# Patient Record
Sex: FEMALE | Race: WHITE | NOT HISPANIC OR LATINO | Employment: OTHER | URBAN - NONMETROPOLITAN AREA
[De-identification: names, ages, dates, MRNs, and addresses within clinical notes are randomized per-mention and may not be internally consistent; named-entity substitution may affect disease eponyms.]

---

## 2017-05-02 ENCOUNTER — TRANSCRIBE ORDERS (OUTPATIENT)
Dept: ADMINISTRATIVE | Facility: HOSPITAL | Age: 82
End: 2017-05-02

## 2017-05-02 DIAGNOSIS — Z12.39 ENCOUNTER FOR SCREENING FOR MALIGNANT NEOPLASM OF BREAST: Primary | ICD-10-CM

## 2017-05-10 ENCOUNTER — TRANSCRIBE ORDERS (OUTPATIENT)
Dept: ADMINISTRATIVE | Facility: HOSPITAL | Age: 82
End: 2017-05-10

## 2017-05-10 ENCOUNTER — HOSPITAL ENCOUNTER (OUTPATIENT)
Dept: GENERAL RADIOLOGY | Facility: HOSPITAL | Age: 82
Discharge: HOME | End: 2017-05-10
Attending: FAMILY MEDICINE

## 2017-05-10 ENCOUNTER — HOSPITAL ENCOUNTER (OUTPATIENT)
Dept: MAMMOGRAPHY | Facility: HOSPITAL | Age: 82
Discharge: HOME | End: 2017-05-10
Attending: FAMILY MEDICINE | Admitting: FAMILY MEDICINE

## 2017-05-10 DIAGNOSIS — M79.604 BILATERAL LEG PAIN: Primary | ICD-10-CM

## 2017-05-10 DIAGNOSIS — M79.605 BILATERAL LEG PAIN: Primary | ICD-10-CM

## 2017-05-10 DIAGNOSIS — Z12.31 ENCOUNTER FOR SCREENING MAMMOGRAM FOR BREAST CANCER: ICD-10-CM

## 2017-05-10 PROCEDURE — 72110 X-RAY EXAM L-2 SPINE 4/>VWS: CPT

## 2017-05-10 PROCEDURE — 77063 BREAST TOMOSYNTHESIS BI: CPT

## 2017-05-10 PROCEDURE — G0202 SCR MAMMO BI INCL CAD: HCPCS

## 2017-11-01 ENCOUNTER — TRANSCRIBE ORDERS (OUTPATIENT)
Dept: ADMINISTRATIVE | Facility: HOSPITAL | Age: 82
End: 2017-11-01

## 2017-11-01 DIAGNOSIS — R92.1 BREAST CALCIFICATIONS: Primary | ICD-10-CM

## 2017-11-07 ENCOUNTER — HOSPITAL ENCOUNTER (OUTPATIENT)
Dept: MAMMOGRAPHY | Facility: HOSPITAL | Age: 82
Discharge: HOME | End: 2017-11-07
Attending: FAMILY MEDICINE | Admitting: FAMILY MEDICINE

## 2017-11-07 ENCOUNTER — APPOINTMENT (OUTPATIENT)
Dept: MAMMOGRAPHY | Facility: HOSPITAL | Age: 82
End: 2017-11-07
Attending: FAMILY MEDICINE

## 2017-11-07 DIAGNOSIS — R92.1 BREAST CALCIFICATIONS: ICD-10-CM

## 2017-11-07 PROCEDURE — G0206 DX MAMMO INCL CAD UNI: HCPCS

## 2017-11-07 PROCEDURE — G0279 TOMOSYNTHESIS, MAMMO: HCPCS

## 2017-11-09 ENCOUNTER — DOCUMENTATION (OUTPATIENT)
Dept: ULTRASOUND IMAGING | Facility: HOSPITAL | Age: 82
End: 2017-11-09

## 2017-11-09 ENCOUNTER — DOCUMENTATION (OUTPATIENT)
Dept: MAMMOGRAPHY | Facility: HOSPITAL | Age: 82
End: 2017-11-09

## 2017-11-09 NOTE — PROGRESS NOTES
Spoke with patient regarding need for a left breast stereotactic biopsy.  She states she is on blood thinner but I do not see one on her list.  Call placed to Dr. Pereira regarding above.  Will await return call.  Pre, during and post instructions given.  Patient has stereo educational brochure.

## 2017-11-10 ENCOUNTER — TRANSCRIBE ORDERS (OUTPATIENT)
Dept: ADMINISTRATIVE | Facility: HOSPITAL | Age: 82
End: 2017-11-10

## 2017-11-10 DIAGNOSIS — R92.1 BREAST CALCIFICATION, LEFT: Primary | ICD-10-CM

## 2017-11-13 ENCOUNTER — DOCUMENTATION (OUTPATIENT)
Dept: MAMMOGRAPHY | Facility: HOSPITAL | Age: 82
End: 2017-11-13

## 2017-11-13 NOTE — PROGRESS NOTES
Patient was scheduled for left breast stereotactic biopsy per Dr. Pereira's office for Wednesday November 15, 2017 at 9:30am.      Spoke with [place] with [employer].  No pre auth needed.  Reference # [id number].

## 2017-11-13 NOTE — PROGRESS NOTES
Call received from Delfina with Dr. Pereira on voicemail.  Ok to proceed with left breast biopsy for patient.

## 2017-11-15 ENCOUNTER — HOSPITAL ENCOUNTER (OUTPATIENT)
Dept: MAMMOGRAPHY | Facility: HOSPITAL | Age: 82
Discharge: HOME | End: 2017-11-15
Attending: FAMILY MEDICINE | Admitting: FAMILY MEDICINE

## 2017-11-15 ENCOUNTER — HOSPITAL ENCOUNTER (OUTPATIENT)
Dept: MAMMOGRAPHY | Facility: HOSPITAL | Age: 82
Discharge: HOME | End: 2017-11-15
Attending: FAMILY MEDICINE

## 2017-11-15 VITALS — DIASTOLIC BLOOD PRESSURE: 75 MMHG | SYSTOLIC BLOOD PRESSURE: 159 MMHG

## 2017-11-15 DIAGNOSIS — R92.1 BREAST CALCIFICATION, LEFT: ICD-10-CM

## 2017-11-15 PROCEDURE — 88305 TISSUE EXAM BY PATHOLOGIST: CPT | Performed by: FAMILY MEDICINE

## 2017-11-15 PROCEDURE — A4648 IMPLANTABLE TISSUE MARKER: HCPCS

## 2017-11-15 RX ORDER — LIDOCAINE HYDROCHLORIDE 10 MG/ML
10 INJECTION, SOLUTION INFILTRATION; PERINEURAL ONCE
Status: DISCONTINUED | OUTPATIENT
Start: 2017-11-15 | End: 2017-11-25 | Stop reason: HOSPADM

## 2017-11-15 RX ORDER — LIDOCAINE HYDROCHLORIDE AND EPINEPHRINE 10; 10 MG/ML; UG/ML
10 INJECTION, SOLUTION INFILTRATION; PERINEURAL ONCE
Status: DISCONTINUED | OUTPATIENT
Start: 2017-11-15 | End: 2017-11-25 | Stop reason: HOSPADM

## 2017-11-16 ENCOUNTER — DOCUMENTATION (OUTPATIENT)
Dept: MAMMOGRAPHY | Facility: HOSPITAL | Age: 82
End: 2017-11-16

## 2017-11-16 LAB
CYTO UR: NORMAL
LAB AP CASE REPORT: NORMAL
LAB AP CLINICAL INFORMATION: NORMAL
Lab: NORMAL
PATH REPORT.FINAL DX SPEC: NORMAL
PATH REPORT.GROSS SPEC: NORMAL

## 2017-11-16 NOTE — PROGRESS NOTES
Pathology complete of left breast stereotactic biopsy of left breast done yesterday- pathology is benign, however there is an intraductal papilloma present which warrants a recommendation for surgical consult per the radiologists.  The above message was left on nurse voicemail of Dr. Delfina Pereira.

## 2017-11-24 ENCOUNTER — HOSPITAL ENCOUNTER (OUTPATIENT)
Facility: HOSPITAL | Age: 82
Setting detail: OBSERVATION
Discharge: HOME | End: 2017-11-25
Attending: FAMILY MEDICINE | Admitting: INTERNAL MEDICINE

## 2017-11-24 ENCOUNTER — APPOINTMENT (OUTPATIENT)
Dept: GENERAL RADIOLOGY | Facility: HOSPITAL | Age: 82
End: 2017-11-24

## 2017-11-24 DIAGNOSIS — R07.9 CHEST PAIN, UNSPECIFIED TYPE: Primary | ICD-10-CM

## 2017-11-24 LAB
ALBUMIN SERPL-MCNC: 3.9 G/DL (ref 3.5–5)
ALBUMIN/GLOB SERPL: 1.6 G/DL (ref 1.1–2.5)
ALP SERPL-CCNC: 68 U/L (ref 24–120)
ALT SERPL W P-5'-P-CCNC: 32 U/L (ref 0–54)
ANION GAP SERPL CALCULATED.3IONS-SCNC: 9 MMOL/L (ref 4–13)
APTT PPP: 24.9 SECONDS (ref 24.1–34.8)
AST SERPL-CCNC: 29 U/L (ref 7–45)
BACTERIA UR QL AUTO: ABNORMAL /HPF
BASOPHILS # BLD AUTO: 0.04 10*3/MM3 (ref 0–0.2)
BASOPHILS NFR BLD AUTO: 0.5 % (ref 0–2)
BILIRUB SERPL-MCNC: 0.5 MG/DL (ref 0.1–1)
BILIRUB UR QL STRIP: NEGATIVE
BUN BLD-MCNC: 35 MG/DL (ref 5–21)
BUN/CREAT SERPL: 24.5 (ref 7–25)
CALCIUM SPEC-SCNC: 10 MG/DL (ref 8.4–10.4)
CHLORIDE SERPL-SCNC: 104 MMOL/L (ref 98–110)
CLARITY UR: CLEAR
CO2 SERPL-SCNC: 29 MMOL/L (ref 24–31)
COLOR UR: YELLOW
CREAT BLD-MCNC: 1.43 MG/DL (ref 0.5–1.4)
DEPRECATED RDW RBC AUTO: 46.8 FL (ref 40–54)
EOSINOPHIL # BLD AUTO: 0.15 10*3/MM3 (ref 0–0.7)
EOSINOPHIL NFR BLD AUTO: 1.9 % (ref 0–4)
ERYTHROCYTE [DISTWIDTH] IN BLOOD BY AUTOMATED COUNT: 13.7 % (ref 12–15)
GFR SERPL CREATININE-BSD FRML MDRD: 35 ML/MIN/1.73
GLOBULIN UR ELPH-MCNC: 2.5 GM/DL
GLUCOSE BLD-MCNC: 135 MG/DL (ref 70–100)
GLUCOSE UR STRIP-MCNC: NEGATIVE MG/DL
HCT VFR BLD AUTO: 35.7 % (ref 37–47)
HGB BLD-MCNC: 11.4 G/DL (ref 12–16)
HGB UR QL STRIP.AUTO: NEGATIVE
HYALINE CASTS UR QL AUTO: ABNORMAL /LPF
IMM GRANULOCYTES # BLD: 0.02 10*3/MM3 (ref 0–0.03)
IMM GRANULOCYTES NFR BLD: 0.3 % (ref 0–5)
INR PPP: 0.89 (ref 0.91–1.09)
KETONES UR QL STRIP: NEGATIVE
LEUKOCYTE ESTERASE UR QL STRIP.AUTO: ABNORMAL
LIPASE SERPL-CCNC: 100 U/L (ref 23–203)
LYMPHOCYTES # BLD AUTO: 2.42 10*3/MM3 (ref 0.72–4.86)
LYMPHOCYTES NFR BLD AUTO: 30.6 % (ref 15–45)
MCH RBC QN AUTO: 29.8 PG (ref 28–32)
MCHC RBC AUTO-ENTMCNC: 31.9 G/DL (ref 33–36)
MCV RBC AUTO: 93.5 FL (ref 82–98)
MONOCYTES # BLD AUTO: 0.82 10*3/MM3 (ref 0.19–1.3)
MONOCYTES NFR BLD AUTO: 10.4 % (ref 4–12)
NEUTROPHILS # BLD AUTO: 4.46 10*3/MM3 (ref 1.87–8.4)
NEUTROPHILS NFR BLD AUTO: 56.3 % (ref 39–78)
NITRITE UR QL STRIP: NEGATIVE
PH UR STRIP.AUTO: <=5 [PH] (ref 5–8)
PLATELET # BLD AUTO: 217 10*3/MM3 (ref 130–400)
PMV BLD AUTO: 9.6 FL (ref 6–12)
POTASSIUM BLD-SCNC: 4.2 MMOL/L (ref 3.5–5.3)
PROT SERPL-MCNC: 6.4 G/DL (ref 6.3–8.7)
PROT UR QL STRIP: NEGATIVE
PROTHROMBIN TIME: 12.3 SECONDS (ref 11.9–14.6)
RBC # BLD AUTO: 3.82 10*6/MM3 (ref 4.2–5.4)
RBC # UR: ABNORMAL /HPF
REF LAB TEST METHOD: ABNORMAL
SODIUM BLD-SCNC: 142 MMOL/L (ref 135–145)
SP GR UR STRIP: 1.01 (ref 1–1.03)
SQUAMOUS #/AREA URNS HPF: ABNORMAL /HPF
TROPONIN I SERPL-MCNC: <0.012 NG/ML (ref 0–0.03)
UROBILINOGEN UR QL STRIP: ABNORMAL
WBC NRBC COR # BLD: 7.91 10*3/MM3 (ref 4.8–10.8)
WBC UR QL AUTO: ABNORMAL /HPF

## 2017-11-24 PROCEDURE — 99284 EMERGENCY DEPT VISIT MOD MDM: CPT

## 2017-11-24 PROCEDURE — 71010 HC CHEST PA OR AP: CPT

## 2017-11-24 PROCEDURE — 80053 COMPREHEN METABOLIC PANEL: CPT | Performed by: FAMILY MEDICINE

## 2017-11-24 PROCEDURE — 83690 ASSAY OF LIPASE: CPT | Performed by: FAMILY MEDICINE

## 2017-11-24 PROCEDURE — 85610 PROTHROMBIN TIME: CPT | Performed by: FAMILY MEDICINE

## 2017-11-24 PROCEDURE — 84484 ASSAY OF TROPONIN QUANT: CPT | Performed by: FAMILY MEDICINE

## 2017-11-24 PROCEDURE — 85025 COMPLETE CBC W/AUTO DIFF WBC: CPT | Performed by: FAMILY MEDICINE

## 2017-11-24 PROCEDURE — 85730 THROMBOPLASTIN TIME PARTIAL: CPT | Performed by: FAMILY MEDICINE

## 2017-11-24 PROCEDURE — 93010 ELECTROCARDIOGRAM REPORT: CPT | Performed by: INTERNAL MEDICINE

## 2017-11-24 PROCEDURE — 81001 URINALYSIS AUTO W/SCOPE: CPT | Performed by: FAMILY MEDICINE

## 2017-11-24 PROCEDURE — 93005 ELECTROCARDIOGRAM TRACING: CPT

## 2017-11-24 RX ORDER — CYCLOSPORINE 0.5 MG/ML
1 EMULSION OPHTHALMIC EVERY 12 HOURS
COMMUNITY

## 2017-11-24 RX ORDER — LEVOTHYROXINE SODIUM 112 UG/1
112 CAPSULE ORAL DAILY
COMMUNITY
End: 2018-08-20

## 2017-11-24 RX ORDER — ALENDRONATE SODIUM 70 MG/1
70 TABLET ORAL
COMMUNITY

## 2017-11-24 RX ORDER — AMLODIPINE BESYLATE 10 MG/1
10 TABLET ORAL DAILY
COMMUNITY
End: 2022-05-02 | Stop reason: HOSPADM

## 2017-11-25 ENCOUNTER — APPOINTMENT (OUTPATIENT)
Dept: ULTRASOUND IMAGING | Facility: HOSPITAL | Age: 82
End: 2017-11-25

## 2017-11-25 ENCOUNTER — APPOINTMENT (OUTPATIENT)
Dept: CARDIOLOGY | Facility: HOSPITAL | Age: 82
End: 2017-11-25

## 2017-11-25 VITALS
SYSTOLIC BLOOD PRESSURE: 112 MMHG | RESPIRATION RATE: 16 BRPM | DIASTOLIC BLOOD PRESSURE: 66 MMHG | HEART RATE: 90 BPM | WEIGHT: 125.38 LBS | HEIGHT: 61 IN | TEMPERATURE: 98.9 F | BODY MASS INDEX: 23.67 KG/M2 | OXYGEN SATURATION: 95 %

## 2017-11-25 PROBLEM — M79.622 LEFT UPPER ARM PAIN: Status: ACTIVE | Noted: 2017-11-25

## 2017-11-25 PROBLEM — R07.9 CHEST PAIN: Status: ACTIVE | Noted: 2017-11-25

## 2017-11-25 LAB
ALBUMIN SERPL-MCNC: 3.6 G/DL (ref 3.5–5)
ALBUMIN/GLOB SERPL: 1.5 G/DL (ref 1.1–2.5)
ALP SERPL-CCNC: 67 U/L (ref 24–120)
ALT SERPL W P-5'-P-CCNC: 37 U/L (ref 0–54)
ANION GAP SERPL CALCULATED.3IONS-SCNC: 8 MMOL/L (ref 4–13)
ANION GAP SERPL CALCULATED.3IONS-SCNC: 8 MMOL/L (ref 4–13)
ARTICHOKE IGE QN: 68 MG/DL (ref 0–99)
AST SERPL-CCNC: 36 U/L (ref 7–45)
BASOPHILS # BLD AUTO: 0.03 10*3/MM3 (ref 0–0.2)
BASOPHILS NFR BLD AUTO: 0.4 % (ref 0–2)
BH CV STRESS BP STAGE 1: NORMAL
BH CV STRESS BP STAGE 2: NORMAL
BH CV STRESS BP STAGE 3: NORMAL
BH CV STRESS DOSE DOBUTAMINE STAGE 1: 10
BH CV STRESS DOSE DOBUTAMINE STAGE 2: 20
BH CV STRESS DOSE DOBUTAMINE STAGE 3: 30
BH CV STRESS DURATION MIN STAGE 1: 3
BH CV STRESS DURATION MIN STAGE 2: 3
BH CV STRESS DURATION MIN STAGE 3: 2
BH CV STRESS DURATION SEC STAGE 1: 0
BH CV STRESS DURATION SEC STAGE 2: 0
BH CV STRESS DURATION SEC STAGE 3: 41
BH CV STRESS HR STAGE 1: 86
BH CV STRESS HR STAGE 2: 96
BH CV STRESS HR STAGE 3: 115
BH CV STRESS PROTOCOL 1: NORMAL
BH CV STRESS RECOVERY BP: NORMAL MMHG
BH CV STRESS RECOVERY HR: 100 BPM
BH CV STRESS STAGE 1: 1
BH CV STRESS STAGE 2: 2
BH CV STRESS STAGE 3: 3
BILIRUB SERPL-MCNC: 0.4 MG/DL (ref 0.1–1)
BUN BLD-MCNC: 21 MG/DL (ref 5–21)
BUN BLD-MCNC: 30 MG/DL (ref 5–21)
BUN/CREAT SERPL: 18.3 (ref 7–25)
BUN/CREAT SERPL: 23.4 (ref 7–25)
CALCIUM SPEC-SCNC: 9.2 MG/DL (ref 8.4–10.4)
CALCIUM SPEC-SCNC: 9.3 MG/DL (ref 8.4–10.4)
CHLORIDE SERPL-SCNC: 107 MMOL/L (ref 98–110)
CHLORIDE SERPL-SCNC: 109 MMOL/L (ref 98–110)
CHOLEST SERPL-MCNC: 139 MG/DL (ref 130–200)
CO2 SERPL-SCNC: 26 MMOL/L (ref 24–31)
CO2 SERPL-SCNC: 27 MMOL/L (ref 24–31)
CREAT BLD-MCNC: 1.15 MG/DL (ref 0.5–1.4)
CREAT BLD-MCNC: 1.28 MG/DL (ref 0.5–1.4)
DEPRECATED RDW RBC AUTO: 46.5 FL (ref 40–54)
EOSINOPHIL # BLD AUTO: 0.22 10*3/MM3 (ref 0–0.7)
EOSINOPHIL NFR BLD AUTO: 3.2 % (ref 0–4)
ERYTHROCYTE [DISTWIDTH] IN BLOOD BY AUTOMATED COUNT: 13.7 % (ref 12–15)
GFR SERPL CREATININE-BSD FRML MDRD: 39 ML/MIN/1.73
GFR SERPL CREATININE-BSD FRML MDRD: 44 ML/MIN/1.73
GLOBULIN UR ELPH-MCNC: 2.4 GM/DL
GLUCOSE BLD-MCNC: 116 MG/DL (ref 70–100)
GLUCOSE BLD-MCNC: 84 MG/DL (ref 70–100)
HBA1C MFR BLD: 5.6 %
HCT VFR BLD AUTO: 35.3 % (ref 37–47)
HDLC SERPL-MCNC: 57 MG/DL
HGB BLD-MCNC: 11.2 G/DL (ref 12–16)
IMM GRANULOCYTES # BLD: 0.02 10*3/MM3 (ref 0–0.03)
IMM GRANULOCYTES NFR BLD: 0.3 % (ref 0–5)
LDLC/HDLC SERPL: 1.22 {RATIO}
LYMPHOCYTES # BLD AUTO: 2.46 10*3/MM3 (ref 0.72–4.86)
LYMPHOCYTES NFR BLD AUTO: 35.4 % (ref 15–45)
MAGNESIUM SERPL-MCNC: 1.9 MG/DL (ref 1.4–2.2)
MAXIMAL PREDICTED HEART RATE: 131 BPM
MCH RBC QN AUTO: 29.8 PG (ref 28–32)
MCHC RBC AUTO-ENTMCNC: 31.7 G/DL (ref 33–36)
MCV RBC AUTO: 93.9 FL (ref 82–98)
MONOCYTES # BLD AUTO: 0.86 10*3/MM3 (ref 0.19–1.3)
MONOCYTES NFR BLD AUTO: 12.4 % (ref 4–12)
NEUTROPHILS # BLD AUTO: 3.35 10*3/MM3 (ref 1.87–8.4)
NEUTROPHILS NFR BLD AUTO: 48.3 % (ref 39–78)
PERCENT MAX PREDICTED HR: 87.79 %
PHOSPHATE SERPL-MCNC: 4.1 MG/DL (ref 2.5–4.5)
PLATELET # BLD AUTO: 205 10*3/MM3 (ref 130–400)
PMV BLD AUTO: 10.2 FL (ref 6–12)
POTASSIUM BLD-SCNC: 4.2 MMOL/L (ref 3.5–5.3)
POTASSIUM BLD-SCNC: 4.3 MMOL/L (ref 3.5–5.3)
PROT SERPL-MCNC: 6 G/DL (ref 6.3–8.7)
RBC # BLD AUTO: 3.76 10*6/MM3 (ref 4.2–5.4)
SODIUM BLD-SCNC: 142 MMOL/L (ref 135–145)
SODIUM BLD-SCNC: 143 MMOL/L (ref 135–145)
SODIUM UR-SCNC: 86 MMOL/L (ref 30–90)
STRESS BASELINE BP: NORMAL MMHG
STRESS BASELINE HR: 90 BPM
STRESS PERCENT HR: 103 %
STRESS POST EXERCISE DUR MIN: 8 MIN
STRESS POST EXERCISE DUR SEC: 41 SEC
STRESS POST PEAK BP: NORMAL MMHG
STRESS POST PEAK HR: 115 BPM
STRESS TARGET HR: 111 BPM
TRIGL SERPL-MCNC: 62 MG/DL (ref 0–149)
TROPONIN I SERPL-MCNC: <0.012 NG/ML (ref 0–0.03)
TROPONIN I SERPL-MCNC: <0.012 NG/ML (ref 0–0.03)
WBC NRBC COR # BLD: 6.94 10*3/MM3 (ref 4.8–10.8)

## 2017-11-25 PROCEDURE — 84300 ASSAY OF URINE SODIUM: CPT | Performed by: FAMILY MEDICINE

## 2017-11-25 PROCEDURE — 93350 STRESS TTE ONLY: CPT

## 2017-11-25 PROCEDURE — 85025 COMPLETE CBC W/AUTO DIFF WBC: CPT | Performed by: FAMILY MEDICINE

## 2017-11-25 PROCEDURE — 96372 THER/PROPH/DIAG INJ SC/IM: CPT

## 2017-11-25 PROCEDURE — 96360 HYDRATION IV INFUSION INIT: CPT

## 2017-11-25 PROCEDURE — 93017 CV STRESS TEST TRACING ONLY: CPT

## 2017-11-25 PROCEDURE — 76775 US EXAM ABDO BACK WALL LIM: CPT

## 2017-11-25 PROCEDURE — 83036 HEMOGLOBIN GLYCOSYLATED A1C: CPT | Performed by: FAMILY MEDICINE

## 2017-11-25 PROCEDURE — 93018 CV STRESS TEST I&R ONLY: CPT | Performed by: INTERNAL MEDICINE

## 2017-11-25 PROCEDURE — 93352 ADMIN ECG CONTRAST AGENT: CPT | Performed by: INTERNAL MEDICINE

## 2017-11-25 PROCEDURE — G0378 HOSPITAL OBSERVATION PER HR: HCPCS

## 2017-11-25 PROCEDURE — 80061 LIPID PANEL: CPT | Performed by: FAMILY MEDICINE

## 2017-11-25 PROCEDURE — 84484 ASSAY OF TROPONIN QUANT: CPT | Performed by: FAMILY MEDICINE

## 2017-11-25 PROCEDURE — 83735 ASSAY OF MAGNESIUM: CPT | Performed by: FAMILY MEDICINE

## 2017-11-25 PROCEDURE — 25010000002 HEPARIN (PORCINE) PER 1000 UNITS: Performed by: FAMILY MEDICINE

## 2017-11-25 PROCEDURE — 25010000002 DOBUTAMINE 250 MG/20ML SOLUTION: Performed by: INTERNAL MEDICINE

## 2017-11-25 PROCEDURE — 93350 STRESS TTE ONLY: CPT | Performed by: INTERNAL MEDICINE

## 2017-11-25 PROCEDURE — 96361 HYDRATE IV INFUSION ADD-ON: CPT

## 2017-11-25 PROCEDURE — 84100 ASSAY OF PHOSPHORUS: CPT | Performed by: FAMILY MEDICINE

## 2017-11-25 PROCEDURE — 93010 ELECTROCARDIOGRAM REPORT: CPT | Performed by: INTERNAL MEDICINE

## 2017-11-25 PROCEDURE — 80053 COMPREHEN METABOLIC PANEL: CPT | Performed by: FAMILY MEDICINE

## 2017-11-25 PROCEDURE — 25010000002 PERFLUTREN 6.52 MG/ML SUSPENSION: Performed by: INTERNAL MEDICINE

## 2017-11-25 PROCEDURE — 93005 ELECTROCARDIOGRAM TRACING: CPT | Performed by: FAMILY MEDICINE

## 2017-11-25 RX ORDER — AMLODIPINE BESYLATE 10 MG/1
10 TABLET ORAL
Status: DISCONTINUED | OUTPATIENT
Start: 2017-11-25 | End: 2017-11-25 | Stop reason: HOSPADM

## 2017-11-25 RX ORDER — LEVOTHYROXINE SODIUM 112 UG/1
112 TABLET ORAL
Status: DISCONTINUED | OUTPATIENT
Start: 2017-11-25 | End: 2017-11-25 | Stop reason: HOSPADM

## 2017-11-25 RX ORDER — ACETAMINOPHEN 325 MG/1
650 TABLET ORAL EVERY 4 HOURS PRN
Status: DISCONTINUED | OUTPATIENT
Start: 2017-11-25 | End: 2017-11-25 | Stop reason: HOSPADM

## 2017-11-25 RX ORDER — HEPARIN SODIUM 5000 [USP'U]/ML
5000 INJECTION, SOLUTION INTRAVENOUS; SUBCUTANEOUS EVERY 8 HOURS SCHEDULED
Status: DISCONTINUED | OUTPATIENT
Start: 2017-11-25 | End: 2017-11-25 | Stop reason: HOSPADM

## 2017-11-25 RX ORDER — FAMOTIDINE 20 MG/1
40 TABLET, FILM COATED ORAL DAILY
Status: DISCONTINUED | OUTPATIENT
Start: 2017-11-25 | End: 2017-11-25 | Stop reason: DRUGHIGH

## 2017-11-25 RX ORDER — DOBUTAMINE HYDROCHLORIDE 100 MG/100ML
10-50 INJECTION INTRAVENOUS CONTINUOUS
Status: DISCONTINUED | OUTPATIENT
Start: 2017-11-25 | End: 2017-11-25 | Stop reason: HOSPADM

## 2017-11-25 RX ORDER — ASPIRIN 81 MG/1
81 TABLET ORAL DAILY
Status: DISCONTINUED | OUTPATIENT
Start: 2017-11-25 | End: 2017-11-25 | Stop reason: HOSPADM

## 2017-11-25 RX ORDER — NALOXONE HCL 0.4 MG/ML
0.4 VIAL (ML) INJECTION
Status: DISCONTINUED | OUTPATIENT
Start: 2017-11-25 | End: 2017-11-25 | Stop reason: HOSPADM

## 2017-11-25 RX ORDER — SODIUM CHLORIDE 0.9 % (FLUSH) 0.9 %
1-10 SYRINGE (ML) INJECTION AS NEEDED
Status: DISCONTINUED | OUTPATIENT
Start: 2017-11-25 | End: 2017-11-25 | Stop reason: HOSPADM

## 2017-11-25 RX ORDER — MORPHINE SULFATE 2 MG/ML
1 INJECTION, SOLUTION INTRAMUSCULAR; INTRAVENOUS EVERY 4 HOURS PRN
Status: DISCONTINUED | OUTPATIENT
Start: 2017-11-25 | End: 2017-11-25 | Stop reason: HOSPADM

## 2017-11-25 RX ORDER — SODIUM CHLORIDE 9 MG/ML
100 INJECTION, SOLUTION INTRAVENOUS CONTINUOUS
Status: DISCONTINUED | OUTPATIENT
Start: 2017-11-25 | End: 2017-11-25 | Stop reason: HOSPADM

## 2017-11-25 RX ORDER — ONDANSETRON 2 MG/ML
4 INJECTION INTRAMUSCULAR; INTRAVENOUS EVERY 6 HOURS PRN
Status: DISCONTINUED | OUTPATIENT
Start: 2017-11-25 | End: 2017-11-25 | Stop reason: HOSPADM

## 2017-11-25 RX ORDER — CYCLOSPORINE 0.5 MG/ML
1 EMULSION OPHTHALMIC 2 TIMES DAILY
Status: DISCONTINUED | OUTPATIENT
Start: 2017-11-25 | End: 2017-11-25 | Stop reason: HOSPADM

## 2017-11-25 RX ORDER — FAMOTIDINE 20 MG/1
20 TABLET, FILM COATED ORAL DAILY
Status: DISCONTINUED | OUTPATIENT
Start: 2017-11-25 | End: 2017-11-25 | Stop reason: HOSPADM

## 2017-11-25 RX ADMIN — SODIUM CHLORIDE 100 ML/HR: 9 INJECTION, SOLUTION INTRAVENOUS at 06:32

## 2017-11-25 RX ADMIN — DOBUTAMINE 30 MCG/KG/MIN: 12.5 INJECTION, SOLUTION, CONCENTRATE INTRAVENOUS at 11:22

## 2017-11-25 RX ADMIN — FAMOTIDINE 20 MG: 20 TABLET, FILM COATED ORAL at 08:35

## 2017-11-25 RX ADMIN — LEVOTHYROXINE SODIUM 112 MCG: 112 TABLET ORAL at 06:32

## 2017-11-25 RX ADMIN — PERFLUTREN 8.48 MG: 6.52 INJECTION, SUSPENSION INTRAVENOUS at 11:31

## 2017-11-25 RX ADMIN — ASPIRIN 81 MG: 81 TABLET ORAL at 08:35

## 2017-11-25 RX ADMIN — CYCLOSPORINE 1 DROP: 0.5 EMULSION OPHTHALMIC at 08:35

## 2017-11-25 RX ADMIN — AMLODIPINE BESYLATE 10 MG: 10 TABLET ORAL at 08:35

## 2017-11-25 RX ADMIN — HEPARIN SODIUM 5000 UNITS: 5000 INJECTION, SOLUTION INTRAVENOUS; SUBCUTANEOUS at 06:32

## 2017-11-25 NOTE — PROGRESS NOTES
"Pharmacy Dosing Service  Automatic Renal Adjustment  Pepcid    Assessment/Action/Plan:  Based on prescribing information provided by the drug , Pepcid 40 mg PO every 24 hours, has been changed to Pepcid 20 mg PO every 24 hours. Pharmacy will continue to monitor daily and make further adjustment(s) accordingly.     Subjective:  Mya Chung is a 89 y.o. female     Additional Factors Considered:  • Patient disposition per documentation  • Disease state or condition being treated    Objective:  Ht: 62\" (157.5 cm); Wt: 120 lb (54.4 kg)  Estimated Creatinine Clearance: 22.9 mL/min (by C-G formula based on Cr of 1.43).   Lab Results   Component Value Date    CREATININE 1.43 (H) 11/24/2017       Regino Francois [facility]  11/25/17 3:41 AM    "

## 2017-11-25 NOTE — PROGRESS NOTES
Discharge Planning Assessment   [facility]     Patient Name: Mya Chung  MRN: [id number]  Today's Date: 11/25/2017    Admit Date: 11/24/2017          Discharge Needs Assessment       11/25/17 1009    Living Environment    Lives With alone    Home Accessibility no concerns    Stair Railings at Home none    Type of Financial/Environmental Concern none    Transportation Available car;family or friend will provide    Living Environment    Provides Primary Care For no one    Quality Of Family Relationships supportive    Able to Return to Prior Living Arrangements yes    Discharge Needs Assessment    Concerns To Be Addressed no discharge needs identified    Readmission Within The Last 30 Days no previous admission in last 30 days    Anticipated Changes Related to Illness none    Equipment Currently Used at Home none    Equipment Needed After Discharge none    Discharge Planning Comments --   Pt denies any dc needs and states she does not need  services.            Discharge Plan     None        Discharge Placement     No information found                Demographic Summary     None            Functional Status     None            Psychosocial     None            Abuse/Neglect     None            Legal     None            Substance Abuse     None            Patient Forms     None          SANTOS De Luna

## 2017-11-25 NOTE — ED PROVIDER NOTES
[facility]  eMERGENCY dEPARTMENT eNCOUnter      Pt Name: Mya Chung  MRN: [id number]  Birthdate 2/10/1928  Date of evaluation: 11/25/2017      CHIEF COMPLAINT       Chief Complaint   Patient presents with   • Arm Pain       Nurses Notes reviewed and I agree except as noted in the HPI.      HISTORY OF PRESENT ILLNESS    Mya Chung is a 89 y.o. female who presents   Location/Symptom: Patient presents for left sided arm pain.  It starts in the upper arm and goes down towards her elbow.  Timing/Onset: She Towards that this started about 2 days ago.  She states that finally she was convinced to come in and be checked out.  No treatments prior to arrival.  Exertion seems to make this worse at times.  However at other times it does not seem to do anything to it.  She states that it is extremely random.  She states that it does sometimes go up towards her shoulder as well.       REVIEW OF SYSTEMS     Review of Systems  CONSTITUION: No Fever, No chills, No activity change, No diaphoresis, No unexpected wt change.    HENT: No congestion, no dental problems, no ear pain or discharge,   EYES: No drainage, no itching, no photophobia, no visual disturbance  RESPIRATORY: No apnea, no chest tightness, no cough, no shortness of breath, no stridor, no wheezing  CARDIOVASCULAR: No chest pain, no palpitations, no leg swelling  GI: No abdominal distention, no abdominal pain, no rectal bleeding, no melena, no hematachezia, no diarrhea, no nausea, no vomiting  ENDOCRINE: No polydipsia, no polyphagia, no polyuria, no cold or heat intolerance  : No difficulty urinating, no dyspareunia, nodysuria, no flank pain, no frequency, no genital sore, no hematuria, no menstrual problem if female, no decreased urniation, no hesitation of urination, no vaginal discharge if female, no vaginal pain if female no penile pain if male  ALLERG/IMMUNO:  No env or food allergies, not immunocompromised  NEUROLOGICAL: No dizziness,  "no facial asymmetry, no Headaches, no Light-headedness, no numbess nor paresthesias, no seizures, no speech difficulty, No syncope, no temors, no weakness  HEMATOLOGIC: No adenopathy, no unusual bleeding or bruising  MUSC: No arthralgia, no back pain, no joint swelling, no myalgias, no neck pain, no neck stiffness  SKIN: No rash, no color change, no pallor, no wound  PSYCH: No agitation, no behavior problem, no confusion, no decr concentration, no hallucinations, no suicidal ideation, no homicidal ideation, no self injury, no sleep disturbance  As per the HPI otherwise negative    PAST MEDICAL HISTORY     Past Medical History:   Diagnosis Date   • Hyperlipidemia    • Hypertension    • Hypothyroidism        SURGICAL HISTORY      has a past surgical history that includes Breast biopsy (Bilateral) and  section.    CURRENT MEDICATIONS        Medication List      ASK your doctor about these medications          alendronate 70 MG tablet   Commonly known as:  FOSAMAX       Aliskiren-Hydrochlorothiazide 300-12.5 MG tablet       amLODIPine 10 MG tablet   Commonly known as:  NORVASC       aspirin 81 MG tablet       cycloSPORINE 0.05 % ophthalmic emulsion   Commonly known as:  RESTASIS       levothyroxine sodium 112 MCG capsule   Commonly known as:  TIROSINT           ALLERGIES     is allergic to erythromycin.    FAMILY HISTORY     indicated that the status of her neg hx is unknown.  family history is negative for Breast cancer.    SOCIAL HISTORY      reports that she has never smoked. She does not have any smokeless tobacco history on file. She reports that she does not drink alcohol.    PHYSICAL EXAM     INITIAL VITALS:  height is 62\" (157.5 cm) and weight is 120 lb (54.4 kg). Her temporal artery  temperature is 97.9 °F (36.6 °C). Her blood pressure is 106/52 and her pulse is 80. Her respiration is 12 and oxygen saturation is 93%.    Physical Exam    CONSTITUTIONAL: Well developed, well nourished, not diaphoretic nor " distressed  HENT: Normocephalic, atraumatic, oropharynx clear and moist  EYES: PERRL, EOM normal, no discharge, no scleral icterus  NECK: ROM normal, supple, no tracheal deviation nor JVD, no stridor  CARDIOVASCULAR: Normal rate and rhythm, heart sounds normal, no rub no gallop, intact distal pulses, normal cap refill  PULMONARY: Normal effort and breath sounds, no distress, no wheezes, rhonci or rales, no chest tenderness  ABDOMINAL: Soft, nontender, no guarding, no mass, no rebound, no hernia  GENITOURINARY/ANORECTAL: deferred  MUSCKULOSKELETAL: ROM normal, no tenderness nor deformity, no edema  NEUROLOGICAL: Alert, oriented x 3, DTRS normal, CN x 10 normal, normal tone  SKIN: Warm, dry, no erythema, no rash, normal color  PSYCH: Mood and affect normal, behavior normal, thought content and judgement normal.    DIFFERENTIAL DIAGNOSIS:   MI versus musculoskeletal pain considered but not limited to.    DIAGNOSTIC RESULTS     EKG: All EKG's are interpreted by the Emergency Department Physician who either signs or Co-signs this chart in the absence of a cardiologist.  No acute ST wave changes noted    RADIOLOGY: non-plain film images(s) such as CT, Ultrasound and MRI are read by the radiologist.  Plain radiographic images are visualized and preliminarily interpreted by the emergency physician unless otherwise stated below.  US Renal Limited   Final Result   Left renal cyst.       This report was finalized on 11/25/2017 14:09 by Dr. Rosalinda Maurice MD.      XR Chest 1 View   Final Result   Chronic lung changes without evidence of acute   cardiopulmonary process.   This report was finalized on 11/24/2017 21:15 by Dr. Ghulam Ruiz MD.                 LABS:   Lab Results (last 24 hours)     Procedure Component Value Units Date/Time    CBC & Differential [[id number]] Collected:  11/24/17 2121    Specimen:  Blood Updated:  11/24/17 2131    Narrative:       The following orders were created for panel order CBC &  Differential.  Procedure                               Abnormality         Status                     ---------                               -----------         ------                     CBC Auto Differential[[id number]]        Abnormal            Final result                 Please view results for these tests on the individual orders.    Comprehensive Metabolic Panel [[id number]]  (Abnormal) Collected:  11/24/17 2121    Specimen:  Blood Updated:  11/24/17 2138     Glucose 135 (H) mg/dL      BUN 35 (H) mg/dL      Creatinine 1.43 (H) mg/dL      Sodium 142 mmol/L      Potassium 4.2 mmol/L      Chloride 104 mmol/L      CO2 29.0 mmol/L      Calcium 10.0 mg/dL      Total Protein 6.4 g/dL      Albumin 3.90 g/dL      ALT (SGPT) 32 U/L      AST (SGOT) 29 U/L      Alkaline Phosphatase 68 U/L      Total Bilirubin 0.5 mg/dL      eGFR Non African Amer 35 (L) mL/min/1.73      Globulin 2.5 gm/dL      A/G Ratio 1.6 g/dL      BUN/Creatinine Ratio 24.5     Anion Gap 9.0 mmol/L     Narrative:       The MDRD GFR formula is only valid for adults with stable renal function between ages 18 and 70.    Protime-INR [[id number]]  (Abnormal) Collected:  11/24/17 2121    Specimen:  Blood Updated:  11/24/17 2143     Protime 12.3 Seconds      INR 0.89 (L)    aPTT [[id number]]  (Normal) Collected:  11/24/17 2121    Specimen:  Blood Updated:  11/24/17 2143     PTT 24.9 seconds     Lipase [[id number]]  (Normal) Collected:  11/24/17 2121    Specimen:  Blood Updated:  11/24/17 2138     Lipase 100 U/L     Troponin [[id number]]  (Normal) Collected:  11/24/17 2121    Specimen:  Blood Updated:  11/24/17 2150     Troponin I <0.012 ng/mL     CBC Auto Differential [[id number]]  (Abnormal) Collected:  11/24/17 2121    Specimen:  Blood Updated:  11/24/17 2131     WBC 7.91 10*3/mm3      RBC 3.82 (L) 10*6/mm3      Hemoglobin 11.4 (L) g/dL      Hematocrit 35.7 (L) %      MCV 93.5 fL      MCH 29.8 pg      MCHC 31.9 (L) g/dL      RDW 13.7 %      RDW-SD 46.8 fl       "MPV 9.6 fL      Platelets 217 10*3/mm3      Neutrophil % 56.3 %      Lymphocyte % 30.6 %      Monocyte % 10.4 %      Eosinophil % 1.9 %      Basophil % 0.5 %      Immature Grans % 0.3 %      Neutrophils, Absolute 4.46 10*3/mm3      Lymphocytes, Absolute 2.42 10*3/mm3      Monocytes, Absolute 0.82 10*3/mm3      Eosinophils, Absolute 0.15 10*3/mm3      Basophils, Absolute 0.04 10*3/mm3      Immature Grans, Absolute 0.02 10*3/mm3     Urinalysis With / Culture If Indicated - Urine, Clean Catch [[id number]]  (Abnormal) Collected:  11/24/17 2130    Specimen:  Urine from Urine, Clean Catch Updated:  11/24/17 2146     Color, UA Yellow     Appearance, UA Clear     pH, UA <=5.0     Specific Gravity, UA 1.014     Glucose, UA Negative     Ketones, UA Negative     Bilirubin, UA Negative     Blood, UA Negative     Protein, UA Negative     Leuk Esterase, UA Trace (A)     Nitrite, UA Negative     Urobilinogen, UA 0.2 E.U./dL    Urinalysis, Microscopic Only - Urine, Clean Catch [[id number]]  (Abnormal) Collected:  11/24/17 2130    Specimen:  Urine from Urine, Clean Catch Updated:  11/24/17 2146     RBC, UA 3-5 (A) /HPF      WBC, UA 0-2 (A) /HPF      Bacteria, UA None Seen /HPF      Squamous Epithelial Cells, UA None Seen /HPF      Hyaline Casts, UA None Seen /LPF      Methodology Automated Microscopy    Troponin [[id number]]  (Normal) Collected:  11/25/17 0009    Specimen:  Blood Updated:  11/25/17 0037     Troponin I <0.012 ng/mL           EMERGENCY DEPARTMENT COURSE:   Vitals:    Vitals:    11/24/17 1949 11/24/17 2131 11/24/17 2132 11/24/17 2231   BP: 141/57 135/83 128/59 106/52   BP Location: Right arm      Patient Position: Sitting      Pulse: 90 85 84 80   Resp: 12      Temp: 97.9 °F (36.6 °C)      TempSrc: Temporal Artery       SpO2: 97% 96% 97% 93%   Weight: 120 lb (54.4 kg)      Height: 62\" (157.5 cm)          The patient was given the following medications:  Medications - No data to display    ED Course       CRITICAL " CARE:  none    CONSULTS:  none    PROCEDURES:  None    FINAL IMPRESSION      1. Chest pain, unspecified type          DISPOSITION/PLAN   Admit in stable condition      PATIENT REFERRED TO:  No follow-up provider specified.    DISCHARGE MEDICATIONS:     Medication List      ASK your doctor about these medications          alendronate 70 MG tablet   Commonly known as:  FOSAMAX       Aliskiren-Hydrochlorothiazide 300-12.5 MG tablet       amLODIPine 10 MG tablet   Commonly known as:  NORVASC       aspirin 81 MG tablet       cycloSPORINE 0.05 % ophthalmic emulsion   Commonly known as:  RESTASIS       levothyroxine sodium 112 MCG capsule   Commonly known as:  TIROSINT           (Please note that portions of this note were completed with a voice recognition program.)    Bisi Browne, DO Bisi Browne DO  11/26/17 0511

## 2017-11-25 NOTE — DISCHARGE SUMMARY
[facility]  DISCHARGE SUMMARY       Date of Admission: 11/24/2017  Date of Discharge:  11/25/2017  Primary Care Physician: Delfina Pereira,     Discharge Diagnoses:  Hospital Problem List     * (Principal)Left upper arm pain        Discharge diagnoses   1.  Left arm pain, negative troponin, negative stress echocardiogram  2.  Acute kidney injury, mild elevation in creatinine, resolved at discharge.  3.  Essential hypertension  4.  Hyperlipidemia  5.  Hypothyroidism  6.  Recent left breast biopsy  Presenting Problem/History of Present Illness:  Chest pain, unspecified type [R07.9]     Chief Complaint on Day of Discharge: No complaints  History of Present Illness on Day of Discharge:   Sitting up in room.  Son present in room.  She denies nausea, vomiting or abdominal pain.  She denies chest pain, palpitations or shortness of breath.  She reports intermittent left upper arm pain from shoulder to elbow over the last 2-3 days.  She recently had left breast biopsy.  In retrospect she thinks arm pain may likely be related to recent breast biopsy.  She had negative stress test today.  Troponins all negative.     Consults: None    Procedures Performed: None    Pertinent Test Results:     Laboratory Data:      Results from last 7 days  Lab Units 11/25/17  0402 11/24/17  2121   WBC 10*3/mm3 6.94 7.91   HEMOGLOBIN g/dL 11.2* 11.4*   HEMATOCRIT % 35.3* 35.7*   PLATELETS 10*3/mm3 205 217          Results from last 7 days  Lab Units 11/25/17  1313 11/25/17  0402 11/24/17  2121   SODIUM mmol/L 143 142 142   POTASSIUM mmol/L 4.2 4.3 4.2   CHLORIDE mmol/L 109 107 104   CO2 mmol/L 26.0 27.0 29.0   BUN mg/dL 21 30* 35*   CREATININE mg/dL 1.15 1.28 1.43*   CALCIUM mg/dL 9.2 9.3 10.0   BILIRUBIN mg/dL  --  0.4 0.5   ALK PHOS U/L  --  67 68   ALT (SGPT) U/L  --  37 32   AST (SGOT) U/L  --  36 29   GLUCOSE mg/dL 116* 84 135*       Lab Results  Lab Value Date/Time   TROPONINI  <0.012 11/25/2017 0402   TROPONINI <0.012 11/25/2017 0009   TROPONINI <0.012 11/24/2017 2121   TROPONINI <0.012 03/28/2016 2320   TROPONINI <0.012 03/28/2016 1859      Collected: 11/25/17 0706      Lab Status: Final result Specimen: Urine from Urine, Clean Catch Updated: 11/25/17 0832      Sodium, Urine 86        Collected: 11/25/17 0402      Lab Status: Final result Specimen: Blood Updated: 11/25/17 0514      Total Cholesterol 139 mg/dL       Triglycerides 62 mg/dL       HDL Cholesterol 57 mg/dL       LDL Cholesterol  68 mg/dL       LDL/HDL Ratio 1.22     Magnesium [[id number]] (Normal) Collected: 11/25/17 0402     Lab Status: Final result Specimen: Blood Updated: 11/25/17 0504      Magnesium 1.9 mg/dL      Phosphorus [[id number]] (Normal) Collected: 11/25/17 0402     Lab Status: Final result Specimen: Blood Updated: 11/25/17 0504      Phosphorus 4.1 mg/dL      Hemoglobin A1c [[id number]] Collected: 11/25/17 0402     Lab Status: Final result Specimen: Blood Updated: 11/25/17 0545      Hemoglobin A1C 5.6        Specimen: Urine from Urine, Clean Catch Updated: 11/24/17 2146       Color, UA Yellow      Appearance, UA Clear      pH, UA <=5.0      Specific Gravity, UA 1.014      Glucose, UA Negative      Ketones, UA Negative      Bilirubin, UA Negative      Blood, UA Negative      Protein, UA Negative      Leuk Esterase, UA Trace (A)      Nitrite, UA Negative      Urobilinogen, UA 0.2 E.U./dL     Urinalysis, Microscopic Only - Urine, Clean Catch [[id number]] (Abnormal) Collected: 11/24/17 2130     Lab Status: Final result Specimen: Urine from Urine, Clean Catch Updated: 11/24/17 2146      RBC, UA 3-5 (A) /HPF       WBC, UA 0-2 (A) /HPF       Bacteria, UA None Seen /HPF       Squamous Epithelial Cells, UA None Seen /HPF       Hyaline Casts, UA None Seen       Specimen: Blood Updated: 11/24/17 2143         Protime 12.3 Seconds       INR 0.89 (L)     aPTT [[id number]] (Normal) Collected: 11/24/17 2121     Lab Status: Final  result Specimen: Blood Updated: 11/24/17 2143      PTT 24.9 seconds      Lipase [[id number]] (Normal) Collected: 11/24/17 2121     Lab Status: Final result Specimen: Blood Updated: 11/24/17 2138      Lipase 100       Imaging Results (all)     Procedure Component Value Units Date/Time    XR Chest 1 View [[id number]] Collected:  11/24/17 2114     Updated:  11/24/17 2118    Narrative:       EXAMINATION: XR CHEST 1 VW- 11/24/2017 9:14 PM CST     HISTORY: Atypical chest pain     COMPARISON: 05/31/2016     FINDINGS:  Heart appears normal in size. The aorta is tortuous with atherosclerotic  calcifications. Chronic interstitial changes are seen bilaterally. There  is no new focal consolidation or effusion. No pneumothorax. Pulmonary  vasculature is stable. There is biapical pleural thickening. There is  evidence of prior granulomatous exposure. No acute bony abnormality is  identified.       Impression:       Chronic lung changes without evidence of acute  cardiopulmonary process.  This report was finalized on 11/24/2017 21:15 by Dr. Ghulam Ruiz MD.    US Renal Limited [[id number]] Collected:  11/25/17 1408     Updated:  11/25/17 1412    Narrative:       EXAM:  US RENAL LIMITED-     INDICATION:  11/25/2017     COMPARISON:  None available.     TECHNIQUE:  Routine grayscale and color Doppler images were obtained  through the bilateral renal fossae.     FINDINGS:       Right Kidney: The right kidney measures 8.3 cm in longitudinal  dimension. There is good corticomedullary differentiation. There is no  evidence of hydronephrosis. There are no cystic lesions or masses. No  echogenic stone.     Left Kidney: The left kidney measures 10.6 cm in longitudinal dimension.  There is good corticomedullary differentiation. There is no evidence of  hydronephrosis. There is a 2.7 x 1.8 x 3.3 cm cyst in the interpolar  region of the left kidney.. No echogenic stone.     Bladder: The bladder is unremarkable without evidence of mass  or  internal debris.       Impression:       Left renal cyst.     This report was finalized on 11/25/2017 14:09 by Dr. Rosalinda Maurice MD.        Stress echocardiogram 11/25/17  · Low risk for ischemia.  · No ECG or echocardiographic evidence of ischemia.      Hospital Course  Patient is a 89 y.o. female presented to [facility] emergency room 11/24/17 with complaints of left arm pain from left shoulder to left elbow off and on for the last 2-3 days.  She denied any chest pain, palpitations, or shortness of breath.  She denied nausea, vomiting or abdominal pain.  No alleviating or aggravating factors.  She reports recent left breast biopsy.  Troponins negative ×2 in the emergency room.  Patient denied any recent trauma to the left arm.  Creatinine 1.43 with baseline creatinine noted to be 1.07 May 2016.  Chest x-ray chronic lung changes without evidence of acute cardiopulmonary process.    She was admitted to the telemetry floor under the hospitalist service with left arm pain rule out cardiac etiology and acute renal failure.  She was hydrated with IV fluids and serial troponins were monitored.  Creatinine improved to 1.28 and then repeat creatinine 1.15 with IV fluid hydration.  Troponins were monitored and were all negative.  She had stress echocardiogram 11/25 that was low risk for ischemia.  No EKG or or echocardiographic evidence of ischemia.  She had no further episode of left upper arm pain.  She denied anterior chest pain, palpitations or shortness of breath.  Again, she indicates she recently had left breast biopsy and in retrospect she believes that left upper arm pain may be related to recent biopsy.  She remained normal sinus rhythm on telemetry.  She ambulated without shortness of breath or chest pain.  Results of troponins and stress echocardiogram discussed with patient and son.  She has been advised to follow up with her primary care provider Dr. Delfina Pereira next week.  No changes in  "home medications at discharge.    On 11/25/17 she is suitable for discharge.  Cardiac workup negative.  Creatinine 1.15 at discharge.  Troponins negative.  Stress echocardiogram negative.    Physical Exam on Discharge:  /66 (BP Location: Left arm, Patient Position: Sitting)  Pulse 90  Temp 98.9 °F (37.2 °C) (Temporal Artery )   Resp 16  Ht 61\" (154.9 cm)  Wt 125 lb 6 oz (56.9 kg)  SpO2 95%  BMI 23.69 kg/m2  Physical Exam   Constitutional: She is oriented to person, place, and time. She appears well-developed and well-nourished.   HENT:   Head: Normocephalic and atraumatic.   Eyes: EOM are normal. Pupils are equal, round, and reactive to light.   Neck: Normal range of motion. Neck supple. No tracheal deviation present. No thyromegaly present.   Cardiovascular: Normal rate, regular rhythm, normal heart sounds and intact distal pulses.  Exam reveals no gallop and no friction rub.    No murmur heard.  Normal sinus rhythm 77-90 on telemetry   Pulmonary/Chest: Effort normal and breath sounds normal. No respiratory distress. She has no wheezes. She has no rales.   Abdominal: Soft. Bowel sounds are normal. She exhibits no distension. There is no tenderness.   Genitourinary:   Genitourinary Comments: Deferred   Musculoskeletal: Normal range of motion. She exhibits no edema or deformity.   Neurological: She is alert and oriented to person, place, and time. She has normal reflexes.   Skin: Skin is warm and dry. No erythema.   Psychiatric: She has a normal mood and affect. Her behavior is normal. Judgment and thought content normal.     Condition on Discharge:  Stable    Discharge Disposition: Home with family      Discharge Diet:   Diet Instructions     Advance Diet As Tolerated                     Activity at Discharge:   Activity Instructions     Activity as Tolerated                   Discharge Care Plan / Instructions:   1.  Should she have worsening returning symptoms of left arm pain she should seek medical " attention.  2.  Follow with primary care provider Dr. Delfina Pereiar, one week.    Discharge Medications:   Mya Chung   Home Medication Instructions TOÑITO:[id number]    Printed on:11/25/17 7513   Medication Information                      alendronate (FOSAMAX) 70 MG tablet  Take 70 mg by mouth.             Aliskiren-Hydrochlorothiazide 300-12.5 MG tablet  Take  by mouth.             amLODIPine (NORVASC) 10 MG tablet  Take 10 mg by mouth.             aspirin 81 MG tablet  Take 81 mg by mouth.             cycloSPORINE (RESTASIS) 0.05 % ophthalmic emulsion  Apply  to eye.             levothyroxine sodium (TIROSINT) 112 MCG capsule  Take  by mouth.               Follow-up Appointments:   Follow-up Information     Follow up with Delfina Pereira DO .    Specialty:  Family Medicine    Why:  1 week    Contact information:    [address] [address]  [phone]          Test Results Pending at Discharge: None    The above documentation resulted from a face-to-face encounter by me Flor TRAVIS, [facility].    ANGY Montemayor  11/25/17  3:24 PM    Time:  This discharge process required 35 minutes for completion.     Plan discussed with Dr. Mckay, patient, and son.    Time spent in face-to-face evaluation, chart review, planning and education 35 minutes.  Please note that portions of this note may have been completed with a voice recognition program. Efforts were made to edit the dictations, but occasionally words are mistranscribed.        I personally evaluated the patient in conjunction with ANGY Ray and agree with the assessment, treatment plan, and disposition of the patient as recorded by her. My history, exam, and further recommendations are:     I reviewed above information and discussed with Flor.  I also reviewed other record.  I noted she had breast biopsy recently.  She presented with left arm pain.  Serial cardiac markers and stress test were negative.    Patient is hemodynamically stable and appropriate for discharge.    Ernesto Mckay MD  11/26/17  2:06 PM

## 2017-11-25 NOTE — H&P
[facility]  HISTORY AND PHYSICAL    Date of Admission: 2017  Primary Care Physician: Delfina Pereira DO    Subjective     Chief Complaint: Left arm pain    History of Present Illness     89-year-old female with past medical history of hyperlipidemia, hypertension and hypothyroidism comes into the ER with left arm pain.  Patient states she was told by her PCP that left arm pain is a sign of a heart attack.  She wanted to come in to rule out heart attack related symptoms.  Initially patient had troponin ×2 was negative in the ER.  Although her lab showed acute renal failure so decision was made to hold patient for observation and IV hydration.  During examination patient denies any other associated symptoms.  Patient denies any trauma to the left arm or any similar symptoms in the past.        Review of Systems     Otherwise complete ROS reviewed and negative except as mentioned in the HPI.    Past Medical History:   Past Medical History:   Diagnosis Date   • Hyperlipidemia    • Hypertension    • Hypothyroidism      Past Surgical History:  Past Surgical History:   Procedure Laterality Date   • BREAST BIOPSY Bilateral     multiple on both, all benign   •  SECTION     • EYE SURGERY      CATARACT SURGERY   • JOINT REPLACEMENT      RIGHT HIP     Social History:  reports that she has never smoked. She has never used smokeless tobacco. She reports that she does not drink alcohol or use illicit drugs.    Family History: family history is negative for Breast cancer.      Allergies:  Allergies   Allergen Reactions   • Erythromycin Shortness Of Breath     Medications:  Prior to Admission medications    Medication Sig Start Date End Date Taking? Authorizing Provider   alendronate (FOSAMAX) 70 MG tablet Take 70 mg by mouth.    Historical Provider, MD   Aliskiren-Hydrochlorothiazide 300-12.5 MG tablet Take  by mouth.    Historical Provider, MD   amLODIPine  "(NORVASC) 10 MG tablet Take 10 mg by mouth.    Historical Provider, MD   aspirin 81 MG tablet Take 81 mg by mouth.    Historical Provider, MD   cycloSPORINE (RESTASIS) 0.05 % ophthalmic emulsion Apply  to eye.    Historical Provider, MD   levothyroxine sodium (TIROSINT) 112 MCG capsule Take  by mouth.    Historical Provider, MD     Objective     Vital Signs: /59  Pulse 79  Temp 98.3 °F (36.8 °C)  Resp 16  Ht 62\" (157.5 cm)  Wt 120 lb (54.4 kg)  SpO2 93%  BMI 21.95 kg/m2  Physical Exam   Constitutional: She is oriented to person, place, and time. She appears well-developed and well-nourished.   HENT:   Head: Normocephalic and atraumatic.   Eyes: EOM are normal. Pupils are equal, round, and reactive to light.   Neck: Normal range of motion. Neck supple.   Cardiovascular: Normal rate, regular rhythm and normal heart sounds.    Pulmonary/Chest: Effort normal and breath sounds normal.   Abdominal: Soft. Bowel sounds are normal.   Musculoskeletal: Normal range of motion.   Neurological: She is alert and oriented to person, place, and time.   Skin: Skin is warm.   Psychiatric: She has a normal mood and affect. Her behavior is normal. Thought content normal.             Results Reviewed:  Lab Results (last 24 hours)     Procedure Component Value Units Date/Time    CBC & Differential [[id number]] Collected:  11/24/17 2121    Specimen:  Blood Updated:  11/24/17 2131    Narrative:       The following orders were created for panel order CBC & Differential.  Procedure                               Abnormality         Status                     ---------                               -----------         ------                     CBC Auto Differential[[id number]]        Abnormal            Final result                 Please view results for these tests on the individual orders.    CBC Auto Differential [[id number]]  (Abnormal) Collected:  11/24/17 2121    Specimen:  Blood Updated:  11/24/17 2131     WBC 7.91 10*3/mm3  "     RBC 3.82 (L) 10*6/mm3      Hemoglobin 11.4 (L) g/dL      Hematocrit 35.7 (L) %      MCV 93.5 fL      MCH 29.8 pg      MCHC 31.9 (L) g/dL      RDW 13.7 %      RDW-SD 46.8 fl      MPV 9.6 fL      Platelets 217 10*3/mm3      Neutrophil % 56.3 %      Lymphocyte % 30.6 %      Monocyte % 10.4 %      Eosinophil % 1.9 %      Basophil % 0.5 %      Immature Grans % 0.3 %      Neutrophils, Absolute 4.46 10*3/mm3      Lymphocytes, Absolute 2.42 10*3/mm3      Monocytes, Absolute 0.82 10*3/mm3      Eosinophils, Absolute 0.15 10*3/mm3      Basophils, Absolute 0.04 10*3/mm3      Immature Grans, Absolute 0.02 10*3/mm3     Comprehensive Metabolic Panel [[id number]]  (Abnormal) Collected:  11/24/17 2121    Specimen:  Blood Updated:  11/24/17 2138     Glucose 135 (H) mg/dL      BUN 35 (H) mg/dL      Creatinine 1.43 (H) mg/dL      Sodium 142 mmol/L      Potassium 4.2 mmol/L      Chloride 104 mmol/L      CO2 29.0 mmol/L      Calcium 10.0 mg/dL      Total Protein 6.4 g/dL      Albumin 3.90 g/dL      ALT (SGPT) 32 U/L      AST (SGOT) 29 U/L      Alkaline Phosphatase 68 U/L      Total Bilirubin 0.5 mg/dL      eGFR Non African Amer 35 (L) mL/min/1.73      Globulin 2.5 gm/dL      A/G Ratio 1.6 g/dL      BUN/Creatinine Ratio 24.5     Anion Gap 9.0 mmol/L     Narrative:       The MDRD GFR formula is only valid for adults with stable renal function between ages 18 and 70.    Lipase [[id number]]  (Normal) Collected:  11/24/17 2121    Specimen:  Blood Updated:  11/24/17 2138     Lipase 100 U/L     Protime-INR [[id number]]  (Abnormal) Collected:  11/24/17 2121    Specimen:  Blood Updated:  11/24/17 2143     Protime 12.3 Seconds      INR 0.89 (L)    aPTT [[id number]]  (Normal) Collected:  11/24/17 2121    Specimen:  Blood Updated:  11/24/17 2143     PTT 24.9 seconds     Urinalysis With / Culture If Indicated - Urine, Clean Catch [[id number]]  (Abnormal) Collected:  11/24/17 2130    Specimen:  Urine from Urine, Clean Catch Updated:  11/24/17 2146      Color, UA Yellow     Appearance, UA Clear     pH, UA <=5.0     Specific Gravity, UA 1.014     Glucose, UA Negative     Ketones, UA Negative     Bilirubin, UA Negative     Blood, UA Negative     Protein, UA Negative     Leuk Esterase, UA Trace (A)     Nitrite, UA Negative     Urobilinogen, UA 0.2 E.U./dL    Urinalysis, Microscopic Only - Urine, Clean Catch [[id number]]  (Abnormal) Collected:  11/24/17 2130    Specimen:  Urine from Urine, Clean Catch Updated:  11/24/17 2146     RBC, UA 3-5 (A) /HPF      WBC, UA 0-2 (A) /HPF      Bacteria, UA None Seen /HPF      Squamous Epithelial Cells, UA None Seen /HPF      Hyaline Casts, UA None Seen /LPF      Methodology Automated Microscopy    Troponin [[id number]]  (Normal) Collected:  11/24/17 2121    Specimen:  Blood Updated:  11/24/17 2150     Troponin I <0.012 ng/mL     Troponin [[id number]]  (Normal) Collected:  11/25/17 0009    Specimen:  Blood Updated:  11/25/17 0037     Troponin I <0.012 ng/mL         Imaging Results (last 24 hours)     Procedure Component Value Units Date/Time    XR Chest 1 View [[id number]] Collected:  11/24/17 2114     Updated:  11/24/17 2118    Narrative:       EXAMINATION: XR CHEST 1 VW- 11/24/2017 9:14 PM CST     HISTORY: Atypical chest pain     COMPARISON: 05/31/2016     FINDINGS:  Heart appears normal in size. The aorta is tortuous with atherosclerotic  calcifications. Chronic interstitial changes are seen bilaterally. There  is no new focal consolidation or effusion. No pneumothorax. Pulmonary  vasculature is stable. There is biapical pleural thickening. There is  evidence of prior granulomatous exposure. No acute bony abnormality is  identified.       Impression:       Chronic lung changes without evidence of acute  cardiopulmonary process.  This report was finalized on 11/24/2017 21:15 by Dr. Ghulam Ruiz MD.        I have personally reviewed and interpreted the radiology studies and ECG obtained at time of admission.     Assessment / Plan      Assessment:   Hospital Problem List     Chest pain        1.  Acute renal failure  2.  Left arm pain and rule out cardiac etiology  3.  Hyperglycemia  4.  Hypertension  5.  Hyperlipidemia  6.  Hypothyroidism    Plan:     -Admit for observation  -Continue cardiac monitoring  -Continuous pulse ox  -Monitor vitals  -Continue IV fluid  -Follow-up a.m. renal panel  -All up renal ultrasound  -Follow-up urine sodium  -Follow-up urine eosinophil  -Hold nephrotoxic medications for now  -Pain management if needed  -Initial troponin ×2 noted to be negative  -Initial EKG in the ER noted  -Follow-up subsequent troponin level  -Follow-up a.m. EKG  -If indicated consider echocardiogram and/or cardiology consult  -Strict glycemic control  -Maintain optimal blood pressure  -Follow-up lipid panel  -Continue home medications  -GI prophylaxis  -DVT prophylaxis      Code Status: Full code     I discussed the patients findings and my recommendations with the patient    Estimated length of stay 1-2 days    Zeina Juan MD   11/25/17   2:17 AM

## 2017-11-27 ENCOUNTER — TELEPHONE (OUTPATIENT)
Dept: SOCIAL WORK | Facility: HOSPITAL | Age: 82
End: 2017-11-27

## 2017-11-27 NOTE — TELEPHONE ENCOUNTER
Post discharge f/u call for [facility].  Pt states doing well.  No questions concerning medications.  Verified f/u appt and that there are no transportation needs.  Will call if needs arise.

## 2017-11-28 LAB
CYTO UR: NORMAL
LAB AP CASE REPORT: NORMAL
Lab: NORMAL
PATH REPORT.FINAL DX SPEC: NORMAL
PATH REPORT.GROSS SPEC: NORMAL

## 2017-11-30 ENCOUNTER — APPOINTMENT (OUTPATIENT)
Dept: PREADMISSION TESTING | Facility: HOSPITAL | Age: 82
End: 2017-11-30

## 2017-11-30 ENCOUNTER — TRANSCRIBE ORDERS (OUTPATIENT)
Dept: ADMINISTRATIVE | Facility: HOSPITAL | Age: 82
End: 2017-11-30

## 2017-11-30 VITALS
DIASTOLIC BLOOD PRESSURE: 53 MMHG | WEIGHT: 124 LBS | BODY MASS INDEX: 22.82 KG/M2 | HEART RATE: 76 BPM | OXYGEN SATURATION: 99 % | RESPIRATION RATE: 18 BRPM | HEIGHT: 62 IN | SYSTOLIC BLOOD PRESSURE: 135 MMHG

## 2017-11-30 DIAGNOSIS — D36.9 INTRADUCTAL PAPILLOMA: Primary | ICD-10-CM

## 2017-11-30 LAB
ALBUMIN SERPL-MCNC: 4.5 G/DL (ref 3.5–5)
ALBUMIN/GLOB SERPL: 1.5 G/DL (ref 1.1–2.5)
ALP SERPL-CCNC: 83 U/L (ref 24–120)
ALT SERPL W P-5'-P-CCNC: 33 U/L (ref 0–54)
ANION GAP SERPL CALCULATED.3IONS-SCNC: 9 MMOL/L (ref 4–13)
AST SERPL-CCNC: 36 U/L (ref 7–45)
BASOPHILS # BLD AUTO: 0.04 10*3/MM3 (ref 0–0.2)
BASOPHILS NFR BLD AUTO: 0.5 % (ref 0–2)
BILIRUB SERPL-MCNC: 0.8 MG/DL (ref 0.1–1)
BUN BLD-MCNC: 26 MG/DL (ref 5–21)
BUN/CREAT SERPL: 21.8 (ref 7–25)
CALCIUM SPEC-SCNC: 9.8 MG/DL (ref 8.4–10.4)
CHLORIDE SERPL-SCNC: 105 MMOL/L (ref 98–110)
CO2 SERPL-SCNC: 28 MMOL/L (ref 24–31)
CREAT BLD-MCNC: 1.19 MG/DL (ref 0.5–1.4)
DEPRECATED RDW RBC AUTO: 44.7 FL (ref 40–54)
EOSINOPHIL # BLD AUTO: 0.16 10*3/MM3 (ref 0–0.7)
EOSINOPHIL NFR BLD AUTO: 2.1 % (ref 0–4)
ERYTHROCYTE [DISTWIDTH] IN BLOOD BY AUTOMATED COUNT: 13.2 % (ref 12–15)
GFR SERPL CREATININE-BSD FRML MDRD: 43 ML/MIN/1.73
GLOBULIN UR ELPH-MCNC: 3 GM/DL
GLUCOSE BLD-MCNC: 81 MG/DL (ref 70–100)
HCT VFR BLD AUTO: 39.3 % (ref 37–47)
HGB BLD-MCNC: 12.4 G/DL (ref 12–16)
IMM GRANULOCYTES # BLD: 0.02 10*3/MM3 (ref 0–0.03)
IMM GRANULOCYTES NFR BLD: 0.3 % (ref 0–5)
LYMPHOCYTES # BLD AUTO: 2.28 10*3/MM3 (ref 0.72–4.86)
LYMPHOCYTES NFR BLD AUTO: 30 % (ref 15–45)
MCH RBC QN AUTO: 29.2 PG (ref 28–32)
MCHC RBC AUTO-ENTMCNC: 31.6 G/DL (ref 33–36)
MCV RBC AUTO: 92.5 FL (ref 82–98)
MONOCYTES # BLD AUTO: 0.78 10*3/MM3 (ref 0.19–1.3)
MONOCYTES NFR BLD AUTO: 10.2 % (ref 4–12)
NEUTROPHILS # BLD AUTO: 4.33 10*3/MM3 (ref 1.87–8.4)
NEUTROPHILS NFR BLD AUTO: 56.9 % (ref 39–78)
NRBC BLD MANUAL-RTO: 0 /100 WBC (ref 0–0)
PLATELET # BLD AUTO: 233 10*3/MM3 (ref 130–400)
PMV BLD AUTO: 9.4 FL (ref 6–12)
POTASSIUM BLD-SCNC: 4.6 MMOL/L (ref 3.5–5.3)
PROT SERPL-MCNC: 7.5 G/DL (ref 6.3–8.7)
RBC # BLD AUTO: 4.25 10*6/MM3 (ref 4.2–5.4)
SODIUM BLD-SCNC: 142 MMOL/L (ref 135–145)
WBC NRBC COR # BLD: 7.61 10*3/MM3 (ref 4.8–10.8)

## 2017-11-30 PROCEDURE — 80053 COMPREHEN METABOLIC PANEL: CPT | Performed by: SPECIALIST

## 2017-11-30 PROCEDURE — 36415 COLL VENOUS BLD VENIPUNCTURE: CPT

## 2017-11-30 PROCEDURE — 85025 COMPLETE CBC W/AUTO DIFF WBC: CPT | Performed by: SPECIALIST

## 2017-12-01 ENCOUNTER — APPOINTMENT (OUTPATIENT)
Dept: NUCLEAR MEDICINE | Facility: HOSPITAL | Age: 82
End: 2017-12-01
Attending: SPECIALIST

## 2017-12-01 ENCOUNTER — HOSPITAL ENCOUNTER (OUTPATIENT)
Dept: MAMMOGRAPHY | Facility: HOSPITAL | Age: 82
Discharge: HOME | End: 2017-12-01
Attending: SPECIALIST

## 2017-12-01 ENCOUNTER — ANESTHESIA (OUTPATIENT)
Dept: PERIOP | Facility: HOSPITAL | Age: 82
End: 2017-12-01

## 2017-12-01 ENCOUNTER — HOSPITAL ENCOUNTER (OUTPATIENT)
Facility: HOSPITAL | Age: 82
Setting detail: HOSPITAL OUTPATIENT SURGERY
Discharge: HOME | End: 2017-12-01
Attending: SPECIALIST | Admitting: SPECIALIST

## 2017-12-01 ENCOUNTER — ANESTHESIA EVENT (OUTPATIENT)
Dept: PERIOP | Facility: HOSPITAL | Age: 82
End: 2017-12-01

## 2017-12-01 VITALS
DIASTOLIC BLOOD PRESSURE: 52 MMHG | SYSTOLIC BLOOD PRESSURE: 109 MMHG | OXYGEN SATURATION: 96 % | HEART RATE: 91 BPM | RESPIRATION RATE: 18 BRPM | TEMPERATURE: 98.7 F

## 2017-12-01 DIAGNOSIS — D36.9 INTRADUCTAL PAPILLOMA: ICD-10-CM

## 2017-12-01 DIAGNOSIS — N63.0 BREAST MASS: ICD-10-CM

## 2017-12-01 PROCEDURE — 25010000002 ONDANSETRON PER 1 MG: Performed by: NURSE ANESTHETIST, CERTIFIED REGISTERED

## 2017-12-01 PROCEDURE — 25010000002 FENTANYL CITRATE (PF) 100 MCG/2ML SOLUTION: Performed by: NURSE ANESTHETIST, CERTIFIED REGISTERED

## 2017-12-01 PROCEDURE — 25010000002 DEXAMETHASONE PER 1 MG: Performed by: ANESTHESIOLOGY

## 2017-12-01 PROCEDURE — 88307 TISSUE EXAM BY PATHOLOGIST: CPT | Performed by: SPECIALIST

## 2017-12-01 PROCEDURE — 25010000002 DEXAMETHASONE PER 1 MG: Performed by: NURSE ANESTHETIST, CERTIFIED REGISTERED

## 2017-12-01 PROCEDURE — 76098 X-RAY EXAM SURGICAL SPECIMEN: CPT

## 2017-12-01 PROCEDURE — 25010000002 LEVOFLOXACIN PER 250 MG: Performed by: NURSE ANESTHETIST, CERTIFIED REGISTERED

## 2017-12-01 PROCEDURE — 25010000002 HYDROMORPHONE PER 4 MG: Performed by: ANESTHESIOLOGY

## 2017-12-01 PROCEDURE — 25010000002 PROPOFOL 10 MG/ML EMULSION: Performed by: NURSE ANESTHETIST, CERTIFIED REGISTERED

## 2017-12-01 RX ORDER — MORPHINE SULFATE 2 MG/ML
2 INJECTION, SOLUTION INTRAMUSCULAR; INTRAVENOUS AS NEEDED
Status: DISCONTINUED | OUTPATIENT
Start: 2017-12-01 | End: 2017-12-01 | Stop reason: HOSPADM

## 2017-12-01 RX ORDER — NALOXONE HCL 0.4 MG/ML
0.04 VIAL (ML) INJECTION AS NEEDED
Status: DISCONTINUED | OUTPATIENT
Start: 2017-12-01 | End: 2017-12-01 | Stop reason: HOSPADM

## 2017-12-01 RX ORDER — ONDANSETRON 2 MG/ML
4 INJECTION INTRAMUSCULAR; INTRAVENOUS AS NEEDED
Status: DISCONTINUED | OUTPATIENT
Start: 2017-12-01 | End: 2017-12-01 | Stop reason: HOSPADM

## 2017-12-01 RX ORDER — SODIUM CHLORIDE 0.9 % (FLUSH) 0.9 %
3 SYRINGE (ML) INJECTION AS NEEDED
Status: DISCONTINUED | OUTPATIENT
Start: 2017-12-01 | End: 2017-12-01 | Stop reason: HOSPADM

## 2017-12-01 RX ORDER — ONDANSETRON 2 MG/ML
INJECTION INTRAMUSCULAR; INTRAVENOUS AS NEEDED
Status: DISCONTINUED | OUTPATIENT
Start: 2017-12-01 | End: 2017-12-01 | Stop reason: SURG

## 2017-12-01 RX ORDER — ROCURONIUM BROMIDE 10 MG/ML
INJECTION, SOLUTION INTRAVENOUS AS NEEDED
Status: DISCONTINUED | OUTPATIENT
Start: 2017-12-01 | End: 2017-12-01 | Stop reason: SURG

## 2017-12-01 RX ORDER — PROPOFOL 10 MG/ML
VIAL (ML) INTRAVENOUS AS NEEDED
Status: DISCONTINUED | OUTPATIENT
Start: 2017-12-01 | End: 2017-12-01 | Stop reason: SURG

## 2017-12-01 RX ORDER — METOCLOPRAMIDE HYDROCHLORIDE 5 MG/ML
5 INJECTION INTRAMUSCULAR; INTRAVENOUS
Status: DISCONTINUED | OUTPATIENT
Start: 2017-12-01 | End: 2017-12-01 | Stop reason: HOSPADM

## 2017-12-01 RX ORDER — SODIUM CHLORIDE, SODIUM LACTATE, POTASSIUM CHLORIDE, CALCIUM CHLORIDE 600; 310; 30; 20 MG/100ML; MG/100ML; MG/100ML; MG/100ML
100 INJECTION, SOLUTION INTRAVENOUS CONTINUOUS
Status: DISCONTINUED | OUTPATIENT
Start: 2017-12-01 | End: 2017-12-01 | Stop reason: HOSPADM

## 2017-12-01 RX ORDER — FLUMAZENIL 0.1 MG/ML
0.2 INJECTION INTRAVENOUS AS NEEDED
Status: DISCONTINUED | OUTPATIENT
Start: 2017-12-01 | End: 2017-12-01 | Stop reason: HOSPADM

## 2017-12-01 RX ORDER — LIDOCAINE HYDROCHLORIDE 10 MG/ML
10 INJECTION, SOLUTION INFILTRATION; PERINEURAL ONCE
Status: DISCONTINUED | OUTPATIENT
Start: 2017-12-01 | End: 2018-07-30

## 2017-12-01 RX ORDER — SODIUM CHLORIDE, SODIUM LACTATE, POTASSIUM CHLORIDE, CALCIUM CHLORIDE 600; 310; 30; 20 MG/100ML; MG/100ML; MG/100ML; MG/100ML
1000 INJECTION, SOLUTION INTRAVENOUS CONTINUOUS
Status: DISCONTINUED | OUTPATIENT
Start: 2017-12-01 | End: 2017-12-01 | Stop reason: HOSPADM

## 2017-12-01 RX ORDER — MAGNESIUM HYDROXIDE 1200 MG/15ML
LIQUID ORAL AS NEEDED
Status: DISCONTINUED | OUTPATIENT
Start: 2017-12-01 | End: 2017-12-01 | Stop reason: HOSPADM

## 2017-12-01 RX ORDER — SODIUM CHLORIDE 0.9 % (FLUSH) 0.9 %
1-10 SYRINGE (ML) INJECTION AS NEEDED
Status: DISCONTINUED | OUTPATIENT
Start: 2017-12-01 | End: 2017-12-01 | Stop reason: HOSPADM

## 2017-12-01 RX ORDER — LABETALOL HYDROCHLORIDE 5 MG/ML
5 INJECTION, SOLUTION INTRAVENOUS
Status: DISCONTINUED | OUTPATIENT
Start: 2017-12-01 | End: 2017-12-01 | Stop reason: HOSPADM

## 2017-12-01 RX ORDER — LIDOCAINE HYDROCHLORIDE 20 MG/ML
INJECTION, SOLUTION INFILTRATION; PERINEURAL AS NEEDED
Status: DISCONTINUED | OUTPATIENT
Start: 2017-12-01 | End: 2017-12-01 | Stop reason: SURG

## 2017-12-01 RX ORDER — IPRATROPIUM BROMIDE AND ALBUTEROL SULFATE 2.5; .5 MG/3ML; MG/3ML
3 SOLUTION RESPIRATORY (INHALATION) ONCE AS NEEDED
Status: DISCONTINUED | OUTPATIENT
Start: 2017-12-01 | End: 2017-12-01 | Stop reason: HOSPADM

## 2017-12-01 RX ORDER — DEXAMETHASONE SODIUM PHOSPHATE 4 MG/ML
INJECTION, SOLUTION INTRA-ARTICULAR; INTRALESIONAL; INTRAMUSCULAR; INTRAVENOUS; SOFT TISSUE AS NEEDED
Status: DISCONTINUED | OUTPATIENT
Start: 2017-12-01 | End: 2017-12-01 | Stop reason: SURG

## 2017-12-01 RX ORDER — HYDROCODONE BITARTRATE AND ACETAMINOPHEN 5; 325 MG/1; MG/1
1-2 TABLET ORAL EVERY 4 HOURS PRN
Qty: 10 TABLET | Refills: 0 | Status: SHIPPED | OUTPATIENT
Start: 2017-12-01 | End: 2018-07-24

## 2017-12-01 RX ORDER — PHENYLEPHRINE HCL IN 0.9% NACL 0.8MG/10ML
SYRINGE (ML) INTRAVENOUS AS NEEDED
Status: DISCONTINUED | OUTPATIENT
Start: 2017-12-01 | End: 2017-12-01 | Stop reason: SURG

## 2017-12-01 RX ORDER — FENTANYL CITRATE 50 UG/ML
INJECTION, SOLUTION INTRAMUSCULAR; INTRAVENOUS AS NEEDED
Status: DISCONTINUED | OUTPATIENT
Start: 2017-12-01 | End: 2017-12-01 | Stop reason: SURG

## 2017-12-01 RX ORDER — LIDOCAINE HYDROCHLORIDE 10 MG/ML
10 INJECTION, SOLUTION INFILTRATION; PERINEURAL ONCE
Status: CANCELLED | OUTPATIENT
Start: 2017-12-01 | End: 2017-12-01

## 2017-12-01 RX ORDER — HYDROCODONE BITARTRATE AND ACETAMINOPHEN 5; 325 MG/1; MG/1
1 TABLET ORAL ONCE AS NEEDED
Status: DISCONTINUED | OUTPATIENT
Start: 2017-12-01 | End: 2017-12-01 | Stop reason: HOSPADM

## 2017-12-01 RX ORDER — LEVOFLOXACIN 5 MG/ML
INJECTION, SOLUTION INTRAVENOUS AS NEEDED
Status: DISCONTINUED | OUTPATIENT
Start: 2017-12-01 | End: 2017-12-01 | Stop reason: SURG

## 2017-12-01 RX ORDER — HYDRALAZINE HYDROCHLORIDE 20 MG/ML
5 INJECTION INTRAMUSCULAR; INTRAVENOUS
Status: DISCONTINUED | OUTPATIENT
Start: 2017-12-01 | End: 2017-12-01 | Stop reason: HOSPADM

## 2017-12-01 RX ORDER — MEPERIDINE HYDROCHLORIDE 25 MG/ML
12.5 INJECTION INTRAMUSCULAR; INTRAVENOUS; SUBCUTANEOUS
Status: DISCONTINUED | OUTPATIENT
Start: 2017-12-01 | End: 2017-12-01 | Stop reason: HOSPADM

## 2017-12-01 RX ORDER — DEXAMETHASONE SODIUM PHOSPHATE 4 MG/ML
4 INJECTION, SOLUTION INTRA-ARTICULAR; INTRALESIONAL; INTRAMUSCULAR; INTRAVENOUS; SOFT TISSUE ONCE AS NEEDED
Status: COMPLETED | OUTPATIENT
Start: 2017-12-01 | End: 2017-12-01

## 2017-12-01 RX ADMIN — LIDOCAINE HYDROCHLORIDE 100 MG: 20 INJECTION, SOLUTION INFILTRATION; PERINEURAL at 14:38

## 2017-12-01 RX ADMIN — PROPOFOL 100 MG: 10 INJECTION, EMULSION INTRAVENOUS at 14:38

## 2017-12-01 RX ADMIN — HYDROMORPHONE HYDROCHLORIDE 0.5 MG: 1 INJECTION, SOLUTION INTRAMUSCULAR; INTRAVENOUS; SUBCUTANEOUS at 15:43

## 2017-12-01 RX ADMIN — HYDROMORPHONE HYDROCHLORIDE 0.5 MG: 1 INJECTION, SOLUTION INTRAMUSCULAR; INTRAVENOUS; SUBCUTANEOUS at 15:55

## 2017-12-01 RX ADMIN — FENTANYL CITRATE 100 MCG: 50 INJECTION, SOLUTION INTRAMUSCULAR; INTRAVENOUS at 14:38

## 2017-12-01 RX ADMIN — DEXAMETHASONE SODIUM PHOSPHATE 4 MG: 4 INJECTION, SOLUTION INTRAMUSCULAR; INTRAVENOUS at 15:00

## 2017-12-01 RX ADMIN — Medication 80 MCG: at 14:38

## 2017-12-01 RX ADMIN — SODIUM CHLORIDE, POTASSIUM CHLORIDE, SODIUM LACTATE AND CALCIUM CHLORIDE 1000 ML: 600; 310; 30; 20 INJECTION, SOLUTION INTRAVENOUS at 09:14

## 2017-12-01 RX ADMIN — LEVOFLOXACIN 500 MG: 500 INJECTION, SOLUTION INTRAVENOUS at 14:38

## 2017-12-01 RX ADMIN — PROPOFOL 50 MG: 10 INJECTION, EMULSION INTRAVENOUS at 15:03

## 2017-12-01 RX ADMIN — ROCURONIUM BROMIDE 10 MG: 10 INJECTION INTRAVENOUS at 14:38

## 2017-12-01 RX ADMIN — DEXAMETHASONE SODIUM PHOSPHATE 4 MG: 4 INJECTION, SOLUTION INTRAMUSCULAR; INTRAVENOUS at 13:25

## 2017-12-01 RX ADMIN — LIDOCAINE HYDROCHLORIDE 0.5 ML: 10 INJECTION, SOLUTION EPIDURAL; INFILTRATION; INTRACAUDAL; PERINEURAL at 09:14

## 2017-12-01 RX ADMIN — ONDANSETRON HYDROCHLORIDE 4 MG: 2 SOLUTION INTRAMUSCULAR; INTRAVENOUS at 15:00

## 2017-12-01 RX ADMIN — EPHEDRINE SULFATE 10 MG: 50 INJECTION INTRAMUSCULAR; INTRAVENOUS; SUBCUTANEOUS at 14:46

## 2017-12-01 NOTE — ANESTHESIA PREPROCEDURE EVALUATION
Anesthesia Evaluation     Patient summary reviewed   no history of anesthetic complications:  NPO Solid Status: > 8 hours  NPO Liquid Status: > 8 hours     Airway   Mallampati: II  TM distance: >3 FB  Neck ROM: full  no difficulty expected  Dental          Pulmonary - negative pulmonary ROS and normal exam   Cardiovascular - normal exam  Exercise tolerance: good (4-7 METS)    (+) hypertension, hyperlipidemia      Neuro/Psych- negative ROS  GI/Hepatic/Renal/Endo    (+)  hypothyroidism,     Musculoskeletal (-) negative ROS    Abdominal    Substance History      OB/GYN          Other      history of cancer (intraductal papilloma)                                    Anesthesia Plan    ASA 2     general     intravenous induction   Anesthetic plan and risks discussed with patient.

## 2017-12-01 NOTE — PLAN OF CARE
Problem: Patient Care Overview (Adult)  Goal: Plan of Care Review  Outcome: Ongoing (interventions implemented as appropriate)    12/01/17 1126   Coping/Psychosocial Response Interventions   Plan Of Care Reviewed With patient   Patient Care Overview   Progress improving   Outcome Evaluation   Outcome Summary/Follow up Plan met dc criteria pacu         Problem: Perioperative Period (Adult)  Goal: Signs and Symptoms of Listed Potential Problems Will be Absent or Manageable (Perioperative Period)  Outcome: Ongoing (interventions implemented as appropriate)

## 2017-12-01 NOTE — PLAN OF CARE
Problem: Patient Care Overview (Adult)  Goal: Plan of Care Review  Outcome: Ongoing (interventions implemented as appropriate)    12/01/17 1311   Coping/Psychosocial Response Interventions   Plan Of Care Reviewed With patient   Patient Care Overview   Progress no change         Problem: Perioperative Period (Adult)  Goal: Signs and Symptoms of Listed Potential Problems Will be Absent or Manageable (Perioperative Period)  Outcome: Ongoing (interventions implemented as appropriate)    12/01/17 1311   Perioperative Period   Problems Assessed (Perioperative Period) pain;embolism;infection   Problems Present (Perioperative Period) none

## 2017-12-01 NOTE — DISCHARGE INSTRUCTIONS

## 2017-12-01 NOTE — OP NOTE
Preoperative diagnosis:  Left intraductal papilloma  Postoperative diagnosis:  Same  Procedure:  Left needle-directed excisional biopsy  Surgeon:  Malgorzata Scott MD  Anesthesia:  Get loc  Ebl:  Minimal  Ivf:  See anesthesia notes  Indications:  The patient is an 89-year-old female who has been found to have left microcalcifications. Pathology demonstrated fibrocystic changes with intraductal papilloma.  She presents for left needle-directed excisional biopsy for completion of excision.  The risks, benefits, complications, and possible alternatives were discussed with the patient who agreed to proceed.  Description of procedure:  The patient was laid supine.  The left breast was prepped and draped. An incision was created near the localization wire.  bovie electrocautery was used to excise the tissue around the perimeter of the wire.  Specimen mammogram was confirmed by the radiologist to include the localization clip.  Hemostasis was obtained with bovie electrocautery.  The skin was closed with 3-0 and 4-0 vicryl.  The sponge, needle, and instrument counts were correct.  Complications: none  Findings: specimen mammogram ok'd  Disposition:  Good to pacu

## 2017-12-01 NOTE — ANESTHESIA PROCEDURE NOTES
Airway  Urgency: elective    Airway not difficult    General Information and Staff    Patient location during procedure: OR  CRNA: JOHANNA BUSTILLO    Indications and Patient Condition  Indications for airway management: airway protection    Preoxygenated: yes  Mask difficulty assessment: 1 - vent by mask    Final Airway Details  Final airway type: endotracheal airway      Successful airway: ETT  Cuffed: yes   Successful intubation technique: direct laryngoscopy  Facilitating devices/methods: intubating stylet  Endotracheal tube insertion site: oral  Blade: Ruiz  Blade size: #2  ETT size: 7.0 mm  Cormack-Lehane Classification: grade I - full view of glottis  Placement verified by: chest auscultation and capnometry   Cuff volume (mL): 8  Measured from: lips  ETT to lips (cm): 21  Number of attempts at approach: 1    Additional Comments  Atraumatic

## 2017-12-01 NOTE — PLAN OF CARE
Problem: Patient Care Overview (Adult)  Goal: Plan of Care Review  Outcome: Outcome(s) achieved Date Met:  12/01/17 12/01/17 1726   Coping/Psychosocial Response Interventions   Plan Of Care Reviewed With patient;daughter   Patient Care Overview   Progress improving   Outcome Evaluation   Outcome Summary/Follow up Plan Patient meets discharge criteria. Patient and daughter verbalize understanding of discharge instructions.          Problem: Perioperative Period (Adult)  Goal: Signs and Symptoms of Listed Potential Problems Will be Absent or Manageable (Perioperative Period)  Outcome: Outcome(s) achieved Date Met:  12/01/17

## 2018-05-03 ENCOUNTER — TRANSCRIBE ORDERS (OUTPATIENT)
Dept: ADMINISTRATIVE | Facility: HOSPITAL | Age: 83
End: 2018-05-03

## 2018-05-03 DIAGNOSIS — Z12.31 ENCOUNTER FOR SCREENING MAMMOGRAM FOR MALIGNANT NEOPLASM OF BREAST: Primary | ICD-10-CM

## 2018-05-14 ENCOUNTER — HOSPITAL ENCOUNTER (OUTPATIENT)
Dept: MAMMOGRAPHY | Facility: HOSPITAL | Age: 83
Discharge: HOME | End: 2018-05-14
Attending: FAMILY MEDICINE | Admitting: FAMILY MEDICINE

## 2018-05-14 DIAGNOSIS — Z12.31 ENCOUNTER FOR SCREENING MAMMOGRAM FOR MALIGNANT NEOPLASM OF BREAST: ICD-10-CM

## 2018-05-14 PROCEDURE — 77067 SCR MAMMO BI INCL CAD: CPT

## 2018-05-14 PROCEDURE — 77063 BREAST TOMOSYNTHESIS BI: CPT

## 2018-06-04 ENCOUNTER — HOSPITAL ENCOUNTER (EMERGENCY)
Facility: HOSPITAL | Age: 83
Discharge: HOME | End: 2018-06-05
Admitting: EMERGENCY MEDICINE

## 2018-06-04 ENCOUNTER — APPOINTMENT (OUTPATIENT)
Dept: GENERAL RADIOLOGY | Facility: HOSPITAL | Age: 83
End: 2018-06-04

## 2018-06-04 DIAGNOSIS — I10 HYPERTENSION, UNSPECIFIED TYPE: Primary | ICD-10-CM

## 2018-06-04 LAB
ALBUMIN SERPL-MCNC: 3.8 G/DL (ref 3.5–5)
ALBUMIN/GLOB SERPL: 1.5 G/DL (ref 1.1–2.5)
ALP SERPL-CCNC: 74 U/L (ref 24–120)
ALT SERPL W P-5'-P-CCNC: 28 U/L (ref 0–54)
ANION GAP SERPL CALCULATED.3IONS-SCNC: 9 MMOL/L (ref 4–13)
AST SERPL-CCNC: 30 U/L (ref 7–45)
BASOPHILS # BLD AUTO: 0.04 10*3/MM3 (ref 0–0.2)
BASOPHILS NFR BLD AUTO: 0.5 % (ref 0–2)
BILIRUB SERPL-MCNC: 0.4 MG/DL (ref 0.1–1)
BUN BLD-MCNC: 26 MG/DL (ref 5–21)
BUN/CREAT SERPL: 23.9 (ref 7–25)
CALCIUM SPEC-SCNC: 9.7 MG/DL (ref 8.4–10.4)
CHLORIDE SERPL-SCNC: 104 MMOL/L (ref 98–110)
CO2 SERPL-SCNC: 30 MMOL/L (ref 24–31)
CREAT BLD-MCNC: 1.09 MG/DL (ref 0.5–1.4)
DEPRECATED RDW RBC AUTO: 42.8 FL (ref 40–54)
EOSINOPHIL # BLD AUTO: 0.28 10*3/MM3 (ref 0–0.7)
EOSINOPHIL NFR BLD AUTO: 3.7 % (ref 0–4)
ERYTHROCYTE [DISTWIDTH] IN BLOOD BY AUTOMATED COUNT: 12.7 % (ref 12–15)
GFR SERPL CREATININE-BSD FRML MDRD: 47 ML/MIN/1.73
GLOBULIN UR ELPH-MCNC: 2.6 GM/DL
GLUCOSE BLD-MCNC: 106 MG/DL (ref 70–100)
HCT VFR BLD AUTO: 38.2 % (ref 37–47)
HGB BLD-MCNC: 12.4 G/DL (ref 12–16)
IMM GRANULOCYTES # BLD: 0.05 10*3/MM3 (ref 0–0.03)
IMM GRANULOCYTES NFR BLD: 0.7 % (ref 0–5)
LYMPHOCYTES # BLD AUTO: 3.21 10*3/MM3 (ref 0.72–4.86)
LYMPHOCYTES NFR BLD AUTO: 42.7 % (ref 15–45)
MCH RBC QN AUTO: 30 PG (ref 28–32)
MCHC RBC AUTO-ENTMCNC: 32.5 G/DL (ref 33–36)
MCV RBC AUTO: 92.3 FL (ref 82–98)
MONOCYTES # BLD AUTO: 0.74 10*3/MM3 (ref 0.19–1.3)
MONOCYTES NFR BLD AUTO: 9.9 % (ref 4–12)
NEUTROPHILS # BLD AUTO: 3.19 10*3/MM3 (ref 1.87–8.4)
NEUTROPHILS NFR BLD AUTO: 42.5 % (ref 39–78)
NRBC BLD MANUAL-RTO: 0 /100 WBC (ref 0–0)
PLATELET # BLD AUTO: 204 10*3/MM3 (ref 130–400)
PMV BLD AUTO: 9.2 FL (ref 6–12)
POTASSIUM BLD-SCNC: 4.5 MMOL/L (ref 3.5–5.3)
PROT SERPL-MCNC: 6.4 G/DL (ref 6.3–8.7)
RBC # BLD AUTO: 4.14 10*6/MM3 (ref 4.2–5.4)
SODIUM BLD-SCNC: 143 MMOL/L (ref 135–145)
TROPONIN I SERPL-MCNC: <0.012 NG/ML (ref 0–0.03)
WBC NRBC COR # BLD: 7.51 10*3/MM3 (ref 4.8–10.8)

## 2018-06-04 PROCEDURE — 99284 EMERGENCY DEPT VISIT MOD MDM: CPT

## 2018-06-04 PROCEDURE — 85025 COMPLETE CBC W/AUTO DIFF WBC: CPT | Performed by: NURSE PRACTITIONER

## 2018-06-04 PROCEDURE — 71045 X-RAY EXAM CHEST 1 VIEW: CPT

## 2018-06-04 PROCEDURE — 84484 ASSAY OF TROPONIN QUANT: CPT | Performed by: NURSE PRACTITIONER

## 2018-06-04 PROCEDURE — 80053 COMPREHEN METABOLIC PANEL: CPT | Performed by: NURSE PRACTITIONER

## 2018-06-04 PROCEDURE — 93005 ELECTROCARDIOGRAM TRACING: CPT | Performed by: NURSE PRACTITIONER

## 2018-06-04 PROCEDURE — 93010 ELECTROCARDIOGRAM REPORT: CPT | Performed by: INTERNAL MEDICINE

## 2018-06-04 RX ORDER — SODIUM CHLORIDE 0.9 % (FLUSH) 0.9 %
10 SYRINGE (ML) INJECTION AS NEEDED
Status: DISCONTINUED | OUTPATIENT
Start: 2018-06-04 | End: 2018-06-05 | Stop reason: HOSPADM

## 2018-06-05 VITALS
OXYGEN SATURATION: 94 % | WEIGHT: 122 LBS | SYSTOLIC BLOOD PRESSURE: 132 MMHG | RESPIRATION RATE: 16 BRPM | HEIGHT: 62 IN | HEART RATE: 65 BPM | DIASTOLIC BLOOD PRESSURE: 59 MMHG | TEMPERATURE: 98 F | BODY MASS INDEX: 22.45 KG/M2

## 2018-06-05 NOTE — DISCHARGE INSTRUCTIONS
Return to the ER as needed  Please follow up with your PCP in 1-2 days for a recheck; keep a blood pressure diary at home    Hypertension  Hypertension is another name for high blood pressure. High blood pressure forces your heart to work harder to pump blood. This can cause problems over time.  There are two numbers in a blood pressure reading. There is a top number (systolic) over a bottom number (diastolic). It is best to have a blood pressure below 120/80. Healthy choices can help lower your blood pressure. You may need medicine to help lower your blood pressure if:  · Your blood pressure cannot be lowered with healthy choices.  · Your blood pressure is higher than 130/80.  Follow these instructions at home:  Eating and drinking   · If directed, follow the DASH eating plan. This diet includes:  ¨ Filling half of your plate at each meal with fruits and vegetables.  ¨ Filling one quarter of your plate at each meal with whole grains. Whole grains include whole wheat pasta, brown rice, and whole grain bread.  ¨ Eating or drinking low-fat dairy products, such as skim milk or low-fat yogurt.  ¨ Filling one quarter of your plate at each meal with low-fat (lean) proteins. Low-fat proteins include fish, skinless chicken, eggs, beans, and tofu.  ¨ Avoiding fatty meat, cured and processed meat, or chicken with skin.  ¨ Avoiding premade or processed food.  · Eat less than 1,500 mg of salt (sodium) a day.  · Limit alcohol use to no more than 1 drink a day for nonpregnant women and 2 drinks a day for men. One drink equals 12 oz of beer, 5 oz of wine, or 1½ oz of hard liquor.  Lifestyle   · Work with your doctor to stay at a healthy weight or to lose weight. Ask your doctor what the best weight is for you.  · Get at least 30 minutes of exercise that causes your heart to beat faster (aerobic exercise) most days of the week. This may include walking, swimming, or biking.  · Get at least 30 minutes of exercise that strengthens  your muscles (resistance exercise) at least 3 days a week. This may include lifting weights or pilates.  · Do not use any products that contain nicotine or tobacco. This includes cigarettes and e-cigarettes. If you need help quitting, ask your doctor.  · Check your blood pressure at home as told by your doctor.  · Keep all follow-up visits as told by your doctor. This is important.  Medicines   · Take over-the-counter and prescription medicines only as told by your doctor. Follow directions carefully.  · Do not skip doses of blood pressure medicine. The medicine does not work as well if you skip doses. Skipping doses also puts you at risk for problems.  · Ask your doctor about side effects or reactions to medicines that you should watch for.  Contact a doctor if:  · You think you are having a reaction to the medicine you are taking.  · You have headaches that keep coming back (recurring).  · You feel dizzy.  · You have swelling in your ankles.  · You have trouble with your vision.  Get help right away if:  · You get a very bad headache.  · You start to feel confused.  · You feel weak or numb.  · You feel faint.  · You get very bad pain in your:  ¨ Chest.  ¨ Belly (abdomen).  · You throw up (vomit) more than once.  · You have trouble breathing.  Summary  · Hypertension is another name for high blood pressure.  · Making healthy choices can help lower blood pressure. If your blood pressure cannot be controlled with healthy choices, you may need to take medicine.  This information is not intended to replace advice given to you by your health care provider. Make sure you discuss any questions you have with your health care provider.  Document Released: 06/05/2009 Document Revised: 11/15/2017 Document Reviewed: 11/15/2017  [employer] Interactive Patient Education © 2017 [employer]

## 2018-06-05 NOTE — ED PROVIDER NOTES
Subjective   Patient is a 89-year-old  female that presents to the ER today with complaint of elevated blood pressure.  Patient reports that this evening she took her blood pressure and it was 162/72 at home.  She states that normally her blood pressure runs in the 120s systolic.  She states that this concerned her so she called her family to bring her to the ER.  The patient reports that she has no headache or dizziness.  She denies visual changes.  Patient denies chest pain or shortness of breath with this.  She states that she has no weakness.  She states that she feels fine however due to her blood pressure being elevated thought she should come to the ER for further evaluation.  She denies any recent changes in her medications.  She reports that she has taken her medication as prescribed today. She presents to the ER today for further evaluation.         History provided by:  Patient   used: No    Hypertension   Severity:  Mild  Onset quality:  Sudden  Duration:  1 day  Timing:  Constant  Progression:  Unchanged  Chronicity:  New  Time since last dose of antihypertensive:  1 day  Notable PTA blood pressures:  162/72  Context: normal sodium, not caffeine, not drug abuse, not herbal remedies, not medication change, not noncompliance, not OTC medications used and not stress    Relieved by:  Nothing  Worsened by:  Nothing  Ineffective treatments:  ACE inhibitors  Associated symptoms: no abdominal pain, no anxiety, no blurred vision, no chest pain, no confusion, no dizziness, no ear pain, no epistaxis, no fatigue, no fever, no headaches, no hematuria, no hypokalemia, no loss of consciousness, no nausea, no neck pain, no palpitations, no peripheral edema, no shortness of breath, no syncope, no tinnitus and no weakness    Risk factors: no alcohol use, no cardiac disease, no cocaine use, no decongestant use, no diabetes, no family history of hypertension, no kidney disease, no obesity, no  prior aneurysm, no prior stroke, no PVD and no tobacco use        Review of Systems   Constitutional: Negative for fatigue and fever.   HENT: Negative for ear pain, nosebleeds and tinnitus.    Eyes: Negative for blurred vision.   Respiratory: Negative for shortness of breath.    Cardiovascular: Negative for chest pain, palpitations and syncope.   Gastrointestinal: Negative for abdominal pain and nausea.   Genitourinary: Negative for hematuria.   Musculoskeletal: Negative for neck pain.   Neurological: Negative for dizziness, loss of consciousness, weakness and headaches.   Psychiatric/Behavioral: Negative for confusion. The patient is not nervous/anxious.    All other systems reviewed and are negative.      Past Medical History:   Diagnosis Date   • Hyperlipidemia    • Hypertension    • Hypothyroidism    • Skin cancer        Allergies   Allergen Reactions   • Erythromycin Shortness Of Breath   • Penicillins Rash       Past Surgical History:   Procedure Laterality Date   • BREAST BIOPSY Bilateral     multiple on both, all benign   • BREAST BIOPSY Left 2017    Procedure: LEFT MAMMOGRAM GUIDED NEEDLE DIRECTED EXCISIONAL BREAST BIOPSY;  Surgeon: Malgorzata Scott MD;  Location: [facility];  Service:    •  SECTION      X2   • EYE SURGERY      CATARACT SURGERY   • JOINT REPLACEMENT      RIGHT HIP       Family History   Problem Relation Age of Onset   • Breast cancer Neg Hx        Social History     Social History   • Marital status:      Social History Main Topics   • Smoking status: Former Smoker     Years: 2.00     Types: Cigarettes     Quit date:    • Smokeless tobacco: Never Used   • Alcohol use No   • Drug use: No   • Sexual activity: Defer     Other Topics Concern   • Not on file           Objective   Physical Exam   Constitutional: She is oriented to person, place, and time. She appears well-developed and well-nourished.   HENT:   Head: Normocephalic and atraumatic.   Eyes: Conjunctivae are  normal. Pupils are equal, round, and reactive to light.   Cardiovascular: Normal rate, regular rhythm and normal heart sounds.    Pulmonary/Chest: Effort normal and breath sounds normal.   Abdominal: Soft. Bowel sounds are normal.   Neurological: She is alert and oriented to person, place, and time.   Skin: Skin is warm and dry. Capillary refill takes less than 2 seconds.   Psychiatric: She has a normal mood and affect.   Nursing note and vitals reviewed.      Procedures           ED Course  ED Course as of Jun 05 0003 Mon Jun 04, 2018   2359 Is labs reviewed.  Her troponin was negative.  Other labs are unremarkable.  EKG today was compared to previous EKG from May 17.  There are no significant changes noted.  Patient blood pressure at this time is 131/57.  Again the patient has no headache, no dizziness, no visual changes, chest pain, no shortness of breath.  At this time patient be discharged home in stable condition.  Advised her to keep a blood pressure diary.  Asked her to follow-up with Dr. Pereira in one to 2 days for a recheck.  She is asked return to the ER if any new or worsening symptoms.  She will be discharged home at this time family stable condition.  [LF]      ED Course User Index  [LF] Gayathri Tyson, ANGY          XR Chest 1 View    (Results Pending)     Lab Results (last 24 hours)     Procedure Component Value Units Date/Time    CBC & Differential [[id number]] Collected:  06/04/18 2245    Specimen:  Blood Updated:  06/04/18 2251    Narrative:       The following orders were created for panel order CBC & Differential.  Procedure                               Abnormality         Status                     ---------                               -----------         ------                     CBC Auto Differential[[id number]]        Abnormal            Final result                 Please view results for these tests on the individual orders.    Comprehensive Metabolic Panel [[id number]]  (Abnormal)  Collected:  06/04/18 2245    Specimen:  Blood Updated:  06/04/18 2303     Glucose 106 (H) mg/dL      BUN 26 (H) mg/dL      Creatinine 1.09 mg/dL      Sodium 143 mmol/L      Potassium 4.5 mmol/L      Chloride 104 mmol/L      CO2 30.0 mmol/L      Calcium 9.7 mg/dL      Total Protein 6.4 g/dL      Albumin 3.80 g/dL      ALT (SGPT) 28 U/L      AST (SGOT) 30 U/L      Alkaline Phosphatase 74 U/L      Total Bilirubin 0.4 mg/dL      eGFR Non African Amer 47 (L) mL/min/1.73      Globulin 2.6 gm/dL      A/G Ratio 1.5 g/dL      BUN/Creatinine Ratio 23.9     Anion Gap 9.0 mmol/L     Narrative:       The MDRD GFR formula is only valid for adults with stable renal function between ages 18 and 70.    Troponin [[id number]]  (Normal) Collected:  06/04/18 2245    Specimen:  Blood Updated:  06/04/18 2321     Troponin I <0.012 ng/mL     CBC Auto Differential [[id number]]  (Abnormal) Collected:  06/04/18 2245    Specimen:  Blood Updated:  06/04/18 2251     WBC 7.51 10*3/mm3      RBC 4.14 (L) 10*6/mm3      Hemoglobin 12.4 g/dL      Hematocrit 38.2 %      MCV 92.3 fL      MCH 30.0 pg      MCHC 32.5 (L) g/dL      RDW 12.7 %      RDW-SD 42.8 fl      MPV 9.2 fL      Platelets 204 10*3/mm3      Neutrophil % 42.5 %      Lymphocyte % 42.7 %      Monocyte % 9.9 %      Eosinophil % 3.7 %      Basophil % 0.5 %      Immature Grans % 0.7 %      Neutrophils, Absolute 3.19 10*3/mm3      Lymphocytes, Absolute 3.21 10*3/mm3      Monocytes, Absolute 0.74 10*3/mm3      Eosinophils, Absolute 0.28 10*3/mm3      Basophils, Absolute 0.04 10*3/mm3      Immature Grans, Absolute 0.05 (H) 10*3/mm3      nRBC 0.0 /100 WBC                 MDM  Number of Diagnoses or Management Options  Hypertension, unspecified type: new and requires workup     Amount and/or Complexity of Data Reviewed  Clinical lab tests: ordered and reviewed  Tests in the radiology section of CPT®: ordered and reviewed  Tests in the medicine section of CPT®: ordered and reviewed  Decide to obtain  previous medical records or to obtain history from someone other than the patient: yes  Discuss the patient with other providers: yes    Patient Progress  Patient progress: stable        Final diagnoses:   Hypertension, unspecified type            Gayathri Tyson, ANGY  06/05/18 0003

## 2018-07-18 ENCOUNTER — TELEPHONE (OUTPATIENT)
Dept: VASCULAR SURGERY | Facility: CLINIC | Age: 83
End: 2018-07-18

## 2018-07-24 ENCOUNTER — OFFICE VISIT (OUTPATIENT)
Dept: VASCULAR SURGERY | Facility: CLINIC | Age: 83
End: 2018-07-24

## 2018-07-24 VITALS
SYSTOLIC BLOOD PRESSURE: 126 MMHG | BODY MASS INDEX: 21.34 KG/M2 | WEIGHT: 125 LBS | HEART RATE: 84 BPM | HEIGHT: 64 IN | OXYGEN SATURATION: 98 % | DIASTOLIC BLOOD PRESSURE: 76 MMHG

## 2018-07-24 DIAGNOSIS — I10 ESSENTIAL HYPERTENSION: ICD-10-CM

## 2018-07-24 DIAGNOSIS — Z01.818 PREOP TESTING: ICD-10-CM

## 2018-07-24 DIAGNOSIS — E78.5 HYPERLIPIDEMIA, UNSPECIFIED HYPERLIPIDEMIA TYPE: ICD-10-CM

## 2018-07-24 DIAGNOSIS — Z51.81 ENCOUNTER FOR MONITORING ANTIPLATELET THERAPY: ICD-10-CM

## 2018-07-24 DIAGNOSIS — I73.9 PAD (PERIPHERAL ARTERY DISEASE) (HCC): Primary | ICD-10-CM

## 2018-07-24 DIAGNOSIS — Z79.02 ENCOUNTER FOR MONITORING ANTIPLATELET THERAPY: ICD-10-CM

## 2018-07-24 PROCEDURE — 99204 OFFICE O/P NEW MOD 45 MIN: CPT | Performed by: NURSE PRACTITIONER

## 2018-07-24 NOTE — PATIENT INSTRUCTIONS
Angiogram  An angiogram, also called angiography, is a procedure used to look at the blood vessels. In this procedure, dye is injected through a long, thin tube (catheter) into an artery. X-rays are then taken. The X-rays will show if there is a blockage or problem in a blood vessel.  Tell a health care provider about:  · Any allergies you have, including allergies to shellfish or contrast dye.  · All medicines you are taking, including vitamins, herbs, eye drops, creams, and over-the-counter medicines.  · Any problems you or family members have had with anesthetic medicines.  · Any blood disorders you have.  · Any surgeries you have had.  · Any previous kidney problems or failure you have had.  · Any medical conditions you have.  · Possibility of pregnancy, if this applies.  What are the risks?  Generally, an angiogram is a safe procedure. However, as with any procedure, problems can occur. Possible problems include:  · Injury to the blood vessels, including rupture or bleeding.  · Infection or bruising at the catheter site.  · Allergic reaction to the dye or contrast used.  · Kidney damage from the dye or contrast used.  · Blood clots that can lead to a stroke or heart attack.    What happens before the procedure?  · Do not eat or drink after midnight on the night before the procedure, or as directed by your health care provider.  · Ask your health care provider if you may drink enough water to take any needed medicines the morning of the procedure.  What happens during the procedure?  · You may be given a medicine to help you relax (sedative) before and during the procedure. This medicine is given through an IV access tube that is inserted into one of your veins.  · The area where the catheter will be inserted will be washed and shaved. This is usually done in the groin but may be done in the fold of your arm (near your elbow) or in the wrist.  · A medicine will be given to numb the area where the catheter will  be inserted (local anesthetic).  · The catheter will be inserted with a guide wire into an artery. The catheter is guided by using a type of X-ray (fluoroscopy) to the blood vessel being examined.  · Dye is then injected into the catheter, and X-rays are taken. The dye helps to show where any narrowing or blockages are located.  What happens after the procedure?  · If the procedure is done through the leg, you will be kept in bed lying flat for several hours. You will be instructed to not bend or cross your legs.  · The insertion site will be checked frequently.  · The pulse in your feet or wrist will be checked frequently.  · Additional blood tests, X-rays, and electrocardiography may be done.  · You may need to stay in the hospital overnight for observation.  This information is not intended to replace advice given to you by your health care provider. Make sure you discuss any questions you have with your health care provider.  Document Released: 09/27/2006 Document Revised: 05/31/2017 Document Reviewed: 05/21/2014  "[employer] Interactive Patient Education © 2017 "[employer] Inc[employer]        How to Use Compression Stockings  Compression stockings are elastic socks that squeeze the legs. They help to increase blood flow to the legs, decrease swelling in the legs, and reduce the chance of developing blood clots in the lower legs. Compression stockings are often used by people who:  · Are recovering from surgery.  · Have poor circulation in their legs.  · Are prone to getting blood clots in their legs.  · Have varicose veins.  · Sit or stay in bed for long periods of time.    How to use compression stockings  Before you put on your compression stockings:  · Make sure that they are the correct size. If you do not know your size, ask your health care provider.  · Make sure that they are clean, dry, and in good condition.  · Check them for rips and tears. Do not put them on if they are ripped or torn.    Put your stockings on  first thing in the morning, before you get out of bed. Keep them on for as long as your health care provider advises. When you are wearing your stockings:  · Keep them as smooth as possible. Do not allow them to bunch up. It is especially important to prevent the stockings from bunching up around your toes or behind your knees.  · Do not roll the stockings downward and leave them rolled down. This can decrease blood flow to your leg.  · Change them right away if they become wet or dirty.    When you take off your stockings, inspect your legs and feet. Anything that does not seem normal may require medical attention. Look for:  · Open sores.  · Red spots.  · Swelling.    Information and tips  · Do not stop wearing your compression stockings without talking to your health care provider first.  · Wash your stockings every day with mild detergent in cold or warm water. Do not use bleach. Air-dry your stockings or dry them in a clothes dryer on low heat.  · Replace your stockings every 3-6 months.  · If skin moisturizing is part of your treatment plan, apply lotion or cream at night so that your skin will be dry when you put on the stockings in the morning. It is harder to put the stockings on when you have lotion on your legs or feet.  Contact a health care provider if:  Remove your stockings and seek medical care if:  · You have a feeling of pins and needles in your feet or legs.  · You have any new changes in your skin.  · You have skin lesions that are getting worse.  · You have swelling or pain that is getting worse.    Get help right away if:  · You have numbness or tingling in your lower legs that does not get better right after you take the stockings off.  · Your toes or feet become cold and blue.  · You develop open sores or red spots on your legs that do not go away.  · You see or feel a warm spot on your leg.  · You have new swelling or soreness in your leg.  · You are short of breath or you have chest pain  for no reason.  · You have a rapid or irregular heartbeat.  · You feel light-headed or dizzy.  This information is not intended to replace advice given to you by your health care provider. Make sure you discuss any questions you have with your health care provider.  Document Released: 10/15/2010 Document Revised: 05/17/2017 Document Reviewed: 11/25/2015  [employer] Interactive Patient Education © 2018 [employer]

## 2018-07-24 NOTE — H&P
2018        Delfina Pereira, DO  [address]  [address]  [place] [address]     Mya Chung  2/10/1928          Chief Complaint   Patient presents with   • Varicose Veins       Dr Pereira referred over for severe varicose veins with leg pain. Patient denies any stroke like symptoms. Patient states bilateral legs are weak and about a month ago the left leg started hurting giving her a lot of problems an painful.          Dear Delfina Pereira, *:        HPI  I had the pleasure of seeing your patient Mya Chung in the office today.  Thank you kindly for this consultation.  As you recall, Mya Chung is a 89 y.o.  female who you are currently following for routine health maintenance.  She has complaints of weakness to bilateral lower extremities.  She has pain in her legs, left leg worse.  She states the pain is worse when she walks, and difficulty walking to her mailbox. She had arterial testing in 2016, that showed mild insufficiency.  She denies any back pain.  She does receive cortisone shot  In her knee.  She denies any cramping to her legs at night when she sleeps.       Medical History        Past Medical History:   Diagnosis Date   • Arthritis     • Hyperlipidemia     • Hypertension     • Hypothyroidism     • Lung nodule     • Osteopenia     • Skin cancer     • 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              Surgical History         Past Surgical History:   Procedure Laterality Date   • BREAST BIOPSY Bilateral       multiple on both, all benign   • BREAST BIOPSY Left 2017     Procedure: LEFT MAMMOGRAM GUIDED NEEDLE DIRECTED EXCISIONAL BREAST BIOPSY;  Surgeon: Malgorzata Scott MD;  Location: [facility];  Service:    • BUNIONECTOMY Bilateral     •  SECTION         X2   • EYE SURGERY         CATARACT SURGERY   • JOINT REPLACEMENT         RIGHT HIP                  Family History   Problem Relation Age of Onset   • Cancer Sister           Uterus   • Breast cancer  Neg Hx           Social History   Social History            Social History   • Marital status:        Spouse name: N/A   • Number of children: N/A   • Years of education: N/A          Occupational History   • Not on file.            Social History Main Topics   • Smoking status: Former Smoker       Years: 2.00       Types: Cigarettes       Quit date: 1955   • Smokeless tobacco: Never Used   • Alcohol use No   • Drug use: No   • Sexual activity: Defer           Other Topics Concern   • Not on file          Social History Narrative   • No narrative on file                  Allergies   Allergen Reactions   • Erythromycin Shortness Of Breath   • Zithromax [Azithromycin] Unknown (See Comments)       unknown   • Penicillins Rash                 Prior to Admission medications    Medication Sig Start Date End Date Taking? Authorizing Provider   alendronate (FOSAMAX) 70 MG tablet Take 70 mg by mouth Every 7 (Seven) Days.     Yes Historical Provider, MD   amLODIPine (NORVASC) 10 MG tablet Take 10 mg by mouth Daily.     Yes Historical Provider, MD   aspirin 81 MG tablet Take 81 mg by mouth Daily.     Yes Historical Provider, MD   Calcium Carbonate-Vitamin D (CALCIUM 600/VITAMIN D PO) Take 1 tablet by mouth Daily.     Yes Historical Provider, MD   CloNIDine (CATAPRES) 0.1 MG tablet Take 0.1 mg by mouth.     Yes Historical Provider, MD   cycloSPORINE (RESTASIS) 0.05 % ophthalmic emulsion Administer 1 drop to both eyes Every 12 (Twelve) Hours.     Yes Historical Provider, MD   Glucosamine-Chondroitin-Vit D3 5182-0851-802 MG-MG-UNIT pack Take 1 tablet by mouth 2 (Two) Times a Day.     Yes Historical Provider, MD   levothyroxine sodium (TIROSINT) 112 MCG capsule Take 112 mcg by mouth Daily.     Yes Historical Provider, MD   Multiple Vitamins-Minerals (COMPLETE MULTIVITAMIN/MINERAL PO) Take 1 tablet by mouth Daily.     Yes Historical Provider, MD   Aliskiren-Hydrochlorothiazide 300-12.5 MG tablet Take 1 tablet by mouth Daily.  "      Historical Provider, MD   HYDROcodone-acetaminophen (NORCO) 5-325 MG per tablet Take 1-2 tablets by mouth Every 4 (Four) Hours As Needed (Pain). 12/1/17 7/24/18 April JOHNATHAN Scott MD         Review of Systems   Constitutional: Negative.    HENT: Negative.    Eyes: Negative.    Respiratory: Negative.    Cardiovascular: Negative.    Gastrointestinal: Negative.    Endocrine: Negative.    Genitourinary: Negative.    Musculoskeletal: Negative.    Skin: Positive for color change.   Allergic/Immunologic: Negative.    Neurological: Negative for numbness.   Hematological: Negative.    Psychiatric/Behavioral: Negative.          /76 (BP Location: Right arm, Patient Position: Sitting, Cuff Size: Adult)   Pulse 84   Ht 162.6 cm (64\")   Wt 56.7 kg (125 lb)   SpO2 98%   BMI 21.46 kg/m²   Physical Exam   Constitutional: She is oriented to person, place, and time. She appears well-developed and well-nourished. No distress.   HENT:   Head: Normocephalic and atraumatic.   Mouth/Throat: Oropharynx is clear and moist.   Eyes: Pupils are equal, round, and reactive to light. No scleral icterus.   Neck: Normal range of motion. Neck supple. No JVD present. Carotid bruit is not present. No thyromegaly present.   Cardiovascular: Normal rate, regular rhythm, S2 normal, normal heart sounds, intact distal pulses and normal pulses.  Exam reveals no gallop and no friction rub.    No murmur heard.  Pulses:       Femoral pulses are 2+ on the right side, and 2+ on the left side.       Dorsalis pedis pulses are 2+ on the right side.        Posterior tibial pulses are 2+ on the right side.   Left: doppler signals  Significant varicose veins to left lower extremity   Pulmonary/Chest: Effort normal and breath sounds normal.   Abdominal: Soft. Normal aorta and bowel sounds are normal. There is no hepatosplenomegaly.   Musculoskeletal: Normal range of motion.   Neurological: She is alert and oriented to person, place, and time. No cranial " nerve deficit.   Skin: Skin is warm and dry. She is not diaphoretic.   Psychiatric: She has a normal mood and affect. Her behavior is normal. Judgment and thought content normal.   Nursing note and vitals reviewed.        No results found.         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   • Hypertension   • Hyperlipidemia             ICD-10-CM ICD-9-CM   1. PAD (peripheral artery disease) (CMS/[facility]) I73.9 443.9   2. Preop testing Z01.818 V72.84   3. Encounter for monitoring antiplatelet therapy Z51.81 V58.83     Z79.02 V58.63   4. Essential hypertension I10 401.9   5. Hyperlipidemia, unspecified hyperlipidemia type E78.5 272.4         Lab Frequency Next Occurrence   Mammo Diagnostic Digital Tomosynthesis Left With CAD Once 11/12/2017   NM sentinel node injection only Once 12/05/2017         Plan: After thoroughly evaluating [facility], I believe the best course of action is to proceed with an angiogram of the left lower extremity.  Risks of angiogram were discussed.  These include, but are not limited to, bleeding, infection, vessel damage, nerve damage, embolus, and loss of limb.  The patient understands these risks and wishes to proceed with procedure.  I did discuss vascular risk factors as they pertain to the progression of vascular disease including controlling her hypertension and hyperlipidemia.  Body mass index is 21.46 kg/m². The patient can continue taking her current medication regimen as previously planned.  This was all discussed in full with complete understanding.     Thank you for allowing me to participate in the care of your patient.  Please do not hesitate with any questions or concerns.  I will keep you aware of any further encounters with [facility].           Sincerely yours,           ANGY Allen, DO

## 2018-07-24 NOTE — PROGRESS NOTES
2018      Delfina Pereira, DO  [address]  [address]  [place] [address]    Mya Chung  2/10/1928    Chief Complaint   Patient presents with   • Varicose Veins     Dr Pereira referred over for severe varicose veins with leg pain. Patient denies any stroke like symptoms. Patient states bilateral legs are weak and about a month ago the left leg started hurting giving her a lot of problems an painful.        Dear Delfina Pereira, *:      HPI  I had the pleasure of seeing your patient Mya Chung in the office today.  Thank you kindly for this consultation.  As you recall, Mya Chung is a 89 y.o.  female who you are currently following for routine health maintenance.  She has complaints of weakness to bilateral lower extremities.  She has pain in her legs, left leg worse.  She states the pain is worse when she walks, and difficulty walking to her mailbox. She had arterial testing in 2016, that showed mild insufficiency.  She denies any back pain.  She does receive cortisone shot  In her knee.  She denies any cramping to her legs at night when she sleeps.      Past Medical History:   Diagnosis Date   • Arthritis    • Hyperlipidemia    • Hypertension    • Hypothyroidism    • Lung nodule    • Osteopenia    • Skin cancer    • 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        Past Surgical History:   Procedure Laterality Date   • BREAST BIOPSY Bilateral     multiple on both, all benign   • BREAST BIOPSY Left 2017    Procedure: LEFT MAMMOGRAM GUIDED NEEDLE DIRECTED EXCISIONAL BREAST BIOPSY;  Surgeon: Malgorzata Scott MD;  Location: [facility];  Service:    • BUNIONECTOMY Bilateral    •  SECTION      X2   • EYE SURGERY      CATARACT SURGERY   • JOINT REPLACEMENT      RIGHT HIP       Family History   Problem Relation Age of Onset   • Cancer Sister         Uterus   • Breast cancer Neg Hx        Social History     Social History   • Marital status:      Spouse name: N/A   •  Number of children: N/A   • Years of education: N/A     Occupational History   • Not on file.     Social History Main Topics   • Smoking status: Former Smoker     Years: 2.00     Types: Cigarettes     Quit date: 1955   • Smokeless tobacco: Never Used   • Alcohol use No   • Drug use: No   • Sexual activity: Defer     Other Topics Concern   • Not on file     Social History Narrative   • No narrative on file       Allergies   Allergen Reactions   • Erythromycin Shortness Of Breath   • Zithromax [Azithromycin] Unknown (See Comments)     unknown   • Penicillins Rash       Prior to Admission medications    Medication Sig Start Date End Date Taking? Authorizing Provider   alendronate (FOSAMAX) 70 MG tablet Take 70 mg by mouth Every 7 (Seven) Days.   Yes Historical Provider, MD   amLODIPine (NORVASC) 10 MG tablet Take 10 mg by mouth Daily.   Yes Historical Provider, MD   aspirin 81 MG tablet Take 81 mg by mouth Daily.   Yes Historical Provider, MD   Calcium Carbonate-Vitamin D (CALCIUM 600/VITAMIN D PO) Take 1 tablet by mouth Daily.   Yes Historical Provider, MD   CloNIDine (CATAPRES) 0.1 MG tablet Take 0.1 mg by mouth.   Yes Historical Provider, MD   cycloSPORINE (RESTASIS) 0.05 % ophthalmic emulsion Administer 1 drop to both eyes Every 12 (Twelve) Hours.   Yes Historical Provider, MD   Glucosamine-Chondroitin-Vit D3 1029-1767-717 MG-MG-UNIT pack Take 1 tablet by mouth 2 (Two) Times a Day.   Yes Historical Provider, MD   levothyroxine sodium (TIROSINT) 112 MCG capsule Take 112 mcg by mouth Daily.   Yes Historical Provider, MD   Multiple Vitamins-Minerals (COMPLETE MULTIVITAMIN/MINERAL PO) Take 1 tablet by mouth Daily.   Yes Historical Provider, MD   Aliskiren-Hydrochlorothiazide 300-12.5 MG tablet Take 1 tablet by mouth Daily.    Historical Provider, MD   HYDROcodone-acetaminophen (NORCO) 5-325 MG per tablet Take 1-2 tablets by mouth Every 4 (Four) Hours As Needed (Pain). 12/1/17 7/24/18 April JOHNATHAN Scott MD       Review  "of Systems   Constitutional: Negative.    HENT: Negative.    Eyes: Negative.    Respiratory: Negative.    Cardiovascular: Negative.    Gastrointestinal: Negative.    Endocrine: Negative.    Genitourinary: Negative.    Musculoskeletal: Negative.    Skin: Positive for color change.   Allergic/Immunologic: Negative.    Neurological: Negative for numbness.   Hematological: Negative.    Psychiatric/Behavioral: Negative.        /76 (BP Location: Right arm, Patient Position: Sitting, Cuff Size: Adult)   Pulse 84   Ht 162.6 cm (64\")   Wt 56.7 kg (125 lb)   SpO2 98%   BMI 21.46 kg/m²   Physical Exam   Constitutional: She is oriented to person, place, and time. She appears well-developed and well-nourished. No distress.   HENT:   Head: Normocephalic and atraumatic.   Mouth/Throat: Oropharynx is clear and moist.   Eyes: Pupils are equal, round, and reactive to light. No scleral icterus.   Neck: Normal range of motion. Neck supple. No JVD present. Carotid bruit is not present. No thyromegaly present.   Cardiovascular: Normal rate, regular rhythm, S2 normal, normal heart sounds, intact distal pulses and normal pulses.  Exam reveals no gallop and no friction rub.    No murmur heard.  Pulses:       Femoral pulses are 2+ on the right side, and 2+ on the left side.       Dorsalis pedis pulses are 2+ on the right side.        Posterior tibial pulses are 2+ on the right side.   Left: doppler signals  Significant varicose veins to left lower extremity   Pulmonary/Chest: Effort normal and breath sounds normal.   Abdominal: Soft. Normal aorta and bowel sounds are normal. There is no hepatosplenomegaly.   Musculoskeletal: Normal range of motion.   Neurological: She is alert and oriented to person, place, and time. No cranial nerve deficit.   Skin: Skin is warm and dry. She is not diaphoretic.   Psychiatric: She has a normal mood and affect. Her behavior is normal. Judgment and thought content normal.   Nursing note and vitals " reviewed.      No results found.    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   • Hypertension   • Hyperlipidemia         ICD-10-CM ICD-9-CM   1. PAD (peripheral artery disease) (CMS/[facility]) I73.9 443.9   2. Preop testing Z01.818 V72.84   3. Encounter for monitoring antiplatelet therapy Z51.81 V58.83    Z79.02 V58.63   4. Essential hypertension I10 401.9   5. Hyperlipidemia, unspecified hyperlipidemia type E78.5 272.4       Lab Frequency Next Occurrence   Mammo Diagnostic Digital Tomosynthesis Left With CAD Once 11/12/2017   NM sentinel node injection only Once 12/05/2017       Plan: After thoroughly evaluating [facility], I believe the best course of action is to proceed with an angiogram of the left lower extremity.  Risks of angiogram were discussed.  These include, but are not limited to, bleeding, infection, vessel damage, nerve damage, embolus, and loss of limb.  The patient understands these risks and wishes to proceed with procedure.  I did also give her a prescription for compression stockings in the 15-20 mmHg.  We did instruct her on how to wear these on a daily basis.  I did discuss vascular risk factors as they pertain to the progression of vascular disease including controlling her hypertension and hyperlipidemia.  Body mass index is 21.46 kg/m². The patient can continue taking her current medication regimen as previously planned.  This was all discussed in full with complete understanding.    Thank you for allowing me to participate in the care of your patient.  Please do not hesitate with any questions or concerns.  I will keep you aware of any further encounters with [facility].        Sincerely yours,         ANGY Allen, DO

## 2018-07-30 ENCOUNTER — APPOINTMENT (OUTPATIENT)
Dept: PREADMISSION TESTING | Facility: HOSPITAL | Age: 83
End: 2018-07-30

## 2018-07-30 ENCOUNTER — HOSPITAL ENCOUNTER (OUTPATIENT)
Dept: GENERAL RADIOLOGY | Facility: HOSPITAL | Age: 83
Discharge: HOME | End: 2018-07-30
Admitting: NURSE PRACTITIONER

## 2018-07-30 VITALS
OXYGEN SATURATION: 96 % | SYSTOLIC BLOOD PRESSURE: 148 MMHG | RESPIRATION RATE: 20 BRPM | WEIGHT: 125.88 LBS | HEIGHT: 63 IN | BODY MASS INDEX: 22.3 KG/M2 | HEART RATE: 78 BPM | DIASTOLIC BLOOD PRESSURE: 65 MMHG

## 2018-07-30 DIAGNOSIS — Z01.818 PREOP TESTING: ICD-10-CM

## 2018-07-30 DIAGNOSIS — Z51.81 ENCOUNTER FOR MONITORING ANTIPLATELET THERAPY: ICD-10-CM

## 2018-07-30 DIAGNOSIS — I73.9 PAD (PERIPHERAL ARTERY DISEASE) (HCC): ICD-10-CM

## 2018-07-30 DIAGNOSIS — Z79.02 ENCOUNTER FOR MONITORING ANTIPLATELET THERAPY: ICD-10-CM

## 2018-07-30 LAB
ANION GAP SERPL CALCULATED.3IONS-SCNC: 10 MMOL/L (ref 4–13)
APTT PPP: 28.7 SECONDS (ref 24.1–34.8)
BASOPHILS # BLD AUTO: 0.06 10*3/MM3 (ref 0–0.2)
BASOPHILS NFR BLD AUTO: 0.7 % (ref 0–2)
BUN BLD-MCNC: 27 MG/DL (ref 5–21)
BUN/CREAT SERPL: 25 (ref 7–25)
CALCIUM SPEC-SCNC: 10.6 MG/DL (ref 8.4–10.4)
CHLORIDE SERPL-SCNC: 104 MMOL/L (ref 98–110)
CO2 SERPL-SCNC: 28 MMOL/L (ref 24–31)
CREAT BLD-MCNC: 1.08 MG/DL (ref 0.5–1.4)
DEPRECATED RDW RBC AUTO: 43.5 FL (ref 40–54)
EOSINOPHIL # BLD AUTO: 0.21 10*3/MM3 (ref 0–0.7)
EOSINOPHIL NFR BLD AUTO: 2.5 % (ref 0–4)
ERYTHROCYTE [DISTWIDTH] IN BLOOD BY AUTOMATED COUNT: 12.8 % (ref 12–15)
GFR SERPL CREATININE-BSD FRML MDRD: 48 ML/MIN/1.73
GLUCOSE BLD-MCNC: 89 MG/DL (ref 70–100)
HCT VFR BLD AUTO: 40 % (ref 37–47)
HGB BLD-MCNC: 12.7 G/DL (ref 12–16)
IMM GRANULOCYTES # BLD: 0.03 10*3/MM3 (ref 0–0.03)
IMM GRANULOCYTES NFR BLD: 0.4 % (ref 0–5)
INR PPP: 0.88 (ref 0.91–1.09)
LYMPHOCYTES # BLD AUTO: 3.22 10*3/MM3 (ref 0.72–4.86)
LYMPHOCYTES NFR BLD AUTO: 38.5 % (ref 15–45)
MCH RBC QN AUTO: 29.3 PG (ref 28–32)
MCHC RBC AUTO-ENTMCNC: 31.8 G/DL (ref 33–36)
MCV RBC AUTO: 92.4 FL (ref 82–98)
MONOCYTES # BLD AUTO: 0.85 10*3/MM3 (ref 0.19–1.3)
MONOCYTES NFR BLD AUTO: 10.2 % (ref 4–12)
NEUTROPHILS # BLD AUTO: 4 10*3/MM3 (ref 1.87–8.4)
NEUTROPHILS NFR BLD AUTO: 47.7 % (ref 39–78)
NRBC BLD MANUAL-RTO: 0 /100 WBC (ref 0–0)
PLATELET # BLD AUTO: 217 10*3/MM3 (ref 130–400)
PMV BLD AUTO: 9.9 FL (ref 6–12)
POTASSIUM BLD-SCNC: 4.2 MMOL/L (ref 3.5–5.3)
PROTHROMBIN TIME: 12.2 SECONDS (ref 11.9–14.6)
RBC # BLD AUTO: 4.33 10*6/MM3 (ref 4.2–5.4)
SODIUM BLD-SCNC: 142 MMOL/L (ref 135–145)
WBC NRBC COR # BLD: 8.37 10*3/MM3 (ref 4.8–10.8)

## 2018-07-30 PROCEDURE — 93005 ELECTROCARDIOGRAM TRACING: CPT

## 2018-07-30 PROCEDURE — 85730 THROMBOPLASTIN TIME PARTIAL: CPT | Performed by: NURSE PRACTITIONER

## 2018-07-30 PROCEDURE — 80048 BASIC METABOLIC PNL TOTAL CA: CPT | Performed by: NURSE PRACTITIONER

## 2018-07-30 PROCEDURE — 85610 PROTHROMBIN TIME: CPT | Performed by: NURSE PRACTITIONER

## 2018-07-30 PROCEDURE — 71046 X-RAY EXAM CHEST 2 VIEWS: CPT

## 2018-07-30 PROCEDURE — 93010 ELECTROCARDIOGRAM REPORT: CPT | Performed by: INTERNAL MEDICINE

## 2018-07-30 PROCEDURE — 36415 COLL VENOUS BLD VENIPUNCTURE: CPT

## 2018-07-30 PROCEDURE — 85025 COMPLETE CBC W/AUTO DIFF WBC: CPT | Performed by: NURSE PRACTITIONER

## 2018-07-30 RX ORDER — ALISKIREN 300 MG/1
300 TABLET, FILM COATED ORAL DAILY
COMMUNITY
End: 2022-05-04

## 2018-08-03 ENCOUNTER — TELEPHONE (OUTPATIENT)
Dept: VASCULAR SURGERY | Facility: CLINIC | Age: 83
End: 2018-08-03

## 2018-08-03 NOTE — TELEPHONE ENCOUNTER
I called patient to give her a curtesy reminder of her procedure on Monday with a 8 am arrival time. Had to leave a message.

## 2018-08-06 ENCOUNTER — ANESTHESIA (OUTPATIENT)
Dept: PERIOP | Facility: HOSPITAL | Age: 83
End: 2018-08-06

## 2018-08-06 ENCOUNTER — APPOINTMENT (OUTPATIENT)
Dept: INTERVENTIONAL RADIOLOGY/VASCULAR | Facility: HOSPITAL | Age: 83
End: 2018-08-06

## 2018-08-06 ENCOUNTER — ANESTHESIA EVENT (OUTPATIENT)
Dept: PERIOP | Facility: HOSPITAL | Age: 83
End: 2018-08-06

## 2018-08-06 ENCOUNTER — HOSPITAL ENCOUNTER (OUTPATIENT)
Facility: HOSPITAL | Age: 83
Setting detail: HOSPITAL OUTPATIENT SURGERY
Discharge: HOME | End: 2018-08-06
Attending: SURGERY | Admitting: SURGERY

## 2018-08-06 VITALS
HEIGHT: 63 IN | TEMPERATURE: 97.9 F | SYSTOLIC BLOOD PRESSURE: 136 MMHG | HEART RATE: 76 BPM | RESPIRATION RATE: 20 BRPM | DIASTOLIC BLOOD PRESSURE: 59 MMHG | OXYGEN SATURATION: 96 %

## 2018-08-06 DIAGNOSIS — Z01.818 PREOP TESTING: ICD-10-CM

## 2018-08-06 DIAGNOSIS — I73.9 PAD (PERIPHERAL ARTERY DISEASE) (HCC): ICD-10-CM

## 2018-08-06 LAB
ABO GROUP BLD: NORMAL
BLD GP AB SCN SERPL QL: NEGATIVE
RH BLD: POSITIVE
T&S EXPIRATION DATE: NORMAL

## 2018-08-06 PROCEDURE — C1894 INTRO/SHEATH, NON-LASER: HCPCS | Performed by: SURGERY

## 2018-08-06 PROCEDURE — 86850 RBC ANTIBODY SCREEN: CPT | Performed by: NURSE PRACTITIONER

## 2018-08-06 PROCEDURE — 25010000002 FENTANYL CITRATE (PF) 100 MCG/2ML SOLUTION: Performed by: NURSE ANESTHETIST, CERTIFIED REGISTERED

## 2018-08-06 PROCEDURE — 25010000002 PROPOFOL 1000 MG/ML EMULSION: Performed by: NURSE ANESTHETIST, CERTIFIED REGISTERED

## 2018-08-06 PROCEDURE — 75710 ARTERY X-RAYS ARM/LEG: CPT

## 2018-08-06 PROCEDURE — C1887 CATHETER, GUIDING: HCPCS | Performed by: SURGERY

## 2018-08-06 PROCEDURE — 25010000002 HEPARIN (PORCINE) PER 1000 UNITS: Performed by: SURGERY

## 2018-08-06 PROCEDURE — 75710 ARTERY X-RAYS ARM/LEG: CPT | Performed by: SURGERY

## 2018-08-06 PROCEDURE — 36245 INS CATH ABD/L-EXT ART 1ST: CPT | Performed by: SURGERY

## 2018-08-06 PROCEDURE — C1769 GUIDE WIRE: HCPCS | Performed by: SURGERY

## 2018-08-06 PROCEDURE — 76000 FLUOROSCOPY <1 HR PHYS/QHP: CPT

## 2018-08-06 PROCEDURE — 25010000002 HEPARIN (PORCINE) PER 1000 UNITS: Performed by: NURSE ANESTHETIST, CERTIFIED REGISTERED

## 2018-08-06 PROCEDURE — 86901 BLOOD TYPING SEROLOGIC RH(D): CPT | Performed by: NURSE PRACTITIONER

## 2018-08-06 PROCEDURE — 25010000002 VANCOMYCIN 10 G RECONSTITUTED SOLUTION: Performed by: NURSE PRACTITIONER

## 2018-08-06 PROCEDURE — 86900 BLOOD TYPING SEROLOGIC ABO: CPT | Performed by: NURSE PRACTITIONER

## 2018-08-06 PROCEDURE — C1760 CLOSURE DEV, VASC: HCPCS | Performed by: SURGERY

## 2018-08-06 PROCEDURE — 25010000002 IOPAMIDOL 61 % SOLUTION: Performed by: SURGERY

## 2018-08-06 RX ORDER — IBUPROFEN 600 MG/1
600 TABLET ORAL ONCE AS NEEDED
Status: DISCONTINUED | OUTPATIENT
Start: 2018-08-06 | End: 2018-08-06 | Stop reason: HOSPADM

## 2018-08-06 RX ORDER — FENTANYL CITRATE 50 UG/ML
INJECTION, SOLUTION INTRAMUSCULAR; INTRAVENOUS AS NEEDED
Status: DISCONTINUED | OUTPATIENT
Start: 2018-08-06 | End: 2018-08-06 | Stop reason: SURG

## 2018-08-06 RX ORDER — SODIUM CHLORIDE 0.9 % (FLUSH) 0.9 %
3 SYRINGE (ML) INJECTION AS NEEDED
Status: DISCONTINUED | OUTPATIENT
Start: 2018-08-06 | End: 2018-08-06 | Stop reason: HOSPADM

## 2018-08-06 RX ORDER — IPRATROPIUM BROMIDE AND ALBUTEROL SULFATE 2.5; .5 MG/3ML; MG/3ML
3 SOLUTION RESPIRATORY (INHALATION) ONCE AS NEEDED
Status: DISCONTINUED | OUTPATIENT
Start: 2018-08-06 | End: 2018-08-06 | Stop reason: HOSPADM

## 2018-08-06 RX ORDER — SODIUM CHLORIDE, SODIUM LACTATE, POTASSIUM CHLORIDE, CALCIUM CHLORIDE 600; 310; 30; 20 MG/100ML; MG/100ML; MG/100ML; MG/100ML
1000 INJECTION, SOLUTION INTRAVENOUS CONTINUOUS
Status: DISCONTINUED | OUTPATIENT
Start: 2018-08-06 | End: 2018-08-06 | Stop reason: HOSPADM

## 2018-08-06 RX ORDER — OXYCODONE AND ACETAMINOPHEN 10; 325 MG/1; MG/1
1 TABLET ORAL ONCE AS NEEDED
Status: DISCONTINUED | OUTPATIENT
Start: 2018-08-06 | End: 2018-08-06 | Stop reason: HOSPADM

## 2018-08-06 RX ORDER — ONDANSETRON 2 MG/ML
4 INJECTION INTRAMUSCULAR; INTRAVENOUS ONCE AS NEEDED
Status: DISCONTINUED | OUTPATIENT
Start: 2018-08-06 | End: 2018-08-06 | Stop reason: HOSPADM

## 2018-08-06 RX ORDER — HYDROCODONE BITARTRATE AND ACETAMINOPHEN 5; 325 MG/1; MG/1
1 TABLET ORAL ONCE AS NEEDED
Status: DISCONTINUED | OUTPATIENT
Start: 2018-08-06 | End: 2018-08-06 | Stop reason: HOSPADM

## 2018-08-06 RX ORDER — SODIUM CHLORIDE 0.9 % (FLUSH) 0.9 %
1-10 SYRINGE (ML) INJECTION AS NEEDED
Status: DISCONTINUED | OUTPATIENT
Start: 2018-08-06 | End: 2018-08-06 | Stop reason: HOSPADM

## 2018-08-06 RX ORDER — MEPERIDINE HYDROCHLORIDE 50 MG/ML
12.5 INJECTION INTRAMUSCULAR; INTRAVENOUS; SUBCUTANEOUS
Status: DISCONTINUED | OUTPATIENT
Start: 2018-08-06 | End: 2018-08-06 | Stop reason: HOSPADM

## 2018-08-06 RX ORDER — HEPARIN SODIUM 1000 [USP'U]/ML
INJECTION, SOLUTION INTRAVENOUS; SUBCUTANEOUS AS NEEDED
Status: DISCONTINUED | OUTPATIENT
Start: 2018-08-06 | End: 2018-08-06 | Stop reason: SURG

## 2018-08-06 RX ORDER — OXYCODONE AND ACETAMINOPHEN 7.5; 325 MG/1; MG/1
2 TABLET ORAL ONCE AS NEEDED
Status: DISCONTINUED | OUTPATIENT
Start: 2018-08-06 | End: 2018-08-06 | Stop reason: HOSPADM

## 2018-08-06 RX ORDER — DEXTROSE MONOHYDRATE 25 G/50ML
12.5 INJECTION, SOLUTION INTRAVENOUS AS NEEDED
Status: DISCONTINUED | OUTPATIENT
Start: 2018-08-06 | End: 2018-08-06 | Stop reason: HOSPADM

## 2018-08-06 RX ORDER — VASOPRESSIN 20 U/ML
INJECTION PARENTERAL AS NEEDED
Status: DISCONTINUED | OUTPATIENT
Start: 2018-08-06 | End: 2018-08-06 | Stop reason: SURG

## 2018-08-06 RX ORDER — FENTANYL CITRATE 50 UG/ML
25 INJECTION, SOLUTION INTRAMUSCULAR; INTRAVENOUS AS NEEDED
Status: DISCONTINUED | OUTPATIENT
Start: 2018-08-06 | End: 2018-08-06 | Stop reason: HOSPADM

## 2018-08-06 RX ORDER — NALOXONE HCL 0.4 MG/ML
0.4 VIAL (ML) INJECTION AS NEEDED
Status: DISCONTINUED | OUTPATIENT
Start: 2018-08-06 | End: 2018-08-06 | Stop reason: HOSPADM

## 2018-08-06 RX ORDER — LABETALOL HYDROCHLORIDE 5 MG/ML
5 INJECTION, SOLUTION INTRAVENOUS
Status: DISCONTINUED | OUTPATIENT
Start: 2018-08-06 | End: 2018-08-06 | Stop reason: HOSPADM

## 2018-08-06 RX ORDER — METOCLOPRAMIDE HYDROCHLORIDE 5 MG/ML
5 INJECTION INTRAMUSCULAR; INTRAVENOUS
Status: DISCONTINUED | OUTPATIENT
Start: 2018-08-06 | End: 2018-08-06 | Stop reason: HOSPADM

## 2018-08-06 RX ORDER — BUPIVACAINE HYDROCHLORIDE 5 MG/ML
INJECTION, SOLUTION EPIDURAL; INTRACAUDAL AS NEEDED
Status: DISCONTINUED | OUTPATIENT
Start: 2018-08-06 | End: 2018-08-06 | Stop reason: HOSPADM

## 2018-08-06 RX ORDER — MIDAZOLAM HYDROCHLORIDE 1 MG/ML
1 INJECTION INTRAMUSCULAR; INTRAVENOUS
Status: DISCONTINUED | OUTPATIENT
Start: 2018-08-06 | End: 2018-08-06 | Stop reason: HOSPADM

## 2018-08-06 RX ORDER — SODIUM CHLORIDE, SODIUM LACTATE, POTASSIUM CHLORIDE, CALCIUM CHLORIDE 600; 310; 30; 20 MG/100ML; MG/100ML; MG/100ML; MG/100ML
9 INJECTION, SOLUTION INTRAVENOUS CONTINUOUS
Status: DISCONTINUED | OUTPATIENT
Start: 2018-08-06 | End: 2018-08-06 | Stop reason: HOSPADM

## 2018-08-06 RX ORDER — MIDAZOLAM HYDROCHLORIDE 1 MG/ML
2 INJECTION INTRAMUSCULAR; INTRAVENOUS
Status: DISCONTINUED | OUTPATIENT
Start: 2018-08-06 | End: 2018-08-06 | Stop reason: HOSPADM

## 2018-08-06 RX ORDER — FENTANYL CITRATE 50 UG/ML
25 INJECTION, SOLUTION INTRAMUSCULAR; INTRAVENOUS
Status: DISCONTINUED | OUTPATIENT
Start: 2018-08-06 | End: 2018-08-06 | Stop reason: HOSPADM

## 2018-08-06 RX ADMIN — HEPARIN SODIUM 1000 UNITS: 1000 INJECTION, SOLUTION INTRAVENOUS; SUBCUTANEOUS at 10:55

## 2018-08-06 RX ADMIN — FENTANYL CITRATE 50 MCG: 50 INJECTION, SOLUTION INTRAMUSCULAR; INTRAVENOUS at 10:35

## 2018-08-06 RX ADMIN — LIDOCAINE HYDROCHLORIDE 0.5 ML: 10 INJECTION, SOLUTION EPIDURAL; INFILTRATION; INTRACAUDAL; PERINEURAL at 09:00

## 2018-08-06 RX ADMIN — VASOPRESSIN 0.5 UNITS: 20 INJECTION INTRAVENOUS at 11:00

## 2018-08-06 RX ADMIN — VANCOMYCIN HYDROCHLORIDE 750 MG: 10 INJECTION, POWDER, LYOPHILIZED, FOR SOLUTION INTRAVENOUS at 10:11

## 2018-08-06 RX ADMIN — SODIUM CHLORIDE, POTASSIUM CHLORIDE, SODIUM LACTATE AND CALCIUM CHLORIDE 1000 ML: 600; 310; 30; 20 INJECTION, SOLUTION INTRAVENOUS at 09:00

## 2018-08-06 RX ADMIN — VASOPRESSIN 0.5 UNITS: 20 INJECTION INTRAVENOUS at 10:51

## 2018-08-06 RX ADMIN — PROPOFOL 50 MCG/KG/MIN: 10 INJECTION, EMULSION INTRAVENOUS at 10:35

## 2018-08-06 RX ADMIN — VASOPRESSIN 0.5 UNITS: 20 INJECTION INTRAVENOUS at 11:13

## 2018-08-06 NOTE — H&P (VIEW-ONLY)
2018        Delfina Pereira, DO  [address]  [address]  [place] [address]     Mya Chung  2/10/1928          Chief Complaint   Patient presents with   • Varicose Veins       Dr Pereira referred over for severe varicose veins with leg pain. Patient denies any stroke like symptoms. Patient states bilateral legs are weak and about a month ago the left leg started hurting giving her a lot of problems an painful.          Dear Delfina Pereira, *:        HPI  I had the pleasure of seeing your patient Mya Chung in the office today.  Thank you kindly for this consultation.  As you recall, Mya Chung is a 89 y.o.  female who you are currently following for routine health maintenance.  She has complaints of weakness to bilateral lower extremities.  She has pain in her legs, left leg worse.  She states the pain is worse when she walks, and difficulty walking to her mailbox. She had arterial testing in 2016, that showed mild insufficiency.  She denies any back pain.  She does receive cortisone shot  In her knee.  She denies any cramping to her legs at night when she sleeps.       Medical History        Past Medical History:   Diagnosis Date   • Arthritis     • Hyperlipidemia     • Hypertension     • Hypothyroidism     • Lung nodule     • Osteopenia     • Skin cancer     • 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              Surgical History         Past Surgical History:   Procedure Laterality Date   • BREAST BIOPSY Bilateral       multiple on both, all benign   • BREAST BIOPSY Left 2017     Procedure: LEFT MAMMOGRAM GUIDED NEEDLE DIRECTED EXCISIONAL BREAST BIOPSY;  Surgeon: Malgorzata Scott MD;  Location: [facility];  Service:    • BUNIONECTOMY Bilateral     •  SECTION         X2   • EYE SURGERY         CATARACT SURGERY   • JOINT REPLACEMENT         RIGHT HIP                  Family History   Problem Relation Age of Onset   • Cancer Sister           Uterus   • Breast cancer  Neg Hx           Social History   Social History            Social History   • Marital status:        Spouse name: N/A   • Number of children: N/A   • Years of education: N/A          Occupational History   • Not on file.            Social History Main Topics   • Smoking status: Former Smoker       Years: 2.00       Types: Cigarettes       Quit date: 1955   • Smokeless tobacco: Never Used   • Alcohol use No   • Drug use: No   • Sexual activity: Defer           Other Topics Concern   • Not on file          Social History Narrative   • No narrative on file                  Allergies   Allergen Reactions   • Erythromycin Shortness Of Breath   • Zithromax [Azithromycin] Unknown (See Comments)       unknown   • Penicillins Rash                 Prior to Admission medications    Medication Sig Start Date End Date Taking? Authorizing Provider   alendronate (FOSAMAX) 70 MG tablet Take 70 mg by mouth Every 7 (Seven) Days.     Yes Historical Provider, MD   amLODIPine (NORVASC) 10 MG tablet Take 10 mg by mouth Daily.     Yes Historical Provider, MD   aspirin 81 MG tablet Take 81 mg by mouth Daily.     Yes Historical Provider, MD   Calcium Carbonate-Vitamin D (CALCIUM 600/VITAMIN D PO) Take 1 tablet by mouth Daily.     Yes Historical Provider, MD   CloNIDine (CATAPRES) 0.1 MG tablet Take 0.1 mg by mouth.     Yes Historical Provider, MD   cycloSPORINE (RESTASIS) 0.05 % ophthalmic emulsion Administer 1 drop to both eyes Every 12 (Twelve) Hours.     Yes Historical Provider, MD   Glucosamine-Chondroitin-Vit D3 3877-0480-145 MG-MG-UNIT pack Take 1 tablet by mouth 2 (Two) Times a Day.     Yes Historical Provider, MD   levothyroxine sodium (TIROSINT) 112 MCG capsule Take 112 mcg by mouth Daily.     Yes Historical Provider, MD   Multiple Vitamins-Minerals (COMPLETE MULTIVITAMIN/MINERAL PO) Take 1 tablet by mouth Daily.     Yes Historical Provider, MD   Aliskiren-Hydrochlorothiazide 300-12.5 MG tablet Take 1 tablet by mouth Daily.  "      Historical Provider, MD   HYDROcodone-acetaminophen (NORCO) 5-325 MG per tablet Take 1-2 tablets by mouth Every 4 (Four) Hours As Needed (Pain). 12/1/17 7/24/18 April JOHNATHAN Scott MD         Review of Systems   Constitutional: Negative.    HENT: Negative.    Eyes: Negative.    Respiratory: Negative.    Cardiovascular: Negative.    Gastrointestinal: Negative.    Endocrine: Negative.    Genitourinary: Negative.    Musculoskeletal: Negative.    Skin: Positive for color change.   Allergic/Immunologic: Negative.    Neurological: Negative for numbness.   Hematological: Negative.    Psychiatric/Behavioral: Negative.          /76 (BP Location: Right arm, Patient Position: Sitting, Cuff Size: Adult)   Pulse 84   Ht 162.6 cm (64\")   Wt 56.7 kg (125 lb)   SpO2 98%   BMI 21.46 kg/m²   Physical Exam   Constitutional: She is oriented to person, place, and time. She appears well-developed and well-nourished. No distress.   HENT:   Head: Normocephalic and atraumatic.   Mouth/Throat: Oropharynx is clear and moist.   Eyes: Pupils are equal, round, and reactive to light. No scleral icterus.   Neck: Normal range of motion. Neck supple. No JVD present. Carotid bruit is not present. No thyromegaly present.   Cardiovascular: Normal rate, regular rhythm, S2 normal, normal heart sounds, intact distal pulses and normal pulses.  Exam reveals no gallop and no friction rub.    No murmur heard.  Pulses:       Femoral pulses are 2+ on the right side, and 2+ on the left side.       Dorsalis pedis pulses are 2+ on the right side.        Posterior tibial pulses are 2+ on the right side.   Left: doppler signals  Significant varicose veins to left lower extremity   Pulmonary/Chest: Effort normal and breath sounds normal.   Abdominal: Soft. Normal aorta and bowel sounds are normal. There is no hepatosplenomegaly.   Musculoskeletal: Normal range of motion.   Neurological: She is alert and oriented to person, place, and time. No cranial " nerve deficit.   Skin: Skin is warm and dry. She is not diaphoretic.   Psychiatric: She has a normal mood and affect. Her behavior is normal. Judgment and thought content normal.   Nursing note and vitals reviewed.        No results found.         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   • Hypertension   • Hyperlipidemia             ICD-10-CM ICD-9-CM   1. PAD (peripheral artery disease) (CMS/[facility]) I73.9 443.9   2. Preop testing Z01.818 V72.84   3. Encounter for monitoring antiplatelet therapy Z51.81 V58.83     Z79.02 V58.63   4. Essential hypertension I10 401.9   5. Hyperlipidemia, unspecified hyperlipidemia type E78.5 272.4         Lab Frequency Next Occurrence   Mammo Diagnostic Digital Tomosynthesis Left With CAD Once 11/12/2017   NM sentinel node injection only Once 12/05/2017         Plan: After thoroughly evaluating [facility], I believe the best course of action is to proceed with an angiogram of the left lower extremity.  Risks of angiogram were discussed.  These include, but are not limited to, bleeding, infection, vessel damage, nerve damage, embolus, and loss of limb.  The patient understands these risks and wishes to proceed with procedure.  I did discuss vascular risk factors as they pertain to the progression of vascular disease including controlling her hypertension and hyperlipidemia.  Body mass index is 21.46 kg/m². The patient can continue taking her current medication regimen as previously planned.  This was all discussed in full with complete understanding.     Thank you for allowing me to participate in the care of your patient.  Please do not hesitate with any questions or concerns.  I will keep you aware of any further encounters with [facility].           Sincerely yours,           ANGY Allen, DO

## 2018-08-06 NOTE — DISCHARGE INSTRUCTIONS
YOUR NEXT PAIN MEDICATION IS DUE AT______________        Moderate Conscious Sedation, Adult, Care After  Refer to this sheet in the next few weeks. These instructions provide you with information on caring for yourself after your procedure. Your health care provider may also give you more specific instructions. Your treatment has been planned according to current medical practices, but problems sometimes occur. Call your health care provider if you have any problems or questions after your procedure.  WHAT TO EXPECT AFTER THE PROCEDURE    After your procedure:  · You may feel sleepy, clumsy, and have poor balance for several hours.  · Vomiting may occur if you eat too soon after the procedure.  HOME CARE INSTRUCTIONS  · Do not participate in any activities where you could become injured for at least 24 hours. Do not:  ¨ Drive.  ¨ Swim.  ¨ Ride a bicycle.  ¨ Operate heavy machinery.  ¨ Cook.  ¨ Use power tools.  ¨ Climb ladders.  ¨ Work from a high place.  · Do not make important decisions or sign legal documents until you are improved.  · If you vomit, drink water, juice, or soup when you can drink without vomiting. Make sure you have little or no nausea before eating solid foods.  · Only take over-the-counter or prescription medicines for pain, discomfort, or fever as directed by your health care provider.  · Make sure you and your family fully understand everything about the medicines given to you, including what side effects may occur.  · You should not drink alcohol, take sleeping pills, or take medicines that cause drowsiness for at least 24 hours.  · If you smoke, do not smoke without supervision.  · If you are feeling better, you may resume normal activities 24 hours after you were sedated.  · Keep all appointments with your health care provider.  SEEK MEDICAL CARE IF:  · Your skin is pale or bluish in color.  · You continue to feel nauseous or vomit.  · Your pain is getting worse and is not helped by  medicine.  · You have bleeding or swelling.  · You are still sleepy or feeling clumsy after 24 hours.  SEEK IMMEDIATE MEDICAL CARE IF:  · You develop a rash.  · You have difficulty breathing.  · You develop any type of allergic problem.  · You have a fever.  MAKE SURE YOU:  · Understand these instructions.  · Will watch your condition.  · Will get help right away if you are not doing well or get worse.     This information is not intended to replace advice given to you by your health care provider. Make sure you discuss any questions you have with your health care provider.     Document Released: 10/08/2014 Document Revised: 01/08/2016 Document Reviewed: 10/08/2014  [employer] Interactive Patient Education ©2016 [employer]         CALL YOUR PHYSICIAN IF YOU EXPERIENCE  INCREASED PAIN NOT HELPED BY YOUR PAIN MEDICATION.        Fall Prevention in the Home      Falls can cause injuries. They can happen to people of all ages. There are many things you can do to make your home safe and to help prevent falls.    WHAT CAN I DO ON THE OUTSIDE OF MY HOME?  · Regularly fix the edges of walkways and driveways and fix any cracks.  · Remove anything that might make you trip as you walk through a door, such as a raised step or threshold.  · Trim any bushes or trees on the path to your home.  · Use bright outdoor lighting.  · Clear any walking paths of anything that might make someone trip, such as rocks or tools.  · Regularly check to see if handrails are loose or broken. Make sure that both sides of any steps have handrails.  · Any raised decks and porches should have guardrails on the edges.  · Have any leaves, snow, or ice cleared regularly.  · Use sand or salt on walking paths during winter.  · Clean up any spills in your garage right away. This includes oil or grease spills.  WHAT CAN I DO IN THE BATHROOM?    · Use night lights.  · Install grab bars by the toilet and in the tub and shower. Do not use towel bars as grab  bars.  · Use non-skid mats or decals in the tub or shower.  · If you need to sit down in the shower, use a plastic, non-slip stool.  · Keep the floor dry. Clean up any water that spills on the floor as soon as it happens.  · Remove soap buildup in the tub or shower regularly.  · Attach bath mats securely with double-sided non-slip rug tape.  · Do not have throw rugs and other things on the floor that can make you trip.  WHAT CAN I DO IN THE BEDROOM?  · Use night lights.  · Make sure that you have a light by your bed that is easy to reach.  · Do not use any sheets or blankets that are too big for your bed. They should not hang down onto the floor.  · Have a firm chair that has side arms. You can use this for support while you get dressed.  · Do not have throw rugs and other things on the floor that can make you trip.  WHAT CAN I DO IN THE KITCHEN?  · Clean up any spills right away.  · Avoid walking on wet floors.  · Keep items that you use a lot in easy-to-reach places.  · If you need to reach something above you, use a strong step stool that has a grab bar.  · Keep electrical cords out of the way.  · Do not use floor polish or wax that makes floors slippery. If you must use wax, use non-skid floor wax.  · Do not have throw rugs and other things on the floor that can make you trip.  WHAT CAN I DO WITH MY STAIRS?  · Do not leave any items on the stairs.  · Make sure that there are handrails on both sides of the stairs and use them. Fix handrails that are broken or loose. Make sure that handrails are as long as the stairways.  · Check any carpeting to make sure that it is firmly attached to the stairs. Fix any carpet that is loose or worn.  · Avoid having throw rugs at the top or bottom of the stairs. If you do have throw rugs, attach them to the floor with carpet tape.  · Make sure that you have a light switch at the top of the stairs and the bottom of the stairs. If you do not have them, ask someone to add them for  you.  WHAT ELSE CAN I DO TO HELP PREVENT FALLS?  · Wear shoes that:  ¨ Do not have high heels.  ¨ Have rubber bottoms.  ¨ Are comfortable and fit you well.  ¨ Are closed at the toe. Do not wear sandals.  · If you use a stepladder:  ¨ Make sure that it is fully opened. Do not climb a closed stepladder.  ¨ Make sure that both sides of the stepladder are locked into place.  ¨ Ask someone to hold it for you, if possible.  · Clearly ormel and make sure that you can see:  ¨ Any grab bars or handrails.  ¨ First and last steps.  ¨ Where the edge of each step is.  · Use tools that help you move around (mobility aids) if they are needed. These include:  ¨ Canes.  ¨ Walkers.  ¨ Scooters.  ¨ Crutches.  · Turn on the lights when you go into a dark area. Replace any light bulbs as soon as they burn out.  · Set up your furniture so you have a clear path. Avoid moving your furniture around.  · If any of your floors are uneven, fix them.  · If there are any pets around you, be aware of where they are.  · Review your medicines with your doctor. Some medicines can make you feel dizzy. This can increase your chance of falling.  Ask your doctor what other things that you can do to help prevent falls.     This information is not intended to replace advice given to you by your health care provider. Make sure you discuss any questions you have with your health care provider.     Document Released: 10/14/2010 Document Revised: 05/03/2016 Document Reviewed: 01/22/2016  Elsevier Interactive Patient Education ©2016 [employer]     PATIENT/FAMILY/RESPONSIBLE PARTY VERBALIZES UNDERSTANDING OF ABOVE EDUCATION.  COPY OF PAIN SCALE GIVEN AND REVIEWED WITH VERBALIZED UNDERSTANDING.

## 2018-08-06 NOTE — OP NOTE
Mya Chung  8/6/2018     PREOPERATIVE DIAGNOSIS: Preop testing [Z01.818]  PAD (peripheral artery disease) (CMS/[facility]) [I73.9]     POSTOPERATIVE DIAGNOSIS: Post-Op Diagnosis Codes:     * Preop testing [Z01.818]     * PAD (peripheral artery disease) (CMS/[facility]) [I73.9]     PROCEDURE PERFORMED:   1.  Introduction of catheter/sheath into the aorta  2.  Aortoiliac angiogram with left lower extremity runoff with radiographic supervision and interpretation  3.  Contralateral cannulation of the left common iliac artery  4.  Minx closure     SURGEON: Walker Davis DO      ANESTHESIA: Mac    PREPARATION: Routine.    STAFF: Circulator: Adriana Jones RN  Scrub Person: Jessica Kathleen  Assistant: Loni Mariee  Vascular Radiology Technician: Katelynn Lester    ESTIMATED BLOOD LOSS: 10 ML    SPECIMENS: None    COMPLICATIONS: None    INDICATIONS: Mya Chung is a 89 y.o. female who has complaints of weakness to bilateral lower extremities.  She has pain in her legs, left leg worse.  She states the pain is worse when she walks, and difficulty walking to her mailbox. She had arterial testing in 2016, that showed mild insufficiency.  She denies any back pain.  She does receive cortisone shot  In her knee.  She denies any cramping to her legs at night when she sleeps. The indications, risks, and possible complications of the procedure were explained to the patient, who voiced understanding and wished to proceed with surgery.     PROCEDURE IN DETAIL: The patient was taken to the operating room and placed on the operating table in a supine position. After Mac anesthesia was obtained, the bilateral groins was prepped and draped in a sterile manner.  5 mL of 0.5% Marcaine plain was used to infiltrate the right groin for local anesthesia.  Using a micropuncture technique the right common femoral artery was cannulated and a micro-sheath was placed.  The stiff angled Glidewire was advanced up into the aorta and a short 5  Lao introducer sheath was placed.  The patient was given 1000 units of intravenous heparin.  The Omni Flush catheter was advanced into the aorta and aortoiliac angiogram was performed.  Findings are as follows:  1.  Patent renal arteries bilaterally without stenosis  2.  Patent aorta without stenosis  3.  Patent iliac systems bilaterally without stenosis    At this point, the decision was made to proceed with cannulation of the left common iliac artery.  The catheter was then brought down to the level of the femoral head.  An angiogram with runoff was performed.  Findings are as follows:  1.  Occluded common femoral artery with reconstitution in the proximal SFA approximately 5 cm past the takeoff.   2.  Patent profunda femoris artery  3.  Patent proximal/mid and distal SFA without stenosis  4.  Patent above-knee and below-knee popliteal artery without stenosis  5.  Two-vessel runoff to the foot via the anterior tibial and posterior tibial arteries.  The anterior tibial artery becomes atretic as it enters the ankle.     At this point, I felt no further intervention was warranted.  The patient will need an open common femoral endarterectomy with bovine patch angioplasty.  The sheath and wire were removed.  A minx was used to seal off the right common femoral artery.  Direct pressure was held for an additional 10-15 minutes of ensure hemostasis.  Sterile dressings were applied. The patient tolerated the procedure well. Sponge and needle counts were correct. The patient was then awakened  in the operating room and taken to the recovery room in good condition.  Walker Davis DO  Date: 8/6/2018 Time: 11:10 AM    CC:Delfina Pereira DO

## 2018-08-06 NOTE — BRIEF OP NOTE
AORTAGRAM WITH OR WITHOUT RUNOFFS POSSIBLE STENT  Progress Note    Mya STONE [facility]  8/6/2018    Pre-op Diagnosis:   Preop testing [Z01.818]  PAD (peripheral artery disease) (CMS/[facility]) [I73.9]       Post-Op Diagnosis Codes:     * Preop testing [Z01.818]     * PAD (peripheral artery disease) (CMS/[facility]) [I73.9]    Procedure/CPT® Codes:      Procedure(s):  LEFT LOWER EXTREMITY ANGIOGRAM    Surgeon(s):  Walker Davis DO    Anesthesia: Monitor Anesthesia Care    Staff:   Circulator: Adriana Jones RN  Scrub Person: Jessica Kathleen  Assistant: Loni Mariee  Vascular Radiology Technician: Katelynn Lester    Estimated Blood Loss: 10ml    Urine Voided: * No values recorded between 8/6/2018 10:30 AM and 8/6/2018 11:08 AM *    Specimens:                None      Complications: none      Walker Davis DO     Date: 8/6/2018  Time: 11:08 AM

## 2018-08-06 NOTE — ANESTHESIA POSTPROCEDURE EVALUATION
"Patient: Mya STONE [facility]    Procedure Summary     Date:  08/06/18 Room / Location:   PAD OR  /  PAD HYBRID OR 12    Anesthesia Start:  1032 Anesthesia Stop:  1122    Procedure:  LEFT LOWER EXTREMITY ANGIOGRAM (Right Groin) Diagnosis:       Preop testing      PAD (peripheral artery disease) (CMS/[facility])      (Preop testing [Z01.818])      (PAD (peripheral artery disease) (CMS/[facility]) [I73.9])    Surgeon:  Walker Davis DO Provider:  Wilber Lynn CRNA    Anesthesia Type:  MAC ASA Status:  2          Anesthesia Type: MAC  Last vitals  BP   125/51 (08/06/18 1531)   Temp   97.9 °F (36.6 °C) (08/06/18 1220)   Pulse   71 (08/06/18 1531)   Resp   18 (08/06/18 1531)     SpO2   95 % (08/06/18 1531)     Post Anesthesia Care and Evaluation    Patient location during evaluation: PACU  Patient participation: complete - patient participated  Level of consciousness: awake and alert  Pain management: adequate  Airway patency: patent  Anesthetic complications: No anesthetic complications  PONV Status: none  Cardiovascular status: acceptable and hemodynamically stable  Respiratory status: acceptable  Hydration status: acceptable    Comments: Blood pressure 125/51, pulse 71, temperature 97.9 °F (36.6 °C), temperature source Temporal Artery , resp. rate 18, height 161 cm (63.39\"), SpO2 95 %.    Patient discharged from PACU based upon Brad score. Please see RN notes for further details      "

## 2018-08-06 NOTE — ANESTHESIA PREPROCEDURE EVALUATION
Anesthesia Evaluation     Patient summary reviewed   no history of anesthetic complications:  NPO Solid Status: > 8 hours             Airway   Mallampati: II  TM distance: >3 FB  Neck ROM: full  Dental      Pulmonary    (+) COPD,   (-) sleep apnea, not a smoker  Cardiovascular   Exercise tolerance: (Limited by leg pain)    ECG reviewed    (+) hypertension, PVD,   (-) pacemaker, past MI, angina, cardiac stents      Neuro/Psych  (-) seizures, TIA, CVA  GI/Hepatic/Renal/Endo    (+)   hypothyroidism,   (-) GERD, liver disease, no renal disease, diabetes    Musculoskeletal     Abdominal    Substance History      OB/GYN          Other                        Anesthesia Plan    ASA 2     MAC     intravenous induction   Anesthetic plan and risks discussed with patient.

## 2018-08-08 ENCOUNTER — TELEPHONE (OUTPATIENT)
Dept: SOCIAL WORK | Facility: HOSPITAL | Age: 83
End: 2018-08-08

## 2018-08-08 NOTE — TELEPHONE ENCOUNTER
[facility] member post discharge f/u call to home.  Spoke with patient who states she is doing well with no complications from procedure.  Verified f/u appts next week with MD. Denies any needs at this time.

## 2018-08-13 ENCOUNTER — OFFICE VISIT (OUTPATIENT)
Dept: VASCULAR SURGERY | Facility: CLINIC | Age: 83
End: 2018-08-13

## 2018-08-13 VITALS
HEIGHT: 63 IN | WEIGHT: 124 LBS | DIASTOLIC BLOOD PRESSURE: 68 MMHG | SYSTOLIC BLOOD PRESSURE: 124 MMHG | BODY MASS INDEX: 21.97 KG/M2 | HEART RATE: 90 BPM

## 2018-08-13 DIAGNOSIS — Z79.02 ENCOUNTER FOR MONITORING ANTIPLATELET THERAPY: ICD-10-CM

## 2018-08-13 DIAGNOSIS — Z01.818 PREOP TESTING: ICD-10-CM

## 2018-08-13 DIAGNOSIS — E78.5 HYPERLIPIDEMIA, UNSPECIFIED HYPERLIPIDEMIA TYPE: ICD-10-CM

## 2018-08-13 DIAGNOSIS — Z51.81 ENCOUNTER FOR MONITORING ANTIPLATELET THERAPY: ICD-10-CM

## 2018-08-13 DIAGNOSIS — I10 ESSENTIAL HYPERTENSION: ICD-10-CM

## 2018-08-13 DIAGNOSIS — I73.9 PAD (PERIPHERAL ARTERY DISEASE) (HCC): Primary | ICD-10-CM

## 2018-08-13 PROCEDURE — 99213 OFFICE O/P EST LOW 20 MIN: CPT | Performed by: NURSE PRACTITIONER

## 2018-08-13 NOTE — PROGRESS NOTES
"08/13/2018       Delfina Pereira,   [address]  [place] [address]    Mya Chung  2/10/1928    Chief Complaint   Patient presents with   • Follow-up     Post LLE angiogram.Denies problems since procedure.Continues to have left leg pain when walking.Relieved with rest.       Dear Delfina Pereira,        HPI  I had the pleasure of seeing your patient Mya Chung in the office today.   As you recall, Mya Chung is a 89 y.o.  female who you are currently following for routine health maintenance.  She has complaints of weakness to bilateral lower extremities.  She has pain in her legs, left leg worse.  She states the pain is worse when she walks, and difficulty walking to her mailbox.  She denies any back pain.  She denies any cramping to her legs at night when she sleeps. She did undergo a left lower extremity angiogram and is now back for follow up.  She is going to need a left common femoral endarterectomy.  Her groin has healed.       Review of Systems   Constitutional: Negative.    HENT: Negative.    Eyes: Negative.    Respiratory: Negative.    Cardiovascular: Negative.    Gastrointestinal: Negative.    Endocrine: Negative.    Genitourinary: Negative.    Musculoskeletal: Negative.    Skin: Positive for color change.   Allergic/Immunologic: Negative.    Neurological: Negative for numbness.   Hematological: Negative.    Psychiatric/Behavioral: Negative.        /68   Pulse 90   Ht 158.8 cm (62.5\")   Wt 56.2 kg (124 lb)   BMI 22.32 kg/m²   Physical Exam   Constitutional: She is oriented to person, place, and time. She appears well-developed and well-nourished. No distress.   HENT:   Head: Normocephalic and atraumatic.   Mouth/Throat: Oropharynx is clear and moist.   Eyes: Pupils are equal, round, and reactive to light. No scleral icterus.   Neck: Normal range of motion. Neck supple. No JVD present. Carotid bruit is not present. No thyromegaly present. "   Cardiovascular: Normal rate, regular rhythm, S2 normal, normal heart sounds, intact distal pulses and normal pulses.  Exam reveals no gallop and no friction rub.    No murmur heard.  Pulses:       Femoral pulses are 2+ on the right side, and 2+ on the left side.       Dorsalis pedis pulses are 2+ on the right side.        Posterior tibial pulses are 2+ on the right side.   Left: doppler signals  Significant varicose veins to left lower extremity   Pulmonary/Chest: Effort normal and breath sounds normal.   Abdominal: Soft. Normal aorta and bowel sounds are normal. There is no hepatosplenomegaly.   Musculoskeletal: Normal range of motion.   Neurological: She is alert and oriented to person, place, and time. No cranial nerve deficit.   Skin: Skin is warm and dry. She is not diaphoretic.   Psychiatric: She has a normal mood and affect. Her behavior is normal. Judgment and thought content normal.   Nursing note and vitals reviewed.      Xr Chest 2 Vw    Result Date: 7/30/2018  Narrative: XR CHEST 2 VW- 7/30/2018 3:30 PM CDT  HISTORY: PREOP-LEFT LOWER EXTREMTIY ANGIOGRAM; I73.9-Peripheral vascular disease, unspecified; Z01.818-Encounter for other preprocedural examination   COMPARISON: June 4, 2018.  FINDINGS: Upright frontal and lateral radiographs of the chest were obtained.  Hyperinflation flattening diaphragms noted.. Cardiac silhouette is normal. Vascular calcifications present aortic arch and descending thoracic aorta.. The osseous structures and surrounding soft tissues demonstrate no acute abnormality.      Impression: 1. COPD with no acute cardiopulmonary process.   This report was finalized on 07/30/2018 15:46 by Dr. Jose J Dominguez MD.    Ir Angiogram Extremity Left    Result Date: 8/6/2018  Narrative: Performed by Dr. Davis. Please see procedure note.  This report was finalized on 08/06/2018 14:58 by Dr. Walker Davis MD.    Fl C Arm During Surgery    Result Date: 8/6/2018  Narrative: Performed by   Ryan. Please see procedure note.  This report was finalized on 08/06/2018 14:58 by Dr. Walker Davis MD.      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 PAD (peripheral artery disease) (CMS/[facility]) I73.9 443.9   2. Preop testing Z01.818 V72.84   3. Encounter for monitoring antiplatelet therapy Z51.81 V58.83    Z79.02 V58.63       Lab Frequency Next Occurrence   Mammo Diagnostic Digital Tomosynthesis Left With CAD Once 11/12/2017   NM sentinel node injection only Once 12/05/2017       Plan: After thoroughly evaluating [facility], I believe the best course of action is to proceed with a left common femoral endarterectomy.  Risks of femoral endarterectomy were discussed.  These include, but are not limited to, bleeding, infection, vessel damage, nerve damage, embolus, and loss of limb.  The patient understands these risks and wishes to proceed with procedure.  I did also give her a prescription for compression stockings in the 15-20 mmHg.  We did instruct her on how to wear these on a daily basis.  I did discuss vascular risk factors as they pertain to the progression of vascular disease including controlling her hypertension and hyperlipidemia.  Body mass index is 22.32 kg/m². The patient can continue taking her current medication regimen as previously planned.  This was all discussed in full with complete understanding.    Thank you for allowing me to participate in the care of your patient.  Please do not hesitate with any questions or concerns.  I will keep you aware of any further encounters with [facility].        Sincerely yours,         Saumya Morales, ANGY Pereira, Delfina Whitney, DO

## 2018-08-15 ENCOUNTER — TELEPHONE (OUTPATIENT)
Dept: VASCULAR SURGERY | Facility: CLINIC | Age: 83
End: 2018-08-15

## 2018-08-15 NOTE — TELEPHONE ENCOUNTER
I called patient to see if she wanted to move her surgery up, she agrees. Her procedure is now scheduled for 8/22/18 with a 5:15 arrival time. Her pre work will stay the same.

## 2018-08-20 ENCOUNTER — APPOINTMENT (OUTPATIENT)
Dept: PREADMISSION TESTING | Facility: HOSPITAL | Age: 83
End: 2018-08-20

## 2018-08-20 VITALS
DIASTOLIC BLOOD PRESSURE: 56 MMHG | HEIGHT: 63 IN | OXYGEN SATURATION: 95 % | BODY MASS INDEX: 22.27 KG/M2 | WEIGHT: 125.66 LBS | RESPIRATION RATE: 20 BRPM | HEART RATE: 87 BPM | SYSTOLIC BLOOD PRESSURE: 149 MMHG

## 2018-08-20 DIAGNOSIS — Z51.81 ENCOUNTER FOR MONITORING ANTIPLATELET THERAPY: ICD-10-CM

## 2018-08-20 DIAGNOSIS — Z01.818 PREOP TESTING: ICD-10-CM

## 2018-08-20 DIAGNOSIS — Z79.02 ENCOUNTER FOR MONITORING ANTIPLATELET THERAPY: ICD-10-CM

## 2018-08-20 DIAGNOSIS — I73.9 PAD (PERIPHERAL ARTERY DISEASE) (HCC): ICD-10-CM

## 2018-08-20 LAB
ANION GAP SERPL CALCULATED.3IONS-SCNC: 10 MMOL/L (ref 4–13)
APTT PPP: 29.5 SECONDS (ref 24.1–34.8)
BASOPHILS # BLD AUTO: 0.06 10*3/MM3 (ref 0–0.2)
BASOPHILS NFR BLD AUTO: 0.8 % (ref 0–2)
BUN BLD-MCNC: 23 MG/DL (ref 5–21)
BUN/CREAT SERPL: 19.7 (ref 7–25)
CALCIUM SPEC-SCNC: 10.1 MG/DL (ref 8.4–10.4)
CHLORIDE SERPL-SCNC: 103 MMOL/L (ref 98–110)
CO2 SERPL-SCNC: 28 MMOL/L (ref 24–31)
CREAT BLD-MCNC: 1.17 MG/DL (ref 0.5–1.4)
DEPRECATED RDW RBC AUTO: 43.1 FL (ref 40–54)
EOSINOPHIL # BLD AUTO: 0.15 10*3/MM3 (ref 0–0.7)
EOSINOPHIL NFR BLD AUTO: 2 % (ref 0–4)
ERYTHROCYTE [DISTWIDTH] IN BLOOD BY AUTOMATED COUNT: 12.7 % (ref 12–15)
GFR SERPL CREATININE-BSD FRML MDRD: 43 ML/MIN/1.73
GLUCOSE BLD-MCNC: 100 MG/DL (ref 70–100)
HCT VFR BLD AUTO: 37 % (ref 37–47)
HGB BLD-MCNC: 12.2 G/DL (ref 12–16)
IMM GRANULOCYTES # BLD: 0.02 10*3/MM3 (ref 0–0.03)
IMM GRANULOCYTES NFR BLD: 0.3 % (ref 0–5)
INR PPP: 0.88 (ref 0.91–1.09)
LYMPHOCYTES # BLD AUTO: 2.58 10*3/MM3 (ref 0.72–4.86)
LYMPHOCYTES NFR BLD AUTO: 33.9 % (ref 15–45)
MCH RBC QN AUTO: 30.3 PG (ref 28–32)
MCHC RBC AUTO-ENTMCNC: 33 G/DL (ref 33–36)
MCV RBC AUTO: 91.8 FL (ref 82–98)
MONOCYTES # BLD AUTO: 0.69 10*3/MM3 (ref 0.19–1.3)
MONOCYTES NFR BLD AUTO: 9.1 % (ref 4–12)
NEUTROPHILS # BLD AUTO: 4.12 10*3/MM3 (ref 1.87–8.4)
NEUTROPHILS NFR BLD AUTO: 53.9 % (ref 39–78)
NRBC BLD MANUAL-RTO: 0 /100 WBC (ref 0–0)
PLATELET # BLD AUTO: 231 10*3/MM3 (ref 130–400)
PMV BLD AUTO: 9.6 FL (ref 6–12)
POTASSIUM BLD-SCNC: 4.6 MMOL/L (ref 3.5–5.3)
PROTHROMBIN TIME: 12.2 SECONDS (ref 11.9–14.6)
RBC # BLD AUTO: 4.03 10*6/MM3 (ref 4.2–5.4)
SODIUM BLD-SCNC: 141 MMOL/L (ref 135–145)
WBC NRBC COR # BLD: 7.62 10*3/MM3 (ref 4.8–10.8)

## 2018-08-20 PROCEDURE — 85730 THROMBOPLASTIN TIME PARTIAL: CPT | Performed by: NURSE PRACTITIONER

## 2018-08-20 PROCEDURE — 85025 COMPLETE CBC W/AUTO DIFF WBC: CPT | Performed by: NURSE PRACTITIONER

## 2018-08-20 PROCEDURE — 80048 BASIC METABOLIC PNL TOTAL CA: CPT | Performed by: NURSE PRACTITIONER

## 2018-08-20 PROCEDURE — 85610 PROTHROMBIN TIME: CPT | Performed by: NURSE PRACTITIONER

## 2018-08-20 PROCEDURE — 36415 COLL VENOUS BLD VENIPUNCTURE: CPT

## 2018-08-20 RX ORDER — LEVOTHYROXINE SODIUM 0.12 MG/1
125 TABLET ORAL DAILY
Status: ON HOLD | COMMUNITY
End: 2022-05-02 | Stop reason: SDUPTHER

## 2018-08-20 NOTE — DISCHARGE INSTRUCTIONS
DAY OF SURGERY INSTRUCTIONS        YOUR SURGEON: dr grady    PROCEDURE: ***left femoral endarterectomy    DATE OF SURGERY: ***8/22/2018  ARRIVAL TIME: AS DIRECTED BY OFFICE    DAY OF SURGERY TAKE ONLY THESE MEDICATIONS UNLESS OTHERWISE INSTRUCTED BY YOUR PHYSICIAN: ***amlodipine, tekturna        MANAGING PAIN AFTER SURGERY    We know you are probably wondering what your pain will be like after surgery.  Following surgery it is unrealistic to expect you will not have pain.   Pain is how our bodies let us know that something is wrong or cautions us to be careful.  That said, our goal is to make your pain tolerable.    Methods we may use to treat your pain include (oral or IV medications, PCAs, epidurals, nerve blocks, etc.)   While some procedures require IV pain medications for a short time after surgery, transitioning to pain medications by mouth allows for better management of pain.   Your nurse will encourage you to take oral pain medications whenever possible.  IV medications work almost immediately, but only last a short while.  Taking medications by mouth allows for a more constant level of medication in your blood stream for a longer period of time.      Once your pain is out of control it is harder to get back under control.  It is important you are aware when your next dose of pain medication is due.  If you are admitted, your nurse may write the time of your next dose on the white board in your room to help you remember.      We are interested in your pain and encourage you to inform us about aggravating factors during your visit.   Many times a simple repositioning every few hours can make a big difference.    If your physician says it is okay, do not let your pain prevent you from getting out of bed. Be sure to call your nurse for assistance prior to getting up so you do not fall.      Before surgery, please decide your tolerable pain goal.  These faces help describe the pain ratings we use on a 0-10  scale.   Be prepared to tell us your goal and whether or not you take pain or anxiety medications at home.          BEFORE YOU COME TO THE HOSPITAL  (Pre-op instructions)  • Do not eat, drink, smoke or chew gum after midnight the night before surgery.  This also includes no mints.  • Morning of surgery take only the medicines you have been instructed with a sip of water unless otherwise instructed  by your physician.  • Do not shave, wear makeup or dark nail polish.  • Remove all jewelry including rings.  • Leave anything you consider valuable at home.  • Leave your suitcase in the car until after your surgery.  • Bring the following with you if applicable:  o Picture ID and insurance, Medicare or Medicaid cards  o Co-pay/deductible required by insurance (cash, check, credit card)  o Copy of advance directive, living will or power-of- documents if not brought to PAT  o CPAP or BIPAP mask and tubing  o Relaxation aids (MP3 player, book, magazine)  • On the day of surgery check in at registration located at the main entrance of the hospital.       Outpatient Surgery Guidelines, Adult  Outpatient procedures are those for which the person having the procedure is allowed to go home the same day as the procedure. Various procedures are done on an outpatient basis. You should follow some general guidelines if you will be having an outpatient procedure.  LET YOUR HEALTH CARE PROVIDER KNOW ABOUT:  · Any allergies you have.  · All medicines you are taking, including vitamins, herbs, eye drops, creams, and over-the-counter medicines.  · Previous problems you or members of your family have had with the use of anesthetics.  · Any blood disorders you have.  · Previous surgeries you have had.  · Medical conditions you have.  RISKS AND COMPLICATIONS  Your health care provider will discuss possible risks and complications with you before surgery. Common risks and complications include:    · Problems due to the use of  anesthetics.  · Blood loss and replacement (does not apply to minor surgical procedures).  · Temporary increase in pain due to surgery.  · Uncorrected pain or problems that the surgery was meant to correct.  · Infection.  · New damage.  BEFORE THE PROCEDURE  · Ask your health care provider about changing or stopping your regular medicines. You may need to stop taking certain medicines in the days or weeks before the procedure.  · Stop smoking at least 2 weeks before surgery. This lowers your risk for complications during and after surgery. Ask your health care provider for help with this if needed.  · Eat your usual meals and a light supper the day before surgery. Continue fluid intake. Do not drink alcohol.  · Do not eat or drink after midnight the night before your surgery.   · Arrange for someone to take you home and to stay with you for 24 hours after the procedure. Medicine given for your procedure may affect your ability to drive or to care for yourself.  · Call your health care provider's office if you develop an illness or problem that may prevent you from safely having your procedure.  AFTER THE PROCEDURE  After surgery, you will be taken to a recovery area, where your progress will be monitored. If there are no complications, you will be allowed to go home when you are awake, stable, and taking fluids well. You may have numbness around the surgical site. Healing will take some time. You will have tenderness at the surgical site and may have some swelling and bruising. You may also have some nausea.  HOME CARE INSTRUCTIONS  · Do not drive for 24 hours, or as directed by your health care provider. Do not drive while taking prescription pain medicines.  · Do not drink alcohol for 24 hours.  · Do not make important decisions or sign legal documents for 24 hours.  · You may resume a normal diet and activities as directed.  · Do not lift anything heavier than 10 pounds (4.5 kg) or play contact sports until your  health care provider says it is okay.  · Change your bandages (dressings) as directed.  · Only take over-the-counter or prescription medicines as directed by your health care provider.  · Follow up with your health care provider as directed.  SEEK MEDICAL CARE IF:  · You have increased bleeding (more than a small spot) from the surgical site.  · You have redness, swelling, or increasing pain in the wound.  · You see pus coming from the wound.  · You have a fever.  · You notice a bad smell coming from the wound or dressing.  · You feel lightheaded or faint.  · You develop a rash.  · You have trouble breathing.  · You develop allergies.  MAKE SURE YOU:  · Understand these instructions.  · Will watch your condition.  · Will get help right away if you are not doing well or get worse.     This information is not intended to replace advice given to you by your health care provider. Make sure you discuss any questions you have with your health care provider.     Document Released: 09/12/2002 Document Revised: 05/03/2016 Document Reviewed: 05/22/2014  [employer] Interactive Patient Education ©2016 [employer]       Fall Prevention in Hospitals, Adult  As a hospital patient, your condition and the treatments you receive can increase your risk for falls. Some additional risk factors for falls in a hospital include:  · Being in an unfamiliar environment.  · Being on bed rest.  · Your surgery.  · Taking certain medicines.  · Your tubing requirements, such as intravenous (IV) therapy or catheters.  It is important that you learn how to decrease fall risks while at the hospital. Below are important tips that can help prevent falls.  SAFETY TIPS FOR PREVENTING FALLS  Talk about your risk of falling.  · Ask your health care provider why you are at risk for falling. Is it your medicine, illness, tubing placement, or something else?  · Make a plan with your health care provider to keep you safe from falls.  · Ask your health care  provider or pharmacist about side effects of your medicines. Some medicines can make you dizzy or affect your coordination.  Ask for help.  · Ask for help before getting out of bed. You may need to press your call button.  · Ask for assistance in getting safely to the toilet.  · Ask for a walker or cane to be put at your bedside. Ask that most of the side rails on your bed be placed up before your health care provider leaves the room.  · Ask family or friends to sit with you.  · Ask for things that are out of your reach, such as your glasses, hearing aids, telephone, bedside table, or call button.  Follow these tips to avoid falling:  · Stay lying or seated, rather than standing, while waiting for help.  · Wear rubber-soled slippers or shoes whenever you walk in the hospital.  · Avoid quick, sudden movements.  ¨ Change positions slowly.  ¨ Sit on the side of your bed before standing.  ¨ Stand up slowly and wait before you start to walk.  · Let your health care provider know if there is a spill on the floor.  · Pay careful attention to the medical equipment, electrical cords, and tubes around you.  · When you need help, use your call button by your bed or in the bathroom. Wait for one of your health care providers to help you.  · If you feel dizzy or unsure of your footing, return to bed and wait for assistance.  · Avoid being distracted by the TV, telephone, or another person in your room.  · Do not lean or support yourself on rolling objects, such as IV poles or bedside tables.     This information is not intended to replace advice given to you by your health care provider. Make sure you discuss any questions you have with your health care provider.     Document Released: 12/15/2001 Document Revised: 01/08/2016 Document Reviewed: 08/25/2013  [employer] Interactive Patient Education ©2016 [employer]       Surgical Site Infections FAQs  What is a Surgical Site Infection (SSI)?  A surgical site infection is an  infection that occurs after surgery in the part of the body where the surgery took place. Most patients who have surgery do not develop an infection. However, infections develop in about 1 to 3 out of every 100 patients who have surgery.  Some of the common symptoms of a surgical site infection are:  · Redness and pain around the area where you had surgery  · Drainage of cloudy fluid from your surgical wound  · Fever  Can SSIs be treated?  Yes. Most surgical site infections can be treated with antibiotics. The antibiotic given to you depends on the bacteria (germs) causing the infection. Sometimes patients with SSIs also need another surgery to treat the infection.  What are some of the things that hospitals are doing to prevent SSIs?  To prevent SSIs, doctors, nurses, and other healthcare providers:  · Clean their hands and arms up to their elbows with an antiseptic agent just before the surgery.  · Clean their hands with soap and water or an alcohol-based hand rub before and after caring for each patient.  · May remove some of your hair immediately before your surgery using electric clippers if the hair is in the same area where the procedure will occur. They should not shave you with a razor.  · Wear special hair covers, masks, gowns, and gloves during surgery to keep the surgery area clean.  · Give you antibiotics before your surgery starts. In most cases, you should get antibiotics within 60 minutes before the surgery starts and the antibiotics should be stopped within 24 hours after surgery.  · Clean the skin at the site of your surgery with a special soap that kills germs.  What can I do to help prevent SSIs?  Before your surgery:  · Tell your doctor about other medical problems you may have. Health problems such as allergies, diabetes, and obesity could affect your surgery and your treatment.  · Quit smoking. Patients who smoke get more infections. Talk to your doctor about how you can quit before your  surgery.  · Do not shave near where you will have surgery. Shaving with a razor can irritate your skin and make it easier to develop an infection.  At the time of your surgery:  · Speak up if someone tries to shave you with a razor before surgery. Ask why you need to be shaved and talk with your surgeon if you have any concerns.  · Ask if you will get antibiotics before surgery.  After your surgery:  · Make sure that your healthcare providers clean their hands before examining you, either with soap and water or an alcohol-based hand rub.  · If you do not see your providers clean their hands, please ask them to do so.  · Family and friends who visit you should not touch the surgical wound or dressings.  · Family and friends should clean their hands with soap and water or an alcohol-based hand rub before and after visiting you. If you do not see them clean their hands, ask them to clean their hands.  What do I need to do when I go home from the hospital?  · Before you go home, your doctor or nurse should explain everything you need to know about taking care of your wound. Make sure you understand how to care for your wound before you leave the hospital.  · Always clean your hands before and after caring for your wound.  · Before you go home, make sure you know who to contact if you have questions or problems after you get home.  · If you have any symptoms of an infection, such as redness and pain at the surgery site, drainage, or fever, call your doctor immediately.  If you have additional questions, please ask your doctor or nurse.  Developed and co-sponsored by The Society for Healthcare Epidemiology of [place] (SHEA); Infectious Diseases Society of [place] (IDSA); American Hospital Association; Association for Professionals in Infection Control and Epidemiology (APIC); Centers for Disease Control and Prevention (CDC); and The Joint Commission.     This information is not intended to replace advice given to you by  your health care provider. Make sure you discuss any questions you have with your health care provider.     Document Released: 12/23/2014 Document Revised: 01/08/2016 Document Reviewed: 03/02/2016  [employer] Interactive Patient Education ©2016 [employer]       [facility]  CHG 4% Patient Instruction Sheet    Preparing the Skin Before Surgery  Preparing or “prepping” skin before surgery can reduce the risk of infection at the surgical site. To make the process easier,[facility] has chosen 4% Chlorhexidine Gluconate (CHG) antiseptic solution.   The steps below outline the prepping process and should be carefully followed.                                                                                                                                                      Prep the skin at the following time(s):                                                      We recommend you shower the night before surgery, and again the morning of surgery with the 4% CHG antiseptic solution using half of the bottle and a cloth each time.  Dress in clean clothes/sleepwear after showering.  See instructions below for application.          Do not apply any lotions or moisturizers.       Do not shave the area to be prepped for at least 2 days prior to surgery.    Clipping the hair may be done immediately prior to your surgery at the hospital    if needed.    Directions:  Thoroughly rinse your body with water.  Apply 4% CHG to a cloth and wash skin gently, paying special attention to the operative site.  Rinse again thoroughly.  Once you have begun using this product do not apply anything else to your skin. If itching or redness persists, rinse affected areas and discontinue use.    When using this product:  • Keep out of eyes, ears, and mouth.  • If solution should contact these areas, rinse out promptly and thoroughly with water.  • For external use only.  • Do not use in genital area, on your face or  head.      PATIENT/FAMILY/RESPONSIBLE PARTY VERBALIZES UNDERSTANDING OF ABOVE EDUCATION.  COPY OF PAIN SCALE GIVEN AND REVIEWED WITH VERBALIZED UNDERSTANDING.

## 2018-08-21 ENCOUNTER — TELEPHONE (OUTPATIENT)
Dept: VASCULAR SURGERY | Facility: CLINIC | Age: 83
End: 2018-08-21

## 2018-08-21 NOTE — TELEPHONE ENCOUNTER
I called patient to give a curtesy reminder of her procedure tomorrow with a new arrival time of 7 am.

## 2018-08-22 ENCOUNTER — APPOINTMENT (OUTPATIENT)
Dept: INTERVENTIONAL RADIOLOGY/VASCULAR | Facility: HOSPITAL | Age: 83
End: 2018-08-22

## 2018-08-22 ENCOUNTER — ANESTHESIA EVENT (OUTPATIENT)
Dept: PERIOP | Facility: HOSPITAL | Age: 83
End: 2018-08-22

## 2018-08-22 ENCOUNTER — ANESTHESIA (OUTPATIENT)
Dept: PERIOP | Facility: HOSPITAL | Age: 83
End: 2018-08-22

## 2018-08-22 ENCOUNTER — HOSPITAL ENCOUNTER (INPATIENT)
Facility: HOSPITAL | Age: 83
LOS: 1 days | Discharge: HOME | End: 2018-08-23
Attending: SURGERY | Admitting: SURGERY

## 2018-08-22 DIAGNOSIS — I73.9 PAD (PERIPHERAL ARTERY DISEASE) (HCC): ICD-10-CM

## 2018-08-22 DIAGNOSIS — Z01.818 PREOP TESTING: ICD-10-CM

## 2018-08-22 PROCEDURE — 25010000002 PROPOFOL 10 MG/ML EMULSION: Performed by: NURSE ANESTHETIST, CERTIFIED REGISTERED

## 2018-08-22 PROCEDURE — 25010000002 IOPAMIDOL 61 % SOLUTION: Performed by: SURGERY

## 2018-08-22 PROCEDURE — 25010000002 PROTAMINE SULFATE PER 10 MG: Performed by: NURSE ANESTHETIST, CERTIFIED REGISTERED

## 2018-08-22 PROCEDURE — 25010000002 HEPARIN (PORCINE) PER 1000 UNITS: Performed by: SURGERY

## 2018-08-22 PROCEDURE — 04CL0ZZ EXTIRPATION OF MATTER FROM LEFT FEMORAL ARTERY, OPEN APPROACH: ICD-10-PCS | Performed by: SURGERY

## 2018-08-22 PROCEDURE — 86900 BLOOD TYPING SEROLOGIC ABO: CPT | Performed by: NURSE PRACTITIONER

## 2018-08-22 PROCEDURE — 25010000002 HEPARIN (PORCINE) PER 1000 UNITS: Performed by: NURSE ANESTHETIST, CERTIFIED REGISTERED

## 2018-08-22 PROCEDURE — 25010000002 VANCOMYCIN 10 G RECONSTITUTED SOLUTION: Performed by: NURSE PRACTITIONER

## 2018-08-22 PROCEDURE — 94799 UNLISTED PULMONARY SVC/PX: CPT

## 2018-08-22 PROCEDURE — 25010000002 MORPHINE SULFATE (PF) 2 MG/ML SOLUTION: Performed by: ANESTHESIOLOGY

## 2018-08-22 PROCEDURE — 25010000002 SUCCINYLCHOLINE PER 20 MG: Performed by: NURSE ANESTHETIST, CERTIFIED REGISTERED

## 2018-08-22 PROCEDURE — 25010000002 FENTANYL CITRATE (PF) 100 MCG/2ML SOLUTION: Performed by: ANESTHESIOLOGY

## 2018-08-22 PROCEDURE — 86850 RBC ANTIBODY SCREEN: CPT | Performed by: NURSE PRACTITIONER

## 2018-08-22 PROCEDURE — 35372 RECHANNELING OF ARTERY: CPT | Performed by: SURGERY

## 2018-08-22 PROCEDURE — 88311 DECALCIFY TISSUE: CPT | Performed by: SURGERY

## 2018-08-22 PROCEDURE — 04UL0KZ SUPPLEMENT LEFT FEMORAL ARTERY WITH NONAUTOLOGOUS TISSUE SUBSTITUTE, OPEN APPROACH: ICD-10-PCS | Performed by: SURGERY

## 2018-08-22 PROCEDURE — 25010000002 FENTANYL CITRATE (PF) 100 MCG/2ML SOLUTION: Performed by: NURSE ANESTHETIST, CERTIFIED REGISTERED

## 2018-08-22 PROCEDURE — 25010000002 DEXAMETHASONE PER 1 MG: Performed by: ANESTHESIOLOGY

## 2018-08-22 PROCEDURE — C1768 GRAFT, VASCULAR: HCPCS | Performed by: SURGERY

## 2018-08-22 PROCEDURE — 88304 TISSUE EXAM BY PATHOLOGIST: CPT | Performed by: SURGERY

## 2018-08-22 PROCEDURE — 86901 BLOOD TYPING SEROLOGIC RH(D): CPT | Performed by: NURSE PRACTITIONER

## 2018-08-22 DEVICE — PTCH VASC XENOSURE BIO 0.8X8CM: Type: IMPLANTABLE DEVICE | Site: GROIN | Status: FUNCTIONAL

## 2018-08-22 RX ORDER — FLUMAZENIL 0.1 MG/ML
0.2 INJECTION INTRAVENOUS AS NEEDED
Status: DISCONTINUED | OUTPATIENT
Start: 2018-08-22 | End: 2018-08-22 | Stop reason: HOSPADM

## 2018-08-22 RX ORDER — LABETALOL HYDROCHLORIDE 5 MG/ML
5 INJECTION, SOLUTION INTRAVENOUS
Status: DISCONTINUED | OUTPATIENT
Start: 2018-08-22 | End: 2018-08-22 | Stop reason: HOSPADM

## 2018-08-22 RX ORDER — MULTIVIT,CALC,MINS/IRON/FOLIC 9MG-400MCG
1 TABLET ORAL DAILY
Status: DISCONTINUED | OUTPATIENT
Start: 2018-08-22 | End: 2018-08-23 | Stop reason: HOSPADM

## 2018-08-22 RX ORDER — MORPHINE SULFATE 2 MG/ML
2 INJECTION, SOLUTION INTRAMUSCULAR; INTRAVENOUS
Status: DISCONTINUED | OUTPATIENT
Start: 2018-08-22 | End: 2018-08-22 | Stop reason: HOSPADM

## 2018-08-22 RX ORDER — LIDOCAINE HYDROCHLORIDE 20 MG/ML
INJECTION, SOLUTION INFILTRATION; PERINEURAL AS NEEDED
Status: DISCONTINUED | OUTPATIENT
Start: 2018-08-22 | End: 2018-08-22 | Stop reason: SURG

## 2018-08-22 RX ORDER — ROCURONIUM BROMIDE 10 MG/ML
INJECTION, SOLUTION INTRAVENOUS AS NEEDED
Status: DISCONTINUED | OUTPATIENT
Start: 2018-08-22 | End: 2018-08-22 | Stop reason: SURG

## 2018-08-22 RX ORDER — IPRATROPIUM BROMIDE AND ALBUTEROL SULFATE 2.5; .5 MG/3ML; MG/3ML
3 SOLUTION RESPIRATORY (INHALATION) ONCE AS NEEDED
Status: DISCONTINUED | OUTPATIENT
Start: 2018-08-22 | End: 2018-08-22 | Stop reason: HOSPADM

## 2018-08-22 RX ORDER — CYCLOSPORINE 0.5 MG/ML
1 EMULSION OPHTHALMIC EVERY 12 HOURS SCHEDULED
Status: DISCONTINUED | OUTPATIENT
Start: 2018-08-22 | End: 2018-08-23 | Stop reason: HOSPADM

## 2018-08-22 RX ORDER — ACETAMINOPHEN 325 MG/1
650 TABLET ORAL EVERY 4 HOURS PRN
Status: DISCONTINUED | OUTPATIENT
Start: 2018-08-22 | End: 2018-08-23 | Stop reason: HOSPADM

## 2018-08-22 RX ORDER — ONDANSETRON 4 MG/1
4 TABLET, FILM COATED ORAL EVERY 6 HOURS PRN
Status: DISCONTINUED | OUTPATIENT
Start: 2018-08-22 | End: 2018-08-23 | Stop reason: HOSPADM

## 2018-08-22 RX ORDER — HYDRALAZINE HYDROCHLORIDE 20 MG/ML
5 INJECTION INTRAMUSCULAR; INTRAVENOUS
Status: DISCONTINUED | OUTPATIENT
Start: 2018-08-22 | End: 2018-08-22 | Stop reason: HOSPADM

## 2018-08-22 RX ORDER — ONDANSETRON 4 MG/1
4 TABLET, ORALLY DISINTEGRATING ORAL EVERY 6 HOURS PRN
Status: DISCONTINUED | OUTPATIENT
Start: 2018-08-22 | End: 2018-08-23 | Stop reason: HOSPADM

## 2018-08-22 RX ORDER — FENTANYL CITRATE 50 UG/ML
25 INJECTION, SOLUTION INTRAMUSCULAR; INTRAVENOUS AS NEEDED
Status: DISCONTINUED | OUTPATIENT
Start: 2018-08-22 | End: 2018-08-22 | Stop reason: HOSPADM

## 2018-08-22 RX ORDER — ACETAMINOPHEN 500 MG
500 TABLET ORAL ONCE
Status: COMPLETED | OUTPATIENT
Start: 2018-08-22 | End: 2018-08-22

## 2018-08-22 RX ORDER — METOCLOPRAMIDE HYDROCHLORIDE 5 MG/ML
5 INJECTION INTRAMUSCULAR; INTRAVENOUS
Status: DISCONTINUED | OUTPATIENT
Start: 2018-08-22 | End: 2018-08-22 | Stop reason: HOSPADM

## 2018-08-22 RX ORDER — MORPHINE SULFATE 2 MG/ML
2 INJECTION, SOLUTION INTRAMUSCULAR; INTRAVENOUS
Status: DISCONTINUED | OUTPATIENT
Start: 2018-08-22 | End: 2018-08-23 | Stop reason: HOSPADM

## 2018-08-22 RX ORDER — SODIUM CHLORIDE 9 MG/ML
50 INJECTION, SOLUTION INTRAVENOUS CONTINUOUS
Status: DISCONTINUED | OUTPATIENT
Start: 2018-08-22 | End: 2018-08-23 | Stop reason: HOSPADM

## 2018-08-22 RX ORDER — HEPARIN SODIUM 1000 [USP'U]/ML
INJECTION, SOLUTION INTRAVENOUS; SUBCUTANEOUS AS NEEDED
Status: DISCONTINUED | OUTPATIENT
Start: 2018-08-22 | End: 2018-08-22 | Stop reason: SURG

## 2018-08-22 RX ORDER — SODIUM CHLORIDE 0.9 % (FLUSH) 0.9 %
3 SYRINGE (ML) INJECTION AS NEEDED
Status: DISCONTINUED | OUTPATIENT
Start: 2018-08-22 | End: 2018-08-22 | Stop reason: HOSPADM

## 2018-08-22 RX ORDER — PROTAMINE SULFATE 10 MG/ML
INJECTION, SOLUTION INTRAVENOUS AS NEEDED
Status: DISCONTINUED | OUTPATIENT
Start: 2018-08-22 | End: 2018-08-22 | Stop reason: SURG

## 2018-08-22 RX ORDER — SODIUM CHLORIDE, SODIUM LACTATE, POTASSIUM CHLORIDE, CALCIUM CHLORIDE 600; 310; 30; 20 MG/100ML; MG/100ML; MG/100ML; MG/100ML
1000 INJECTION, SOLUTION INTRAVENOUS CONTINUOUS
Status: DISCONTINUED | OUTPATIENT
Start: 2018-08-22 | End: 2018-08-22 | Stop reason: HOSPADM

## 2018-08-22 RX ORDER — NALOXONE HCL 0.4 MG/ML
0.04 VIAL (ML) INJECTION AS NEEDED
Status: DISCONTINUED | OUTPATIENT
Start: 2018-08-22 | End: 2018-08-22 | Stop reason: HOSPADM

## 2018-08-22 RX ORDER — VASOPRESSIN 20 U/ML
INJECTION PARENTERAL AS NEEDED
Status: DISCONTINUED | OUTPATIENT
Start: 2018-08-22 | End: 2018-08-22 | Stop reason: SURG

## 2018-08-22 RX ORDER — FENTANYL CITRATE 50 UG/ML
INJECTION, SOLUTION INTRAMUSCULAR; INTRAVENOUS AS NEEDED
Status: DISCONTINUED | OUTPATIENT
Start: 2018-08-22 | End: 2018-08-22 | Stop reason: SURG

## 2018-08-22 RX ORDER — CLONIDINE HYDROCHLORIDE 0.1 MG/1
0.1 TABLET ORAL EVERY 8 HOURS PRN
Status: DISCONTINUED | OUTPATIENT
Start: 2018-08-22 | End: 2018-08-23 | Stop reason: HOSPADM

## 2018-08-22 RX ORDER — LEVOTHYROXINE SODIUM 112 UG/1
112 TABLET ORAL
Status: DISCONTINUED | OUTPATIENT
Start: 2018-08-23 | End: 2018-08-23 | Stop reason: HOSPADM

## 2018-08-22 RX ORDER — ASPIRIN 81 MG/1
81 TABLET ORAL DAILY
Status: DISCONTINUED | OUTPATIENT
Start: 2018-08-22 | End: 2018-08-23 | Stop reason: HOSPADM

## 2018-08-22 RX ORDER — SODIUM CHLORIDE 0.9 % (FLUSH) 0.9 %
1-10 SYRINGE (ML) INJECTION AS NEEDED
Status: DISCONTINUED | OUTPATIENT
Start: 2018-08-22 | End: 2018-08-22 | Stop reason: HOSPADM

## 2018-08-22 RX ORDER — HYDROCODONE BITARTRATE AND ACETAMINOPHEN 5; 325 MG/1; MG/1
1 TABLET ORAL EVERY 4 HOURS PRN
Status: DISCONTINUED | OUTPATIENT
Start: 2018-08-22 | End: 2018-08-23 | Stop reason: HOSPADM

## 2018-08-22 RX ORDER — ALISKIREN 150 MG/1
300 TABLET, FILM COATED ORAL DAILY
Status: DISCONTINUED | OUTPATIENT
Start: 2018-08-22 | End: 2018-08-23 | Stop reason: HOSPADM

## 2018-08-22 RX ORDER — DEXAMETHASONE SODIUM PHOSPHATE 4 MG/ML
2 INJECTION, SOLUTION INTRA-ARTICULAR; INTRALESIONAL; INTRAMUSCULAR; INTRAVENOUS; SOFT TISSUE ONCE AS NEEDED
Status: COMPLETED | OUTPATIENT
Start: 2018-08-22 | End: 2018-08-22

## 2018-08-22 RX ORDER — AMLODIPINE BESYLATE 10 MG/1
10 TABLET ORAL DAILY
Status: DISCONTINUED | OUTPATIENT
Start: 2018-08-23 | End: 2018-08-23 | Stop reason: HOSPADM

## 2018-08-22 RX ORDER — OXYCODONE AND ACETAMINOPHEN 10; 325 MG/1; MG/1
1 TABLET ORAL ONCE AS NEEDED
Status: COMPLETED | OUTPATIENT
Start: 2018-08-22 | End: 2018-08-22

## 2018-08-22 RX ORDER — ONDANSETRON 2 MG/ML
4 INJECTION INTRAMUSCULAR; INTRAVENOUS EVERY 6 HOURS PRN
Status: DISCONTINUED | OUTPATIENT
Start: 2018-08-22 | End: 2018-08-23 | Stop reason: HOSPADM

## 2018-08-22 RX ORDER — MEPERIDINE HYDROCHLORIDE 25 MG/ML
12.5 INJECTION INTRAMUSCULAR; INTRAVENOUS; SUBCUTANEOUS
Status: DISCONTINUED | OUTPATIENT
Start: 2018-08-22 | End: 2018-08-22 | Stop reason: HOSPADM

## 2018-08-22 RX ORDER — ONDANSETRON 2 MG/ML
4 INJECTION INTRAMUSCULAR; INTRAVENOUS AS NEEDED
Status: DISCONTINUED | OUTPATIENT
Start: 2018-08-22 | End: 2018-08-22 | Stop reason: HOSPADM

## 2018-08-22 RX ORDER — CLOPIDOGREL BISULFATE 75 MG/1
75 TABLET ORAL EVERY 24 HOURS
Status: DISCONTINUED | OUTPATIENT
Start: 2018-08-22 | End: 2018-08-23 | Stop reason: HOSPADM

## 2018-08-22 RX ORDER — SUCCINYLCHOLINE CHLORIDE 20 MG/ML
INJECTION INTRAMUSCULAR; INTRAVENOUS AS NEEDED
Status: DISCONTINUED | OUTPATIENT
Start: 2018-08-22 | End: 2018-08-22 | Stop reason: SURG

## 2018-08-22 RX ORDER — PROPOFOL 10 MG/ML
VIAL (ML) INTRAVENOUS AS NEEDED
Status: DISCONTINUED | OUTPATIENT
Start: 2018-08-22 | End: 2018-08-22 | Stop reason: SURG

## 2018-08-22 RX ORDER — NALOXONE HCL 0.4 MG/ML
0.4 VIAL (ML) INJECTION
Status: DISCONTINUED | OUTPATIENT
Start: 2018-08-22 | End: 2018-08-23 | Stop reason: HOSPADM

## 2018-08-22 RX ORDER — SODIUM CHLORIDE, SODIUM LACTATE, POTASSIUM CHLORIDE, CALCIUM CHLORIDE 600; 310; 30; 20 MG/100ML; MG/100ML; MG/100ML; MG/100ML
100 INJECTION, SOLUTION INTRAVENOUS CONTINUOUS
Status: DISCONTINUED | OUTPATIENT
Start: 2018-08-22 | End: 2018-08-22 | Stop reason: HOSPADM

## 2018-08-22 RX ORDER — PHENYLEPHRINE HCL IN 0.9% NACL 0.8MG/10ML
SYRINGE (ML) INTRAVENOUS AS NEEDED
Status: DISCONTINUED | OUTPATIENT
Start: 2018-08-22 | End: 2018-08-22 | Stop reason: SURG

## 2018-08-22 RX ADMIN — ASPIRIN 81 MG: 81 TABLET ORAL at 17:48

## 2018-08-22 RX ADMIN — SODIUM CHLORIDE, POTASSIUM CHLORIDE, SODIUM LACTATE AND CALCIUM CHLORIDE 100 ML/HR: 600; 310; 30; 20 INJECTION, SOLUTION INTRAVENOUS at 09:29

## 2018-08-22 RX ADMIN — MORPHINE SULFATE 2 MG: 2 INJECTION, SOLUTION INTRAMUSCULAR; INTRAVENOUS at 12:27

## 2018-08-22 RX ADMIN — EPHEDRINE SULFATE 10 MG: 50 INJECTION INTRAMUSCULAR; INTRAVENOUS; SUBCUTANEOUS at 10:44

## 2018-08-22 RX ADMIN — PROPOFOL 70 MG: 10 INJECTION, EMULSION INTRAVENOUS at 10:08

## 2018-08-22 RX ADMIN — SODIUM CHLORIDE, POTASSIUM CHLORIDE, SODIUM LACTATE AND CALCIUM CHLORIDE 1000 ML: 600; 310; 30; 20 INJECTION, SOLUTION INTRAVENOUS at 08:47

## 2018-08-22 RX ADMIN — SUCCINYLCHOLINE CHLORIDE 100 MG: 20 INJECTION, SOLUTION INTRAMUSCULAR; INTRAVENOUS at 10:08

## 2018-08-22 RX ADMIN — VASOPRESSIN 1 UNITS: 20 INJECTION INTRAVENOUS at 10:16

## 2018-08-22 RX ADMIN — ACETAMINOPHEN 500 MG: 500 TABLET, FILM COATED ORAL at 09:29

## 2018-08-22 RX ADMIN — FENTANYL CITRATE 100 MCG: 50 INJECTION, SOLUTION INTRAMUSCULAR; INTRAVENOUS at 10:08

## 2018-08-22 RX ADMIN — VASOPRESSIN 0.5 UNITS: 20 INJECTION INTRAVENOUS at 10:38

## 2018-08-22 RX ADMIN — CLOPIDOGREL 75 MG: 75 TABLET, FILM COATED ORAL at 17:48

## 2018-08-22 RX ADMIN — SODIUM CHLORIDE 50 ML/HR: 9 INJECTION, SOLUTION INTRAVENOUS at 17:48

## 2018-08-22 RX ADMIN — LIDOCAINE HYDROCHLORIDE 80 MG: 20 INJECTION, SOLUTION INFILTRATION; PERINEURAL at 10:08

## 2018-08-22 RX ADMIN — Medication 1 TABLET: at 17:48

## 2018-08-22 RX ADMIN — VANCOMYCIN HYDROCHLORIDE 750 MG: 10 INJECTION, POWDER, LYOPHILIZED, FOR SOLUTION INTRAVENOUS at 09:31

## 2018-08-22 RX ADMIN — FENTANYL CITRATE 25 MCG: 50 INJECTION, SOLUTION INTRAMUSCULAR; INTRAVENOUS at 12:30

## 2018-08-22 RX ADMIN — CYCLOSPORINE 1 DROP: 0.5 EMULSION OPHTHALMIC at 21:08

## 2018-08-22 RX ADMIN — DEXAMETHASONE SODIUM PHOSPHATE 2 MG: 4 INJECTION, SOLUTION INTRA-ARTICULAR; INTRALESIONAL; INTRAMUSCULAR; INTRAVENOUS; SOFT TISSUE at 09:29

## 2018-08-22 RX ADMIN — OXYCODONE HYDROCHLORIDE AND ACETAMINOPHEN 1 TABLET: 10; 325 TABLET ORAL at 12:54

## 2018-08-22 RX ADMIN — LIDOCAINE HYDROCHLORIDE 0.5 ML: 10 INJECTION, SOLUTION EPIDURAL; INFILTRATION; INTRACAUDAL; PERINEURAL at 08:46

## 2018-08-22 RX ADMIN — ROCURONIUM BROMIDE 10 MG: 10 INJECTION INTRAVENOUS at 10:08

## 2018-08-22 RX ADMIN — Medication 80 MCG: at 10:13

## 2018-08-22 RX ADMIN — PROTAMINE SULFATE 20 MG: 10 INJECTION, SOLUTION INTRAVENOUS at 11:13

## 2018-08-22 RX ADMIN — EPHEDRINE SULFATE 10 MG: 50 INJECTION INTRAMUSCULAR; INTRAVENOUS; SUBCUTANEOUS at 10:19

## 2018-08-22 RX ADMIN — HEPARIN SODIUM 6000 UNITS: 1000 INJECTION, SOLUTION INTRAVENOUS; SUBCUTANEOUS at 10:34

## 2018-08-22 NOTE — ANESTHESIA PROCEDURE NOTES
Airway  Urgency: elective    Airway not difficult    General Information and Staff    Patient location during procedure: OR  CRNA: EYE, TEODORO    Indications and Patient Condition  Indications for airway management: airway protection    Preoxygenated: yes  Mask difficulty assessment: 1 - vent by mask    Final Airway Details  Final airway type: endotracheal airway      Successful airway: ETT  Cuffed: yes   Successful intubation technique: direct laryngoscopy  Facilitating devices/methods: intubating stylet  Endotracheal tube insertion site: oral  Blade: Ruiz  Blade size: #2  ETT size: 7.0 mm  Cormack-Lehane Classification: grade IIb - view of arytenoids or posterior of glottis only  Placement verified by: chest auscultation and capnometry   Cuff volume (mL): 5  Measured from: lips  ETT to lips (cm): 20  Number of attempts at approach: 1

## 2018-08-22 NOTE — BRIEF OP NOTE
FEMORAL ENDARTERECTOMY  Progress Note    Mya STONE [facility]  8/22/2018    Pre-op Diagnosis:   Preop testing [Z01.818]  PAD (peripheral artery disease) (CMS/[facility]) [I73.9]       Post-Op Diagnosis Codes:     * Preop testing [Z01.818]     * PAD (peripheral artery disease) (CMS/[facility]) [I73.9]    Procedure/CPT® Codes:      Procedure(s):  LEFT FEMORAL ENDARTERECTOMY    Surgeon(s):  Walker Davis DO    Anesthesia: General    Staff:   Circulator: Adriana Jones RN  Scrub Person: Loni Mariee Helen B  Assistant: Jessica Kathleen    Estimated Blood Loss: 50ml    Urine Voided: * No values recorded between 8/22/2018 10:04 AM and 8/22/2018 11:28 AM *    Specimens:                ID Type Source Tests Collected by Time   A : left femoral plaque Tissue Femoral Plaque TISSUE PATHOLOGY EXAM Walker Davis DO 8/22/2018 1041         Drains:   Urethral Catheter Silicone 16 Fr. (Active)         Complications: none      Walker Davis DO     Date: 8/22/2018  Time: 11:28 AM

## 2018-08-22 NOTE — ANESTHESIA POSTPROCEDURE EVALUATION
Patient: Mya STONE [facility]    Procedure Summary     Date:  08/22/18 Room / Location:   PAD OR  /  PAD HYBRID OR 12    Anesthesia Start:  1004 Anesthesia Stop:  1142    Procedure:  LEFT FEMORAL ENDARTERECTOMY (Left Groin) Diagnosis:       Preop testing      PAD (peripheral artery disease) (CMS/HCC)      (Preop testing [Z01.818])      (PAD (peripheral artery disease) (CMS/HCC) [I73.9])    Surgeon:  Walker Davis DO Provider:  Roxann aCson CRNA    Anesthesia Type:  general ASA Status:  2          Anesthesia Type: general  Last vitals  BP   114/62 (08/22/18 1645)   Temp   97.7 °F (36.5 °C) (08/22/18 1645)   Pulse   97 (08/22/18 1645)   Resp   16 (08/22/18 1645)     SpO2   99 % (08/22/18 1645)     Post Anesthesia Care and Evaluation    Patient location during evaluation: PACU  Level of consciousness: awake  Pain management: adequate  Airway patency: patent  Anesthetic complications: No anesthetic complications  PONV Status: none  Cardiovascular status: acceptable  Respiratory status: acceptable  Hydration status: acceptable

## 2018-08-22 NOTE — ANESTHESIA PREPROCEDURE EVALUATION
Anesthesia Evaluation     Patient summary reviewed   no history of anesthetic complications:  NPO Solid Status: > 8 hours  NPO Liquid Status: > 8 hours           Airway   Mallampati: I  TM distance: >3 FB  Neck ROM: full  Dental - normal exam     Pulmonary - normal exam    breath sounds clear to auscultation  (-) asthma, recent URI, sleep apnea, not a smoker  Cardiovascular - normal exam  Exercise tolerance: good (4-7 METS)    ECG reviewed  Rhythm: regular  Rate: normal    (+) hypertension, PVD, hyperlipidemia,   (-) pacemaker, past MI, angina, cardiac stents, CABG    ROS comment: Stress Echo 11/25/17 - Post stress images with adequate visualization of myocardium to assess for ischemia. Normal stress echo with no significant echocardiographic evidence for myocardial ischemia.    Neuro/Psych  (-) seizures, TIA, CVA  GI/Hepatic/Renal/Endo    (+)   hypothyroidism,   (-) GERD, liver disease, no renal disease, diabetes, hyperthyroidism    Musculoskeletal     Abdominal    Substance History      OB/GYN          Other                      Anesthesia Plan    ASA 2     general     intravenous induction   Anesthetic plan and risks discussed with patient.

## 2018-08-22 NOTE — PLAN OF CARE
Problem: Patient Care Overview  Goal: Plan of Care Review  Outcome: Ongoing (interventions implemented as appropriate)   08/22/18 1702   Coping/Psychosocial   Plan of Care Reviewed With patient   Plan of Care Review   Progress improving   OTHER   Outcome Summary dressing dry and intact. left dp and pt pulses 2 with doppler. prn pain meds available., family at bedside. cabral to bedside drainage.      Goal: Individualization and Mutuality  Outcome: Ongoing (interventions implemented as appropriate)    Goal: Discharge Needs Assessment  Outcome: Ongoing (interventions implemented as appropriate)      Problem: Pain, Acute (Adult)  Goal: Identify Related Risk Factors and Signs and Symptoms  Outcome: Ongoing (interventions implemented as appropriate)    Goal: Acceptable Pain Control/Comfort Level  Outcome: Ongoing (interventions implemented as appropriate)      Problem: Surgery Nonspecified (Adult)  Goal: Signs and Symptoms of Listed Potential Problems Will be Absent, Minimized or Managed (Surgery Nonspecified)  Outcome: Ongoing (interventions implemented as appropriate)

## 2018-08-22 NOTE — OP NOTE
Mya Chung  8/22/2018     PREOPERATIVE DIAGNOSIS: Preop testing [Z01.818]  PAD (peripheral artery disease) (CMS/[facility]) [I73.9]     POSTOPERATIVE DIAGNOSIS: Post-Op Diagnosis Codes:     * Preop testing [Z01.818]     * PAD (peripheral artery disease) (CMS/[facility]) [I73.9]     PROCEDURE PERFORMED:   1.  Left common femoral endarterectomy with bovine patch angioplasty  2.  Eversion profundoplasty     SURGEON: Walker Davis DO      ANESTHESIA: General.    PREPARATION: Routine.    STAFF: Circulator: Adriana Jones RN  Scrub Person: Loni Mariee; Zenia Peter  Assistant: Jessica Kathleen    ESTIMATED BLOOD LOSS: 50 ML    SPECIMENS: Femoral plaque    COMPLICATIONS: None    INDICATIONS: Mya Chung is a 89 y.o. female who has complaints of weakness to bilateral lower extremities.  She has pain in her legs, left leg worse.  She states the pain is worse when she walks, and difficulty walking to her mailbox. She had arterial testing in 2016, that showed mild insufficiency.  She denies any back pain.  She does receive cortisone shot  In her knee.  She had a recent angiogram of the left leg which showed a common femoral artery occlusion.  She is here today for revascularization. The indications, risks, and possible complications of the procedure were explained to the patient, who voiced understanding and wished to proceed with surgery.     PROCEDURE IN DETAIL: The patient was taken to the operating room and placed on the operating table in a supine position. After general anesthesia was obtained, the bilateral groins and left lower extremity was prepped and draped in a sterile manner.  A longitudinal incision was then made in the left groin overlying the common femoral artery.  Careful dissection was made down through subcutaneous tissues using the Bovie cautery to ensure hemostasis.  Any crossing veins were ligated with 3-0 silk suture and hemoclips.  The inguinal ligament was identified and just inferior  to that the femoral sheath was entered with the Metzenbaum scissors.  The common femoral artery was identified and freed from its local attachments.  Proximal and distal control was established with vessel loops.  Patient was given 6000 units of intravenous heparin.  After 3 minutes the artery was clamped proximally and distally.  Using an 11 blade an arteriotomy was made in the common femoral artery.  It was extended up along the proximal common femoral artery and all the way down onto the superficial femoral artery about 5 cm.  There was a significant amount of heavy plaque burden localized in the common femoral extending just down onto the superficial femoral artery.  Using the [place] elevator the standard endarterectomy was performed removing all the plaque burden.  An eversion profundoplasty was also performed removing plaque burden.  The wound bed was irrigated with heparinized saline and all loose debris was picked clean.  The bovine pericardial patch was brought to the field and the patch anastomosis was performed in a running fashion with 5-0 Prolene.  Both sutures were brought down to the midline.  The patch was then appropriately fashioned distally.  The patch anastomosis was continued in a running fashion with 5-0 Prolene.  Both sutures were brought to the midline.  Prior to completion of the patch anastomosis the appropriate flushing maneuvers were performed and the anastomosis was completed.  Flow was reestablished.  There was strong Doppler signals at the ankle level and the foot was nice and warm.  2 separate sutures of 5-0 Prolene were used help ensure hemostasis.  20 mg of protamine was given intravenously.  Gelfoam and thrombin was also used of ensure hemostasis.  The wound bed was irrigated with antibiotic saline and hemostasis was observed.  The deep layers were then closed with a 2-0 Vicryl in a running fashion.  The subcutaneous layers were closed with a 3-0 Vicryl in a running fashion.  The  skin was then reapproximated using a 4-0 Monocryl in a subcuticular fashion.  The wound was then cleaned.  Sterile dressings were applied. The patient tolerated the procedure well. Sponge and needle counts were correct. The patient was then awakened and extubated in the operating room and taken to the recovery room in good condition.  Walker Davis DO  Date: 8/22/2018 Time: 11:32 AM     CC:CC:Delfina Pereira DO

## 2018-08-22 NOTE — H&P (VIEW-ONLY)
2018        Delfina Pereira, DO  [address]  [address]  [place] [address]     Mya Chung  2/10/1928          Chief Complaint   Patient presents with   • Varicose Veins       Dr Pereira referred over for severe varicose veins with leg pain. Patient denies any stroke like symptoms. Patient states bilateral legs are weak and about a month ago the left leg started hurting giving her a lot of problems an painful.          Dear Delfina Pereira, *:        HPI  I had the pleasure of seeing your patient Mya Chung in the office today.  Thank you kindly for this consultation.  As you recall, Mya Chung is a 89 y.o.  female who you are currently following for routine health maintenance.  She has complaints of weakness to bilateral lower extremities.  She has pain in her legs, left leg worse.  She states the pain is worse when she walks, and difficulty walking to her mailbox. She had arterial testing in 2016, that showed mild insufficiency.  She denies any back pain.  She does receive cortisone shot  In her knee.  She denies any cramping to her legs at night when she sleeps.       Medical History        Past Medical History:   Diagnosis Date   • Arthritis     • Hyperlipidemia     • Hypertension     • Hypothyroidism     • Lung nodule     • Osteopenia     • Skin cancer     • 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              Surgical History         Past Surgical History:   Procedure Laterality Date   • BREAST BIOPSY Bilateral       multiple on both, all benign   • BREAST BIOPSY Left 2017     Procedure: LEFT MAMMOGRAM GUIDED NEEDLE DIRECTED EXCISIONAL BREAST BIOPSY;  Surgeon: Malgorzata Scott MD;  Location: [facility];  Service:    • BUNIONECTOMY Bilateral     •  SECTION         X2   • EYE SURGERY         CATARACT SURGERY   • JOINT REPLACEMENT         RIGHT HIP                  Family History   Problem Relation Age of Onset   • Cancer Sister           Uterus   • Breast cancer  Neg Hx           Social History   Social History            Social History   • Marital status:        Spouse name: N/A   • Number of children: N/A   • Years of education: N/A          Occupational History   • Not on file.            Social History Main Topics   • Smoking status: Former Smoker       Years: 2.00       Types: Cigarettes       Quit date: 1955   • Smokeless tobacco: Never Used   • Alcohol use No   • Drug use: No   • Sexual activity: Defer           Other Topics Concern   • Not on file          Social History Narrative   • No narrative on file                  Allergies   Allergen Reactions   • Erythromycin Shortness Of Breath   • Zithromax [Azithromycin] Unknown (See Comments)       unknown   • Penicillins Rash                 Prior to Admission medications    Medication Sig Start Date End Date Taking? Authorizing Provider   alendronate (FOSAMAX) 70 MG tablet Take 70 mg by mouth Every 7 (Seven) Days.     Yes Historical Provider, MD   amLODIPine (NORVASC) 10 MG tablet Take 10 mg by mouth Daily.     Yes Historical Provider, MD   aspirin 81 MG tablet Take 81 mg by mouth Daily.     Yes Historical Provider, MD   Calcium Carbonate-Vitamin D (CALCIUM 600/VITAMIN D PO) Take 1 tablet by mouth Daily.     Yes Historical Provider, MD   CloNIDine (CATAPRES) 0.1 MG tablet Take 0.1 mg by mouth.     Yes Historical Provider, MD   cycloSPORINE (RESTASIS) 0.05 % ophthalmic emulsion Administer 1 drop to both eyes Every 12 (Twelve) Hours.     Yes Historical Provider, MD   Glucosamine-Chondroitin-Vit D3 5255-9486-025 MG-MG-UNIT pack Take 1 tablet by mouth 2 (Two) Times a Day.     Yes Historical Provider, MD   levothyroxine sodium (TIROSINT) 112 MCG capsule Take 112 mcg by mouth Daily.     Yes Historical Provider, MD   Multiple Vitamins-Minerals (COMPLETE MULTIVITAMIN/MINERAL PO) Take 1 tablet by mouth Daily.     Yes Historical Provider, MD   Aliskiren-Hydrochlorothiazide 300-12.5 MG tablet Take 1 tablet by mouth Daily.  "      Historical Provider, MD   HYDROcodone-acetaminophen (NORCO) 5-325 MG per tablet Take 1-2 tablets by mouth Every 4 (Four) Hours As Needed (Pain). 12/1/17 7/24/18 April JOHNATHAN Scott MD         Review of Systems   Constitutional: Negative.    HENT: Negative.    Eyes: Negative.    Respiratory: Negative.    Cardiovascular: Negative.    Gastrointestinal: Negative.    Endocrine: Negative.    Genitourinary: Negative.    Musculoskeletal: Negative.    Skin: Positive for color change.   Allergic/Immunologic: Negative.    Neurological: Negative for numbness.   Hematological: Negative.    Psychiatric/Behavioral: Negative.          /76 (BP Location: Right arm, Patient Position: Sitting, Cuff Size: Adult)   Pulse 84   Ht 162.6 cm (64\")   Wt 56.7 kg (125 lb)   SpO2 98%   BMI 21.46 kg/m²   Physical Exam   Constitutional: She is oriented to person, place, and time. She appears well-developed and well-nourished. No distress.   HENT:   Head: Normocephalic and atraumatic.   Mouth/Throat: Oropharynx is clear and moist.   Eyes: Pupils are equal, round, and reactive to light. No scleral icterus.   Neck: Normal range of motion. Neck supple. No JVD present. Carotid bruit is not present. No thyromegaly present.   Cardiovascular: Normal rate, regular rhythm, S2 normal, normal heart sounds, intact distal pulses and normal pulses.  Exam reveals no gallop and no friction rub.    No murmur heard.  Pulses:       Femoral pulses are 2+ on the right side, and 2+ on the left side.       Dorsalis pedis pulses are 2+ on the right side.        Posterior tibial pulses are 2+ on the right side.   Left: doppler signals  Significant varicose veins to left lower extremity   Pulmonary/Chest: Effort normal and breath sounds normal.   Abdominal: Soft. Normal aorta and bowel sounds are normal. There is no hepatosplenomegaly.   Musculoskeletal: Normal range of motion.   Neurological: She is alert and oriented to person, place, and time. No cranial " nerve deficit.   Skin: Skin is warm and dry. She is not diaphoretic.   Psychiatric: She has a normal mood and affect. Her behavior is normal. Judgment and thought content normal.   Nursing note and vitals reviewed.        No results found.         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   • Hypertension   • Hyperlipidemia             ICD-10-CM ICD-9-CM   1. PAD (peripheral artery disease) (CMS/[facility]) I73.9 443.9   2. Preop testing Z01.818 V72.84   3. Encounter for monitoring antiplatelet therapy Z51.81 V58.83     Z79.02 V58.63   4. Essential hypertension I10 401.9   5. Hyperlipidemia, unspecified hyperlipidemia type E78.5 272.4         Lab Frequency Next Occurrence   Mammo Diagnostic Digital Tomosynthesis Left With CAD Once 11/12/2017   NM sentinel node injection only Once 12/05/2017         Plan: After thoroughly evaluating [facility], I believe the best course of action is to proceed with an angiogram of the left lower extremity.  Risks of angiogram were discussed.  These include, but are not limited to, bleeding, infection, vessel damage, nerve damage, embolus, and loss of limb.  The patient understands these risks and wishes to proceed with procedure.  I did discuss vascular risk factors as they pertain to the progression of vascular disease including controlling her hypertension and hyperlipidemia.  Body mass index is 21.46 kg/m². The patient can continue taking her current medication regimen as previously planned.  This was all discussed in full with complete understanding.     Thank you for allowing me to participate in the care of your patient.  Please do not hesitate with any questions or concerns.  I will keep you aware of any further encounters with [facility].           Sincerely yours,           ANGY Allen, DO

## 2018-08-22 NOTE — INTERVAL H&P NOTE
Plan: After thoroughly evaluating the patient I believe the best course of action is to proceed with a left femoral endarterectomy with patch angioplasty.  Patient may need endovascular angiogram with stenting at the same time.  Risks/benefits were explained at great length to the patient which include but are not limited to bleeding, infection, vessel damage, nerve damage, embolus, and loss of limb.  The patient understands the risks and wishes for me to proceed.    H&P reviewed. The patient was examined and there are no changes to the H&P.

## 2018-08-23 VITALS
SYSTOLIC BLOOD PRESSURE: 106 MMHG | HEART RATE: 90 BPM | BODY MASS INDEX: 22.27 KG/M2 | OXYGEN SATURATION: 96 % | RESPIRATION RATE: 17 BRPM | WEIGHT: 125.66 LBS | TEMPERATURE: 97.9 F | HEIGHT: 63 IN | DIASTOLIC BLOOD PRESSURE: 52 MMHG

## 2018-08-23 LAB
CYTO UR: NORMAL
LAB AP CASE REPORT: NORMAL
PATH REPORT.FINAL DX SPEC: NORMAL
PATH REPORT.GROSS SPEC: NORMAL

## 2018-08-23 PROCEDURE — 94760 N-INVAS EAR/PLS OXIMETRY 1: CPT

## 2018-08-23 PROCEDURE — 94799 UNLISTED PULMONARY SVC/PX: CPT

## 2018-08-23 PROCEDURE — 99024 POSTOP FOLLOW-UP VISIT: CPT | Performed by: NURSE PRACTITIONER

## 2018-08-23 PROCEDURE — 25010000002 VANCOMYCIN 10 G RECONSTITUTED SOLUTION: Performed by: SURGERY

## 2018-08-23 RX ORDER — CLOPIDOGREL BISULFATE 75 MG/1
75 TABLET ORAL EVERY 24 HOURS
Qty: 30 TABLET | Refills: 5 | Status: SHIPPED | OUTPATIENT
Start: 2018-08-23

## 2018-08-23 RX ORDER — HYDROCODONE BITARTRATE AND ACETAMINOPHEN 5; 325 MG/1; MG/1
1 TABLET ORAL EVERY 6 HOURS PRN
Qty: 30 TABLET | Refills: 0 | Status: SHIPPED | OUTPATIENT
Start: 2018-08-23 | End: 2019-03-06

## 2018-08-23 RX ADMIN — Medication 1 TABLET: at 09:25

## 2018-08-23 RX ADMIN — LEVOTHYROXINE SODIUM 112 MCG: 112 TABLET ORAL at 06:29

## 2018-08-23 RX ADMIN — ASPIRIN 81 MG: 81 TABLET ORAL at 09:25

## 2018-08-23 RX ADMIN — CYCLOSPORINE 1 DROP: 0.5 EMULSION OPHTHALMIC at 09:25

## 2018-08-23 RX ADMIN — HYDROCODONE BITARTRATE AND ACETAMINOPHEN 1 TABLET: 5; 325 TABLET ORAL at 12:14

## 2018-08-23 RX ADMIN — VANCOMYCIN HYDROCHLORIDE 750 MG: 10 INJECTION, POWDER, LYOPHILIZED, FOR SOLUTION INTRAVENOUS at 09:29

## 2018-08-23 NOTE — PLAN OF CARE
Problem: Patient Care Overview  Goal: Plan of Care Review  Outcome: Ongoing (interventions implemented as appropriate)   08/23/18 0059   Coping/Psychosocial   Plan of Care Reviewed With patient   Plan of Care Review   Progress improving

## 2018-08-23 NOTE — DISCHARGE SUMMARY
Date of Discharge:  8/23/2018    Discharge Diagnosis: PAD (peripheral artery disease) (CMS/[facility]) [I73.9]    Presenting Problem/History of Present Illness  Preop testing [Z01.818]  PAD (peripheral artery disease) (CMS/[facility]) [I73.9]  PAD (peripheral artery disease) (CMS/[facility]) [I73.9]       Hospital Course  Patient is a 89 y.o. female who has complaints of weakness to bilateral lower extremities.  She has pain in her legs, left leg worse.  She states the pain is worse when she walks, and difficulty walking to her mailbox. She did underwent a left common femoral endarterectomy without incident. She was transferred to  overnight for continued monitoring. Her groin is soft, left foot warm with palpable pulses.  She is stable and ready for discharge.  We will see her back in 2 weeks for post operative follow up.  Medication will remain the same with the addition of Plavix.  I will also send in pain medication. Written and verbal instructions were given.  This was all discussed in full with complete understanding.    Procedures Performed  Procedure(s):  LEFT FEMORAL ENDARTERECTOMY       Consults:   Consults     No orders found for last 30 day(s).            Condition on Discharge:  stable    Discharge Medications     Discharge Medications      New Medications      Instructions Start Date   clopidogrel 75 MG tablet  Commonly known as:  PLAVIX   75 mg, Oral, Every 24 Hours      HYDROcodone-acetaminophen 5-325 MG per tablet  Commonly known as:  NORCO   1 tablet, Oral, Every 6 Hours PRN         Continue These Medications      Instructions Start Date   alendronate 70 MG tablet  Commonly known as:  FOSAMAX   70 mg, Oral, Every 7 Days, Thursday      amLODIPine 10 MG tablet  Commonly known as:  NORVASC   10 mg, Oral, Daily      aspirin 81 MG tablet   81 mg, Oral, Daily      CALCIUM 600/VITAMIN D PO   1 tablet, Oral, Daily      CloNIDine 0.1 MG tablet  Commonly known as:  CATAPRES   0.1 mg, Oral, As Needed      COMPLETE  MULTIVITAMIN/MINERAL PO   1 tablet, Oral, Daily      cycloSPORINE 0.05 % ophthalmic emulsion  Commonly known as:  RESTASIS   1 drop, Both Eyes, Every 12 Hours      Glucosamine-Chondroitin-Vit D3 1783-7400-538 MG-MG-UNIT pack   1 tablet, Oral, 2 Times Daily      levothyroxine 112 MCG tablet  Commonly known as:  SYNTHROID, LEVOTHROID   112 mcg, Oral, Daily      TEKTURNA 300 MG tablet  Generic drug:  aliskiren   300 mg, Oral, Daily             Discharge Diet:   Diet Instructions     Diet: Regular; Thin       Discharge Diet:  Regular    Fluid Consistency:  Thin          Activity at Discharge:   Activity Instructions     Discharge Activity Restrictions       1) No driving for 1 week and no longer taking narcotics.   2) Return to school / work after follow up  3) May shower / sponge bathe for 24 hours.  4) Do not lift / push / pull more then 10 lbs.  5) No squatting          Follow-up Appointments  No future appointments.  Additional Instructions for the Follow-ups that You Need to Schedule     Discharge Follow-up with Specialty: Ryan; 2 Weeks    As directed      Specialty:  Ryan    Follow Up:  2 Weeks                ANGY Allen  08/23/18  8:57 AM

## 2018-08-27 ENCOUNTER — TELEPHONE (OUTPATIENT)
Dept: VASCULAR SURGERY | Facility: CLINIC | Age: 83
End: 2018-08-27

## 2018-08-27 NOTE — TELEPHONE ENCOUNTER
Ms Chung called in and stated she had a procedure done on 08/22/2018 and she was having a little swelling in lower legs but mainly feet more than legs. She wasn't hurting or anything just a little swelling. Advised patient to elevate legs with some warm compresses to see if this helps the swelling at all and did advise if this doesn't help to call the office back this afternoon 08/27/18. Ms Chung verbalized understanding and stated she had just sat down and elevated her legs before calling because she thought her being up on them awhile could be part of it.

## 2018-09-05 ENCOUNTER — OFFICE VISIT (OUTPATIENT)
Dept: VASCULAR SURGERY | Facility: CLINIC | Age: 83
End: 2018-09-05

## 2018-09-05 VITALS
OXYGEN SATURATION: 98 % | SYSTOLIC BLOOD PRESSURE: 120 MMHG | HEART RATE: 97 BPM | DIASTOLIC BLOOD PRESSURE: 70 MMHG | HEIGHT: 63 IN | BODY MASS INDEX: 21.26 KG/M2 | WEIGHT: 120 LBS

## 2018-09-05 DIAGNOSIS — E78.5 HYPERLIPIDEMIA, UNSPECIFIED HYPERLIPIDEMIA TYPE: ICD-10-CM

## 2018-09-05 DIAGNOSIS — I10 ESSENTIAL HYPERTENSION: ICD-10-CM

## 2018-09-05 DIAGNOSIS — I73.9 PAD (PERIPHERAL ARTERY DISEASE) (HCC): Primary | ICD-10-CM

## 2018-09-05 PROBLEM — Z01.818 PREOP TESTING: Status: RESOLVED | Noted: 2018-07-24 | Resolved: 2018-09-05

## 2018-09-05 PROCEDURE — 99024 POSTOP FOLLOW-UP VISIT: CPT | Performed by: NURSE PRACTITIONER

## 2018-09-05 NOTE — PROGRESS NOTES
"09/05/2018      Delfina Pereira,   [address]  [facility] [address]    Mya Chung  2/10/1928    Chief Complaint   Patient presents with   • Follow-up     2 Week Post-Op Follow Up LEFT FEMORAL ENDARTERECTOMY. Patient denies any stroke like symptoms.        Dear Delfina Pereira,        HPI  I had the pleasure of seeing your patient Mya Chung in the office today.   As you recall, Mya Chung is a 89 y.o.  female who you are currently following for routine health maintenance.  She has complaints of weakness to bilateral lower extremities.  She has pain in her legs, left leg worse.  She states the pain is worse when she walks, and difficulty walking to her mailbox.  She denies any back pain.  She denies any cramping to her legs at night when she sleeps. She did undergo a left lower extremity angiogram most recently a left common femoral endarterectomy.  Her groin has healed.       Review of Systems   Constitutional: Negative.    HENT: Negative.    Eyes: Negative.    Respiratory: Negative.    Cardiovascular: Negative.    Gastrointestinal: Negative.    Endocrine: Negative.    Genitourinary: Negative.    Musculoskeletal: Negative.    Skin: Positive for color change.   Allergic/Immunologic: Negative.    Neurological: Negative for numbness.   Hematological: Negative.    Psychiatric/Behavioral: Negative.        /70 (BP Location: Right arm, Patient Position: Sitting, Cuff Size: Adult)   Pulse 97   Ht 160 cm (63\")   Wt 54.4 kg (120 lb)   SpO2 98%   BMI 21.26 kg/m²   Physical Exam   Constitutional: She is oriented to person, place, and time. She appears well-developed and well-nourished. No distress.   HENT:   Head: Normocephalic and atraumatic.   Mouth/Throat: Oropharynx is clear and moist.   Eyes: Pupils are equal, round, and reactive to light. No scleral icterus.   Neck: Normal range of motion. Neck supple. No JVD present. Carotid bruit is not present. No thyromegaly " present.   Cardiovascular: Normal rate, regular rhythm, S2 normal, normal heart sounds, intact distal pulses and normal pulses.  Exam reveals no gallop and no friction rub.    No murmur heard.  Pulses:       Femoral pulses are 2+ on the right side, and 2+ on the left side.       Dorsalis pedis pulses are 2+ on the right side.        Posterior tibial pulses are 2+ on the right side.   Left: doppler signals  Significant varicose veins to left lower extremity   Pulmonary/Chest: Effort normal and breath sounds normal.   Abdominal: Soft. Normal aorta and bowel sounds are normal. There is no hepatosplenomegaly.   Musculoskeletal: Normal range of motion.   Neurological: She is alert and oriented to person, place, and time. No cranial nerve deficit.   Skin: Skin is warm and dry. She is not diaphoretic.   Psychiatric: She has a normal mood and affect. Her behavior is normal. Judgment and thought content normal.   Nursing note and vitals reviewed.      No results found.    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   • Hypertension   • Hyperlipidemia   • PAD (peripheral artery disease) (CMS/[facility])         ICD-10-CM ICD-9-CM   1. PAD (peripheral artery disease) (CMS/[facility]) I73.9 443.9   2. Essential hypertension I10 401.9   3. Hyperlipidemia, unspecified hyperlipidemia type E78.5 272.4       Lab Frequency Next Occurrence   Mammo Diagnostic Digital Tomosynthesis Left With CAD Once 11/12/2017   NM sentinel node injection only Once 12/05/2017       Plan: After thoroughly evaluating Mya [facility], I am pleased to report she is doing great status post  left common femoral endarterectomy.  She states she has no difficulty walking o her mailbox which is about a block and a half away.  We will see her back in 6 months with repeat noninvasive testing for continued surveillance, including ABIs.   I did also give her a prescription for compression stockings in the 15-20  mmHg.  We did instruct her on how to wear these on a daily basis.  I did discuss vascular risk factors as they pertain to the progression of vascular disease including controlling her hypertension and hyperlipidemia.  Body mass index is 21.26 kg/m². The patient can continue taking her current medication regimen as previously planned.  This was all discussed in full with complete understanding.    Thank you for allowing me to participate in the care of your patient.  Please do not hesitate with any questions or concerns.  I will keep you aware of any further encounters with [facility].        Sincerely yours,         Saumya Morales, ANGY Pereira, Delfina Whitney, DO

## 2018-10-16 ENCOUNTER — TRANSCRIBE ORDERS (OUTPATIENT)
Dept: ADMINISTRATIVE | Facility: HOSPITAL | Age: 83
End: 2018-10-16

## 2018-10-16 DIAGNOSIS — N64.59 OTHER SIGNS AND SYMPTOMS IN BREAST: Primary | ICD-10-CM

## 2018-10-16 DIAGNOSIS — N64.4 MASTODYNIA, FEMALE: ICD-10-CM

## 2018-10-18 ENCOUNTER — HOSPITAL ENCOUNTER (OUTPATIENT)
Dept: MAMMOGRAPHY | Facility: HOSPITAL | Age: 83
Discharge: HOME | End: 2018-10-18
Admitting: PHYSICIAN ASSISTANT

## 2018-10-18 ENCOUNTER — HOSPITAL ENCOUNTER (OUTPATIENT)
Dept: ULTRASOUND IMAGING | Facility: HOSPITAL | Age: 83
Discharge: HOME | End: 2018-10-18

## 2018-10-18 DIAGNOSIS — N64.4 MASTODYNIA, FEMALE: ICD-10-CM

## 2018-10-18 DIAGNOSIS — N64.59 OTHER SIGNS AND SYMPTOMS IN BREAST: ICD-10-CM

## 2018-10-18 PROCEDURE — G0279 TOMOSYNTHESIS, MAMMO: HCPCS

## 2018-10-18 PROCEDURE — 76642 ULTRASOUND BREAST LIMITED: CPT

## 2018-10-18 PROCEDURE — 77065 DX MAMMO INCL CAD UNI: CPT

## 2019-02-22 ENCOUNTER — TRANSCRIBE ORDERS (OUTPATIENT)
Dept: ADMINISTRATIVE | Facility: HOSPITAL | Age: 84
End: 2019-02-22

## 2019-02-22 DIAGNOSIS — R09.89 OTH SYMPTOMS AND SIGNS INVOLVING THE CIRC AND RESP SYSTEMS: ICD-10-CM

## 2019-02-22 DIAGNOSIS — E03.9 HYPOTHYROIDISM, UNSPECIFIED TYPE: ICD-10-CM

## 2019-02-22 DIAGNOSIS — R53.83 OTHER FATIGUE: ICD-10-CM

## 2019-02-22 DIAGNOSIS — R06.02 SHORTNESS OF BREATH: Primary | ICD-10-CM

## 2019-02-22 DIAGNOSIS — I10 ESSENTIAL (PRIMARY) HYPERTENSION: ICD-10-CM

## 2019-02-27 ENCOUNTER — TRANSCRIBE ORDERS (OUTPATIENT)
Dept: ADMINISTRATIVE | Facility: HOSPITAL | Age: 84
End: 2019-02-27

## 2019-02-27 ENCOUNTER — HOSPITAL ENCOUNTER (OUTPATIENT)
Dept: CARDIOLOGY | Facility: HOSPITAL | Age: 84
Discharge: HOME | End: 2019-02-27

## 2019-02-27 ENCOUNTER — HOSPITAL ENCOUNTER (OUTPATIENT)
Dept: CARDIOLOGY | Facility: HOSPITAL | Age: 84
Discharge: HOME | End: 2019-02-27
Admitting: FAMILY MEDICINE

## 2019-02-27 VITALS
DIASTOLIC BLOOD PRESSURE: 70 MMHG | WEIGHT: 120 LBS | SYSTOLIC BLOOD PRESSURE: 120 MMHG | BODY MASS INDEX: 21.26 KG/M2 | HEIGHT: 63 IN

## 2019-02-27 DIAGNOSIS — R06.02 SHORTNESS OF BREATH: ICD-10-CM

## 2019-02-27 DIAGNOSIS — I10 HYPERTENSION, ESSENTIAL: Primary | ICD-10-CM

## 2019-02-27 DIAGNOSIS — R53.83 OTHER FATIGUE: ICD-10-CM

## 2019-02-27 PROCEDURE — 93010 ELECTROCARDIOGRAM REPORT: CPT | Performed by: INTERNAL MEDICINE

## 2019-02-27 PROCEDURE — 93306 TTE W/DOPPLER COMPLETE: CPT | Performed by: INTERNAL MEDICINE

## 2019-02-27 PROCEDURE — 93005 ELECTROCARDIOGRAM TRACING: CPT

## 2019-02-27 PROCEDURE — 93306 TTE W/DOPPLER COMPLETE: CPT

## 2019-03-01 LAB
BH CV ECHO MEAS - AO MAX PG (FULL): 2 MMHG
BH CV ECHO MEAS - AO MAX PG: 5.4 MMHG
BH CV ECHO MEAS - AO MEAN PG (FULL): 1 MMHG
BH CV ECHO MEAS - AO MEAN PG: 3 MMHG
BH CV ECHO MEAS - AO ROOT AREA (BSA CORRECTED): 1.9
BH CV ECHO MEAS - AO ROOT AREA: 6.6 CM^2
BH CV ECHO MEAS - AO ROOT DIAM: 2.9 CM
BH CV ECHO MEAS - AO V2 MAX: 116 CM/SEC
BH CV ECHO MEAS - AO V2 MEAN: 85.5 CM/SEC
BH CV ECHO MEAS - AO V2 VTI: 27 CM
BH CV ECHO MEAS - AVA(I,A): 2.6 CM^2
BH CV ECHO MEAS - AVA(I,D): 2.6 CM^2
BH CV ECHO MEAS - AVA(V,A): 2.2 CM^2
BH CV ECHO MEAS - AVA(V,D): 2.2 CM^2
BH CV ECHO MEAS - BSA(HAYCOCK): 1.6 M^2
BH CV ECHO MEAS - BSA: 1.6 M^2
BH CV ECHO MEAS - BZI_BMI: 21.3 KILOGRAMS/M^2
BH CV ECHO MEAS - BZI_METRIC_HEIGHT: 160 CM
BH CV ECHO MEAS - BZI_METRIC_WEIGHT: 54.4 KG
BH CV ECHO MEAS - EDV(CUBED): 67.9 ML
BH CV ECHO MEAS - EDV(MOD-SP4): 68.2 ML
BH CV ECHO MEAS - EDV(TEICH): 73.4 ML
BH CV ECHO MEAS - EF(CUBED): 59 %
BH CV ECHO MEAS - EF(MOD-SP4): 53.7 %
BH CV ECHO MEAS - EF(TEICH): 51.1 %
BH CV ECHO MEAS - ESV(CUBED): 27.8 ML
BH CV ECHO MEAS - ESV(MOD-SP4): 31.6 ML
BH CV ECHO MEAS - ESV(TEICH): 35.9 ML
BH CV ECHO MEAS - FS: 25.7 %
BH CV ECHO MEAS - IVS/LVPW: 0.87
BH CV ECHO MEAS - IVSD: 0.93 CM
BH CV ECHO MEAS - LA DIMENSION: 2.9 CM
BH CV ECHO MEAS - LA/AO: 1
BH CV ECHO MEAS - LAT PEAK E' VEL: 5 CM/SEC
BH CV ECHO MEAS - LV DIASTOLIC VOL/BSA (35-75): 43.8 ML/M^2
BH CV ECHO MEAS - LV MASS(C)D: 130.7 GRAMS
BH CV ECHO MEAS - LV MASS(C)DI: 84 GRAMS/M^2
BH CV ECHO MEAS - LV MAX PG: 3.4 MMHG
BH CV ECHO MEAS - LV MEAN PG: 2 MMHG
BH CV ECHO MEAS - LV SYSTOLIC VOL/BSA (12-30): 20.3 ML/M^2
BH CV ECHO MEAS - LV V1 MAX: 91.6 CM/SEC
BH CV ECHO MEAS - LV V1 MEAN: 67.1 CM/SEC
BH CV ECHO MEAS - LV V1 VTI: 24.3 CM
BH CV ECHO MEAS - LVIDD: 4.1 CM
BH CV ECHO MEAS - LVIDS: 3 CM
BH CV ECHO MEAS - LVLD AP4: 8.4 CM
BH CV ECHO MEAS - LVLS AP4: 7.1 CM
BH CV ECHO MEAS - LVOT AREA (M): 2.8 CM^2
BH CV ECHO MEAS - LVOT AREA: 2.8 CM^2
BH CV ECHO MEAS - LVOT DIAM: 1.9 CM
BH CV ECHO MEAS - LVPWD: 1.1 CM
BH CV ECHO MEAS - MED PEAK E' VEL: 4 CM/SEC
BH CV ECHO MEAS - MR MAX PG: 72.6 MMHG
BH CV ECHO MEAS - MR MAX VEL: 426 CM/SEC
BH CV ECHO MEAS - MV A MAX VEL: 116 CM/SEC
BH CV ECHO MEAS - MV DEC SLOPE: 551 CM/SEC^2
BH CV ECHO MEAS - MV DEC TIME: 0.16 SEC
BH CV ECHO MEAS - MV E MAX VEL: 71.3 CM/SEC
BH CV ECHO MEAS - MV E/A: 0.61
BH CV ECHO MEAS - MV P1/2T MAX VEL: 97.7 CM/SEC
BH CV ECHO MEAS - MV P1/2T: 51.9 MSEC
BH CV ECHO MEAS - MVA P1/2T LCG: 2.3 CM^2
BH CV ECHO MEAS - MVA(P1/2T): 4.2 CM^2
BH CV ECHO MEAS - RAP SYSTOLE: 5 MMHG
BH CV ECHO MEAS - RVSP: 27.8 MMHG
BH CV ECHO MEAS - SI(AO): 114.6 ML/M^2
BH CV ECHO MEAS - SI(CUBED): 25.8 ML/M^2
BH CV ECHO MEAS - SI(LVOT): 44.3 ML/M^2
BH CV ECHO MEAS - SI(MOD-SP4): 23.5 ML/M^2
BH CV ECHO MEAS - SI(TEICH): 24.1 ML/M^2
BH CV ECHO MEAS - SV(AO): 178.3 ML
BH CV ECHO MEAS - SV(CUBED): 40.1 ML
BH CV ECHO MEAS - SV(LVOT): 68.9 ML
BH CV ECHO MEAS - SV(MOD-SP4): 36.6 ML
BH CV ECHO MEAS - SV(TEICH): 37.5 ML
BH CV ECHO MEAS - TR MAX VEL: 239 CM/SEC
BH CV ECHO MEASUREMENTS AVERAGE E/E' RATIO: 15.84
LEFT ATRIUM VOLUME INDEX: 17.1 ML/M2
LV EF 2D ECHO EST: 55 %
MAXIMAL PREDICTED HEART RATE: 129 BPM
STRESS TARGET HR: 110 BPM

## 2019-03-05 ENCOUNTER — TELEPHONE (OUTPATIENT)
Dept: VASCULAR SURGERY | Facility: CLINIC | Age: 84
End: 2019-03-05

## 2019-03-05 NOTE — TELEPHONE ENCOUNTER
Spoke with Ms Chung reminding her of her appointments for Wednesday, March 6th, 2019. Reminded Ms Chung of to arrive at the [facility] at 830 am for testing and follow up afterwards at 1015 am with Saumya TRAVIS. Ms Chung confirmed she would be here.

## 2019-03-06 ENCOUNTER — HOSPITAL ENCOUNTER (OUTPATIENT)
Dept: ULTRASOUND IMAGING | Facility: HOSPITAL | Age: 84
Discharge: HOME | End: 2019-03-06
Admitting: NURSE PRACTITIONER

## 2019-03-06 ENCOUNTER — OFFICE VISIT (OUTPATIENT)
Dept: VASCULAR SURGERY | Facility: CLINIC | Age: 84
End: 2019-03-06

## 2019-03-06 VITALS
WEIGHT: 121 LBS | OXYGEN SATURATION: 97 % | DIASTOLIC BLOOD PRESSURE: 70 MMHG | BODY MASS INDEX: 20.66 KG/M2 | HEIGHT: 64 IN | HEART RATE: 88 BPM | SYSTOLIC BLOOD PRESSURE: 126 MMHG

## 2019-03-06 DIAGNOSIS — E78.5 HYPERLIPIDEMIA, UNSPECIFIED HYPERLIPIDEMIA TYPE: ICD-10-CM

## 2019-03-06 DIAGNOSIS — I73.9 PAD (PERIPHERAL ARTERY DISEASE) (HCC): ICD-10-CM

## 2019-03-06 DIAGNOSIS — I73.9 PAD (PERIPHERAL ARTERY DISEASE) (HCC): Primary | ICD-10-CM

## 2019-03-06 DIAGNOSIS — I10 ESSENTIAL HYPERTENSION: ICD-10-CM

## 2019-03-06 PROCEDURE — 99214 OFFICE O/P EST MOD 30 MIN: CPT | Performed by: NURSE PRACTITIONER

## 2019-03-06 PROCEDURE — 93924 LWR XTR VASC STDY BILAT: CPT | Performed by: SURGERY

## 2019-03-06 PROCEDURE — 93923 UPR/LXTR ART STDY 3+ LVLS: CPT

## 2019-05-07 ENCOUNTER — TRANSCRIBE ORDERS (OUTPATIENT)
Dept: ADMINISTRATIVE | Facility: HOSPITAL | Age: 84
End: 2019-05-07

## 2019-05-07 DIAGNOSIS — Z12.31 ENCOUNTER FOR SCREENING MAMMOGRAM FOR MALIGNANT NEOPLASM OF BREAST: Primary | ICD-10-CM

## 2019-05-07 DIAGNOSIS — M85.9 DISORDER OF BONE DENSITY AND STRUCTURE, UNSPECIFIED: ICD-10-CM

## 2019-05-30 ENCOUNTER — HOSPITAL ENCOUNTER (OUTPATIENT)
Dept: BONE DENSITY | Facility: HOSPITAL | Age: 84
Discharge: HOME | End: 2019-05-30

## 2019-05-30 ENCOUNTER — HOSPITAL ENCOUNTER (OUTPATIENT)
Dept: MAMMOGRAPHY | Facility: HOSPITAL | Age: 84
Discharge: HOME | End: 2019-05-30
Admitting: FAMILY MEDICINE

## 2019-05-30 DIAGNOSIS — Z12.31 ENCOUNTER FOR SCREENING MAMMOGRAM FOR MALIGNANT NEOPLASM OF BREAST: ICD-10-CM

## 2019-05-30 DIAGNOSIS — M85.9 DISORDER OF BONE DENSITY AND STRUCTURE, UNSPECIFIED: ICD-10-CM

## 2019-05-30 PROCEDURE — 77067 SCR MAMMO BI INCL CAD: CPT

## 2019-05-30 PROCEDURE — 77080 DXA BONE DENSITY AXIAL: CPT

## 2019-05-30 PROCEDURE — 77063 BREAST TOMOSYNTHESIS BI: CPT

## 2019-07-04 ENCOUNTER — APPOINTMENT (OUTPATIENT)
Dept: GENERAL RADIOLOGY | Facility: HOSPITAL | Age: 84
End: 2019-07-04

## 2019-07-04 ENCOUNTER — HOSPITAL ENCOUNTER (INPATIENT)
Facility: HOSPITAL | Age: 84
LOS: 3 days | Discharge: NURSING, REHAB OR LONG-TERM CARE | End: 2019-07-08
Attending: INTERNAL MEDICINE | Admitting: FAMILY MEDICINE
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
## 2019-07-04 DIAGNOSIS — S52.501A CLOSED FRACTURE OF DISTAL END OF RIGHT RADIUS, UNSPECIFIED FRACTURE MORPHOLOGY, INITIAL ENCOUNTER: ICD-10-CM

## 2019-07-04 DIAGNOSIS — S72.001A CLOSED FRACTURE OF RIGHT HIP, INITIAL ENCOUNTER (HCC): Primary | ICD-10-CM

## 2019-07-04 DIAGNOSIS — Z74.09 IMPAIRED MOBILITY: ICD-10-CM

## 2019-07-04 DIAGNOSIS — Z78.9 DECREASED ACTIVITIES OF DAILY LIVING (ADL): ICD-10-CM

## 2019-07-04 DIAGNOSIS — S52.601A CLOSED FRACTURE OF DISTAL END OF RIGHT ULNA, UNSPECIFIED FRACTURE MORPHOLOGY, INITIAL ENCOUNTER: ICD-10-CM

## 2019-07-04 DIAGNOSIS — Z79.01 CHRONIC ANTICOAGULATION: ICD-10-CM

## 2019-07-04 PROCEDURE — 99284 EMERGENCY DEPT VISIT MOD MDM: CPT

## 2019-07-04 PROCEDURE — 73130 X-RAY EXAM OF HAND: CPT

## 2019-07-04 PROCEDURE — 36415 COLL VENOUS BLD VENIPUNCTURE: CPT

## 2019-07-04 PROCEDURE — 73502 X-RAY EXAM HIP UNI 2-3 VIEWS: CPT

## 2019-07-04 PROCEDURE — 73110 X-RAY EXAM OF WRIST: CPT

## 2019-07-04 RX ORDER — SODIUM CHLORIDE 0.9 % (FLUSH) 0.9 %
10 SYRINGE (ML) INJECTION AS NEEDED
Status: DISCONTINUED | OUTPATIENT
Start: 2019-07-04 | End: 2019-07-08 | Stop reason: HOSPADM

## 2019-07-05 ENCOUNTER — APPOINTMENT (OUTPATIENT)
Dept: GENERAL RADIOLOGY | Facility: HOSPITAL | Age: 84
End: 2019-07-05

## 2019-07-05 PROBLEM — S72.001A CLOSED FRACTURE OF RIGHT HIP (HCC): Status: ACTIVE | Noted: 2019-07-05

## 2019-07-05 PROBLEM — M25.551 ACUTE RIGHT HIP PAIN: Status: ACTIVE | Noted: 2019-07-05

## 2019-07-05 PROBLEM — S32.591A CLOSED FRACTURE OF MULTIPLE PUBIC RAMI, RIGHT, INITIAL ENCOUNTER (HCC): Status: ACTIVE | Noted: 2019-07-05

## 2019-07-05 PROBLEM — R26.89 IMPAIRED GAIT AND MOBILITY: Status: ACTIVE | Noted: 2019-07-05

## 2019-07-05 PROBLEM — S62.101A CLOSED FRACTURE OF RIGHT WRIST: Status: ACTIVE | Noted: 2019-07-05

## 2019-07-05 PROBLEM — E03.9 HYPOTHYROIDISM: Status: ACTIVE | Noted: 2019-07-05
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
## 2019-07-05 LAB
ALBUMIN SERPL-MCNC: 4.3 G/DL (ref 3.5–5)
ALBUMIN/GLOB SERPL: 1.5 G/DL (ref 1.1–2.5)
ALP SERPL-CCNC: 95 U/L (ref 24–120)
ALT SERPL W P-5'-P-CCNC: 22 U/L (ref 0–54)
ANION GAP SERPL CALCULATED.3IONS-SCNC: 7 MMOL/L (ref 4–13)
APTT PPP: 28.1 SECONDS (ref 24.1–35)
AST SERPL-CCNC: 46 U/L (ref 7–45)
BASOPHILS # BLD AUTO: 0.05 10*3/MM3 (ref 0–0.2)
BASOPHILS NFR BLD AUTO: 0.3 % (ref 0–2)
BILIRUB SERPL-MCNC: 0.6 MG/DL (ref 0.1–1)
BUN BLD-MCNC: 36 MG/DL (ref 5–21)
BUN/CREAT SERPL: 29 (ref 7–25)
CALCIUM SPEC-SCNC: 9.5 MG/DL (ref 8.4–10.4)
CHLORIDE SERPL-SCNC: 108 MMOL/L (ref 98–110)
CO2 SERPL-SCNC: 25 MMOL/L (ref 24–31)
CREAT BLD-MCNC: 1.24 MG/DL (ref 0.5–1.4)
DEPRECATED RDW RBC AUTO: 42.8 FL (ref 40–54)
EOSINOPHIL # BLD AUTO: 0.13 10*3/MM3 (ref 0–0.7)
EOSINOPHIL NFR BLD AUTO: 0.9 % (ref 0–4)
ERYTHROCYTE [DISTWIDTH] IN BLOOD BY AUTOMATED COUNT: 12.7 % (ref 12–15)
GFR SERPL CREATININE-BSD FRML MDRD: 41 ML/MIN/1.73
GLOBULIN UR ELPH-MCNC: 2.9 GM/DL
GLUCOSE BLD-MCNC: 110 MG/DL (ref 70–100)
HCT VFR BLD AUTO: 37.2 % (ref 37–47)
HGB BLD-MCNC: 12.1 G/DL (ref 12–16)
IMM GRANULOCYTES # BLD AUTO: 0.1 10*3/MM3 (ref 0–0.05)
IMM GRANULOCYTES NFR BLD AUTO: 0.7 % (ref 0–5)
INR PPP: 0.94 (ref 0.91–1.09)
LYMPHOCYTES # BLD AUTO: 2.17 10*3/MM3 (ref 0.72–4.86)
LYMPHOCYTES NFR BLD AUTO: 14.7 % (ref 15–45)
MCH RBC QN AUTO: 29.9 PG (ref 28–32)
MCHC RBC AUTO-ENTMCNC: 32.5 G/DL (ref 33–36)
MCV RBC AUTO: 91.9 FL (ref 82–98)
MONOCYTES # BLD AUTO: 1.18 10*3/MM3 (ref 0.19–1.3)
MONOCYTES NFR BLD AUTO: 8 % (ref 4–12)
NEUTROPHILS # BLD AUTO: 11.17 10*3/MM3 (ref 1.87–8.4)
NEUTROPHILS NFR BLD AUTO: 75.4 % (ref 39–78)
NRBC BLD AUTO-RTO: 0 /100 WBC (ref 0–0.2)
PLATELET # BLD AUTO: 201 10*3/MM3 (ref 130–400)
PMV BLD AUTO: 9.3 FL (ref 6–12)
POTASSIUM BLD-SCNC: 5 MMOL/L (ref 3.5–5.3)
PROT SERPL-MCNC: 7.2 G/DL (ref 6.3–8.7)
PROTHROMBIN TIME: 12.8 SECONDS (ref 11.9–14.6)
RBC # BLD AUTO: 4.05 10*6/MM3 (ref 4.2–5.4)
SODIUM BLD-SCNC: 140 MMOL/L (ref 135–145)
WBC NRBC COR # BLD: 14.8 10*3/MM3 (ref 4.8–10.8)

## 2019-07-05 PROCEDURE — 25010000002 MORPHINE PER 10 MG: Performed by: NURSE PRACTITIONER

## 2019-07-05 PROCEDURE — 73562 X-RAY EXAM OF KNEE 3: CPT

## 2019-07-05 PROCEDURE — 93010 ELECTROCARDIOGRAM REPORT: CPT | Performed by: INTERNAL MEDICINE

## 2019-07-05 PROCEDURE — 73590 X-RAY EXAM OF LOWER LEG: CPT

## 2019-07-05 PROCEDURE — 94760 N-INVAS EAR/PLS OXIMETRY 1: CPT

## 2019-07-05 PROCEDURE — 80053 COMPREHEN METABOLIC PANEL: CPT | Performed by: NURSE PRACTITIONER

## 2019-07-05 PROCEDURE — 97162 PT EVAL MOD COMPLEX 30 MIN: CPT

## 2019-07-05 PROCEDURE — 94799 UNLISTED PULMONARY SVC/PX: CPT

## 2019-07-05 PROCEDURE — 25010000002 ONDANSETRON PER 1 MG: Performed by: NURSE PRACTITIONER

## 2019-07-05 PROCEDURE — 97166 OT EVAL MOD COMPLEX 45 MIN: CPT

## 2019-07-05 PROCEDURE — 85025 COMPLETE CBC W/AUTO DIFF WBC: CPT | Performed by: NURSE PRACTITIONER

## 2019-07-05 PROCEDURE — 85610 PROTHROMBIN TIME: CPT | Performed by: NURSE PRACTITIONER

## 2019-07-05 PROCEDURE — 93005 ELECTROCARDIOGRAM TRACING: CPT | Performed by: INTERNAL MEDICINE

## 2019-07-05 PROCEDURE — 85730 THROMBOPLASTIN TIME PARTIAL: CPT | Performed by: NURSE PRACTITIONER

## 2019-07-05 RX ORDER — ONDANSETRON 2 MG/ML
4 INJECTION INTRAMUSCULAR; INTRAVENOUS EVERY 6 HOURS PRN
Status: DISCONTINUED | OUTPATIENT
Start: 2019-07-05 | End: 2019-07-08 | Stop reason: HOSPADM

## 2019-07-05 RX ORDER — ONDANSETRON 4 MG/1
4 TABLET, FILM COATED ORAL EVERY 6 HOURS PRN
Status: DISCONTINUED | OUTPATIENT
Start: 2019-07-05 | End: 2019-07-08 | Stop reason: HOSPADM

## 2019-07-05 RX ORDER — AMLODIPINE BESYLATE 10 MG/1
10 TABLET ORAL DAILY
Status: DISCONTINUED | OUTPATIENT
Start: 2019-07-05 | End: 2019-07-08 | Stop reason: HOSPADM

## 2019-07-05 RX ORDER — CYCLOSPORINE 0.5 MG/ML
1 EMULSION OPHTHALMIC EVERY 12 HOURS
Status: DISCONTINUED | OUTPATIENT
Start: 2019-07-05 | End: 2019-07-08 | Stop reason: HOSPADM

## 2019-07-05 RX ORDER — ONDANSETRON 2 MG/ML
4 INJECTION INTRAMUSCULAR; INTRAVENOUS ONCE
Status: COMPLETED | OUTPATIENT
Start: 2019-07-05 | End: 2019-07-05

## 2019-07-05 RX ORDER — SODIUM CHLORIDE 0.9 % (FLUSH) 0.9 %
3 SYRINGE (ML) INJECTION EVERY 12 HOURS SCHEDULED
Status: DISCONTINUED | OUTPATIENT
Start: 2019-07-05 | End: 2019-07-08 | Stop reason: HOSPADM

## 2019-07-05 RX ORDER — OXYCODONE HYDROCHLORIDE AND ACETAMINOPHEN 5; 325 MG/1; MG/1
1 TABLET ORAL EVERY 4 HOURS PRN
Status: DISCONTINUED | OUTPATIENT
Start: 2019-07-05 | End: 2019-07-08 | Stop reason: HOSPADM

## 2019-07-05 RX ORDER — MORPHINE SULFATE 2 MG/ML
2 INJECTION, SOLUTION INTRAMUSCULAR; INTRAVENOUS EVERY 4 HOURS PRN
Status: DISCONTINUED | OUTPATIENT
Start: 2019-07-05 | End: 2019-07-06 | Stop reason: ALTCHOICE

## 2019-07-05 RX ORDER — ALISKIREN 150 MG/1
300 TABLET, FILM COATED ORAL DAILY
Status: DISCONTINUED | OUTPATIENT
Start: 2019-07-05 | End: 2019-07-08 | Stop reason: HOSPADM

## 2019-07-05 RX ORDER — LEVOTHYROXINE SODIUM 112 UG/1
112 TABLET ORAL
Status: DISCONTINUED | OUTPATIENT
Start: 2019-07-05 | End: 2019-07-08

## 2019-07-05 RX ORDER — CLONIDINE HYDROCHLORIDE 0.1 MG/1
0.1 TABLET ORAL EVERY 6 HOURS PRN
Status: DISCONTINUED | OUTPATIENT
Start: 2019-07-05 | End: 2019-07-08 | Stop reason: HOSPADM

## 2019-07-05 RX ORDER — VITS A,C,E/LUTEIN/MINERALS 300MCG-200
1 TABLET ORAL DAILY
Status: DISCONTINUED | OUTPATIENT
Start: 2019-07-05 | End: 2019-07-08 | Stop reason: HOSPADM

## 2019-07-05 RX ORDER — NALOXONE HCL 0.4 MG/ML
0.4 VIAL (ML) INJECTION
Status: DISCONTINUED | OUTPATIENT
Start: 2019-07-05 | End: 2019-07-08 | Stop reason: HOSPADM

## 2019-07-05 RX ORDER — SODIUM CHLORIDE 9 MG/ML
50 INJECTION, SOLUTION INTRAVENOUS CONTINUOUS
Status: DISCONTINUED | OUTPATIENT
Start: 2019-07-05 | End: 2019-07-06

## 2019-07-05 RX ORDER — MORPHINE SULFATE 2 MG/ML
2 INJECTION, SOLUTION INTRAMUSCULAR; INTRAVENOUS ONCE
Status: COMPLETED | OUTPATIENT
Start: 2019-07-05 | End: 2019-07-05

## 2019-07-05 RX ORDER — SODIUM CHLORIDE 0.9 % (FLUSH) 0.9 %
3-10 SYRINGE (ML) INJECTION AS NEEDED
Status: DISCONTINUED | OUTPATIENT
Start: 2019-07-05 | End: 2019-07-08 | Stop reason: HOSPADM

## 2019-07-05 RX ORDER — ACETAMINOPHEN 325 MG/1
650 TABLET ORAL EVERY 4 HOURS PRN
Status: DISCONTINUED | OUTPATIENT
Start: 2019-07-05 | End: 2019-07-08 | Stop reason: HOSPADM

## 2019-07-05 RX ADMIN — CYCLOSPORINE 1 DROP: 0.5 EMULSION OPHTHALMIC at 09:03

## 2019-07-05 RX ADMIN — LEVOTHYROXINE SODIUM 112 MCG: 112 TABLET ORAL at 09:19

## 2019-07-05 RX ADMIN — ONDANSETRON 4 MG: 2 INJECTION INTRAMUSCULAR; INTRAVENOUS at 01:56

## 2019-07-05 RX ADMIN — SODIUM CHLORIDE 75 ML/HR: 9 INJECTION, SOLUTION INTRAVENOUS at 03:13

## 2019-07-05 RX ADMIN — AMLODIPINE BESYLATE 10 MG: 10 TABLET ORAL at 09:03

## 2019-07-05 RX ADMIN — MORPHINE SULFATE 2 MG: 2 INJECTION, SOLUTION INTRAMUSCULAR; INTRAVENOUS at 01:56

## 2019-07-05 RX ADMIN — CYCLOSPORINE 1 DROP: 0.5 EMULSION OPHTHALMIC at 20:07

## 2019-07-05 RX ADMIN — CALCIUM CARBONATE-CHOLECALCIFEROL TAB 250 MG-125 UNIT 1 TABLET: 250-125 TAB at 09:03

## 2019-07-05 RX ADMIN — SODIUM CHLORIDE, PRESERVATIVE FREE 3 ML: 5 INJECTION INTRAVENOUS at 20:07

## 2019-07-05 RX ADMIN — OXYCODONE HYDROCHLORIDE AND ACETAMINOPHEN 1 TABLET: 5; 325 TABLET ORAL at 03:12

## 2019-07-05 RX ADMIN — Medication 1 TABLET: at 09:03

## 2019-07-05 RX ADMIN — ALISKIREN HEMIFUMARATE 300 MG: 150 TABLET, FILM COATED ORAL at 09:03

## 2019-07-05 RX ADMIN — OXYCODONE HYDROCHLORIDE AND ACETAMINOPHEN 1 TABLET: 5; 325 TABLET ORAL at 16:16

## 2019-07-05 RX ADMIN — SODIUM CHLORIDE, PRESERVATIVE FREE 3 ML: 5 INJECTION INTRAVENOUS at 09:03

## 2019-07-05 NOTE — PROGRESS NOTES
[facility] Medicine Services  INPATIENT PROGRESS NOTE    Length of Stay: 0  Date of Admission: 7/4/2019  Primary Care Physician: Delfina Pereira DO    Subjective   Chief Complaint: Fall with right hip and right arm pain  HPI   She was walking out of her house carrying a folding chair to see the fireworks.  She got to the bottom step and missed the step slipped and fell landing onto the grass.  She fell landing on her right hip with her right arm outstretched.  She denied any loss of consciousness.  She denied blurred vision double vision or syncopal episode prior to fall.  She reports that she just missed the bottom step and fell.  She experienced immediate pain and was unable to stand.  Presented to the emergency room where x-ray reported distal right radius and ulna fracture.  She has soft splint cast in place. Concern for fracture greater trochanteric on right hip.  Orthopedics consulted.    Lying in bed.  Son in room.  She reports right hip pain level 7 out of 10.  She denies nausea, vomiting or abdominal pain.  Oxygen at 1.5 L.  She does not normally wear oxygen at home.  She denies chest pain, palpitations or shortness of breath.  Awaiting orthopedic consultation and recommendations.    Review of Systems   Constitutional: Positive for appetite change (After fall). Negative for fatigue and fever.   HENT: Negative for congestion and trouble swallowing.    Eyes: Negative for photophobia and visual disturbance.   Respiratory: Negative for cough, shortness of breath and wheezing.    Cardiovascular: Negative for chest pain, palpitations and leg swelling.   Gastrointestinal: Negative for constipation, diarrhea, nausea and vomiting.   Endocrine: Negative for cold intolerance, heat intolerance and polyuria.   Genitourinary: Negative for dysuria and urgency.   Musculoskeletal: Positive for gait problem (After fall).   Skin: Negative for wound.   Allergic/Immunologic: Negative  for immunocompromised state.   Neurological: Positive for weakness.   Hematological: Negative for adenopathy. Does not bruise/bleed easily.   Psychiatric/Behavioral: Negative for agitation, behavioral problems and confusion.      All pertinent negatives and positives are as above. All other systems have been reviewed and are negative unless otherwise stated.     Objective    Temp:  [97.4 °F (36.3 °C)-98 °F (36.7 °C)] 98 °F (36.7 °C)  Heart Rate:  [72-93] 77  Resp:  [15-18] 18  BP: (120-145)/(56-71) 130/56  Physical Exam   Constitutional: She is oriented to person, place, and time. She appears well-developed and well-nourished.   HENT:   Head: Atraumatic.   Eyes: EOM are normal. Pupils are equal, round, and reactive to light.   Neck: Normal range of motion. Neck supple.   Cardiovascular: Normal rate, regular rhythm, normal heart sounds and intact distal pulses. Exam reveals no gallop and no friction rub.   No murmur heard.  Pulmonary/Chest: Effort normal and breath sounds normal. She has no wheezes. She has no rales.   Oxygen at 1.5 L   Abdominal: Soft. Bowel sounds are normal. She exhibits no distension.   Genitourinary:   Genitourinary Comments: Voiding.   Musculoskeletal: She exhibits tenderness (Right hip, right lower extremity, right upper extremity).   Neurological: She is alert and oriented to person, place, and time.   Skin: Skin is warm and dry.   Psychiatric: She has a normal mood and affect. Her behavior is normal. Judgment and thought content normal.     Results Review:  I have reviewed the labs, radiology results, and diagnostic studies.    Laboratory Data:   Results from last 7 days   Lab Units 07/05/19  0018   WBC 10*3/mm3 14.80*   HEMOGLOBIN g/dL 12.1   HEMATOCRIT % 37.2   PLATELETS 10*3/mm3 201        Results from last 7 days   Lab Units 07/05/19  0017   SODIUM mmol/L 140   POTASSIUM mmol/L 5.0   CHLORIDE mmol/L 108   CO2 mmol/L 25.0   BUN mg/dL 36*   CREATININE mg/dL 1.24   CALCIUM mg/dL 9.5    BILIRUBIN mg/dL 0.6   ALK PHOS U/L 95   ALT (SGPT) U/L 22   AST (SGOT) U/L 46*   GLUCOSE mg/dL 110*        Imaging Results (all)     Procedure Component Value Units Date/Time    XR Hand 3+ View Right [[id number]] Collected:  07/05/19 0740     Updated:  07/05/19 0744    Narrative:       EXAMINATION:  XR HAND 3+ VW RIGHT-  7/5/2019 12:04 AM CDT     HISTORY: rt hand pain, fall      COMPARISON: No comparison study.     TECHNIQUE: 3 views: AP lateral and oblique projection imaging     FINDINGS:      There is a transversely oriented fracture of the distal radius, mildly  displaced.     There is a mildly displaced ulnar styloid fracture.     There is osteoporosis.     There are degenerative changes in the distal interphalangeal joints  particularly.     There is no additional fractures of the right hand.       Impression:       1. Distal radial and ulnar styloid fractures.  2. No additional fracture/acute osseous abnormality.  This report was finalized on 07/05/2019 07:41 by Dr. Christian Kathleen MD.    XR Wrist 3+ View Right [[id number]] Collected:  07/05/19 0739     Updated:  07/05/19 0743    Narrative:       EXAMINATION:  XR WRIST 3+ VW RIGHT-  7/5/2019 12:04 AM CDT     HISTORY: rt wrist pain, fall      COMPARISON: No comparison study.     TECHNIQUE: 3 views: AP, lateral and oblique projection imaging     FINDINGS:      There is distal radial fracture, transversely oriented, mildly displaced  with intra-articular extension centrally.     There is a mildly displaced ulnar styloid fracture.     There is osteoporosis.       Impression:       1. Distal radial and ulnar styloid fractures.  This report was finalized on 07/05/2019 07:40 by Dr. Christian Kathleen MD.    XR Tibia Fibula 2 View Right [[id number]] Collected:  07/05/19 0738     Updated:  07/05/19 0742    Narrative:       EXAMINATION:  XR TIBIA FIBULA 2 VW RIGHT-  7/5/2019 12:05 AM CDT     HISTORY: pain, swellling to lateral leg, brushing; S72.001A-Fracture of  unspecified  part of neck of right femur, initial encounter for closed  fracture; W19.XXXA-Unspecified fall, initial encounter;  S52.501A-Unspecified fracture of the lower end of right radius, initial  encounter for closed fracture; S52.601A-Unspecified fracture of lower  end of right ulna, initial encounter for closed fracture; Z79.01-Long      COMPARISON: No comparison study.     TECHNIQUE: 2 views: AP and lateral projection imaging. 4 images     FINDINGS: The knee and ankle joints are maintained without fracture.       Impression:       1. No acute osseous abnormality.  This report was finalized on 07/05/2019 07:39 by Dr. Christian Kathleen MD.    XR Knee 3 View Right [[id number]] Collected:  07/05/19 0735     Updated:  07/05/19 0741    Narrative:       EXAMINATION:  XR KNEE 3 VW RIGHT-  7/5/2019 12:05 AM CDT     HISTORY: rt knee pain; S72.001A-Fracture of unspecified part of neck of  right femur, initial encounter for closed fracture; W19.XXXA-Unspecified  fall, initial encounter; S52.501A-Unspecified fracture of the lower end  of right radius, initial encounter for closed fracture;  S52.601A-Unspecified fracture of lower end of right ulna, initial  encounter for closed fracture; Z79.01-Long term (current) use of anti      COMPARISON: No comparison study.     TECHNIQUE: 3 views: AP lateral and oblique projection imaging     FINDINGS:      Patella femoral and tibial relationships are appropriate, without  fracture.     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 Chondrocalcinosis  involving the menisci observed.     Vascular calcifications observed.     There is no significant joint effusion.       Impression:       1. No acute osseous abnormality.  This report was finalized on 07/05/2019 07:38 by Dr. Christian Kathleen MD.    XR Hip With or Without Pelvis 2 - 3 View Right [[id number]] Collected:  07/05/19 0731     Updated:  07/05/19 0741    Narrative:        EXAMINATION:  XR HIP W OR WO PELVIS 2-3 VIEW RIGHT-  7/5/2019 12:04 AM  CDT     HISTORY: rt  hip pain, fall      COMPARISON: 02/26/2016     TECHNIQUE: 3 views: AP pelvis, AP and lateral right hip     FINDINGS:      The right femoral prosthesis well-positioned without hardware  complications.     There is no fracture or dislocation.     There is deformity of the right superior and inferior pubic rami  compatible with fractures, age-indeterminate, question subacute/chronic.  Correlate with clinical findings.     The left hip joint is maintained. Advanced degenerative changes in the  lower lumbar spine.       Impression:       1. 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  2. Deformity of the right superior and inferior pubic rami compatible  with fractures, age-indeterminate, question chronic/subacute. Correlate  with patient presentation.  This report was finalized on 07/05/2019 07:35 by Dr. Christian Kathleen MD.          Intake/Output  No intake or output data in the 24 hours ending 07/05/19 0800    Scheduled Meds    aliskiren 300 mg Oral Daily   amLODIPine 10 mg Oral Daily   cycloSPORINE 1 drop Both Eyes Q12H   levothyroxine 112 mcg Oral Q AM   OCUVITE-LUTEIN 1 tablet Oral Daily   oyster shell calcium/vitamin d 1 tablet Oral Daily   sodium chloride 3 mL Intravenous Q12H       I have reviewed the patient current medications.     Assessment/Plan     Active Hospital Problems    Diagnosis   • **Closed fracture of right hip (CMS/[facility])   • Closed fracture of right wrist   • Fall   • Impaired gait and mobility   • Closed fracture of multiple pubic rami, right, initial encounter (CMS/[facility])   • Acute right hip pain   • Hypothyroidism   • Hypertension     Plan:  1.  X-ray right hip changes from right hip arthroplasty without hardware complications.  Deformity right superior and inferior pubic ramus compatible with fractures.  2.  X-ray right wrist with distal radial and ulnar styloid fractures.  3.   Orthopedic consult  4.  Nothing by mouth until orthopedic evaluation.  5.  IV fluids at 75 mL/h.  6.  Physical therapy and Occupational Therapy consults.  7.  Social service consult.  8.  Home medications reviewed and resumed if appropriate.  9.  Hold aspirin and Plavix until evaluated by orthopedics.  10.  SCDs for deep vein thrombosis prophylaxis.  11.  CBC, basic metabolic panel tomorrow    Her sonJuliet and daughter Yeni Mixon will act as her surrogate decision makers.  The above documentation resulted from a face-to-face encounter by me Flor TRAVIS, [facility].    Discharge Planning: I expect patient to be discharged to be determined.    ANGY Montemayor   07/05/19   8:00 AM

## 2019-07-05 NOTE — PROGRESS NOTES
Discharge Planning Assessment  [facility]     Patient Name: Mya Chung  MRN: [id number]  Today's Date: 7/5/2019    Admit Date: 7/4/2019    Discharge Needs Assessment     Row Name 07/05/19 1111       Living Environment    Lives With  alone    Current Living Arrangements  home/apartment/condo    Primary Care Provided by  self    Provides Primary Care For  no one    Family Caregiver if Needed  child(nancy), adult    Quality of Family Relationships  helpful;involved;supportive    Able to Return to Prior Arrangements  other (see comments)    Living Arrangement Comments  MAKING REFERRAL TO [place] PER PT/FAMILY REQUEST       Resource/Environmental Concerns    Resource/Environmental Concerns  none    Transportation Concerns  car, none       Transition Planning    Patient/Family Anticipates Transition to  inpatient rehabilitation facility;long term care facility    Patient/Family Anticipated Services at Transition  skilled nursing    Transportation Anticipated  health plan transportation       Discharge Needs Assessment    Readmission Within the Last 30 Days  no previous admission in last 30 days    Concerns to be Addressed  denies needs/concerns at this time    Equipment Currently Used at Home  none    Anticipated Changes Related to Illness  inability to care for self    Equipment Needed After Discharge  other (see comments) NH WILL PROVIDE DME     Outpatient/Agency/Support Group Needs  skilled nursing facility    Discharge Facility/Level of Care Needs  nursing facility, skilled    Offered/Gave Vendor List  yes    Patient's Choice of Community Agency(s)  [place]    Discharge Coordination/Progress  PT HAD OR 7-4-19. SPOKE TO FAMILY REGARDING DC NEEDS. THEY HAVE REQUESTED REFERRAL BE SENT TO [place] FOR SHORT TERM. MADE REFERRAL TO [place] [phone], AWAIT ANSWER.         Discharge Plan     Row Name 07/05/19 1116       Plan    Plan  REFERRAL PENDING TO [place]    Row Name 07/05/19 1038       Plan    Plan  Patient is  a [facility] member of [employer]. May contact navigators at [phone] or [phone] for any discharge needs.    Patient/Family in Agreement with Plan  yes    Plan Comments  Visited with patient and son.   notified of admission.  Patient denies any needs at this time, will continue to follow.        Destination      No service coordination in this encounter.      Durable Medical Equipment      No service coordination in this encounter.      Dialysis/Infusion      No service coordination in this encounter.      Home Medical Care      No service coordination in this encounter.      Therapy      No service coordination in this encounter.      Community Resources      No service coordination in this encounter.          Demographic Summary    No documentation.       Functional Status    No documentation.       Psychosocial    No documentation.       Abuse/Neglect    No documentation.       Legal    No documentation.       Substance Abuse    No documentation.       Patient Forms    No documentation.           LONNIE Engle

## 2019-07-05 NOTE — PROGRESS NOTES
Continued Stay Note   [facility]     Patient Name: Mya Chung  MRN: [id number]  Today's Date: 7/5/2019    Admit Date: 7/4/2019    Discharge Plan     Row Name 07/05/19 1033       Plan    Plan  Patient is a [facility] member of [employer]. May contact navigators at [phone] or [phone] for any discharge needs.    Patient/Family in Agreement with Plan  yes    Plan Comments  Visited with patient and son.   notified of admission.  Patient denies any needs at this time, will continue to follow.        Discharge Codes    No documentation.             Kathyrn Downing RN

## 2019-07-05 NOTE — PLAN OF CARE
Problem: Patient Care Overview  Goal: Plan of Care Review  Outcome: Ongoing (interventions implemented as appropriate)   07/05/19 0401   Coping/Psychosocial   Plan of Care Reviewed With patient   Plan of Care Review   Progress no change   OTHER   Outcome Summary Pt admitted from ED after a fall at home. R arm is splinted. R hip fracture, and hematoma RLE. Can turn w assist, using bedpan. OC pain 8/10, tolerates PO pain meds as ordered. NPO for possible surgery today. Bedrest.        Problem: Fall Risk (Adult)  Goal: Identify Related Risk Factors and Signs and Symptoms  Outcome: Outcome(s) achieved Date Met: 07/05/19    Goal: Absence of Fall  Outcome: Ongoing (interventions implemented as appropriate)      Problem: Fractured Hip (Adult)  Goal: Signs and Symptoms of Listed Potential Problems Will be Absent, Minimized or Managed (Fractured Hip)  Outcome: Ongoing (interventions implemented as appropriate)

## 2019-07-05 NOTE — ED PROVIDER NOTES
Subjective   Patient is a 89-year-old  female who presents ER today with fall.  Patient reports that she tripped on a stair earlier this evening and fell landing on the grass.  She states that she landed on her outstretched right arm and right hip.  The patient states that she did not hit her head.  She states that she did not have a loss of consciousness.  Patient states that her foot slipped which caused her to fall.  The patient is complaining of right hip pain, right lower extremity pain, and right wrist pain and swelling.  The patient presents with her family members who brought her into the ER today for further evaluation.  She denies chest pain shortness of breath or other injuries.  She denies any back or neck pain.  Patient has had a previous hip replacement to the right hip in the past.  She presents to the ER today for further evaluation.        History provided by:  Patient   used: No    Fall   Mechanism of injury: fall    Injury location:  Leg and hand  Hand injury location:  R wrist and R hand  Leg injury location:  R hip and R lower leg  Incident location:  Home  Time since incident:  3 hours  Arrived directly from scene: no    Fall:     Fall occurred:  Standing    Impact surface:  Grass    Point of impact:  Outstretched arms    Entrapped after fall: no    Suspicion of alcohol use: no    Suspicion of drug use: no    Associated symptoms: no abdominal pain, no back pain, no blindness, no chest pain, no difficulty breathing, no headaches, no hearing loss, no loss of consciousness, no nausea, no neck pain, no seizures and no vomiting    Risk factors: anticoagulation therapy    Risk factors: no AICD, no asthma, no beta blocker therapy, no CABG, no CAD, no CHF, no COPD, no diabetes, no dialysis, no hemophilia, no kidney disease, no pacemaker, no past MI, not pregnant and no steroid use        Review of Systems   HENT: Negative for hearing loss.    Eyes: Negative for blindness.    Cardiovascular: Negative for chest pain.   Gastrointestinal: Negative for abdominal pain, nausea and vomiting.   Musculoskeletal: Negative for back pain and neck pain.   Neurological: Negative for seizures, loss of consciousness and headaches.   All other systems reviewed and are negative.      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    • Wears glasses        Allergies   Allergen Reactions   • Erythromycin Shortness Of Breath   • Zithromax [Azithromycin] Other (See Comments)     Yeast infection   • Penicillins Rash       Past Surgical History:   Procedure Laterality Date   • AORTAGRAM Right 2018    Procedure: LEFT LOWER EXTREMITY ANGIOGRAM;  Surgeon: Walker Davis DO;  Location:  [facility];  Service: Vascular   • BREAST BIOPSY Bilateral     multiple on both, all benign   • BREAST BIOPSY Left 2017    Procedure: LEFT MAMMOGRAM GUIDED NEEDLE DIRECTED EXCISIONAL BREAST BIOPSY;  Surgeon: Malgorzata Scott MD;  Location: [facility] OR;  Service:    • BUNIONECTOMY Bilateral    •  SECTION      X2   • EYE SURGERY      CATARACT SURGERY   • FEMORAL ENDARTERECTOMY Left 2018    Procedure: LEFT FEMORAL ENDARTERECTOMY;  Surgeon: Walker Davis DO;  Location:  [facility];  Service: Vascular   • JOINT REPLACEMENT      RIGHT HIP       Family History   Problem Relation Age of Onset   • Cancer Sister         Uterus   • No Known Problems Mother    • No Known Problems Father    • No Known Problems Brother    • No Known Problems Daughter    • No Known Problems Son    • No Known Problems Maternal Grandmother    • No Known Problems Paternal Grandmother    • No Known Problems Maternal Aunt    • No Known Problems Paternal Aunt    • Breast cancer Neg Hx    • BRCA 1/2 Neg Hx    • Colon  cancer Neg Hx    • Endometrial cancer Neg Hx    • Ovarian cancer Neg Hx        Social History     Socioeconomic History   • Marital status:      Spouse name: Not on file   • Number of children: Not on file   • Years of education: Not on file   • Highest education level: Not on file   Tobacco Use   • Smoking status: Former Smoker     Years: 2.00     Types: Cigarettes     Last attempt to quit:      Years since quittin.5   • Smokeless tobacco: Never Used   Substance and Sexual Activity   • Alcohol use: No   • Drug use: No   • Sexual activity: Defer           Objective   Physical Exam   Constitutional: She is oriented to person, place, and time. She appears well-developed and well-nourished.   HENT:   Head: Normocephalic and atraumatic.   Eyes: Conjunctivae are normal. Pupils are equal, round, and reactive to light.   Neck: Normal range of motion. Neck supple.       Cardiovascular: Normal rate, regular rhythm and normal heart sounds.   Pulmonary/Chest: Effort normal and breath sounds normal.   Musculoskeletal:        Arms:       Legs:  Neurological: She is alert and oriented to person, place, and time.   Skin: Skin is warm and dry. Capillary refill takes less than 2 seconds.   Psychiatric: She has a normal mood and affect.   Nursing note and vitals reviewed.      Splint - Cast - Strapping  Date/Time: 2019 1:43 AM  Performed by: Gayathri Tyson APRN  Authorized by: Remigio Bryant MD     Consent:     Consent obtained:  Verbal    Consent given by:  Patient    Risks discussed:  Discoloration, numbness, pain and swelling    Alternatives discussed:  No treatment, delayed treatment, alternative treatment, observation and referral  Pre-procedure details:     Sensation:  Normal    Skin color:  Pink, warm and dry, +CMS, neurovascularly intact  Procedure details:     Laterality:  Right    Location:  Wrist    Wrist:  R wrist    Splint type:  Sugar tong    Supplies:  Cotton padding, Ortho-Glass and  elastic bandage  Post-procedure details:     Pain:  Improved    Sensation:  Normal    Skin color:  Pink, warm and dry, +CMS, neurovasculary intact    Patient tolerance of procedure:  Tolerated well, no immediate complications  Comments:      Splint applied per LAYLA Randolph               ED Course  ED Course as of Jul 05 0145 Fri Jul 05, 2019 0114 The patient was evaluated-year-old female that fell today.  Patient has a right hip fracture.  She also has a right distal radius and distal ulnar fracture.  She will be placed in a sugar tong splint.  I discussed the patient's case with Dr. Perez.  He is asked the patient be admitted to the hospitalist consult on the patient in the morning.  I discussed the patient's case with Dr. Bryant who agrees with admission.  All findings have been discussed with the patient and family members and they are agreeable to admission at this time.  Pt will be admitted at this time in stable condition.  [LF]      ED Course User Index  [LF] Gayathri Tyson, APRN          XR Hip With or Without Pelvis 2 - 3 View Right   ED Interpretation   Abnormal   Fx tip of greater trochanter rt hip      XR Wrist 3+ View Right   ED Interpretation   Abnormal   Distal radius and ulna fx      XR Hand 3+ View Right   ED Interpretation   No acute findings      XR Tibia Fibula 2 View Right   ED Interpretation   No acute findings      XR Knee 3 View Right   ED Interpretation   No acute findings        Labs Reviewed   COMPREHENSIVE METABOLIC PANEL - Abnormal; Notable for the following components:       Result Value    Glucose 110 (*)     BUN 36 (*)     AST (SGOT) 46 (*)     eGFR Non African Amer 41 (*)     BUN/Creatinine Ratio 29.0 (*)     All other components within normal limits    Narrative:     GFR Normal >60  Chronic Kidney Disease <60  Kidney Failure <15   CBC WITH AUTO DIFFERENTIAL - Abnormal; Notable for the following components:    WBC 14.80 (*)     RBC 4.05 (*)     MCHC 32.5 (*)     Lymphocyte %  14.7 (*)     Neutrophils, Absolute 11.17 (*)     Immature Grans, Absolute 0.10 (*)     All other components within normal limits   PROTIME-INR - Normal   APTT - Normal   CBC AND DIFFERENTIAL    Narrative:     The following orders were created for panel order CBC & Differential.  Procedure                               Abnormality         Status                     ---------                               -----------         ------                     CBC Auto Differential[[id number]]        Abnormal            Final result                 Please view results for these tests on the individual orders.             MDM  Number of Diagnoses or Management Options  Chronic anticoagulation: new and requires workup  Closed fracture of distal end of right radius, unspecified fracture morphology, initial encounter: new and requires workup  Closed fracture of distal end of right ulna, unspecified fracture morphology, initial encounter: new and requires workup  Closed fracture of right hip, initial encounter (CMS/[facility]): new and requires workup  Fall, initial encounter: new and requires workup     Amount and/or Complexity of Data Reviewed  Clinical lab tests: ordered and reviewed  Tests in the radiology section of CPT®: ordered and reviewed  Discuss the patient with other providers: yes  Independent visualization of images, tracings, or specimens: yes    Patient Progress  Patient progress: stable        Final diagnoses:   Closed fracture of right hip, initial encounter (CMS/[facility])   Fall, initial encounter   Closed fracture of distal end of right radius, unspecified fracture morphology, initial encounter   Closed fracture of distal end of right ulna, unspecified fracture morphology, initial encounter   Chronic anticoagulation            Gayathri Tyson, APRN  07/05/19 0145

## 2019-07-05 NOTE — H&P
[facility] Medicine Services  HISTORY AND PHYSICAL    Date of Admission: 7/4/2019  Primary Care Physician: Delfina Pereira DO    Subjective     Chief Complaint: Fall right hip pain    History of Present Illness  Patient is a very pleasant 89-year-old white female past medical history of hypertension hypothyroidism who was walking out of her house this evening and going down the stairs tripped and fell landing on the grass.  She had a right arm outstretched and landed on her right hip.  Her hip has a total arthrosis.  X-rays show a fracture of the distal radius and ulna as well as well looks like to be a partial fracture of the greater trochanter on the right hip.  The hardware looks to be intact.  Orthopedics has been contacted and wishes for the patient to be admitted she cannot bear weight on her leg.  We have been asked to facilitate this and admit the patient.  She denies any loss of consciousness she denies striking her head.  She denies any other pains except for the place as mentioned above.  Her review of systems otherwise unremarkable for any recent problems.        Review of Systems   14 point review of systems negative except for as per HPI    Otherwise complete ROS reviewed and negative except as mentioned in the HPI.    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    • Wears glasses      Past Surgical History:  Past Surgical History:   Procedure Laterality Date   • AORTAGRAM Right 8/6/2018    Procedure: LEFT LOWER EXTREMITY ANGIOGRAM;  Surgeon: Walker Davis DO;  Location: [place] [id number];  Service: Vascular   • BREAST BIOPSY Bilateral     multiple on both, all benign   • BREAST BIOPSY Left 12/1/2017    Procedure: LEFT  MAMMOGRAM GUIDED NEEDLE DIRECTED EXCISIONAL BREAST BIOPSY;  Surgeon: Malgorzata Scott MD;  Location: [facility] OR;  Service:    • BUNIONECTOMY Bilateral    •  SECTION      X2   • EYE SURGERY      CATARACT SURGERY   • FEMORAL ENDARTERECTOMY Left 2018    Procedure: LEFT FEMORAL ENDARTERECTOMY;  Surgeon: Walker Davis DO;  Location:  [facility];  Service: Vascular   • JOINT REPLACEMENT      RIGHT HIP     Social History:  reports that she quit smoking about 64 years ago. Her smoking use included cigarettes. She quit after 2.00 years of use. She has never used smokeless tobacco. She reports that she does not drink alcohol or use drugs.    Family History: family history includes Cancer in her sister; No Known Problems in her brother, daughter, father, maternal aunt, maternal grandmother, mother, paternal aunt, paternal grandmother, and son.       Allergies:  Allergies   Allergen Reactions   • Erythromycin Shortness Of Breath   • Zithromax [Azithromycin] Other (See Comments)     Yeast infection   • Penicillins Rash     Medications:  Prior to Admission medications    Medication Sig Start Date End Date Taking? Authorizing Provider   alendronate (FOSAMAX) 70 MG tablet Take 70 mg by mouth Every 7 (Seven) Days. Thursday    Hossein Colvin MD   aliskiren (TEKTURNA) 300 MG tablet Take 300 mg by mouth Daily.    ProviderHossein MD   amLODIPine (NORVASC) 10 MG tablet Take 10 mg by mouth Daily.    Hossein Colvin MD   aspirin 81 MG tablet Take 81 mg by mouth Daily.    Hossein Colvin MD   Calcium Carbonate-Vitamin D (CALCIUM 600/VITAMIN D PO) Take 1 tablet by mouth Daily.    Hossein Colvin MD   CloNIDine (CATAPRES) 0.1 MG tablet Take 0.1 mg by mouth As Needed.    Hossein Colvin MD   clopidogrel (PLAVIX) 75 MG tablet Take 1 tablet by mouth Daily. 18   Saumya Morales APRN   cycloSPORINE (RESTASIS) 0.05 % ophthalmic emulsion Administer 1 drop to both eyes Every 12  "(Twelve) Hours.    Hossein Colvin MD   Glucosamine-Chondroitin-Vit D3 8890-4102-975 MG-MG-UNIT pack Take 1 tablet by mouth 2 (Two) Times a Day.    Hossein Colvin MD   levothyroxine (SYNTHROID, LEVOTHROID) 112 MCG tablet Take 112 mcg by mouth Daily.    Hossein Colvin MD   Multiple Vitamins-Minerals (COMPLETE MULTIVITAMIN/MINERAL PO) Take 1 tablet by mouth Daily.    Hossein Colvin MD     Objective     Vital Signs: /59   Pulse 85   Temp 97.7 °F (36.5 °C)   Resp 17   Ht 157.5 cm (62\")   Wt 54.4 kg (120 lb)   SpO2 96%   BMI 21.95 kg/m²   Physical Exam  Gen.:  Well-nourished well-developed white female in no acute distress  HEENT: Atraumatic, normocephalic.  Pupils equal, round, and reactive to light. Extraocular movements intact.  Sclerae anicteric.  TMs negative.  Mucous membranes moist.  Neck is supple without lymphadenopathy.  No JVD is noted.  No carotid bruits are auscultated.  Oropharynx is without erythema or exudate.   Chest: Clear to auscultation and percussion.  CV: Regular rate and rhythm.  Normal S1-S2.  No gallops, murmurs. or rubs.  Abdomen: Soft, nontender, nondistended.  Active bowel sounds,  No hepatosplenomegaly.  No masses.  No hernias.  Extremities: No clubbing, edema, or cyanosis.  Capillary refill is normal.  Pulses are 2+ and symmetric at radial and dorsalis pedis.  Posterior tibial pulses are intact and 2+ palpable.  Pain with palpation range of motion of right hip also a large hematoma on her anterior lateral shin  Neuro: Patient is awake, alert, and oriented ×3.  Cranial nerves II through XII are grossly intact.  Motor is 5 out of 5 bilaterally.  DTRs are 2+ and symmetric bilaterally. Sensory exam is nonfocal  Skin: Warm, dry, and intact.  No evidence of breakdown.  Sensorium: Normal thought and affect    Nursing notes and vital signs reviewed        Results Reviewed:  Lab Results (last 24 hours)     Procedure Component Value Units Date/Time    " Protime-INR [[id number]]  (Normal) Collected:  07/05/19 0017    Specimen:  Blood Updated:  07/05/19 0039     Protime 12.8 Seconds      INR 0.94    aPTT [[id number]]  (Normal) Collected:  07/05/19 0017    Specimen:  Blood Updated:  07/05/19 0039     PTT 28.1 seconds     Comprehensive Metabolic Panel [[id number]]  (Abnormal) Collected:  07/05/19 0017    Specimen:  Blood Updated:  07/05/19 0034     Glucose 110 mg/dL      BUN 36 mg/dL      Creatinine 1.24 mg/dL      Sodium 140 mmol/L      Potassium 5.0 mmol/L      Chloride 108 mmol/L      CO2 25.0 mmol/L      Calcium 9.5 mg/dL      Total Protein 7.2 g/dL      Albumin 4.30 g/dL      ALT (SGPT) 22 U/L      AST (SGOT) 46 U/L      Alkaline Phosphatase 95 U/L      Total Bilirubin 0.6 mg/dL      eGFR Non African Amer 41 mL/min/1.73      Globulin 2.9 gm/dL      A/G Ratio 1.5 g/dL      BUN/Creatinine Ratio 29.0     Anion Gap 7.0 mmol/L     Narrative:       GFR Normal >60  Chronic Kidney Disease <60  Kidney Failure <15    CBC & Differential [[id number]] Collected:  07/05/19 0018    Specimen:  Blood Updated:  07/05/19 0024    Narrative:       The following orders were created for panel order CBC & Differential.  Procedure                               Abnormality         Status                     ---------                               -----------         ------                     CBC Auto Differential[[id number]]        Abnormal            Final result                 Please view results for these tests on the individual orders.    CBC Auto Differential [[id number]]  (Abnormal) Collected:  07/05/19 0018    Specimen:  Blood Updated:  07/05/19 0024     WBC 14.80 10*3/mm3      RBC 4.05 10*6/mm3      Hemoglobin 12.1 g/dL      Hematocrit 37.2 %      MCV 91.9 fL      MCH 29.9 pg      MCHC 32.5 g/dL      RDW 12.7 %      RDW-SD 42.8 fl      MPV 9.3 fL      Platelets 201 10*3/mm3      Neutrophil % 75.4 %      Lymphocyte % 14.7 %      Monocyte % 8.0 %      Eosinophil % 0.9 %      Basophil %  0.3 %      Immature Grans % 0.7 %      Neutrophils, Absolute 11.17 10*3/mm3      Lymphocytes, Absolute 2.17 10*3/mm3      Monocytes, Absolute 1.18 10*3/mm3      Eosinophils, Absolute 0.13 10*3/mm3      Basophils, Absolute 0.05 10*3/mm3      Immature Grans, Absolute 0.10 10*3/mm3      nRBC 0.0 /100 WBC         Imaging Results (last 24 hours)     Procedure Component Value Units Date/Time    XR Hip With or Without Pelvis 2 - 3 View Right [[id number]]  (Abnormal) Resulted:  07/05/19 0108     Updated:  07/05/19 0109    XR Wrist 3+ View Right [[id number]]  (Abnormal) Resulted:  07/05/19 0108     Updated:  07/05/19 0108    XR Hand 3+ View Right [[id number]] Resulted:  07/05/19 0108     Updated:  07/05/19 0108    XR Tibia Fibula 2 View Right [[id number]] Resulted:  07/05/19 0108     Updated:  07/05/19 0108    XR Knee 3 View Right [[id number]] Resulted:  07/05/19 0108     Updated:  07/05/19 0108        I have personally reviewed and interpreted the radiology studies and ECG obtained at time of admission.     Assessment / Plan     Assessment:   Active Hospital Problems    Diagnosis   • **Closed fracture of right hip (CMS/HCC)   • Closed fracture of right wrist   • Fall   • Hypertension   1.  Fall fracture right hip and right wrist plan is to admit patient consult PT and OT consult orthopedic surgery.  Further plans per orthopedics we will hold her Plavix and aspirin for now in case surgery is planned.  2.  Hypertension stable continue current medications and monitor blood pressure.  3.  Peripheral vascular disease stable asymptomatic currently will have to hold Plavix and aspirin at least for today.  Due to the possibility of surgery and also due to the large hematoma she has leg.        Patient seen after midnight         Code Status: DNR     I discussed the patient's findings and my recommendations with the patient her daughter and her son-in-law.  Her daughter is her healthcare power surrogate should she not be able to make  decisions for herself.    Estimated length of stay to be determined    Remigio Bryant MD   07/05/19   2:15 AM

## 2019-07-05 NOTE — THERAPY EVALUATION
Acute Care - Physical Therapy Initial Evaluation  [facility]     Patient Name: Mya Chung  : 2/10/1928  MRN: [id number]  Today's Date: 2019   Onset of Illness/Injury or Date of Surgery: 19  Date of Referral to PT: 19  Referring Physician: Dr Bryant      Admit Date: 2019    Visit Dx:     ICD-10-CM ICD-9-CM   1. Closed fracture of right hip, initial encounter (CMS/[facility]) S72.001A 820.8   2. Fall, initial encounter W19.XXXA E888.9   3. Closed fracture of distal end of right radius, unspecified fracture morphology, initial encounter S52.501A 813.42   4. Closed fracture of distal end of right ulna, unspecified fracture morphology, initial encounter S52.601A 813.43   5. Chronic anticoagulation Z79.01 V58.61   6. Impaired mobility Z74.09 799.89   7. Decreased activities of daily living (ADL) R68.89 780.99     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   • Hypertension   • Hyperlipidemia   • PAD (peripheral artery disease) (CMS/[facility])   • Closed fracture of right hip (CMS/[facility])   • Closed fracture of right wrist   • Fall   • Impaired gait and mobility   • Acute right hip pain   • Hypothyroidism   • Closed fracture of multiple pubic rami, right, initial encounter (CMS/[facility])     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    • Wears glasses      Past Surgical History:   Procedure Laterality Date   • AORTAGRAM Right 2018    Procedure: LEFT LOWER EXTREMITY ANGIOGRAM;  Surgeon: Walker Davis DO;  Location: [place] [address];  Service: Vascular   • BREAST BIOPSY Bilateral     multiple on both, all benign   • BREAST BIOPSY Left 2017    Procedure: LEFT MAMMOGRAM GUIDED NEEDLE DIRECTED EXCISIONAL BREAST BIOPSY;  Surgeon:  Malgorzata Scott MD;  Location: [facility] OR;  Service:    • BUNIONECTOMY Bilateral    •  SECTION      X2   • EYE SURGERY      CATARACT SURGERY   • FEMORAL ENDARTERECTOMY Left 2018    Procedure: LEFT FEMORAL ENDARTERECTOMY;  Surgeon: Walker Daivs DO;  Location: [facility] HYBRID OR 12;  Service: Vascular   • JOINT REPLACEMENT      RIGHT HIP        PT ASSESSMENT (last 12 hours)      Physical Therapy Evaluation     Row Name 19 1115 19 0800       PT Evaluation Time/Intention    Subjective Information  complains of;pain  -PRINCESS  --    Document Type  evaluation  -PRINCESS  --    Mode of Treatment  physical therapy  -PRINCESS  --    Comment: Evaluation Not Performed  --  Awaiting ortho  (Significant)   -PRINCESS    Row Name 19 1115          General Information    Patient Profile Reviewed?  yes  -PRINCESS     Onset of Illness/Injury or Date of Surgery  19  -PRINCESS     Referring Physician  Dr Bryant  -PRINCESS     Patient Observations  alert;cooperative;agree to therapy  -PRINCESS     Patient/Family Observations  multiple family in room  -PRINCESS     General Observations of Patient  lying in fowlers in bed, splint to R UE, pt is having large amount of oral secretions unable to swallow them  -PRINCESS     Prior Level of Function  independent:;all household mobility;community mobility;ADL's  -PRINCESS     Equipment Currently Used at Home  cane, quad;cane, straight;raised toilet;commode, bedside;grab bar;dressing device;walker, rolling  -PRINCESS     Pertinent History of Current Functional Problem  fall onto R side with R hip and wrist pain while carrying chair down steps; Dx: R greater trochanter avulsion fx, R displaced superior pubic ramus fx, R nondisplaced inferior pubic ramus fx, R distal radius and ulnar styloid fxs  -PRINCESS     Existing Precautions/Restrictions  fall;non-weight bearing  (Significant)  NWB RUE, WBAT RLE  -PRINCESS     Equipment Issued to Patient  -- platform walker  -PRINCESS     Risks Reviewed  patient and  family:;LOB;nausea/vomiting;dizziness;increased discomfort;change in vital signs;lines disloged  -PRINCESS     Benefits Reviewed  patient and family:;improve function;increase independence;increase strength;increase balance;decrease pain;increase knowledge;improve skin integrity  -PRINCESS     Barriers to Rehab  physical barrier  -[facility] Name 07/05/19 1115          Relationship/Environment    Lives With  alone  -PRINCESS     Family Caregiver if Needed  child(nancy), adult  -PRINCESS     Row Name 07/05/19 1115          Resource/Environmental Concerns    Current Living Arrangements  home/apartment/condo  -[facility] 07/05/19 1115          Home Main Entrance    Number of Stairs, Main Entrance  two  -PRINCESS     Stair Railings, Main Entrance  railing on left side (ascending)  -[facility] Name 07/05/19 1115          Cognitive Assessment/Interventions    Additional Documentation  Cognitive Assessment/Intervention (Group)  -PRINCESS     Row Name 07/05/19 1115          Cognitive Assessment/Intervention- PT/OT    Orientation Status (Cognition)  oriented x 4  -PRINCESS     Follows Commands (Cognition)  WFL  -PRINCESS     Personal Safety Interventions  fall prevention program maintained;gait belt;muscle strengthening facilitated;nonskid shoes/slippers when out of bed  -[facility] Name 07/05/19 1115          Safety Issues, Functional Mobility    Impairments Affecting Function (Mobility)  balance;pain;strength  -[facility] Name 07/05/19 1115          Mobility Assessment/Treatment    Extremity Weight-bearing Status  right upper extremity;right lower extremity  (Significant)  R wrist  -PRINCESS     Right Upper Extremity (Weight-bearing Status)  non weight-bearing (NWB)  (Significant)   -PRINCESS     Right Lower Extremity (Weight-bearing Status)  weight-bearing as tolerated (WBAT)  (Significant)   -[facility] Name 07/05/19 1115          Bed Mobility Assessment/Treatment    Bed Mobility Assessment/Treatment  scooting/bridging;supine-sit;sit-supine  -PRINCESS     Scooting/Bridging  Evans (Bed Mobility)  moderate assist (50% patient effort);2 person assist;verbal cues;nonverbal cues (demo/gesture)  -PRINCESS     Supine-Sit Evans (Bed Mobility)  moderate assist (50% patient effort);2 person assist;verbal cues  -PRINCESS     Sit-Supine Evans (Bed Mobility)  moderate assist (50% patient effort);2 person assist;verbal cues  -PRINCESS     Bed Mobility, Safety Issues  decreased use of arms for pushing/pulling;decreased use of legs for bridging/pushing  -PRINCESS     Assistive Device (Bed Mobility)  draw sheet;head of bed elevated  -PRINCESS     [facility] Name 07/05/19 1115          Transfer Assessment/Treatment    Transfer Assessment/Treatment  bed-chair transfer;chair-bed transfer  -PRINCESS     Maintains Weight-bearing Status (Transfers)  able to maintain  -PRINCESS     Bed-Chair Evans (Transfers)  moderate assist (50% patient effort);2 person assist;verbal cues  -PRINCESS     Chair-Bed Evans (Transfers)  moderate assist (50% patient effort);2 person assist;verbal cues  -[facility] Name 07/05/19 1115          General ROM    GENERAL ROM COMMENTS  L LE AROM WFL, deferred R LE  -[facility] Name 07/05/19 1115          MMT (Manual Muscle Testing)    General MMT Comments  L LE functionally 4/5, deferred R LE  -PRINCESS     [facility] Name 07/05/19 1115          Motor Assessment/Intervention    Additional Documentation  Balance (Group)  -PRINCESS Cates Name 07/05/19 1115          Balance    Balance  static sitting balance;static standing balance  -PRINCESS     Row Name 07/05/19 1115          Static Sitting Balance    Level of Evans (Unsupported Sitting, Static Balance)  standby assist  -PRINCESS     Sitting Position (Unsupported Sitting, Static Balance)  -- on BSC  -PRINCESS Cates Name 07/05/19 1115          Static Standing Balance    Level of Evans (Supported Standing, Static Balance)  moderate assist, 50 to 74% patient effort;2 person assist  -PRINCESS     [facility] Name 07/05/19 1115          Pain Assessment    Additional Documentation  Pain Scale:  FACES Pre/Post-Treatment (Group)  -PRINCESS     Row Name 07/05/19 1115          Pain Scale: Numbers Pre/Post-Treatment    Pain Location - Side  Right  -PRINCESS     Pain Location - Orientation  lower  -PRINCESS     Pain Location  extremity  -PRINCESS     Row Name 07/05/19 1115          Pain Scale: FACES Pre/Post-Treatment    Pain: FACES Scale, Pretreatment  4-->hurts little more  -PRINCESS     Pain: FACES Scale, Post-Treatment  4-->hurts little more  -PRINCESS     Row Name 07/05/19 1115          Health Promotion    Additional Documentation  Coping (Group);Plan of Care Review (Group)  -PRINCESS     Row Name             Wound 07/05/19 0330 Right lower;anterior leg other (see comments)    Wound - Properties Group Date first assessed: 07/05/19  -ST Time first assessed: 0330  -ST Present On Admission : yes  -ST Side: Right  -ST Orientation: lower;anterior  -ST Location: leg  -ST Type: other (see comments)  -ST, Hematoma     Row Name 07/05/19 1115          Coping    Observed Emotional State  accepting;cooperative  -PRINCESS     Row Name 07/05/19 1115          Plan of Care Review    Plan of Care Reviewed With  patient;family  -PRINCESS     Row Name 07/05/19 1115          Physical Therapy Clinical Impression    Date of Referral to PT  07/05/19  -PRINCESS     Patient/Family Goals Statement (PT Clinical Impression)  Go to rehab  -PRINCESS     Criteria for Skilled Interventions Met (PT Clinical Impression)  yes;treatment indicated  -PRINCESS     Impairments Found (describe specific impairments)  gait, locomotion, and balance  -PRINCESS     Functional Limitations in Following Categories (Describe Specific Limitations)  self-care;home management  -PRINCESS     Disability: Inability to Perform Actions/Activities of Required Roles (describe specific disability)  community/leisure  -PRINCESS     Rehab Potential (PT Clinical Summary)  good, to achieve stated therapy goals  -PRINCESS     Predicted Duration of Therapy (PT)  until d/c  -PRINCESS     Care Plan Review (PT)  evaluation/treatment results reviewed;risks/benefits  reviewed;current/potential barriers reviewed;patient/other agree to care plan  -PRINCESS     Care Plan Review, Other Participant (PT Clinical Impression)  family  -PRINCESS     Row Name 07/05/19 1115          Physical Therapy Goals    Bed Mobility Goal Selection (PT)  bed mobility, PT goal 1  -PRINCESS     Transfer Goal Selection (PT)  transfer, PT goal 1  -PRINCESS     Gait Training Goal Selection (PT)  gait training, PT goal 1  -PRINCESS     Row Name 07/05/19 1115          Bed Mobility Goal 1 (PT)    Activity/Assistive Device (Bed Mobility Goal 1, PT)  sit to supine/supine to sit  -PRINCESS     [place] Level/Cues Needed (Bed Mobility Goal 1, PT)  minimum assist (75% or more patient effort)  -PRINCESS     Time Frame (Bed Mobility Goal 1, PT)  long term goal (LTG);10 days  -PRINCESS     Progress/Outcomes (Bed Mobility Goal 1, PT)  goal ongoing  -PRINCESS     Row Name 07/05/19 1115          Transfer Goal 1 (PT)    Activity/Assistive Device (Transfer Goal 1, PT)  sit-to-stand/stand-to-sit;bed-to-chair/chair-to-bed;walker, rolling platform  -PRINCESS     [place] Level/Cues Needed (Transfer Goal 1, PT)  minimum assist (75% or more patient effort)  -PRINCESS     Time Frame (Transfer Goal 1, PT)  long term goal (LTG);10 days  -PRINCESS     Progress/Outcome (Transfer Goal 1, PT)  goal ongoing  -PRINCESS     Row Name 07/05/19 1115          Gait Training Goal 1 (PT)    Activity/Assistive Device (Gait Training Goal 1, PT)  gait (walking locomotion);assistive device use;decrease fall risk;improve balance and speed;increase endurance/gait distance;walker, rolling platform  -PRINCESS     [place] Level (Gait Training Goal 1, PT)  minimum assist (75% or more patient effort)  -PRINCESS     Distance (Gait Goal 1, PT)  50  -PRINCESS     Time Frame (Gait Training Goal 1, PT)  long term goal (LTG);10 days  -PRINCESS     Progress/Outcome (Gait Training Goal 1, PT)  goal ongoing  -PRINCESS     Row Name 07/05/19 1115          Positioning and Restraints    Pre-Treatment Position  in bed  -PRINCESS     Post Treatment Position  bed  -PRINCESS      In Bed  fowlers;call light within reach;encouraged to call for assist;notified nsg;with nsg;with family/caregiver  -PRINCESS     Row Name 07/05/19 1115          Living Environment    Home Accessibility  stairs to enter home  -PRINCESS       User Key  (r) = Recorded By, (t) = Taken By, (c) = Cosigned By    Initials Name Provider Type    Ming Hernandez, PT DPT Physical Therapist    Lakesha Burris RN Registered Nurse        Physical Therapy Education     Title: PT OT SLP Therapies (In Progress)     Topic: Physical Therapy (In Progress)     Point: Mobility training (Done)     Learning Progress Summary           Patient Acceptance, E, VU,NR by PRINCESS at 7/5/2019 11:15 AM    Comment:  Educated pt/family on progression of PT POC, benefits of activity, WBAT R LE, NWB R wrist.   Family Acceptance, E, VU,NR by PRINCESS at 7/5/2019 11:15 AM    Comment:  Educated pt/family on progression of PT POC, benefits of activity, WBAT R LE, NWB R wrist.                   Point: Precautions (Done)     Learning Progress Summary           Patient Acceptance, E, VU,NR by PRINCESS at 7/5/2019 11:15 AM    Comment:  Educated pt/family on progression of PT POC, benefits of activity, WBAT R LE, NWB R wrist.   Family Acceptance, E, VU,NR by PRINCESS at 7/5/2019 11:15 AM    Comment:  Educated pt/family on progression of PT POC, benefits of activity, WBAT R LE, NWB R wrist.                               User Key     Initials Effective Dates Name Provider Type Sukhdeep SÁNCHEZ 08/02/16 -  Ming Villaseñor, PT DPT Physical Therapist PT              PT Recommendation and Plan  Anticipated Discharge Disposition (PT): inpatient rehabilitation facility, skilled nursing facility  Planned Therapy Interventions (PT Eval): bed mobility training, transfer training, gait training, balance training, home exercise program, patient/family education, postural re-education, strengthening  Therapy Frequency (PT Clinical Impression): daily  Outcome Summary/Treatment Plan  (PT)  Anticipated Discharge Disposition (PT): inpatient rehabilitation facility, skilled nursing facility  Plan of Care Reviewed With: patient, family  Outcome Summary: PT rocco completed. She presents alert and oriented. She was educated on WBAT R LE and NWB R wrist. She needed mod assist x 2 to complete all bed mobility and bed<>BSC transfer. She demos pain in R LE with activity and weakness. She will benefit from continued therapy in order to increased stregnth, weight bearing, and functional mobility. PT ramiro kincaid d.c to acute rehab vs SNF for continued PT and OT.   Outcome Measures     Row Name 07/05/19 1115 07/05/19 1100          How much help from another person do you currently need...    Turning from your back to your side while in flat bed without using bedrails?  2  -PRINCESS  --     Moving from lying on back to sitting on the side of a flat bed without bedrails?  2  -PRINCESS  --     Moving to and from a bed to a chair (including a wheelchair)?  2  -PRINCESS  --     Standing up from a chair using your arms (e.g., wheelchair, bedside chair)?  2  -PRINCESS  --     Climbing 3-5 steps with a railing?  1  -PRINCESS  --     To walk in hospital room?  2  -PRINCESS  --     AM-PAC 6 Clicks Score (PT)  11  -PRINCESS  --        How much help from another is currently needed...    Putting on and taking off regular lower body clothing?  --  2  -AC     Bathing (including washing, rinsing, and drying)  --  2  -AC     Toileting (which includes using toilet bed pan or urinal)  --  2  -AC     Putting on and taking off regular upper body clothing  --  3  -AC     Taking care of personal grooming (such as brushing teeth)  --  3  -AC     Eating meals  --  3  -AC     AM-PAC 6 Clicks Score (OT)  --  15  -AC        Functional Assessment    Outcome Measure Options  AM-PAC 6 Clicks Basic Mobility (PT)  -PRINCESS  AM-PAC 6 Clicks Daily Activity (OT)  -AC       User Key  (r) = Recorded By, (t) = Taken By, (c) = Cosigned By    Initials Name Provider Type    Jose Frazier  N, OTR/L, CNT Occupational Therapist    Ming Hernandez, PT DPT Physical Therapist         Time Calculation:   PT Charges     Row Name 07/05/19 1309             Time Calculation    Start Time  1115  -PRINCESS      Stop Time  1145 + 10 minutes with chart review at 0750. PT with 40 total minutes.   -PRINCESS      Time Calculation (min)  30 min  -PRINCESS      PT Received On  07/05/19  -PRINCESS      PT Goal Re-Cert Due Date  07/15/19  -PRINCESS        User Key  (r) = Recorded By, (t) = Taken By, (c) = Cosigned By    Initials Name Provider Type    Ming Hernandez, PT DPT Physical Therapist        Therapy Charges for Today     Code Description Service Date Service Provider Modifiers Qty    89408009053 HC PT EVAL MOD COMPLEXITY 3 7/5/2019 Ming Villaseñor, PT DPT GP 1          PT G-Codes  Outcome Measure Options: AM-PAC 6 Clicks Basic Mobility (PT)  AM-PAC 6 Clicks Score (PT): 11  AM-PAC 6 Clicks Score (OT): 15      Ming Villaseñor, PT DPT  7/5/2019

## 2019-07-05 NOTE — PLAN OF CARE
Problem: Patient Care Overview  Goal: Plan of Care Review  Outcome: Ongoing (interventions implemented as appropriate)   07/05/19 1310   Coping/Psychosocial   Plan of Care Reviewed With patient   Plan of Care Review   Progress no change   OTHER   Outcome Summary OT eval completed. Pt alert and oriented, follows commands WFL for eval. ModAx2 for bed mobility and transfers to BSC and back to bed. Pt unable to bear full weight on RLE. Needs minimal cues to maintain NWB status on R wrist. Pt maxA for hygiene after voiding and LB dressing. Pt having large amount of oral secretions she is unable to swallow, RN aware. OT will continue to work with pt to increase her safety, balance, and overall independence with ADL. Anticipate SNF placement at discharge.

## 2019-07-05 NOTE — PLAN OF CARE
Problem: Patient Care Overview  Goal: Plan of Care Review  Outcome: Ongoing (interventions implemented as appropriate)   07/05/19 9968   Coping/Psychosocial   Plan of Care Reviewed With patient;family;son   OTHER   Outcome Summary Pt alert and oriented x4. Pt has not requested any medicine for pain relief. Pt ate only few bites of breakfast and lunch after being able to consume a regular diet. Pt up with assist x2 due to lower extremity weakness. Pt's family plans on transferring pt to [facility] after DC for further rehabilitation. Pt's family came and got RN due to pt having difficulty swallowing secretions. Pt c/o thick secretions, mucus, and congestion in chest area. Contacted ANGY Haile for mucinex.      Goal: Individualization and Mutuality  Outcome: Ongoing (interventions implemented as appropriate)    Goal: Interprofessional Rounds/Family Conf  Outcome: Ongoing (interventions implemented as appropriate)      Problem: Fall Risk (Adult)  Goal: Absence of Fall  Outcome: Ongoing (interventions implemented as appropriate)      Problem: Fractured Hip (Adult)  Goal: Signs and Symptoms of Listed Potential Problems Will be Absent, Minimized or Managed (Fractured Hip)  Outcome: Ongoing (interventions implemented as appropriate)      Problem: Skin Injury Risk (Adult)  Goal: Identify Related Risk Factors and Signs and Symptoms  Outcome: Ongoing (interventions implemented as appropriate)    Goal: Skin Health and Integrity  Outcome: Ongoing (interventions implemented as appropriate)

## 2019-07-05 NOTE — CONSULTS
MD Rip Lozoya PA-C, [facility]         Orthopaedic Surgery Consult Note      7/5/2019   9:17 AM    Name:  Mya Chung  MRN:    [id number]     Acct:     [id number]   Room:  [address] [place] [address] Day: 0     Admit Date: 7/4/2019  PCP: Delfina Pereira DO        Subjective:     HPI: 89 y.o. female presented to the ED via EMS with chief complaint of right hip and wrist pain. She was carrying a folding dg out of her house to watch fireworks when she tripped and fell from a standing height impacting her right hip and outstretched right arm. She had immediate right hip pain, both inguinally and laterally, as well as right wrist pain. She was unable to bear weight in either her right arm or leg and was unable to stand or ambulate. She was fully evaluated by the ED staff and seen to have a right distal radius fracture and a right trochanteric avulsion fracture and acute to subacute pubic rami fractures. She denies any previous falls. She states that she has continued severe right hip pain with bearing weight.        Medications:     Allergies:   Allergies   Allergen Reactions   • Erythromycin Shortness Of Breath   • Zithromax [Azithromycin] Other (See Comments)     Yeast infection   • Penicillins Rash       Current Meds:   Current Facility-Administered Medications   Medication Dose Route Frequency Provider Last Rate Last Dose   • acetaminophen (TYLENOL) tablet 650 mg  650 mg Oral Q4H PRN Remigio Bryant MD       • aliskiren (TEKTURNA) tablet 300 mg  300 mg Oral Daily Remigio Bryant MD   300 mg at 07/05/19 0903   • amLODIPine (NORVASC) tablet 10 mg  10 mg Oral Daily Remigio Bryant MD   10 mg at 07/05/19 0903   • CloNIDine (CATAPRES) tablet 0.1 mg  0.1 mg Oral Q6H PRN Remigio Bryant MD       • cycloSPORINE (RESTASIS) 0.05 % ophthalmic emulsion 1 drop  1 drop Both Eyes Q12H Remigio Bryant MD   1 drop at 07/05/19 0903   • levothyroxine (SYNTHROID, LEVOTHROID) tablet 112 mcg  112 mcg Oral Q  AM Remigio Bryant MD       • morphine injection 2 mg  2 mg Intravenous Q4H PRN Remigio Bryant MD        And   • naloxone (NARCAN) injection 0.4 mg  0.4 mg Intravenous Q5 Min PRN Remigio Bryant MD       • OCUVITE-LUTEIN (OCUVITE) tablet 1 tablet  1 tablet Oral Daily Remigio Bryant MD   1 tablet at 07/05/19 0903   • ondansetron (ZOFRAN) tablet 4 mg  4 mg Oral Q6H PRN Remigio Bryant MD        Or   • ondansetron (ZOFRAN) injection 4 mg  4 mg Intravenous Q6H PRN Remigio Bryant MD       • oxyCODONE-acetaminophen (PERCOCET) 5-325 MG per tablet 1 tablet  1 tablet Oral Q4H PRN Remigio Bryant MD   1 tablet at 07/05/19 0312   • oyster shell calcium/vitamin d tablet 1 tablet  1 tablet Oral Daily Remigio Bryant MD   1 tablet at 07/05/19 0903   • sodium chloride 0.9 % flush 10 mL  10 mL Intravenous PRN Gayathri Tyson APRN       • sodium chloride 0.9 % flush 3 mL  3 mL Intravenous Q12H Remigio Bryant MD   3 mL at 07/05/19 0903   • sodium chloride 0.9 % flush 3-10 mL  3-10 mL Intravenous PRN Remigio Bryant MD       • sodium chloride 0.9 % infusion  75 mL/hr Intravenous Continuous Remigio Bryant MD 75 mL/hr at 07/05/19 0313 75 mL/hr at 07/05/19 0313           Data:     Code Status:    Code Status and Medical Interventions:   Ordered at: 07/05/19 0214     Limited Support to NOT Include:    Intubation    Cardioversion/Defibrillation    Vasopressors     Level Of Support Discussed With:    Patient     Code Status:    No CPR     Medical Interventions (Level of Support Prior to Arrest):    Limited       Family History   Problem Relation Age of Onset   • Cancer Sister         Uterus   • No Known Problems Mother    • No Known Problems Father    • No Known Problems Brother    • No Known Problems Daughter    • No Known Problems Son    • No Known Problems Maternal Grandmother    • No Known Problems Paternal Grandmother    • No Known Problems Maternal Aunt    • No Known Problems Paternal Aunt     • Breast cancer Neg Hx    • BRCA 1/2 Neg Hx    • Colon cancer Neg Hx    • Endometrial cancer Neg Hx    • Ovarian cancer Neg Hx        Social History     Socioeconomic History   • Marital status:      Spouse name: Not on file   • Number of children: Not on file   • Years of education: Not on file   • Highest education level: Not on file   Tobacco Use   • Smoking status: Former Smoker     Years: 2.00     Types: Cigarettes     Last attempt to quit: 5     Years since quittin.5   • Smokeless tobacco: Never Used   Substance and Sexual Activity   • Alcohol use: No   • Drug use: No   • Sexual activity: Defer       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    • Wears glasses          Review of Symptoms:  CONSTITUTIONAL:  negative for  fevers, chills, sweats, fatigue, malaise, anorexia and weight loss  EYES:  negative for  double vision, blurred vision and visual disturbance  HEENT:  negative for  hearing loss, tinnitus, ear drainage, earaches, nasal congestion, epistaxis, snoring, sore mouth, sore throat, hoarseness and voice change  RESPIRATORY:  negative for  dry cough, cough with sputum, dyspnea, wheezing, hemoptysis, chest pain, pleuritic pain and cyanosis  CARDIOVASCULAR:  negative for  chest pain, palpitations, orthopnea, exertional chest pressure/discomfort, edema  GASTROINTESTINAL:  negative for nausea, vomiting, change in bowel habits, diarrhea, constipation, abdominal pain, jaundice, dysphagia, regurgitation, hematemesis and hemtochezia  GENITOURINARY:  negative for frequency, dysuria, nocturia, hesitancy and hematuria  INTEGUMENT/BREAST:  negative for rash, skin lesion(s), dryness, skin color change, pruritus, changes in hair and changes in nails  HEMATOLOGIC/LYMPHATIC:  negative for easy  "bruising, bleeding, lymphadenopathy, petechiae and swelling/edema  ALLERGIC/IMMUNOLOGIC:  negative for recurrent infections and urticaria  ENDOCRINE:  negative for heat intolerance, cold intolerance, tremor, weight changes, hair loss and diabetic symptoms including neither polyuria nor polydipsia  MUSCULOSKELETAL:  See HPI  NEUROLOGICAL:  negative for headaches, dizziness, seizures, memory problems, speech problems, visual disturbance, coordination problems, gait problems, tremor, syncope and near syncope  BEHAVIOR/PSYCH:  negative for depressed mood, elated mood, increased agitation and anxiety      Vitals:  /44 (BP Location: Left arm, Patient Position: Lying)   Pulse 77   Temp 97.7 °F (36.5 °C) (Oral)   Resp 18   Ht 157.5 cm (62\")   Wt 57.2 kg (126 lb 3 oz)   SpO2 96%   BMI 23.08 kg/m²   Temp (24hrs), Av.7 °F (36.5 °C), Min:97.4 °F (36.3 °C), Max:98 °F (36.7 °C)      I/O (24Hr):  No intake or output data in the 24 hours ending 19    Labs:  Lab Results (last 72 hours)     Procedure Component Value Units Date/Time    Protime-INR [[id number]]  (Normal) Collected:  19    Specimen:  Blood Updated:  19     Protime 12.8 Seconds      INR 0.94    aPTT [[id number]]  (Normal) Collected:  19    Specimen:  Blood Updated:  19     PTT 28.1 seconds     Comprehensive Metabolic Panel [[id number]]  (Abnormal) Collected:  19    Specimen:  Blood Updated:  19     Glucose 110 mg/dL      BUN 36 mg/dL      Creatinine 1.24 mg/dL      Sodium 140 mmol/L      Potassium 5.0 mmol/L      Chloride 108 mmol/L      CO2 25.0 mmol/L      Calcium 9.5 mg/dL      Total Protein 7.2 g/dL      Albumin 4.30 g/dL      ALT (SGPT) 22 U/L      AST (SGOT) 46 U/L      Alkaline Phosphatase 95 U/L      Total Bilirubin 0.6 mg/dL      eGFR Non African Amer 41 mL/min/1.73      Globulin 2.9 gm/dL      A/G Ratio 1.5 g/dL      BUN/Creatinine Ratio 29.0     Anion Gap 7.0 mmol/L     " Narrative:       GFR Normal >60  Chronic Kidney Disease <60  Kidney Failure <15    CBC & Differential [[id number]] Collected:  07/05/19 0018    Specimen:  Blood Updated:  07/05/19 0024    Narrative:       The following orders were created for panel order CBC & Differential.  Procedure                               Abnormality         Status                     ---------                               -----------         ------                     CBC Auto Differential[[id number]]        Abnormal            Final result                 Please view results for these tests on the individual orders.    CBC Auto Differential [[id number]]  (Abnormal) Collected:  07/05/19 0018    Specimen:  Blood Updated:  07/05/19 0024     WBC 14.80 10*3/mm3      RBC 4.05 10*6/mm3      Hemoglobin 12.1 g/dL      Hematocrit 37.2 %      MCV 91.9 fL      MCH 29.9 pg      MCHC 32.5 g/dL      RDW 12.7 %      RDW-SD 42.8 fl      MPV 9.3 fL      Platelets 201 10*3/mm3      Neutrophil % 75.4 %      Lymphocyte % 14.7 %      Monocyte % 8.0 %      Eosinophil % 0.9 %      Basophil % 0.3 %      Immature Grans % 0.7 %      Neutrophils, Absolute 11.17 10*3/mm3      Lymphocytes, Absolute 2.17 10*3/mm3      Monocytes, Absolute 1.18 10*3/mm3      Eosinophils, Absolute 0.13 10*3/mm3      Basophils, Absolute 0.05 10*3/mm3      Immature Grans, Absolute 0.10 10*3/mm3      nRBC 0.0 /100 WBC           Imaging:  EXAMINATION:  XR HIP W OR WO PELVIS 2-3 VIEW RIGHT-  7/5/2019 12:04 AM  CDT     HISTORY: rt  hip pain, fall      COMPARISON: 02/26/2016     TECHNIQUE: 3 views: AP pelvis, AP and lateral right hip     FINDINGS:      The right femoral prosthesis well-positioned without hardware  complications.     There is no fracture or dislocation.     There is deformity of the right superior and inferior pubic rami  compatible with fractures, age-indeterminate, question subacute/chronic.  Correlate with clinical findings.     The left hip joint is maintained. Advanced  degenerative changes in the  lower lumbar spine.     IMPRESSION:  1. 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  2. Deformity of the right superior and inferior pubic rami compatible  with fractures, age-indeterminate, question chronic/subacute. Correlate  with patient presentation.  This report was finalized on 07/05/2019 07:35 by Dr. Christian Kathleen MD.    EXAMINATION:  XR WRIST 3+ VW RIGHT-  7/5/2019 12:04 AM CDT     HISTORY: rt wrist pain, fall      COMPARISON: No comparison study.     TECHNIQUE: 3 views: AP, lateral and oblique projection imaging     FINDINGS:      There is distal radial fracture, transversely oriented, mildly displaced  with intra-articular extension centrally.     There is a mildly displaced ulnar styloid fracture.     There is osteoporosis.     IMPRESSION:  1. Distal radial and ulnar styloid fractures.  This report was finalized on 07/05/2019 07:40 by Dr. Christian Kathleen MD.        Physical Exam:     General: Pleasant mood and appropriate affect. Well nourished.  HEENT: NC/AT, PRATIMA, EOMI, Nares patent bilaterally with no epistaxis noted.  Neck: Soft throughout with no obvious masses.  Chest: +2 distal pulses throughout, no heaves or lifts seen.  Respiratory: Equal rise and fall bilaterally, no retractions or rhonchi or wheezes noted.  Abdomen: Obese and non tender.  Skin: Pink and dry with appropriate turgor.  Neuro: CN II-XII grossly intact, no focal deficits noted.  Right Hip: Skin is CDI, no deformities noted. TTP laterally and inguinally. Limited ROM secondary to lateral sided pain. NVI distally.  Right Wrist: Currently in a sugar tong splint, which is CDI. NVI distally to exposed fingers.      Assessment:     Primary Orthopaedic Problem  1. Right greater trochanter avulsion fracture, periprosthetic, closed, minimally displaced, initial encounter.  2. Right displaced and shortened superior pubic rami fracture, closed, initial encounter.  3. Right nondisplaced  inferior pubic rami fracture, closed, initial encounter.         Plan:     · Discussed with patient and son the non operative nature of both fracture areas in the right hip and they understand and agree. WBAT on right LE.  · Discussed as well with patient and son that her distal radius fracture is in good position for non operative treatment with close following with xrays about a week apart. Discussed that statistically the outcome of operative vs non operative treatment of this fracture in her age category has the same outcome. Will keep NWB right wrist.  · Approximately 45 min was spent in direct patient contact and decision making.          Electronically signed by Rip Mustafa Jr., PA on 7/5/2019 at 9:17 AM

## 2019-07-05 NOTE — THERAPY EVALUATION
Acute Care - Occupational Therapy Initial Evaluation  [facility]     Patient Name: Mya Chung  : 2/10/1928  MRN: [id number]  Today's Date: 2019  Onset of Illness/Injury or Date of Surgery: 19  Date of Referral to OT: 19  Referring Physician: Dr Bryant    Admit Date: 2019       ICD-10-CM ICD-9-CM   1. Closed fracture of right hip, initial encounter (CMS/[facility]) S72.001A 820.8   2. Fall, initial encounter W19.XXXA E888.9   3. Closed fracture of distal end of right radius, unspecified fracture morphology, initial encounter S52.501A 813.42   4. Closed fracture of distal end of right ulna, unspecified fracture morphology, initial encounter S52.601A 813.43   5. Chronic anticoagulation Z79.01 V58.61   6. Impaired mobility Z74.09 799.89   7. Decreased activities of daily living (ADL) R68.89 780.99     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   • Hypertension   • Hyperlipidemia   • PAD (peripheral artery disease) (CMS/[facility])   • Closed fracture of right hip (CMS/[facility])   • Closed fracture of right wrist   • Fall   • Impaired gait and mobility   • Acute right hip pain   • Hypothyroidism   • Closed fracture of multiple pubic rami, right, initial encounter (CMS/[facility])     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    • Wears glasses      Past Surgical History:   Procedure Laterality Date   • AORTAGRAM Right 2018    Procedure: LEFT LOWER EXTREMITY ANGIOGRAM;  Surgeon: Walker Davis DO;  Location: [facility] OR ;  Service: Vascular   • BREAST BIOPSY Bilateral     multiple on both, all benign   • BREAST BIOPSY Left 2017    Procedure: LEFT MAMMOGRAM GUIDED NEEDLE DIRECTED EXCISIONAL BREAST BIOPSY;  Surgeon: Malgorzata MANN  MD [place];  Location: [facility] OR;  Service:    • BUNIONECTOMY Bilateral    •  SECTION      X2   • EYE SURGERY      CATARACT SURGERY   • FEMORAL ENDARTERECTOMY Left 2018    Procedure: LEFT FEMORAL ENDARTERECTOMY;  Surgeon: Walker Davis DO;  Location: [facility] HYBRID OR 12;  Service: Vascular   • JOINT REPLACEMENT      RIGHT HIP          OT ASSESSMENT FLOWSHEET (last 12 hours)      Occupational Therapy Evaluation     Row Name 19 1100                   OT Evaluation Time/Intention    Subjective Information  complains of;pain  -AC        Document Type  evaluation  -AC        Mode of Treatment  occupational therapy  -AC        Patient Effort  good  -AC           General Information    Patient Profile Reviewed?  yes  -AC        Onset of Illness/Injury or Date of Surgery  19  -AC        Referring Physician  Dr. Bryant  -        Patient Observations  alert;cooperative;agree to therapy  -AC        Patient/Family Observations  multiple family present  -        General Observations of Patient  lying in fowlers in bed, splint to R UE, pt is having large amount of oral secretions unable to swallow them  -AC        Prior Level of Function  independent:;all household mobility;community mobility;gait;transfer;ADL's  -AC        Equipment Currently Used at Home  cane, quad;cane, straight;raised toilet;commode, bedside;grab bar;dressing device;walker, rolling  -AC        Pertinent History of Current Functional Problem  fall onto R side with R hip and wrist pain while carrying chair down steps; Dx: R greater trochanter avulsion fx, R displaced superior pubic ramus fx, R nondisplaced inferior pubic ramus fx, R distal radius and ulnar styloid fxs  -AC        Existing Precautions/Restrictions  fall;non-weight bearing  (Significant)  NWB R wrist, WBAT RLE  -AC        Risks Reviewed  patient:;LOB;nausea/vomiting;dizziness;increased discomfort;change in vital signs;lines disloged  -AC        Benefits  Reviewed  patient:;improve function;increase independence;increase strength;increase balance;decrease pain;decrease risk of DVT;improve skin integrity;increase knowledge  -        Barriers to Rehab  physical barrier  -AC           Relationship/Environment    Lives With  alone  -        Family Caregiver if Needed  child(nancy), adult  -           Resource/Environmental Concerns    Current Living Arrangements  home/apartment/condo  -           Home Main Entrance    Number of Stairs, Main Entrance  two  -AC        Stair Railings, Main Entrance  railing on left side (ascending)  -           Cognitive Assessment/Interventions    Additional Documentation  Cognitive Assessment/Intervention (Group)  -           Cognitive Assessment/Intervention- PT/OT    Orientation Status (Cognition)  oriented x 4  -AC        Follows Commands (Cognition)  WFL  -AC        Cognitive Function (Cognitive)  WFL  -AC        Personal Safety Interventions  elopement precautions initiated;fall prevention program maintained;gait belt;muscle strengthening facilitated;nonskid shoes/slippers when out of bed;supervised activity  -           Safety Issues, Functional Mobility    Impairments Affecting Function (Mobility)  balance;pain;strength  -           Mobility Assessment/Treatment    Extremity Weight-bearing Status  right upper extremity;right lower extremity R wrist  -        Right Upper Extremity (Weight-bearing Status)  non weight-bearing (NWB)  -        Right Lower Extremity (Weight-bearing Status)  weight-bearing as tolerated (WBAT)  -           Bed Mobility Assessment/Treatment    Bed Mobility Assessment/Treatment  scooting/bridging;supine-sit;sit-supine  -        Scooting/Bridging [place] (Bed Mobility)  moderate assist (50% patient effort);2 person assist;verbal cues;nonverbal cues (demo/gesture)  -        Supine-Sit [place] (Bed Mobility)  moderate assist (50% patient effort);2 person assist;verbal cues  -         Sit-Supine [place] (Bed Mobility)  moderate assist (50% patient effort);2 person assist;verbal cues  -        Bed Mobility, Safety Issues  decreased use of arms for pushing/pulling;decreased use of legs for bridging/pushing  -        Assistive Device (Bed Mobility)  draw sheet;head of bed elevated  -           Transfer Assessment/Treatment    Transfer Assessment/Treatment  bed-chair transfer;chair-bed transfer  -        Maintains Weight-bearing Status (Transfers)  able to maintain  -           Bed-Chair Transfer    Bed-Chair [place] (Transfers)  moderate assist (50% patient effort);2 person assist;verbal cues  -AC           Chair-Bed Transfer    Chair-Bed [place] (Transfers)  moderate assist (50% patient effort);2 person assist;verbal cues  -           ADL Assessment/Intervention    BADL Assessment/Intervention  lower body dressing;toileting  -           Lower Body Dressing Assessment/Training    Lower Body Dressing [place] Level  don;socks;maximum assist (25% patient effort)  -        Lower Body Dressing Position  edge of bed sitting  -           Toileting Assessment/Training    [place] Level (Toileting)  toileting skills;supervision;adjust/manage clothing;perform perineal hygiene;change pad/brief;maximum assist (25% patient effort)  -        Assistive Devices (Toileting)  commode, bedside without drop arms  -        Toileting Position  supported sitting;supported standing  -           BADL Safety/Performance    Impairments, BADL Safety/Performance  balance;pain;strength  -           General ROM    GENERAL ROM COMMENTS  WFL AROM BUE, deferred R forearm, wrist and hand  -           MMT (Manual Muscle Testing)    General MMT Comments  LUE 4+/5 functionally, RUE deferred  -           Motor Assessment/Interventions    Additional Documentation  Balance (Group)  -           Balance    Balance  static sitting balance;static standing balance;dynamic sitting  balance;dynamic standing balance  -AC           Static Sitting Balance    Level of [place] (Supported Sitting, Static Balance)  standby assist  -AC        Sitting Position (Supported Sitting, Static Balance)  sitting on edge of bed  -AC        Comment (Supported Sitting, Static Balance)  supports self with LUE  -AC           Static Standing Balance    Level of [place] (Supported Standing, Static Balance)  moderate assist, 50 to 74% patient effort;2 person assist  -AC           Sensory Assessment/Intervention    Sensory General Assessment  no sensation deficits identified  -AC           Positioning and Restraints    Pre-Treatment Position  in bed  -AC        Post Treatment Position  bed  -AC        In Bed  fowlers;call light within reach;encouraged to call for assist;exit alarm on;with family/caregiver;side rails up x2  -AC           Pain Assessment    Additional Documentation  Pain Scale: FACES Pre/Post-Treatment (Group)  -AC           Pain Scale: Numbers Pre/Post-Treatment    Pain Location - Side  Right  -AC        Pain Location - Orientation  lower  -AC        Pain Location  extremity  -AC           Pain Scale: FACES Pre/Post-Treatment    Pain: FACES Scale, Pretreatment  4-->hurts little more  -AC        Pain: FACES Scale, Post-Treatment  4-->hurts little more  -AC           Wound 07/05/19 0330 Right lower;anterior leg other (see comments)    Wound - Properties Group Date first assessed: 07/05/19  -ST Time first assessed: 0330  -ST Present On Admission : yes  -ST Side: Right  -ST Orientation: lower;anterior  -ST Location: leg  -ST Type: other (see comments)  -ST, Hematoma        Plan of Care Review    Plan of Care Reviewed With  patient;family  -AC           Clinical Impression (OT)    Date of Referral to OT  07/05/19  -AC        OT Diagnosis  decreased adl  -AC        Prognosis (OT Eval)  good  -AC        Criteria for Skilled Therapeutic Interventions Met (OT Eval)  yes;treatment indicated  -AC         Rehab Potential (OT Eval)  good, to achieve stated therapy goals  -AC        Therapy Frequency (OT Eval)  5 times/wk  -AC        Predicted Duration of Therapy Intervention (Therapy Eval)  10 days  -AC        Care Plan Review (OT)  evaluation/treatment results reviewed;care plan/treatment goals reviewed;risks/benefits reviewed;current/potential barriers reviewed;patient/other agree to care plan  -AC        Anticipated Discharge Disposition (OT)  skilled nursing facility  -AC           Planned OT Interventions    Planned Therapy Interventions (OT Eval)  BADL retraining;functional balance retraining;occupation/activity based interventions;patient/caregiver education/training;strengthening exercise;transfer/mobility retraining  -AC           OT Goals    Transfer Goal Selection (OT)  transfer, OT goal 1  -AC        Dressing Goal Selection (OT)  dressing, OT goal 1  -AC        Toileting Goal Selection (OT)  toileting, OT goal 1  -AC           Transfer Goal 1 (OT)    Activity/Assistive Device (Transfer Goal 1, OT)  bed-to-chair/chair-to-bed;toilet;commode, bedside without drop arms  -AC        [place] Level/Cues Needed (Transfer Goal 1, OT)  minimum assist (75% or more patient effort)  -AC        Time Frame (Transfer Goal 1, OT)  long term goal (LTG);10 days  -AC        Progress/Outcome (Transfer Goal 1, OT)  goal ongoing  -AC           Dressing Goal 1 (OT)    Activity/Assistive Device (Dressing Goal 1, OT)  lower body dressing  -AC        [place]/Cues Needed (Dressing Goal 1, OT)  minimum assist (75% or more patient effort)  -AC        Time Frame (Dressing Goal 1, OT)  long term goal (LTG);10 days  -AC        Progress/Outcome (Dressing Goal 1, OT)  goal ongoing  -AC           Toileting Goal 1 (OT)    Activity/Device (Toileting Goal 1, OT)  toileting skills, all;commode, bedside without drop arms  -AC        [place] Level/Cues Needed (Toileting Goal 1, OT)  minimum assist (75% or more patient effort)  -AC         Time Frame (Toileting Goal 1, OT)  long term goal (LTG);10 days  -        Progress/Outcome (Toileting Goal 1, OT)  goal ongoing  -           Living Environment    Home Accessibility  stairs to enter home;tub/shower is not walk in walk in shower  -          User Key  (r) = Recorded By, (t) = Taken By, (c) = Cosigned By    Initials Name Effective Dates     Jose Field, OTR/L, KARAN 04/09/19 -     Lakesha Burris RN 08/02/16 -          Occupational Therapy Education     Title: PT OT SLP Therapies (In Progress)     Topic: Occupational Therapy (Done)     Point: ADL training (Done)     Description: Instruct learner(s) on proper safety adaptation and remediation techniques during self care or transfers.   Instruct in proper use of assistive devices.    Learning Progress Summary           Patient Acceptance, E, VU by  at 7/5/2019  1:09 PM    Comment:  NWB status, beenfits of activity, OT POC                   Point: Precautions (Done)     Description: Instruct learner(s) on prescribed precautions during self-care and functional transfers.    Learning Progress Summary           Patient Acceptance, E, VU by  at 7/5/2019  1:09 PM    Comment:  NWB status, beenfits of activity, OT POC                               User Key     Initials Effective Dates Name Provider Type Discipline     04/09/19 -  Jose Field, LYUDMILA/L, KARAN Occupational Therapist OT                  OT Recommendation and Plan  Outcome Summary/Treatment Plan (OT)  Anticipated Discharge Disposition (OT): skilled nursing facility  Planned Therapy Interventions (OT Eval): BADL retraining, functional balance retraining, occupation/activity based interventions, patient/caregiver education/training, strengthening exercise, transfer/mobility retraining  Therapy Frequency (OT Eval): 5 times/wk  Plan of Care Review  Plan of Care Reviewed With: patient  Plan of Care Reviewed With: patient  Outcome Summary: OT eval completed.  Pt alert and  oriented, follows commands WFL for eval.  ModAx2 for bed mobility and transfers to BSC and back to bed.  Pt unable to bear full weight on RLE.  Needs minimal cues to maintain NWB status on R wrist.  Pt maxA for hygiene after voiding and LB dressing.  Pt having large amount of oral secretions she is unable to swallow, RN aware. OT will continue to work with pt to increase her safety, balance, and overall independence with ADL.  Anticipate SNF placement at discharge.     Outcome Measures     Row Name 07/05/19 1115 07/05/19 1100          How much help from another person do you currently need...    Turning from your back to your side while in flat bed without using bedrails?  2  -PRINCESS  --     Moving from lying on back to sitting on the side of a flat bed without bedrails?  2  -PRINCESS  --     Moving to and from a bed to a chair (including a wheelchair)?  2  -PRINCESS  --     Standing up from a chair using your arms (e.g., wheelchair, bedside chair)?  2  -PRINCESS  --     Climbing 3-5 steps with a railing?  1  -PRINCESS  --     To walk in hospital room?  2  -PRINCESS  --     AM-PAC 6 Clicks Score (PT)  11  -PRINCESS  --        How much help from another is currently needed...    Putting on and taking off regular lower body clothing?  --  2  -AC     Bathing (including washing, rinsing, and drying)  --  2  -AC     Toileting (which includes using toilet bed pan or urinal)  --  2  -AC     Putting on and taking off regular upper body clothing  --  3  -AC     Taking care of personal grooming (such as brushing teeth)  --  3  -AC     Eating meals  --  3  -AC     AM-PAC 6 Clicks Score (OT)  --  15  -AC        Functional Assessment    Outcome Measure Options  AM-PAC 6 Clicks Basic Mobility (PT)  -PRINCESS  AM-PAC 6 Clicks Daily Activity (OT)  -AC       User Key  (r) = Recorded By, (t) = Taken By, (c) = Cosigned By    Initials Name Provider Type    Jose Frazier, OTR/L, CNT Occupational Therapist    Ming Hernandez, PT DPT Physical Therapist          Time  Calculation:   Time Calculation- OT     Row Name 07/05/19 1309             Time Calculation- OT    OT Start Time  1100  -AC      OT Stop Time  1200  -AC      OT Time Calculation (min)  60 min  -AC      OT Received On  07/05/19  -      OT Goal Re-Cert Due Date  07/15/19  -        User Key  (r) = Recorded By, (t) = Taken By, (c) = Cosigned By    Initials Name Provider Type     Jose Field, OTR/L, KARAN Occupational Therapist        Therapy Charges for Today     Code Description Service Date Service Provider Modifiers Qty    [id number]  OT EVAL MOD COMPLEXITY 4 7/5/2019 Jose Field OTR/L, KARAN GO 1               LYUDMILA Downey/L, KARAN  7/5/2019

## 2019-07-05 NOTE — PLAN OF CARE
Problem: Patient Care Overview  Goal: Plan of Care Review  Outcome: Ongoing (interventions implemented as appropriate)   07/05/19 1115   Coping/Psychosocial   Plan of Care Reviewed With patient;family   OTHER   Outcome Summary PT rocco completed. She presents alert and oriented. She was educated on WBAT R LE and NWB R wrist. She needed mod assist x 2 to complete all bed mobility and bed<>BSC transfer. She demos pain in R LE with activity and weakness. She will benefit from continued therapy in order to increased stregnth, weight bearing, and functional mobility. PT ramiro recommend d.c to acute rehab vs SNF for continued PT and OT.

## 2019-07-06 LAB
ANION GAP SERPL CALCULATED.3IONS-SCNC: 4 MMOL/L (ref 4–13)
BASOPHILS # BLD AUTO: 0.03 10*3/MM3 (ref 0–0.2)
BASOPHILS NFR BLD AUTO: 0.3 % (ref 0–2)
BUN BLD-MCNC: 23 MG/DL (ref 5–21)
BUN/CREAT SERPL: 22.8 (ref 7–25)
CALCIUM SPEC-SCNC: 8.6 MG/DL (ref 8.4–10.4)
CHLORIDE SERPL-SCNC: 108 MMOL/L (ref 98–110)
CO2 SERPL-SCNC: 27 MMOL/L (ref 24–31)
CREAT BLD-MCNC: 1.01 MG/DL (ref 0.5–1.4)
DEPRECATED RDW RBC AUTO: 44.1 FL (ref 40–54)
EOSINOPHIL # BLD AUTO: 0.21 10*3/MM3 (ref 0–0.7)
EOSINOPHIL NFR BLD AUTO: 2.4 % (ref 0–4)
ERYTHROCYTE [DISTWIDTH] IN BLOOD BY AUTOMATED COUNT: 12.8 % (ref 12–15)
GFR SERPL CREATININE-BSD FRML MDRD: 51 ML/MIN/1.73
GLUCOSE BLD-MCNC: 99 MG/DL (ref 70–100)
GLUCOSE BLDC GLUCOMTR-MCNC: 130 MG/DL (ref 70–130)
HCT VFR BLD AUTO: 31.4 % (ref 37–47)
HGB BLD-MCNC: 10 G/DL (ref 12–16)
IMM GRANULOCYTES # BLD AUTO: 0.03 10*3/MM3 (ref 0–0.05)
IMM GRANULOCYTES NFR BLD AUTO: 0.3 % (ref 0–5)
LYMPHOCYTES # BLD AUTO: 1.82 10*3/MM3 (ref 0.72–4.86)
LYMPHOCYTES NFR BLD AUTO: 20.7 % (ref 15–45)
MCH RBC QN AUTO: 29.8 PG (ref 28–32)
MCHC RBC AUTO-ENTMCNC: 31.8 G/DL (ref 33–36)
MCV RBC AUTO: 93.5 FL (ref 82–98)
MONOCYTES # BLD AUTO: 0.99 10*3/MM3 (ref 0.19–1.3)
MONOCYTES NFR BLD AUTO: 11.3 % (ref 4–12)
NEUTROPHILS # BLD AUTO: 5.72 10*3/MM3 (ref 1.87–8.4)
NEUTROPHILS NFR BLD AUTO: 65 % (ref 39–78)
NRBC BLD AUTO-RTO: 0 /100 WBC (ref 0–0.2)
PLATELET # BLD AUTO: 169 10*3/MM3 (ref 130–400)
PMV BLD AUTO: 9.6 FL (ref 6–12)
POTASSIUM BLD-SCNC: 4.3 MMOL/L (ref 3.5–5.3)
RBC # BLD AUTO: 3.36 10*6/MM3 (ref 4.2–5.4)
SODIUM BLD-SCNC: 139 MMOL/L (ref 135–145)
WBC NRBC COR # BLD: 8.8 10*3/MM3 (ref 4.8–10.8)

## 2019-07-06 PROCEDURE — 85025 COMPLETE CBC W/AUTO DIFF WBC: CPT | Performed by: NURSE PRACTITIONER

## 2019-07-06 PROCEDURE — 80048 BASIC METABOLIC PNL TOTAL CA: CPT | Performed by: NURSE PRACTITIONER

## 2019-07-06 PROCEDURE — 94799 UNLISTED PULMONARY SVC/PX: CPT

## 2019-07-06 PROCEDURE — 82962 GLUCOSE BLOOD TEST: CPT

## 2019-07-06 PROCEDURE — 97530 THERAPEUTIC ACTIVITIES: CPT

## 2019-07-06 PROCEDURE — 94760 N-INVAS EAR/PLS OXIMETRY 1: CPT

## 2019-07-06 PROCEDURE — 97116 GAIT TRAINING THERAPY: CPT

## 2019-07-06 RX ORDER — CLOPIDOGREL BISULFATE 75 MG/1
75 TABLET ORAL EVERY 24 HOURS
Status: DISCONTINUED | OUTPATIENT
Start: 2019-07-06 | End: 2019-07-08 | Stop reason: HOSPADM

## 2019-07-06 RX ORDER — MAGNESIUM CARB/ALUMINUM HYDROX 105-160MG
296 TABLET,CHEWABLE ORAL ONCE
Status: COMPLETED | OUTPATIENT
Start: 2019-07-06 | End: 2019-07-06

## 2019-07-06 RX ORDER — ASPIRIN 81 MG/1
81 TABLET, CHEWABLE ORAL DAILY
Status: DISCONTINUED | OUTPATIENT
Start: 2019-07-06 | End: 2019-07-08 | Stop reason: HOSPADM

## 2019-07-06 RX ADMIN — SODIUM CHLORIDE, PRESERVATIVE FREE 3 ML: 5 INJECTION INTRAVENOUS at 08:26

## 2019-07-06 RX ADMIN — Medication 1 TABLET: at 08:24

## 2019-07-06 RX ADMIN — LEVOTHYROXINE SODIUM 112 MCG: 112 TABLET ORAL at 06:07

## 2019-07-06 RX ADMIN — CLOPIDOGREL 75 MG: 75 TABLET, FILM COATED ORAL at 08:24

## 2019-07-06 RX ADMIN — ASPIRIN 81 MG 81 MG: 81 TABLET ORAL at 08:24

## 2019-07-06 RX ADMIN — AMLODIPINE BESYLATE 10 MG: 10 TABLET ORAL at 08:24

## 2019-07-06 RX ADMIN — Medication 296 ML: at 11:23

## 2019-07-06 RX ADMIN — CYCLOSPORINE 1 DROP: 0.5 EMULSION OPHTHALMIC at 08:24

## 2019-07-06 RX ADMIN — ALISKIREN HEMIFUMARATE 300 MG: 150 TABLET, FILM COATED ORAL at 08:24

## 2019-07-06 RX ADMIN — OXYCODONE HYDROCHLORIDE AND ACETAMINOPHEN 1 TABLET: 5; 325 TABLET ORAL at 18:46

## 2019-07-06 RX ADMIN — CYCLOSPORINE 1 DROP: 0.5 EMULSION OPHTHALMIC at 21:25

## 2019-07-06 RX ADMIN — CALCIUM CARBONATE-CHOLECALCIFEROL TAB 250 MG-125 UNIT 1 TABLET: 250-125 TAB at 08:24

## 2019-07-06 RX ADMIN — SODIUM CHLORIDE, PRESERVATIVE FREE 3 ML: 5 INJECTION INTRAVENOUS at 21:26

## 2019-07-06 RX ADMIN — OXYCODONE HYDROCHLORIDE AND ACETAMINOPHEN 1 TABLET: 5; 325 TABLET ORAL at 03:38

## 2019-07-06 NOTE — THERAPY TREATMENT NOTE
Acute Care - Physical Therapy Treatment Note  [facility]     Patient Name: Mya Chung  : 2/10/1928  MRN: [id number]  Today's Date: 2019  Onset of Illness/Injury or Date of Surgery: 19  Date of Referral to PT: 19  Referring Physician: Dr Bryant    Admit Date: 2019    Visit Dx:    ICD-10-CM ICD-9-CM   1. Closed fracture of right hip, initial encounter (CMS/Formerly [facility]) S72.001A 820.8   2. Fall, initial encounter W19.XXXA E888.9   3. Closed fracture of distal end of right radius, unspecified fracture morphology, initial encounter S52.501A 813.42   4. Closed fracture of distal end of right ulna, unspecified fracture morphology, initial encounter S52.601A 813.43   5. Chronic anticoagulation Z79.01 V58.61   6. Impaired mobility Z74.09 799.89   7. Decreased activities of daily living (ADL) R68.89 780.99     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   • Hypertension   • Hyperlipidemia   • PAD (peripheral artery disease) (CMS/Formerly [facility])   • Right greater trochanteric avulsion periprosthetic, closed, minimally displaced   • Closed fracture of right wrist   • Fall   • Impaired gait and mobility   • Acute right hip pain   • Hypothyroidism   • Closed fracture of multiple pubic rami, right, initial encounter (CMS/Formerly [facility])       Therapy Treatment    Rehabilitation Treatment Summary     Row Name 19 1131             Treatment Time/Intention    Discipline  physical therapy assistant  -LG      Document Type  therapy note (daily note)  -LG2      Subjective Information  complains of;weakness;pain  -LG2      Mode of Treatment  individual therapy;physical therapy  -LG2      Patient/Family Observations  son present bedside  -LG2      Patient Effort  good  -LG2      Existing Precautions/Restrictions  fall;non-weight bearing  (Significant)  NWB R WRIST , WBAT R LE  -LG2      Recorded by [LG] Norberto Degroot, PTA 19 1131  [LG2] Norberto Degroot, PTA  07/06/19 1155      Row Name 07/06/19 1131             Bed Mobility Assessment/Treatment    Supine-Sit [place] (Bed Mobility)  verbal cues;minimum assist (75% patient effort)  -LG      Assistive Device (Bed Mobility)  bed rails;draw sheet  -LG      Recorded by [LG] Norberto Degroot, PTA 07/06/19 1155      Row Name 07/06/19 1131             Sit-Stand Transfer    Sit-Stand [place] (Transfers)  verbal cues;contact guard;minimum assist (75% patient effort);2 person assist  -LG      Assistive Device (Sit-Stand Transfers)  walker, rolling platform  -LG      Recorded by [LG] Norberto Degroot, PTA 07/06/19 1155      Row Name 07/06/19 1131             Stand-Sit Transfer    Stand-Sit [place] (Transfers)  verbal cues;contact guard  -LG      Assistive Device (Stand-Sit Transfers)  walker, rolling platform  -LG      Recorded by [LG] Norberto Degroot, PTA 07/06/19 1155      Row Name 07/06/19 1131             Toilet Transfer    Type (Toilet Transfer)  sit-stand;stand-sit;stand pivot/stand step  -LG      [place] Level (Toilet Transfer)  verbal cues;minimum assist (75% patient effort)  -LG      Assistive Device (Toilet Transfer)  commode, bedside without drop arms;walker, rolling platform  -LG      Recorded by [LG] Norberto Degroot, PTA 07/06/19 1155      Row Name 07/06/19 1131             Gait/Stairs Assessment/Training    Gait/Stairs Assessment/Training  gait/ambulation assistive device  -LG      [place] Level (Gait)  verbal cues;contact guard;minimum assist (75% patient effort)  -LG      Assistive Device (Gait)  walker, rolling platform  -LG      Distance in Feet (Gait)  10' standing rest, 10' standing rest then 5' sitting rest.   -LG      Pattern (Gait)  step-to  -LG      Deviations/Abnormal Patterns (Gait)  antalgic;base of support, narrow;gait speed decreased;stride length decreased  -LG      Bilateral Gait Deviations  forward flexed posture  -LG      Recorded by [LG] Norberto Degroot, PTA 07/06/19 1155      Row Name  07/06/19 1131             Positioning and Restraints    Pre-Treatment Position  in bed  -LG      Post Treatment Position  chair  -LG      In Chair  reclined;legs elevated;call light within reach;encouraged to call for assist;with family/caregiver  -LG      Recorded by [LG] Norberto Degroot, PTA 07/06/19 1155      Row Name 07/06/19 1131             Pain Scale: FACES Pre/Post-Treatment    Pain: FACES Scale, Pretreatment  4-->hurts little more  -LG      Pain: FACES Scale, Post-Treatment  4-->hurts little more  -LG      Recorded by [LG] Norberto Degroot, PTA 07/06/19 1155      Row Name                Wound 07/05/19 0330 Right lower;anterior leg other (see comments)    Wound - Properties Group Date first assessed: 07/05/19 [ST] Time first assessed: 0330 [ST] Present On Admission : yes [ST] Side: Right [ST] Orientation: lower;anterior [ST] Location: leg [ST] Type: other (see comments) [ST], Hematoma  Recorded by:  [ST] Lakesha Mejias RN 07/05/19 0342      User Key  (r) = Recorded By, (t) = Taken By, (c) = Cosigned By    Initials Name Effective Dates Discipline    LG Norberto Degroot, PTA 08/02/16 -  PT    ST Lakesha Mejias RN 08/02/16 -  Nurse          Wound 07/05/19 0330 Right lower;anterior leg other (see comments) (Active)   Dressing Appearance open to air 7/6/2019  8:25 AM   Base purple 7/5/2019  7:30 PM   Periwound dry;intact 7/5/2019  7:30 PM   Drainage Amount none 7/6/2019  8:25 AM   Dressing Care, Wound open to air 7/6/2019  8:25 AM           Physical Therapy Education     Title: PT OT SLP Therapies (In Progress)     Topic: Physical Therapy (In Progress)     Point: Mobility training (Done)     Learning Progress Summary           Patient Acceptance, E, VU,NR by PRINCESS at 7/5/2019 11:15 AM    Comment:  Educated pt/family on progression of PT POC, benefits of activity, WBAT R LE, NWB R wrist.   Family Acceptance, E, VU,NR by PRINCESS at 7/5/2019 11:15 AM    Comment:  Educated pt/family on progression of PT POC, benefits of  activity, WBAT R LE, NWB R wrist.                   Point: Precautions (Done)     Learning Progress Summary           Patient Acceptance, E, ARIAN,NR by PRINCESS at 7/5/2019 11:15 AM    Comment:  Educated pt/family on progression of PT POC, benefits of activity, WBAT R LE, NWB R wrist.   Family Acceptance, E, ARIAN,NR by PRINCESS at 7/5/2019 11:15 AM    Comment:  Educated pt/family on progression of PT POC, benefits of activity, WBAT R LE, NWB R wrist.                               User Key     Initials Effective Dates Name Provider Type Discipline    PRINCESS 08/02/16 -  Ming Villaseñor, PT DPT Physical Therapist PT                PT Recommendation and Plan        Outcome Measures     Row Name 07/05/19 1115 07/05/19 1100          How much help from another person do you currently need...    Turning from your back to your side while in flat bed without using bedrails?  2  -PRINCESS  --     Moving from lying on back to sitting on the side of a flat bed without bedrails?  2  -PRINCESS  --     Moving to and from a bed to a chair (including a wheelchair)?  2  -PRINCESS  --     Standing up from a chair using your arms (e.g., wheelchair, bedside chair)?  2  -PRINCESS  --     Climbing 3-5 steps with a railing?  1  -PRINCESS  --     To walk in hospital room?  2  -PRINCESS  --     AM-PAC 6 Clicks Score (PT)  11  -JO  --        How much help from another is currently needed...    Putting on and taking off regular lower body clothing?  --  2  -AC     Bathing (including washing, rinsing, and drying)  --  2  -AC     Toileting (which includes using toilet bed pan or urinal)  --  2  -AC     Putting on and taking off regular upper body clothing  --  3  -AC     Taking care of personal grooming (such as brushing teeth)  --  3  -AC     Eating meals  --  3  -AC     AM-PAC 6 Clicks Score (OT)  --  15  -AC        Functional Assessment    Outcome Measure Options  AM-PAC 6 Clicks Basic Mobility (PT)  -PRINCESS  AM-PAC 6 Clicks Daily Activity (OT)  -AC       User Key  (r) = Recorded By, (t) = Taken By,  (c) = Cosigned By    Initials Name Provider Type    AC Jose Field, OTR/L, CNT Occupational Therapist    Ming Hernandez, PT DPT Physical Therapist         Time Calculation:   PT Charges     Row Name 07/06/19 1131             Time Calculation    Start Time  1131  -LG      Stop Time  1155  -LG      Time Calculation (min)  24 min  -LG      PT Received On  07/06/19  -LG      PT Goal Re-Cert Due Date  07/15/19  -LG         Time Calculation- PT    Total Timed Code Minutes- PT  24 minute(s)  -LG        User Key  (r) = Recorded By, (t) = Taken By, (c) = Cosigned By    Initials Name Provider Type    LG Norberto Degroot PTA Physical Therapy Assistant        Therapy Charges for Today     Code Description Service Date Service Provider Modifiers Qty    45198309836 HC GAIT TRAINING EA 15 MIN 7/6/2019 Norberto Degroot, RICARDO GP 1    82912674807 HC PT THERAPEUTIC ACT EA 15 MIN 7/6/2019 Norberto Degroot PTA GP 1          PT G-Codes  Outcome Measure Options: AM-PAC 6 Clicks Basic Mobility (PT)  AM-PAC 6 Clicks Score (PT): 11  AM-PAC 6 Clicks Score (OT): 15    Norberto Degroot PTA  7/6/2019

## 2019-07-06 NOTE — PROGRESS NOTES
[facility] Medicine Services  INPATIENT PROGRESS NOTE    Length of Stay: 1  Date of Admission: 7/4/2019  Primary Care Physician: Delfina Pereira DO    Subjective   Chief Complaint: Right arm pain, right hip pain  HPI   Lying in bed.  No family in room.  Right upper extremity elevated on pillow.  Complains of right hip pain when attempts to stand.  Nonweightbearing right upper extremity.  Weightbearing as tolerated right lower extremity.  She denies nausea, vomiting or abdominal pain.  Reports no bowel movement since admission.  She does not wear oxygen at home.  Will wean oxygen.  Orthopedics recommends nonsurgical intervention.    Review of Systems   Constitutional: Positive for appetite change (After fall). Negative for fatigue and fever.   HENT: Negative for congestion and trouble swallowing.    Eyes: Negative for photophobia and visual disturbance.   Respiratory: Negative for cough, shortness of breath and wheezing.    Cardiovascular: Negative for chest pain, palpitations and leg swelling.   Gastrointestinal: Negative for constipation, diarrhea, nausea and vomiting.   Endocrine: Negative for cold intolerance, heat intolerance and polyuria.   Genitourinary: Negative for dysuria and urgency.   Musculoskeletal: Positive for gait problem (After fall).   Skin: Negative for wound.   Allergic/Immunologic: Negative for immunocompromised state.   Neurological: Positive for weakness.   Hematological: Negative for adenopathy. Does not bruise/bleed easily.   Psychiatric/Behavioral: Negative for agitation, behavioral problems and confusion.     All pertinent negatives and positives are as above. All other systems have been reviewed and are negative unless otherwise stated.     Objective    Temp:  [97.9 °F (36.6 °C)-99.5 °F (37.5 °C)] 98 °F (36.7 °C)  Heart Rate:  [76-95] 81  Resp:  [14-18] 14  BP: ()/(40-47) 110/47  Physical Exam  Constitutional: She is oriented to person,  place, and time. She appears well-developed and well-nourished.   HENT:   Head: Atraumatic.   Eyes: EOM are normal. Pupils are equal, round, and reactive to light.   Neck: Normal range of motion. Neck supple.   Cardiovascular: Normal rate, regular rhythm, normal heart sounds and intact distal pulses. Exam reveals no gallop and no friction rub.   No murmur heard.  Pulmonary/Chest: Effort normal and breath sounds normal. She has no wheezes. She has no rales.   Oxygen at 1 L  and discontinued during assessment.  Abdominal: Soft. Bowel sounds are normal. She exhibits no distension.   Genitourinary:   Genitourinary Comments: Voiding.   Musculoskeletal: She exhibits tenderness (Right hip, right lower extremity, right upper extremity).   Right upper extremity with Ace cast  Neurological: She is alert and oriented to person, place, and time.   Skin: Skin is warm and dry.   Psychiatric: She has a normal mood and affect. Her behavior is normal. Judgment and thought content normal.     Results Review:  I have reviewed the labs, radiology results, and diagnostic studies.    Laboratory Data:   Results from last 7 days   Lab Units 07/06/19  0501 07/05/19  0018   WBC 10*3/mm3 8.80 14.80*   HEMOGLOBIN g/dL 10.0* 12.1   HEMATOCRIT % 31.4* 37.2   PLATELETS 10*3/mm3 169 201        Results from last 7 days   Lab Units 07/06/19  0501 07/05/19  0017   SODIUM mmol/L 139 140   POTASSIUM mmol/L 4.3 5.0   CHLORIDE mmol/L 108 108   CO2 mmol/L 27.0 25.0   BUN mg/dL 23* 36*   CREATININE mg/dL 1.01 1.24   CALCIUM mg/dL 8.6 9.5   BILIRUBIN mg/dL  --  0.6   ALK PHOS U/L  --  95   ALT (SGPT) U/L  --  22   AST (SGOT) U/L  --  46*   GLUCOSE mg/dL 99 110*        Imaging Results (all)     Procedure Component Value Units Date/Time    XR Hand 3+ View Right [[id number]] Collected:  07/05/19 0740     Updated:  07/05/19 0744    Narrative:       EXAMINATION:  XR HAND 3+ VW RIGHT-  7/5/2019 12:04 AM CDT     HISTORY: rt hand pain, fall      COMPARISON: No  comparison study.     TECHNIQUE: 3 views: AP lateral and oblique projection imaging     FINDINGS:      There is a transversely oriented fracture of the distal radius, mildly  displaced.     There is a mildly displaced ulnar styloid fracture.     There is osteoporosis.     There are degenerative changes in the distal interphalangeal joints  particularly.     There is no additional fractures of the right hand.       Impression:       1. Distal radial and ulnar styloid fractures.  2. No additional fracture/acute osseous abnormality.  This report was finalized on 07/05/2019 07:41 by Dr. Christian Kathleen MD.    XR Wrist 3+ View Right [[id number]] Collected:  07/05/19 0739     Updated:  07/05/19 0743    Narrative:       EXAMINATION:  XR WRIST 3+ VW RIGHT-  7/5/2019 12:04 AM CDT     HISTORY: rt wrist pain, fall      COMPARISON: No comparison study.     TECHNIQUE: 3 views: AP, lateral and oblique projection imaging     FINDINGS:      There is distal radial fracture, transversely oriented, mildly displaced  with intra-articular extension centrally.     There is a mildly displaced ulnar styloid fracture.     There is osteoporosis.       Impression:       1. Distal radial and ulnar styloid fractures.  This report was finalized on 07/05/2019 07:40 by Dr. Christian Kathleen MD.    XR Tibia Fibula 2 View Right [[id number]] Collected:  07/05/19 0738     Updated:  07/05/19 0742    Narrative:       EXAMINATION:  XR TIBIA FIBULA 2 VW RIGHT-  7/5/2019 12:05 AM CDT     HISTORY: pain, swellling to lateral leg, brushing; S72.001A-Fracture of  unspecified part of neck of right femur, initial encounter for closed  fracture; W19.XXXA-Unspecified fall, initial encounter;  S52.501A-Unspecified fracture of the lower end of right radius, initial  encounter for closed fracture; S52.601A-Unspecified fracture of lower  end of right ulna, initial encounter for closed fracture; Z79.01-Long      COMPARISON: No comparison study.     TECHNIQUE: 2 views: AP and  lateral projection imaging. 4 images     FINDINGS: The knee and ankle joints are maintained without fracture.       Impression:       1. No acute osseous abnormality.  This report was finalized on 07/05/2019 07:39 by Dr. Christian Kathleen MD.    XR Knee 3 View Right [[id number]] Collected:  07/05/19 0735     Updated:  07/05/19 0741    Narrative:       EXAMINATION:  XR KNEE 3 VW RIGHT-  7/5/2019 12:05 AM CDT     HISTORY: rt knee pain; S72.001A-Fracture of unspecified part of neck of  right femur, initial encounter for closed fracture; W19.XXXA-Unspecified  fall, initial encounter; S52.501A-Unspecified fracture of the lower end  of right radius, initial encounter for closed fracture;  S52.601A-Unspecified fracture of lower end of right ulna, initial  encounter for closed fracture; Z79.01-Long term (current) use of anti      COMPARISON: No comparison study.     TECHNIQUE: 3 views: AP lateral and oblique projection imaging     FINDINGS:      Patella femoral and tibial relationships are appropriate, without  fracture.     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 Chondrocalcinosis  involving the menisci observed.     Vascular calcifications observed.     There is no significant joint effusion.       Impression:       1. No acute osseous abnormality.  This report was finalized on 07/05/2019 07:38 by Dr. Christian Kathleen MD.    XR Hip With or Without Pelvis 2 - 3 View Right [[id number]] Collected:  07/05/19 0731     Updated:  07/05/19 0741    Narrative:       EXAMINATION:  XR HIP W OR WO PELVIS 2-3 VIEW RIGHT-  7/5/2019 12:04 AM  CDT     HISTORY: rt  hip pain, fall      COMPARISON: 02/26/2016     TECHNIQUE: 3 views: AP pelvis, AP and lateral right hip     FINDINGS:      The right femoral prosthesis well-positioned without hardware  complications.     There is no fracture or dislocation.     There is deformity of the right superior and inferior pubic  rami  compatible with fractures, age-indeterminate, question subacute/chronic.  Correlate with clinical findings.     The left hip joint is maintained. Advanced degenerative changes in the  lower lumbar spine.       Impression:       1. 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  2. Deformity of the right superior and inferior pubic rami compatible  with fractures, age-indeterminate, question chronic/subacute. Correlate  with patient presentation.  This report was finalized on 07/05/2019 07:35 by Dr. Christian Kathleen MD.          Intake/Output    Intake/Output Summary (Last 24 hours) at 7/6/2019 0936  Last data filed at 7/6/2019 0922  Gross per 24 hour   Intake 560 ml   Output 360 ml   Net 200 ml       Scheduled Meds    aliskiren 300 mg Oral Daily   amLODIPine 10 mg Oral Daily   aspirin 81 mg Oral Daily   clopidogrel 75 mg Oral Q24H   cycloSPORINE 1 drop Both Eyes Q12H   levothyroxine 112 mcg Oral Q AM   OCUVITE-LUTEIN 1 tablet Oral Daily   oyster shell calcium/vitamin d 1 tablet Oral Daily   sodium chloride 3 mL Intravenous Q12H     I have reviewed the patient current medications.     Assessment/Plan     Active Hospital Problems    Diagnosis   • **Right greater trochanteric avulsion periprosthetic, closed, minimally displaced   • Closed fracture of right wrist   • Closed fracture of multiple pubic rami, right, initial encounter (CMS/Formerly [facility])   • Fall   • Impaired gait and mobility   • Acute right hip pain   • Hypothyroidism   • Hypertension     Plan:  1.  X-ray right hip changes from right hip arthroplasty without hardware complications.  Deformity right superior and inferior pubic ramus compatible with fractures.  2.  X-ray right wrist with distal radial and ulnar styloid fractures.  3.  Orthopedic consulted.  Nonoperative fractures right hip.  Distal fracture right radius nonoperative treatment.  4.  Nonweightbearing right wrist.  Weightbearing as tolerated lower extremities.  5.  Physical  therapy consulted.  Requires moderate assist x2 to complete bed mobility.  Recommends acute rehab or skilled nursing facility for physical therapy and Occupational Therapy.  6.  Family requested [facility].  Social service consulted and referral made.  7.  Home medications reviewed.  Will resume aspirin and Plavix as no surgical intervention planned per orthopedics.  8.  SCDs.  9.  Discontinue oxygen.  Wean as tolerated  10.  Magnesium citrate for constipation.    The above documentation resulted from a face-to-face encounter by me Flor TRAVIS, [facility].    Discharge Planning: I expect patient to be discharged to [facility] in 2 days if insurance approves and bed offered..    ANGY Montemayor   07/06/19   9:36 AM

## 2019-07-06 NOTE — PLAN OF CARE
"Problem: Patient Care Overview  Goal: Plan of Care Review   07/06/19 1431   Coping/Psychosocial   Plan of Care Reviewed With patient   OTHER   Outcome Summary Pt up in chair was able to sit-stand mod x2 started amb to BR pt was able to amb 10' into the BR as soon as we got in front of toilet pt stated \"im going black\" but cont to talk w/ PT and was able to take few steps bk to commode. Pt was able to reach back and sit down min x2 once sitting pt became unresponsive did mumble a few times then didn't respond to anything called for Staff Assist/Code Blue had to dependently pick pt up and transfer to straight back chair pulled chair to the bed and dependently transferred pt btb. after doing so nsg staff began to come in to assist and pt started coming around and answering questions. Vitals stable except HR elevated /59 o2 92% RA but nsg placed o2 on pt. HR 160s but after several minutes came down to 70-80s. Left pt w/ nsg staff and pt was awake and oriented          "

## 2019-07-06 NOTE — PROGRESS NOTES
MD Rip Lozoya PA-C, [facility]       Orthopaedic Surgery Progress Note      7/6/2019   9:01 AM    Name:  Mya Chung  MRN:    [id number]     Acct:     [id number]   Room:  [address] [place] [address] Day: 1     Admit Date: 7/4/2019  PCP: Delfina Pereira DO        Subjective:     Interval: Resting comfortably in bed. Doing very well with regard to wrist. Still complaining of severe pain in the lateral and inguinal aspects of her right hip when bearing weight or ambulating.      Medications:     Allergies:   Allergies   Allergen Reactions   • Erythromycin Shortness Of Breath   • Zithromax [Azithromycin] Other (See Comments)     Yeast infection   • Penicillins Rash       Current Meds:   Current Facility-Administered Medications   Medication Dose Route Frequency Provider Last Rate Last Dose   • acetaminophen (TYLENOL) tablet 650 mg  650 mg Oral Q4H PRN Remigio Bryant MD       • aliskiren (TEKTURNA) tablet 300 mg  300 mg Oral Daily Remigio Bryant MD   300 mg at 07/06/19 0824   • amLODIPine (NORVASC) tablet 10 mg  10 mg Oral Daily Remigio Bryant MD   10 mg at 07/06/19 0824   • aspirin chewable tablet 81 mg  81 mg Oral Daily Flor Montalvo APRN   81 mg at 07/06/19 0824   • CloNIDine (CATAPRES) tablet 0.1 mg  0.1 mg Oral Q6H PRN Remigio Bryant MD       • clopidogrel (PLAVIX) tablet 75 mg  75 mg Oral Q24H Flor Montalvo APRN   75 mg at 07/06/19 0824   • cycloSPORINE (RESTASIS) 0.05 % ophthalmic emulsion 1 drop  1 drop Both Eyes Q12H Remigio Bryant MD   1 drop at 07/06/19 0824   • levothyroxine (SYNTHROID, LEVOTHROID) tablet 112 mcg  112 mcg Oral Q AM Remigio Bryant MD   112 mcg at 07/06/19 0607   • naloxone (NARCAN) injection 0.4 mg  0.4 mg Intravenous Q5 Min PRN Remigio Bryant MD       • OCUVITE-LUTEIN (OCUVITE) tablet 1 tablet  1 tablet Oral Daily Remigio Bryant MD   1 tablet at 07/06/19 0861   • ondansetron (ZOFRAN) tablet 4 mg  4 mg Oral Q6H PRN Remigio Bryant MD     "    Or   • ondansetron (ZOFRAN) injection 4 mg  4 mg Intravenous Q6H PRN Remigio Bryant MD       • oxyCODONE-acetaminophen (PERCOCET) 5-325 MG per tablet 1 tablet  1 tablet Oral Q4H PRN Remigio Bryant MD   1 tablet at 19 0338   • oyster shell calcium/vitamin d tablet 1 tablet  1 tablet Oral Daily Remigio Bryant MD   1 tablet at 19 0824   • sodium chloride 0.9 % flush 10 mL  10 mL Intravenous PRN Gayathri Tyson APRN       • sodium chloride 0.9 % flush 3 mL  3 mL Intravenous Q12H Remigio Bryant MD   3 mL at 19 0826   • sodium chloride 0.9 % flush 3-10 mL  3-10 mL Intravenous PRN Remigio Bryant MD       • sodium chloride 0.9 % infusion  50 mL/hr Intravenous Continuous Flor Montalvo APRN 50 mL/hr at 19 50 mL/hr at 19         Data:     Vitals:  /47 (BP Location: Left arm, Patient Position: Lying)   Pulse 81   Temp 98 °F (36.7 °C) (Oral)   Resp 14   Ht 157.5 cm (62\")   Wt 57.2 kg (126 lb 3 oz)   SpO2 91%   BMI 23.08 kg/m²   Temp (24hrs), Av.5 °F (36.9 °C), Min:97.9 °F (36.6 °C), Max:99.5 °F (37.5 °C)      I/O (24Hr):    Intake/Output Summary (Last 24 hours) at 2019  Last data filed at 2019 0338  Gross per 24 hour   Intake 320 ml   Output 360 ml   Net -40 ml       Labs:  Lab Results (last 72 hours)     Procedure Component Value Units Date/Time    CBC & Differential [[id number]] Collected:  19 050    Specimen:  Blood Updated:  19    Narrative:       The following orders were created for panel order CBC & Differential.  Procedure                               Abnormality         Status                     ---------                               -----------         ------                     CBC Auto Differential[[id number]]        Abnormal            Final result                 Please view results for these tests on the individual orders.    CBC Auto Differential [[id number]]  (Abnormal) Collected:  19 " 0501    Specimen:  Blood Updated:  07/06/19 0629     WBC 8.80 10*3/mm3      RBC 3.36 10*6/mm3      Hemoglobin 10.0 g/dL      Hematocrit 31.4 %      MCV 93.5 fL      MCH 29.8 pg      MCHC 31.8 g/dL      RDW 12.8 %      RDW-SD 44.1 fl      MPV 9.6 fL      Platelets 169 10*3/mm3      Neutrophil % 65.0 %      Lymphocyte % 20.7 %      Monocyte % 11.3 %      Eosinophil % 2.4 %      Basophil % 0.3 %      Immature Grans % 0.3 %      Neutrophils, Absolute 5.72 10*3/mm3      Lymphocytes, Absolute 1.82 10*3/mm3      Monocytes, Absolute 0.99 10*3/mm3      Eosinophils, Absolute 0.21 10*3/mm3      Basophils, Absolute 0.03 10*3/mm3      Immature Grans, Absolute 0.03 10*3/mm3      nRBC 0.0 /100 WBC     Basic Metabolic Panel [[id number]]  (Abnormal) Collected:  07/06/19 0501    Specimen:  Blood Updated:  07/06/19 0542     Glucose 99 mg/dL      BUN 23 mg/dL      Creatinine 1.01 mg/dL      Sodium 139 mmol/L      Potassium 4.3 mmol/L      Chloride 108 mmol/L      CO2 27.0 mmol/L      Calcium 8.6 mg/dL      eGFR Non African Amer 51 mL/min/1.73      BUN/Creatinine Ratio 22.8     Anion Gap 4.0 mmol/L     Narrative:       GFR Normal >60  Chronic Kidney Disease <60  Kidney Failure <15    Protime-INR [[id number]]  (Normal) Collected:  07/05/19 0017    Specimen:  Blood Updated:  07/05/19 0039     Protime 12.8 Seconds      INR 0.94    aPTT [[id number]]  (Normal) Collected:  07/05/19 0017    Specimen:  Blood Updated:  07/05/19 0039     PTT 28.1 seconds     Comprehensive Metabolic Panel [[id number]]  (Abnormal) Collected:  07/05/19 0017    Specimen:  Blood Updated:  07/05/19 0034     Glucose 110 mg/dL      BUN 36 mg/dL      Creatinine 1.24 mg/dL      Sodium 140 mmol/L      Potassium 5.0 mmol/L      Chloride 108 mmol/L      CO2 25.0 mmol/L      Calcium 9.5 mg/dL      Total Protein 7.2 g/dL      Albumin 4.30 g/dL      ALT (SGPT) 22 U/L      AST (SGOT) 46 U/L      Alkaline Phosphatase 95 U/L      Total Bilirubin 0.6 mg/dL      eGFR Non  Amer 41  mL/min/1.73      Globulin 2.9 gm/dL      A/G Ratio 1.5 g/dL      BUN/Creatinine Ratio 29.0     Anion Gap 7.0 mmol/L     Narrative:       GFR Normal >60  Chronic Kidney Disease <60  Kidney Failure <15    CBC & Differential [[id number]] Collected:  07/05/19 0018    Specimen:  Blood Updated:  07/05/19 0024    Narrative:       The following orders were created for panel order CBC & Differential.  Procedure                               Abnormality         Status                     ---------                               -----------         ------                     CBC Auto Differential[[id number]]        Abnormal            Final result                 Please view results for these tests on the individual orders.    CBC Auto Differential [[id number]]  (Abnormal) Collected:  07/05/19 0018    Specimen:  Blood Updated:  07/05/19 0024     WBC 14.80 10*3/mm3      RBC 4.05 10*6/mm3      Hemoglobin 12.1 g/dL      Hematocrit 37.2 %      MCV 91.9 fL      MCH 29.9 pg      MCHC 32.5 g/dL      RDW 12.7 %      RDW-SD 42.8 fl      MPV 9.3 fL      Platelets 201 10*3/mm3      Neutrophil % 75.4 %      Lymphocyte % 14.7 %      Monocyte % 8.0 %      Eosinophil % 0.9 %      Basophil % 0.3 %      Immature Grans % 0.7 %      Neutrophils, Absolute 11.17 10*3/mm3      Lymphocytes, Absolute 2.17 10*3/mm3      Monocytes, Absolute 1.18 10*3/mm3      Eosinophils, Absolute 0.13 10*3/mm3      Basophils, Absolute 0.05 10*3/mm3      Immature Grans, Absolute 0.10 10*3/mm3      nRBC 0.0 /100 WBC             Physical Exam:     Right Hip: Skin is CDI, no deformities noted. TTP laterally and inguinally. Limited ROM secondary to lateral sided pain. NVI distally.  Right Wrist: Currently in a sugar tong splint, which is CDI. NVI distally to exposed fingers.        Assessment:     Primary Problem  1. Right greater trochanter avulsion fracture, periprosthetic, closed, minimally displaced, subsequent encounter.  2. Right displaced and shortened superior pubic  rami fracture, closed, subsequent encounter.  3. Right nondisplaced inferior pubic rami fracture, closed, subsequent encounter.             Plan:     1. Continue PT/OT.  2. Continue DVT prophylaxis.  3. Continue NWB right upper extremity, WBAT right LE  4. Anticipate discharge when placement found.      Electronically signed by Rip Mustafa Jr., PA on 7/6/2019 at 9:01 AM

## 2019-07-06 NOTE — THERAPY TREATMENT NOTE
Acute Care - Physical Therapy Treatment Note  [facility]     Patient Name: Mya Chung  : 2/10/1928  MRN: [id number]  Today's Date: 2019  Onset of Illness/Injury or Date of Surgery: 19  Date of Referral to PT: 19  Referring Physician: Dr Bryant    Admit Date: 2019    Visit Dx:    ICD-10-CM ICD-9-CM   1. Closed fracture of right hip, initial encounter (CMS/[facility]) S72.001A 820.8   2. Fall, initial encounter W19.XXXA E888.9   3. Closed fracture of distal end of right radius, unspecified fracture morphology, initial encounter S52.501A 813.42   4. Closed fracture of distal end of right ulna, unspecified fracture morphology, initial encounter S52.601A 813.43   5. Chronic anticoagulation Z79.01 V58.61   6. Impaired mobility Z74.09 799.89   7. Decreased activities of daily living (ADL) R68.89 780.99     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   • Hypertension   • Hyperlipidemia   • PAD (peripheral artery disease) (CMS/[facility])   • Right greater trochanteric avulsion periprosthetic, closed, minimally displaced   • Closed fracture of right wrist   • Fall   • Impaired gait and mobility   • Acute right hip pain   • Hypothyroidism   • Closed fracture of multiple pubic rami, right, initial encounter (CMS/[facility])       Therapy Treatment    Rehabilitation Treatment Summary     Row Name 19 1326 19 1131          Treatment Time/Intention    Discipline  physical therapy assistant  -NW  physical therapy assistant  -LG     Document Type  therapy note (daily note)  -NW  therapy note (daily note)  -LG2     Subjective Information  complains of;weakness;fatigue;pain  -NW2  complains of;weakness;pain  -LG2     Mode of Treatment  --  individual therapy;physical therapy  -LG2     Patient/Family Observations  no family present  -NW2  son present bedside  -LG2     Patient Effort  good  -NW2  good  -LG2     Comment  NWB RUE ok WB thru wrist WBAT  RLE  -NW2  --     Existing Precautions/Restrictions  fall;non-weight bearing  -NW2  fall;non-weight bearing  (Significant)  NWB R WRIST , WBAT R LE  -LG2     Recorded by [NW] Lina Laura, PTA 07/06/19 1352  [NW2] Lina Laura, PTA 07/06/19 1428 [LG] Norberto Degroot, PTA 07/06/19 1131  [LG2] Norberto Degroot, PTA 07/06/19 1155     Row Name 07/06/19 1326             Safety Issues, Functional Mobility    Impairments Affecting Function (Mobility)  pain;strength  -NW      Recorded by [NW] Lina Laura, PTA 07/06/19 1428      Row Name 07/06/19 1326             Mobility Assessment/Intervention    Extremity Weight-bearing Status  right upper extremity  -NW      Right Upper Extremity (Weight-bearing Status)  non weight-bearing (NWB)  -NW      Right Lower Extremity (Weight-bearing Status)  weight-bearing as tolerated (WBAT)  -NW      Recorded by [NW] Lina Laura, PTA 07/06/19 1428      Row Name 07/06/19 1326 07/06/19 1131          Bed Mobility Assessment/Treatment    Supine-Sit [place] (Bed Mobility)  -- up in chair  -NW  verbal cues;minimum assist (75% patient effort)  -LG     Sit-Supine [place] (Bed Mobility)  dependent (less than 25% patient effort)  -NW  --     Bed Mobility, Safety Issues  decreased use of arms for pushing/pulling;decreased use of legs for bridging/pushing  -NW  --     Assistive Device (Bed Mobility)  --  bed rails;draw sheet  -LG     Recorded by [NW] Lina Laura, PTA 07/06/19 1428 [LG] Norberto Degroot, PTA 07/06/19 1155     Row Name 07/06/19 1326 07/06/19 1131          Sit-Stand Transfer    Sit-Stand Drake (Transfers)  verbal cues;moderate assist (50% patient effort);2 person assist from chair  -NW  verbal cues;contact guard;minimum assist (75% patient effort);2 person assist  -LG     Assistive Device (Sit-Stand Transfers)  walker, front-wheeled  -NW  walker, rolling platform  -LG     Recorded by [NW] Lina Laura, PTA 07/06/19 1428 [LG] Norberto Degroot, PTA 07/06/19  "1155     Row Name 07/06/19 1131             Stand-Sit Transfer    Stand-Sit [place] (Transfers)  verbal cues;contact guard  -LG      Assistive Device (Stand-Sit Transfers)  walker, rolling platform  -LG      Recorded by [LG] Norberto Degroot, PTA 07/06/19 1155      Row Name 07/06/19 1131             Toilet Transfer    Type (Toilet Transfer)  sit-stand;stand-sit;stand pivot/stand step  -LG      [place] Level (Toilet Transfer)  verbal cues;minimum assist (75% patient effort)  -LG      Assistive Device (Toilet Transfer)  commode, bedside without drop arms;walker, rolling platform  -LG      Recorded by [LG] Norberto Degroot, PTA 07/06/19 1155      Row Name 07/06/19 1326 07/06/19 1131          Gait/Stairs Assessment/Training    Gait/Stairs Assessment/Training  --  gait/ambulation assistive device  -LG     [place] Level (Gait)  --  verbal cues;contact guard;minimum assist (75% patient effort)  -LG     Assistive Device (Gait)  --  walker, rolling platform  -LG     Distance in Feet (Gait)  --  10' standing rest, 10' standing rest then 5' sitting rest.   -LG     Pattern (Gait)  --  step-to  -LG     Deviations/Abnormal Patterns (Gait)  --  antalgic;base of support, narrow;gait speed decreased;stride length decreased  -LG     Bilateral Gait Deviations  --  forward flexed posture  -LG     Comment (Gait/Stairs)  Pt up in chair was able to sit-stand mod x2 started amb to BR pt was able to amb 10' into the BR as soon as we got in front of toilet pt stated \"im going black\" but cont to talk w/ PT and was able to take few steps bk to commode. Pt was able to reach back and sit down min x2 once sitting pt became unresponsive did mumble a few times then didn't respond to anything called for Staff Assist/Code Blue had to dependently pick pt up and transfer to straight back chair pulled chair to the bed and dependently transferred pt btb. after doing so nsg staff began to come in to assist and pt started coming around and " answering questions. Vitals stable except HR elevated /59 o2 92% RA but nsg placed o2 on pt. HR 160s but after several minutes came down to 70-80s. Left pt w/ nsg staff and pt was awake and oriented  (Significant)   -NW  --     Recorded by [NW] Lina Laura, PTA 07/06/19 1428 [LG] Norberto Degroot, PTA 07/06/19 1155     Row Name 07/06/19 1326 07/06/19 1131          Positioning and Restraints    Pre-Treatment Position  sitting in chair/recliner  -NW  in bed  -LG     Post Treatment Position  bed  -NW  chair  -LG     In Bed  fowlers;call light within reach;encouraged to call for assist;exit alarm on;with nsg  -NW  --     In Chair  --  reclined;legs elevated;call light within reach;encouraged to call for assist;with family/caregiver  -LG     Recorded by [NW] Lina Laura, PTA 07/06/19 1428 [LG] Norberto Degroot, PTA 07/06/19 1155     Row Name 07/06/19 1131             Pain Scale: FACES Pre/Post-Treatment    Pain: FACES Scale, Pretreatment  4-->hurts little more  -LG      Pain: FACES Scale, Post-Treatment  4-->hurts little more  -LG      Recorded by [LG] Norberto Degroot, PTA 07/06/19 1155      Row Name                Wound 07/05/19 0330 Right lower;anterior leg other (see comments)    Wound - Properties Group Date first assessed: 07/05/19 [ST] Time first assessed: 0330 [ST] Present On Admission : yes [ST] Side: Right [ST] Orientation: lower;anterior [ST] Location: leg [ST] Type: other (see comments) [ST], Hematoma  Recorded by:  [ST] Lakesha Mejias RN 07/05/19 0342      User Key  (r) = Recorded By, (t) = Taken By, (c) = Cosigned By    Initials Name Effective Dates Discipline    LG Norberto Degroot, PTA 08/02/16 -  PT    NW Lina Laura, PTA 08/02/16 -  PT    ST Lakesha Mejias RN 08/02/16 -  Nurse          Wound 07/05/19 0330 Right lower;anterior leg other (see comments) (Active)   Dressing Appearance open to air 7/6/2019  8:25 AM   Base purple 7/5/2019  7:30 PM   Periwound dry;intact 7/5/2019  7:30 PM    Drainage Amount none 7/6/2019  8:25 AM   Dressing Care, Wound open to air 7/6/2019  8:25 AM           Physical Therapy Education     Title: PT OT SLP Therapies (In Progress)     Topic: Physical Therapy (In Progress)     Point: Mobility training (Done)     Learning Progress Summary           Patient Acceptance, E, VU,NR by PRINCESS at 7/5/2019 11:15 AM    Comment:  Educated pt/family on progression of PT POC, benefits of activity, WBAT R LE, NWB R wrist.   Family Acceptance, E, VU,NR by PRINCESS at 7/5/2019 11:15 AM    Comment:  Educated pt/family on progression of PT POC, benefits of activity, WBAT R LE, NWB R wrist.                   Point: Precautions (Done)     Learning Progress Summary           Patient Acceptance, E, VU,NR by PRINCESS at 7/5/2019 11:15 AM    Comment:  Educated pt/family on progression of PT POC, benefits of activity, WBAT R LE, NWB R wrist.   Family Acceptance, E, VU,NR by PRINCESS at 7/5/2019 11:15 AM    Comment:  Educated pt/family on progression of PT POC, benefits of activity, WBAT R LE, NWB R wrist.                               User Key     Initials Effective Dates Name Provider Type Discipline    PRINCESS 08/02/16 -  Ming Villaseñor, PT DPT Physical Therapist PT                PT Recommendation and Plan        Outcome Measures     Row Name 07/05/19 1115 07/05/19 1100          How much help from another person do you currently need...    Turning from your back to your side while in flat bed without using bedrails?  2  -PRINCESS  --     Moving from lying on back to sitting on the side of a flat bed without bedrails?  2  -PRINCESS  --     Moving to and from a bed to a chair (including a wheelchair)?  2  -PRINCESS  --     Standing up from a chair using your arms (e.g., wheelchair, bedside chair)?  2  -PRINCESS  --     Climbing 3-5 steps with a railing?  1  -PRINCESS  --     To walk in hospital room?  2  -PRINCESS  --     AM-PAC 6 Clicks Score (PT)  11  -PRINCESS  --        How much help from another is currently needed...    Putting on and taking off regular  lower body clothing?  --  2  -AC     Bathing (including washing, rinsing, and drying)  --  2  -AC     Toileting (which includes using toilet bed pan or urinal)  --  2  -AC     Putting on and taking off regular upper body clothing  --  3  -AC     Taking care of personal grooming (such as brushing teeth)  --  3  -AC     Eating meals  --  3  -AC     AM-PAC 6 Clicks Score (OT)  --  15  -AC        Functional Assessment    Outcome Measure Options  AM-PAC 6 Clicks Basic Mobility (PT)  -PRINCESS  AM-PAC 6 Clicks Daily Activity (OT)  -AC       User Key  (r) = Recorded By, (t) = Taken By, (c) = Cosigned By    Initials Name Provider Type    AC Jose Field, OTR/L, CNT Occupational Therapist    Ming Hernandez, PT DPT Physical Therapist         Time Calculation:   PT Charges     Row Name 07/06/19 1428 07/06/19 1131          Time Calculation    Start Time  1326  -NW  1131  -LG     Stop Time  1354  -NW  1155  -LG     Time Calculation (min)  28 min  -NW  24 min  -LG     PT Received On  --  07/06/19  -     PT Goal Re-Cert Due Date  --  07/15/19  -        Time Calculation- PT    Total Timed Code Minutes- PT  28 minute(s)  -NW  24 minute(s)  -        Timed Charges    81907 - Gait Training Minutes   15  -NW  --     97137 - PT Therapeutic Activity Minutes  13  -NW  --       User Key  (r) = Recorded By, (t) = Taken By, (c) = Cosigned By    Initials Name Provider Type     Norberto Degroot PTA Physical Therapy Assistant     Lina Laura PTA Physical Therapy Assistant        Therapy Charges for Today     Code Description Service Date Service Provider Modifiers Qty    46859271701 HC GAIT TRAINING EA 15 MIN 7/6/2019 Lina Laura PTA GP 1    74799681187 HC PT THERAPEUTIC ACT EA 15 MIN 7/6/2019 Lina Laura PTA GP 1          PT G-Codes  Outcome Measure Options: AM-PAC 6 Clicks Basic Mobility (PT)  AM-PAC 6 Clicks Score (PT): 11  AM-PAC 6 Clicks Score (OT): 15    Lina Laura PTA  7/6/2019

## 2019-07-06 NOTE — NURSING NOTE
Responded to Staff assist/code blue called by PT while they were working with patient. After arriving in room, witnessed PT getting patient back into bed from straight back chair. PT stated patient had been on the toilet in bathroom when she had blacked out and became unresponsive. Nursing staff began checking vitals and blood glucose, pt became alert and oriented again once lying in bed. HR was elevated at first but returned to normal in 70s. All other vitals were stable. /59, O2 90-93% on room air, . ANGY Haile was notified at the desk of incident. No new orders.

## 2019-07-06 NOTE — PLAN OF CARE
Problem: Patient Care Overview  Goal: Plan of Care Review  Outcome: Ongoing (interventions implemented as appropriate)   07/06/19 0309   Coping/Psychosocial   Plan of Care Reviewed With patient   Plan of Care Review   Progress no change   OTHER   Outcome Summary AAOX4. Family wants patient to go to [facility]. Hard for patient to move in bed even. Resting easy at present time. Will cont to monitor.       Problem: Fall Risk (Adult)  Goal: Absence of Fall  Outcome: Ongoing (interventions implemented as appropriate)      Problem: Fractured Hip (Adult)  Goal: Signs and Symptoms of Listed Potential Problems Will be Absent, Minimized or Managed (Fractured Hip)  Outcome: Ongoing (interventions implemented as appropriate)      Problem: Skin Injury Risk (Adult)  Goal: Identify Related Risk Factors and Signs and Symptoms  Outcome: Outcome(s) achieved Date Met: 07/06/19    Goal: Skin Health and Integrity  Outcome: Ongoing (interventions implemented as appropriate)

## 2019-07-07 LAB
DEPRECATED RDW RBC AUTO: 43.2 FL (ref 40–54)
ERYTHROCYTE [DISTWIDTH] IN BLOOD BY AUTOMATED COUNT: 12.6 % (ref 12–15)
HCT VFR BLD AUTO: 32 % (ref 37–47)
HGB BLD-MCNC: 10.2 G/DL (ref 12–16)
MCH RBC QN AUTO: 30 PG (ref 28–32)
MCHC RBC AUTO-ENTMCNC: 31.9 G/DL (ref 33–36)
MCV RBC AUTO: 94.1 FL (ref 82–98)
PLATELET # BLD AUTO: 167 10*3/MM3 (ref 130–400)
PMV BLD AUTO: 9.8 FL (ref 6–12)
RBC # BLD AUTO: 3.4 10*6/MM3 (ref 4.2–5.4)
WBC NRBC COR # BLD: 9.23 10*3/MM3 (ref 4.8–10.8)

## 2019-07-07 PROCEDURE — 94760 N-INVAS EAR/PLS OXIMETRY 1: CPT

## 2019-07-07 PROCEDURE — 85027 COMPLETE CBC AUTOMATED: CPT | Performed by: NURSE PRACTITIONER

## 2019-07-07 PROCEDURE — 97116 GAIT TRAINING THERAPY: CPT

## 2019-07-07 PROCEDURE — 94799 UNLISTED PULMONARY SVC/PX: CPT

## 2019-07-07 RX ADMIN — AMLODIPINE BESYLATE 10 MG: 10 TABLET ORAL at 08:05

## 2019-07-07 RX ADMIN — ASPIRIN 81 MG 81 MG: 81 TABLET ORAL at 08:05

## 2019-07-07 RX ADMIN — CYCLOSPORINE 1 DROP: 0.5 EMULSION OPHTHALMIC at 20:59

## 2019-07-07 RX ADMIN — Medication 1 TABLET: at 08:05

## 2019-07-07 RX ADMIN — OXYCODONE HYDROCHLORIDE AND ACETAMINOPHEN 1 TABLET: 5; 325 TABLET ORAL at 14:28

## 2019-07-07 RX ADMIN — CALCIUM CARBONATE-CHOLECALCIFEROL TAB 250 MG-125 UNIT 1 TABLET: 250-125 TAB at 08:05

## 2019-07-07 RX ADMIN — CYCLOSPORINE 1 DROP: 0.5 EMULSION OPHTHALMIC at 10:28

## 2019-07-07 RX ADMIN — SODIUM CHLORIDE, PRESERVATIVE FREE 3 ML: 5 INJECTION INTRAVENOUS at 08:06

## 2019-07-07 RX ADMIN — CLOPIDOGREL 75 MG: 75 TABLET, FILM COATED ORAL at 08:05

## 2019-07-07 RX ADMIN — SODIUM CHLORIDE, PRESERVATIVE FREE 3 ML: 5 INJECTION INTRAVENOUS at 20:59

## 2019-07-07 RX ADMIN — ALISKIREN HEMIFUMARATE 300 MG: 150 TABLET, FILM COATED ORAL at 08:05

## 2019-07-07 RX ADMIN — LEVOTHYROXINE SODIUM 112 MCG: 112 TABLET ORAL at 05:47

## 2019-07-07 NOTE — PROGRESS NOTES
[facility] Medicine Services  INPATIENT PROGRESS NOTE    Length of Stay: 2  Date of Admission: 7/4/2019  Primary Care Physician: Delfina Pereira DO    Subjective   Chief Complaint: Right hip, right arm pain  HPI   Lying in bed.  No family in room.  Right upper extremity casted and elevated on pillow.  Nonweightbearing right upper extremity.  She was up with physical therapy yesterday.  When she went to the bathroom for a bowel movement she had a vagal response.  She was not able to have a bowel movement.  No vomiting, nausea or abdominal pain.  Oxygen 2 L will wean and discontinue.  Nonsurgical intervention recommended by orthopedics.  Hopefully to skilled nursing facility tomorrow.    Review of Systems   Constitutional: Positive for appetite change (After fall). Negative for fatigue and fever.   HENT: Negative for congestion and trouble swallowing.    Eyes: Negative for photophobia and visual disturbance.   Respiratory: Negative for cough, shortness of breath and wheezing.    Cardiovascular: Negative for chest pain, palpitations and leg swelling.   Gastrointestinal: Negative for constipation, diarrhea, nausea and vomiting.   Endocrine: Negative for cold intolerance, heat intolerance and polyuria.   Genitourinary: Negative for dysuria and urgency.   Musculoskeletal: Positive for gait problem (After fall).   Skin: Negative for wound.   Allergic/Immunologic: Negative for immunocompromised state.   Neurological: Positive for weakness.   Hematological: Negative for adenopathy. Does not bruise/bleed easily.   Psychiatric/Behavioral: Negative for agitation, behavioral problems and confusion    All pertinent negatives and positives are as above. All other systems have been reviewed and are negative unless otherwise stated.     Objective    Temp:  [98.2 °F (36.8 °C)-98.6 °F (37 °C)] 98.3 °F (36.8 °C)  Heart Rate:  [] 90  Resp:  [14-16] 16  BP: ()/(48-66)  122/48  Physical Exam  Constitutional: She is oriented to person, place, and time. She appears well-developed and well-nourished.   HENT:   Head: Atraumatic.   Eyes: EOM are normal. Pupils are equal, round, and reactive to light.   Neck: Normal range of motion. Neck supple.   Cardiovascular: Normal rate, regular rhythm, normal heart sounds and intact distal pulses. Exam reveals no gallop and no friction rub.   No murmur heard.  Pulmonary/Chest: Effort normal and breath sounds normal. She has no wheezes. She has no rales.   Oxygen at 1 L.  Will wean and discontinue.  Abdominal: Soft. Bowel sounds are normal. She exhibits no distension.   Genitourinary:   Genitourinary Comments: Voiding.   Musculoskeletal: She exhibits tenderness (Right hip, right lower extremity, right upper extremity).   Right upper extremity with Ace cast  Neurological: She is alert and oriented to person, place, and time.   Skin: Skin is warm and dry.   Large bruising region right lower extremity  Psychiatric: She has a normal mood and affect. Her behavior is normal. Judgment and thought content normal.     Results Review:  I have reviewed the labs, radiology results, and diagnostic studies.    Laboratory Data:   Results from last 7 days   Lab Units 07/07/19  0738 07/06/19  0501 07/05/19  0018   WBC 10*3/mm3 9.23 8.80 14.80*   HEMOGLOBIN g/dL 10.2* 10.0* 12.1   HEMATOCRIT % 32.0* 31.4* 37.2   PLATELETS 10*3/mm3 167 169 201        Results from last 7 days   Lab Units 07/06/19  0501 07/05/19  0017   SODIUM mmol/L 139 140   POTASSIUM mmol/L 4.3 5.0   CHLORIDE mmol/L 108 108   CO2 mmol/L 27.0 25.0   BUN mg/dL 23* 36*   CREATININE mg/dL 1.01 1.24   CALCIUM mg/dL 8.6 9.5   BILIRUBIN mg/dL  --  0.6   ALK PHOS U/L  --  95   ALT (SGPT) U/L  --  22   AST (SGOT) U/L  --  46*   GLUCOSE mg/dL 99 110*     Imaging Results (all)     Procedure Component Value Units Date/Time    XR Hand 3+ View Right [[id number]] Collected:  07/05/19 0740     Updated:  07/05/19 0744     Narrative:       EXAMINATION:  XR HAND 3+ VW RIGHT-  7/5/2019 12:04 AM CDT     HISTORY: rt hand pain, fall      COMPARISON: No comparison study.     TECHNIQUE: 3 views: AP lateral and oblique projection imaging     FINDINGS:      There is a transversely oriented fracture of the distal radius, mildly  displaced.     There is a mildly displaced ulnar styloid fracture.     There is osteoporosis.     There are degenerative changes in the distal interphalangeal joints  particularly.     There is no additional fractures of the right hand.       Impression:       1. Distal radial and ulnar styloid fractures.  2. No additional fracture/acute osseous abnormality.  This report was finalized on 07/05/2019 07:41 by Dr. Christian Kathleen MD.    XR Wrist 3+ View Right [[id number]] Collected:  07/05/19 0739     Updated:  07/05/19 0743    Narrative:       EXAMINATION:  XR WRIST 3+ VW RIGHT-  7/5/2019 12:04 AM CDT     HISTORY: rt wrist pain, fall      COMPARISON: No comparison study.     TECHNIQUE: 3 views: AP, lateral and oblique projection imaging     FINDINGS:      There is distal radial fracture, transversely oriented, mildly displaced  with intra-articular extension centrally.     There is a mildly displaced ulnar styloid fracture.     There is osteoporosis.       Impression:       1. Distal radial and ulnar styloid fractures.  This report was finalized on 07/05/2019 07:40 by Dr. Christian Kathleen MD.    XR Tibia Fibula 2 View Right [[id number]] Collected:  07/05/19 0738     Updated:  07/05/19 0742    Narrative:       EXAMINATION:  XR TIBIA FIBULA 2 VW RIGHT-  7/5/2019 12:05 AM CDT     HISTORY: pain, swellling to lateral leg, brushing; S72.001A-Fracture of  unspecified part of neck of right femur, initial encounter for closed  fracture; W19.XXXA-Unspecified fall, initial encounter;  S52.501A-Unspecified fracture of the lower end of right radius, initial  encounter for closed fracture; S52.601A-Unspecified fracture of lower  end of  right ulna, initial encounter for closed fracture; Z79.01-Long      COMPARISON: No comparison study.     TECHNIQUE: 2 views: AP and lateral projection imaging. 4 images     FINDINGS: The knee and ankle joints are maintained without fracture.       Impression:       1. No acute osseous abnormality.  This report was finalized on 07/05/2019 07:39 by Dr. Christian Kathleen MD.    XR Knee 3 View Right [[id number]] Collected:  07/05/19 0735     Updated:  07/05/19 0741    Narrative:       EXAMINATION:  XR KNEE 3 VW RIGHT-  7/5/2019 12:05 AM CDT     HISTORY: rt knee pain; S72.001A-Fracture of unspecified part of neck of  right femur, initial encounter for closed fracture; W19.XXXA-Unspecified  fall, initial encounter; S52.501A-Unspecified fracture of the lower end  of right radius, initial encounter for closed fracture;  S52.601A-Unspecified fracture of lower end of right ulna, initial  encounter for closed fracture; Z79.01-Long term (current) use of anti      COMPARISON: No comparison study.     TECHNIQUE: 3 views: AP lateral and oblique projection imaging     FINDINGS:      Patella femoral and tibial relationships are appropriate, without  fracture.     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 Chondrocalcinosis  involving the menisci observed.     Vascular calcifications observed.     There is no significant joint effusion.       Impression:       1. No acute osseous abnormality.  This report was finalized on 07/05/2019 07:38 by Dr. Christian Kathleen MD.    XR Hip With or Without Pelvis 2 - 3 View Right [[id number]] Collected:  07/05/19 0731     Updated:  07/05/19 0741    Narrative:       EXAMINATION:  XR HIP W OR WO PELVIS 2-3 VIEW RIGHT-  7/5/2019 12:04 AM  CDT     HISTORY: rt  hip pain, fall      COMPARISON: 02/26/2016     TECHNIQUE: 3 views: AP pelvis, AP and lateral right hip     FINDINGS:      The right femoral prosthesis well-positioned without  hardware  complications.     There is no fracture or dislocation.     There is deformity of the right superior and inferior pubic rami  compatible with fractures, age-indeterminate, question subacute/chronic.  Correlate with clinical findings.     The left hip joint is maintained. Advanced degenerative changes in the  lower lumbar spine.       Impression:       1. 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  2. Deformity of the right superior and inferior pubic rami compatible  with fractures, age-indeterminate, question chronic/subacute. Correlate  with patient presentation.  This report was finalized on 07/05/2019 07:35 by Dr. Christian Kathleen MD.          Intake/Output    Intake/Output Summary (Last 24 hours) at 7/7/2019 0853  Last data filed at 7/7/2019 0600  Gross per 24 hour   Intake 240 ml   Output 570 ml   Net -330 ml       Scheduled Meds    aliskiren 300 mg Oral Daily   amLODIPine 10 mg Oral Daily   aspirin 81 mg Oral Daily   clopidogrel 75 mg Oral Q24H   cycloSPORINE 1 drop Both Eyes Q12H   levothyroxine 112 mcg Oral Q AM   OCUVITE-LUTEIN 1 tablet Oral Daily   oyster shell calcium/vitamin d 1 tablet Oral Daily   sodium chloride 3 mL Intravenous Q12H       I have reviewed the patient current medications.     Assessment/Plan     Active Hospital Problems    Diagnosis   • **Right greater trochanteric avulsion periprosthetic, closed, minimally displaced   • Closed fracture of right wrist   • Closed fracture of multiple pubic rami, right, initial encounter (CMS/[facility])   • Fall   • Impaired gait and mobility   • Acute right hip pain   • Hypothyroidism   • Hypertension     Plan:  1.  X-ray right hip changes from right hip arthroplasty without hardware complications.  Deformity right superior and inferior pubic ramus compatible with fractures.  2.  X-ray right wrist with distal radial and ulnar styloid fractures.  3.  Orthopedics recommends nonoperative fractures right hip.  Distal fracture right  radius nonoperative treatment.  4.  Nonweightbearing right wrist.  Weightbearing as tolerated lower extremities.  5.  Physical therapy consulted. Moderate assist x2 to complete bed mobility.  Recommends acute rehab or skilled nursing facility for physical therapy and Occupational Therapy.  6.  Family requested [facility].  Social service consulted and referral made.  7.  Home medications reviewed. Aspirin and Plavix as no surgical intervention planned per orthopedics.  8.  SCDs.  9.  Wean oxygen as tolerated  10.    No results from magnesium citrate yesterday.  Will give hog enema today.  No bowel movement since admission.  11.  Labs stable     The above documentation resulted from a face-to-face encounter by me Flor TRAVIS, [facility].    Discharge Planning: I expect patient to be discharged to [facility] in 1 day.    ANGY Montemayor   07/07/19   8:53 AM

## 2019-07-07 NOTE — PROGRESS NOTES
Continued Stay Note  JAMEY Scales     Patient Name: Mya Chung  MRN: [id number]  Today's Date: 7/7/2019    Admit Date: 7/4/2019    Discharge Plan     Row Name 07/07/19 1049       Plan    Plan Comments  Referral was sent Friday, 07/05, to [facility]. 3 night inpatient stay will be completed Monday, 07/08.  will follow for bed offer.         Discharge Codes    No documentation.             Nahomi Torre

## 2019-07-07 NOTE — PLAN OF CARE
Problem: Patient Care Overview  Goal: Plan of Care Review  Outcome: Ongoing (interventions implemented as appropriate)   07/06/19 0309 07/06/19 1431 07/06/19 1901   Coping/Psychosocial   Plan of Care Reviewed With --  patient --    Plan of Care Review   Progress no change --  --    OTHER   Outcome Summary --  --  Pt AAO x4. Medicated prn for pain. Continent, voiding. Assist x2 to chair. Splint/ACE wrap c/d/i to R arm. Bruising to hip. Safety maintained. Will continue to monitor.     Goal: Individualization and Mutuality  Outcome: Ongoing (interventions implemented as appropriate)      Problem: Fall Risk (Adult)  Goal: Absence of Fall  Outcome: Ongoing (interventions implemented as appropriate)      Problem: Fractured Hip (Adult)  Goal: Signs and Symptoms of Listed Potential Problems Will be Absent, Minimized or Managed (Fractured Hip)  Outcome: Ongoing (interventions implemented as appropriate)      Problem: Skin Injury Risk (Adult)  Goal: Skin Health and Integrity  Outcome: Ongoing (interventions implemented as appropriate)

## 2019-07-07 NOTE — PLAN OF CARE
Problem: Patient Care Overview  Goal: Plan of Care Review  Outcome: Ongoing (interventions implemented as appropriate)   07/07/19 1447 07/07/19 1611   Coping/Psychosocial   Plan of Care Reviewed With patient --    Plan of Care Review   Progress improving --    OTHER   Outcome Summary --  Pt AAO x4. Medicated prn for pain. Assist x2 to BSC. Voiding, BM today. Refused Hog enema. Splint/ACE to R arm c/d/i. Safety maintained. Will continue to monitor.     Goal: Individualization and Mutuality  Outcome: Ongoing (interventions implemented as appropriate)      Problem: Fall Risk (Adult)  Goal: Absence of Fall  Outcome: Ongoing (interventions implemented as appropriate)      Problem: Fractured Hip (Adult)  Goal: Signs and Symptoms of Listed Potential Problems Will be Absent, Minimized or Managed (Fractured Hip)  Outcome: Ongoing (interventions implemented as appropriate)      Problem: Skin Injury Risk (Adult)  Goal: Skin Health and Integrity  Outcome: Ongoing (interventions implemented as appropriate)

## 2019-07-07 NOTE — PLAN OF CARE
Problem: Patient Care Overview  Goal: Plan of Care Review  Outcome: Ongoing (interventions implemented as appropriate)   07/07/19 3796   Coping/Psychosocial   Plan of Care Reviewed With patient   Plan of Care Review   Progress improving   OTHER   Outcome Summary Bed mobility min x1-2 using draw sheet pt has difficulty due to not being able to use RUE and increase pain in RLE. sit-stand min 2 w/ bed elevated Pt amb 20'x2 rolling platform wx min1 and follow w/ chair. Pt also needs assist w/ AD during amb . Pt will benefit from cont PT upon d/c

## 2019-07-07 NOTE — PLAN OF CARE
Problem: Patient Care Overview  Goal: Plan of Care Review  Outcome: Ongoing (interventions implemented as appropriate)      Problem: Fall Risk (Adult)  Goal: Absence of Fall  Outcome: Ongoing (interventions implemented as appropriate)      Problem: Fractured Hip (Adult)  Goal: Signs and Symptoms of Listed Potential Problems Will be Absent, Minimized or Managed (Fractured Hip)  Outcome: Ongoing (interventions implemented as appropriate)      Problem: Skin Injury Risk (Adult)  Goal: Skin Health and Integrity  Outcome: Ongoing (interventions implemented as appropriate)      Pt. Is A&O x4. No complaints of pain during shift. Splint/ACE wrap to right arm C/D/I and elevated on a pillow. Bruises to right leg. VSS. Safety maintained.

## 2019-07-08 VITALS
BODY MASS INDEX: 23.22 KG/M2 | SYSTOLIC BLOOD PRESSURE: 100 MMHG | DIASTOLIC BLOOD PRESSURE: 44 MMHG | HEART RATE: 85 BPM | OXYGEN SATURATION: 93 % | HEIGHT: 62 IN | RESPIRATION RATE: 17 BRPM | WEIGHT: 126.19 LBS | TEMPERATURE: 98.5 F

## 2019-07-08 PROCEDURE — 97116 GAIT TRAINING THERAPY: CPT

## 2019-07-08 RX ORDER — OXYCODONE HYDROCHLORIDE AND ACETAMINOPHEN 5; 325 MG/1; MG/1
1 TABLET ORAL EVERY 6 HOURS PRN
Qty: 12 TABLET | Refills: 0 | Status: SHIPPED | OUTPATIENT
Start: 2019-07-08 | End: 2019-07-15

## 2019-07-08 RX ORDER — LEVOTHYROXINE SODIUM 0.12 MG/1
125 TABLET ORAL
Status: DISCONTINUED | OUTPATIENT
Start: 2019-07-09 | End: 2019-07-08 | Stop reason: HOSPADM

## 2019-07-08 RX ORDER — ACETAMINOPHEN 325 MG/1
650 TABLET ORAL EVERY 4 HOURS PRN
Status: ON HOLD
Start: 2019-07-08 | End: 2022-10-21

## 2019-07-08 RX ADMIN — ALISKIREN HEMIFUMARATE 300 MG: 150 TABLET, FILM COATED ORAL at 08:38

## 2019-07-08 RX ADMIN — OXYCODONE HYDROCHLORIDE AND ACETAMINOPHEN 1 TABLET: 5; 325 TABLET ORAL at 08:40

## 2019-07-08 RX ADMIN — CYCLOSPORINE 1 DROP: 0.5 EMULSION OPHTHALMIC at 08:38

## 2019-07-08 RX ADMIN — SODIUM CHLORIDE, PRESERVATIVE FREE 3 ML: 5 INJECTION INTRAVENOUS at 08:37

## 2019-07-08 RX ADMIN — Medication 1 TABLET: at 08:37

## 2019-07-08 RX ADMIN — CLOPIDOGREL 75 MG: 75 TABLET, FILM COATED ORAL at 08:37

## 2019-07-08 RX ADMIN — CALCIUM CARBONATE-CHOLECALCIFEROL TAB 250 MG-125 UNIT 1 TABLET: 250-125 TAB at 08:38

## 2019-07-08 RX ADMIN — AMLODIPINE BESYLATE 10 MG: 10 TABLET ORAL at 08:37

## 2019-07-08 RX ADMIN — ASPIRIN 81 MG 81 MG: 81 TABLET ORAL at 08:37

## 2019-07-08 RX ADMIN — LEVOTHYROXINE SODIUM 112 MCG: 112 TABLET ORAL at 05:47

## 2019-07-08 NOTE — PLAN OF CARE
Problem: Patient Care Overview  Goal: Plan of Care Review  Outcome: Ongoing (interventions implemented as appropriate)   07/08/19 6974   Coping/Psychosocial   Plan of Care Reviewed With patient   Plan of Care Review   Progress improving   OTHER   Outcome Summary Pt A&Ox4. Turn Q2 hrs. C/o pain in right hip. PRN medication given upon request with good relief. Hematoma on R leg. RUE splint in place. D/C to [facility] via EMS. Report called to Ronna CLEMENS.      Goal: Discharge Needs Assessment  Outcome: Ongoing (interventions implemented as appropriate)    Goal: Interprofessional Rounds/Family Conf  Outcome: Ongoing (interventions implemented as appropriate)      Problem: Fall Risk (Adult)  Goal: Absence of Fall  Outcome: Ongoing (interventions implemented as appropriate)      Problem: Fractured Hip (Adult)  Goal: Signs and Symptoms of Listed Potential Problems Will be Absent, Minimized or Managed (Fractured Hip)  Outcome: Ongoing (interventions implemented as appropriate)      Problem: Skin Injury Risk (Adult)  Goal: Skin Health and Integrity  Outcome: Ongoing (interventions implemented as appropriate)

## 2019-07-08 NOTE — PLAN OF CARE
Problem: Patient Care Overview  Goal: Plan of Care Review  Outcome: Ongoing (interventions implemented as appropriate)   07/08/19 0845   Coping/Psychosocial   Plan of Care Reviewed With patient   Plan of Care Review   Progress improving   OTHER   Outcome Summary pt trans to EOB min of 2,sit-stand min assist, pt amb 25 feet x 2 with platform rwx, follow with chair. pt would benefit from SNF

## 2019-07-08 NOTE — THERAPY TREATMENT NOTE
Acute Care - Physical Therapy Treatment Note  [facility]     Patient Name: Mya Chung  : 2/10/1928  MRN: [id number]  Today's Date: 2019  Onset of Illness/Injury or Date of Surgery: 19  Date of Referral to PT: 19  Referring Physician: Dr Bryant    Admit Date: 2019    Visit Dx:    ICD-10-CM ICD-9-CM   1. Closed fracture of right hip, initial encounter (CMS/[facility]) S72.001A 820.8   2. Fall, initial encounter W19.XXXA E888.9   3. Closed fracture of distal end of right radius, unspecified fracture morphology, initial encounter S52.501A 813.42   4. Closed fracture of distal end of right ulna, unspecified fracture morphology, initial encounter S52.601A 813.43   5. Chronic anticoagulation Z79.01 V58.61   6. Impaired mobility Z74.09 799.89   7. Decreased activities of daily living (ADL) R68.89 780.99     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   • Hypertension   • Hyperlipidemia   • PAD (peripheral artery disease) (CMS/[facility])   • Right greater trochanteric avulsion periprosthetic, closed, minimally displaced   • Closed fracture of right wrist   • Fall   • Impaired gait and mobility   • Acute right hip pain   • Hypothyroidism   • Closed fracture of multiple pubic rami, right, initial encounter (CMS/[facility])       Therapy Treatment    Rehabilitation Treatment Summary     Row Name 19 0811             Treatment Time/Intention    Discipline  physical therapy assistant  -      Document Type  therapy note (daily note)  -2      Subjective Information  complains of;pain  -2      Patient Effort  good  -2      Existing Precautions/Restrictions  fall;non-weight bearing NWB R wrist, WBAT RLE  -2      Recorded by [AH] Ameena Pagan, PTA 19 0812  [AH2] Ameena Pagan, PTA 19 0844      Row Name 19 0811             Safety Issues, Functional Mobility    Impairments Affecting Function (Mobility)  pain;strength  -       Recorded by [] Ameena Pagan, PTA 07/08/19 0844      Row Name 07/08/19 0811             Mobility Assessment/Intervention    Extremity Weight-bearing Status  right upper extremity;right lower extremity  -      Right Upper Extremity (Weight-bearing Status)  non weight-bearing (NWB) ok to WB thru elbow  -      Right Lower Extremity (Weight-bearing Status)  weight-bearing as tolerated (WBAT)  -      Recorded by [] Ameena Pagan, PTA 07/08/19 0844      Row Name 07/08/19 0811             Bed Mobility Assessment/Treatment    Supine-Sit [place] (Bed Mobility)  verbal cues;minimum assist (75% patient effort);2 person assist  -      Sit-Supine [place] (Bed Mobility)  -- up in chair  -      Bed Mobility, Safety Issues  decreased use of arms for pushing/pulling  -      Assistive Device (Bed Mobility)  draw sheet  -      Recorded by [] Ameena Pagan, PTA 07/08/19 0844      Row Name 07/08/19 0811             Sit-Stand Transfer    Sit-Stand [place] (Transfers)  verbal cues;minimum assist (75% patient effort);2 person assist  -      Assistive Device (Sit-Stand Transfers)  walker, rolling platform  -      Recorded by [] Ameena Pagan, PTA 07/08/19 0844      Row Name 07/08/19 0811             Stand-Sit Transfer    Stand-Sit [place] (Transfers)  verbal cues;minimum assist (75% patient effort)  -      Assistive Device (Stand-Sit Transfers)  walker, rolling platform  -      Recorded by [] Ameena Pagan, PTA 07/08/19 0844      Row Name 07/08/19 0811             Gait/Stairs Assessment/Training    [place] Level (Gait)  verbal cues;contact guard;minimum assist (75% patient effort);1 person assist;1 person to manage equipment  -      Assistive Device (Gait)  walker, front-wheeled  -      Distance in Feet (Gait)  25 25 X 2, follow with chair  -      Pattern (Gait)  step-to  -      Deviations/Abnormal Patterns (Gait)  gait speed decreased;stride length decreased  -       Bilateral Gait Deviations  heel strike decreased  -      Recorded by [] Ameena Pagan, PTA 07/08/19 0844      Row Name 07/08/19 0811             Positioning and Restraints    Pre-Treatment Position  in bed  -AH      Post Treatment Position  chair  -AH      In Chair  reclined;call light within reach;encouraged to call for assist;notified nsg  -AH      Recorded by [] Ameena Pagan, PTA 07/08/19 0844      Row Name 07/08/19 0811             Pain Assessment    Additional Documentation  Pain Scale: Numbers Pre/Post-Treatment (Group)  -AH      Recorded by [] Ameena Pagan, PTA 07/08/19 0844      Row Name 07/08/19 0811             Pain Scale: Numbers Pre/Post-Treatment    Pain Scale: Numbers, Pretreatment  7/10  -      Pain Location - Side  Right  -AH      Pain Location - Orientation  --  -AH      Pain Location  hip  -AH      Pain Intervention(s)  Repositioned;Ambulation/increased activity  -      Recorded by [] Ameena Pagan, PTA 07/08/19 0844      Row Name 07/08/19 0811             Pain Scale: FACES Pre/Post-Treatment    Pain: FACES Scale, Pretreatment  --  -AH      Recorded by [] Ameena Pagan, PTA 07/08/19 0844      Row Name                Wound 07/05/19 0330 Right lower;anterior leg other (see comments)    Wound - Properties Group Date first assessed: 07/05/19 [ST] Time first assessed: 0330 [ST] Present On Admission : yes [ST] Side: Right [ST] Orientation: lower;anterior [ST] Location: leg [ST] Type: other (see comments) [ST], Hematoma  Recorded by:  [ST] Lakesha Mejias RN 07/05/19 0342      User Key  (r) = Recorded By, (t) = Taken By, (c) = Cosigned By    Initials Name Effective Dates Discipline     Ameena Pagan, [facility] 08/02/16 -  PT    Lakesha Burris RN 08/02/16 -  Nurse          Wound 07/05/19 0330 Right lower;anterior leg other (see comments) (Active)   Dressing Appearance open to air 7/7/2019  9:02 PM   Base purple 7/7/2019  9:02 PM   Periwound dry;intact 7/7/2019  9:02 PM    Dressing Care, Wound open to air 7/7/2019  9:02 PM           Physical Therapy Education     Title: PT OT SLP Therapies (In Progress)     Topic: Physical Therapy (In Progress)     Point: Mobility training (Done)     Learning Progress Summary           Patient Acceptance, E,TB, VU by  at 7/8/2019  8:45 AM    Comment:  bed mobility    Acceptance, E, VU,NR by PRINCESS at 7/5/2019 11:15 AM    Comment:  Educated pt/family on progression of PT POC, benefits of activity, WBAT R LE, NWB R wrist.   Family Acceptance, E, VU,NR by PRINCESS at 7/5/2019 11:15 AM    Comment:  Educated pt/family on progression of PT POC, benefits of activity, WBAT R LE, NWB R wrist.                   Point: Precautions (Done)     Learning Progress Summary           Patient Acceptance, E, VU,NR by PRINCESS at 7/5/2019 11:15 AM    Comment:  Educated pt/family on progression of PT POC, benefits of activity, WBAT R LE, NWB R wrist.   Family Acceptance, E, VU,NR by PRINCESS at 7/5/2019 11:15 AM    Comment:  Educated pt/family on progression of PT POC, benefits of activity, WBAT R LE, NWB R wrist.                               User Key     Initials Effective Dates Name Provider Type Discipline     08/02/16 -  Ameena Pagan, PTA Physical Therapy Assistant PT    PRINCESS 08/02/16 -  Ming Villaseñor, PT DPT Physical Therapist PT                PT Recommendation and Plan     Plan of Care Reviewed With: patient  Progress: improving  Outcome Summary: pt trans to EOB min of 2,sit-stand min assist, pt amb 25 feet x 2 with platform rwx, follow with chair. pt would benefit from SNF  Outcome Measures     Row Name 07/07/19 1400 07/05/19 1115 07/05/19 1100       How much help from another person do you currently need...    Turning from your back to your side while in flat bed without using bedrails?  3  -NW  2  -PRINCESS  --    Moving from lying on back to sitting on the side of a flat bed without bedrails?  3  -NW  2  -PRINCESS  --    Moving to and from a bed to a chair (including a wheelchair)?   3  -NW  2  -PRINCESS  --    Standing up from a chair using your arms (e.g., wheelchair, bedside chair)?  2  -NW  2  -PRINCESS  --    Climbing 3-5 steps with a railing?  2  -NW  1  -PRINCESS  --    To walk in hospital room?  3  -NW  2  -PRINCESS  --    AM-PAC 6 Clicks Score (PT)  16  -NW  11  -PRINCESS  --       How much help from another is currently needed...    Putting on and taking off regular lower body clothing?  --  --  2  -AC    Bathing (including washing, rinsing, and drying)  --  --  2  -AC    Toileting (which includes using toilet bed pan or urinal)  --  --  2  -AC    Putting on and taking off regular upper body clothing  --  --  3  -AC    Taking care of personal grooming (such as brushing teeth)  --  --  3  -AC    Eating meals  --  --  3  -AC    AM-PAC 6 Clicks Score (OT)  --  --  15  -AC       Functional Assessment    Outcome Measure Options  AM-PAC 6 Clicks Basic Mobility (PT)  -NW  AM-PAC 6 Clicks Basic Mobility (PT)  -PRINCESS  AM-PAC 6 Clicks Daily Activity (OT)  -AC      User Key  (r) = Recorded By, (t) = Taken By, (c) = Cosigned By    Initials Name Provider Type     Jose Field, OTR/L, CNT Occupational Therapist    Ming Hernandez, PT DPT Physical Therapist    iLna Estrada, RICARDO Physical Therapy Assistant         Time Calculation:   PT Charges     Row Name 07/08/19 0847             Time Calculation    Start Time  0811  -      Stop Time  0826  -      Time Calculation (min)  15 min  -      PT Received On  07/08/19  -         Time Calculation- PT    Total Timed Code Minutes- PT  15 minute(s)  -         Timed Charges    57982 - Gait Training Minutes   15  -        User Key  (r) = Recorded By, (t) = Taken By, (c) = Cosigned By    Initials Name Provider Type     Ameena Pagan, RICARDO Physical Therapy Assistant        Therapy Charges for Today     Code Description Service Date Service Provider Modifiers Qty    95120898387 HC GAIT TRAINING EA 15 MIN 7/8/2019 Ameena Pagan, PTA GP 1          PT  G-Codes  Outcome Measure Options: AM-PAC 6 Clicks Basic Mobility (PT)  AM-PAC 6 Clicks Score (PT): 16  AM-PAC 6 Clicks Score (OT): 15    Ameena Pagan, PTA  7/8/2019

## 2019-07-08 NOTE — PLAN OF CARE
Problem: Patient Care Overview  Goal: Plan of Care Review  Outcome: Ongoing (interventions implemented as appropriate)   07/08/19 0354   Coping/Psychosocial   Plan of Care Reviewed With patient   Plan of Care Review   Progress improving   OTHER   Outcome Summary Pt is A&O x4. No complaints of pain. Splint/ACE to right arm C/D/I and elevated on pillow. Repositioned q2hrs Safety maintained.        Problem: Fall Risk (Adult)  Goal: Absence of Fall  Outcome: Ongoing (interventions implemented as appropriate)      Problem: Fractured Hip (Adult)  Goal: Signs and Symptoms of Listed Potential Problems Will be Absent, Minimized or Managed (Fractured Hip)  Outcome: Ongoing (interventions implemented as appropriate)      Problem: Skin Injury Risk (Adult)  Goal: Skin Health and Integrity  Outcome: Ongoing (interventions implemented as appropriate)

## 2019-07-08 NOTE — PROGRESS NOTES
Continued Stay Note   [facility]     Patient Name: Mya Chung  MRN: [id number]  Today's Date: 7/8/2019    Admit Date: 7/4/2019    Discharge Plan     Row Name 07/08/19 0858       Plan    Final Discharge Disposition Code  03 - skilled nursing facility (SNF)    Final Note  [place] HAS OFFERED A BED AND CAN ACCEPT PT TODAY. PT IS BEING DCD TO [facility] TODAY, SKILLED LEVEL OF CARE. CALL REPORT NUMBER [phone]        Discharge Codes    No documentation.             LONNIE Engle

## 2019-07-08 NOTE — THERAPY DISCHARGE NOTE
Acute Care - Physical Therapy Discharge Summary  [facility]       Patient Name: Mya Chung  : 2/10/1928  MRN: [id number]    Today's Date: 2019  Onset of Illness/Injury or Date of Surgery: 19    Date of Referral to PT: 19  Referring Physician: Dr Bryant      Admit Date: 2019      PT Recommendation and Plan    Visit Dx:    ICD-10-CM ICD-9-CM   1. Closed fracture of right hip, initial encounter (CMS/[facility]) S72.001A 820.8   2. Fall, initial encounter W19.XXXA E888.9   3. Closed fracture of distal end of right radius, unspecified fracture morphology, initial encounter S52.501A 813.42   4. Closed fracture of distal end of right ulna, unspecified fracture morphology, initial encounter S52.601A 813.43   5. Chronic anticoagulation Z79.01 V58.61   6. Impaired mobility Z74.09 799.89   7. Decreased activities of daily living (ADL) R68.89 780.99       Outcome Measures     Row Name 19 1400             How much help from another person do you currently need...    Turning from your back to your side while in flat bed without using bedrails?  3  -NW      Moving from lying on back to sitting on the side of a flat bed without bedrails?  3  -NW      Moving to and from a bed to a chair (including a wheelchair)?  3  -NW      Standing up from a chair using your arms (e.g., wheelchair, bedside chair)?  2  -NW      Climbing 3-5 steps with a railing?  2  -NW      To walk in hospital room?  3  -NW      AM-PAC 6 Clicks Score (PT)  16  -NW         Functional Assessment    Outcome Measure Options  AM-PAC 6 Clicks Basic Mobility (PT)  -NW        User Key  (r) = Recorded By, (t) = Taken By, (c) = Cosigned By    Initials Name Provider Type    Lina Estrada, PTA Physical Therapy Assistant          PT Charges     Row Name 19 0847             Time Calculation    Start Time  08  -      Stop Time  826  -      Time Calculation (min)  15 min  -      PT Received On  19  -         Time  Calculation- PT    Total Timed Code Minutes- PT  15 minute(s)  -         Timed Charges    81076 - Gait Training Minutes   15  -AH        User Key  (r) = Recorded By, (t) = Taken By, (c) = Cosigned By    Initials Name Provider Type    Ameena Suarez PTA Physical Therapy Assistant          Rehab Goal Summary     Row Name 07/08/19 1556             Physical Therapy Goals    Bed Mobility Goal Selection (PT)  bed mobility, PT goal 1  -NW      Transfer Goal Selection (PT)  transfer, PT goal 1  -NW      Gait Training Goal Selection (PT)  gait training, PT goal 1  -NW         Bed Mobility Goal 1 (PT)    Activity/Assistive Device (Bed Mobility Goal 1, PT)  sit to supine/supine to sit  -NW      [place] Level/Cues Needed (Bed Mobility Goal 1, PT)  minimum assist (75% or more patient effort)  -NW      Time Frame (Bed Mobility Goal 1, PT)  long term goal (LTG);10 days  -NW      Progress/Outcomes (Bed Mobility Goal 1, PT)  goal not met  -NW         Transfer Goal 1 (PT)    Activity/Assistive Device (Transfer Goal 1, PT)  sit-to-stand/stand-to-sit;bed-to-chair/chair-to-bed;walker, rolling platform  -NW      [place] Level/Cues Needed (Transfer Goal 1, PT)  minimum assist (75% or more patient effort)  -NW      Time Frame (Transfer Goal 1, PT)  long term goal (LTG);10 days  -NW      Progress/Outcome (Transfer Goal 1, PT)  goal not met  -NW         Gait Training Goal 1 (PT)    Activity/Assistive Device (Gait Training Goal 1, PT)  gait (walking locomotion);assistive device use;decrease fall risk;improve balance and speed;increase endurance/gait distance;walker, rolling platform  -NW      [place] Level (Gait Training Goal 1, PT)  minimum assist (75% or more patient effort)  -NW      Distance (Gait Goal 1, PT)  50  -NW      Time Frame (Gait Training Goal 1, PT)  long term goal (LTG);10 days  -NW      Progress/Outcome (Gait Training Goal 1, PT)  goal not met  -NW        User Key  (r) = Recorded By, (t) = Taken By, (c)  = Cosigned By    Initials Name Provider Type Discipline    NW Lina Laura, RICARDO Physical Therapy Assistant PT          Therapy Charges for Today     Code Description Service Date Service Provider Modifiers Qty    63273414149 HC GAIT TRAINING EA 15 MIN 7/7/2019 Lina Laura PTA GP 2          PT Discharge Summary  Anticipated Discharge Disposition (PT): skilled nursing facility  Reason for Discharge: Discharge from facility  Outcomes Achieved: Refer to plan of care for updates on goals achieved  Discharge Destination: SNF      Lina Laura PTA   7/8/2019

## 2019-07-09 NOTE — THERAPY DISCHARGE NOTE
Acute Care - Occupational Therapy Discharge Summary  [facility]     Patient Name: Mya Chung  : 2/10/1928  MRN: [id number]    Today's Date: 2019  Onset of Illness/Injury or Date of Surgery: 19    Date of Referral to OT: 19  Referring Physician: Dr Bryant      Admit Date: 2019        OT Recommendation and Plan    Visit Dx:    ICD-10-CM ICD-9-CM   1. Closed fracture of right hip, initial encounter (CMS/[facility]) S72.001A 820.8   2. Fall, initial encounter W19.XXXA E888.9   3. Closed fracture of distal end of right radius, unspecified fracture morphology, initial encounter S52.501A 813.42   4. Closed fracture of distal end of right ulna, unspecified fracture morphology, initial encounter S52.601A 813.43   5. Chronic anticoagulation Z79.01 V58.61   6. Impaired mobility Z74.09 799.89   7. Decreased activities of daily living (ADL) R68.89 780.99               Rehab Goal Summary     Row Name 19 0700             Transfer Goal 1 (OT)    Activity/Assistive Device (Transfer Goal 1, OT)  bed-to-chair/chair-to-bed;toilet;commode, bedside without drop arms  -TS      [place] Level/Cues Needed (Transfer Goal 1, OT)  minimum assist (75% or more patient effort)  -TS      Time Frame (Transfer Goal 1, OT)  long term goal (LTG);10 days  -TS      Progress/Outcome (Transfer Goal 1, OT)  goal not met  -TS         Dressing Goal 1 (OT)    Activity/Assistive Device (Dressing Goal 1, OT)  lower body dressing  -TS      [place]/Cues Needed (Dressing Goal 1, OT)  minimum assist (75% or more patient effort)  -TS      Time Frame (Dressing Goal 1, OT)  long term goal (LTG);10 days  -TS      Progress/Outcome (Dressing Goal 1, OT)  goal not met  -TS         Toileting Goal 1 (OT)    Activity/Device (Toileting Goal 1, OT)  toileting skills, all;commode, bedside without drop arms  -TS      [place] Level/Cues Needed (Toileting Goal 1, OT)  minimum assist (75% or more patient effort)  -TS      Time Frame  (Toileting Goal 1, OT)  long term goal (LTG);10 days  -TS      Progress/Outcome (Toileting Goal 1, OT)  goal not met  -TS        User Key  (r) = Recorded By, (t) = Taken By, (c) = Cosigned By    Initials Name Provider Type Discipline    TS Roxann Garcia COTA/L Occupational Therapy Assistant OT          Outcome Measures     Row Name 07/07/19 1400             How much help from another person do you currently need...    Turning from your back to your side while in flat bed without using bedrails?  3  -NW      Moving from lying on back to sitting on the side of a flat bed without bedrails?  3  -NW      Moving to and from a bed to a chair (including a wheelchair)?  3  -NW      Standing up from a chair using your arms (e.g., wheelchair, bedside chair)?  2  -NW      Climbing 3-5 steps with a railing?  2  -NW      To walk in hospital room?  3  -NW      AM-PAC 6 Clicks Score (PT)  16  -NW         Functional Assessment    Outcome Measure Options  AM-PAC 6 Clicks Basic Mobility (PT)  -NW        User Key  (r) = Recorded By, (t) = Taken By, (c) = Cosigned By    Initials Name Provider Type    Lina Estrada, PTA Physical Therapy Assistant          Therapy Suggested Charges     Code   Minutes Charges    None                 OT Discharge Summary  Reason for Discharge: Discharge from facility  Outcomes Achieved: Refer to plan of care for updates on goals achieved  Discharge Destination: [facility]      PASCUAL Garcia  7/9/2019

## 2019-08-27 ENCOUNTER — TELEPHONE (OUTPATIENT)
Dept: VASCULAR SURGERY | Facility: CLINIC | Age: 84
End: 2019-08-27

## 2019-08-28 ENCOUNTER — HOSPITAL ENCOUNTER (OUTPATIENT)
Dept: ULTRASOUND IMAGING | Facility: HOSPITAL | Age: 84
Discharge: HOME | End: 2019-08-28
Admitting: NURSE PRACTITIONER

## 2019-08-28 ENCOUNTER — OFFICE VISIT (OUTPATIENT)
Dept: VASCULAR SURGERY | Facility: CLINIC | Age: 84
End: 2019-08-28

## 2019-08-28 VITALS
SYSTOLIC BLOOD PRESSURE: 134 MMHG | WEIGHT: 117.2 LBS | HEART RATE: 96 BPM | OXYGEN SATURATION: 98 % | HEIGHT: 62 IN | DIASTOLIC BLOOD PRESSURE: 78 MMHG | BODY MASS INDEX: 21.57 KG/M2

## 2019-08-28 DIAGNOSIS — I73.9 PAD (PERIPHERAL ARTERY DISEASE) (HCC): Primary | ICD-10-CM

## 2019-08-28 DIAGNOSIS — E78.5 HYPERLIPIDEMIA, UNSPECIFIED HYPERLIPIDEMIA TYPE: ICD-10-CM

## 2019-08-28 DIAGNOSIS — I73.9 PAD (PERIPHERAL ARTERY DISEASE) (HCC): ICD-10-CM

## 2019-08-28 DIAGNOSIS — I10 ESSENTIAL HYPERTENSION: ICD-10-CM

## 2019-08-28 PROCEDURE — 99214 OFFICE O/P EST MOD 30 MIN: CPT | Performed by: NURSE PRACTITIONER

## 2019-08-28 PROCEDURE — 93923 UPR/LXTR ART STDY 3+ LVLS: CPT

## 2019-08-28 PROCEDURE — 93924 LWR XTR VASC STDY BILAT: CPT | Performed by: SURGERY

## 2019-08-28 NOTE — PROGRESS NOTES
"8/28/2019       Delfina Pereira,   [address]  [place] [address]    Mya Chung  2/10/1928    Chief Complaint   Patient presents with   • Follow-up     6 Month Follow Up For Peripheral Artery Disease. Test [id number] US pad ankle / brach ind ext comp. Patient denies any stroke like symptoms.        Dear Delfina Pereira,        HPI  I had the pleasure of seeing your patient Mya Chung in the office today.   As you recall, Mya Chung is a 89 y.o.  female who you are currently following for routine health maintenance.  She has complaints of weakness to bilateral lower extremities.   She denies any cramping to her legs at night when she sleeps.  She was describing claudication and underwent  a left lower extremity angiogram most recently a left common femoral endarterectomy in August 2018.  She denies any claudication symptoms since that time.  She is maintained on aspirin and Plavix.  She did have noninvasive testing performed today, which I did review in office.      Review of Systems   Constitutional: Negative.    Eyes: Negative.    Respiratory: Negative.    Cardiovascular: Positive for leg swelling.   Gastrointestinal: Negative.    Endocrine: Negative.    Genitourinary: Negative.    Musculoskeletal: Negative.    Skin: Positive for color change.   Allergic/Immunologic: Negative.    Neurological: Negative for numbness.   Hematological: Negative.    Psychiatric/Behavioral: Negative.        /78 (BP Location: Right arm, Patient Position: Sitting, Cuff Size: Adult)   Pulse 96   Ht 157.5 cm (62\")   Wt 53.2 kg (117 lb 3.2 oz)   SpO2 98%   BMI 21.44 kg/m²   Physical Exam   Constitutional: She is oriented to person, place, and time. Vital signs are normal. She appears well-developed and well-nourished. No distress.   HENT:   Head: Normocephalic and atraumatic.   Mouth/Throat: Oropharynx is clear and moist.   Eyes: Pupils are equal, round, and reactive to light. No scleral " icterus.   Neck: Normal range of motion. Neck supple. No JVD present. Carotid bruit is not present. No thyromegaly present.   Cardiovascular: Normal rate, regular rhythm, S2 normal, normal heart sounds, intact distal pulses and normal pulses. Exam reveals no gallop and no friction rub.   No murmur heard.  Pulses:       Femoral pulses are 2+ on the right side, and 2+ on the left side.       Dorsalis pedis pulses are 2+ on the right side, and 2+ on the left side.        Posterior tibial pulses are 2+ on the right side, and 2+ on the left side.   Significant varicose veins to left lower extremity with venous congestion   Pulmonary/Chest: Effort normal and breath sounds normal.   Abdominal: Soft. Normal aorta and bowel sounds are normal. There is no hepatosplenomegaly.   Musculoskeletal: Normal range of motion. She exhibits edema (Bilateral lower extremities).   Neurological: She is alert and oriented to person, place, and time. No cranial nerve deficit.   Skin: Skin is warm and dry. She is not diaphoretic.   Psychiatric: She has a normal mood and affect. Her behavior is normal. Judgment and thought content normal.   Nursing note and vitals reviewed.      No results found.    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)   • Right greater trochanteric avulsion periprosthetic, closed, minimally displaced   • Closed fracture of right wrist   • Fall   • Impaired gait and mobility   • Acute right hip pain   • Hypothyroidism   • Closed fracture of multiple pubic rami, right, initial encounter (CMS/[facility])         ICD-10-CM ICD-9-CM   1. PAD (peripheral artery disease) (CMS/[facility]) I73.9 443.9   2. Essential hypertension I10 401.9   3. Hyperlipidemia, unspecified hyperlipidemia type E78.5 272.4         Plan: After thoroughly evaluating [facility], I best course of action is to  remain conservative from a vascular standpoint.  Her legs continue to feel good, but does have some swelling noted.  I did review her testing and is within normal limits.  Feet and nice and warm with palpable pulses.  We will see her back in 6 months with repeat noninvasive testing for continued surveillance, including ABIs.  I did also give her a prescription for compression stockings in the 15-20 mmHg.  We did instruct her on how to wear these on a daily basis.  I did discuss vascular risk factors as they pertain to the progression of vascular disease including controlling her hypertension and hyperlipidemia.  Her hypertension and hyperlipidemia remain stable. Body mass index is 21.44 kg/m². The patient can continue taking her current medication regimen as previously planned.  This was all discussed in full with complete understanding.    Thank you for allowing me to participate in the care of your patient.  Please do not hesitate with any questions or concerns.  I will keep you aware of any further encounters with [facility].        Sincerely yours,         Saumya Morales, ANGY Pereira, Delfina Whitney, DO

## 2019-11-22 ENCOUNTER — HOSPITAL ENCOUNTER (OUTPATIENT)
Dept: CARDIOLOGY | Facility: HOSPITAL | Age: 84
Discharge: HOME | End: 2019-11-22
Admitting: FAMILY MEDICINE

## 2019-11-22 ENCOUNTER — TRANSCRIBE ORDERS (OUTPATIENT)
Dept: ADMINISTRATIVE | Facility: HOSPITAL | Age: 84
End: 2019-11-22

## 2019-11-22 ENCOUNTER — HOSPITAL ENCOUNTER (OUTPATIENT)
Dept: CT IMAGING | Facility: HOSPITAL | Age: 84
Discharge: HOME | End: 2019-11-22

## 2019-11-22 DIAGNOSIS — R55 SYNCOPE AND COLLAPSE: ICD-10-CM

## 2019-11-22 DIAGNOSIS — R55 SYNCOPE AND COLLAPSE: Primary | ICD-10-CM

## 2019-11-22 PROCEDURE — 93005 ELECTROCARDIOGRAM TRACING: CPT | Performed by: FAMILY MEDICINE

## 2019-11-22 PROCEDURE — 70450 CT HEAD/BRAIN W/O DYE: CPT

## 2019-11-22 PROCEDURE — 93010 ELECTROCARDIOGRAM REPORT: CPT | Performed by: INTERNAL MEDICINE

## 2020-02-25 ENCOUNTER — APPOINTMENT (OUTPATIENT)
Dept: ULTRASOUND IMAGING | Facility: HOSPITAL | Age: 85
End: 2020-02-25

## 2020-03-18 ENCOUNTER — APPOINTMENT (OUTPATIENT)
Dept: ULTRASOUND IMAGING | Facility: HOSPITAL | Age: 85
End: 2020-03-18

## 2020-09-18 ENCOUNTER — TELEPHONE (OUTPATIENT)
Dept: PODIATRY | Facility: CLINIC | Age: 85
End: 2020-09-18

## 2020-09-18 NOTE — TELEPHONE ENCOUNTER
Pt called requesting to move appt to Nov. I have moved appt and testing , pt was notified and letter of appt have been mailed DN

## 2020-10-02 ENCOUNTER — APPOINTMENT (OUTPATIENT)
Dept: ULTRASOUND IMAGING | Facility: HOSPITAL | Age: 85
End: 2020-10-02

## 2020-10-29 ENCOUNTER — TRANSCRIBE ORDERS (OUTPATIENT)
Dept: ADMINISTRATIVE | Facility: HOSPITAL | Age: 85
End: 2020-10-29

## 2020-10-29 DIAGNOSIS — Z12.31 ENCOUNTER FOR SCREENING MAMMOGRAM FOR MALIGNANT NEOPLASM OF BREAST: Primary | ICD-10-CM

## 2020-11-09 ENCOUNTER — HOSPITAL ENCOUNTER (OUTPATIENT)
Dept: ULTRASOUND IMAGING | Facility: HOSPITAL | Age: 85
Discharge: HOME | End: 2020-11-09
Admitting: NURSE PRACTITIONER

## 2020-11-09 ENCOUNTER — OFFICE VISIT (OUTPATIENT)
Dept: VASCULAR SURGERY | Facility: CLINIC | Age: 85
End: 2020-11-09

## 2020-11-09 VITALS
WEIGHT: 121 LBS | BODY MASS INDEX: 20.66 KG/M2 | HEART RATE: 82 BPM | HEIGHT: 64 IN | OXYGEN SATURATION: 96 % | DIASTOLIC BLOOD PRESSURE: 60 MMHG | SYSTOLIC BLOOD PRESSURE: 142 MMHG

## 2020-11-09 DIAGNOSIS — I73.9 PAD (PERIPHERAL ARTERY DISEASE) (HCC): ICD-10-CM

## 2020-11-09 DIAGNOSIS — I73.9 PAD (PERIPHERAL ARTERY DISEASE) (HCC): Primary | ICD-10-CM

## 2020-11-09 DIAGNOSIS — I10 ESSENTIAL HYPERTENSION: ICD-10-CM

## 2020-11-09 DIAGNOSIS — E78.5 HYPERLIPIDEMIA, UNSPECIFIED HYPERLIPIDEMIA TYPE: ICD-10-CM

## 2020-11-09 PROCEDURE — 93923 UPR/LXTR ART STDY 3+ LVLS: CPT | Performed by: SURGERY

## 2020-11-09 PROCEDURE — 93923 UPR/LXTR ART STDY 3+ LVLS: CPT

## 2020-11-09 PROCEDURE — 99214 OFFICE O/P EST MOD 30 MIN: CPT | Performed by: NURSE PRACTITIONER

## 2020-11-09 NOTE — PROGRESS NOTES
"11/9/2020       Delfina Pereira,   [address]  [facility] [address]    Mya Chung  2/10/1928    Chief Complaint   Patient presents with   • Follow-up     Patient here for 6 month fu with SHEREEN completed.  She states that she has not had any stroke like symptoms.  Patient states that her right leg feels like it will give out on her.  Patient is taking Plavix.        Dear Delfina Pereira,        HPI  I had the pleasure of seeing your patient Mya Chung in the office today.   As you recall, Mya Chung is a 89 y.o.  female who you are currently following for routine health maintenance.  She has complaints of weakness to bilateral lower extremities.   She denies any cramping to her legs at night when she sleeps.  She was describing claudication and underwent  a left lower extremity angiogram most recently a left common femoral endarterectomy in August 2018.  She denies any claudication symptoms since that time.  She continues to be maintained on aspirin and Plavix.  Her only complaint is that she feels like occasionally her right leg will just give out on her.  She denies any back pain.  She did have noninvasive testing performed today, which I did review in office.      Review of Systems   Constitutional: Negative.    Eyes: Negative.    Respiratory: Negative.    Cardiovascular: Positive for leg swelling.   Gastrointestinal: Negative.    Endocrine: Negative.    Genitourinary: Negative.    Musculoskeletal: Negative.    Skin: Positive for color change.   Allergic/Immunologic: Negative.    Neurological: Negative for numbness.   Hematological: Negative.    Psychiatric/Behavioral: Negative.        /60 (BP Location: Left arm, Patient Position: Sitting)   Pulse 82   Ht 162.6 cm (64\")   Wt 54.9 kg (121 lb)   SpO2 96%   BMI 20.77 kg/m²     Physical Exam  Vitals signs and nursing note reviewed.   Constitutional:       General: She is not in acute distress.     Appearance: She is " well-developed. She is not diaphoretic.   HENT:      Head: Normocephalic and atraumatic.   Eyes:      General: No scleral icterus.     Pupils: Pupils are equal, round, and reactive to light.   Neck:      Musculoskeletal: Normal range of motion and neck supple.      Thyroid: No thyromegaly.      Vascular: No carotid bruit or JVD.   Cardiovascular:      Rate and Rhythm: Normal rate and regular rhythm.      Pulses: Normal pulses.           Femoral pulses are 2+ on the right side and 2+ on the left side.       Dorsalis pedis pulses are 2+ on the right side and 2+ on the left side.        Posterior tibial pulses are 2+ on the right side and 2+ on the left side.      Heart sounds: Normal heart sounds and S2 normal. No murmur. No friction rub. No gallop.       Comments: Significant varicose veins to left lower extremity with venous congestion  Pulmonary:      Effort: Pulmonary effort is normal.      Breath sounds: Normal breath sounds.   Abdominal:      General: Bowel sounds are normal.      Palpations: Abdomen is soft.   Musculoskeletal: Normal range of motion.   Skin:     General: Skin is warm and dry.   Neurological:      Mental Status: She is alert and oriented to person, place, and time.      Cranial Nerves: No cranial nerve deficit.   Psychiatric:         Behavior: Behavior normal.         Thought Content: Thought content normal.         Judgment: Judgment normal.       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 PAD (peripheral artery  disease) (CMS/[facility])  I73.9 443.9   2. Essential hypertension  I10 401.9   3. Hyperlipidemia, unspecified hyperlipidemia type  E78.5 272.4         Plan: After thoroughly evaluating Mya Chung, I believe the best course of action is to remain conservative from vascular surgery standpoint.  Overall, she appears to be doing well and denies any claudication to her lower extremities.  She does have the feeling that her right leg wants to give out on her intermittently.  She denies any back pain however I assume this is due to lumbar degenerative disc disease.  I did review her testing which shows normal arterial insufficiency to her lower extremities.  I would typically have her come back every 6 months with testing however the patient inquires about yearly testing.  We can see her back in 1 year however I urged her to call sooner with any claudication symptoms especially to her left lower extremity.   I did discuss vascular risk factors as they pertain to the progression of vascular disease including controlling her hypertension and hyperlipidemia.  Her blood pressure is stable on her current medication regimen.   Body mass index is 20.77 kg/m². The patient can continue taking her current medication regimen as previously planned.  This was all discussed in full with complete understanding.    Thank you for allowing me to participate in the care of your patient.  Please do not hesitate with any questions or concerns.  I will keep you aware of any further encounters with Mya Chung.        Sincerely yours,         Saumya Morales, ANGY Pereira, Delfina Whitney, DO

## 2020-11-18 ENCOUNTER — HOSPITAL ENCOUNTER (OUTPATIENT)
Dept: MAMMOGRAPHY | Facility: HOSPITAL | Age: 85
Discharge: HOME | End: 2020-11-18
Admitting: FAMILY MEDICINE

## 2020-11-18 DIAGNOSIS — Z12.31 ENCOUNTER FOR SCREENING MAMMOGRAM FOR MALIGNANT NEOPLASM OF BREAST: ICD-10-CM

## 2020-11-18 PROCEDURE — 77063 BREAST TOMOSYNTHESIS BI: CPT

## 2020-11-18 PROCEDURE — 77067 SCR MAMMO BI INCL CAD: CPT

## 2021-02-25 ENCOUNTER — TRANSCRIBE ORDERS (OUTPATIENT)
Dept: LAB | Facility: HOSPITAL | Age: 86
End: 2021-02-25

## 2021-02-25 ENCOUNTER — LAB (OUTPATIENT)
Dept: LAB | Facility: HOSPITAL | Age: 86
End: 2021-02-25

## 2021-02-25 DIAGNOSIS — R11.11 VOMITING WITHOUT NAUSEA, INTRACTABILITY OF VOMITING NOT SPECIFIED, UNSPECIFIED VOMITING TYPE: ICD-10-CM

## 2021-02-25 DIAGNOSIS — R11.0 NAUSEA: ICD-10-CM

## 2021-02-25 DIAGNOSIS — R11.11 VOMITING WITHOUT NAUSEA, INTRACTABILITY OF VOMITING NOT SPECIFIED, UNSPECIFIED VOMITING TYPE: Primary | ICD-10-CM

## 2021-02-25 LAB — SARS-COV-2 ORF1AB RESP QL NAA+PROBE: NOT DETECTED

## 2021-02-25 PROCEDURE — U0004 COV-19 TEST NON-CDC HGH THRU: HCPCS

## 2021-02-25 PROCEDURE — U0005 INFEC AGEN DETEC AMPLI PROBE: HCPCS

## 2021-02-25 PROCEDURE — C9803 HOPD COVID-19 SPEC COLLECT: HCPCS

## 2021-02-26 ENCOUNTER — TRANSCRIBE ORDERS (OUTPATIENT)
Dept: ADMINISTRATIVE | Facility: HOSPITAL | Age: 86
End: 2021-02-26

## 2021-02-26 DIAGNOSIS — R10.13 EPIGASTRIC PAIN: Primary | ICD-10-CM

## 2021-03-01 ENCOUNTER — IMMUNIZATION (OUTPATIENT)
Dept: VACCINE CLINIC | Facility: HOSPITAL | Age: 86
End: 2021-03-01

## 2021-03-01 PROCEDURE — 0011A: CPT | Performed by: OBSTETRICS & GYNECOLOGY

## 2021-03-01 PROCEDURE — 91301 HC SARSCO02 VAC 100MCG/0.5ML IM: CPT | Performed by: OBSTETRICS & GYNECOLOGY

## 2021-03-03 ENCOUNTER — HOSPITAL ENCOUNTER (OUTPATIENT)
Dept: ULTRASOUND IMAGING | Facility: HOSPITAL | Age: 86
Discharge: HOME | End: 2021-03-03
Admitting: FAMILY MEDICINE

## 2021-03-03 DIAGNOSIS — R10.13 EPIGASTRIC PAIN: ICD-10-CM

## 2021-03-03 PROCEDURE — 76705 ECHO EXAM OF ABDOMEN: CPT

## 2021-03-04 ENCOUNTER — HOSPITAL ENCOUNTER (OUTPATIENT)
Dept: NUCLEAR MEDICINE | Facility: HOSPITAL | Age: 86
Discharge: HOME | End: 2021-03-04

## 2021-03-04 DIAGNOSIS — R10.13 EPIGASTRIC PAIN: ICD-10-CM

## 2021-03-04 PROCEDURE — A9537 TC99M MEBROFENIN: HCPCS | Performed by: FAMILY MEDICINE

## 2021-03-04 PROCEDURE — 0 TECHNETIUM TC 99M MEBROFENIN KIT: Performed by: FAMILY MEDICINE

## 2021-03-04 PROCEDURE — 78226 HEPATOBILIARY SYSTEM IMAGING: CPT

## 2021-03-04 RX ORDER — KIT FOR THE PREPARATION OF TECHNETIUM TC 99M MEBROFENIN 45 MG/10ML
1 INJECTION, POWDER, LYOPHILIZED, FOR SOLUTION INTRAVENOUS
Status: COMPLETED | OUTPATIENT
Start: 2021-03-04 | End: 2021-03-04

## 2021-03-04 RX ADMIN — MEBROFENIN 1 DOSE: 45 INJECTION, POWDER, LYOPHILIZED, FOR SOLUTION INTRAVENOUS at 08:20

## 2021-03-10 ENCOUNTER — TRANSCRIBE ORDERS (OUTPATIENT)
Dept: ADMINISTRATIVE | Facility: HOSPITAL | Age: 86
End: 2021-03-10

## 2021-03-10 DIAGNOSIS — Z11.59 SCREENING FOR VIRAL DISEASE: Primary | ICD-10-CM

## 2021-03-11 ENCOUNTER — APPOINTMENT (OUTPATIENT)
Dept: PREADMISSION TESTING | Facility: HOSPITAL | Age: 86
End: 2021-03-11

## 2021-03-11 VITALS
HEART RATE: 92 BPM | OXYGEN SATURATION: 100 % | HEIGHT: 61 IN | SYSTOLIC BLOOD PRESSURE: 122 MMHG | DIASTOLIC BLOOD PRESSURE: 59 MMHG | WEIGHT: 117.73 LBS | BODY MASS INDEX: 22.23 KG/M2

## 2021-03-11 LAB
ALBUMIN SERPL-MCNC: 4 G/DL (ref 3.5–5.2)
ALBUMIN/GLOB SERPL: 1.4 G/DL
ALP SERPL-CCNC: 60 U/L (ref 39–117)
ALT SERPL W P-5'-P-CCNC: 10 U/L (ref 1–33)
ANION GAP SERPL CALCULATED.3IONS-SCNC: 8 MMOL/L (ref 5–15)
AST SERPL-CCNC: 20 U/L (ref 1–32)
BASOPHILS # BLD AUTO: 0.04 10*3/MM3 (ref 0–0.2)
BASOPHILS NFR BLD AUTO: 0.6 % (ref 0–1.5)
BILIRUB SERPL-MCNC: 0.7 MG/DL (ref 0–1.2)
BUN SERPL-MCNC: 42 MG/DL (ref 8–23)
BUN/CREAT SERPL: 30.9 (ref 7–25)
CALCIUM SPEC-SCNC: 9.9 MG/DL (ref 8.2–9.6)
CHLORIDE SERPL-SCNC: 102 MMOL/L (ref 98–107)
CO2 SERPL-SCNC: 29 MMOL/L (ref 22–29)
CREAT SERPL-MCNC: 1.36 MG/DL (ref 0.57–1)
DEPRECATED RDW RBC AUTO: 45.5 FL (ref 37–54)
EOSINOPHIL # BLD AUTO: 0.24 10*3/MM3 (ref 0–0.4)
EOSINOPHIL NFR BLD AUTO: 3.4 % (ref 0.3–6.2)
ERYTHROCYTE [DISTWIDTH] IN BLOOD BY AUTOMATED COUNT: 13.3 % (ref 12.3–15.4)
GFR SERPL CREATININE-BSD FRML MDRD: 36 ML/MIN/1.73
GLOBULIN UR ELPH-MCNC: 2.8 GM/DL
GLUCOSE SERPL-MCNC: 94 MG/DL (ref 65–99)
HCT VFR BLD AUTO: 39.9 % (ref 34–46.6)
HGB BLD-MCNC: 13 G/DL (ref 12–15.9)
IMM GRANULOCYTES # BLD AUTO: 0.02 10*3/MM3 (ref 0–0.05)
IMM GRANULOCYTES NFR BLD AUTO: 0.3 % (ref 0–0.5)
LYMPHOCYTES # BLD AUTO: 2.11 10*3/MM3 (ref 0.7–3.1)
LYMPHOCYTES NFR BLD AUTO: 30.2 % (ref 19.6–45.3)
MCH RBC QN AUTO: 30.4 PG (ref 26.6–33)
MCHC RBC AUTO-ENTMCNC: 32.6 G/DL (ref 31.5–35.7)
MCV RBC AUTO: 93.4 FL (ref 79–97)
MONOCYTES # BLD AUTO: 0.62 10*3/MM3 (ref 0.1–0.9)
MONOCYTES NFR BLD AUTO: 8.9 % (ref 5–12)
NEUTROPHILS NFR BLD AUTO: 3.96 10*3/MM3 (ref 1.7–7)
NEUTROPHILS NFR BLD AUTO: 56.6 % (ref 42.7–76)
NRBC BLD AUTO-RTO: 0 /100 WBC (ref 0–0.2)
PLATELET # BLD AUTO: 182 10*3/MM3 (ref 140–450)
PMV BLD AUTO: 10.2 FL (ref 6–12)
POTASSIUM SERPL-SCNC: 4.7 MMOL/L (ref 3.5–5.2)
PROT SERPL-MCNC: 6.8 G/DL (ref 6–8.5)
RBC # BLD AUTO: 4.27 10*6/MM3 (ref 3.77–5.28)
SODIUM SERPL-SCNC: 139 MMOL/L (ref 136–145)
WBC # BLD AUTO: 6.99 10*3/MM3 (ref 3.4–10.8)

## 2021-03-11 PROCEDURE — 80053 COMPREHEN METABOLIC PANEL: CPT

## 2021-03-11 PROCEDURE — 36415 COLL VENOUS BLD VENIPUNCTURE: CPT

## 2021-03-11 PROCEDURE — 93010 ELECTROCARDIOGRAM REPORT: CPT | Performed by: EMERGENCY MEDICINE

## 2021-03-11 PROCEDURE — 85025 COMPLETE CBC W/AUTO DIFF WBC: CPT

## 2021-03-11 PROCEDURE — 93005 ELECTROCARDIOGRAM TRACING: CPT

## 2021-03-11 NOTE — DISCHARGE INSTRUCTIONS
DAY OF SURGERY INSTRUCTIONS        YOUR SURGEON: ***April moise    PROCEDURE: ***cholecystectomy    DATE OF SURGERY: ***03/16/2021    ARRIVAL TIME: AS DIRECTED BY OFFICE    YOU MAY TAKE THE FOLLOWING MEDICATION(S) THE MORNING OF SURGERY WITH A SIP OF WATER: ***      ALL OTHER HOME MEDICATION CHECK WITH YOUR PHYSICIAN      DO NOT TAKE ANY ERECTILE DYSFUNCTION MEDICATIONS (EX: CIALIS, VIAGRA) 24 HOURS PRIOR TO SURGERY                      MANAGING PAIN AFTER SURGERY    We know you are probably wondering what your pain will be like after surgery.  Following surgery it is unrealistic to expect you will not have pain.   Pain is how our bodies let us know that something is wrong or cautions us to be careful.  That said, our goal is to make your pain tolerable.    Methods we may use to treat your pain include (oral or IV medications, PCAs, epidurals, nerve blocks, etc.)   While some procedures require IV pain medications for a short time after surgery, transitioning to pain medications by mouth allows for better management of pain.   Your nurse will encourage you to take oral pain medications whenever possible.  IV medications work almost immediately, but only last a short while.  Taking medications by mouth allows for a more constant level of medication in your blood stream for a longer period of time.      Once your pain is out of control it is harder to get back under control.  It is important you are aware when your next dose of pain medication is due.  If you are admitted, your nurse may write the time of your next dose on the white board in your room to help you remember.      We are interested in your pain and encourage you to inform us about aggravating factors during your visit.   Many times a simple repositioning every few hours can make a big difference.    If your physician says it is okay, do not let your pain prevent you from getting out of bed. Be sure to call your nurse for assistance prior to getting  up so you do not fall.      Before surgery, please decide your tolerable pain goal.  These faces help describe the pain ratings we use on a 0-10 scale.   Be prepared to tell us your goal and whether or not you take pain or anxiety medications at home.          BEFORE YOU COME TO THE HOSPITAL  (Pre-op instructions)  • Do not eat, drink, smoke or chew gum after midnight the night before surgery.  This also includes no mints.  • Morning of surgery take only the medicines you have been instructed with a sip of water unless otherwise instructed  by your physician.  • Do not shave, wear makeup or dark nail polish.  • Remove all jewelry including rings.  • Leave anything you consider valuable at home.  • Leave your suitcase in the car until after your surgery.  • Bring the following with you if applicable:  o Picture ID and insurance, Medicare or Medicaid cards  o Co-pay/deductible required by insurance (cash, check, credit card)  o Copy of advance directive, living will or power-of- documents if not brought to PAT  o CPAP or BIPAP mask and tubing  o Relaxation aids ( book, magazine), etc.  o Hearing aids                        ON THE DAY OF SURGERY  · On the day of surgery check in at registration located at the main entrance of the hospital.   ? You will be registered and given a beeper with instructions where to wait in the main lobby.  ? When your beeper lights up and vibrates a member of the Outpatient Surgery staff will meet you at the double doors under the stair steps and escort you to your preoperative room.   · You may have cloth compression devices placed on your legs. These help to prevent blood clots and reduce swelling in your legs.  · An IV may be inserted into one of your veins.  · In the operating room, you may be given one or more of the following:  ? A medicine to help you relax (sedative).  ? A medicine to numb the area (local anesthetic).  ? A medicine to make you fall asleep (general  "anesthetic).  ? A medicine that is injected into an area of your body to numb everything below the injection site (regional anesthetic).  · Your surgical site will be marked or identified.  · You may be given an antibiotic through your IV to help prevent infection.  Contact a health care provider if you:  · Develop a fever of more than 100.4°F (38°C) or other feelings of illness during the 48 hours before your surgery.  · Have symptoms that get worse.  Have questions or concerns about your surgery    General Anesthesia/Surgery, Adult  General anesthesia is the use of medicines to make a person \"go to sleep\" (unconscious) for a medical procedure. General anesthesia must be used for certain procedures, and is often recommended for procedures that:  · Last a long time.  · Require you to be still or in an unusual position.  · Are major and can cause blood loss.  The medicines used for general anesthesia are called general anesthetics. As well as making you unconscious for a certain amount of time, these medicines:  · Prevent pain.  · Control your blood pressure.  · Relax your muscles.  Tell a health care provider about:  · Any allergies you have.  · All medicines you are taking, including vitamins, herbs, eye drops, creams, and over-the-counter medicines.  · Any problems you or family members have had with anesthetic medicines.  · Types of anesthetics you have had in the past.  · Any blood disorders you have.  · Any surgeries you have had.  · Any medical conditions you have.  · Any recent upper respiratory, chest, or ear infections.  · Any history of:  ? Heart or lung conditions, such as heart failure, sleep apnea, asthma, or chronic obstructive pulmonary disease (COPD).  ?  service.  ? Depression or anxiety.  · Any tobacco or drug use, including marijuana or alcohol use.  · Whether you are pregnant or may be pregnant.  What are the risks?  Generally, this is a safe procedure. However, problems may occur, " including:  · Allergic reaction.  · Lung and heart problems.  · Inhaling food or liquid from the stomach into the lungs (aspiration).  · Nerve injury.  · Air in the bloodstream, which can lead to stroke.  · Extreme agitation or confusion (delirium) when you wake up from the anesthetic.  · Waking up during your procedure and being unable to move. This is rare.  These problems are more likely to develop if you are having a major surgery or if you have an advanced or serious medical condition. You can prevent some of these complications by answering all of your health care provider's questions thoroughly and by following all instructions before your procedure.  General anesthesia can cause side effects, including:  · Nausea or vomiting.  · A sore throat from the breathing tube.  · Hoarseness.  · Wheezing or coughing.  · Shaking chills.  · Tiredness.  · Body aches.  · Anxiety.  · Sleepiness or drowsiness.  · Confusion or agitation.  RISKS AND COMPLICATIONS OF SURGERY  Your health care provider will discuss possible risks and complications with you before surgery. Common risks and complications include:    · Problems due to the use of anesthetics.  · Blood loss and replacement (does not apply to minor surgical procedures).  · Temporary increase in pain due to surgery.  · Uncorrected pain or problems that the surgery was meant to correct.  · Infection.  · New damage.    What happens before the procedure?    Medicines  Ask your health care provider about:  · Changing or stopping your regular medicines. This is especially important if you are taking diabetes medicines or blood thinners.  · Taking medicines such as aspirin and ibuprofen. These medicines can thin your blood. Do not take these medicines unless your health care provider tells you to take them.  · Taking over-the-counter medicines, vitamins, herbs, and supplements. Do not take these during the week before your procedure unless your health care provider approves  them.  General instructions  · Starting 3-6 weeks before the procedure, do not use any products that contain nicotine or tobacco, such as cigarettes and e-cigarettes. If you need help quitting, ask your health care provider.  · If you brush your teeth on the morning of the procedure, make sure to spit out all of the toothpaste.  · Tell your health care provider if you become ill or develop a cold, cough, or fever.  · If instructed by your health care provider, bring your sleep apnea device with you on the day of your surgery (if applicable).  · Ask your health care provider if you will be going home the same day, the following day, or after a longer hospital stay.  ? Plan to have someone take you home from the hospital or clinic.  ? Plan to have a responsible adult care for you for at least 24 hours after you leave the hospital or clinic. This is important.  What happens during the procedure?  · You will be given anesthetics through both of the following:  ? A mask placed over your nose and mouth.  ? An IV in one of your veins.  · You may receive a medicine to help you relax (sedative).  · After you are unconscious, a breathing tube may be inserted down your throat to help you breathe. This will be removed before you wake up.  · An anesthesia specialist will stay with you throughout your procedure. He or she will:  ? Keep you comfortable and safe by continuing to give you medicines and adjusting the amount of medicine that you get.  ? Monitor your blood pressure, pulse, and oxygen levels to make sure that the anesthetics do not cause any problems.  The procedure may vary among health care providers and hospitals.  What happens after the procedure?  · Your blood pressure, temperature, heart rate, breathing rate, and blood oxygen level will be monitored until the medicines you were given have worn off.  · You will wake up in a recovery area. You may wake up slowly.  · If you feel anxious or agitated, you may be given  medicine to help you calm down.  · If you will be going home the same day, your health care provider may check to make sure you can walk, drink, and urinate.  · Your health care provider will treat any pain or side effects you have before you go home.  · Do not drive for 24 hours if you were given a sedative.  Summary  · General anesthesia is used to keep you still and prevent pain during a procedure.  · It is important to tell your healthcare provider about your medical history and any surgeries you have had, and previous experience with anesthesia.  · Follow your healthcare provider’s instructions about when to stop eating, drinking, or taking certain medicines before your procedure.  · Plan to have someone take you home from the hospital or clinic.  This information is not intended to replace advice given to you by your health care provider. Make sure you discuss any questions you have with your health care provider.  Document Released: 03/26/2009 Document Revised: 08/03/2018 Document Reviewed: 08/03/2018  [employer] Interactive Patient Education © 2019 [employer]       Fall Prevention in Hospitals, Adult  As a hospital patient, your condition and the treatments you receive can increase your risk for falls. Some additional risk factors for falls in a hospital include:  · Being in an unfamiliar environment.  · Being on bed rest.  · Your surgery.  · Taking certain medicines.  · Your tubing requirements, such as intravenous (IV) therapy or catheters.  It is important that you learn how to decrease fall risks while at the hospital. Below are important tips that can help prevent falls.  SAFETY TIPS FOR PREVENTING FALLS  Talk about your risk of falling.  · Ask your health care provider why you are at risk for falling. Is it your medicine, illness, tubing placement, or something else?  · Make a plan with your health care provider to keep you safe from falls.  · Ask your health care provider or pharmacist about side  effects of your medicines. Some medicines can make you dizzy or affect your coordination.  Ask for help.  · Ask for help before getting out of bed. You may need to press your call button.  · Ask for assistance in getting safely to the toilet.  · Ask for a walker or cane to be put at your bedside. Ask that most of the side rails on your bed be placed up before your health care provider leaves the room.  · Ask family or friends to sit with you.  · Ask for things that are out of your reach, such as your glasses, hearing aids, telephone, bedside table, or call button.  Follow these tips to avoid falling:  · Stay lying or seated, rather than standing, while waiting for help.  · Wear rubber-soled slippers or shoes whenever you walk in the hospital.  · Avoid quick, sudden movements.  ¨ Change positions slowly.  ¨ Sit on the side of your bed before standing.  ¨ Stand up slowly and wait before you start to walk.  · Let your health care provider know if there is a spill on the floor.  · Pay careful attention to the medical equipment, electrical cords, and tubes around you.  · When you need help, use your call button by your bed or in the bathroom. Wait for one of your health care providers to help you.  · If you feel dizzy or unsure of your footing, return to bed and wait for assistance.  · Avoid being distracted by the TV, telephone, or another person in your room.  · Do not lean or support yourself on rolling objects, such as IV poles or bedside tables.     This information is not intended to replace advice given to you by your health care provider. Make sure you discuss any questions you have with your health care provider.     Document Released: 12/15/2001 Document Revised: 01/08/2016 Document Reviewed: 08/25/2013  [employer] Interactive Patient Education ©2016 [employer]       [facility]  CHG 4% Patient Instruction Sheet    Chlorhexidine Before Surgery  Chlorhexidine gluconate (CHG) is a germ-killing  (antiseptic) solution that is used to clean the skin. It gets rid of the bacteria that normally live on the skin. Cleaning your skin with CHG before surgery helps lower the risk for infection after surgery.    How to use CHG solution  · You will take 2 showers, one shower the night before surgery, the second shower the morning of surgery before coming to the hospital.  · Use CHG only as told by your health care provider, and follow the instructions on the label.  · Use CHG solution while taking a shower. Follow these steps when using CHG solution (unless your health care provider gives you different instructions):  1. Start the shower.  2. Use your normal soap and shampoo to wash your face and hair.  3. Turn off the shower or move out of the shower stream.  4. Pour the CHG onto a clean washcloth. Do not use any type of brush or rough-edged sponge.  5. Starting at your neck, lather your body down to your toes. Make sure you:  6. Pay special attention to the part of your body where you will be having surgery. Scrub this area for at least 1 minute.  7. Use the full amount of CHG as directed. Usually, this is one half bottle for each shower.  8. Do not use CHG on your head or face. If the solution gets into your ears or eyes, rinse them well with water.  9. Avoid your genital area.  10. Avoid any areas of skin that have broken skin, cuts, or scrapes.  11. Scrub your back and under your arms. Make sure to wash skin folds.  12. Let the lather sit on your skin for 1-2 minutes or as long as told by your health care  provider.  13. Thoroughly rinse your entire body in the shower. Make sure that all body creases and crevices are rinsed well.  14. Dry off with a clean towel. Do not put any substances on your body afterward, such as powder, lotion, or perfume.  15. Put on clean clothes or pajamas.  16. If it is the night before your surgery, sleep in clean sheets.    What are the risks?  Risks of using CHG include:  · A skin  reaction.  · Hearing loss, if CHG gets in your ears.  · Eye injury, if CHG gets in your eyes and is not rinsed out.  · The CHG product catching fire.  Make sure that you avoid smoking and flames after applying CHG to your skin.  Do not use CHG:  · If you have a chlorhexidine allergy or have previously reacted to chlorhexidine.  · On babies younger than 2 months of age.      On the day of surgery, when you are taken to your room in Outpatient Surgery you will be given a CHG prepackaged cloth to wipe the site for your surgery.  How to use CHG prepackaged cloths  · Follow the instructions on the label.  · Use the CHG cloth on clean, dry skin. Follow these steps when using a CHG cloth (unless your health care provider gives you different instructions):  1. Using the CHG cloth, vigorously scrub the part of your body where you will be having surgery. Scrub using a back-and-forth motion for 3 minutes. The area on your body should be completely wet with CHG when you are finished scrubbing.  2. Do not rinse. Discard the cloth and let the area air-dry for 1 minute. Do not put any substances on your body afterward, such as powder, lotion, or perfume.  Contact a health care provider if:  · Your skin gets irritated after scrubbing.  · You have questions about using your solution or cloth.  Get help right away if:  · Your eyes become very red or swollen.  · Your eyes itch badly.  · Your skin itches badly and is red or swollen.  · Your hearing changes.  · You have trouble seeing.  · You have swelling or tingling in your mouth or throat.  · You have trouble breathing.  · You swallow any chlorhexidine.  Summary  · Chlorhexidine gluconate (CHG) is a germ-killing (antiseptic) solution that is used to clean the skin. Cleaning your skin with CHG before surgery helps lower the risk for infection after surgery.  · You may be given CHG to use at home. It may be in a bottle or in a prepackaged cloth to use on your skin. Carefully follow  your health care provider's instructions and the instructions on the product label.  · Do not use CHG if you have a chlorhexidine allergy.  · Contact your health care provider if your skin gets irritated after scrubbing.  This information is not intended to replace advice given to you by your health care provider. Make sure you discuss any questions you have with your health care provider.  Document Released: 09/11/2013 Document Revised: 11/15/2018 Document Reviewed: 11/15/2018  [employer] Interactive Patient Education © 2019 [employer]          PATIENT/FAMILY/RESPONSIBLE PARTY VERBALIZES UNDERSTANDING OF ABOVE EDUCATION.  COPY OF PAIN SCALE GIVEN AND REVIEWED WITH VERBALIZED UNDERSTANDING.

## 2021-03-12 LAB
QT INTERVAL: 416 MS
QTC INTERVAL: 486 MS

## 2021-03-13 ENCOUNTER — LAB (OUTPATIENT)
Dept: LAB | Facility: HOSPITAL | Age: 86
End: 2021-03-13

## 2021-03-13 PROCEDURE — U0004 COV-19 TEST NON-CDC HGH THRU: HCPCS | Performed by: SPECIALIST

## 2021-03-13 PROCEDURE — C9803 HOPD COVID-19 SPEC COLLECT: HCPCS | Performed by: SPECIALIST

## 2021-03-13 PROCEDURE — U0005 INFEC AGEN DETEC AMPLI PROBE: HCPCS | Performed by: SPECIALIST

## 2021-03-14 LAB — SARS-COV-2 ORF1AB RESP QL NAA+PROBE: NOT DETECTED

## 2021-03-16 ENCOUNTER — ANESTHESIA (OUTPATIENT)
Dept: PERIOP | Facility: HOSPITAL | Age: 86
End: 2021-03-16

## 2021-03-16 ENCOUNTER — ANESTHESIA EVENT (OUTPATIENT)
Dept: PERIOP | Facility: HOSPITAL | Age: 86
End: 2021-03-16

## 2021-03-16 ENCOUNTER — HOSPITAL ENCOUNTER (OUTPATIENT)
Facility: HOSPITAL | Age: 86
Discharge: HOME | End: 2021-03-17
Attending: SPECIALIST | Admitting: SPECIALIST

## 2021-03-16 DIAGNOSIS — K81.9 CHOLECYSTITIS: ICD-10-CM

## 2021-03-16 PROCEDURE — A9270 NON-COVERED ITEM OR SERVICE: HCPCS | Performed by: SPECIALIST

## 2021-03-16 PROCEDURE — 25010000002 FENTANYL CITRATE (PF) 100 MCG/2ML SOLUTION: Performed by: NURSE ANESTHETIST, CERTIFIED REGISTERED

## 2021-03-16 PROCEDURE — 25010000002 DEXAMETHASONE PER 1 MG: Performed by: NURSE ANESTHETIST, CERTIFIED REGISTERED

## 2021-03-16 PROCEDURE — 25010000002 PROPOFOL 10 MG/ML EMULSION: Performed by: NURSE ANESTHETIST, CERTIFIED REGISTERED

## 2021-03-16 PROCEDURE — G0378 HOSPITAL OBSERVATION PER HR: HCPCS

## 2021-03-16 PROCEDURE — 25010000002 ONDANSETRON PER 1 MG: Performed by: NURSE ANESTHETIST, CERTIFIED REGISTERED

## 2021-03-16 PROCEDURE — 63710000001 OXYCODONE-ACETAMINOPHEN 7.5-325 MG TABLET: Performed by: ANESTHESIOLOGY

## 2021-03-16 PROCEDURE — 88304 TISSUE EXAM BY PATHOLOGIST: CPT | Performed by: SPECIALIST

## 2021-03-16 PROCEDURE — 63710000001 CYCLOSPORINE 0.05 % EMULSION: Performed by: SPECIALIST

## 2021-03-16 PROCEDURE — 63710000001 LEVOTHYROXINE 125 MCG TABLET: Performed by: SPECIALIST

## 2021-03-16 PROCEDURE — 63710000001 ALISKIREN 150 MG TABLET: Performed by: SPECIALIST

## 2021-03-16 PROCEDURE — 63710000001 AMLODIPINE 10 MG TABLET: Performed by: SPECIALIST

## 2021-03-16 PROCEDURE — 25010000002 FENTANYL CITRATE (PF) 100 MCG/2ML SOLUTION: Performed by: ANESTHESIOLOGY

## 2021-03-16 PROCEDURE — 94799 UNLISTED PULMONARY SVC/PX: CPT

## 2021-03-16 PROCEDURE — A9270 NON-COVERED ITEM OR SERVICE: HCPCS | Performed by: ANESTHESIOLOGY

## 2021-03-16 PROCEDURE — 25010000002 DEXAMETHASONE PER 1 MG: Performed by: ANESTHESIOLOGY

## 2021-03-16 PROCEDURE — 25010000002 ONDANSETRON PER 1 MG: Performed by: ANESTHESIOLOGY

## 2021-03-16 PROCEDURE — 25010000002 PHENYLEPHRINE HCL 0.8 MG/10ML SOLUTION PREFILLED SYRINGE: Performed by: NURSE ANESTHETIST, CERTIFIED REGISTERED

## 2021-03-16 DEVICE — LIGACLIP 10-M/L, 10MM ENDOSCOPIC ROTATING MULTIPLE CLIP APPLIERS
Type: IMPLANTABLE DEVICE | Status: FUNCTIONAL
Brand: LIGACLIP

## 2021-03-16 RX ORDER — LABETALOL HYDROCHLORIDE 5 MG/ML
5 INJECTION, SOLUTION INTRAVENOUS
Status: DISCONTINUED | OUTPATIENT
Start: 2021-03-16 | End: 2021-03-16 | Stop reason: HOSPADM

## 2021-03-16 RX ORDER — IBUPROFEN 600 MG/1
600 TABLET ORAL ONCE AS NEEDED
Status: DISCONTINUED | OUTPATIENT
Start: 2021-03-16 | End: 2021-03-16 | Stop reason: HOSPADM

## 2021-03-16 RX ORDER — EPHEDRINE SULFATE 50 MG/ML
INJECTION, SOLUTION INTRAVENOUS AS NEEDED
Status: DISCONTINUED | OUTPATIENT
Start: 2021-03-16 | End: 2021-03-16

## 2021-03-16 RX ORDER — LIDOCAINE HYDROCHLORIDE 10 MG/ML
0.5 INJECTION, SOLUTION EPIDURAL; INFILTRATION; INTRACAUDAL; PERINEURAL ONCE AS NEEDED
Status: DISCONTINUED | OUTPATIENT
Start: 2021-03-16 | End: 2021-03-16 | Stop reason: HOSPADM

## 2021-03-16 RX ORDER — AMLODIPINE BESYLATE 10 MG/1
10 TABLET ORAL DAILY
Status: DISCONTINUED | OUTPATIENT
Start: 2021-03-16 | End: 2021-03-17 | Stop reason: HOSPADM

## 2021-03-16 RX ORDER — ONDANSETRON 2 MG/ML
INJECTION INTRAMUSCULAR; INTRAVENOUS AS NEEDED
Status: DISCONTINUED | OUTPATIENT
Start: 2021-03-16 | End: 2021-03-16 | Stop reason: SURG

## 2021-03-16 RX ORDER — SODIUM CHLORIDE 0.9 % (FLUSH) 0.9 %
10 SYRINGE (ML) INJECTION EVERY 12 HOURS SCHEDULED
Status: DISCONTINUED | OUTPATIENT
Start: 2021-03-16 | End: 2021-03-16 | Stop reason: HOSPADM

## 2021-03-16 RX ORDER — FENTANYL CITRATE 50 UG/ML
25 INJECTION, SOLUTION INTRAMUSCULAR; INTRAVENOUS
Status: DISCONTINUED | OUTPATIENT
Start: 2021-03-16 | End: 2021-03-16 | Stop reason: HOSPADM

## 2021-03-16 RX ORDER — ROCURONIUM BROMIDE 10 MG/ML
INJECTION, SOLUTION INTRAVENOUS AS NEEDED
Status: DISCONTINUED | OUTPATIENT
Start: 2021-03-16 | End: 2021-03-16 | Stop reason: SURG

## 2021-03-16 RX ORDER — OXYCODONE AND ACETAMINOPHEN 10; 325 MG/1; MG/1
1 TABLET ORAL ONCE AS NEEDED
Status: DISCONTINUED | OUTPATIENT
Start: 2021-03-16 | End: 2021-03-16 | Stop reason: HOSPADM

## 2021-03-16 RX ORDER — FENTANYL CITRATE 50 UG/ML
25 INJECTION, SOLUTION INTRAMUSCULAR; INTRAVENOUS
Status: COMPLETED | OUTPATIENT
Start: 2021-03-16 | End: 2021-03-16

## 2021-03-16 RX ORDER — OXYCODONE AND ACETAMINOPHEN 7.5; 325 MG/1; MG/1
2 TABLET ORAL EVERY 4 HOURS PRN
Status: DISCONTINUED | OUTPATIENT
Start: 2021-03-16 | End: 2021-03-16 | Stop reason: HOSPADM

## 2021-03-16 RX ORDER — FAMOTIDINE 10 MG/ML
20 INJECTION, SOLUTION INTRAVENOUS
Status: DISCONTINUED | OUTPATIENT
Start: 2021-03-16 | End: 2021-03-16 | Stop reason: HOSPADM

## 2021-03-16 RX ORDER — EPHEDRINE SULFATE 50 MG/ML
INJECTION, SOLUTION INTRAVENOUS AS NEEDED
Status: DISCONTINUED | OUTPATIENT
Start: 2021-03-16 | End: 2021-03-16 | Stop reason: SURG

## 2021-03-16 RX ORDER — FLUMAZENIL 0.1 MG/ML
0.2 INJECTION INTRAVENOUS AS NEEDED
Status: DISCONTINUED | OUTPATIENT
Start: 2021-03-16 | End: 2021-03-16 | Stop reason: HOSPADM

## 2021-03-16 RX ORDER — LEVOTHYROXINE SODIUM 0.12 MG/1
125 TABLET ORAL
Status: DISCONTINUED | OUTPATIENT
Start: 2021-03-16 | End: 2021-03-17 | Stop reason: HOSPADM

## 2021-03-16 RX ORDER — SODIUM CHLORIDE 0.9 % (FLUSH) 0.9 %
3 SYRINGE (ML) INJECTION AS NEEDED
Status: DISCONTINUED | OUTPATIENT
Start: 2021-03-16 | End: 2021-03-16 | Stop reason: HOSPADM

## 2021-03-16 RX ORDER — PROPOFOL 10 MG/ML
VIAL (ML) INTRAVENOUS AS NEEDED
Status: DISCONTINUED | OUTPATIENT
Start: 2021-03-16 | End: 2021-03-16 | Stop reason: SURG

## 2021-03-16 RX ORDER — ALISKIREN 150 MG/1
300 TABLET, FILM COATED ORAL DAILY
Status: DISCONTINUED | OUTPATIENT
Start: 2021-03-16 | End: 2021-03-17 | Stop reason: HOSPADM

## 2021-03-16 RX ORDER — LIDOCAINE HYDROCHLORIDE 20 MG/ML
INJECTION, SOLUTION EPIDURAL; INFILTRATION; INTRACAUDAL; PERINEURAL AS NEEDED
Status: DISCONTINUED | OUTPATIENT
Start: 2021-03-16 | End: 2021-03-16 | Stop reason: SURG

## 2021-03-16 RX ORDER — ONDANSETRON 2 MG/ML
4 INJECTION INTRAMUSCULAR; INTRAVENOUS EVERY 4 HOURS PRN
Status: DISCONTINUED | OUTPATIENT
Start: 2021-03-16 | End: 2021-03-17 | Stop reason: HOSPADM

## 2021-03-16 RX ORDER — CYCLOSPORINE 0.5 MG/ML
1 EMULSION OPHTHALMIC EVERY 12 HOURS SCHEDULED
Status: DISCONTINUED | OUTPATIENT
Start: 2021-03-16 | End: 2021-03-17 | Stop reason: HOSPADM

## 2021-03-16 RX ORDER — SODIUM CHLORIDE, SODIUM LACTATE, POTASSIUM CHLORIDE, CALCIUM CHLORIDE 600; 310; 30; 20 MG/100ML; MG/100ML; MG/100ML; MG/100ML
9 INJECTION, SOLUTION INTRAVENOUS CONTINUOUS
Status: DISCONTINUED | OUTPATIENT
Start: 2021-03-16 | End: 2021-03-16

## 2021-03-16 RX ORDER — SODIUM CHLORIDE, SODIUM LACTATE, POTASSIUM CHLORIDE, CALCIUM CHLORIDE 600; 310; 30; 20 MG/100ML; MG/100ML; MG/100ML; MG/100ML
1000 INJECTION, SOLUTION INTRAVENOUS CONTINUOUS
Status: DISCONTINUED | OUTPATIENT
Start: 2021-03-16 | End: 2021-03-16

## 2021-03-16 RX ORDER — DEXAMETHASONE SODIUM PHOSPHATE 4 MG/ML
4 INJECTION, SOLUTION INTRA-ARTICULAR; INTRALESIONAL; INTRAMUSCULAR; INTRAVENOUS; SOFT TISSUE ONCE AS NEEDED
Status: COMPLETED | OUTPATIENT
Start: 2021-03-16 | End: 2021-03-16

## 2021-03-16 RX ORDER — MAGNESIUM HYDROXIDE 1200 MG/15ML
LIQUID ORAL AS NEEDED
Status: DISCONTINUED | OUTPATIENT
Start: 2021-03-16 | End: 2021-03-16 | Stop reason: HOSPADM

## 2021-03-16 RX ORDER — SODIUM CHLORIDE 0.9 % (FLUSH) 0.9 %
10 SYRINGE (ML) INJECTION AS NEEDED
Status: DISCONTINUED | OUTPATIENT
Start: 2021-03-16 | End: 2021-03-16 | Stop reason: HOSPADM

## 2021-03-16 RX ORDER — SODIUM CHLORIDE, SODIUM LACTATE, POTASSIUM CHLORIDE, CALCIUM CHLORIDE 600; 310; 30; 20 MG/100ML; MG/100ML; MG/100ML; MG/100ML
50 INJECTION, SOLUTION INTRAVENOUS CONTINUOUS
Status: DISCONTINUED | OUTPATIENT
Start: 2021-03-16 | End: 2021-03-17 | Stop reason: HOSPADM

## 2021-03-16 RX ORDER — BUPIVACAINE HYDROCHLORIDE AND EPINEPHRINE 2.5; 5 MG/ML; UG/ML
INJECTION, SOLUTION INFILTRATION; PERINEURAL AS NEEDED
Status: DISCONTINUED | OUTPATIENT
Start: 2021-03-16 | End: 2021-03-16 | Stop reason: HOSPADM

## 2021-03-16 RX ORDER — NEOSTIGMINE METHYLSULFATE 5 MG/5 ML
SYRINGE (ML) INTRAVENOUS AS NEEDED
Status: DISCONTINUED | OUTPATIENT
Start: 2021-03-16 | End: 2021-03-16 | Stop reason: SURG

## 2021-03-16 RX ORDER — SODIUM CHLORIDE 9 MG/ML
INJECTION, SOLUTION INTRAVENOUS AS NEEDED
Status: DISCONTINUED | OUTPATIENT
Start: 2021-03-16 | End: 2021-03-16 | Stop reason: HOSPADM

## 2021-03-16 RX ORDER — ONDANSETRON 2 MG/ML
4 INJECTION INTRAMUSCULAR; INTRAVENOUS ONCE AS NEEDED
Status: COMPLETED | OUTPATIENT
Start: 2021-03-16 | End: 2021-03-16

## 2021-03-16 RX ORDER — KETOROLAC TROMETHAMINE 30 MG/ML
15 INJECTION, SOLUTION INTRAMUSCULAR; INTRAVENOUS EVERY 6 HOURS PRN
Status: DISCONTINUED | OUTPATIENT
Start: 2021-03-16 | End: 2021-03-17 | Stop reason: HOSPADM

## 2021-03-16 RX ORDER — OXYCODONE HYDROCHLORIDE AND ACETAMINOPHEN 5; 325 MG/1; MG/1
1 TABLET ORAL EVERY 4 HOURS PRN
Status: DISCONTINUED | OUTPATIENT
Start: 2021-03-16 | End: 2021-03-17 | Stop reason: HOSPADM

## 2021-03-16 RX ORDER — DEXAMETHASONE SODIUM PHOSPHATE 4 MG/ML
INJECTION, SOLUTION INTRA-ARTICULAR; INTRALESIONAL; INTRAMUSCULAR; INTRAVENOUS; SOFT TISSUE AS NEEDED
Status: DISCONTINUED | OUTPATIENT
Start: 2021-03-16 | End: 2021-03-16 | Stop reason: SURG

## 2021-03-16 RX ORDER — NALOXONE HCL 0.4 MG/ML
0.4 VIAL (ML) INJECTION AS NEEDED
Status: DISCONTINUED | OUTPATIENT
Start: 2021-03-16 | End: 2021-03-16 | Stop reason: HOSPADM

## 2021-03-16 RX ORDER — FENTANYL CITRATE 50 UG/ML
INJECTION, SOLUTION INTRAMUSCULAR; INTRAVENOUS AS NEEDED
Status: DISCONTINUED | OUTPATIENT
Start: 2021-03-16 | End: 2021-03-16 | Stop reason: SURG

## 2021-03-16 RX ORDER — PHENYLEPHRINE HCL IN 0.9% NACL 0.8MG/10ML
SYRINGE (ML) INTRAVENOUS AS NEEDED
Status: DISCONTINUED | OUTPATIENT
Start: 2021-03-16 | End: 2021-03-16 | Stop reason: SURG

## 2021-03-16 RX ORDER — DEXTROSE MONOHYDRATE 25 G/50ML
12.5 INJECTION, SOLUTION INTRAVENOUS AS NEEDED
Status: DISCONTINUED | OUTPATIENT
Start: 2021-03-16 | End: 2021-03-16 | Stop reason: HOSPADM

## 2021-03-16 RX ADMIN — CEFAZOLIN 2 G: 1 INJECTION, POWDER, FOR SOLUTION INTRAMUSCULAR; INTRAVENOUS; PARENTERAL at 07:13

## 2021-03-16 RX ADMIN — CYCLOSPORINE 1 DROP: 0.5 EMULSION OPHTHALMIC at 21:50

## 2021-03-16 RX ADMIN — ONDANSETRON HYDROCHLORIDE 4 MG: 2 SOLUTION INTRAMUSCULAR; INTRAVENOUS at 07:40

## 2021-03-16 RX ADMIN — OXYCODONE HYDROCHLORIDE AND ACETAMINOPHEN 2 TABLET: 7.5; 325 TABLET ORAL at 08:30

## 2021-03-16 RX ADMIN — DEXAMETHASONE SODIUM PHOSPHATE 4 MG: 4 INJECTION, SOLUTION INTRAMUSCULAR; INTRAVENOUS at 07:39

## 2021-03-16 RX ADMIN — EPHEDRINE SULFATE 10 MG: 50 INJECTION INTRAVENOUS at 07:25

## 2021-03-16 RX ADMIN — GLYCOPYRROLATE 0.2 MG: 0.2 INJECTION, SOLUTION INTRAMUSCULAR; INTRAVENOUS at 07:40

## 2021-03-16 RX ADMIN — DEXAMETHASONE SODIUM PHOSPHATE 4 MG: 4 INJECTION, SOLUTION INTRAMUSCULAR; INTRAVENOUS at 06:56

## 2021-03-16 RX ADMIN — EPHEDRINE SULFATE 10 MG: 50 INJECTION INTRAVENOUS at 07:18

## 2021-03-16 RX ADMIN — FENTANYL CITRATE 25 MCG: 50 INJECTION, SOLUTION INTRAMUSCULAR; INTRAVENOUS at 08:15

## 2021-03-16 RX ADMIN — ROCURONIUM BROMIDE 20 MG: 10 INJECTION INTRAVENOUS at 07:07

## 2021-03-16 RX ADMIN — LEVOTHYROXINE SODIUM 125 MCG: 125 TABLET ORAL at 10:41

## 2021-03-16 RX ADMIN — ONDANSETRON HYDROCHLORIDE 4 MG: 2 SOLUTION INTRAMUSCULAR; INTRAVENOUS at 08:04

## 2021-03-16 RX ADMIN — FENTANYL CITRATE 50 MCG: 50 INJECTION, SOLUTION INTRAMUSCULAR; INTRAVENOUS at 07:29

## 2021-03-16 RX ADMIN — Medication 80 MCG: at 07:20

## 2021-03-16 RX ADMIN — Medication 3 MG: at 07:40

## 2021-03-16 RX ADMIN — FENTANYL CITRATE 25 MCG: 50 INJECTION, SOLUTION INTRAMUSCULAR; INTRAVENOUS at 08:05

## 2021-03-16 RX ADMIN — GLYCOPYRROLATE 0.4 MG: 0.2 INJECTION, SOLUTION INTRAMUSCULAR; INTRAVENOUS at 07:25

## 2021-03-16 RX ADMIN — FENTANYL CITRATE 25 MCG: 50 INJECTION, SOLUTION INTRAMUSCULAR; INTRAVENOUS at 08:10

## 2021-03-16 RX ADMIN — FENTANYL CITRATE 25 MCG: 50 INJECTION, SOLUTION INTRAMUSCULAR; INTRAVENOUS at 08:20

## 2021-03-16 RX ADMIN — EPHEDRINE SULFATE 10 MG: 50 INJECTION INTRAVENOUS at 07:20

## 2021-03-16 RX ADMIN — ALISKIREN 300 MG: 150 TABLET, FILM COATED ORAL at 10:40

## 2021-03-16 RX ADMIN — LIDOCAINE HYDROCHLORIDE 30 MG: 20 INJECTION, SOLUTION EPIDURAL; INFILTRATION; INTRACAUDAL; PERINEURAL at 07:07

## 2021-03-16 RX ADMIN — EPHEDRINE SULFATE 10 MG: 50 INJECTION INTRAVENOUS at 07:23

## 2021-03-16 RX ADMIN — CYCLOSPORINE 1 DROP: 0.5 EMULSION OPHTHALMIC at 10:40

## 2021-03-16 RX ADMIN — AMLODIPINE BESYLATE 10 MG: 10 TABLET ORAL at 10:41

## 2021-03-16 RX ADMIN — PROPOFOL 80 MG: 10 INJECTION, EMULSION INTRAVENOUS at 07:07

## 2021-03-16 RX ADMIN — Medication 80 MCG: at 07:14

## 2021-03-16 RX ADMIN — SODIUM CHLORIDE, POTASSIUM CHLORIDE, SODIUM LACTATE AND CALCIUM CHLORIDE 1000 ML: 600; 310; 30; 20 INJECTION, SOLUTION INTRAVENOUS at 05:54

## 2021-03-16 RX ADMIN — FENTANYL CITRATE 50 MCG: 50 INJECTION, SOLUTION INTRAMUSCULAR; INTRAVENOUS at 07:05

## 2021-03-16 RX ADMIN — FAMOTIDINE 20 MG: 10 INJECTION INTRAVENOUS at 06:56

## 2021-03-16 RX ADMIN — SODIUM CHLORIDE, POTASSIUM CHLORIDE, SODIUM LACTATE AND CALCIUM CHLORIDE 50 ML/HR: 600; 310; 30; 20 INJECTION, SOLUTION INTRAVENOUS at 10:07

## 2021-03-16 NOTE — ANESTHESIA POSTPROCEDURE EVALUATION
Patient: Mya STONE [facility]    Procedure Summary     Date: 03/16/21 Room / Location:  [facility] OR 06 /  [facility] OR    Anesthesia Start: 0703 Anesthesia Stop: 0755    Procedure: LAPAROSCOPIC CHOLECYSTECTOMY WITH CHOLANGIOGRAM (N/A Abdomen) Diagnosis: (BILIARY DYSKINESIA)    Surgeons: Malgorzata Scott MD Provider: Esperanza Peck CRNA    Anesthesia Type: general ASA Status: 3          Anesthesia Type: general    Vitals  Vitals Value Taken Time   /64 03/16/21 0850   Temp 97.5 °F (36.4 °C) 03/16/21 0850   Pulse 95 03/16/21 0850   Resp 12 03/16/21 0850   SpO2 97 % 03/16/21 0850           Post Anesthesia Care and Evaluation    Patient location during evaluation: bedside  Patient participation: complete - patient participated  Level of consciousness: awake and awake and alert  Pain score: 0  Pain management: adequate  Airway patency: patent  Anesthetic complications: No anesthetic complications  PONV Status: none  Cardiovascular status: acceptable  Respiratory status: acceptable  Hydration status: acceptable    Comments: Patient discharged according to acceptable Brad score per RN assessment. See nursing records for further information.     Blood pressure 135/57, pulse 60, temperature 97.4 °F (36.3 °C), temperature source Oral, resp. rate 12, SpO2 100 %, not currently breastfeeding.

## 2021-03-16 NOTE — ANESTHESIA PROCEDURE NOTES
Airway  Urgency: elective    Date/Time: 3/16/2021 7:08 AM  Airway not difficult    General Information and Staff    Patient location during procedure: OR    Indications and Patient Condition  Indications for airway management: airway protection    Preoxygenated: yes  MILS maintained throughout  Mask difficulty assessment: 2 - vent by mask + OA or adjuvant +/- NMBA    Final Airway Details  Final airway type: endotracheal airway      Successful airway: ETT  Cuffed: yes   Successful intubation technique: direct laryngoscopy  Facilitating devices/methods: intubating stylet  Endotracheal tube insertion site: oral  Blade: Paulette  Blade size: 3.5.  ETT size (mm): 7.0  Cormack-Lehane Classification: grade IIb - view of arytenoids or posterior of glottis only  Placement verified by: chest auscultation and capnometry   Measured from: lips  ETT/EBT  to lips (cm): 21  Number of attempts at approach: 1  Assessment: atraumatic intubation    Additional Comments  Small lip laceration noted after intubation

## 2021-03-16 NOTE — PLAN OF CARE
VSS, patient up with stand by assist, continuous pulse ox, room air, voiding, dsg CDI to abdomen X4 laps, family member at bedside, tolerating regular diet, safety maintained    Problem: Adult Inpatient Plan of Care  Goal: Plan of Care Review  3/16/2021 1802 by Camila Goyal, RN  Outcome: Ongoing, Progressing  Flowsheets  Taken 3/16/2021 1802  Progress: improving  Taken 3/16/2021 1029  Plan of Care Reviewed With:   patient   daughter   Goal Outcome Evaluation:  Plan of Care Reviewed With: patient, daughter  Progress: improving

## 2021-03-16 NOTE — OP NOTE
Preoperative diagnosis:  Biliary dyskinesia  Postoperative diagnosis:  Same  Procedure: laparoscopic cholecystectomy  Surgeon: Malgorzata Scott MD  Anesthesia:  Get loc  Ebl:  Minimal  Ivf: see anesthesia  Indications: the patient is a 89-year-old female who presents for cholecystectomy. The risks, benefits, complications, and possible alternatives were discussed with the patient who agreed to proceed.  Description of procedure: the patient was laid supine. The abdomen was prepped and draped.  The fundus and infundibulum were grasped and elevated. The cystic duct and artery were skeletonized and identified. They were clipped proximally and distally and then transected. The gallbladder was freed from the liver bed with bovie. It was placed in an endocatch bag and removed.  The fascia of the epigastric site was closed with 0 vicryl using an endostitch passer. The skin was closed with 3-0 and 4-0 vicyrl. The sponge, needle, and instrument counts were correct.  Complications: none  Disposition: good to pacu  Findings:  Dilated and friable gallbladder

## 2021-03-16 NOTE — ANESTHESIA PREPROCEDURE EVALUATION
Anesthesia Evaluation     Patient summary reviewed   no history of anesthetic complications:  NPO Solid Status: > 8 hours             Airway   Mallampati: II  TM distance: >3 FB  Neck ROM: full  Dental      Pulmonary    (-) COPD, asthma, sleep apnea, not a smoker  Cardiovascular   Exercise tolerance: good (4-7 METS)    ECG reviewed    (+) hypertension, PVD,   (-) pacemaker, past MI, angina, cardiac stents      Neuro/Psych  (-) seizures, TIA, CVA  GI/Hepatic/Renal/Endo    (+)   renal disease CRI, thyroid problem hypothyroidism  (-) GERD, liver disease, diabetes    Musculoskeletal     Abdominal    Substance History      OB/GYN          Other                        Anesthesia Plan    ASA 3     general     intravenous induction     Anesthetic plan, all risks, benefits, and alternatives have been provided, discussed and informed consent has been obtained with: patient.

## 2021-03-16 NOTE — PROGRESS NOTES
Patient is a [facility] member of [id number].  [employer] is without a  at this time.  Navigators visited with patient and daughter who deny any needs at this time.  Will continue to follow.

## 2021-03-17 VITALS
RESPIRATION RATE: 16 BRPM | DIASTOLIC BLOOD PRESSURE: 49 MMHG | SYSTOLIC BLOOD PRESSURE: 107 MMHG | OXYGEN SATURATION: 96 % | HEIGHT: 61 IN | WEIGHT: 117.73 LBS | HEART RATE: 84 BPM | TEMPERATURE: 97.8 F | BODY MASS INDEX: 22.23 KG/M2

## 2021-03-17 PROCEDURE — 63710000001 AMLODIPINE 10 MG TABLET: Performed by: SPECIALIST

## 2021-03-17 PROCEDURE — A9270 NON-COVERED ITEM OR SERVICE: HCPCS | Performed by: SPECIALIST

## 2021-03-17 PROCEDURE — 63710000001 CYCLOSPORINE 0.05 % EMULSION: Performed by: SPECIALIST

## 2021-03-17 PROCEDURE — 63710000001 ALISKIREN 150 MG TABLET: Performed by: SPECIALIST

## 2021-03-17 PROCEDURE — 63710000001 LEVOTHYROXINE 125 MCG TABLET: Performed by: SPECIALIST

## 2021-03-17 RX ADMIN — SODIUM CHLORIDE, POTASSIUM CHLORIDE, SODIUM LACTATE AND CALCIUM CHLORIDE 50 ML/HR: 600; 310; 30; 20 INJECTION, SOLUTION INTRAVENOUS at 06:06

## 2021-03-17 RX ADMIN — ALISKIREN 300 MG: 150 TABLET, FILM COATED ORAL at 08:48

## 2021-03-17 RX ADMIN — LEVOTHYROXINE SODIUM 125 MCG: 125 TABLET ORAL at 06:45

## 2021-03-17 RX ADMIN — AMLODIPINE BESYLATE 10 MG: 10 TABLET ORAL at 08:48

## 2021-03-17 RX ADMIN — CYCLOSPORINE 1 DROP: 0.5 EMULSION OPHTHALMIC at 08:48

## 2021-03-17 NOTE — PROGRESS NOTES
"Malgorzata cSott MD [facility]  Progress Note     LOS: 0 days   Patient Care Team:  Delfina Pereira DO as PCP - General  Delfina Pereira DO as PCP - Family Medicine      Subjective     Interval History:      \"doing great\"     Objective     Vital Signs  Temp:  [97.5 °F (36.4 °C)-98 °F (36.7 °C)] 97.8 °F (36.6 °C)  Heart Rate:  [72-84] 84  Resp:  [12-18] 16  BP: (107-118)/(48-63) 107/49    Physical Exam:  General appearance - alert, well appearing, and in no distress  Abdomen - soft, clean, appropriately tender      Results Review:    Lab Results (last 24 hours)     ** No results found for the last 24 hours. **        Imaging Results (Last 24 Hours)     ** No results found for the last 24 hours. **            Assessment/Plan       POD#1 lap pebbles    home      Malgorzata Scott MD  03/17/21  11:08 CDT      "

## 2021-03-17 NOTE — PLAN OF CARE
Goal Outcome Evaluation:  Plan of Care Reviewed With: patient  Progress: no change  Outcome Summary: Patient A&O x 4, tolerating po intake without pain or nausea, IVF cont, O2 at 3 liters, cont pulse ox, Plavix on hold, SCDs in place, up with SB assist to bathroom et voids without difficulty,  denies any pain this shift.

## 2021-03-17 NOTE — DISCHARGE SUMMARY
Consults     No orders found for last 30 day(s).      Malgorzata Scott MD FACS Discharge Summary    Date of Discharge:  3/17/2021    Discharge Diagnosis: biliary dyskinesia    Presenting Problem/History of Present Illness  Cholecystitis [K81.9]     Hospital Course  Patient is a 89 y.o. female presented with findings of dyskinesia.  She underwent lap pebbles.  She did extremely well.  Diet as tolerated.  She was discharged on all home meds and diet and activity as tolerated.      Procedures Performed  Procedure(s):  LAPAROSCOPIC CHOLECYSTECTOMY WITH CHOLANGIOGRAM       Consults:   Consults     No orders found for last 30 day(s).          Condition on Discharge:  good    Vital Signs  Temp:  [97.5 °F (36.4 °C)-98 °F (36.7 °C)] 97.8 °F (36.6 °C)  Heart Rate:  [72-84] 84  Resp:  [12-18] 16  BP: (107-118)/(48-63) 107/49    Physical Exam:   See History and Physical found in chart.    Discharge Disposition  Home or Self Care    Discharge Medications     Discharge Medications      Continue These Medications      Instructions Start Date   acetaminophen 325 MG tablet  Commonly known as: TYLENOL   650 mg, Oral, Every 4 Hours PRN      alendronate 70 MG tablet  Commonly known as: FOSAMAX   70 mg, Oral, Every 7 Days, Thursday      amLODIPine 10 MG tablet  Commonly known as: NORVASC   10 mg, Oral, Daily      aspirin 81 MG tablet   81 mg, Oral, Daily      CALCIUM 600/VITAMIN D PO   1 tablet, Oral, Daily      clopidogrel 75 MG tablet  Commonly known as: PLAVIX   75 mg, Oral, Every 24 Hours      COMPLETE MULTIVITAMIN/MINERAL PO   1 tablet, Oral, Daily      cycloSPORINE 0.05 % ophthalmic emulsion  Commonly known as: RESTASIS   1 drop, Both Eyes, Every 12 Hours      Glucosamine-Chondroitin-Vit D3 7384-5917-411 MG-MG-UNIT pack   1 tablet, Oral, 2 Times Daily      levothyroxine 125 MCG tablet  Commonly known as: SYNTHROID, LEVOTHROID   125 mcg, Oral, Daily      Tekturna 300 MG tablet  Generic drug: aliskiren   300 mg, Oral, Daily              Discharge Diet:     Activity at Discharge:     Follow-up Appointments  Future Appointments   Date Time Provider Department Center   3/29/2021  4:05 PM C19 VACCINE CLIN PAD 2ND DOSE  PAD C19VC PAD   11/8/2021  9:00 AM PAD US NIVAS CART 1  PAD US PAD   11/8/2021 10:00 AM Saumya Morales APRN [facility] VS PAD PAD         Test Results Pending at Discharge  Pending Labs     Order Current Status    Tissue Pathology Exam In process           April SUSANNE Scott MD  03/17/21  11:10 CDT

## 2021-03-22 ENCOUNTER — TELEPHONE (OUTPATIENT)
Dept: PASTORAL CARE | Facility: HOSPITAL | Age: 86
End: 2021-03-22

## 2021-03-22 NOTE — TELEPHONE ENCOUNTER
Attempted post discharge follow up call to [facility] member.  No answer.

## 2021-03-23 ENCOUNTER — TELEPHONE (OUTPATIENT)
Dept: PASTORAL CARE | Facility: HOSPITAL | Age: 86
End: 2021-03-23

## 2021-03-23 NOTE — TELEPHONE ENCOUNTER
Post discharge follow up call to [facility] member.  Spoke with patient who states she is doing well.  No questions concerning medications.  Verified follow up appointments with physicians.  Denies any needs from [employer], has good family support who live nearby.

## 2021-03-29 ENCOUNTER — IMMUNIZATION (OUTPATIENT)
Dept: VACCINE CLINIC | Facility: HOSPITAL | Age: 86
End: 2021-03-29

## 2021-03-29 PROCEDURE — 0012A: CPT | Performed by: OBSTETRICS & GYNECOLOGY

## 2021-03-29 PROCEDURE — 91301 HC SARSCO02 VAC 100MCG/0.5ML IM: CPT | Performed by: OBSTETRICS & GYNECOLOGY

## 2021-05-10 NOTE — PROGRESS NOTES
"Chief Complaint   Patient presents with   • GI Problem     nausea weight loss had gallbladder surgury 3- still having problems running low grade temp last 2 nights       PCP: Delfina Pereira,   REFER: Delfina Pereira,*    Subjective     HPI      Mya Chung referred for weight loss.  Mya Chung complain of daily persistent nausea.  No emesis.  Similar symptoms present prior to undergoing cholecystectomy.  Six months ago bowels described as moving dialy, formed stool.  Now stool described as \"mushy.\"  No bright red blood per rectum, no melena.  She feels bowels are completely evacuated.  She does not feel hungry.  No heartburn or indigestion.  Denies frequent use of NSAIDs.     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    • Wears glasses        Past Surgical History:   Procedure Laterality Date   • AORTAGRAM Right 2018    Procedure: LEFT LOWER EXTREMITY ANGIOGRAM;  Surgeon: Walker Davis DO;  Location: [facility] OR 12;  Service: Vascular   • BREAST BIOPSY Bilateral     multiple on both, all benign   • BREAST BIOPSY Left 2017    Procedure: LEFT MAMMOGRAM GUIDED NEEDLE DIRECTED EXCISIONAL BREAST BIOPSY;  Surgeon: Malgorzata Scott MD;  Location: [facility] OR;  Service:    • BUNIONECTOMY Bilateral    •  SECTION      X2   • CHOLECYSTECTOMY     • CHOLECYSTECTOMY WITH INTRAOPERATIVE CHOLANGIOGRAM N/A 3/16/2021    Procedure: LAPAROSCOPIC CHOLECYSTECTOMY WITH CHOLANGIOGRAM;  Surgeon: Malgorzata Scott MD;  Location: [facility] OR;  Service: " General;  Laterality: N/A;   • COLONOSCOPY  06/18/2015    diverticulosis   • EYE SURGERY      CATARACT SURGERY   • FEMORAL ENDARTERECTOMY Left 8/22/2018    Procedure: LEFT FEMORAL ENDARTERECTOMY;  Surgeon: Walker Davsi DO;  Location: [place] [address];  Service: Vascular   • JOINT REPLACEMENT      RIGHT HIP       Outpatient Medications Marked as Taking for the 5/11/21 encounter (Office Visit) with Ghulam Hyde APRN   Medication Sig Dispense Refill   • acetaminophen (TYLENOL) 325 MG tablet Take 2 tablets by mouth Every 4 (Four) Hours As Needed for Mild Pain .     • alendronate (FOSAMAX) 70 MG tablet Take 70 mg by mouth Every 7 (Seven) Days. Thursday     • aliskiren (TEKTURNA) 300 MG tablet Take 300 mg by mouth Daily.     • amLODIPine (NORVASC) 10 MG tablet Take 10 mg by mouth Daily.     • aspirin 81 MG tablet Take 81 mg by mouth Daily.     • Calcium Carbonate-Vitamin D (CALCIUM 600/VITAMIN D PO) Take 1 tablet by mouth Daily.     • clopidogrel (PLAVIX) 75 MG tablet Take 1 tablet by mouth Daily. 30 tablet 5   • cycloSPORINE (RESTASIS) 0.05 % ophthalmic emulsion Administer 1 drop to both eyes Every 12 (Twelve) Hours.     • Glucosamine-Chondroitin-Vit D3 8650-3435-082 MG-MG-UNIT pack Take 1 tablet by mouth 2 (Two) Times a Day.     • levothyroxine (SYNTHROID, LEVOTHROID) 125 MCG tablet Take 125 mcg by mouth Daily.     • Multiple Vitamins-Minerals (COMPLETE MULTIVITAMIN/MINERAL PO) Take 1 tablet by mouth Daily.         Allergies   Allergen Reactions   • Erythromycin Shortness Of Breath   • Zithromax [Azithromycin] Itching and Other (See Comments)     Yeast infection   • Penicillins Rash       Social History     Socioeconomic History   • Marital status:      Spouse name: Not on file   • Number of children: Not on file   • Years of education: Not on file   • Highest education level: Not on file   Tobacco Use   • Smoking status: Former Smoker     Years: 2.00     Types: Cigarettes     Quit date: 1955  "    Years since quittin.4   • Smokeless tobacco: Never Used   Substance and Sexual Activity   • Alcohol use: No   • Drug use: No   • Sexual activity: Defer       Review of Systems   Constitutional: Positive for unexpected weight change.   Respiratory: Negative for shortness of breath.    Cardiovascular: Negative for chest pain.   Gastrointestinal: Positive for nausea. Negative for abdominal pain and anal bleeding.       Objective     Vitals:    21 1021   BP: 110/60   Pulse: 81   Temp: 97 °F (36.1 °C)   SpO2: 96%   Weight: 52.2 kg (115 lb)   Height: 162.6 cm (64\")     Body mass index is 19.74 kg/m².    Physical Exam  Constitutional:       Appearance: Normal appearance. She is well-developed.   Eyes:      General: No scleral icterus.  Cardiovascular:      Rate and Rhythm: Regular rhythm.      Heart sounds: Normal heart sounds. No murmur heard.     Pulmonary:      Effort: Pulmonary effort is normal. No accessory muscle usage.      Breath sounds: Normal breath sounds.   Abdominal:      General: Bowel sounds are normal. There is no distension.      Palpations: Abdomen is soft. There is no mass.      Tenderness: There is no abdominal tenderness. There is no guarding or rebound.   Skin:     General: Skin is warm and dry.      Coloration: Skin is not jaundiced.   Neurological:      Mental Status: She is alert.   Psychiatric:         Behavior: Behavior is cooperative.         Imaging Results (Most Recent)     None          Body mass index is 19.74 kg/m².    Assessment/Plan     Diagnoses and all orders for this visit:    1. Nausea (Primary)  -     Case Request; Standing  -     Implement Anesthesia Orders Day of Procedure; Standing  -     Obtain Informed Consent; Standing  -     Case Request    2. Weight loss        ESOPHAGOGASTRODUODENOSCOPY WITH ANESTHESIA (N/A)    If EGD is negative colonoscopy vs bowel prep  Will defer to Dr Cheek post EGD    Hold plavix x 5 days    Patient is to continue all blood pressure and " cardiac medications prior to procedure and has been advised to take medications morning of procedure   Pt verbalized understanding      Advised pt to stop use of NSAIDs, Fish Oil, and MV 5 days prior to procedure, per Dr Cheek protocol.  Tylenol based products are ok to take.  Pt verbalized understanding.    The risk of the endoscopy were discussed in detail.  We discussed the risk of perforation (one out of [id number], riskier with dilation), bleeding (one out of 500), and the rare risks of infection, adverse reaction to anesthesia, respiratory failure, cardiac failure including MI and adverse reaction to medications, etc.  We discussed consequences that could occur if a risk were to develop such as the need for hospitalization, blood transfusion, surgical intervention, medications, pain and disability and death.  Alternatives include not doing anything, or pursuing an UGI series which only offers a diagnosis with potential less accuracy compared to egd.  The patient verbalizes understanding and agrees to proceed.          Precautions are currently being put in place due to COVID-19.  I have explained to [facility] they will be required to undergo COVID testing prior to their procedure.  [facility] verbalized understanding and was willing to proceed.        Ghulam Hyde, APRN  05/11/21          There are no Patient Instructions on file for this visit.

## 2021-05-11 ENCOUNTER — OFFICE VISIT (OUTPATIENT)
Dept: GASTROENTEROLOGY | Facility: CLINIC | Age: 86
End: 2021-05-11

## 2021-05-11 VITALS
HEART RATE: 81 BPM | WEIGHT: 115 LBS | TEMPERATURE: 97 F | HEIGHT: 64 IN | DIASTOLIC BLOOD PRESSURE: 60 MMHG | OXYGEN SATURATION: 96 % | SYSTOLIC BLOOD PRESSURE: 110 MMHG | BODY MASS INDEX: 19.63 KG/M2

## 2021-05-11 DIAGNOSIS — R63.4 WEIGHT LOSS: ICD-10-CM

## 2021-05-11 DIAGNOSIS — R11.0 NAUSEA: Primary | ICD-10-CM

## 2021-05-11 PROCEDURE — 99214 OFFICE O/P EST MOD 30 MIN: CPT | Performed by: NURSE PRACTITIONER

## 2021-05-12 ENCOUNTER — TRANSCRIBE ORDERS (OUTPATIENT)
Dept: LAB | Facility: HOSPITAL | Age: 86
End: 2021-05-12

## 2021-05-12 DIAGNOSIS — Z01.818 PREOP TESTING: Primary | ICD-10-CM

## 2021-05-12 PROBLEM — R11.0 NAUSEA: Status: ACTIVE | Noted: 2021-05-12

## 2021-05-15 ENCOUNTER — LAB (OUTPATIENT)
Dept: LAB | Facility: HOSPITAL | Age: 86
End: 2021-05-15

## 2021-05-15 LAB — SARS-COV-2 ORF1AB RESP QL NAA+PROBE: NOT DETECTED

## 2021-05-15 PROCEDURE — U0004 COV-19 TEST NON-CDC HGH THRU: HCPCS | Performed by: INTERNAL MEDICINE

## 2021-05-15 PROCEDURE — U0005 INFEC AGEN DETEC AMPLI PROBE: HCPCS | Performed by: INTERNAL MEDICINE

## 2021-05-15 PROCEDURE — C9803 HOPD COVID-19 SPEC COLLECT: HCPCS | Performed by: INTERNAL MEDICINE

## 2021-05-18 ENCOUNTER — ANESTHESIA EVENT (OUTPATIENT)
Dept: GASTROENTEROLOGY | Facility: HOSPITAL | Age: 86
End: 2021-05-18

## 2021-05-18 ENCOUNTER — HOSPITAL ENCOUNTER (OUTPATIENT)
Facility: HOSPITAL | Age: 86
Setting detail: HOSPITAL OUTPATIENT SURGERY
Discharge: HOME | End: 2021-05-18
Attending: INTERNAL MEDICINE | Admitting: INTERNAL MEDICINE

## 2021-05-18 ENCOUNTER — ANESTHESIA (OUTPATIENT)
Dept: GASTROENTEROLOGY | Facility: HOSPITAL | Age: 86
End: 2021-05-18

## 2021-05-18 VITALS
DIASTOLIC BLOOD PRESSURE: 65 MMHG | HEIGHT: 63 IN | SYSTOLIC BLOOD PRESSURE: 128 MMHG | RESPIRATION RATE: 20 BRPM | BODY MASS INDEX: 20.2 KG/M2 | HEART RATE: 75 BPM | OXYGEN SATURATION: 96 % | WEIGHT: 114 LBS | TEMPERATURE: 96.8 F

## 2021-05-18 DIAGNOSIS — R11.0 NAUSEA: ICD-10-CM

## 2021-05-18 PROCEDURE — 87081 CULTURE SCREEN ONLY: CPT | Performed by: INTERNAL MEDICINE

## 2021-05-18 PROCEDURE — 43239 EGD BIOPSY SINGLE/MULTIPLE: CPT | Performed by: INTERNAL MEDICINE

## 2021-05-18 PROCEDURE — 25010000002 PROPOFOL 10 MG/ML EMULSION: Performed by: NURSE ANESTHETIST, CERTIFIED REGISTERED

## 2021-05-18 PROCEDURE — 88305 TISSUE EXAM BY PATHOLOGIST: CPT | Performed by: INTERNAL MEDICINE

## 2021-05-18 RX ORDER — LIDOCAINE HYDROCHLORIDE 20 MG/ML
INJECTION, SOLUTION EPIDURAL; INFILTRATION; INTRACAUDAL; PERINEURAL AS NEEDED
Status: DISCONTINUED | OUTPATIENT
Start: 2021-05-18 | End: 2021-05-18 | Stop reason: SURG

## 2021-05-18 RX ORDER — SODIUM CHLORIDE 0.9 % (FLUSH) 0.9 %
10 SYRINGE (ML) INJECTION AS NEEDED
Status: DISCONTINUED | OUTPATIENT
Start: 2021-05-18 | End: 2021-05-18 | Stop reason: HOSPADM

## 2021-05-18 RX ORDER — SODIUM CHLORIDE 9 MG/ML
500 INJECTION, SOLUTION INTRAVENOUS CONTINUOUS PRN
Status: DISCONTINUED | OUTPATIENT
Start: 2021-05-18 | End: 2021-05-18 | Stop reason: HOSPADM

## 2021-05-18 RX ORDER — PROPOFOL 10 MG/ML
VIAL (ML) INTRAVENOUS AS NEEDED
Status: DISCONTINUED | OUTPATIENT
Start: 2021-05-18 | End: 2021-05-18 | Stop reason: SURG

## 2021-05-18 RX ORDER — LIDOCAINE HYDROCHLORIDE 10 MG/ML
0.5 INJECTION, SOLUTION EPIDURAL; INFILTRATION; INTRACAUDAL; PERINEURAL ONCE AS NEEDED
Status: CANCELLED | OUTPATIENT
Start: 2021-05-18

## 2021-05-18 RX ADMIN — LIDOCAINE HYDROCHLORIDE 40 MG: 20 INJECTION, SOLUTION EPIDURAL; INFILTRATION; INTRACAUDAL; PERINEURAL at 12:49

## 2021-05-18 RX ADMIN — SODIUM CHLORIDE 500 ML: 9 INJECTION, SOLUTION INTRAVENOUS at 12:00

## 2021-05-18 RX ADMIN — PROPOFOL 40 MG: 10 INJECTION, EMULSION INTRAVENOUS at 12:49

## 2021-05-18 NOTE — ANESTHESIA PREPROCEDURE EVALUATION
Anesthesia Evaluation     Patient summary reviewed   no history of anesthetic complications:  NPO Solid Status: > 8 hours             Airway   Mallampati: II  Dental      Pulmonary - negative pulmonary ROS   Cardiovascular     (+) hypertension, PVD,       Neuro/Psych- negative ROS  GI/Hepatic/Renal/Endo    (+)   renal disease CRI, thyroid problem hypothyroidism    Musculoskeletal     Abdominal    Substance History      OB/GYN          Other                        Anesthesia Plan    ASA 3     MAC       Anesthetic plan, all risks, benefits, and alternatives have been provided, discussed and informed consent has been obtained with: patient.

## 2021-05-19 LAB
LAB AP CASE REPORT: NORMAL
PATH REPORT.FINAL DX SPEC: NORMAL
PATH REPORT.GROSS SPEC: NORMAL
UREASE TISS QL: NEGATIVE

## 2021-05-20 ENCOUNTER — TELEPHONE (OUTPATIENT)
Dept: GASTROENTEROLOGY | Facility: CLINIC | Age: 86
End: 2021-05-20

## 2021-05-20 NOTE — TELEPHONE ENCOUNTER
----- Message from Sonu Cheek DO sent at 5/19/2021  1:51 PM CDT -----  Please let patient know small bowel biopsy was negative     Thank you      ----- Message -----  From: Lab, Background User  Sent: 5/19/2021   1:02 PM CDT  To: Sonu Cheek DO

## 2021-10-20 ENCOUNTER — TRANSCRIBE ORDERS (OUTPATIENT)
Dept: ADMINISTRATIVE | Facility: HOSPITAL | Age: 86
End: 2021-10-20

## 2021-10-20 DIAGNOSIS — Z12.31 ENCOUNTER FOR SCREENING MAMMOGRAM FOR MALIGNANT NEOPLASM OF BREAST: ICD-10-CM

## 2021-10-20 DIAGNOSIS — Z78.0 POSTMENOPAUSAL: Primary | ICD-10-CM

## 2021-11-04 ENCOUNTER — HOSPITAL ENCOUNTER (OUTPATIENT)
Dept: BONE DENSITY | Facility: HOSPITAL | Age: 86
Discharge: HOME | End: 2021-11-04

## 2021-11-04 ENCOUNTER — HOSPITAL ENCOUNTER (OUTPATIENT)
Dept: MAMMOGRAPHY | Facility: HOSPITAL | Age: 86
Discharge: HOME | End: 2021-11-04

## 2021-11-04 DIAGNOSIS — Z78.0 POSTMENOPAUSAL: ICD-10-CM

## 2021-11-04 DIAGNOSIS — Z12.31 ENCOUNTER FOR SCREENING MAMMOGRAM FOR MALIGNANT NEOPLASM OF BREAST: ICD-10-CM

## 2021-11-04 PROCEDURE — 77067 SCR MAMMO BI INCL CAD: CPT

## 2021-11-04 PROCEDURE — 77080 DXA BONE DENSITY AXIAL: CPT

## 2021-11-04 PROCEDURE — 77063 BREAST TOMOSYNTHESIS BI: CPT

## 2021-11-05 ENCOUNTER — TELEPHONE (OUTPATIENT)
Dept: VASCULAR SURGERY | Facility: CLINIC | Age: 86
End: 2021-11-05

## 2021-11-05 NOTE — TELEPHONE ENCOUNTER
Spoke with Mrs Mixon reminding her of Mrs Chung's appointments for Monday, November 8th, 2021. Reminded Mrs Mixon to have Mrs Chung to arrive [facility] the [facility] at 830 am for 9 o'clock testing and follow up afterwards at 10 am with Saumya TRAVIS. Mrs Mixon confirmed Mrs Chung would be here.

## 2021-11-08 ENCOUNTER — HOSPITAL ENCOUNTER (OUTPATIENT)
Dept: ULTRASOUND IMAGING | Facility: HOSPITAL | Age: 86
Discharge: HOME | End: 2021-11-08
Admitting: NURSE PRACTITIONER

## 2021-11-08 ENCOUNTER — OFFICE VISIT (OUTPATIENT)
Dept: VASCULAR SURGERY | Facility: CLINIC | Age: 86
End: 2021-11-08

## 2021-11-08 VITALS
HEIGHT: 64 IN | OXYGEN SATURATION: 98 % | DIASTOLIC BLOOD PRESSURE: 84 MMHG | WEIGHT: 114 LBS | SYSTOLIC BLOOD PRESSURE: 138 MMHG | HEART RATE: 75 BPM | BODY MASS INDEX: 19.46 KG/M2

## 2021-11-08 DIAGNOSIS — E78.5 HYPERLIPIDEMIA, UNSPECIFIED HYPERLIPIDEMIA TYPE: ICD-10-CM

## 2021-11-08 DIAGNOSIS — I73.9 PAD (PERIPHERAL ARTERY DISEASE) (HCC): ICD-10-CM

## 2021-11-08 DIAGNOSIS — I73.9 PAD (PERIPHERAL ARTERY DISEASE) (HCC): Primary | ICD-10-CM

## 2021-11-08 DIAGNOSIS — I10 ESSENTIAL HYPERTENSION: ICD-10-CM

## 2021-11-08 PROCEDURE — 99214 OFFICE O/P EST MOD 30 MIN: CPT | Performed by: NURSE PRACTITIONER

## 2021-11-08 PROCEDURE — 93923 UPR/LXTR ART STDY 3+ LVLS: CPT | Performed by: SURGERY

## 2021-11-08 PROCEDURE — 93923 UPR/LXTR ART STDY 3+ LVLS: CPT

## 2021-11-08 NOTE — PROGRESS NOTES
"11/8/2021       Delfina Pereira,   [address]  [place] [address]    Mya Chung  2/10/1928    Chief Complaint   Patient presents with   • Follow-up     1 yr f/u with ABIs.  Pt last seen in the office on 11/9/20.  Pt denies any stroke like symptoms.        Dear Delfina Pereira,        HPI  I had the pleasure of seeing your patient Mya Chung in the office today.   As you recall, Mya Chung is a 89 y.o.  female who we are following for lower extremity PAD.  She does have weakness to bilateral lower extremities.  She was describing claudication symptoms and underwent a left lower extremity angiogram with left common femoral endarterectomy in August 2018.  Currently she is doing well denies any claudication.  She is maintained on aspirin and Plavix.  She did have noninvasive testing performed today, which I did review in office.      Review of Systems   Constitutional: Negative.    HENT: Negative.    Eyes: Negative.    Respiratory: Negative.    Cardiovascular: Positive for leg swelling.   Gastrointestinal: Negative.    Endocrine: Negative.    Genitourinary: Negative.    Musculoskeletal: Negative.    Skin: Negative.    Allergic/Immunologic: Negative.    Neurological: Negative.    Hematological: Negative.    Psychiatric/Behavioral: Negative.         /84   Pulse 75   Ht 162.6 cm (64\")   Wt 51.7 kg (114 lb)   SpO2 98%   BMI 19.57 kg/m²   Physical Exam  Vitals and nursing note reviewed.   Constitutional:       General: She is not in acute distress.     Appearance: Normal appearance. She is well-developed and normal weight. She is not diaphoretic.   HENT:      Head: Normocephalic and atraumatic.   Eyes:      General: No scleral icterus.     Pupils: Pupils are equal, round, and reactive to light.   Neck:      Thyroid: No thyromegaly.      Vascular: No carotid bruit or JVD.   Cardiovascular:      Rate and Rhythm: Normal rate and regular rhythm.      Pulses: Normal pulses. "      Heart sounds: Normal heart sounds and S2 normal. No murmur heard.  No friction rub. No gallop.       Comments: Varicose veins significant to the left lower extremity.  Pulmonary:      Effort: Pulmonary effort is normal.      Breath sounds: Normal breath sounds.   Abdominal:      General: Bowel sounds are normal.      Palpations: Abdomen is soft.   Musculoskeletal:         General: Normal range of motion.      Cervical back: Normal range of motion and neck supple.   Skin:     General: Skin is warm and dry.   Neurological:      General: No focal deficit present.      Mental Status: She is alert and oriented to person, place, and time.      Cranial Nerves: No cranial nerve deficit.      Motor: Weakness (To legs) present.   Psychiatric:         Mood and Affect: Mood normal.         Behavior: Behavior normal.         Thought Content: Thought content normal.         Judgment: Judgment normal.        Diagnostic data:  Noninvasive test including ABIs show right SHEREEN 1.09 and a left SHEREEN of 1.09.      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 PAD (peripheral artery disease) ([facility])  I73.9 443.9   2. Essential hypertension  I10 401.9   3. Hyperlipidemia, unspecified hyperlipidemia type  E78.5 272.4         Plan: After thoroughly evaluating Mya [facility], I believe the best course of action is to remain conservative from vascular surgery standpoint.  Overall she appears to be doing well denies any claudication to her lower extremities.  I did  review her testing which shows no arterial insufficiency to her lower extremities.  She has significant varicosities to her left leg and reports no symptoms associated with this.  We will see her back in 1 year with repeat noninvasive testing for continued surveillance, including ABIs.  If she develops any problems sooner I asked her to call our office.  I did discuss vascular risk factors as they pertain to the progression of vascular disease including controlling her hypertension and hyperlipidemia.  Her blood pressure stable on her current medication regimen. Patient's Body mass index is 19.57 kg/m². indicating that she is within normal range (BMI 18.5-24.9). No BMI management plan needed. The patient can continue taking her current medication regimen as previously planned.  This was all discussed in full with complete understanding.    Thank you for allowing me to participate in the care of your patient.  Please do not hesitate with any questions or concerns.  I will keep you aware of any further encounters with [facility].        Sincerely yours,         Saumya Morales, ANGY Pereira, Delfina Whitney, DO

## 2021-11-09 ENCOUNTER — HOSPITAL ENCOUNTER (OUTPATIENT)
Dept: MAMMOGRAPHY | Facility: HOSPITAL | Age: 86
Discharge: HOME | End: 2021-11-09

## 2021-11-09 ENCOUNTER — HOSPITAL ENCOUNTER (OUTPATIENT)
Dept: ULTRASOUND IMAGING | Facility: HOSPITAL | Age: 86
Discharge: HOME | End: 2021-11-09

## 2021-11-09 DIAGNOSIS — R92.8 ABNORMAL MAMMOGRAM: ICD-10-CM

## 2021-11-09 PROCEDURE — 77065 DX MAMMO INCL CAD UNI: CPT

## 2021-11-09 PROCEDURE — G0279 TOMOSYNTHESIS, MAMMO: HCPCS

## 2021-12-13 ENCOUNTER — TRANSCRIBE ORDERS (OUTPATIENT)
Dept: ADMINISTRATIVE | Facility: HOSPITAL | Age: 86
End: 2021-12-13

## 2021-12-13 DIAGNOSIS — R42 DIZZINESS AND GIDDINESS: ICD-10-CM

## 2021-12-13 DIAGNOSIS — R41.82 ALTERED MENTAL STATUS, UNSPECIFIED ALTERED MENTAL STATUS TYPE: Primary | ICD-10-CM

## 2021-12-15 ENCOUNTER — HOSPITAL ENCOUNTER (OUTPATIENT)
Dept: MRI IMAGING | Facility: HOSPITAL | Age: 86
Discharge: HOME | End: 2021-12-15
Admitting: FAMILY MEDICINE

## 2021-12-15 DIAGNOSIS — R42 DIZZINESS AND GIDDINESS: ICD-10-CM

## 2021-12-15 DIAGNOSIS — R41.82 ALTERED MENTAL STATUS, UNSPECIFIED ALTERED MENTAL STATUS TYPE: ICD-10-CM

## 2021-12-15 PROCEDURE — 70551 MRI BRAIN STEM W/O DYE: CPT

## 2021-12-22 ENCOUNTER — APPOINTMENT (OUTPATIENT)
Dept: MRI IMAGING | Facility: HOSPITAL | Age: 86
End: 2021-12-22

## 2022-04-28 ENCOUNTER — HOSPITAL ENCOUNTER (EMERGENCY)
Facility: HOSPITAL | Age: 87
Discharge: HOME | End: 2022-04-28
Attending: EMERGENCY MEDICINE | Admitting: EMERGENCY MEDICINE

## 2022-04-28 ENCOUNTER — APPOINTMENT (OUTPATIENT)
Dept: GENERAL RADIOLOGY | Facility: HOSPITAL | Age: 87
End: 2022-04-28

## 2022-04-28 VITALS
HEART RATE: 86 BPM | TEMPERATURE: 99 F | HEIGHT: 64 IN | BODY MASS INDEX: 19.81 KG/M2 | WEIGHT: 116 LBS | SYSTOLIC BLOOD PRESSURE: 125 MMHG | OXYGEN SATURATION: 91 % | DIASTOLIC BLOOD PRESSURE: 66 MMHG | RESPIRATION RATE: 18 BRPM

## 2022-04-28 DIAGNOSIS — I50.1 PULMONARY EDEMA CARDIAC CAUSE: Primary | ICD-10-CM

## 2022-04-28 DIAGNOSIS — N28.9 RENAL INSUFFICIENCY: ICD-10-CM

## 2022-04-28 LAB
ALBUMIN SERPL-MCNC: 4.1 G/DL (ref 3.5–5.2)
ALBUMIN/GLOB SERPL: 1.5 G/DL
ALP SERPL-CCNC: 69 U/L (ref 39–117)
ALT SERPL W P-5'-P-CCNC: 11 U/L (ref 1–33)
ANION GAP SERPL CALCULATED.3IONS-SCNC: 11 MMOL/L (ref 5–15)
APTT PPP: 31.4 SECONDS (ref 24.1–35)
AST SERPL-CCNC: 20 U/L (ref 1–32)
BASOPHILS # BLD AUTO: 0.05 10*3/MM3 (ref 0–0.2)
BASOPHILS NFR BLD AUTO: 0.5 % (ref 0–1.5)
BILIRUB SERPL-MCNC: 1.1 MG/DL (ref 0–1.2)
BUN SERPL-MCNC: 24 MG/DL (ref 8–23)
BUN/CREAT SERPL: 19.5 (ref 7–25)
CALCIUM SPEC-SCNC: 10.1 MG/DL (ref 8.2–9.6)
CHLORIDE SERPL-SCNC: 107 MMOL/L (ref 98–107)
CO2 SERPL-SCNC: 24 MMOL/L (ref 22–29)
CREAT SERPL-MCNC: 1.23 MG/DL (ref 0.57–1)
D DIMER PPP FEU-MCNC: 0.92 MG/L (FEU) (ref 0–0.5)
DEPRECATED RDW RBC AUTO: 49.7 FL (ref 37–54)
EGFRCR SERPLBLD CKD-EPI 2021: 40.8 ML/MIN/1.73
EOSINOPHIL # BLD AUTO: 0.09 10*3/MM3 (ref 0–0.4)
EOSINOPHIL NFR BLD AUTO: 1 % (ref 0.3–6.2)
ERYTHROCYTE [DISTWIDTH] IN BLOOD BY AUTOMATED COUNT: 14.1 % (ref 12.3–15.4)
GLOBULIN UR ELPH-MCNC: 2.8 GM/DL
GLUCOSE SERPL-MCNC: 108 MG/DL (ref 65–99)
HCT VFR BLD AUTO: 37.4 % (ref 34–46.6)
HGB BLD-MCNC: 11.9 G/DL (ref 12–15.9)
IMM GRANULOCYTES # BLD AUTO: 0.03 10*3/MM3 (ref 0–0.05)
IMM GRANULOCYTES NFR BLD AUTO: 0.3 % (ref 0–0.5)
INR PPP: 0.97 (ref 0.91–1.09)
LYMPHOCYTES # BLD AUTO: 1.76 10*3/MM3 (ref 0.7–3.1)
LYMPHOCYTES NFR BLD AUTO: 19.3 % (ref 19.6–45.3)
MCH RBC QN AUTO: 30.7 PG (ref 26.6–33)
MCHC RBC AUTO-ENTMCNC: 31.8 G/DL (ref 31.5–35.7)
MCV RBC AUTO: 96.4 FL (ref 79–97)
MONOCYTES # BLD AUTO: 0.87 10*3/MM3 (ref 0.1–0.9)
MONOCYTES NFR BLD AUTO: 9.5 % (ref 5–12)
NEUTROPHILS NFR BLD AUTO: 6.32 10*3/MM3 (ref 1.7–7)
NEUTROPHILS NFR BLD AUTO: 69.4 % (ref 42.7–76)
NRBC BLD AUTO-RTO: 0 /100 WBC (ref 0–0.2)
NT-PROBNP SERPL-MCNC: ABNORMAL PG/ML (ref 0–1800)
PLATELET # BLD AUTO: 231 10*3/MM3 (ref 140–450)
PMV BLD AUTO: 9.8 FL (ref 6–12)
POTASSIUM SERPL-SCNC: 4.4 MMOL/L (ref 3.5–5.2)
PROCALCITONIN SERPL-MCNC: 0.09 NG/ML (ref 0–0.25)
PROT SERPL-MCNC: 6.9 G/DL (ref 6–8.5)
PROTHROMBIN TIME: 12.5 SECONDS (ref 11.9–14.6)
RBC # BLD AUTO: 3.88 10*6/MM3 (ref 3.77–5.28)
SODIUM SERPL-SCNC: 142 MMOL/L (ref 136–145)
TROPONIN T SERPL-MCNC: <0.01 NG/ML (ref 0–0.03)
WBC NRBC COR # BLD: 9.12 10*3/MM3 (ref 3.4–10.8)

## 2022-04-28 PROCEDURE — 93005 ELECTROCARDIOGRAM TRACING: CPT | Performed by: EMERGENCY MEDICINE

## 2022-04-28 PROCEDURE — 80053 COMPREHEN METABOLIC PANEL: CPT | Performed by: EMERGENCY MEDICINE

## 2022-04-28 PROCEDURE — 96374 THER/PROPH/DIAG INJ IV PUSH: CPT

## 2022-04-28 PROCEDURE — 99283 EMERGENCY DEPT VISIT LOW MDM: CPT

## 2022-04-28 PROCEDURE — 25010000002 FUROSEMIDE PER 20 MG: Performed by: EMERGENCY MEDICINE

## 2022-04-28 PROCEDURE — 85610 PROTHROMBIN TIME: CPT | Performed by: EMERGENCY MEDICINE

## 2022-04-28 PROCEDURE — 83880 ASSAY OF NATRIURETIC PEPTIDE: CPT | Performed by: EMERGENCY MEDICINE

## 2022-04-28 PROCEDURE — 84484 ASSAY OF TROPONIN QUANT: CPT | Performed by: EMERGENCY MEDICINE

## 2022-04-28 PROCEDURE — 85379 FIBRIN DEGRADATION QUANT: CPT | Performed by: EMERGENCY MEDICINE

## 2022-04-28 PROCEDURE — 84145 PROCALCITONIN (PCT): CPT | Performed by: EMERGENCY MEDICINE

## 2022-04-28 PROCEDURE — 85730 THROMBOPLASTIN TIME PARTIAL: CPT | Performed by: EMERGENCY MEDICINE

## 2022-04-28 PROCEDURE — 93010 ELECTROCARDIOGRAM REPORT: CPT | Performed by: EMERGENCY MEDICINE

## 2022-04-28 PROCEDURE — 85025 COMPLETE CBC W/AUTO DIFF WBC: CPT | Performed by: EMERGENCY MEDICINE

## 2022-04-28 PROCEDURE — 71045 X-RAY EXAM CHEST 1 VIEW: CPT

## 2022-04-28 RX ORDER — POTASSIUM CHLORIDE 750 MG/1
10 TABLET, FILM COATED, EXTENDED RELEASE ORAL DAILY
Qty: 3 TABLET | Refills: 0 | Status: SHIPPED | OUTPATIENT
Start: 2022-04-28 | End: 2022-05-02 | Stop reason: HOSPADM

## 2022-04-28 RX ORDER — MONTELUKAST SODIUM 4 MG/1
1 TABLET, CHEWABLE ORAL DAILY
COMMUNITY

## 2022-04-28 RX ORDER — FUROSEMIDE 20 MG/1
20 TABLET ORAL DAILY
Qty: 7 TABLET | Refills: 0 | Status: ON HOLD | OUTPATIENT
Start: 2022-04-28 | End: 2022-05-02 | Stop reason: SDUPTHER

## 2022-04-28 RX ORDER — FUROSEMIDE 10 MG/ML
40 INJECTION INTRAMUSCULAR; INTRAVENOUS ONCE
Status: COMPLETED | OUTPATIENT
Start: 2022-04-28 | End: 2022-04-28

## 2022-04-28 RX ADMIN — FUROSEMIDE 40 MG: 10 INJECTION, SOLUTION INTRAVENOUS at 13:02

## 2022-04-29 LAB
QT INTERVAL: 406 MS
QTC INTERVAL: 507 MS

## 2022-05-01 ENCOUNTER — HOSPITAL ENCOUNTER (INPATIENT)
Facility: HOSPITAL | Age: 87
LOS: 1 days | Discharge: HOME | End: 2022-05-02
Attending: STUDENT IN AN ORGANIZED HEALTH CARE EDUCATION/TRAINING PROGRAM | Admitting: INTERNAL MEDICINE

## 2022-05-01 ENCOUNTER — APPOINTMENT (OUTPATIENT)
Dept: CT IMAGING | Facility: HOSPITAL | Age: 87
End: 2022-05-01

## 2022-05-01 ENCOUNTER — APPOINTMENT (OUTPATIENT)
Dept: GENERAL RADIOLOGY | Facility: HOSPITAL | Age: 87
End: 2022-05-01

## 2022-05-01 DIAGNOSIS — I50.9 ACUTE ON CHRONIC CONGESTIVE HEART FAILURE, UNSPECIFIED HEART FAILURE TYPE: Primary | ICD-10-CM

## 2022-05-01 DIAGNOSIS — N18.32 STAGE 3B CHRONIC KIDNEY DISEASE: ICD-10-CM

## 2022-05-01 LAB
ALBUMIN SERPL-MCNC: 4.2 G/DL (ref 3.5–5.2)
ALBUMIN/GLOB SERPL: 1.4 G/DL
ALP SERPL-CCNC: 72 U/L (ref 39–117)
ALT SERPL W P-5'-P-CCNC: 10 U/L (ref 1–33)
ANION GAP SERPL CALCULATED.3IONS-SCNC: 12 MMOL/L (ref 5–15)
ARTERIAL PATENCY WRIST A: ABNORMAL
AST SERPL-CCNC: 23 U/L (ref 1–32)
ATMOSPHERIC PRESS: 750 MMHG
BASE EXCESS BLDA CALC-SCNC: 2.3 MMOL/L (ref 0–2)
BASOPHILS # BLD AUTO: 0.06 10*3/MM3 (ref 0–0.2)
BASOPHILS NFR BLD AUTO: 0.7 % (ref 0–1.5)
BDY SITE: ABNORMAL
BILIRUB SERPL-MCNC: 0.8 MG/DL (ref 0–1.2)
BODY TEMPERATURE: 37 C
BUN SERPL-MCNC: 28 MG/DL (ref 8–23)
BUN/CREAT SERPL: 23.3 (ref 7–25)
CALCIUM SPEC-SCNC: 10.3 MG/DL (ref 8.2–9.6)
CHLORIDE SERPL-SCNC: 104 MMOL/L (ref 98–107)
CHOLEST SERPL-MCNC: 207 MG/DL (ref 0–200)
CO2 SERPL-SCNC: 25 MMOL/L (ref 22–29)
CREAT SERPL-MCNC: 1.2 MG/DL (ref 0.57–1)
D DIMER PPP FEU-MCNC: 1.03 MG/L (FEU) (ref 0–0.5)
DEPRECATED RDW RBC AUTO: 49.1 FL (ref 37–54)
EGFRCR SERPLBLD CKD-EPI 2021: 42 ML/MIN/1.73
EOSINOPHIL # BLD AUTO: 0.18 10*3/MM3 (ref 0–0.4)
EOSINOPHIL NFR BLD AUTO: 2.2 % (ref 0.3–6.2)
ERYTHROCYTE [DISTWIDTH] IN BLOOD BY AUTOMATED COUNT: 14 % (ref 12.3–15.4)
GLOBULIN UR ELPH-MCNC: 3 GM/DL
GLUCOSE SERPL-MCNC: 101 MG/DL (ref 65–99)
HBA1C MFR BLD: 5.3 % (ref 4.8–5.6)
HCO3 BLDA-SCNC: 26.5 MMOL/L (ref 20–26)
HCT VFR BLD AUTO: 40.2 % (ref 34–46.6)
HDLC SERPL-MCNC: 66 MG/DL (ref 40–60)
HGB BLD-MCNC: 12.9 G/DL (ref 12–15.9)
HOLD SPECIMEN: NORMAL
IMM GRANULOCYTES # BLD AUTO: 0.03 10*3/MM3 (ref 0–0.05)
IMM GRANULOCYTES NFR BLD AUTO: 0.4 % (ref 0–0.5)
LDLC SERPL CALC-MCNC: 125 MG/DL (ref 0–100)
LDLC/HDLC SERPL: 1.86 {RATIO}
LYMPHOCYTES # BLD AUTO: 2.12 10*3/MM3 (ref 0.7–3.1)
LYMPHOCYTES NFR BLD AUTO: 25.7 % (ref 19.6–45.3)
Lab: ABNORMAL
Lab: ABNORMAL
MCH RBC QN AUTO: 30.8 PG (ref 26.6–33)
MCHC RBC AUTO-ENTMCNC: 32.1 G/DL (ref 31.5–35.7)
MCV RBC AUTO: 95.9 FL (ref 79–97)
MODALITY: ABNORMAL
MONOCYTES # BLD AUTO: 0.52 10*3/MM3 (ref 0.1–0.9)
MONOCYTES NFR BLD AUTO: 6.3 % (ref 5–12)
NEUTROPHILS NFR BLD AUTO: 5.33 10*3/MM3 (ref 1.7–7)
NEUTROPHILS NFR BLD AUTO: 64.7 % (ref 42.7–76)
NOTIFIED BY: ABNORMAL
NOTIFIED WHO: ABNORMAL
NRBC BLD AUTO-RTO: 0 /100 WBC (ref 0–0.2)
NT-PROBNP SERPL-MCNC: 6712 PG/ML (ref 0–1800)
PCO2 BLDA: 38.8 MM HG (ref 35–45)
PCO2 TEMP ADJ BLD: 38.8 MM HG (ref 35–45)
PH BLDA: 7.44 PH UNITS (ref 7.35–7.45)
PH, TEMP CORRECTED: 7.44 PH UNITS (ref 7.35–7.45)
PLATELET # BLD AUTO: 276 10*3/MM3 (ref 140–450)
PMV BLD AUTO: 9.9 FL (ref 6–12)
PO2 BLDA: 53.7 MM HG (ref 83–108)
PO2 TEMP ADJ BLD: 53.7 MM HG (ref 83–108)
POTASSIUM SERPL-SCNC: 4.5 MMOL/L (ref 3.5–5.2)
PROT SERPL-MCNC: 7.2 G/DL (ref 6–8.5)
RBC # BLD AUTO: 4.19 10*6/MM3 (ref 3.77–5.28)
SAO2 % BLDCOA: 89.4 % (ref 94–99)
SARS-COV-2 RNA PNL SPEC NAA+PROBE: NOT DETECTED
SODIUM SERPL-SCNC: 141 MMOL/L (ref 136–145)
TRIGL SERPL-MCNC: 92 MG/DL (ref 0–150)
TROPONIN T SERPL-MCNC: 0.01 NG/ML (ref 0–0.03)
TROPONIN T SERPL-MCNC: <0.01 NG/ML (ref 0–0.03)
TSH SERPL DL<=0.05 MIU/L-ACNC: 22.38 UIU/ML (ref 0.27–4.2)
VENTILATOR MODE: ABNORMAL
VLDLC SERPL-MCNC: 16 MG/DL (ref 5–40)
WBC NRBC COR # BLD: 8.24 10*3/MM3 (ref 3.4–10.8)
WHOLE BLOOD HOLD SPECIMEN: NORMAL

## 2022-05-01 PROCEDURE — 25010000002 FUROSEMIDE PER 20 MG: Performed by: STUDENT IN AN ORGANIZED HEALTH CARE EDUCATION/TRAINING PROGRAM

## 2022-05-01 PROCEDURE — 71045 X-RAY EXAM CHEST 1 VIEW: CPT

## 2022-05-01 PROCEDURE — 0 IOPAMIDOL PER 1 ML: Performed by: STUDENT IN AN ORGANIZED HEALTH CARE EDUCATION/TRAINING PROGRAM

## 2022-05-01 PROCEDURE — 83880 ASSAY OF NATRIURETIC PEPTIDE: CPT | Performed by: STUDENT IN AN ORGANIZED HEALTH CARE EDUCATION/TRAINING PROGRAM

## 2022-05-01 PROCEDURE — 71275 CT ANGIOGRAPHY CHEST: CPT

## 2022-05-01 PROCEDURE — 85379 FIBRIN DEGRADATION QUANT: CPT | Performed by: STUDENT IN AN ORGANIZED HEALTH CARE EDUCATION/TRAINING PROGRAM

## 2022-05-01 PROCEDURE — 85025 COMPLETE CBC W/AUTO DIFF WBC: CPT | Performed by: STUDENT IN AN ORGANIZED HEALTH CARE EDUCATION/TRAINING PROGRAM

## 2022-05-01 PROCEDURE — 87635 SARS-COV-2 COVID-19 AMP PRB: CPT | Performed by: STUDENT IN AN ORGANIZED HEALTH CARE EDUCATION/TRAINING PROGRAM

## 2022-05-01 PROCEDURE — 80061 LIPID PANEL: CPT | Performed by: INTERNAL MEDICINE

## 2022-05-01 PROCEDURE — 84443 ASSAY THYROID STIM HORMONE: CPT | Performed by: INTERNAL MEDICINE

## 2022-05-01 PROCEDURE — 36600 WITHDRAWAL OF ARTERIAL BLOOD: CPT

## 2022-05-01 PROCEDURE — 99284 EMERGENCY DEPT VISIT MOD MDM: CPT

## 2022-05-01 PROCEDURE — 93010 ELECTROCARDIOGRAM REPORT: CPT | Performed by: INTERNAL MEDICINE

## 2022-05-01 PROCEDURE — 84484 ASSAY OF TROPONIN QUANT: CPT | Performed by: STUDENT IN AN ORGANIZED HEALTH CARE EDUCATION/TRAINING PROGRAM

## 2022-05-01 PROCEDURE — 82803 BLOOD GASES ANY COMBINATION: CPT

## 2022-05-01 PROCEDURE — 93005 ELECTROCARDIOGRAM TRACING: CPT | Performed by: STUDENT IN AN ORGANIZED HEALTH CARE EDUCATION/TRAINING PROGRAM

## 2022-05-01 PROCEDURE — 80053 COMPREHEN METABOLIC PANEL: CPT | Performed by: STUDENT IN AN ORGANIZED HEALTH CARE EDUCATION/TRAINING PROGRAM

## 2022-05-01 PROCEDURE — 83036 HEMOGLOBIN GLYCOSYLATED A1C: CPT | Performed by: INTERNAL MEDICINE

## 2022-05-01 PROCEDURE — 25010000002 FUROSEMIDE PER 20 MG: Performed by: INTERNAL MEDICINE

## 2022-05-01 RX ORDER — ALISKIREN 150 MG/1
300 TABLET, FILM COATED ORAL DAILY
Status: DISCONTINUED | OUTPATIENT
Start: 2022-05-02 | End: 2022-05-02 | Stop reason: HOSPADM

## 2022-05-01 RX ORDER — ONDANSETRON 2 MG/ML
4 INJECTION INTRAMUSCULAR; INTRAVENOUS EVERY 6 HOURS PRN
Status: DISCONTINUED | OUTPATIENT
Start: 2022-05-01 | End: 2022-05-02 | Stop reason: HOSPADM

## 2022-05-01 RX ORDER — CYCLOSPORINE 0.5 MG/ML
1 EMULSION OPHTHALMIC EVERY 12 HOURS
Status: DISCONTINUED | OUTPATIENT
Start: 2022-05-01 | End: 2022-05-02 | Stop reason: HOSPADM

## 2022-05-01 RX ORDER — MULTIPLE VITAMINS W/ MINERALS TAB 9MG-400MCG
1 TAB ORAL DAILY
Status: DISCONTINUED | OUTPATIENT
Start: 2022-05-01 | End: 2022-05-02 | Stop reason: HOSPADM

## 2022-05-01 RX ORDER — ACETAMINOPHEN 325 MG/1
650 TABLET ORAL EVERY 4 HOURS PRN
Status: DISCONTINUED | OUTPATIENT
Start: 2022-05-01 | End: 2022-05-01 | Stop reason: SDUPTHER

## 2022-05-01 RX ORDER — FUROSEMIDE 10 MG/ML
20 INJECTION INTRAMUSCULAR; INTRAVENOUS EVERY 12 HOURS
Status: DISCONTINUED | OUTPATIENT
Start: 2022-05-01 | End: 2022-05-02 | Stop reason: HOSPADM

## 2022-05-01 RX ORDER — SODIUM CHLORIDE 0.9 % (FLUSH) 0.9 %
10 SYRINGE (ML) INJECTION AS NEEDED
Status: DISCONTINUED | OUTPATIENT
Start: 2022-05-01 | End: 2022-05-02 | Stop reason: HOSPADM

## 2022-05-01 RX ORDER — SODIUM CHLORIDE 0.9 % (FLUSH) 0.9 %
10 SYRINGE (ML) INJECTION EVERY 12 HOURS SCHEDULED
Status: DISCONTINUED | OUTPATIENT
Start: 2022-05-01 | End: 2022-05-02 | Stop reason: HOSPADM

## 2022-05-01 RX ORDER — LEVOTHYROXINE SODIUM 0.12 MG/1
125 TABLET ORAL
Status: DISCONTINUED | OUTPATIENT
Start: 2022-05-02 | End: 2022-05-02 | Stop reason: HOSPADM

## 2022-05-01 RX ORDER — CLOPIDOGREL BISULFATE 75 MG/1
75 TABLET ORAL EVERY 24 HOURS
Status: DISCONTINUED | OUTPATIENT
Start: 2022-05-01 | End: 2022-05-02 | Stop reason: HOSPADM

## 2022-05-01 RX ORDER — ACETAMINOPHEN 160 MG/5ML
650 SOLUTION ORAL EVERY 4 HOURS PRN
Status: DISCONTINUED | OUTPATIENT
Start: 2022-05-01 | End: 2022-05-02 | Stop reason: HOSPADM

## 2022-05-01 RX ORDER — ACETAMINOPHEN 325 MG/1
650 TABLET ORAL EVERY 4 HOURS PRN
Status: DISCONTINUED | OUTPATIENT
Start: 2022-05-01 | End: 2022-05-02 | Stop reason: HOSPADM

## 2022-05-01 RX ORDER — ASPIRIN 81 MG/1
81 TABLET ORAL DAILY
Status: DISCONTINUED | OUTPATIENT
Start: 2022-05-02 | End: 2022-05-02 | Stop reason: HOSPADM

## 2022-05-01 RX ORDER — FUROSEMIDE 10 MG/ML
20 INJECTION INTRAMUSCULAR; INTRAVENOUS ONCE
Status: COMPLETED | OUTPATIENT
Start: 2022-05-01 | End: 2022-05-01

## 2022-05-01 RX ADMIN — Medication 10 ML: at 21:01

## 2022-05-01 RX ADMIN — CYCLOSPORINE 1 DROP: 0.5 EMULSION OPHTHALMIC at 21:02

## 2022-05-01 RX ADMIN — ACETAMINOPHEN 650 MG: 325 TABLET ORAL at 20:59

## 2022-05-01 RX ADMIN — IOPAMIDOL 81 ML: 755 INJECTION, SOLUTION INTRAVENOUS at 11:55

## 2022-05-01 RX ADMIN — FUROSEMIDE 20 MG: 10 INJECTION, SOLUTION INTRAVENOUS at 17:02

## 2022-05-01 RX ADMIN — FUROSEMIDE 20 MG: 10 INJECTION, SOLUTION INTRAVENOUS at 12:25

## 2022-05-01 NOTE — ED PROVIDER NOTES
Subjective   Patient states that she has been having worsening shortness of breath over the past few days.  States that she was seen here few days ago.  States that she felt better for 2 nights but then last night she was sitting up all night and was not able to breathe so came in today for further evaluation.  She states that she is not having any chest pain.  States she is shortness of breath.  Denies any new leg swelling or calf tenderness.  Denies any numbness, tingling, headache, dizziness, neck pain that is new.          Review of Systems   All other systems reviewed and are negative.      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    • Wears glasses        Allergies   Allergen Reactions   • Erythromycin Shortness Of Breath   • Zithromax [Azithromycin] Itching and Other (See Comments)     Yeast infection   • Penicillins Rash       Past Surgical History:   Procedure Laterality Date   • AORTAGRAM Right 2018    Procedure: LEFT LOWER EXTREMITY ANGIOGRAM;  Surgeon: Walker Davis DO;  Location: [facility] OR 12;  Service: Vascular   • BREAST BIOPSY Bilateral     multiple on both, all benign   • BREAST BIOPSY Left 2017    Procedure: LEFT MAMMOGRAM GUIDED NEEDLE DIRECTED EXCISIONAL BREAST BIOPSY;  Surgeon: Malgorzata Scott MD;  Location: [facility] OR;  Service:    • BUNIONECTOMY Bilateral    •  SECTION      X2   • CHOLECYSTECTOMY     • CHOLECYSTECTOMY WITH INTRAOPERATIVE CHOLANGIOGRAM N/A 2021    Procedure: LAPAROSCOPIC CHOLECYSTECTOMY WITH CHOLANGIOGRAM;  Surgeon: Malgorzata Scott MD;  Location: [facility] OR;  Service: General;  Laterality: N/A;   • COLONOSCOPY  2015    diverticulosis   • ENDOSCOPY N/A 2021    Procedure: ESOPHAGOGASTRODUODENOSCOPY WITH ANESTHESIA;   Surgeon: Sonu Cheek DO;  Location: [facility] ENDOSCOPY;  Service: Gastroenterology;  Laterality: N/A;  pre nausea  post esophagitis  Delfina Pereira DO   • EYE SURGERY      CATARACT SURGERY   • FEMORAL ENDARTERECTOMY Left 2018    Procedure: LEFT FEMORAL ENDARTERECTOMY;  Surgeon: Walker Davis DO;  Location: [facility] HYBRID OR 12;  Service: Vascular   • JOINT REPLACEMENT      RIGHT HIP       Family History   Problem Relation Age of Onset   • No Known Problems Mother    • Heart disease Father    • Cancer Sister         Uterus   • No Known Problems Brother    • No Known Problems Maternal Aunt    • No Known Problems Paternal Aunt    • No Known Problems Maternal Grandmother    • No Known Problems Paternal Grandmother    • No Known Problems Daughter    • No Known Problems Son    • Breast cancer Neg Hx    • BRCA 1/2 Neg Hx    • Colon cancer Neg Hx    • Endometrial cancer Neg Hx    • Ovarian cancer Neg Hx    • Colon polyps Neg Hx    • Esophageal cancer Neg Hx        Social History     Socioeconomic History   • Marital status:    Tobacco Use   • Smoking status: Former Smoker     Years: 2.00     Types: Cigarettes     Quit date:      Years since quittin.3   • Smokeless tobacco: Never Used   Vaping Use   • Vaping Use: Never used   Substance and Sexual Activity   • Alcohol use: No   • Drug use: No   • Sexual activity: Defer           Objective   Physical Exam  Vitals and nursing note reviewed.   Constitutional:       General: She is not in acute distress.     Appearance: She is not toxic-appearing or diaphoretic.   HENT:      Head: Normocephalic and atraumatic.      Nose: Nose normal.   Eyes:      General:         Right eye: No discharge.         Left eye: No discharge.      Extraocular Movements: Extraocular movements intact.      Conjunctiva/sclera: Conjunctivae normal.   Cardiovascular:      Rate and Rhythm: Normal rate.      Pulses: Normal pulses.   Pulmonary:      Effort: Pulmonary  effort is normal. No respiratory distress.      Breath sounds: No stridor. Rhonchi present.   Abdominal:      General: Abdomen is flat.   Musculoskeletal:         General: Normal range of motion.      Cervical back: No rigidity.   Skin:     General: Skin is warm.   Neurological:      General: No focal deficit present.      Mental Status: She is alert and oriented to person, place, and time.      Cranial Nerves: No cranial nerve deficit.      Sensory: No sensory deficit.      Motor: No weakness.   Psychiatric:         Mood and Affect: Mood normal.         Behavior: Behavior normal.         Thought Content: Thought content normal.         Judgment: Judgment normal.         Procedures           ED Course                                                 MDM  Number of Diagnoses or Management Options  Acute on chronic congestive heart failure, unspecified heart failure type (HCC)  Stage 3b chronic kidney disease (HCC)  Diagnosis management comments: This is a 89yoF presenting with shortness of breath. Patient arrived hemodynamically stable and was afebrile. PO2 on ABG is 53. Placed on 2L O2 as she was satting 90-91% prior to ABG. Patient was placed on the monitor and IV access was established. EKG was obtained and did not reveal any malignant/unstable dysrhythmia, any acute ST elevations, evidence of Brugada, or significantly prolonged QTc.     Differentials include, but are not limited to ACS, PE, anxiety, CHF exacerbation.     Plan to obtain cbc, cmp, ekg, troponin, BNP, CXR, ABG, d-dimer, and reassess.    Presentation not consistent with other acute, emergent causes of shortness of breath at this time. Low suspicion for DVT as there is no new calf swelling, tenderness, palpable tortuous lower extremity vein, and there is no chinedu's sign bilaterally. Low suspicion for pneumothorax as the patient has no radiographic evidence of a pneumothorax. Low suspicion for dissection there is no widened mediastinum, hypotension,  pulse deficits, and no tearing back/abdominal pain. Low suspicion for tamponade as there is no JVD, muffled heart sounds, electrical alternans on EKG, and no hypotension. Low suspicion for pericarditis as there is no diffuse ST elevation or MT depression and the patient is afebrile. No evidence of a GI bleed per patient's history that could contribute to the shortness of breath due to blood loss anemia. Low suspicion for myocarditis as there is no persistent tachycardia, recent viral illness, hypotension, or evidence of LVH.     The workup was reviewed and found to have evidence of CHF and chronic kidney disease. I reassessed the patient and discussed the findings of the work up so far. I told her that I would like to admit her. I answered all her questions regarding the emergency department evaluation, diagnosis, and treatment plan in plain and simple language that she was able to understand.     I told her that the next step in treatment would be admission to the hospital for further workup and care. She voiced agreement with the plan of care so far and had no further questions. I told her that there is always some diagnostic uncertainty in the ER and that her work up, current physical exam, and even her current presentation may not always reveal other underlying conditions. I also went over the fact that her condition may change or worsen while being in the hospital. I told her that they may even find more things that require treatment or follow-up. She expressed understanding and agreed that there are reasonable limitations with the practice of emergency medicine.    The hospitalist service was consulted for evaluation and admission. The hospitalist service assumed primary care of the patient and admitted the patient in stable condition.           Amount and/or Complexity of Data Reviewed  Clinical lab tests: reviewed and ordered  Tests in the radiology section of CPT®: ordered and reviewed  Tests in the  medicine section of CPT®: reviewed  Decide to obtain previous medical records or to obtain history from someone other than the patient: yes        Final diagnoses:   Acute on chronic congestive heart failure, unspecified heart failure type (HCC)   Stage 3b chronic kidney disease (HCC)       ED Disposition  ED Disposition     ED Disposition   Decision to Admit    Condition   --    Comment   Level of Care: Med/Surg [1]   Diagnosis: Acute on chronic congestive heart failure, unspecified heart failure type (HCC) [[id number]]   Admitting Physician: WOODY BAUTISTA [[id number]]   Attending Physician: WOODY BAUTISTA [[id number]]   Certification: I Certify That Inpatient Hospital Services Are Medically Necessary For Greater Than 2 Midnights               Delfina Pereira, DO  [address]  [address]  [place] [id number] [phone]    Follow up on 5/6/2022  11:10am    [facility] CARDIOLOGY  [address], [address]  [facility] [address] [phone]    needs ref.from PCP         Medication List      Changed    furosemide 20 MG tablet  Commonly known as: LASIX  Take 2 tablets by mouth Daily As Needed (Weight gain, shortness of breath, swelling).  What changed:   · how much to take  · when to take this  · reasons to take this     levothyroxine 137 MCG tablet  Commonly known as: SYNTHROID, LEVOTHROID  Take 1 tablet by mouth Daily.  What changed:   · medication strength  · how much to take        Stop    amLODIPine 10 MG tablet  Commonly known as: NORVASC     potassium chloride 10 MEQ CR tablet           Where to Get Your Medications      These medications were sent to [facility] [address] [place] [address] [address] [facility] [facility] [phone]  - [phone]   [address] [facility] [address]    Phone: [phone]   · furosemide 20 MG tablet  · levothyroxine 137 MCG tablet          Casey Rodriguez MD  05/09/22 [id number]

## 2022-05-01 NOTE — PLAN OF CARE
Goal Outcome Evaluation:  Plan of Care Reviewed With: patient, daughter        Progress: improving  Outcome Evaluation: PATIENT ADMITTED FROM ER WITH SHORTNESS OF BREATH/CHF. PATIENT IS ALERT AND ORIENTED. SATS WNL ON 2 LITERS. C/O SOA- WORSE WHEN LYING FLAT. RECEIVING IV LASIX. UP AD ALISON. DAUGHTER AT BEDSIDE. CONT TO MONITOR.

## 2022-05-01 NOTE — H&P
"4           [facility] Medicine Services  HISTORY AND PHYSICAL    Date of Admission: 5/1/2022  Primary Care Physician: Delfina Pereira DO    Subjective     Chief Complaint: sob-\"could not breathe\" especially when laying down    History of Present Illness  I am asked to admit the patient for CHF exacerbation with hypoxia.  Patient is 89-year-old woman who carries history of hypertension, hyperlipidemia, hypothyroidism.  Repeat requested hospital admission.  I have not seen physical exam in ER documentation as of yet at the time when I started my review of record.    Initial vital signs were 125/68, heart rate 84, respiratory rate 20, temperature 97.9.  Her O2 saturation is 93%.  O2 saturation did not come down below 90 however on arterial blood gas, PaO2 was 54 taken in room air.  She has a normal pH at 7.44 and PCO2 of 39.  Patient has D-dimer of 1.03.  Her creatinine is 1.2.  GFR 42 based on current creatinine of 1.2.  ER requested for CT angiogram of the chest.  No evidence of PE.  There is cardiomegaly and small pleural effusion as well as interstitial edema favoring CHF.  No aneurysm or dissection.  Chest x-ray done earlier  compared with April 20, 2022 by radiologist read as stable chest.  Cardiomegaly with findings favorable for CHF with interstitial edema and small pleural effusion.  Patient's proBNP is elevated greater than 6000    She does not have any recent echocardiogram.  In the record dates back February 27, 2019.  Results for orders placed during the hospital encounter of 02/27/19    Adult Transthoracic Echo Complete W/ Cont if Necessary Per Protocol    Interpretation Summary  · Estimated EF = 55%.  · Left ventricular systolic function is normal.  · Left ventricular diastolic dysfunction.  · Mild aortic valve regurgitation is present.  · No evidence of pulmonary hypertension is present.    There is not much record from care everywhere of any laboratory or " echocardiogram for comparison.      Patient over the past 3 to 5 days has increasing shortness of breath, decreased appetite, needing to sleep on a recliner because of shortness of breath.  No  cough wheezing  No  chest pain, palpitation, near syncope or syncopal episode  No  nausea vomiting, abdominal pain, bowel disturbance  States been urinating well since started on Lasix.  No mention fall    Patient accompanied by daughter.  Patient not well aware about medications she take neither does she know what it is for.  Her daughter was helping in verifying some information.    Patient still lives by herself.  Quit smoking many years ago    Was recently in the emergency room and received prescription for Lasix.  Review of Systems     Otherwise complet daughter helped e ROS reviewed and negative except as mentioned in the HPI.    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 OR 12;  Service: Vascular   • BREAST BIOPSY Bilateral     multiple on both, all benign   • BREAST BIOPSY Left 2017    Procedure: LEFT MAMMOGRAM GUIDED NEEDLE DIRECTED EXCISIONAL BREAST BIOPSY;  Surgeon: Malgorzata Scott MD;  Location: [facility] OR;  Service:    • BUNIONECTOMY Bilateral    •  SECTION      X2   • CHOLECYSTECTOMY     • CHOLECYSTECTOMY WITH INTRAOPERATIVE CHOLANGIOGRAM N/A 3/16/2021    Procedure: LAPAROSCOPIC CHOLECYSTECTOMY WITH CHOLANGIOGRAM;  Surgeon: Malgorzata Scott MD;  Location: [facility] OR;  Service: General;  Laterality: N/A;   • COLONOSCOPY  2015     diverticulosis   • ENDOSCOPY N/A 5/18/2021    Procedure: ESOPHAGOGASTRODUODENOSCOPY WITH ANESTHESIA;  Surgeon: Sonu Cheek DO;  Location: [facility] ENDOSCOPY;  Service: Gastroenterology;  Laterality: N/A;  pre nausea  post esophagitis  Delfina Pereira DO   • EYE SURGERY      CATARACT SURGERY   • FEMORAL ENDARTERECTOMY Left 8/22/2018    Procedure: LEFT FEMORAL ENDARTERECTOMY;  Surgeon: Walker Davis DO;  Location: [facility] HYBRID OR 12;  Service: Vascular   • JOINT REPLACEMENT      RIGHT HIP     Social History:  reports that she quit smoking about 67 years ago. Her smoking use included cigarettes. She quit after 2.00 years of use. She has never used smokeless tobacco. She reports that she does not drink alcohol and does not use drugs.    Family History: family history includes Cancer in her sister; Heart disease in her father; No Known Problems in her brother, daughter, maternal aunt, maternal grandmother, mother, paternal aunt, paternal grandmother, and son.    Allergies:  Allergies   Allergen Reactions   • Erythromycin Shortness Of Breath   • Zithromax [Azithromycin] Itching and Other (See Comments)     Yeast infection   • Penicillins Rash       Medications:  Prior to Admission medications    Medication Sig Start Date End Date Taking? Authorizing Provider   acetaminophen (TYLENOL) 325 MG tablet Take 2 tablets by mouth Every 4 (Four) Hours As Needed for Mild Pain . 7/8/19   Flor Montalvo APRN   alendronate (FOSAMAX) 70 MG tablet Take 70 mg by mouth Every 7 (Seven) Days. Thursday    Hossein Colvin MD   aliskiren (TEKTURNA) 300 MG tablet Take 300 mg by mouth Daily.    Hossein Colvin MD   amLODIPine (NORVASC) 10 MG tablet Take 10 mg by mouth Daily.    Hossein Colvin MD   aspirin 81 MG tablet Take 81 mg by mouth Daily.    Hossein Colvin MD   Calcium Carbonate-Vitamin D (CALCIUM 600/VITAMIN D PO) Take 1 tablet by mouth Daily.    Hossein Colvin MD   clopidogrel  "(PLAVIX) 75 MG tablet Take 1 tablet by mouth Daily. 8/23/18   Saumya Morales APRN   colestipol (COLESTID) 1 g tablet Take 1 g by mouth.    Hossein Colvin MD   cycloSPORINE (RESTASIS) 0.05 % ophthalmic emulsion Administer 1 drop to both eyes Every 12 (Twelve) Hours.    Hossein Colvin MD   furosemide (LASIX) 20 MG tablet Take 1 tablet by mouth Daily for 7 days. 4/28/22 5/5/22  Joseph Machado MD   Glucosamine-Chondroitin-Vit D3 6422-7486-051 MG-MG-UNIT pack Take 1 tablet by mouth 2 (Two) Times a Day.    Hossein Colvin MD   levothyroxine (SYNTHROID, LEVOTHROID) 125 MCG tablet Take 125 mcg by mouth Daily.    Hossein Colvin MD   Multiple Vitamins-Minerals (COMPLETE MULTIVITAMIN/MINERAL PO) Take 1 tablet by mouth Daily.    Hossein Colvin MD   potassium chloride 10 MEQ CR tablet Take 1 tablet by mouth Daily for 3 days. 4/28/22 5/1/22  Joseph Machado MD     I have utilized all available immediate resources to obtain, update, and review the patient's current medications.    Objective     Vital Signs: /61   Pulse 83   Temp 97.9 °F (36.6 °C)   Resp 17   Ht 162.6 cm (64\")   Wt 51.7 kg (114 lb)   SpO2 92%   BMI 19.57 kg/m²   Physical Exam    not in any apparent distress  Speaks in full sentences  GEN: Awake, alert, interactive, in NAD  HEENT: Atraumatic, PERRLA, EOMI, Anicteric, Trachea midline, no hepatojugular reflux or JVD  Lungs: CTAB, no wheezing/rales/rhonchi  Heart: Intermittent premature beats, +S1/s2, positive gallop.  Positive murmur  ABD: soft, nt/nd, +BS, no guarding/rebound  Extremities: atraumatic, no cyanosis, no edema  Skin: no rashes or lesions  Neuro: AAOx3, no focal deficits  Psych: normal mood & affect      Results Reviewed:  Lab Results (last 24 hours)     Procedure Component Value Units Date/Time    Troponin [[id number]] Collected: 05/01/22 1222    Specimen: Blood Updated: 05/01/22 1224    [place] Draw [[id number]] Collected: 05/01/22 1036    Specimen: Blood " Updated: 05/01/22 1147    Narrative:      The following orders were created for panel order [place] Draw.  Procedure                               Abnormality         Status                     ---------                               -----------         ------                     Red Top[[id number]]                                          Final result               [place] Blood Culture Oscar...[[id number]]                      Final result               [place] Top[[id number]]                                         In process                 Light Blue Top[[id number]]                                   Final result                 Please view results for these tests on the individual orders.    [place] Blood Culture Bottle Set [[id number]] Collected: 05/01/22 1036    Specimen: Blood from Arm, Right Updated: 05/01/22 1147     Extra Tube Hold for add-ons.     Comment: Auto resulted.       Red Top [[id number]] Collected: 05/01/22 1042    Specimen: Blood Updated: 05/01/22 1147     Extra Tube Hold for add-ons.     Comment: Auto resulted.       Light Blue Top [[id number]] Collected: 05/01/22 1042    Specimen: Blood Updated: 05/01/22 1147     Extra Tube hold for add-on     Comment: Auto resulted       COVID PRE-OP / PRE-PROCEDURE SCREENING ORDER (NO ISOLATION) - Swab, Nasal Cavity [[id number]]  (Normal) Collected: 05/01/22 1033    Specimen: Swab from Nasal Cavity Updated: 05/01/22 1127    Narrative:      The following orders were created for panel order COVID PRE-OP / PRE-PROCEDURE SCREENING ORDER (NO ISOLATION) - Swab, Nasal Cavity.  Procedure                               Abnormality         Status                     ---------                               -----------         ------                     COVID-19,[facility] IN-JAZMIN...[[id number]]  Normal              Final result                 Please view results for these tests on the individual orders.    COVID-19,[facility] IN-HOUSE,Nasal Swab No Transport Media 3-4 HR TAT - Swab,  Nasal Cavity [[id number]]  (Normal) Collected: 05/01/22 1033    Specimen: Swab from Nasal Cavity Updated: 05/01/22 1127     COVID19 Not Detected    Narrative:      Fact sheet for providers: https://www.fda.gov/media/966406/download     Fact sheet for patients: https://www.fda.gov/media/092498/download    Test performed by PCR.    Consider negative results in combination with clinical observations, patient history, and epidemiological information.    Comprehensive Metabolic Panel [[id number]]  (Abnormal) Collected: 05/01/22 1042    Specimen: Blood Updated: 05/01/22 1119     Glucose 101 mg/dL      BUN 28 mg/dL      Creatinine 1.20 mg/dL      Sodium 141 mmol/L      Potassium 4.5 mmol/L      Chloride 104 mmol/L      CO2 25.0 mmol/L      Calcium 10.3 mg/dL      Total Protein 7.2 g/dL      Albumin 4.20 g/dL      ALT (SGPT) 10 U/L      AST (SGOT) 23 U/L      Alkaline Phosphatase 72 U/L      Total Bilirubin 0.8 mg/dL      Globulin 3.0 gm/dL      A/G Ratio 1.4 g/dL      BUN/Creatinine Ratio 23.3     Anion Gap 12.0 mmol/L      eGFR 42.0 mL/min/1.73      Comment: National Kidney Foundation and American Society of Nephrology (ASN) Task Force recommended calculation based on the Chronic Kidney Disease Epidemiology Collaboration (CKD-EPI) equation refit without adjustment for race.       Narrative:      GFR Normal >60  Chronic Kidney Disease <60  Kidney Failure <15      Troponin [[id number]]  (Normal) Collected: 05/01/22 1042    Specimen: Blood Updated: 05/01/22 1118     Troponin T 0.011 ng/mL     Narrative:      Troponin T Reference Range:  <= 0.03 ng/mL-   Negative for AMI  >0.03 ng/mL-     Abnormal for myocardial necrosis.  Clinicians would have to utilize clinical acumen, EKG, Troponin and serial changes to determine if it is an Acute Myocardial Infarction or myocardial injury due to an underlying chronic condition.       Results may be falsely decreased if patient taking Biotin.      BNP [[id number]]  (Abnormal) Collected:  05/01/22 1042    Specimen: Blood Updated: 05/01/22 1116     proBNP 6,712.0 pg/mL     Narrative:      Among patients with dyspnea, NT-proBNP is highly sensitive for the detection of acute congestive heart failure. In addition NT-proBNP of <300 pg/ml effectively rules out acute congestive heart failure with 99% negative predictive value.    Results may be falsely decreased if patient taking Biotin.      D-dimer, Quantitative [[id number]]  (Abnormal) Collected: 05/01/22 1042    Specimen: Blood Updated: 05/01/22 1112     D-Dimer, Quantitative 1.03 mg/L (FEU)     Narrative:      Reference Range is 0-0.50 mg/L FEU. However, results <0.50 mg/L FEU tends to rule out DVT or PE. Results >0.50 mg/L FEU are not useful in predicting absence or presence of DVT or PE.      CBC & Differential [[id number]]  (Normal) Collected: 05/01/22 1042    Specimen: Blood Updated: 05/01/22 1057    Narrative:      The following orders were created for panel order CBC & Differential.  Procedure                               Abnormality         Status                     ---------                               -----------         ------                     CBC Auto Differential[[id number]]        Normal              Final result                 Please view results for these tests on the individual orders.    CBC Auto Differential [[id number]]  (Normal) Collected: 05/01/22 1042    Specimen: Blood Updated: 05/01/22 1057     WBC 8.24 10*3/mm3      RBC 4.19 10*6/mm3      Hemoglobin 12.9 g/dL      Hematocrit 40.2 %      MCV 95.9 fL      MCH 30.8 pg      MCHC 32.1 g/dL      RDW 14.0 %      RDW-SD 49.1 fl      MPV 9.9 fL      Platelets 276 10*3/mm3      Neutrophil % 64.7 %      Lymphocyte % 25.7 %      Monocyte % 6.3 %      Eosinophil % 2.2 %      Basophil % 0.7 %      Immature Grans % 0.4 %      Neutrophils, Absolute 5.33 10*3/mm3      Lymphocytes, Absolute 2.12 10*3/mm3      Monocytes, Absolute 0.52 10*3/mm3      Eosinophils, Absolute 0.18 10*3/mm3       Basophils, Absolute 0.06 10*3/mm3      Immature Grans, Absolute 0.03 10*3/mm3      nRBC 0.0 /100 WBC     [place] Top [[id number]] Collected: 05/01/22 1042    Specimen: Blood Updated: 05/01/22 1050    Blood Gas, Arterial - [[id number]]  (Abnormal) Collected: 05/01/22 1101    Specimen: Arterial Blood Updated: 05/01/22 1049     Site Left Brachial     Mandeep's Test N/A     pH, Arterial 7.442 pH units      pCO2, Arterial 38.8 mm Hg      pO2, Arterial 53.7 mm Hg      Comment: 85 Value below critical limit        HCO3, Arterial 26.5 mmol/L      Comment: 83 Value above reference range        Base Excess, Arterial 2.3 mmol/L      Comment: 83 Value above reference range        O2 Saturation, Arterial 89.4 %      Comment: 84 Value below reference range        Temperature 37.0 C      Barometric Pressure for Blood Gas 750 mmHg      Modality Room Air     Ventilator Mode NA     Notified Who DR ABEL     Notified By [id number]     Notified Time 05/01/2022 11:01     Collected by [id number]     Comment: Meter: [id number]     :  [id number]        pCO2, Temperature Corrected 38.8 mm Hg      pH, Temp Corrected 7.442 pH Units      pO2, Temperature Corrected 53.7 mm Hg         Imaging Results (Last 24 Hours)     Procedure Component Value Units Date/Time    CT Angiogram Chest [[id number]] Collected: 05/01/22 1201     Updated: 05/01/22 1216    Narrative:      CT ANGIOGRAM CHEST- 5/1/2022 11:46 AM CDT     HISTORY: sob, elevated dimer, new O2 requirement      COMPARISON: 3/28/2016     DOSE LENGTH PRODUCT: 255 mGy cm. Automated exposure control was also  utilized to decrease patient radiation dose.     TECHNIQUE: Axial images the chest are obtained following IV contrast.  2-D and maximal intensity projection images are reconstructed and  reviewed     FINDINGS:  There are no pulmonary emboli identified. There is  cardiomegaly. Ascending thoracic aorta measuring up to 3.8 cm. Moderate  scattered vascular calcification involving thoracic aorta as  well as  coronary arteries. Only trace pericardial fluid. Very small bilateral  pleural effusions. No pathologic intrathoracic or axillary  lymphadenopathy.     Images the upper abdomen demonstrate a large 9 cm left renal cyst.  Cholecystectomy clips.     There is smooth interlobular septal thickening of the lungs with  scattered groundglass densities greatest within the dependent upper  lobes. Findings most favorable for edema, CHF related. Underlying right  upper lobe pneumonia difficult to entirely exclude. No pneumothorax.     Exaggerated kyphosis of the degenerative thoracic spine. Dextroscoliotic  curvature. No focal destructive osseous lesions.       Impression:      1. No pulmonary emboli.  2. Cardiomegaly small pleural effusions as well as interstitial  pulmonary edema favoring CHF. Request densities within the posterior  right upper lobe likely represent asymmetrical edema, however  superimposed pneumonia difficult to exclude.  3. Scattered vascular calcification with no aneurysm or dissection.  This report was finalized on 05/01/2022 12:13 by Dr. Delfina Escobar MD.    XR Chest 1 View [[id number]] Collected: 05/01/22 1051     Updated: 05/01/22 1056    Narrative:      HISTORY: Shortness of breath     CXR: Frontal view the chest obtained.     COMPARISON: 4/20/2022     FINDINGS: Lungs are hyperinflated. Cardiac silhouette is enlarged.  Diffuse bilateral interstitial pulmonary opacities with small pleural  effusions. Thoracic aortic calcification. Degenerative change regional  skeleton.       Impression:      1. Stable chest compared to 4/20/2022. Cardiomegaly with findings  favorable for CHF with interstitial edema and small pleural effusions.  This report was finalized on 05/01/2022 10:53 by Dr. Delfina Escobar MD.      EKG reviewed.  Findings suggestive of LVH, wide-complex QRS, PVC.  I did not appreciate any acute ST-T wave changes  I have personally reviewed and interpreted the radiology studies and ECG  obtained at time of admission.     Assessment / Plan     Assessment:   Active Hospital Problems    Diagnosis    • Acute on chronic congestive heart failure (HCC)        Suspected acute heart failure  Small pleural effusion by ct imaging   valvular heart disease  HTN prob underlying HCVD (LVH by imaging study and ekg) with HF  Elevated BNP  Hypoxia  Prob underlying ckd stage III  Advance age    Plan:    diurese  Check echo  Follow up electrolytes and volume status  o2  Cont antiplatelet   Held off norvasc to give room for diuretic.   At her age, not certain how much things can be modified from prevention of cardiovascular disease. troponins are negative.     Other plans per orders    Code Status/Advanced Care Plan: dnr/dni  The patient's surrogate decision maker is Yeni (daughter)  I discussed my findings and recommendations with the  Patient and daughter  Estimated length of stay is tbd  The patient was seen and examined by me on .    Electronically signed by Ernesto Mckay MD, 05/01/22, 12:33 CDT.

## 2022-05-02 ENCOUNTER — READMISSION MANAGEMENT (OUTPATIENT)
Dept: CALL CENTER | Facility: HOSPITAL | Age: 87
End: 2022-05-02

## 2022-05-02 ENCOUNTER — APPOINTMENT (OUTPATIENT)
Dept: CARDIOLOGY | Facility: HOSPITAL | Age: 87
End: 2022-05-02

## 2022-05-02 VITALS
BODY MASS INDEX: 18.85 KG/M2 | TEMPERATURE: 98.4 F | HEIGHT: 64 IN | WEIGHT: 110.4 LBS | OXYGEN SATURATION: 95 % | DIASTOLIC BLOOD PRESSURE: 50 MMHG | SYSTOLIC BLOOD PRESSURE: 102 MMHG | RESPIRATION RATE: 16 BRPM | HEART RATE: 70 BPM

## 2022-05-02 PROBLEM — I50.9 ACUTE ON CHRONIC CONGESTIVE HEART FAILURE, UNSPECIFIED HEART FAILURE TYPE: Status: RESOLVED | Noted: 2022-05-01 | Resolved: 2022-05-02

## 2022-05-02 PROBLEM — I50.33 ACUTE ON CHRONIC DIASTOLIC CONGESTIVE HEART FAILURE: Status: ACTIVE | Noted: 2022-05-01

## 2022-05-02 LAB
ANION GAP SERPL CALCULATED.3IONS-SCNC: 10 MMOL/L (ref 5–15)
BH CV ECHO MEAS - AO MAX PG: 6.1 MMHG
BH CV ECHO MEAS - AO MEAN PG: 3 MMHG
BH CV ECHO MEAS - AO ROOT DIAM: 2.8 CM
BH CV ECHO MEAS - AO V2 MAX: 123 CM/SEC
BH CV ECHO MEAS - AO V2 VTI: 24.4 CM
BH CV ECHO MEAS - AVA(I,D): 1.91 CM2
BH CV ECHO MEAS - EDV(CUBED): 96.7 ML
BH CV ECHO MEAS - EDV(MOD-SP2): 111 ML
BH CV ECHO MEAS - EDV(MOD-SP4): 114 ML
BH CV ECHO MEAS - EF(MOD-BP): 47.8 %
BH CV ECHO MEAS - EF(MOD-SP2): 57.7 %
BH CV ECHO MEAS - EF(MOD-SP4): 37.9 %
BH CV ECHO MEAS - ESV(CUBED): 55.3 ML
BH CV ECHO MEAS - ESV(MOD-SP2): 46.9 ML
BH CV ECHO MEAS - ESV(MOD-SP4): 70.8 ML
BH CV ECHO MEAS - FS: 17 %
BH CV ECHO MEAS - IVS/LVPW: 1.03 CM
BH CV ECHO MEAS - IVSD: 1.29 CM
BH CV ECHO MEAS - LA DIMENSION: 3.2 CM
BH CV ECHO MEAS - LAT PEAK E' VEL: 4.7 CM/SEC
BH CV ECHO MEAS - LV DIASTOLIC VOL/BSA (35-75): 75.1 CM2
BH CV ECHO MEAS - LV MASS(C)D: 221.7 GRAMS
BH CV ECHO MEAS - LV MAX PG: 3 MMHG
BH CV ECHO MEAS - LV MEAN PG: 2 MMHG
BH CV ECHO MEAS - LV SYSTOLIC VOL/BSA (12-30): 46.7 CM2
BH CV ECHO MEAS - LV V1 MAX: 85.9 CM/SEC
BH CV ECHO MEAS - LV V1 VTI: 18.3 CM
BH CV ECHO MEAS - LVIDD: 4.6 CM
BH CV ECHO MEAS - LVIDS: 3.8 CM
BH CV ECHO MEAS - LVOT AREA: 2.5 CM2
BH CV ECHO MEAS - LVOT DIAM: 1.8 CM
BH CV ECHO MEAS - LVPWD: 1.25 CM
BH CV ECHO MEAS - MED PEAK E' VEL: 5.2 CM/SEC
BH CV ECHO MEAS - MR MAX PG: 80.9 MMHG
BH CV ECHO MEAS - MR MAX VEL: 449.3 CM/SEC
BH CV ECHO MEAS - MR MEAN PG: 45.3 MMHG
BH CV ECHO MEAS - MR MEAN VEL: 310.3 CM/SEC
BH CV ECHO MEAS - MR VTI: 166.7 CM
BH CV ECHO MEAS - MV A MAX VEL: 103 CM/SEC
BH CV ECHO MEAS - MV DEC TIME: 0.25 MSEC
BH CV ECHO MEAS - MV E MAX VEL: 81.7 CM/SEC
BH CV ECHO MEAS - MV E/A: 0.79
BH CV ECHO MEAS - PI END-D VEL: 105 CM/SEC
BH CV ECHO MEAS - SI(MOD-SP2): 42.3 ML/M2
BH CV ECHO MEAS - SI(MOD-SP4): 28.5 ML/M2
BH CV ECHO MEAS - SV(LVOT): 46.6 ML
BH CV ECHO MEAS - SV(MOD-SP2): 64.1 ML
BH CV ECHO MEAS - SV(MOD-SP4): 43.2 ML
BH CV ECHO MEAS - TR MAX PG: 20.4 MMHG
BH CV ECHO MEAS - TR MAX VEL: 226 CM/SEC
BH CV ECHO MEASUREMENTS AVERAGE E/E' RATIO: 16.51
BUN SERPL-MCNC: 28 MG/DL (ref 8–23)
BUN/CREAT SERPL: 20.4 (ref 7–25)
CALCIUM SPEC-SCNC: 9.2 MG/DL (ref 8.2–9.6)
CHLORIDE SERPL-SCNC: 103 MMOL/L (ref 98–107)
CO2 SERPL-SCNC: 27 MMOL/L (ref 22–29)
CREAT SERPL-MCNC: 1.37 MG/DL (ref 0.57–1)
EGFRCR SERPLBLD CKD-EPI 2021: 35.9 ML/MIN/1.73
GLUCOSE SERPL-MCNC: 94 MG/DL (ref 65–99)
LEFT ATRIUM VOLUME INDEX: 23.3 ML/M2
LEFT ATRIUM VOLUME: 35.4 CM3
MAXIMAL PREDICTED HEART RATE: 126 BPM
POTASSIUM SERPL-SCNC: 3.9 MMOL/L (ref 3.5–5.2)
SODIUM SERPL-SCNC: 140 MMOL/L (ref 136–145)
STRESS TARGET HR: 107 BPM
T4 FREE SERPL-MCNC: 0.8 NG/DL (ref 0.93–1.7)

## 2022-05-02 PROCEDURE — 93306 TTE W/DOPPLER COMPLETE: CPT

## 2022-05-02 PROCEDURE — 93306 TTE W/DOPPLER COMPLETE: CPT | Performed by: EMERGENCY MEDICINE

## 2022-05-02 PROCEDURE — 84439 ASSAY OF FREE THYROXINE: CPT | Performed by: NURSE PRACTITIONER

## 2022-05-02 PROCEDURE — 80048 BASIC METABOLIC PNL TOTAL CA: CPT | Performed by: INTERNAL MEDICINE

## 2022-05-02 PROCEDURE — 36415 COLL VENOUS BLD VENIPUNCTURE: CPT | Performed by: INTERNAL MEDICINE

## 2022-05-02 RX ORDER — FUROSEMIDE 20 MG/1
40 TABLET ORAL DAILY PRN
Qty: 30 TABLET | Refills: 1 | Status: SHIPPED | OUTPATIENT
Start: 2022-05-02

## 2022-05-02 RX ORDER — LEVOTHYROXINE SODIUM 137 UG/1
137 TABLET ORAL DAILY
Qty: 30 TABLET | Refills: 2 | Status: SHIPPED | OUTPATIENT
Start: 2022-05-02

## 2022-05-02 RX ADMIN — ALISKIREN 300 MG: 150 TABLET, FILM COATED ORAL at 08:10

## 2022-05-02 RX ADMIN — CYCLOSPORINE 1 DROP: 0.5 EMULSION OPHTHALMIC at 08:10

## 2022-05-02 RX ADMIN — Medication 1 TABLET: at 12:03

## 2022-05-02 RX ADMIN — LEVOTHYROXINE SODIUM 125 MCG: 125 TABLET ORAL at 06:28

## 2022-05-02 RX ADMIN — Medication 10 ML: at 08:10

## 2022-05-02 RX ADMIN — ASPIRIN 81 MG: 81 TABLET ORAL at 08:11

## 2022-05-02 NOTE — PLAN OF CARE
Goal Outcome Evaluation:              Outcome Evaluation: NTN assessment due to BMI < 19; however, BMI not placing pt at NTN risk. Advanced age; physical findings (muscle and fat) appropriate and no NTN barriers identified at this time. Continue current NTN POC. NTN following per protocol.

## 2022-05-02 NOTE — PLAN OF CARE
Goal Outcome Evaluation:   Patient A/OX4. Slept well last night. Denies pain. Tylenol given last night for slight anxiety, unfamiliar place. Medication effective. States she still feels SOB lying down. HOB elevated for comfort. Voiding well. See I&O. VSS. Telemetry RSR. HR 67-90. Safely maintained. No falls. Will continue to monitor.

## 2022-05-02 NOTE — DISCHARGE INSTRUCTIONS
Measure weight daily at the same time each day.  Take Lasix as needed for weight gain greater than 2 pounds overnight or 3 pounds in 2 days.  May also take Lasix if you are having worsening swelling or shortness of breath.    Measure blood pressure daily at the same time each day.  Record these measurements to take to follow-up with primary care provider to discuss possible medication adjustment.

## 2022-05-02 NOTE — DISCHARGE SUMMARY
[facility]  DISCHARGE SUMMARY       Date of Admission: 5/1/2022  Date of Discharge:  5/2/2022  Primary Care Physician: Delfina Pereira DO    Presenting Problem/History of Present Illness:  Shortness of breath, orthopnea    Final Discharge Diagnoses:  Active Hospital Problems    Diagnosis    • Acute on chronic diastolic congestive heart failure (HCC)    • Hypothyroidism    • Hypertension    • Hyperlipidemia      Consults: None    Procedures Performed: None    Pertinent Test Results:   Lab Results (all)     Procedure Component Value Units    T4, Free [[id number]]  (Abnormal)    Specimen: Blood     Free T4 0.80 ng/dL          Basic Metabolic Panel [[id number]]  (Abnormal)    Specimen: Blood     Glucose 94 mg/dL     BUN 28 mg/dL     Creatinine 1.37 mg/dL     Sodium 140 mmol/L     Potassium 3.9 mmol/L         Chloride 103 mmol/L     CO2 27.0 mmol/L     Calcium 9.2 mg/dL     BUN/Creatinine Ratio     Anion Gap 10.0 mmol/L     eGFR 35.9 mL/min/1.73              Lipid Panel [[id number]]  (Abnormal)    Specimen: Blood     Total Cholesterol 207 mg/dL     Triglycerides 92 mg/dL     HDL Cholesterol 66 mg/dL     LDL Cholesterol  125 mg/dL     VLDL Cholesterol 16 mg/dL     LDL/HDL Ratio          TSH [[id number]]  (Abnormal)    Specimen: Blood     TSH 22.380 uIU/mL    Hemoglobin A1c [[id number]]  (Normal)    Specimen: Blood     Hemoglobin A1C 5.30 %    Troponin [[id number]]  (Normal)    Specimen: Blood     Troponin T <0.010 ng/mL    COVID-19,[facility] IN-HOUSE,Nasal Swab No Transport Media 3-4 HR TAT - Swab, Nasal Cavity [[id number]]  (Normal)    Specimen: Swab from Nasal Cavity     COVID19    Comprehensive Metabolic Panel [[id number]]  (Abnormal)    Specimen: Blood     Glucose 101 mg/dL     BUN 28 mg/dL     Creatinine 1.20 mg/dL     Sodium 141 mmol/L     Potassium 4.5 mmol/L     Chloride 104 mmol/L     CO2 25.0 mmol/L     Calcium 10.3 mg/dL     Total Protein 7.2 g/dL      Albumin 4.20 g/dL     ALT (SGPT) 10 U/L     AST (SGOT) 23 U/L     Alkaline Phosphatase 72 U/L     Total Bilirubin 0.8 mg/dL     Globulin 3.0 gm/dL     A/G Ratio 1.4 g/dL     BUN/Creatinine Ratio     Anion Gap 12.0 mmol/L     eGFR 42.0 mL/min/1.73        Troponin [[id number]]  (Normal)    Specimen: Blood     Troponin T 0.011 ng/mL    BNP [[id number]]  (Abnormal)    Specimen: Blood     proBNP 6,712.0 pg/mL    D-dimer, Quantitative [[id number]]  (Abnormal)    Specimen: Blood     D-Dimer, Quantitative 1.03 mg/L (FEU)    CBC Auto Differential [[id number]]  (Normal)    Specimen: Blood     WBC 8.24 10*3/mm3     RBC 4.19 10*6/mm3     Hemoglobin 12.9 g/dL     Hematocrit 40.2 %     MCV 95.9 fL     MCH 30.8 pg     MCHC 32.1 g/dL     RDW 14.0 %     RDW-SD 49.1 fl     MPV 9.9 fL     Platelets 276 10*3/mm3     Neutrophil % 64.7 %     Lymphocyte % 25.7 %     Monocyte % 6.3 %     Eosinophil % 2.2 %     Basophil % 0.7 %     Immature Grans % 0.4 %     Neutrophils, Absolute 5.33 10*3/mm3     Lymphocytes, Absolute 2.12 10*3/mm3     Monocytes, Absolute 0.52 10*3/mm3     Eosinophils, Absolute 0.18 10*3/mm3     Basophils, Absolute 0.06 10*3/mm3     Immature Grans, Absolute 0.03 10*3/mm3     nRBC 0.0 /100 WBC    Blood Gas, Arterial - [[id number]]  (Abnormal)    Specimen: Arterial Blood     Site     Mandeep's Test     pH, Arterial 7.442 pH units     pCO2, Arterial 38.8 mm Hg     pO2, Arterial 53.7 mm Hg         HCO3, Arterial 26.5 mmol/L         Base Excess, Arterial 2.3 mmol/L         O2 Saturation, Arterial 89.4 %         Temperature 37.0 C     Barometric Pressure for Blood Gas 750 mmHg     Modality     Ventilator Mode     Notified Who     Notified By     Notified Time     Collected by         pCO2, Temperature Corrected 38.8 mm Hg     pH, Temp Corrected 7.442 pH Units     pO2, Temperature Corrected 53.7 mm Hg        Imaging Results (All)     Procedure Component Value Units Date/Time    CT Angiogram Chest [[id number]] Collected: 05/01/22 1206      Updated: 05/01/22 1216    Narrative:      CT ANGIOGRAM CHEST- 5/1/2022 11:46 AM CDT     HISTORY: sob, elevated dimer, new O2 requirement      COMPARISON: 3/28/2016     DOSE LENGTH PRODUCT: 255 mGy cm. Automated exposure control was also  utilized to decrease patient radiation dose.     TECHNIQUE: Axial images the chest are obtained following IV contrast.  2-D and maximal intensity projection images are reconstructed and  reviewed     FINDINGS:  There are no pulmonary emboli identified. There is  cardiomegaly. Ascending thoracic aorta measuring up to 3.8 cm. Moderate  scattered vascular calcification involving thoracic aorta as well as  coronary arteries. Only trace pericardial fluid. Very small bilateral  pleural effusions. No pathologic intrathoracic or axillary  lymphadenopathy.     Images the upper abdomen demonstrate a large 9 cm left renal cyst.  Cholecystectomy clips.     There is smooth interlobular septal thickening of the lungs with  scattered groundglass densities greatest within the dependent upper  lobes. Findings most favorable for edema, CHF related. Underlying right  upper lobe pneumonia difficult to entirely exclude. No pneumothorax.     Exaggerated kyphosis of the degenerative thoracic spine. Dextroscoliotic  curvature. No focal destructive osseous lesions.       Impression:      1. No pulmonary emboli.  2. Cardiomegaly small pleural effusions as well as interstitial  pulmonary edema favoring CHF. Request densities within the posterior  right upper lobe likely represent asymmetrical edema, however  superimposed pneumonia difficult to exclude.  3. Scattered vascular calcification with no aneurysm or dissection.  This report was finalized on 05/01/2022 12:13 by Dr. Delfina Escobar MD.    XR Chest 1 View [[id number]] Collected: 05/01/22 1051     Updated: 05/01/22 1056    Narrative:      HISTORY: Shortness of breath     CXR: Frontal view the chest obtained.     COMPARISON: 4/20/2022     FINDINGS: Lungs  are hyperinflated. Cardiac silhouette is enlarged.  Diffuse bilateral interstitial pulmonary opacities with small pleural  effusions. Thoracic aortic calcification. Degenerative change regional  skeleton.       Impression:      1. Stable chest compared to 4/20/2022. Cardiomegaly with findings  favorable for CHF with interstitial edema and small pleural effusions.  This report was finalized on 05/01/2022 10:53 by Dr. Delfina Escobar MD.        History of Present Illness on Day of Discharge: Patient states she feels about the same today.  She does still have some shortness of breath with lying down.  She denies any chest pain or lower extremity edema.  She is agreeable for discharge home today.    Hospital Course:  Ms. Chung is a very pleasant 89-year-old  female who follows Dr. Delfina Pereira for primary care.  She has a medical history significant for hypertension, hypothyroidism, hyperlipidemia.  The patient presented to the [facility] emergency department on 5/1/2022 with complaints of shortness of breath and orthopnea.  She had been seen in our emergency department on 4/28/2022 for the same complaints.  Her chest x-ray at that time showed findings suggestive of volume overload with interstitial developing ramesh pulmonary edema and bilateral trace pleural effusions.  She was given a dose of IV Lasix and sent home with a 7-day prescription for oral Lasix 20 mg daily.  On this admission, a CTA of the chest was performed, which showed no evidence of pulmonary emboli.  Cardiomegaly and small pleural effusions again noted as well as interstitial pulmonary edema favoring CHF.  The patient was admitted to the hospitalist service for further evaluation and management.    The patient was given 2 doses of IV Lasix on admission.  Her IV Lasix this morning was held due to lower blood pressure readings with systolic blood pressures in the 90s.  She had a transthoracic echocardiogram performed, which  "showed grade 1 diastolic dysfunction with ejection fraction 46 to 50%.  No evidence of pulmonary hypertension noted.  Her last echocardiogram 2019 showed an ejection fraction of 55%.  Due to the patient's lower blood pressure readings today, would not be inclined to add heart failure appropriate beta-blocker at this time.  She does take Tekturna as a home medication, which is also not heart failure appropriate however would defer to her primary care provider regarding changing to ACE or ARB on an outpatient basis if blood pressure and renal function will tolerate.  The patient's creatinine on admission was 1.2 now up to 1.37, however this is still felt to be at or near her baseline.  We will send the patient home with a dosage of Lasix to take daily as needed.  She has been instructed regarding daily weights and symptom management.    Of note, the patient's TSH is under suppressed at 22.3 with low free T4.  She is compliant with her medications per her report.  Her Synthroid dosage will be increased from 125 mcg to 137 mcg and will need to be reassessed patient basis in 6 weeks.    Overall, the patient is hemodynamically stable and appropriate for discharge home today.  She will need close outpatient monitoring by her primary care provider and we will make a referral to cardiology as well.    Condition on Discharge: Medically stable    Physical Exam on Discharge:  /50 (BP Location: Left arm, Patient Position: Lying)   Pulse 70   Temp 98.4 °F (36.9 °C) (Oral)   Resp 16   Ht 162.6 cm (64\")   Wt 50.1 kg (110 lb 6.4 oz)   SpO2 95%   BMI 18.95 kg/m²   Physical Exam  Vitals and nursing note reviewed.   Constitutional:       General: She is not in acute distress.     Appearance: She is not ill-appearing or toxic-appearing.   HENT:      Head: Normocephalic and atraumatic.      Mouth/Throat:      Mouth: Mucous membranes are moist.      Pharynx: Oropharynx is clear.   Cardiovascular:      Rate and Rhythm: Normal " rate and regular rhythm.      Pulses: Normal pulses.      Heart sounds: No murmur heard.     Comments: Sinus 75-76 with first-degree, bundle branch block and frequent PVCs  Pulmonary:      Effort: Pulmonary effort is normal. No respiratory distress.      Breath sounds: Normal breath sounds. No wheezing or rhonchi.   Abdominal:      General: Bowel sounds are normal. There is no distension.      Palpations: Abdomen is soft.      Tenderness: There is no abdominal tenderness.   Musculoskeletal:         General: No swelling or tenderness. Normal range of motion.      Cervical back: Normal range of motion and neck supple. No muscular tenderness.   Skin:     General: Skin is warm and dry.      Findings: No erythema or rash.   Neurological:      General: No focal deficit present.      Mental Status: She is alert and oriented to person, place, and time.      Cranial Nerves: No cranial nerve deficit.      Motor: No weakness.   Psychiatric:         Mood and Affect: Mood normal.         Behavior: Behavior normal.       Discharge Disposition:  Home or Self Care    Discharge Medications:     Discharge Medications      Changes to Medications      Instructions Start Date   furosemide 20 MG tablet  Commonly known as: LASIX  What changed:   · how much to take  · when to take this  · reasons to take this   40 mg, Oral, Daily PRN      levothyroxine 137 MCG tablet  Commonly known as: SYNTHROID, LEVOTHROID  What changed:   · medication strength  · how much to take   137 mcg, Oral, Daily         Continue These Medications      Instructions Start Date   acetaminophen 325 MG tablet  Commonly known as: TYLENOL   650 mg, Oral, Every 4 Hours PRN      alendronate 70 MG tablet  Commonly known as: FOSAMAX   70 mg, Oral, Every 7 Days, Thursday      aliskiren 300 MG tablet  Commonly known as: TEKTURNA   300 mg, Oral, Daily      aspirin 81 MG tablet   81 mg, Oral, Daily      CALCIUM 600/VITAMIN D PO   1 tablet, Oral, Daily      clopidogrel 75 MG  tablet  Commonly known as: PLAVIX   75 mg, Oral, Every 24 Hours      colestipol 1 g tablet  Commonly known as: COLESTID   1 g, Oral, Daily      COMPLETE MULTIVITAMIN/MINERAL PO   1 tablet, Oral, Daily      cycloSPORINE 0.05 % ophthalmic emulsion  Commonly known as: RESTASIS   1 drop, Both Eyes, Every 12 Hours      Glucosamine-Chondroitin-Vit D3 2343-9733-277 MG-MG-UNIT pack   1 tablet, Oral, 2 Times Daily         Stop These Medications    amLODIPine 10 MG tablet  Commonly known as: NORVASC     potassium chloride 10 MEQ CR tablet          Discharge Diet:   Diet Instructions     Diet: Cardiac, Specialty Diet; Thin Liquids, No Restrictions; Low Sodium; Thin      Discharge Diet:  Cardiac  Specialty Diet       Fluid Consistency: Thin Liquids, No Restrictions    Specialty Diets: Low Sodium    Fluid Consistency: Thin        Activity at Discharge:   Activity Instructions     Activity as Tolerated      Measure Blood Pressure      Measure Weight          Discharge Care Plan/Instructions:   1.  Return for any acute or worsening symptoms.  2.  Patient instructed regarding daily weights.  Instructed to take Lasix as needed for weight gain greater than 2 pounds overnight/3 pounds in 2 days, or if experiencing shortness of breath or swelling.  3.  Patient instructed to monitor blood pressure readings at home.  Record these measurements to discuss and follow-up with primary care provider.  Medication Norvasc to be held at time of discharge due to lower blood pressure readings.    Follow-up Appointments:   1.  Primary care provider in 1 week.  2.  Ambulatory referral to cardiology.  Future Appointments   Date Time Provider Department Center   10/24/2022  8:00 AM PAD US [facility] CART 1  PAD US PAD   10/24/2022  9:30 AM Saumya Morales APRN [facility] PAD     Test Results Pending at Discharge: None    Electronically signed by ANGY Carlisle, 5/2/2022, 12:00 CDT.    Time: 35 minutes

## 2022-05-03 LAB
QT INTERVAL: 406 MS
QTC INTERVAL: 499 MS

## 2022-05-03 NOTE — OUTREACH NOTE
Prep Survey    Flowsheet Row Responses   [facility] facility patient discharged from? [place]   Is LACE score < 7 ? No   Emergency Room discharge w/ pulse ox? No   Eligibility Readm Mgmt   Discharge diagnosis Acute on chronic diastolic congestive heart failure, Hypothyroidism   Does the patient have one of the following disease processes/diagnoses(primary or secondary)? CHF   Does the patient have Home health ordered? No   Is there a DME ordered? No   Prep survey completed? Yes          KALLIE HANLEY - Registered Nurse

## 2022-05-04 ENCOUNTER — OFFICE VISIT (OUTPATIENT)
Dept: CARDIOLOGY | Facility: CLINIC | Age: 87
End: 2022-05-04

## 2022-05-04 VITALS
WEIGHT: 112 LBS | BODY MASS INDEX: 19.12 KG/M2 | DIASTOLIC BLOOD PRESSURE: 60 MMHG | SYSTOLIC BLOOD PRESSURE: 106 MMHG | HEIGHT: 64 IN | HEART RATE: 81 BPM | OXYGEN SATURATION: 96 %

## 2022-05-04 DIAGNOSIS — I73.9 PAD (PERIPHERAL ARTERY DISEASE): ICD-10-CM

## 2022-05-04 DIAGNOSIS — I10 PRIMARY HYPERTENSION: ICD-10-CM

## 2022-05-04 DIAGNOSIS — E03.9 HYPOTHYROIDISM, UNSPECIFIED TYPE: ICD-10-CM

## 2022-05-04 DIAGNOSIS — E78.5 HYPERLIPIDEMIA, UNSPECIFIED HYPERLIPIDEMIA TYPE: ICD-10-CM

## 2022-05-04 DIAGNOSIS — I50.33 ACUTE ON CHRONIC DIASTOLIC CONGESTIVE HEART FAILURE: Primary | ICD-10-CM

## 2022-05-04 PROCEDURE — 99204 OFFICE O/P NEW MOD 45 MIN: CPT | Performed by: EMERGENCY MEDICINE

## 2022-05-04 RX ORDER — ESCITALOPRAM OXALATE 10 MG/1
10 TABLET ORAL DAILY
COMMUNITY

## 2022-05-04 RX ORDER — SACUBITRIL AND VALSARTAN 24; 26 MG/1; MG/1
1 TABLET, FILM COATED ORAL 2 TIMES DAILY
Qty: 60 TABLET | Refills: 5 | Status: SHIPPED | OUTPATIENT
Start: 2022-05-04 | End: 2022-11-29 | Stop reason: SDUPTHER

## 2022-05-04 RX ORDER — CARVEDILOL 3.12 MG/1
3.12 TABLET ORAL 2 TIMES DAILY
Qty: 60 TABLET | Refills: 11 | Status: SHIPPED | OUTPATIENT
Start: 2022-05-04 | End: 2022-11-29 | Stop reason: SDUPTHER

## 2022-05-04 NOTE — PROGRESS NOTES
[facility] - CARDIOLOGY  New Patient Initial Outpatient Evaulation    Primary Care Physician: Delfina Pereira,     Subjective     Chief Complaint   Patient presents with   • Congestive Heart Failure     NEW PT         History of Present Illness    Patient is a very pleasant 89-year-old female with a past medical history of hypertension, hyperlipidemia, and hypothyroidism who presents to the cardiology clinic today for initial evaluation.  Patient recently underwent a echocardiogram a couple of days ago while she was being hospitalized for shortness of breath.  This showed that her systolic function was a little low in the 46 to 50% range.  She also had mild concentric hypertrophy and grade 1 diastolic dysfunction.  Mild aortic regurgitation, moderate mitral regurgitation and trace tricuspid regurgitation.    Patient states her symptoms are no better after leaving the hospital than when she first went.    Review of Systems   Constitutional: Negative for diaphoresis, fever and malaise/fatigue.   HENT: Negative for congestion.    Eyes: Negative for vision loss in left eye and vision loss in right eye.   Cardiovascular: Negative for chest pain, claudication, dyspnea on exertion, irregular heartbeat, leg swelling, orthopnea, palpitations and syncope.   Respiratory: Positive for shortness of breath. Negative for cough and wheezing.    Hematologic/Lymphatic: Negative for adenopathy.   Skin: Negative for rash.   Musculoskeletal: Negative for joint pain and joint swelling.   Gastrointestinal: Negative for abdominal pain, diarrhea, nausea and vomiting.   Neurological: Negative for excessive daytime sleepiness, dizziness, focal weakness, light-headedness, numbness and weakness.   Psychiatric/Behavioral: Negative for depression. The patient does not have insomnia.         Otherwise complete ROS reviewed and negative except as mentioned in the HPI.      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 HYBRID OR 12;  Service: Vascular   • BREAST BIOPSY Bilateral     multiple on both, all benign   • BREAST BIOPSY Left 2017    Procedure: LEFT MAMMOGRAM GUIDED NEEDLE DIRECTED EXCISIONAL BREAST BIOPSY;  Surgeon: Malgorzata Scott MD;  Location: [facility] OR;  Service:    • BUNIONECTOMY Bilateral    •  SECTION      X2   • CHOLECYSTECTOMY     • CHOLECYSTECTOMY WITH INTRAOPERATIVE CHOLANGIOGRAM N/A 2021    Procedure: LAPAROSCOPIC CHOLECYSTECTOMY WITH CHOLANGIOGRAM;  Surgeon: Malgorzata Scott MD;  Location: [facility] OR;  Service: General;  Laterality: N/A;   • COLONOSCOPY  2015    diverticulosis   • ENDOSCOPY N/A 2021    Procedure: ESOPHAGOGASTRODUODENOSCOPY WITH ANESTHESIA;  Surgeon: Sonu Cheek DO;  Location: [facility] ENDOSCOPY;  Service: Gastroenterology;  Laterality: N/A;  pre nausea  post esophagitis  Delfina Pereira DO   • EYE SURGERY      CATARACT SURGERY   • FEMORAL ENDARTERECTOMY Left 2018    Procedure: LEFT FEMORAL ENDARTERECTOMY;  Surgeon: Walker Davis DO;  Location: [facility] HYBRID OR 12;  Service: Vascular   • JOINT REPLACEMENT      RIGHT HIP       Family History: family history includes Cancer in her sister; Heart disease in her father; No Known Problems in her brother, daughter, maternal aunt, maternal grandmother, mother, paternal aunt, paternal grandmother, and son.    Social History:  reports that she quit smoking about 67 years ago. Her smoking use included cigarettes. She quit after 2.00 years of use. She has never used  smokeless tobacco. She reports that she does not drink alcohol and does not use drugs.    Medications:  Prior to Admission medications    Medication Sig Start Date End Date Taking? Authorizing Provider   acetaminophen (TYLENOL) 325 MG tablet Take 2 tablets by mouth Every 4 (Four) Hours As Needed for Mild Pain . 7/8/19  Yes Flor Montalvo APRN   alendronate (FOSAMAX) 70 MG tablet Take 70 mg by mouth Every 7 (Seven) Days. Thursday   Yes Hossein Colvin MD   aspirin 81 MG tablet Take 81 mg by mouth Daily.   Yes Hossein Colvin MD   Calcium Carbonate-Vitamin D (CALCIUM 600/VITAMIN D PO) Take 1 tablet by mouth Daily.   Yes Hossein Colvin MD   clopidogrel (PLAVIX) 75 MG tablet Take 1 tablet by mouth Daily. 8/23/18  Yes Saumya Morales APRN   colestipol (COLESTID) 1 g tablet Take 1 g by mouth Daily.   Yes ProviderHossein MD   cycloSPORINE (RESTASIS) 0.05 % ophthalmic emulsion Administer 1 drop to both eyes Every 12 (Twelve) Hours.   Yes Hossein Colvin MD   escitalopram (LEXAPRO) 10 MG tablet Take 10 mg by mouth Daily.   Yes ProviderHossein MD   furosemide (LASIX) 20 MG tablet Take 2 tablets by mouth Daily As Needed (Weight gain, shortness of breath, swelling). 5/2/22  Yes Erika Heart APRN   Glucosamine-Chondroitin-Vit D3 1418-5228-303 MG-MG-UNIT pack Take 1 tablet by mouth 2 (Two) Times a Day.   Yes Hossein Colvin MD   levothyroxine (SYNTHROID, LEVOTHROID) 137 MCG tablet Take 1 tablet by mouth Daily. 5/2/22  Yes Erika Heart APRN   Multiple Vitamins-Minerals (COMPLETE MULTIVITAMIN/MINERAL PO) Take 1 tablet by mouth Daily.   Yes ProviderHossein MD   aliskiren (TEKTURNA) 300 MG tablet Take 300 mg by mouth Daily.  5/4/22 Yes Hossein Colvin MD   carvedilol (COREG) 3.125 MG tablet Take 1 tablet by mouth 2 (Two) Times a Day. 5/4/22   Cesar Urena,    empagliflozin (Jardiance) 10 MG tablet tablet Take 1 tablet by mouth Daily. 5/4/22    "Cesar Urena DO   sacubitril-valsartan (Entresto) 24-26 MG tablet Take 1 tablet by mouth 2 (Two) Times a Day. 5/4/22   Cesar Urena DO     Allergies:  Allergies   Allergen Reactions   • Erythromycin Shortness Of Breath   • Zithromax [Azithromycin] Itching and Other (See Comments)     Yeast infection   • Penicillins Rash       Objective     Vital Signs: /60   Pulse 81   Ht 162.6 cm (64.02\")   Wt 50.8 kg (112 lb)   SpO2 96%   BMI 19.22 kg/m²     Vitals and nursing note reviewed.   Constitutional:       Appearance: Normal and healthy appearance. Well-developed and not in distress.   Eyes:      Extraocular Movements: Extraocular movements intact.      Pupils: Pupils are equal, round, and reactive to light.   HENT:      Head: Normocephalic and atraumatic.    Mouth/Throat:      Pharynx: Oropharynx is clear.   Neck:      Vascular: JVD normal.      Trachea: Trachea normal.   Pulmonary:      Effort: Pulmonary effort is normal.      Breath sounds: Normal breath sounds. No wheezing. No rhonchi. No rales.   Cardiovascular:      PMI at left midclavicular line. Normal rate. Regular rhythm. Normal S1. Normal S2.      Murmurs: There is a grade 2/6 systolic murmur.      No gallop. No click. No rub.   Pulses:     Dorsalis pedis: 2+ bilaterally.     Posterior tibial: 2+ bilaterally.  Abdominal:      General: Bowel sounds are normal.      Palpations: Abdomen is soft.      Tenderness: There is no abdominal tenderness.   Musculoskeletal: Normal range of motion.      Cervical back: Normal range of motion and neck supple. Skin:     General: Skin is warm and dry.      Capillary Refill: Capillary refill takes less than 2 seconds.   Feet:      Right foot:      Skin integrity: Skin integrity normal.      Left foot:      Skin integrity: Skin integrity normal.   Neurological:      Mental Status: Alert and oriented to person, place and time.      Cranial Nerves: Cranial nerves are intact.      Sensory: " Sensation is intact.      Motor: Motor function is intact.      Coordination: Coordination is intact.   Psychiatric:         Speech: Speech normal.         Behavior: Behavior is cooperative.         Results Reviewed:    Procedures      Lab Results   Component Value Date    CHOL 207 (H) 05/01/2022    TRIG 92 05/01/2022    HDL 66 (H) 05/01/2022    VLDL 16 05/01/2022    LDLHDL 1.86 05/01/2022     Lab Results   Component Value Date    HGBA1C 5.30 05/01/2022       Assessment / Plan        Problem List Items Addressed This Visit        Cardiac and Vasculature    Hypertension    Relevant Medications    carvedilol (COREG) 3.125 MG tablet    Hyperlipidemia    PAD (peripheral artery disease) (HCC)    Overview     Added automatically from request for surgery [id number]           Acute on chronic diastolic congestive heart failure (HCC) - Primary    Relevant Medications    sacubitril-valsartan (Entresto) 24-26 MG tablet    carvedilol (COREG) 3.125 MG tablet       Endocrine and Metabolic    Hypothyroidism    Relevant Medications    carvedilol (COREG) 3.125 MG tablet          Plan:    Patient with no diagnosis of systolic and diastolic CHF.  We will attempt to optimize her medical regimen.  She is already on aspirin 81 and Plavix 75 due to her peripheral vascular disease.  She is also on Lasix 40 as needed.  I am going to stop her Tekturna and start her on Entresto 24/26 today.  Samples given.  I am also going to start him on Jardiance, samples given today.  I am also going to start her on beta-blocker with Coreg 3.125 twice daily.  Continue on the Lasix as needed for lower extremity swelling and worsening shortness of breath.    Will try to avoid any invasive cardiac work-up due to patient's age.    Follow-up with cardiology in 3 months or sooner if necessary.    Thank you Erika for this referral.  Please call or text me with any questions at any time.  My cell phone number [phone].    Cesar Urena, DO    Interventional cardiology  [facility]  05/04/22   16:38 CDT

## 2022-05-05 ENCOUNTER — READMISSION MANAGEMENT (OUTPATIENT)
Dept: CALL CENTER | Facility: HOSPITAL | Age: 87
End: 2022-05-05

## 2022-05-05 NOTE — OUTREACH NOTE
CHF Week 1 Survey    Flowsheet Row Responses   [facility] patient discharged from? [place]   Does the patient have one of the following disease processes/diagnoses(primary or secondary)? CHF   CHF Week 1 attempt successful? Yes   Call start time 0958   Call end time 1005   Discharge diagnosis Acute on chronic diastolic congestive heart failure, Hypothyroidism   Person spoke with today (if not patient) and relationship Patient   Meds reviewed with patient/caregiver? Yes   Is the patient having any side effects they believe may be caused by any medication additions or changes? No   Does the patient have all medications ordered at discharge? Yes   Prescription comments Lasix/Levothyroxine dose change   Is the patient taking all medications as directed (includes completed medication regime)? Yes   Does the patient have a primary care provider?  Yes   Does the patient have an appointment with their PCP within 7 days of discharge? Yes   Comments regarding PCP 5/6/22   Has the patient kept scheduled appointments due by today? N/A   Comments Cardiologist fu appt on 5/4/22   Pulse Ox monitoring Intermittent   Pulse Ox device source Patient   O2 Sat comments 92-93% RA   O2 Sat: education provided Sat levels, Monitoring frequency, When to seek care   O2 Sat education comments sats remaining below 90% call 911/go to ED   Psychosocial issues? No   Did the patient receive a copy of their discharge instructions? Yes   Nursing interventions Reviewed instructions with patient   What is the patient's perception of their health status since discharge? Improving   Nursing interventions Nurse provided patient education   Is the patient weighing daily? Yes   Does the patient have scales? Yes   Daily weight interventions Education provided on importance of daily weight   Is the patient able to teach back Heart Failure diet management? Yes   Is the patient able to teach back Heart Failure Zones? Yes   Is the patient able to teach  back signs and symptoms of worsening condition? (i.e. weight gain, shortness of air, etc.) Yes   If the patient is a current smoker, are they able to teach back resources for cessation? Not a smoker   Is the patient/caregiver able to teach back the hierarchy of who to call/visit for symptoms/problems? PCP, Specialist, Home health nurse, Urgent Care, ED, 911 Yes    CHF Week 1 call completed? Yes   Wrap up additional comments Pt states she is doing good. Pt was educated on heart failure warning s/s, and when to call cardiologist. Pt verified PCP fu appt on 5/6/22, and had cardiologist appt on 5/4/22. No questions/concerns.          CLAIRE VIVAR - Registered Nurse

## 2022-05-12 ENCOUNTER — READMISSION MANAGEMENT (OUTPATIENT)
Dept: CALL CENTER | Facility: HOSPITAL | Age: 87
End: 2022-05-12

## 2022-05-12 NOTE — OUTREACH NOTE
CHF Week 2 Survey    Flowsheet Row Responses   [facility] [place] patient discharged from? [place]   Does the patient have one of the following disease processes/diagnoses(primary or secondary)? CHF   Week 2 attempt successful? Yes   Call start time 1514   Call end time 1517   Discharge diagnosis Acute on chronic diastolic congestive heart failure, Hypothyroidism   Person spoke with today (if not patient) and relationship Yeni, natalie   Meds reviewed with patient/caregiver? Yes   Is the patient having any side effects they believe may be caused by any medication additions or changes? No   Does the patient have all medications ordered at discharge? Yes   Is the patient taking all medications as directed (includes completed medication regime)? Yes   Does the patient have a primary care provider?  Yes   Does the patient have an appointment with their PCP within 7 days of discharge? Yes   Has the patient kept scheduled appointments due by today? Yes   Pulse Ox monitoring Intermittent   Pulse Ox device source Patient   O2 Sat comments has not checked   Psychosocial issues? No   What is the patient's perception of their health status since discharge? Improving   Nursing interventions Nurse provided patient education   Is the patient weighing daily? Yes   Does the patient have scales? Yes   Daily weight interventions Education provided on importance of daily weight   Is the patient able to teach back Heart Failure diet management? Yes   Is the patient able to teach back Heart Failure Zones? Yes   Is the patient able to teach back signs and symptoms of worsening condition? (i.e. weight gain, shortness of air, etc.) Yes   CHF Week 2 call completed? Yes   Wrap up additional comments Yeni, daughter, states pt is doing good. No questions/concerns.          CLAIRE VIVAR - Registered Nurse

## 2022-05-31 RX ORDER — SACUBITRIL AND VALSARTAN 24; 26 MG/1; MG/1
1 TABLET, FILM COATED ORAL 2 TIMES DAILY
Qty: 60 TABLET | Refills: 5 | OUTPATIENT
Start: 2022-05-31

## 2022-05-31 RX ORDER — CARVEDILOL 3.12 MG/1
3.12 TABLET ORAL 2 TIMES DAILY
Qty: 60 TABLET | Refills: 11 | OUTPATIENT
Start: 2022-05-31

## 2022-06-26 ENCOUNTER — APPOINTMENT (OUTPATIENT)
Dept: GENERAL RADIOLOGY | Facility: HOSPITAL | Age: 87
End: 2022-06-26

## 2022-06-26 ENCOUNTER — HOSPITAL ENCOUNTER (OUTPATIENT)
Facility: HOSPITAL | Age: 87
Setting detail: OBSERVATION
Discharge: HOME | End: 2022-06-28
Attending: FAMILY MEDICINE | Admitting: INTERNAL MEDICINE

## 2022-06-26 DIAGNOSIS — I50.9 ACUTE CONGESTIVE HEART FAILURE, UNSPECIFIED HEART FAILURE TYPE: Primary | ICD-10-CM

## 2022-06-26 DIAGNOSIS — R09.02 HYPOXIA: ICD-10-CM

## 2022-06-26 LAB
ALBUMIN SERPL-MCNC: 3.9 G/DL (ref 3.5–5.2)
ALBUMIN/GLOB SERPL: 1.4 G/DL
ALP SERPL-CCNC: 63 U/L (ref 39–117)
ALT SERPL W P-5'-P-CCNC: 10 U/L (ref 1–33)
ANION GAP SERPL CALCULATED.3IONS-SCNC: 7 MMOL/L (ref 5–15)
APTT PPP: 27.8 SECONDS (ref 24.1–35)
AST SERPL-CCNC: 19 U/L (ref 1–32)
BASOPHILS # BLD AUTO: 0.04 10*3/MM3 (ref 0–0.2)
BASOPHILS NFR BLD AUTO: 0.6 % (ref 0–1.5)
BILIRUB SERPL-MCNC: 0.5 MG/DL (ref 0–1.2)
BUN SERPL-MCNC: 30 MG/DL (ref 8–23)
BUN/CREAT SERPL: 28 (ref 7–25)
CALCIUM SPEC-SCNC: 9.4 MG/DL (ref 8.2–9.6)
CHLORIDE SERPL-SCNC: 108 MMOL/L (ref 98–107)
CO2 SERPL-SCNC: 26 MMOL/L (ref 22–29)
CREAT SERPL-MCNC: 1.07 MG/DL (ref 0.57–1)
D DIMER PPP FEU-MCNC: 0.91 MCGFEU/ML (ref 0–0.5)
DEPRECATED RDW RBC AUTO: 48.7 FL (ref 37–54)
EGFRCR SERPLBLD CKD-EPI 2021: 48.2 ML/MIN/1.73
EOSINOPHIL # BLD AUTO: 0.28 10*3/MM3 (ref 0–0.4)
EOSINOPHIL NFR BLD AUTO: 4.4 % (ref 0.3–6.2)
ERYTHROCYTE [DISTWIDTH] IN BLOOD BY AUTOMATED COUNT: 13.8 % (ref 12.3–15.4)
GLOBULIN UR ELPH-MCNC: 2.7 GM/DL
GLUCOSE SERPL-MCNC: 94 MG/DL (ref 65–99)
HCT VFR BLD AUTO: 37.5 % (ref 34–46.6)
HGB BLD-MCNC: 12 G/DL (ref 12–15.9)
IMM GRANULOCYTES # BLD AUTO: 0.01 10*3/MM3 (ref 0–0.05)
IMM GRANULOCYTES NFR BLD AUTO: 0.2 % (ref 0–0.5)
INR PPP: 1.04 (ref 0.91–1.09)
LIPASE SERPL-CCNC: 34 U/L (ref 13–60)
LYMPHOCYTES # BLD AUTO: 2.15 10*3/MM3 (ref 0.7–3.1)
LYMPHOCYTES NFR BLD AUTO: 33.7 % (ref 19.6–45.3)
MCH RBC QN AUTO: 30.6 PG (ref 26.6–33)
MCHC RBC AUTO-ENTMCNC: 32 G/DL (ref 31.5–35.7)
MCV RBC AUTO: 95.7 FL (ref 79–97)
MONOCYTES # BLD AUTO: 0.6 10*3/MM3 (ref 0.1–0.9)
MONOCYTES NFR BLD AUTO: 9.4 % (ref 5–12)
NEUTROPHILS NFR BLD AUTO: 3.3 10*3/MM3 (ref 1.7–7)
NEUTROPHILS NFR BLD AUTO: 51.7 % (ref 42.7–76)
NRBC BLD AUTO-RTO: 0 /100 WBC (ref 0–0.2)
NT-PROBNP SERPL-MCNC: ABNORMAL PG/ML (ref 0–1800)
PLATELET # BLD AUTO: 165 10*3/MM3 (ref 140–450)
PMV BLD AUTO: 9.8 FL (ref 6–12)
POTASSIUM SERPL-SCNC: 4.6 MMOL/L (ref 3.5–5.2)
PROT SERPL-MCNC: 6.6 G/DL (ref 6–8.5)
PROTHROMBIN TIME: 13.2 SECONDS (ref 11.9–14.6)
RBC # BLD AUTO: 3.92 10*6/MM3 (ref 3.77–5.28)
SARS-COV-2 RDRP RESP QL NAA+PROBE: NORMAL
SODIUM SERPL-SCNC: 141 MMOL/L (ref 136–145)
TROPONIN T SERPL-MCNC: <0.01 NG/ML (ref 0–0.03)
TROPONIN T SERPL-MCNC: <0.01 NG/ML (ref 0–0.03)
WBC NRBC COR # BLD: 6.38 10*3/MM3 (ref 3.4–10.8)

## 2022-06-26 PROCEDURE — 87635 SARS-COV-2 COVID-19 AMP PRB: CPT | Performed by: NURSE PRACTITIONER

## 2022-06-26 PROCEDURE — 96372 THER/PROPH/DIAG INJ SC/IM: CPT

## 2022-06-26 PROCEDURE — 96374 THER/PROPH/DIAG INJ IV PUSH: CPT

## 2022-06-26 PROCEDURE — G0378 HOSPITAL OBSERVATION PER HR: HCPCS

## 2022-06-26 PROCEDURE — 85025 COMPLETE CBC W/AUTO DIFF WBC: CPT | Performed by: NURSE PRACTITIONER

## 2022-06-26 PROCEDURE — 71045 X-RAY EXAM CHEST 1 VIEW: CPT

## 2022-06-26 PROCEDURE — 80053 COMPREHEN METABOLIC PANEL: CPT | Performed by: NURSE PRACTITIONER

## 2022-06-26 PROCEDURE — 99284 EMERGENCY DEPT VISIT MOD MDM: CPT

## 2022-06-26 PROCEDURE — C9803 HOPD COVID-19 SPEC COLLECT: HCPCS

## 2022-06-26 PROCEDURE — 85730 THROMBOPLASTIN TIME PARTIAL: CPT | Performed by: NURSE PRACTITIONER

## 2022-06-26 PROCEDURE — 84484 ASSAY OF TROPONIN QUANT: CPT | Performed by: FAMILY MEDICINE

## 2022-06-26 PROCEDURE — 25010000002 ENOXAPARIN PER 10 MG: Performed by: FAMILY MEDICINE

## 2022-06-26 PROCEDURE — 36415 COLL VENOUS BLD VENIPUNCTURE: CPT | Performed by: FAMILY MEDICINE

## 2022-06-26 PROCEDURE — 93005 ELECTROCARDIOGRAM TRACING: CPT | Performed by: FAMILY MEDICINE

## 2022-06-26 PROCEDURE — 93010 ELECTROCARDIOGRAM REPORT: CPT | Performed by: INTERNAL MEDICINE

## 2022-06-26 PROCEDURE — 83690 ASSAY OF LIPASE: CPT | Performed by: NURSE PRACTITIONER

## 2022-06-26 PROCEDURE — 25010000002 FUROSEMIDE PER 20 MG: Performed by: NURSE PRACTITIONER

## 2022-06-26 PROCEDURE — 85610 PROTHROMBIN TIME: CPT | Performed by: NURSE PRACTITIONER

## 2022-06-26 PROCEDURE — 83880 ASSAY OF NATRIURETIC PEPTIDE: CPT | Performed by: NURSE PRACTITIONER

## 2022-06-26 PROCEDURE — 85379 FIBRIN DEGRADATION QUANT: CPT | Performed by: NURSE PRACTITIONER

## 2022-06-26 PROCEDURE — 84484 ASSAY OF TROPONIN QUANT: CPT | Performed by: NURSE PRACTITIONER

## 2022-06-26 RX ORDER — SODIUM CHLORIDE 0.9 % (FLUSH) 0.9 %
10 SYRINGE (ML) INJECTION EVERY 12 HOURS SCHEDULED
Status: DISCONTINUED | OUTPATIENT
Start: 2022-06-26 | End: 2022-06-28 | Stop reason: HOSPADM

## 2022-06-26 RX ORDER — FUROSEMIDE 10 MG/ML
40 INJECTION INTRAMUSCULAR; INTRAVENOUS EVERY 12 HOURS
Status: DISCONTINUED | OUTPATIENT
Start: 2022-06-26 | End: 2022-06-26

## 2022-06-26 RX ORDER — SODIUM CHLORIDE 0.9 % (FLUSH) 0.9 %
10 SYRINGE (ML) INJECTION AS NEEDED
Status: DISCONTINUED | OUTPATIENT
Start: 2022-06-26 | End: 2022-06-28 | Stop reason: HOSPADM

## 2022-06-26 RX ORDER — ENOXAPARIN SODIUM 100 MG/ML
30 INJECTION SUBCUTANEOUS EVERY 24 HOURS
Status: DISCONTINUED | OUTPATIENT
Start: 2022-06-26 | End: 2022-06-28 | Stop reason: HOSPADM

## 2022-06-26 RX ORDER — FUROSEMIDE 10 MG/ML
40 INJECTION INTRAMUSCULAR; INTRAVENOUS EVERY 12 HOURS
Status: DISCONTINUED | OUTPATIENT
Start: 2022-06-27 | End: 2022-06-28 | Stop reason: HOSPADM

## 2022-06-26 RX ORDER — NITROGLYCERIN 0.4 MG/1
0.4 TABLET SUBLINGUAL
Status: DISCONTINUED | OUTPATIENT
Start: 2022-06-26 | End: 2022-06-28 | Stop reason: HOSPADM

## 2022-06-26 RX ORDER — FUROSEMIDE 10 MG/ML
40 INJECTION INTRAMUSCULAR; INTRAVENOUS ONCE
Status: COMPLETED | OUTPATIENT
Start: 2022-06-26 | End: 2022-06-26

## 2022-06-26 RX ADMIN — Medication 10 ML: at 21:36

## 2022-06-26 RX ADMIN — ENOXAPARIN SODIUM 30 MG: 100 INJECTION SUBCUTANEOUS at 21:36

## 2022-06-26 RX ADMIN — FUROSEMIDE 40 MG: 10 INJECTION, SOLUTION INTRAVENOUS at 20:34

## 2022-06-27 ENCOUNTER — APPOINTMENT (OUTPATIENT)
Dept: CARDIOLOGY | Facility: HOSPITAL | Age: 87
End: 2022-06-27

## 2022-06-27 ENCOUNTER — APPOINTMENT (OUTPATIENT)
Dept: GENERAL RADIOLOGY | Facility: HOSPITAL | Age: 87
End: 2022-06-27

## 2022-06-27 PROBLEM — R07.89 OTHER CHEST PAIN: Status: ACTIVE | Noted: 2022-06-27

## 2022-06-27 LAB
ALBUMIN SERPL-MCNC: 4 G/DL (ref 3.5–5.2)
ALBUMIN/GLOB SERPL: 1.5 G/DL
ALP SERPL-CCNC: 63 U/L (ref 39–117)
ALT SERPL W P-5'-P-CCNC: 11 U/L (ref 1–33)
ANION GAP SERPL CALCULATED.3IONS-SCNC: 9 MMOL/L (ref 5–15)
AST SERPL-CCNC: 21 U/L (ref 1–32)
BH CV ECHO MEAS - AO MAX PG: 3 MMHG
BH CV ECHO MEAS - AO MEAN PG: 2 MMHG
BH CV ECHO MEAS - AO V2 MAX: 86.9 CM/SEC
BH CV ECHO MEAS - AO V2 VTI: 14.4 CM
BH CV ECHO MEAS - EDV(CUBED): 103.8 ML
BH CV ECHO MEAS - EDV(MOD-SP2): 97 ML
BH CV ECHO MEAS - EDV(MOD-SP4): 138 ML
BH CV ECHO MEAS - EF(MOD-BP): 32 %
BH CV ECHO MEAS - EF(MOD-SP2): 32 %
BH CV ECHO MEAS - EF(MOD-SP4): 31.2 %
BH CV ECHO MEAS - ESV(CUBED): 59.3 ML
BH CV ECHO MEAS - ESV(MOD-SP2): 66 ML
BH CV ECHO MEAS - ESV(MOD-SP4): 95 ML
BH CV ECHO MEAS - FS: 17 %
BH CV ECHO MEAS - IVS/LVPW: 1.44 CM
BH CV ECHO MEAS - IVSD: 1.3 CM
BH CV ECHO MEAS - LA DIMENSION: 2.9 CM
BH CV ECHO MEAS - LV DIASTOLIC VOL/BSA (35-75): 89.9 CM2
BH CV ECHO MEAS - LV MASS(C)D: 187.5 GRAMS
BH CV ECHO MEAS - LV MAX PG: 3 MMHG
BH CV ECHO MEAS - LV MEAN PG: 1 MMHG
BH CV ECHO MEAS - LV SYSTOLIC VOL/BSA (12-30): 61.9 CM2
BH CV ECHO MEAS - LV V1 MAX: 86.9 CM/SEC
BH CV ECHO MEAS - LV V1 VTI: 14.5 CM
BH CV ECHO MEAS - LVIDD: 4.7 CM
BH CV ECHO MEAS - LVIDS: 3.9 CM
BH CV ECHO MEAS - LVPWD: 0.9 CM
BH CV ECHO MEAS - MR MAX PG: 30 MMHG
BH CV ECHO MEAS - MR MAX VEL: 274 CM/SEC
BH CV ECHO MEAS - MV A MAX VEL: 82.9 CM/SEC
BH CV ECHO MEAS - MV DEC TIME: 0.16 MSEC
BH CV ECHO MEAS - MV E MAX VEL: 62.5 CM/SEC
BH CV ECHO MEAS - MV E/A: 0.75
BH CV ECHO MEAS - SI(MOD-SP2): 20.2 ML/M2
BH CV ECHO MEAS - SI(MOD-SP4): 28 ML/M2
BH CV ECHO MEAS - SV(MOD-SP2): 31 ML
BH CV ECHO MEAS - SV(MOD-SP4): 43 ML
BH CV ECHO MEAS - TR MAX PG: 19 MMHG
BH CV ECHO MEAS - TR MAX VEL: 218 CM/SEC
BILIRUB SERPL-MCNC: 0.9 MG/DL (ref 0–1.2)
BUN SERPL-MCNC: 28 MG/DL (ref 8–23)
BUN/CREAT SERPL: 24.8 (ref 7–25)
CALCIUM SPEC-SCNC: 9.5 MG/DL (ref 8.2–9.6)
CHLORIDE SERPL-SCNC: 106 MMOL/L (ref 98–107)
CO2 SERPL-SCNC: 28 MMOL/L (ref 22–29)
CREAT SERPL-MCNC: 1.13 MG/DL (ref 0.57–1)
DEPRECATED RDW RBC AUTO: 48 FL (ref 37–54)
EGFRCR SERPLBLD CKD-EPI 2021: 45.2 ML/MIN/1.73
ERYTHROCYTE [DISTWIDTH] IN BLOOD BY AUTOMATED COUNT: 13.8 % (ref 12.3–15.4)
GLOBULIN UR ELPH-MCNC: 2.7 GM/DL
GLUCOSE SERPL-MCNC: 105 MG/DL (ref 65–99)
HCT VFR BLD AUTO: 38.4 % (ref 34–46.6)
HGB BLD-MCNC: 12.2 G/DL (ref 12–15.9)
LEFT ATRIUM VOLUME INDEX: 22.2 ML/M2
LEFT ATRIUM VOLUME: 34 ML
MAXIMAL PREDICTED HEART RATE: 126 BPM
MCH RBC QN AUTO: 30 PG (ref 26.6–33)
MCHC RBC AUTO-ENTMCNC: 31.8 G/DL (ref 31.5–35.7)
MCV RBC AUTO: 94.3 FL (ref 79–97)
PLATELET # BLD AUTO: 183 10*3/MM3 (ref 140–450)
PMV BLD AUTO: 10 FL (ref 6–12)
POTASSIUM SERPL-SCNC: 4.1 MMOL/L (ref 3.5–5.2)
PROT SERPL-MCNC: 6.7 G/DL (ref 6–8.5)
RBC # BLD AUTO: 4.07 10*6/MM3 (ref 3.77–5.28)
SODIUM SERPL-SCNC: 143 MMOL/L (ref 136–145)
STRESS TARGET HR: 107 BPM
TROPONIN T SERPL-MCNC: <0.01 NG/ML (ref 0–0.03)
WBC NRBC COR # BLD: 7.6 10*3/MM3 (ref 3.4–10.8)

## 2022-06-27 PROCEDURE — 93306 TTE W/DOPPLER COMPLETE: CPT

## 2022-06-27 PROCEDURE — 96372 THER/PROPH/DIAG INJ SC/IM: CPT

## 2022-06-27 PROCEDURE — 25010000002 FUROSEMIDE PER 20 MG: Performed by: FAMILY MEDICINE

## 2022-06-27 PROCEDURE — 93306 TTE W/DOPPLER COMPLETE: CPT | Performed by: INTERNAL MEDICINE

## 2022-06-27 PROCEDURE — 80053 COMPREHEN METABOLIC PANEL: CPT | Performed by: FAMILY MEDICINE

## 2022-06-27 PROCEDURE — 99214 OFFICE O/P EST MOD 30 MIN: CPT | Performed by: INTERNAL MEDICINE

## 2022-06-27 PROCEDURE — 93005 ELECTROCARDIOGRAM TRACING: CPT | Performed by: FAMILY MEDICINE

## 2022-06-27 PROCEDURE — G0378 HOSPITAL OBSERVATION PER HR: HCPCS

## 2022-06-27 PROCEDURE — 93010 ELECTROCARDIOGRAM REPORT: CPT | Performed by: INTERNAL MEDICINE

## 2022-06-27 PROCEDURE — 71045 X-RAY EXAM CHEST 1 VIEW: CPT

## 2022-06-27 PROCEDURE — 85027 COMPLETE CBC AUTOMATED: CPT | Performed by: FAMILY MEDICINE

## 2022-06-27 PROCEDURE — 25010000002 PERFLUTREN 6.52 MG/ML SUSPENSION: Performed by: INTERNAL MEDICINE

## 2022-06-27 PROCEDURE — 84484 ASSAY OF TROPONIN QUANT: CPT | Performed by: FAMILY MEDICINE

## 2022-06-27 PROCEDURE — 96376 TX/PRO/DX INJ SAME DRUG ADON: CPT

## 2022-06-27 PROCEDURE — 25010000002 ENOXAPARIN PER 10 MG: Performed by: FAMILY MEDICINE

## 2022-06-27 PROCEDURE — 0 DOBUTAMINE PER 250 MG: Performed by: INTERNAL MEDICINE

## 2022-06-27 RX ORDER — ESCITALOPRAM OXALATE 10 MG/1
10 TABLET ORAL DAILY
Status: DISCONTINUED | OUTPATIENT
Start: 2022-06-27 | End: 2022-06-28 | Stop reason: HOSPADM

## 2022-06-27 RX ORDER — ACETAMINOPHEN 325 MG/1
650 TABLET ORAL EVERY 4 HOURS PRN
Status: DISCONTINUED | OUTPATIENT
Start: 2022-06-27 | End: 2022-06-28 | Stop reason: HOSPADM

## 2022-06-27 RX ORDER — MULTIPLE VITAMINS W/ MINERALS TAB 9MG-400MCG
1 TAB ORAL DAILY
Status: DISCONTINUED | OUTPATIENT
Start: 2022-06-27 | End: 2022-06-28 | Stop reason: HOSPADM

## 2022-06-27 RX ORDER — DOBUTAMINE HYDROCHLORIDE 100 MG/100ML
10-50 INJECTION INTRAVENOUS CONTINUOUS
Status: DISCONTINUED | OUTPATIENT
Start: 2022-06-27 | End: 2022-06-27

## 2022-06-27 RX ORDER — ASPIRIN 81 MG/1
81 TABLET ORAL DAILY
Status: DISCONTINUED | OUTPATIENT
Start: 2022-06-27 | End: 2022-06-28 | Stop reason: HOSPADM

## 2022-06-27 RX ORDER — CLOPIDOGREL BISULFATE 75 MG/1
75 TABLET ORAL EVERY 24 HOURS
Status: DISCONTINUED | OUTPATIENT
Start: 2022-06-27 | End: 2022-06-28 | Stop reason: HOSPADM

## 2022-06-27 RX ORDER — CYCLOSPORINE 0.5 MG/ML
1 EMULSION OPHTHALMIC EVERY 12 HOURS
Status: DISCONTINUED | OUTPATIENT
Start: 2022-06-27 | End: 2022-06-28 | Stop reason: HOSPADM

## 2022-06-27 RX ORDER — CARVEDILOL 3.12 MG/1
3.12 TABLET ORAL 2 TIMES DAILY
Status: DISCONTINUED | OUTPATIENT
Start: 2022-06-27 | End: 2022-06-28 | Stop reason: HOSPADM

## 2022-06-27 RX ADMIN — CARVEDILOL 3.12 MG: 3.12 TABLET, FILM COATED ORAL at 20:59

## 2022-06-27 RX ADMIN — CYCLOSPORINE 1 DROP: 0.5 EMULSION OPHTHALMIC at 09:32

## 2022-06-27 RX ADMIN — FUROSEMIDE 40 MG: 10 INJECTION, SOLUTION INTRAMUSCULAR; INTRAVENOUS at 08:35

## 2022-06-27 RX ADMIN — ESCITALOPRAM 10 MG: 10 TABLET, FILM COATED ORAL at 09:31

## 2022-06-27 RX ADMIN — EMPAGLIFLOZIN 10 MG: 10 TABLET, FILM COATED ORAL at 09:31

## 2022-06-27 RX ADMIN — SACUBITRIL AND VALSARTAN 1 TABLET: 24; 26 TABLET, FILM COATED ORAL at 20:59

## 2022-06-27 RX ADMIN — Medication 1 TABLET: at 09:31

## 2022-06-27 RX ADMIN — PERFLUTREN 8.48 MG: 6.52 INJECTION, SUSPENSION INTRAVENOUS at 11:01

## 2022-06-27 RX ADMIN — CLOPIDOGREL 75 MG: 75 TABLET, FILM COATED ORAL at 17:51

## 2022-06-27 RX ADMIN — CARVEDILOL 3.12 MG: 3.12 TABLET, FILM COATED ORAL at 09:31

## 2022-06-27 RX ADMIN — ENOXAPARIN SODIUM 30 MG: 100 INJECTION SUBCUTANEOUS at 21:03

## 2022-06-27 RX ADMIN — LEVOTHYROXINE SODIUM 137 MCG: 112 TABLET ORAL at 09:31

## 2022-06-27 RX ADMIN — ASPIRIN 81 MG: 81 TABLET, COATED ORAL at 09:56

## 2022-06-27 RX ADMIN — FUROSEMIDE 40 MG: 10 INJECTION, SOLUTION INTRAMUSCULAR; INTRAVENOUS at 20:59

## 2022-06-27 RX ADMIN — Medication 10 ML: at 20:59

## 2022-06-27 RX ADMIN — Medication 10 ML: at 08:40

## 2022-06-27 RX ADMIN — SACUBITRIL AND VALSARTAN 1 TABLET: 24; 26 TABLET, FILM COATED ORAL at 09:56

## 2022-06-28 ENCOUNTER — READMISSION MANAGEMENT (OUTPATIENT)
Dept: CALL CENTER | Facility: HOSPITAL | Age: 87
End: 2022-06-28

## 2022-06-28 VITALS
SYSTOLIC BLOOD PRESSURE: 90 MMHG | HEART RATE: 72 BPM | DIASTOLIC BLOOD PRESSURE: 44 MMHG | RESPIRATION RATE: 16 BRPM | BODY MASS INDEX: 19.38 KG/M2 | TEMPERATURE: 98.1 F | HEIGHT: 64 IN | OXYGEN SATURATION: 92 % | WEIGHT: 113.5 LBS

## 2022-06-28 DIAGNOSIS — I50.21 ACUTE SYSTOLIC CHF (CONGESTIVE HEART FAILURE): Primary | ICD-10-CM

## 2022-06-28 LAB
QT INTERVAL: 408 MS
QT INTERVAL: 440 MS
QTC INTERVAL: 493 MS
QTC INTERVAL: 497 MS

## 2022-06-28 PROCEDURE — 99213 OFFICE O/P EST LOW 20 MIN: CPT | Performed by: NURSE PRACTITIONER

## 2022-06-28 PROCEDURE — G0378 HOSPITAL OBSERVATION PER HR: HCPCS

## 2022-06-28 RX ADMIN — Medication 1 TABLET: at 08:32

## 2022-06-28 RX ADMIN — CYCLOSPORINE 1 DROP: 0.5 EMULSION OPHTHALMIC at 08:32

## 2022-06-28 RX ADMIN — LEVOTHYROXINE SODIUM 137 MCG: 112 TABLET ORAL at 05:42

## 2022-06-28 RX ADMIN — SACUBITRIL AND VALSARTAN 1 TABLET: 24; 26 TABLET, FILM COATED ORAL at 08:32

## 2022-06-28 RX ADMIN — CARVEDILOL 3.12 MG: 3.12 TABLET, FILM COATED ORAL at 08:32

## 2022-06-28 RX ADMIN — EMPAGLIFLOZIN 10 MG: 10 TABLET, FILM COATED ORAL at 08:32

## 2022-06-28 RX ADMIN — ESCITALOPRAM 10 MG: 10 TABLET, FILM COATED ORAL at 08:32

## 2022-06-28 RX ADMIN — ASPIRIN 81 MG: 81 TABLET, COATED ORAL at 08:32

## 2022-06-29 ENCOUNTER — READMISSION MANAGEMENT (OUTPATIENT)
Dept: CALL CENTER | Facility: HOSPITAL | Age: 87
End: 2022-06-29

## 2022-06-29 NOTE — OUTREACH NOTE
Prep Survey    Flowsheet Row Responses   [facility] facility patient discharged from? [place]   Is LACE score < 7 ? No   Emergency Room discharge w/ pulse ox? No   Eligibility Readm Mgmt   Discharge diagnosis Acute systolic heart failure   Does the patient have one of the following disease processes/diagnoses(primary or secondary)? CHF   Does the patient have Home health ordered? No   Is there a DME ordered? No   Prep survey completed? Yes          GREGOR LOVE - Registered Nurse

## 2022-06-29 NOTE — OUTREACH NOTE
CHF Week 1 Survey    Flowsheet Row Responses   [facility] facility patient discharged from? [place]   Does the patient have one of the following disease processes/diagnoses(primary or secondary)? CHF   CHF Week 1 attempt successful? No   Unsuccessful attempts Attempt 1          SILVIO DUNHAM - Registered Nurse

## 2022-07-06 ENCOUNTER — READMISSION MANAGEMENT (OUTPATIENT)
Dept: CALL CENTER | Facility: HOSPITAL | Age: 87
End: 2022-07-06

## 2022-07-06 ENCOUNTER — TELEPHONE (OUTPATIENT)
Dept: PASTORAL CARE | Facility: HOSPITAL | Age: 87
End: 2022-07-06

## 2022-07-06 NOTE — OUTREACH NOTE
CHF Week 2 Survey    Flowsheet Row Responses   [facility] facility patient discharged from? [place]   Does the patient have one of the following disease processes/diagnoses(primary or secondary)? CHF   Week 2 attempt successful? No   Unsuccessful attempts Attempt 1          DAYLIN ARTEAGA - Registered Nurse

## 2022-07-06 NOTE — TELEPHONE ENCOUNTER
Post discharge follow up call to [facility] member.  Spoke with patient's daughter who states she is doing well and trying hard to follow discharge instructions.  Denies any needs , has good family support.  Will call if any needs arise.

## 2022-07-08 ENCOUNTER — READMISSION MANAGEMENT (OUTPATIENT)
Dept: CALL CENTER | Facility: HOSPITAL | Age: 87
End: 2022-07-08

## 2022-07-08 NOTE — OUTREACH NOTE
CHF Week 2 Survey    Flowsheet Row Responses   [facility] facility patient discharged from? [place]   Does the patient have one of the following disease processes/diagnoses(primary or secondary)? CHF   Week 2 attempt successful? No  [All numbers attempted]   Unsuccessful attempts Attempt 2          EMANUEL ALMEIDA - Registered Nurse

## 2022-07-13 ENCOUNTER — OFFICE VISIT (OUTPATIENT)
Dept: CARDIOLOGY | Facility: CLINIC | Age: 87
End: 2022-07-13

## 2022-07-13 VITALS
DIASTOLIC BLOOD PRESSURE: 54 MMHG | WEIGHT: 113 LBS | HEIGHT: 64 IN | HEART RATE: 67 BPM | SYSTOLIC BLOOD PRESSURE: 110 MMHG | BODY MASS INDEX: 19.29 KG/M2 | OXYGEN SATURATION: 97 %

## 2022-07-13 DIAGNOSIS — I50.42 CHRONIC COMBINED SYSTOLIC AND DIASTOLIC CONGESTIVE HEART FAILURE: ICD-10-CM

## 2022-07-13 DIAGNOSIS — E03.9 HYPOTHYROIDISM, UNSPECIFIED TYPE: ICD-10-CM

## 2022-07-13 DIAGNOSIS — I73.9 PAD (PERIPHERAL ARTERY DISEASE): ICD-10-CM

## 2022-07-13 DIAGNOSIS — I10 PRIMARY HYPERTENSION: ICD-10-CM

## 2022-07-13 DIAGNOSIS — E78.5 HYPERLIPIDEMIA, UNSPECIFIED HYPERLIPIDEMIA TYPE: Primary | ICD-10-CM

## 2022-07-13 PROCEDURE — 99214 OFFICE O/P EST MOD 30 MIN: CPT | Performed by: EMERGENCY MEDICINE

## 2022-07-13 RX ORDER — ONDANSETRON 4 MG/1
4 TABLET, FILM COATED ORAL EVERY 8 HOURS PRN
Qty: 30 TABLET | Refills: 1 | Status: ON HOLD | OUTPATIENT
Start: 2022-07-13 | End: 2022-10-21

## 2022-07-21 ENCOUNTER — READMISSION MANAGEMENT (OUTPATIENT)
Dept: CALL CENTER | Facility: HOSPITAL | Age: 87
End: 2022-07-21

## 2022-07-21 NOTE — OUTREACH NOTE
"CHF Week 4 Survey    Flowsheet Row Responses   [facility] patient discharged from? [place]   Does the patient have one of the following disease processes/diagnoses(primary or secondary)? CHF   Week 4 attempt successful? Yes   Call start time 1628   Call end time 1630   Person spoke with today (if not patient) and relationship son-Donal.   Week 4 Call Completed? Yes   Would the patient like one additional call? No   Graduated Yes   Is the patient interested in additional calls from an ambulatory ?  NOTE:  applies to high risk patients requiring additional follow-up. No   Wrap up additional comments Brief call with sonDonal. States patient is \"doing okay, just getting more forgetful\". Denies any known needs today. States will have patient call if she has any questions/concerns.          DUARTE CASTANEDA - Registered Nurse  "

## 2022-07-24 PROBLEM — I50.42 CHRONIC COMBINED SYSTOLIC AND DIASTOLIC CONGESTIVE HEART FAILURE: Status: ACTIVE | Noted: 2022-07-24

## 2022-07-25 NOTE — PROGRESS NOTES
Subjective:     Encounter Date:07/13/2022      Patient ID: Mya Chung is a 89 y.o. female.    Chief Complaint:  History of Present Illness    Patient is a 89-year-old female with a history of systolic and diastolic CHF, hypertension, hyperlipidemia, peripheral vascular disease who presents today for hospital follow-up.  She says she is feeling better since leaving the hospital.    Review of Systems   Constitutional: Negative for diaphoresis, fever and malaise/fatigue.   HENT: Negative for congestion.    Eyes: Negative for vision loss in left eye and vision loss in right eye.   Cardiovascular: Negative for chest pain, claudication, dyspnea on exertion, irregular heartbeat, leg swelling, orthopnea, palpitations and syncope.   Respiratory: Positive for shortness of breath. Negative for cough and wheezing.    Hematologic/Lymphatic: Negative for adenopathy.   Skin: Negative for rash.   Musculoskeletal: Negative for joint pain and joint swelling.   Gastrointestinal: Positive for nausea and vomiting. Negative for abdominal pain and diarrhea.   Neurological: Negative for excessive daytime sleepiness, dizziness, focal weakness, light-headedness, numbness and weakness.   Psychiatric/Behavioral: Negative for depression. The patient does not have insomnia.            Current Outpatient Medications:   •  acetaminophen (TYLENOL) 325 MG tablet, Take 2 tablets by mouth Every 4 (Four) Hours As Needed for Mild Pain ., Disp: , Rfl:   •  alendronate (FOSAMAX) 70 MG tablet, Take 70 mg by mouth Every 7 (Seven) Days. Thursday, Disp: , Rfl:   •  aspirin 81 MG tablet, Take 81 mg by mouth Daily., Disp: , Rfl:   •  Calcium Carbonate-Vitamin D (CALCIUM 600/VITAMIN D PO), Take 1 tablet by mouth Daily., Disp: , Rfl:   •  carvedilol (COREG) 3.125 MG tablet, Take 1 tablet by mouth 2 (Two) Times a Day., Disp: 60 tablet, Rfl: 11  •  clopidogrel (PLAVIX) 75 MG tablet, Take 1 tablet by mouth Daily., Disp: 30 tablet, Rfl: 5  •  colestipol  (COLESTID) 1 g tablet, Take 1 g by mouth Daily., Disp: , Rfl:   •  cycloSPORINE (RESTASIS) 0.05 % ophthalmic emulsion, Administer 1 drop to both eyes Every 12 (Twelve) Hours., Disp: , Rfl:   •  empagliflozin (Jardiance) 10 MG tablet tablet, Take 1 tablet by mouth Daily., Disp: 30 tablet, Rfl: 5  •  escitalopram (LEXAPRO) 10 MG tablet, Take 10 mg by mouth Daily., Disp: , Rfl:   •  furosemide (LASIX) 20 MG tablet, Take 2 tablets by mouth Daily As Needed (Weight gain, shortness of breath, swelling)., Disp: 30 tablet, Rfl: 1  •  Glucosamine-Chondroitin-Vit D3 8794-9321-086 MG-MG-UNIT pack, Take 1 tablet by mouth 2 (Two) Times a Day., Disp: , Rfl:   •  levothyroxine (SYNTHROID, LEVOTHROID) 137 MCG tablet, Take 1 tablet by mouth Daily., Disp: 30 tablet, Rfl: 2  •  Multiple Vitamins-Minerals (COMPLETE MULTIVITAMIN/MINERAL PO), Take 1 tablet by mouth Daily., Disp: , Rfl:   •  sacubitril-valsartan (Entresto) 24-26 MG tablet, Take 1 tablet by mouth 2 (Two) Times a Day., Disp: 60 tablet, Rfl: 5  •  ondansetron (Zofran) 4 MG tablet, Take 1 tablet by mouth Every 8 (Eight) Hours As Needed for Nausea or Vomiting., Disp: 30 tablet, Rfl: 1       Objective:      Vitals:    07/13/22 1121   BP: 110/54   Pulse: 67   SpO2: 97%     Vitals and nursing note reviewed.   Constitutional:       Appearance: Normal and healthy appearance. Well-developed and not in distress.   Eyes:      Extraocular Movements: Extraocular movements intact.      Pupils: Pupils are equal, round, and reactive to light.   HENT:      Head: Normocephalic and atraumatic.    Mouth/Throat:      Pharynx: Oropharynx is clear.   Neck:      Vascular: JVD normal.      Trachea: Trachea normal.   Pulmonary:      Effort: Pulmonary effort is normal.      Breath sounds: Normal breath sounds. No wheezing. No rhonchi. No rales.   Cardiovascular:      PMI at left midclavicular line. Normal rate. Regular rhythm. Normal S1. Normal S2.      Murmurs: There is a grade 2/6 systolic murmur.       No gallop. No click. No rub.   Pulses:     Dorsalis pedis: 2+ bilaterally.     Posterior tibial: 2+ bilaterally.  Abdominal:      General: Bowel sounds are normal.      Palpations: Abdomen is soft.      Tenderness: There is no abdominal tenderness.   Musculoskeletal: Normal range of motion.      Cervical back: Normal range of motion and neck supple. Skin:     General: Skin is warm and dry.      Capillary Refill: Capillary refill takes less than 2 seconds.   Feet:      Right foot:      Skin integrity: Skin integrity normal.      Left foot:      Skin integrity: Skin integrity normal.   Neurological:      Mental Status: Alert and oriented to person, place and time.      Cranial Nerves: Cranial nerves are intact.      Sensory: Sensation is intact.      Motor: Motor function is intact.      Coordination: Coordination is intact.   Psychiatric:         Speech: Speech normal.         Behavior: Behavior is cooperative.         Lab Review:       Procedures      Assessment/Plan:     Problem List Items Addressed This Visit        Cardiac and Vasculature    Hypertension    Hyperlipidemia - Primary    PAD (peripheral artery disease) (HCC)    Overview     Added automatically from request for surgery [id number]           Chronic combined systolic and diastolic congestive heart failure (HCC)       Endocrine and Metabolic    Hypothyroidism            Recommendations/plans:    No need for any further cardiac testing at this time.  I Sandy give her a prescription of Zofran for the significant nausea and vomiting episodes she has been having.    Recommend she continue on her current cardiac regimen at current doses including the aspirin 81, Coreg 3.125 twice daily, Plavix 75, Jardiance 10, Lasix 40 as needed, and Entresto 24/26.    Follow-up with cardiology in 6 months or sooner if necessary    Cesar Urena, DO  Interventional cardiology  [facility]  07/13/2022  19:36 CDT

## 2022-10-20 ENCOUNTER — APPOINTMENT (OUTPATIENT)
Dept: GENERAL RADIOLOGY | Facility: HOSPITAL | Age: 87
End: 2022-10-20

## 2022-10-20 ENCOUNTER — APPOINTMENT (OUTPATIENT)
Dept: CT IMAGING | Facility: HOSPITAL | Age: 87
End: 2022-10-20

## 2022-10-20 ENCOUNTER — HOSPITAL ENCOUNTER (INPATIENT)
Facility: HOSPITAL | Age: 87
LOS: 3 days | Discharge: NURSING, REHAB OR LONG-TERM CARE | End: 2022-10-23
Attending: STUDENT IN AN ORGANIZED HEALTH CARE EDUCATION/TRAINING PROGRAM | Admitting: FAMILY MEDICINE

## 2022-10-20 DIAGNOSIS — Z78.9 IMPAIRED MOBILITY AND ADLS: ICD-10-CM

## 2022-10-20 DIAGNOSIS — Z74.09 IMPAIRED MOBILITY: ICD-10-CM

## 2022-10-20 DIAGNOSIS — M25.552 ACUTE HIP PAIN, LEFT: ICD-10-CM

## 2022-10-20 DIAGNOSIS — M25.522 LEFT ELBOW PAIN: ICD-10-CM

## 2022-10-20 DIAGNOSIS — Z74.09 IMPAIRED MOBILITY AND ADLS: ICD-10-CM

## 2022-10-20 DIAGNOSIS — S32.592A CLOSED FRACTURE OF RAMUS OF LEFT PUBIS, INITIAL ENCOUNTER: Primary | ICD-10-CM
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
## 2022-10-20 PROBLEM — N18.31 STAGE 3A CHRONIC KIDNEY DISEASE (CKD) (HCC): Status: ACTIVE | Noted: 2022-10-20

## 2022-10-20 LAB
ANION GAP SERPL CALCULATED.3IONS-SCNC: 10 MMOL/L (ref 5–15)
APTT PPP: 28.7 SECONDS (ref 24.1–35)
BASOPHILS # BLD AUTO: 0.04 10*3/MM3 (ref 0–0.2)
BASOPHILS NFR BLD AUTO: 0.4 % (ref 0–1.5)
BUN SERPL-MCNC: 31 MG/DL (ref 8–23)
BUN/CREAT SERPL: 28.4 (ref 7–25)
CALCIUM SPEC-SCNC: 10.2 MG/DL (ref 8.2–9.6)
CHLORIDE SERPL-SCNC: 103 MMOL/L (ref 98–107)
CO2 SERPL-SCNC: 29 MMOL/L (ref 22–29)
CREAT SERPL-MCNC: 1.09 MG/DL (ref 0.57–1)
DEPRECATED RDW RBC AUTO: 50.2 FL (ref 37–54)
EGFRCR SERPLBLD CKD-EPI 2021: 47.2 ML/MIN/1.73
EOSINOPHIL # BLD AUTO: 0.03 10*3/MM3 (ref 0–0.4)
EOSINOPHIL NFR BLD AUTO: 0.3 % (ref 0.3–6.2)
ERYTHROCYTE [DISTWIDTH] IN BLOOD BY AUTOMATED COUNT: 13.7 % (ref 12.3–15.4)
GLUCOSE SERPL-MCNC: 114 MG/DL (ref 65–99)
HCT VFR BLD AUTO: 42.2 % (ref 34–46.6)
HGB BLD-MCNC: 12.9 G/DL (ref 12–15.9)
IMM GRANULOCYTES # BLD AUTO: 0.05 10*3/MM3 (ref 0–0.05)
IMM GRANULOCYTES NFR BLD AUTO: 0.4 % (ref 0–0.5)
INR PPP: 1.07 (ref 0.91–1.09)
LYMPHOCYTES # BLD AUTO: 1.55 10*3/MM3 (ref 0.7–3.1)
LYMPHOCYTES NFR BLD AUTO: 13.6 % (ref 19.6–45.3)
MCH RBC QN AUTO: 30.2 PG (ref 26.6–33)
MCHC RBC AUTO-ENTMCNC: 30.6 G/DL (ref 31.5–35.7)
MCV RBC AUTO: 98.8 FL (ref 79–97)
MONOCYTES # BLD AUTO: 0.96 10*3/MM3 (ref 0.1–0.9)
MONOCYTES NFR BLD AUTO: 8.4 % (ref 5–12)
NEUTROPHILS NFR BLD AUTO: 76.9 % (ref 42.7–76)
NEUTROPHILS NFR BLD AUTO: 8.79 10*3/MM3 (ref 1.7–7)
NRBC BLD AUTO-RTO: 0 /100 WBC (ref 0–0.2)
PLATELET # BLD AUTO: 201 10*3/MM3 (ref 140–450)
PMV BLD AUTO: 9.5 FL (ref 6–12)
POTASSIUM SERPL-SCNC: 4.7 MMOL/L (ref 3.5–5.2)
PROTHROMBIN TIME: 13.5 SECONDS (ref 11.9–14.6)
RBC # BLD AUTO: 4.27 10*6/MM3 (ref 3.77–5.28)
SODIUM SERPL-SCNC: 142 MMOL/L (ref 136–145)
WBC NRBC COR # BLD: 11.42 10*3/MM3 (ref 3.4–10.8)

## 2022-10-20 PROCEDURE — 72192 CT PELVIS W/O DYE: CPT

## 2022-10-20 PROCEDURE — 85730 THROMBOPLASTIN TIME PARTIAL: CPT | Performed by: STUDENT IN AN ORGANIZED HEALTH CARE EDUCATION/TRAINING PROGRAM

## 2022-10-20 PROCEDURE — 80048 BASIC METABOLIC PNL TOTAL CA: CPT | Performed by: STUDENT IN AN ORGANIZED HEALTH CARE EDUCATION/TRAINING PROGRAM

## 2022-10-20 PROCEDURE — 0 HYDROMORPHONE 1 MG/ML SOLUTION: Performed by: STUDENT IN AN ORGANIZED HEALTH CARE EDUCATION/TRAINING PROGRAM

## 2022-10-20 PROCEDURE — 85610 PROTHROMBIN TIME: CPT | Performed by: STUDENT IN AN ORGANIZED HEALTH CARE EDUCATION/TRAINING PROGRAM

## 2022-10-20 PROCEDURE — 99284 EMERGENCY DEPT VISIT MOD MDM: CPT

## 2022-10-20 PROCEDURE — 73502 X-RAY EXAM HIP UNI 2-3 VIEWS: CPT

## 2022-10-20 PROCEDURE — 73080 X-RAY EXAM OF ELBOW: CPT

## 2022-10-20 PROCEDURE — 73552 X-RAY EXAM OF FEMUR 2/>: CPT

## 2022-10-20 PROCEDURE — 63710000001 ONDANSETRON ODT 4 MG TABLET DISPERSIBLE: Performed by: STUDENT IN AN ORGANIZED HEALTH CARE EDUCATION/TRAINING PROGRAM

## 2022-10-20 PROCEDURE — 85025 COMPLETE CBC W/AUTO DIFF WBC: CPT | Performed by: STUDENT IN AN ORGANIZED HEALTH CARE EDUCATION/TRAINING PROGRAM

## 2022-10-20 RX ORDER — CYCLOSPORINE 0.5 MG/ML
1 EMULSION OPHTHALMIC EVERY 12 HOURS
Status: DISCONTINUED | OUTPATIENT
Start: 2022-10-20 | End: 2022-10-23 | Stop reason: HOSPADM

## 2022-10-20 RX ORDER — ACETAMINOPHEN 325 MG/1
650 TABLET ORAL EVERY 4 HOURS PRN
Status: DISCONTINUED | OUTPATIENT
Start: 2022-10-20 | End: 2022-10-23 | Stop reason: HOSPADM

## 2022-10-20 RX ORDER — ESCITALOPRAM OXALATE 10 MG/1
10 TABLET ORAL DAILY
Status: DISCONTINUED | OUTPATIENT
Start: 2022-10-20 | End: 2022-10-23 | Stop reason: HOSPADM

## 2022-10-20 RX ORDER — ENOXAPARIN SODIUM 100 MG/ML
30 INJECTION SUBCUTANEOUS DAILY
Status: DISCONTINUED | OUTPATIENT
Start: 2022-10-20 | End: 2022-10-20

## 2022-10-20 RX ORDER — AMOXICILLIN 250 MG
2 CAPSULE ORAL 2 TIMES DAILY
Status: DISCONTINUED | OUTPATIENT
Start: 2022-10-20 | End: 2022-10-23 | Stop reason: HOSPADM

## 2022-10-20 RX ORDER — POLYETHYLENE GLYCOL 3350 17 G/17G
17 POWDER, FOR SOLUTION ORAL DAILY PRN
Status: DISCONTINUED | OUTPATIENT
Start: 2022-10-20 | End: 2022-10-23 | Stop reason: HOSPADM

## 2022-10-20 RX ORDER — ONDANSETRON 4 MG/1
4 TABLET, FILM COATED ORAL EVERY 8 HOURS PRN
Status: DISCONTINUED | OUTPATIENT
Start: 2022-10-20 | End: 2022-10-23 | Stop reason: HOSPADM

## 2022-10-20 RX ORDER — SODIUM CHLORIDE 0.9 % (FLUSH) 0.9 %
10 SYRINGE (ML) INJECTION AS NEEDED
Status: DISCONTINUED | OUTPATIENT
Start: 2022-10-20 | End: 2022-10-23 | Stop reason: HOSPADM

## 2022-10-20 RX ORDER — OXYCODONE HYDROCHLORIDE 5 MG/1
5 TABLET ORAL ONCE
Status: DISCONTINUED | OUTPATIENT
Start: 2022-10-20 | End: 2022-10-20

## 2022-10-20 RX ORDER — BISACODYL 10 MG
10 SUPPOSITORY, RECTAL RECTAL DAILY PRN
Status: DISCONTINUED | OUTPATIENT
Start: 2022-10-20 | End: 2022-10-23 | Stop reason: HOSPADM

## 2022-10-20 RX ORDER — HYDROMORPHONE HYDROCHLORIDE 1 MG/ML
0.5 INJECTION, SOLUTION INTRAMUSCULAR; INTRAVENOUS; SUBCUTANEOUS EVERY 8 HOURS PRN
Status: DISCONTINUED | OUTPATIENT
Start: 2022-10-20 | End: 2022-10-22 | Stop reason: ALTCHOICE

## 2022-10-20 RX ORDER — CARVEDILOL 3.12 MG/1
3.12 TABLET ORAL 2 TIMES DAILY
Status: DISCONTINUED | OUTPATIENT
Start: 2022-10-20 | End: 2022-10-23 | Stop reason: HOSPADM

## 2022-10-20 RX ORDER — CLOPIDOGREL BISULFATE 75 MG/1
75 TABLET ORAL EVERY 24 HOURS
Status: DISCONTINUED | OUTPATIENT
Start: 2022-10-20 | End: 2022-10-23 | Stop reason: HOSPADM

## 2022-10-20 RX ORDER — OXYCODONE HYDROCHLORIDE 5 MG/1
2.5 TABLET ORAL ONCE
Status: COMPLETED | OUTPATIENT
Start: 2022-10-20 | End: 2022-10-20

## 2022-10-20 RX ORDER — SODIUM CHLORIDE 0.9 % (FLUSH) 0.9 %
10 SYRINGE (ML) INJECTION EVERY 12 HOURS SCHEDULED
Status: DISCONTINUED | OUTPATIENT
Start: 2022-10-20 | End: 2022-10-23 | Stop reason: HOSPADM

## 2022-10-20 RX ORDER — OXYCODONE HYDROCHLORIDE AND ACETAMINOPHEN 5; 325 MG/1; MG/1
1 TABLET ORAL EVERY 6 HOURS PRN
Status: DISCONTINUED | OUTPATIENT
Start: 2022-10-20 | End: 2022-10-23 | Stop reason: HOSPADM

## 2022-10-20 RX ORDER — ONDANSETRON 4 MG/1
4 TABLET, ORALLY DISINTEGRATING ORAL ONCE
Status: COMPLETED | OUTPATIENT
Start: 2022-10-20 | End: 2022-10-20

## 2022-10-20 RX ORDER — ASPIRIN 81 MG/1
81 TABLET ORAL DAILY
Status: DISCONTINUED | OUTPATIENT
Start: 2022-10-20 | End: 2022-10-23 | Stop reason: HOSPADM

## 2022-10-20 RX ORDER — BISACODYL 5 MG/1
5 TABLET, DELAYED RELEASE ORAL DAILY PRN
Status: DISCONTINUED | OUTPATIENT
Start: 2022-10-20 | End: 2022-10-23 | Stop reason: HOSPADM

## 2022-10-20 RX ORDER — NALOXONE HCL 0.4 MG/ML
0.4 VIAL (ML) INJECTION
Status: DISCONTINUED | OUTPATIENT
Start: 2022-10-20 | End: 2022-10-22 | Stop reason: ALTCHOICE

## 2022-10-20 RX ADMIN — OXYCODONE HYDROCHLORIDE 2.5 MG: 5 TABLET ORAL at 14:14

## 2022-10-20 RX ADMIN — ONDANSETRON 4 MG: 4 TABLET, ORALLY DISINTEGRATING ORAL at 14:14

## 2022-10-20 RX ADMIN — EMPAGLIFLOZIN 10 MG: 10 TABLET, FILM COATED ORAL at 20:58

## 2022-10-20 RX ADMIN — CYCLOSPORINE 1 DROP: 0.5 EMULSION OPHTHALMIC at 20:58

## 2022-10-20 RX ADMIN — DOCUSATE SODIUM 50 MG AND SENNOSIDES 8.6 MG 2 TABLET: 8.6; 5 TABLET, FILM COATED ORAL at 20:58

## 2022-10-20 RX ADMIN — HYDROMORPHONE HYDROCHLORIDE 0.5 MG: 1 INJECTION, SOLUTION INTRAMUSCULAR; INTRAVENOUS; SUBCUTANEOUS at 15:59

## 2022-10-20 RX ADMIN — ESCITALOPRAM 10 MG: 10 TABLET, FILM COATED ORAL at 20:58

## 2022-10-20 RX ADMIN — Medication 10 ML: at 20:59

## 2022-10-20 RX ADMIN — OXYCODONE HYDROCHLORIDE AND ACETAMINOPHEN 1 TABLET: 5; 325 TABLET ORAL at 18:59

## 2022-10-21 ENCOUNTER — APPOINTMENT (OUTPATIENT)
Dept: GENERAL RADIOLOGY | Facility: HOSPITAL | Age: 87
End: 2022-10-21

## 2022-10-21 LAB
ALBUMIN SERPL-MCNC: 3.4 G/DL (ref 3.5–5.2)
ALBUMIN/GLOB SERPL: 1.4 G/DL
ALP SERPL-CCNC: 85 U/L (ref 39–117)
ALT SERPL W P-5'-P-CCNC: 83 U/L (ref 1–33)
ANION GAP SERPL CALCULATED.3IONS-SCNC: 8 MMOL/L (ref 5–15)
AST SERPL-CCNC: 175 U/L (ref 1–32)
BASOPHILS # BLD AUTO: 0.03 10*3/MM3 (ref 0–0.2)
BASOPHILS NFR BLD AUTO: 0.4 % (ref 0–1.5)
BILIRUB SERPL-MCNC: 1 MG/DL (ref 0–1.2)
BUN SERPL-MCNC: 34 MG/DL (ref 8–23)
BUN/CREAT SERPL: 27.2 (ref 7–25)
CALCIUM SPEC-SCNC: 9 MG/DL (ref 8.2–9.6)
CHLORIDE SERPL-SCNC: 104 MMOL/L (ref 98–107)
CO2 SERPL-SCNC: 29 MMOL/L (ref 22–29)
CREAT SERPL-MCNC: 1.25 MG/DL (ref 0.57–1)
DEPRECATED RDW RBC AUTO: 49.2 FL (ref 37–54)
EGFRCR SERPLBLD CKD-EPI 2021: 40 ML/MIN/1.73
EOSINOPHIL # BLD AUTO: 0.1 10*3/MM3 (ref 0–0.4)
EOSINOPHIL NFR BLD AUTO: 1.4 % (ref 0.3–6.2)
ERYTHROCYTE [DISTWIDTH] IN BLOOD BY AUTOMATED COUNT: 13.8 % (ref 12.3–15.4)
GLOBULIN UR ELPH-MCNC: 2.5 GM/DL
GLUCOSE SERPL-MCNC: 114 MG/DL (ref 65–99)
HCT VFR BLD AUTO: 34.2 % (ref 34–46.6)
HCT VFR BLD AUTO: 35.1 % (ref 34–46.6)
HGB BLD-MCNC: 10.4 G/DL (ref 12–15.9)
HGB BLD-MCNC: 11.1 G/DL (ref 12–15.9)
IMM GRANULOCYTES # BLD AUTO: 0.02 10*3/MM3 (ref 0–0.05)
IMM GRANULOCYTES NFR BLD AUTO: 0.3 % (ref 0–0.5)
LYMPHOCYTES # BLD AUTO: 1.91 10*3/MM3 (ref 0.7–3.1)
LYMPHOCYTES NFR BLD AUTO: 27 % (ref 19.6–45.3)
MCH RBC QN AUTO: 30.7 PG (ref 26.6–33)
MCHC RBC AUTO-ENTMCNC: 31.6 G/DL (ref 31.5–35.7)
MCV RBC AUTO: 97.2 FL (ref 79–97)
MONOCYTES # BLD AUTO: 0.67 10*3/MM3 (ref 0.1–0.9)
MONOCYTES NFR BLD AUTO: 9.5 % (ref 5–12)
NEUTROPHILS NFR BLD AUTO: 4.34 10*3/MM3 (ref 1.7–7)
NEUTROPHILS NFR BLD AUTO: 61.4 % (ref 42.7–76)
NRBC BLD AUTO-RTO: 0 /100 WBC (ref 0–0.2)
PLATELET # BLD AUTO: 175 10*3/MM3 (ref 140–450)
PMV BLD AUTO: 9.8 FL (ref 6–12)
POTASSIUM SERPL-SCNC: 4.4 MMOL/L (ref 3.5–5.2)
PROT SERPL-MCNC: 5.9 G/DL (ref 6–8.5)
RBC # BLD AUTO: 3.61 10*6/MM3 (ref 3.77–5.28)
SODIUM SERPL-SCNC: 141 MMOL/L (ref 136–145)
WBC NRBC COR # BLD: 7.07 10*3/MM3 (ref 3.4–10.8)

## 2022-10-21 PROCEDURE — 80053 COMPREHEN METABOLIC PANEL: CPT | Performed by: NURSE PRACTITIONER

## 2022-10-21 PROCEDURE — 85018 HEMOGLOBIN: CPT | Performed by: INTERNAL MEDICINE

## 2022-10-21 PROCEDURE — 97161 PT EVAL LOW COMPLEX 20 MIN: CPT | Performed by: PHYSICAL THERAPIST

## 2022-10-21 PROCEDURE — 85025 COMPLETE CBC W/AUTO DIFF WBC: CPT | Performed by: NURSE PRACTITIONER

## 2022-10-21 PROCEDURE — 85014 HEMATOCRIT: CPT | Performed by: INTERNAL MEDICINE

## 2022-10-21 PROCEDURE — 97166 OT EVAL MOD COMPLEX 45 MIN: CPT

## 2022-10-21 PROCEDURE — 97530 THERAPEUTIC ACTIVITIES: CPT

## 2022-10-21 PROCEDURE — 73030 X-RAY EXAM OF SHOULDER: CPT

## 2022-10-21 PROCEDURE — 97535 SELF CARE MNGMENT TRAINING: CPT

## 2022-10-21 RX ORDER — SODIUM CHLORIDE, SODIUM LACTATE, POTASSIUM CHLORIDE, CALCIUM CHLORIDE 600; 310; 30; 20 MG/100ML; MG/100ML; MG/100ML; MG/100ML
75 INJECTION, SOLUTION INTRAVENOUS CONTINUOUS
Status: DISCONTINUED | OUTPATIENT
Start: 2022-10-21 | End: 2022-10-22

## 2022-10-21 RX ADMIN — DOCUSATE SODIUM 50 MG AND SENNOSIDES 8.6 MG 2 TABLET: 8.6; 5 TABLET, FILM COATED ORAL at 08:50

## 2022-10-21 RX ADMIN — OXYCODONE HYDROCHLORIDE AND ACETAMINOPHEN 1 TABLET: 5; 325 TABLET ORAL at 08:48

## 2022-10-21 RX ADMIN — SODIUM CHLORIDE, POTASSIUM CHLORIDE, SODIUM LACTATE AND CALCIUM CHLORIDE 75 ML/HR: 600; 310; 30; 20 INJECTION, SOLUTION INTRAVENOUS at 22:39

## 2022-10-21 RX ADMIN — CYCLOSPORINE 1 DROP: 0.5 EMULSION OPHTHALMIC at 08:49

## 2022-10-21 RX ADMIN — SACUBITRIL AND VALSARTAN 1 TABLET: 24; 26 TABLET, FILM COATED ORAL at 19:35

## 2022-10-21 RX ADMIN — SODIUM CHLORIDE 250 ML: 9 INJECTION, SOLUTION INTRAVENOUS at 00:08

## 2022-10-21 RX ADMIN — CARVEDILOL 3.12 MG: 3.12 TABLET, FILM COATED ORAL at 08:50

## 2022-10-21 RX ADMIN — DOCUSATE SODIUM 50 MG AND SENNOSIDES 8.6 MG 2 TABLET: 8.6; 5 TABLET, FILM COATED ORAL at 19:34

## 2022-10-21 RX ADMIN — CLOPIDOGREL 75 MG: 75 TABLET, FILM COATED ORAL at 19:34

## 2022-10-21 RX ADMIN — ASPIRIN 81 MG: 81 TABLET, COATED ORAL at 08:49

## 2022-10-21 RX ADMIN — SODIUM CHLORIDE, POTASSIUM CHLORIDE, SODIUM LACTATE AND CALCIUM CHLORIDE 75 ML/HR: 600; 310; 30; 20 INJECTION, SOLUTION INTRAVENOUS at 01:07

## 2022-10-21 RX ADMIN — EMPAGLIFLOZIN 10 MG: 10 TABLET, FILM COATED ORAL at 08:50

## 2022-10-21 RX ADMIN — Medication 10 ML: at 19:35

## 2022-10-21 RX ADMIN — Medication 10 ML: at 08:53

## 2022-10-21 RX ADMIN — CYCLOSPORINE 1 DROP: 0.5 EMULSION OPHTHALMIC at 19:34

## 2022-10-21 RX ADMIN — SACUBITRIL AND VALSARTAN 1 TABLET: 24; 26 TABLET, FILM COATED ORAL at 08:53

## 2022-10-21 RX ADMIN — LEVOTHYROXINE SODIUM 137 MCG: 112 TABLET ORAL at 08:52

## 2022-10-21 RX ADMIN — ESCITALOPRAM 10 MG: 10 TABLET, FILM COATED ORAL at 08:51

## 2022-10-22 LAB
ALBUMIN SERPL-MCNC: 3.5 G/DL (ref 3.5–5.2)
ALBUMIN/GLOB SERPL: 1.4 G/DL
ALP SERPL-CCNC: 76 U/L (ref 39–117)
ALT SERPL W P-5'-P-CCNC: 52 U/L (ref 1–33)
ANION GAP SERPL CALCULATED.3IONS-SCNC: 8 MMOL/L (ref 5–15)
AST SERPL-CCNC: 56 U/L (ref 1–32)
BILIRUB SERPL-MCNC: 0.8 MG/DL (ref 0–1.2)
BUN SERPL-MCNC: 33 MG/DL (ref 8–23)
BUN/CREAT SERPL: 28.9 (ref 7–25)
CALCIUM SPEC-SCNC: 8.8 MG/DL (ref 8.2–9.6)
CHLORIDE SERPL-SCNC: 102 MMOL/L (ref 98–107)
CO2 SERPL-SCNC: 28 MMOL/L (ref 22–29)
CREAT SERPL-MCNC: 1.14 MG/DL (ref 0.57–1)
DEPRECATED RDW RBC AUTO: 49.4 FL (ref 37–54)
EGFRCR SERPLBLD CKD-EPI 2021: 44.7 ML/MIN/1.73
ERYTHROCYTE [DISTWIDTH] IN BLOOD BY AUTOMATED COUNT: 13.8 % (ref 12.3–15.4)
GLOBULIN UR ELPH-MCNC: 2.5 GM/DL
GLUCOSE SERPL-MCNC: 105 MG/DL (ref 65–99)
HCT VFR BLD AUTO: 30.6 % (ref 34–46.6)
HGB BLD-MCNC: 9.5 G/DL (ref 12–15.9)
MCH RBC QN AUTO: 30.5 PG (ref 26.6–33)
MCHC RBC AUTO-ENTMCNC: 31 G/DL (ref 31.5–35.7)
MCV RBC AUTO: 98.4 FL (ref 79–97)
PLATELET # BLD AUTO: 153 10*3/MM3 (ref 140–450)
PMV BLD AUTO: 9.9 FL (ref 6–12)
POTASSIUM SERPL-SCNC: 4.2 MMOL/L (ref 3.5–5.2)
PROT SERPL-MCNC: 6 G/DL (ref 6–8.5)
RBC # BLD AUTO: 3.11 10*6/MM3 (ref 3.77–5.28)
SODIUM SERPL-SCNC: 138 MMOL/L (ref 136–145)
WBC NRBC COR # BLD: 9.81 10*3/MM3 (ref 3.4–10.8)

## 2022-10-22 PROCEDURE — 97535 SELF CARE MNGMENT TRAINING: CPT

## 2022-10-22 PROCEDURE — 97116 GAIT TRAINING THERAPY: CPT

## 2022-10-22 PROCEDURE — 85027 COMPLETE CBC AUTOMATED: CPT

## 2022-10-22 PROCEDURE — 80053 COMPREHEN METABOLIC PANEL: CPT

## 2022-10-22 RX ORDER — POLYETHYLENE GLYCOL 3350 17 G/17G
17 POWDER, FOR SOLUTION ORAL DAILY
Status: DISCONTINUED | OUTPATIENT
Start: 2022-10-22 | End: 2022-10-23 | Stop reason: HOSPADM

## 2022-10-22 RX ADMIN — DOCUSATE SODIUM 50 MG AND SENNOSIDES 8.6 MG 2 TABLET: 8.6; 5 TABLET, FILM COATED ORAL at 08:56

## 2022-10-22 RX ADMIN — ASPIRIN 81 MG: 81 TABLET, COATED ORAL at 08:56

## 2022-10-22 RX ADMIN — DOCUSATE SODIUM 50 MG AND SENNOSIDES 8.6 MG 2 TABLET: 8.6; 5 TABLET, FILM COATED ORAL at 21:26

## 2022-10-22 RX ADMIN — LEVOTHYROXINE SODIUM 137 MCG: 112 TABLET ORAL at 08:56

## 2022-10-22 RX ADMIN — EMPAGLIFLOZIN 10 MG: 10 TABLET, FILM COATED ORAL at 08:56

## 2022-10-22 RX ADMIN — SACUBITRIL AND VALSARTAN 1 TABLET: 24; 26 TABLET, FILM COATED ORAL at 08:56

## 2022-10-22 RX ADMIN — SACUBITRIL AND VALSARTAN 1 TABLET: 24; 26 TABLET, FILM COATED ORAL at 21:26

## 2022-10-22 RX ADMIN — POLYETHYLENE GLYCOL 3350 17 G: 17 POWDER, FOR SOLUTION ORAL at 14:09

## 2022-10-22 RX ADMIN — CYCLOSPORINE 1 DROP: 0.5 EMULSION OPHTHALMIC at 21:26

## 2022-10-22 RX ADMIN — ESCITALOPRAM 10 MG: 10 TABLET, FILM COATED ORAL at 08:56

## 2022-10-22 RX ADMIN — CARVEDILOL 3.12 MG: 3.12 TABLET, FILM COATED ORAL at 08:56

## 2022-10-22 RX ADMIN — CYCLOSPORINE 1 DROP: 0.5 EMULSION OPHTHALMIC at 08:56

## 2022-10-22 RX ADMIN — CARVEDILOL 3.12 MG: 3.12 TABLET, FILM COATED ORAL at 21:26

## 2022-10-22 RX ADMIN — CLOPIDOGREL 75 MG: 75 TABLET, FILM COATED ORAL at 21:26

## 2022-10-22 RX ADMIN — OXYCODONE HYDROCHLORIDE AND ACETAMINOPHEN 1 TABLET: 5; 325 TABLET ORAL at 08:56

## 2022-10-23 VITALS
OXYGEN SATURATION: 91 % | WEIGHT: 114.6 LBS | TEMPERATURE: 98.1 F | HEART RATE: 79 BPM | RESPIRATION RATE: 14 BRPM | DIASTOLIC BLOOD PRESSURE: 53 MMHG | HEIGHT: 64 IN | BODY MASS INDEX: 19.56 KG/M2 | SYSTOLIC BLOOD PRESSURE: 125 MMHG

## 2022-10-23 LAB — SARS-COV-2 AG RESP QL IA.RAPID: NORMAL

## 2022-10-23 PROCEDURE — 97530 THERAPEUTIC ACTIVITIES: CPT

## 2022-10-23 PROCEDURE — 87426 SARSCOV CORONAVIRUS AG IA: CPT | Performed by: FAMILY MEDICINE

## 2022-10-23 PROCEDURE — 97116 GAIT TRAINING THERAPY: CPT

## 2022-10-23 RX ORDER — OXYCODONE HYDROCHLORIDE AND ACETAMINOPHEN 5; 325 MG/1; MG/1
1 TABLET ORAL 2 TIMES DAILY PRN
Qty: 6 TABLET | Refills: 0 | Status: SHIPPED | OUTPATIENT
Start: 2022-10-23 | End: 2022-10-27

## 2022-10-23 RX ADMIN — EMPAGLIFLOZIN 10 MG: 10 TABLET, FILM COATED ORAL at 09:08

## 2022-10-23 RX ADMIN — ESCITALOPRAM 10 MG: 10 TABLET, FILM COATED ORAL at 09:08

## 2022-10-23 RX ADMIN — CYCLOSPORINE 1 DROP: 0.5 EMULSION OPHTHALMIC at 09:08

## 2022-10-23 RX ADMIN — ASPIRIN 81 MG: 81 TABLET, COATED ORAL at 09:08

## 2022-10-23 RX ADMIN — LEVOTHYROXINE SODIUM 137 MCG: 112 TABLET ORAL at 09:08

## 2022-10-23 RX ADMIN — SACUBITRIL AND VALSARTAN 1 TABLET: 24; 26 TABLET, FILM COATED ORAL at 09:08

## 2022-10-23 RX ADMIN — CARVEDILOL 3.12 MG: 3.12 TABLET, FILM COATED ORAL at 09:08

## 2022-10-23 RX ADMIN — DOCUSATE SODIUM 50 MG AND SENNOSIDES 8.6 MG 2 TABLET: 8.6; 5 TABLET, FILM COATED ORAL at 09:08

## 2022-10-23 RX ADMIN — POLYETHYLENE GLYCOL 3350 17 G: 17 POWDER, FOR SOLUTION ORAL at 09:08

## 2022-10-23 RX ADMIN — OXYCODONE HYDROCHLORIDE AND ACETAMINOPHEN 1 TABLET: 5; 325 TABLET ORAL at 11:22

## 2022-10-24 ENCOUNTER — APPOINTMENT (OUTPATIENT)
Dept: ULTRASOUND IMAGING | Facility: HOSPITAL | Age: 87
End: 2022-10-24

## 2022-11-29 NOTE — TELEPHONE ENCOUNTER
Caller: Yeni Mixon    Relationship: Emergency Contact    Best call back number: [phone]    Requested Prescriptions:   Requested Prescriptions     Pending Prescriptions Disp Refills   • carvedilol (COREG) 3.125 MG tablet 60 tablet 11     Sig: Take 1 tablet by mouth 2 (Two) Times a Day.   • sacubitril-valsartan (Entresto) 24-26 MG tablet 60 tablet 5     Sig: Take 1 tablet by mouth 2 (Two) Times a Day.   • empagliflozin (Jardiance) 10 MG tablet tablet 30 tablet 5     Sig: Take 1 tablet by mouth Daily.        Pharmacy where request should be sent: [facility] [address] [phone] [facility] [phone]      Additional details provided by patient: PT HAS 8 DAYS LEFT OF JARDIANCE.     Does the patient have less than a 3 day supply:  [] Yes  [x] No    Would you like a call back once the refill request has been completed: [x] Yes [] No    If the office needs to give you a call back, can they leave a voicemail: [x] Yes [] No    Marilynn Mullins Rep   11/29/22 15:32 CST

## 2022-11-30 RX ORDER — CARVEDILOL 3.12 MG/1
3.12 TABLET ORAL 2 TIMES DAILY
Qty: 180 TABLET | Refills: 3 | Status: SHIPPED | OUTPATIENT
Start: 2022-11-30

## 2022-11-30 RX ORDER — SACUBITRIL AND VALSARTAN 24; 26 MG/1; MG/1
1 TABLET, FILM COATED ORAL 2 TIMES DAILY
Qty: 180 TABLET | Refills: 3 | Status: SHIPPED | OUTPATIENT
Start: 2022-11-30

## 2022-12-08 ENCOUNTER — TRANSCRIBE ORDERS (OUTPATIENT)
Dept: ADMINISTRATIVE | Facility: HOSPITAL | Age: 87
End: 2022-12-08

## 2022-12-08 DIAGNOSIS — R31.9 HEMATURIA, UNSPECIFIED TYPE: Primary | ICD-10-CM

## 2022-12-12 ENCOUNTER — HOSPITAL ENCOUNTER (OUTPATIENT)
Dept: ULTRASOUND IMAGING | Facility: HOSPITAL | Age: 87
Discharge: HOME | End: 2022-12-12
Admitting: PHYSICIAN ASSISTANT

## 2022-12-12 DIAGNOSIS — R31.9 HEMATURIA, UNSPECIFIED TYPE: ICD-10-CM

## 2022-12-12 PROCEDURE — 76775 US EXAM ABDO BACK WALL LIM: CPT

## 2022-12-19 NOTE — PROGRESS NOTES
Chief Complaint  Left hydronephrosis    Subjective          Mya [place] Juliet presents to [facility] [place]   Patient found to have left hydronephrosis on renal ultrasound.  There is also a left renal cyst.  Apparently the ultrasound was done because the patient had a UTI at a skilled nursing facility following admission for rehabilitation from pelvic fracture.      Current Outpatient Medications:   •  carvedilol (COREG) 3.125 MG tablet, Take 1 tablet by mouth 2 (Two) Times a Day., Disp: 180 tablet, Rfl: 3  •  empagliflozin (Jardiance) 10 MG tablet tablet, Take 1 tablet by mouth Daily., Disp: 90 tablet, Rfl: 3  •  sacubitril-valsartan (Entresto) 24-26 MG tablet, Take 1 tablet by mouth 2 (Two) Times a Day., Disp: 180 tablet, Rfl: 3  •  alendronate (FOSAMAX) 70 MG tablet, Take 70 mg by mouth Every 7 (Seven) Days. Thursday, Disp: , Rfl:   •  aspirin 81 MG tablet, Take 81 mg by mouth Daily., Disp: , Rfl:   •  Calcium Carbonate-Vitamin D (CALCIUM 600/VITAMIN D PO), Take 1 tablet by mouth Daily., Disp: , Rfl:   •  clopidogrel (PLAVIX) 75 MG tablet, Take 1 tablet by mouth Daily., Disp: 30 tablet, Rfl: 5  •  colestipol (COLESTID) 1 g tablet, Take 1 g by mouth Daily., Disp: , Rfl:   •  cycloSPORINE (RESTASIS) 0.05 % ophthalmic emulsion, Administer 1 drop to both eyes Every 12 (Twelve) Hours., Disp: , Rfl:   •  escitalopram (LEXAPRO) 10 MG tablet, Take 10 mg by mouth Daily., Disp: , Rfl:   •  furosemide (LASIX) 20 MG tablet, Take 2 tablets by mouth Daily As Needed (Weight gain, shortness of breath, swelling)., Disp: 30 tablet, Rfl: 1  •  Glucosamine-Chondroitin-Vit D3 0227-3411-935 MG-MG-UNIT pack, Take 1 tablet by mouth 2 (Two) Times a Day., Disp: , Rfl:   •  levothyroxine (SYNTHROID, LEVOTHROID) 137 MCG tablet, Take 1 tablet by mouth Daily., Disp: 30 tablet, Rfl: 2  •  Multiple Vitamins-Minerals (COMPLETE MULTIVITAMIN/MINERAL PO), Take 1 tablet by mouth Daily., Disp: , Rfl:   Past Medical  History:   Diagnosis Date   • Acid reflux    • Arthritis    • Hyperlipidemia    • Hypertension    • Hypothyroidism    • Leg pain     left leg   • Lung nodule    • Osteopenia    • Skin cancer    • 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    • Wears glasses      Past Surgical History:   Procedure Laterality Date   • AORTAGRAM Right 2018    Procedure: LEFT LOWER EXTREMITY ANGIOGRAM;  Surgeon: Walker Davis DO;  Location: [facility] HYBRID OR 12;  Service: Vascular   • BREAST BIOPSY Bilateral     multiple on both, all benign   • BREAST BIOPSY Left 2017    Procedure: LEFT MAMMOGRAM GUIDED NEEDLE DIRECTED EXCISIONAL BREAST BIOPSY;  Surgeon: Malgorzata Scott MD;  Location: [facility] OR;  Service:    • BUNIONECTOMY Bilateral    •  SECTION      X2   • CHOLECYSTECTOMY     • CHOLECYSTECTOMY WITH INTRAOPERATIVE CHOLANGIOGRAM N/A 2021    Procedure: LAPAROSCOPIC CHOLECYSTECTOMY WITH CHOLANGIOGRAM;  Surgeon: Malgorzata Scott MD;  Location: [facility] OR;  Service: General;  Laterality: N/A;   • COLONOSCOPY  2015    diverticulosis   • ENDOSCOPY N/A 2021    Procedure: ESOPHAGOGASTRODUODENOSCOPY WITH ANESTHESIA;  Surgeon: Sonu Cheek DO;  Location: [facility] ENDOSCOPY;  Service: Gastroenterology;  Laterality: N/A;  pre nausea  post esophagitis  Delfina Pereira DO   • EYE SURGERY      CATARACT SURGERY   • FEMORAL ENDARTERECTOMY Left 2018    Procedure: LEFT FEMORAL ENDARTERECTOMY;  Surgeon: Walker Davis DO;  Location: [facility] OR 12;  Service: Vascular   • JOINT REPLACEMENT      RIGHT HIP           Review of Systems  Review  of systems  Constitutional: Negative for chills and fever.   Gastrointestinal: Negative for abdominal pain, anal bleeding and blood in stool.   Genitourinary: Negative for flank pain and hematuria.      Objective   PHYSICAL EXAM  Vital Signs:   There were no vitals taken for this visit.    Physical Exam  Constitutional: Patient is without distress  or deformity.  Vital signs are reviewed as above.    Neuro: No confusion; No disorientation; Alert and oriented  Pulmonary: No respiratory distress.   Skin: No pallor or diaphoresis        DATA  Result Review :              Results for orders placed or performed during the hospital encounter of 10/20/22   COVID-19 RAPID AG,VERITOR,COR/ONESIMO/PAD/SHAWN/MAD/CATHY/LAG/ROSSY/ IN-HOUSE,DRY SWAB, 1-2 HR TAT - Swab, Nasal Cavity    Specimen: Nasal Cavity; Swab   Result Value Ref Range    COVID19 Presumptive Negative Presumptive Negative - Ref. Range   Basic Metabolic Panel    Specimen: Blood   Result Value Ref Range    Glucose 114 (H) 65 - 99 mg/dL    BUN 31 (H) 8 - 23 mg/dL    Creatinine 1.09 (H) 0.57 - 1.00 mg/dL    Sodium 142 136 - 145 mmol/L    Potassium 4.7 3.5 - 5.2 mmol/L    Chloride 103 98 - 107 mmol/L    CO2 29.0 22.0 - 29.0 mmol/L    Calcium 10.2 (H) 8.2 - 9.6 mg/dL    BUN/Creatinine Ratio 28.4 (H) 7.0 - 25.0    Anion Gap 10.0 5.0 - 15.0 mmol/L    eGFR 47.2 (L) >60.0 mL/min/1.73   Protime-INR    Specimen: Blood   Result Value Ref Range    Protime 13.5 11.9 - 14.6 Seconds    INR 1.07 0.91 - 1.09   aPTT    Specimen: Blood   Result Value Ref Range    PTT 28.7 24.1 - 35.0 seconds   CBC Auto Differential    Specimen: Blood   Result Value Ref Range    WBC 11.42 (H) 3.40 - 10.80 10*3/mm3    RBC 4.27 3.77 - 5.28 10*6/mm3    Hemoglobin 12.9 12.0 - 15.9 g/dL    Hematocrit 42.2 34.0 - 46.6 %    MCV 98.8 (H) 79.0 - 97.0 fL    MCH 30.2 26.6 - 33.0 pg    MCHC 30.6 (L) 31.5 - 35.7 g/dL    RDW 13.7 12.3 - 15.4 %    RDW-SD 50.2 37.0 - 54.0 fl    MPV 9.5 6.0 - 12.0 fL    Platelets 201 140 - 450 10*3/mm3    Neutrophil % 76.9 (H) 42.7 - 76.0 %    Lymphocyte % 13.6 (L) 19.6 - 45.3 %    Monocyte % 8.4 5.0 - 12.0 %    Eosinophil % 0.3 0.3 - 6.2 %    Basophil % 0.4 0.0 - 1.5 %    Immature Grans % 0.4 0.0 - 0.5 %    Neutrophils, Absolute 8.79 (H) 1.70 - 7.00 10*3/mm3    Lymphocytes, Absolute 1.55 0.70 - 3.10 10*3/mm3    Monocytes, Absolute 0.96  (H) 0.10 - 0.90 10*3/mm3    Eosinophils, Absolute 0.03 0.00 - 0.40 10*3/mm3    Basophils, Absolute 0.04 0.00 - 0.20 10*3/mm3    Immature Grans, Absolute 0.05 0.00 - 0.05 10*3/mm3    nRBC 0.0 0.0 - 0.2 /100 WBC   CBC Auto Differential    Specimen: Blood   Result Value Ref Range    WBC 7.07 3.40 - 10.80 10*3/mm3    RBC 3.61 (L) 3.77 - 5.28 10*6/mm3    Hemoglobin 11.1 (L) 12.0 - 15.9 g/dL    Hematocrit 35.1 34.0 - 46.6 %    MCV 97.2 (H) 79.0 - 97.0 fL    MCH 30.7 26.6 - 33.0 pg    MCHC 31.6 31.5 - 35.7 g/dL    RDW 13.8 12.3 - 15.4 %    RDW-SD 49.2 37.0 - 54.0 fl    MPV 9.8 6.0 - 12.0 fL    Platelets 175 140 - 450 10*3/mm3    Neutrophil % 61.4 42.7 - 76.0 %    Lymphocyte % 27.0 19.6 - 45.3 %    Monocyte % 9.5 5.0 - 12.0 %    Eosinophil % 1.4 0.3 - 6.2 %    Basophil % 0.4 0.0 - 1.5 %    Immature Grans % 0.3 0.0 - 0.5 %    Neutrophils, Absolute 4.34 1.70 - 7.00 10*3/mm3    Lymphocytes, Absolute 1.91 0.70 - 3.10 10*3/mm3    Monocytes, Absolute 0.67 0.10 - 0.90 10*3/mm3    Eosinophils, Absolute 0.10 0.00 - 0.40 10*3/mm3    Basophils, Absolute 0.03 0.00 - 0.20 10*3/mm3    Immature Grans, Absolute 0.02 0.00 - 0.05 10*3/mm3    nRBC 0.0 0.0 - 0.2 /100 WBC   Comprehensive Metabolic Panel    Specimen: Blood   Result Value Ref Range    Glucose 114 (H) 65 - 99 mg/dL    BUN 34 (H) 8 - 23 mg/dL    Creatinine 1.25 (H) 0.57 - 1.00 mg/dL    Sodium 141 136 - 145 mmol/L    Potassium 4.4 3.5 - 5.2 mmol/L    Chloride 104 98 - 107 mmol/L    CO2 29.0 22.0 - 29.0 mmol/L    Calcium 9.0 8.2 - 9.6 mg/dL    Total Protein 5.9 (L) 6.0 - 8.5 g/dL    Albumin 3.40 (L) 3.50 - 5.20 g/dL    ALT (SGPT) 83 (H) 1 - 33 U/L    AST (SGOT) 175 (H) 1 - 32 U/L    Alkaline Phosphatase 85 39 - 117 U/L    Total Bilirubin 1.0 0.0 - 1.2 mg/dL    Globulin 2.5 gm/dL    A/G Ratio 1.4 g/dL    BUN/Creatinine Ratio 27.2 (H) 7.0 - 25.0    Anion Gap 8.0 5.0 - 15.0 mmol/L    eGFR 40.0 (L) >60.0 mL/min/1.73   Hemoglobin & Hematocrit, Blood    Specimen: Blood   Result Value Ref  "Range    Hemoglobin 10.4 (L) 12.0 - 15.9 g/dL    Hematocrit 34.2 34.0 - 46.6 %   Comprehensive Metabolic Panel    Specimen: Blood   Result Value Ref Range    Glucose 105 (H) 65 - 99 mg/dL    BUN 33 (H) 8 - 23 mg/dL    Creatinine 1.14 (H) 0.57 - 1.00 mg/dL    Sodium 138 136 - 145 mmol/L    Potassium 4.2 3.5 - 5.2 mmol/L    Chloride 102 98 - 107 mmol/L    CO2 28.0 22.0 - 29.0 mmol/L    Calcium 8.8 8.2 - 9.6 mg/dL    Total Protein 6.0 6.0 - 8.5 g/dL    Albumin 3.50 3.50 - 5.20 g/dL    ALT (SGPT) 52 (H) 1 - 33 U/L    AST (SGOT) 56 (H) 1 - 32 U/L    Alkaline Phosphatase 76 39 - 117 U/L    Total Bilirubin 0.8 0.0 - 1.2 mg/dL    Globulin 2.5 gm/dL    A/G Ratio 1.4 g/dL    BUN/Creatinine Ratio 28.9 (H) 7.0 - 25.0    Anion Gap 8.0 5.0 - 15.0 mmol/L    eGFR 44.7 (L) >60.0 mL/min/1.73   CBC (No Diff)    Specimen: Blood   Result Value Ref Range    WBC 9.81 3.40 - 10.80 10*3/mm3    RBC 3.11 (L) 3.77 - 5.28 10*6/mm3    Hemoglobin 9.5 (L) 12.0 - 15.9 g/dL    Hematocrit 30.6 (L) 34.0 - 46.6 %    MCV 98.4 (H) 79.0 - 97.0 fL    MCH 30.5 26.6 - 33.0 pg    MCHC 31.0 (L) 31.5 - 35.7 g/dL    RDW 13.8 12.3 - 15.4 %    RDW-SD 49.4 37.0 - 54.0 fl    MPV 9.9 6.0 - 12.0 fL    Platelets 153 140 - 450 10*3/mm3               US Renal Bilateral (12/12/2022 08:50)      The images for the above \"link(s)\" were made available to me to review independently.  I also reviewed the radiologist's report described above with regard to the urologic findings. My interpretation is as follows:  Patient is well preserved parenchyma with normal cortical medullary differentiation.  There is moderate hydronephrosis without evidence of stone or mass.  Cystic structure that I think is very difficult to even tell its part of the kidney but is likely a cyst.  /Prieto Emery MD           ASSESSMENT AND PLAN          Problem List Items Addressed This Visit    None  Visit Diagnoses     Hydronephrosis of left kidney    -  Primary    Relevant Orders    POC Urinalysis " Dipstick, Multipro    Acquired renal cyst of left kidney          -Suspect the hydronephrosis is a chronic process she is asymptomatic.  We discussed the possibility of evaluating this with noncontrasted CT but since she is asymptomatic she really does not want anything else done.  Given her age I think that is reasonable.  -This renal cyst appears to be septated but there is no evidence of any solid mass.  While further evaluation would require a CT with renal mass protocol, there is concerns about using IV contrast in her since she does have chronic kidney disease stage IIIa.    She will return if she becomes symptomatic.      FOLLOW UP     No follow-ups on file.        (Please note that portions of this note were completed with a voice recognition program.)  Prieto Emery MD  12/27/22  06:54 CST

## 2022-12-27 ENCOUNTER — OFFICE VISIT (OUTPATIENT)
Dept: UROLOGY | Facility: CLINIC | Age: 87
End: 2022-12-27

## 2022-12-27 VITALS — TEMPERATURE: 97 F | HEIGHT: 64 IN | WEIGHT: 111.8 LBS | BODY MASS INDEX: 19.09 KG/M2

## 2022-12-27 DIAGNOSIS — N28.1 ACQUIRED RENAL CYST OF LEFT KIDNEY: ICD-10-CM

## 2022-12-27 DIAGNOSIS — N13.30 HYDRONEPHROSIS OF LEFT KIDNEY: Primary | ICD-10-CM

## 2022-12-27 LAB
BILIRUB BLD-MCNC: NEGATIVE MG/DL
CLARITY, POC: CLEAR
COLOR UR: YELLOW
GLUCOSE UR STRIP-MCNC: ABNORMAL MG/DL
KETONES UR QL: NEGATIVE
LEUKOCYTE EST, POC: NEGATIVE
NITRITE UR-MCNC: NEGATIVE MG/ML
PH UR: 6 [PH] (ref 5–8)
PROT UR STRIP-MCNC: NEGATIVE MG/DL
RBC # UR STRIP: ABNORMAL /UL
SP GR UR: 1.02 (ref 1–1.03)
UROBILINOGEN UR QL: NORMAL

## 2022-12-27 PROCEDURE — 99203 OFFICE O/P NEW LOW 30 MIN: CPT | Performed by: UROLOGY

## 2022-12-27 PROCEDURE — 81001 URINALYSIS AUTO W/SCOPE: CPT | Performed by: UROLOGY

## 2022-12-30 ENCOUNTER — TELEPHONE (OUTPATIENT)
Dept: VASCULAR SURGERY | Facility: CLINIC | Age: 87
End: 2022-12-30

## 2022-12-30 DIAGNOSIS — I73.9 PAD (PERIPHERAL ARTERY DISEASE): Primary | ICD-10-CM

## 2022-12-30 NOTE — TELEPHONE ENCOUNTER
Spoke with daughter and verified new appt date of 01/23/23 with Saumya testing to arrive at 1030 for 11 am appt at [facility]. appt with Sauyma right after. Yeni spoke understanding.

## 2023-01-13 ENCOUNTER — OFFICE VISIT (OUTPATIENT)
Dept: CARDIOLOGY | Facility: CLINIC | Age: 88
End: 2023-01-13
Payer: MEDICARE

## 2023-01-13 VITALS
BODY MASS INDEX: 18.95 KG/M2 | OXYGEN SATURATION: 92 % | WEIGHT: 111 LBS | HEART RATE: 71 BPM | DIASTOLIC BLOOD PRESSURE: 64 MMHG | HEIGHT: 64 IN | SYSTOLIC BLOOD PRESSURE: 130 MMHG

## 2023-01-13 DIAGNOSIS — R07.89 OTHER CHEST PAIN: ICD-10-CM

## 2023-01-13 DIAGNOSIS — E78.5 HYPERLIPIDEMIA, UNSPECIFIED HYPERLIPIDEMIA TYPE: ICD-10-CM

## 2023-01-13 DIAGNOSIS — N18.31 STAGE 3A CHRONIC KIDNEY DISEASE (CKD): ICD-10-CM

## 2023-01-13 DIAGNOSIS — I10 PRIMARY HYPERTENSION: ICD-10-CM

## 2023-01-13 DIAGNOSIS — I73.9 PAD (PERIPHERAL ARTERY DISEASE): ICD-10-CM

## 2023-01-13 DIAGNOSIS — I50.42 CHRONIC COMBINED SYSTOLIC AND DIASTOLIC CONGESTIVE HEART FAILURE: Primary | ICD-10-CM

## 2023-01-13 DIAGNOSIS — I83.819 VARICOSE VEINS OF LOWER EXTREMITY WITH PAIN, UNSPECIFIED LATERALITY: ICD-10-CM

## 2023-01-13 PROCEDURE — 99214 OFFICE O/P EST MOD 30 MIN: CPT | Performed by: EMERGENCY MEDICINE

## 2023-01-13 PROCEDURE — 93000 ELECTROCARDIOGRAM COMPLETE: CPT | Performed by: EMERGENCY MEDICINE

## 2023-01-13 NOTE — PROGRESS NOTES
Subjective:     Encounter Date:01/13/2023      Patient ID: Mya Chung is a 89 y.o. female.    Chief Complaint:  History of Present Illness  Mya Chung presents today in follow-up for congestive heart failure. She is accompanied by an adult female.    The patient reports that she is doing well. She denies having any chest pains, shortness of breath, dizziness, nausea or other symptoms. The adult female states the patient was nauseous when she was in the hospital but not at her last visit. She reports the patient has not had to take the Zofran that was prescribed to her. She states the patient had a fall in October 2022 and she fractured her pelvis in 2 places and was put in rehab for 6 weeks. She notes the patient did well with that and progressed very well. The patient reports she did not have to have surgery when she fractured her pelvis. She states she takes aspirin 81 mg, Plavix, carvedilol, Entresto, and Jardiance.    Review of Systems   Constitutional: Negative for diaphoresis, fever and malaise/fatigue.   HENT: Negative for congestion.    Eyes: Negative for vision loss in left eye and vision loss in right eye.   Cardiovascular: Negative for chest pain, claudication, dyspnea on exertion, irregular heartbeat, leg swelling, orthopnea, palpitations and syncope.   Respiratory: Negative for cough, shortness of breath and wheezing.    Hematologic/Lymphatic: Negative for adenopathy.   Skin: Negative for rash.   Musculoskeletal: Negative for joint pain and joint swelling.   Gastrointestinal: Positive for nausea. Negative for abdominal pain, diarrhea and vomiting.   Neurological: Negative for excessive daytime sleepiness, dizziness, focal weakness, light-headedness, numbness and weakness.   Psychiatric/Behavioral: Negative for depression. The patient does not have insomnia.            Current Outpatient Medications:   •  alendronate (FOSAMAX) 70 MG tablet, Take 70 mg by mouth Every 7 (Seven) Days.  Thursday, Disp: , Rfl:   •  aspirin 81 MG tablet, Take 81 mg by mouth Daily., Disp: , Rfl:   •  Calcium Carbonate-Vitamin D (CALCIUM 600/VITAMIN D PO), Take 1 tablet by mouth Daily., Disp: , Rfl:   •  carvedilol (COREG) 3.125 MG tablet, Take 1 tablet by mouth 2 (Two) Times a Day., Disp: 180 tablet, Rfl: 3  •  clopidogrel (PLAVIX) 75 MG tablet, Take 1 tablet by mouth Daily., Disp: 30 tablet, Rfl: 5  •  colestipol (COLESTID) 1 g tablet, Take 1 g by mouth Daily., Disp: , Rfl:   •  cycloSPORINE (RESTASIS) 0.05 % ophthalmic emulsion, Administer 1 drop to both eyes Every 12 (Twelve) Hours., Disp: , Rfl:   •  empagliflozin (Jardiance) 10 MG tablet tablet, Take 1 tablet by mouth Daily., Disp: 90 tablet, Rfl: 3  •  escitalopram (LEXAPRO) 10 MG tablet, Take 10 mg by mouth Daily., Disp: , Rfl:   •  furosemide (LASIX) 20 MG tablet, Take 2 tablets by mouth Daily As Needed (Weight gain, shortness of breath, swelling)., Disp: 30 tablet, Rfl: 1  •  Glucosamine-Chondroitin-Vit D3 0700-1700-093 MG-MG-UNIT pack, Take 1 tablet by mouth 2 (Two) Times a Day., Disp: , Rfl:   •  levothyroxine (SYNTHROID, LEVOTHROID) 137 MCG tablet, Take 1 tablet by mouth Daily., Disp: 30 tablet, Rfl: 2  •  Multiple Vitamins-Minerals (COMPLETE MULTIVITAMIN/MINERAL PO), Take 1 tablet by mouth Daily., Disp: , Rfl:   •  sacubitril-valsartan (Entresto) 24-26 MG tablet, Take 1 tablet by mouth 2 (Two) Times a Day., Disp: 180 tablet, Rfl: 3       Objective:      Vitals:    01/13/23 1112   BP: 130/64   Pulse: 71   SpO2: 92%     Vitals and nursing note reviewed.   Constitutional:       Appearance: Normal and healthy appearance. Well-developed and not in distress.   Eyes:      Extraocular Movements: Extraocular movements intact.      Pupils: Pupils are equal, round, and reactive to light.   HENT:      Head: Normocephalic and atraumatic.    Mouth/Throat:      Pharynx: Oropharynx is clear.   Neck:      Vascular: JVD normal.      Trachea: Trachea normal.   Pulmonary:       Effort: Pulmonary effort is normal.      Breath sounds: Normal breath sounds. No wheezing. No rhonchi. No rales.   Cardiovascular:      PMI at left midclavicular line. Normal rate. Regular rhythm. Normal S1. Normal S2.      Murmurs: There is a grade 2/6 systolic murmur.      No gallop. No rub.   Pulses:     Dorsalis pedis: 2+ bilaterally.     Posterior tibial: 2+ bilaterally.  Abdominal:      General: Bowel sounds are normal.      Palpations: Abdomen is soft.      Tenderness: There is no abdominal tenderness.   Musculoskeletal: Normal range of motion.      Cervical back: Normal range of motion and neck supple. Skin:     General: Skin is warm and dry.      Capillary Refill: Capillary refill takes less than 2 seconds.   Feet:      Right foot:      Skin integrity: Skin integrity normal.      Left foot:      Skin integrity: Skin integrity normal.   Neurological:      Mental Status: Alert and oriented to person, place and time.      Cranial Nerves: Cranial nerves are intact.      Sensory: Sensation is intact.      Motor: Motor function is intact.      Coordination: Coordination is intact.   Psychiatric:         Speech: Speech normal.         Behavior: Behavior is cooperative.         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 Congestive heart failure  - The patient will continue taking her current medications.  - EKG looks excellent. No changes needed.    Recommendations/plans:    Transcribed from ambient dictation for Cesar Urena DO by Jeri Bernal.  01/13/23   14:22 CST    Patient or patient representative verbalized consent to the visit recording.  I have personally performed the services described in this document as transcribed by the above individual, and it is both accurate and complete.

## 2023-01-20 ENCOUNTER — TELEPHONE (OUTPATIENT)
Dept: VASCULAR SURGERY | Facility: CLINIC | Age: 88
End: 2023-01-20
Payer: MEDICARE

## 2023-01-23 ENCOUNTER — OFFICE VISIT (OUTPATIENT)
Dept: VASCULAR SURGERY | Facility: CLINIC | Age: 88
End: 2023-01-23
Payer: MEDICARE

## 2023-01-23 ENCOUNTER — HOSPITAL ENCOUNTER (OUTPATIENT)
Dept: ULTRASOUND IMAGING | Facility: HOSPITAL | Age: 88
Discharge: HOME | End: 2023-01-23
Admitting: NURSE PRACTITIONER
Payer: MEDICARE

## 2023-01-23 VITALS
BODY MASS INDEX: 19.04 KG/M2 | SYSTOLIC BLOOD PRESSURE: 112 MMHG | OXYGEN SATURATION: 97 % | WEIGHT: 111 LBS | HEART RATE: 75 BPM | DIASTOLIC BLOOD PRESSURE: 72 MMHG

## 2023-01-23 DIAGNOSIS — E78.5 HYPERLIPIDEMIA, UNSPECIFIED HYPERLIPIDEMIA TYPE: ICD-10-CM

## 2023-01-23 DIAGNOSIS — I65.23 BILATERAL CAROTID ARTERY STENOSIS: ICD-10-CM

## 2023-01-23 DIAGNOSIS — I73.9 PAD (PERIPHERAL ARTERY DISEASE): ICD-10-CM

## 2023-01-23 DIAGNOSIS — I73.9 PAD (PERIPHERAL ARTERY DISEASE): Primary | ICD-10-CM

## 2023-01-23 DIAGNOSIS — I10 PRIMARY HYPERTENSION: ICD-10-CM

## 2023-01-23 PROCEDURE — 93923 UPR/LXTR ART STDY 3+ LVLS: CPT

## 2023-01-23 PROCEDURE — 99213 OFFICE O/P EST LOW 20 MIN: CPT | Performed by: NURSE PRACTITIONER

## 2023-01-23 PROCEDURE — 93923 UPR/LXTR ART STDY 3+ LVLS: CPT | Performed by: SURGERY

## 2023-01-23 NOTE — PROGRESS NOTES
1/23/2023       Delfina Pereira,   [address]  [place] [address]    Mya Chung  2/10/1928    Chief Complaint   Patient presents with   • Follow-up     1 yr f/u with ABIs.  Last seen in the office on 11/8/21.  Pt denies any changes in her legs.       Dear Delfina Pereira,        HPI  I had the pleasure of seeing your patient Mya Chung in the office today.   As you recall, Mya Chung is a 89 y.o.  female who we are following for lower extremity PAD.  She was previously describing disabling claudication and underwent a left lower extremity angiogram with left common femoral endarterectomy in August 2018.  Currently she is doing well and denies any claudication.  She is maintained on aspirin and Plavix.  She did have noninvasive testing performed today, which I did review in office.       Review of Systems   Constitutional: Negative.    HENT: Negative.    Eyes: Negative.    Respiratory: Negative.    Cardiovascular: Negative.  Negative for leg swelling.   Gastrointestinal: Negative.    Endocrine: Negative.    Genitourinary: Negative.    Musculoskeletal: Negative.    Skin: Negative.    Allergic/Immunologic: Negative.    Neurological: Negative.    Hematological: Negative.    Psychiatric/Behavioral: Negative.    All other systems reviewed and are negative.        /72   Pulse 75   Wt 50.3 kg (111 lb)   SpO2 97%   BMI 19.04 kg/m²   Physical Exam  Vitals and nursing note reviewed.   Constitutional:       General: She is not in acute distress.     Appearance: Normal appearance. She is well-developed and normal weight. She is not diaphoretic.   HENT:      Head: Normocephalic and atraumatic.   Eyes:      General: No scleral icterus.     Pupils: Pupils are equal, round, and reactive to light.   Neck:      Thyroid: No thyromegaly.      Vascular: No carotid bruit or JVD.   Cardiovascular:      Rate and Rhythm: Normal rate and regular rhythm.      Pulses: Normal pulses.       Heart sounds: Normal heart sounds and S2 normal. No murmur heard.    No friction rub. No gallop.      Comments: Varicose veins significant to the left lower extremity.  Pulmonary:      Effort: Pulmonary effort is normal.      Breath sounds: Normal breath sounds.   Abdominal:      General: Bowel sounds are normal.      Palpations: Abdomen is soft.   Musculoskeletal:         General: Normal range of motion.      Cervical back: Normal range of motion and neck supple.   Skin:     General: Skin is warm and dry.   Neurological:      General: No focal deficit present.      Mental Status: She is alert and oriented to person, place, and time.      Cranial Nerves: No cranial nerve deficit.      Motor: Weakness (To legs) present.   Psychiatric:         Mood and Affect: Mood normal.         Behavior: Behavior normal.         Thought Content: Thought content normal.         Judgment: Judgment normal.        Diagnostic data:  Noninvasive testing including ABIs show right SHEREEN of 1.08 and a left SHEREEN of 1.04.      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   • Hypertension   • Hyperlipidemia   • PAD (peripheral artery disease) ([facility])   • Right greater trochanteric avulsion periprosthetic, closed, minimally displaced   • Closed fracture of right wrist   • Fall   • Impaired gait and mobility   • Acute right hip pain   • Hypothyroidism   • Closed fracture of multiple pubic rami, right, initial encounter ([facility])   • Cholecystitis   • Nausea   • Acute on chronic diastolic congestive heart failure ([facility])   • Other chest pain   • Acute systolic heart failure (CMS/[facility])   • Chronic combined systolic and diastolic congestive heart failure ([facility])   • Closed fracture of ramus of left pubis, initial encounter ([facility])   • Stage 3a chronic kidney disease (CKD) ([facility])         ICD-10-CM ICD-9-CM   1. PAD (peripheral artery disease) ([facility])  I73.9 443.9   2. Bilateral carotid artery stenosis   I65.23 433.10     433.30   3. Primary hypertension  I10 401.9   4. Hyperlipidemia, unspecified hyperlipidemia type  E78.5 272.4         Plan: After thoroughly evaluating Mya Chung, I believe the best course of action is to remain conservative from vascular surgery standpoint.  Currently she is doing well denies any claudication to her lower extremities.  She also denies any strokelike symptoms.  I did review her testing which shows no arterial insufficiency to her lower extremities.  She does have significant varicosities worse to her left leg but is essentially asymptomatic.  We will see her back in 1 year with repeat noninvasive testing for continued surveillance, including ABIs and a carotid duplex.  I did discuss vascular risk factors as it pertains to the progression of vascular disease including controlling her hypertension and hyperlipidemia.  Her blood pressure stable on her current medications. The patient can continue taking her current medication regimen as previously planned.  This was all discussed in full with complete understanding.    Thank you for allowing me to participate in the care of your patient.  Please do not hesitate with any questions or concerns.  I will keep you aware of any further encounters with Mya Chung.        Sincerely yours,         Saumya Morales, ANGY Pereira, Delfina Whitney, DO

## 2023-01-23 NOTE — LETTER
January 23, 2023     Delfina Pereira DO  [address]  [address]  [place] [address]    Patient: Mya Chung   YOB: 1928   Date of Visit: 1/23/2023       Dear Dr. Randall DO:    Thank you for referring Mya Chung to me for evaluation. Below are the relevant portions of my assessment and plan of care.    If you have questions, please do not hesitate to call me. I look forward to following Mya along with you.         Sincerely,        ANGY Allen        CC: No Recipients  Saumya Morales APRN  01/23/23 1313  Signed  1/23/2023       Delfina Pereira DO  [address]  [place] [address]    Mya Chung  2/10/1928    Chief Complaint   Patient presents with   • Follow-up     1 yr f/u with ABIs.  Last seen in the office on 11/8/21.  Pt denies any changes in her legs.       Dear Delfina Pereira,        HPI  I had the pleasure of seeing your patient Mya Chung in the office today.   As you recall, Mya Chung is a 89 y.o.  female who we are following for lower extremity PAD.  She was previously describing disabling claudication and underwent a left lower extremity angiogram with left common femoral endarterectomy in August 2018.  Currently she is doing well and denies any claudication.  She is maintained on aspirin and Plavix.  She did have noninvasive testing performed today, which I did review in office.       Review of Systems   Constitutional: Negative.    HENT: Negative.    Eyes: Negative.    Respiratory: Negative.    Cardiovascular: Negative.  Negative for leg swelling.   Gastrointestinal: Negative.    Endocrine: Negative.    Genitourinary: Negative.    Musculoskeletal: Negative.    Skin: Negative.    Allergic/Immunologic: Negative.    Neurological: Negative.    Hematological: Negative.    Psychiatric/Behavioral: Negative.    All other systems reviewed and are negative.        /72   Pulse 75   Wt 50.3 kg (111 lb)   SpO2 97%    BMI 19.04 kg/m²   Physical Exam  Vitals and nursing note reviewed.   Constitutional:       General: She is not in acute distress.     Appearance: Normal appearance. She is well-developed and normal weight. She is not diaphoretic.   HENT:      Head: Normocephalic and atraumatic.   Eyes:      General: No scleral icterus.     Pupils: Pupils are equal, round, and reactive to light.   Neck:      Thyroid: No thyromegaly.      Vascular: No carotid bruit or JVD.   Cardiovascular:      Rate and Rhythm: Normal rate and regular rhythm.      Pulses: Normal pulses.      Heart sounds: Normal heart sounds and S2 normal. No murmur heard.    No friction rub. No gallop.      Comments: Varicose veins significant to the left lower extremity.  Pulmonary:      Effort: Pulmonary effort is normal.      Breath sounds: Normal breath sounds.   Abdominal:      General: Bowel sounds are normal.      Palpations: Abdomen is soft.   Musculoskeletal:         General: Normal range of motion.      Cervical back: Normal range of motion and neck supple.   Skin:     General: Skin is warm and dry.   Neurological:      General: No focal deficit present.      Mental Status: She is alert and oriented to person, place, and time.      Cranial Nerves: No cranial nerve deficit.      Motor: Weakness (To legs) present.   Psychiatric:         Mood and Affect: Mood normal.         Behavior: Behavior normal.         Thought Content: Thought content normal.         Judgment: Judgment normal.        Diagnostic data:  Noninvasive testing including ABIs show right SHEREEN of 1.08 and a left SHEREEN of 1.04.      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   • Hypertension   • Hyperlipidemia   • PAD (peripheral artery disease) ([facility])   • Right greater trochanteric avulsion periprosthetic, closed, minimally displaced   • Closed fracture of right wrist   • Fall   • Impaired gait and mobility   • Acute right hip  pain   • Hypothyroidism   • Closed fracture of multiple pubic rami, right, initial encounter ([facility])   • Cholecystitis   • Nausea   • Acute on chronic diastolic congestive heart failure ([facility])   • Other chest pain   • Acute systolic heart failure (CMS/[facility])   • Chronic combined systolic and diastolic congestive heart failure ([facility])   • Closed fracture of ramus of left pubis, initial encounter ([facility])   • Stage 3a chronic kidney disease (CKD) ([facility])         ICD-10-CM ICD-9-CM   1. PAD (peripheral artery disease) ([facility])  I73.9 443.9   2. Bilateral carotid artery stenosis  I65.23 433.10     433.30   3. Primary hypertension  I10 401.9   4. Hyperlipidemia, unspecified hyperlipidemia type  E78.5 272.4         Plan: After thoroughly evaluating Mya Chung, I believe the best course of action is to remain conservative from vascular surgery standpoint.  Currently she is doing well denies any claudication to her lower extremities.  She also denies any strokelike symptoms.  I did review her testing which shows no arterial insufficiency to her lower extremities.  She does have significant varicosities worse to her left leg but is essentially asymptomatic.  We will see her back in 1 year with repeat noninvasive testing for continued surveillance, including ABIs and a carotid duplex.  I did discuss vascular risk factors as it pertains to the progression of vascular disease including controlling her hypertension and hyperlipidemia.  Her blood pressure stable on her current medications. The patient can continue taking her current medication regimen as previously planned.  This was all discussed in full with complete understanding.    Thank you for allowing me to participate in the care of your patient.  Please do not hesitate with any questions or concerns.  I will keep you aware of any further encounters with Mya [facility].        Sincerely yours,         Saumya Morales, ANGY Pereira, Delfina Whitney, DO

## 2023-03-22 ENCOUNTER — HOSPITAL ENCOUNTER (EMERGENCY)
Facility: HOSPITAL | Age: 88
Discharge: HOME | End: 2023-03-22
Attending: STUDENT IN AN ORGANIZED HEALTH CARE EDUCATION/TRAINING PROGRAM | Admitting: STUDENT IN AN ORGANIZED HEALTH CARE EDUCATION/TRAINING PROGRAM
Payer: MEDICARE

## 2023-03-22 ENCOUNTER — APPOINTMENT (OUTPATIENT)
Dept: CT IMAGING | Facility: HOSPITAL | Age: 88
End: 2023-03-22
Payer: MEDICARE

## 2023-03-22 VITALS
HEIGHT: 64 IN | TEMPERATURE: 97.8 F | OXYGEN SATURATION: 93 % | BODY MASS INDEX: 19.46 KG/M2 | WEIGHT: 114 LBS | DIASTOLIC BLOOD PRESSURE: 70 MMHG | RESPIRATION RATE: 20 BRPM | HEART RATE: 86 BPM | SYSTOLIC BLOOD PRESSURE: 112 MMHG

## 2023-03-22 DIAGNOSIS — R51.9 NONINTRACTABLE HEADACHE, UNSPECIFIED CHRONICITY PATTERN, UNSPECIFIED HEADACHE TYPE: Primary | ICD-10-CM

## 2023-03-22 LAB
ANION GAP SERPL CALCULATED.3IONS-SCNC: 9 MMOL/L (ref 5–15)
BASOPHILS # BLD AUTO: 0.03 10*3/MM3 (ref 0–0.2)
BASOPHILS NFR BLD AUTO: 0.3 % (ref 0–1.5)
BUN SERPL-MCNC: 26 MG/DL (ref 8–23)
BUN/CREAT SERPL: 23.2 (ref 7–25)
CALCIUM SPEC-SCNC: 9.6 MG/DL (ref 8.2–9.6)
CHLORIDE SERPL-SCNC: 103 MMOL/L (ref 98–107)
CO2 SERPL-SCNC: 27 MMOL/L (ref 22–29)
CREAT SERPL-MCNC: 1.12 MG/DL (ref 0.57–1)
DEPRECATED RDW RBC AUTO: 47.7 FL (ref 37–54)
EGFRCR SERPLBLD CKD-EPI 2021: 45.4 ML/MIN/1.73
EOSINOPHIL # BLD AUTO: 0.04 10*3/MM3 (ref 0–0.4)
EOSINOPHIL NFR BLD AUTO: 0.4 % (ref 0.3–6.2)
ERYTHROCYTE [DISTWIDTH] IN BLOOD BY AUTOMATED COUNT: 13.4 % (ref 12.3–15.4)
GLUCOSE SERPL-MCNC: 117 MG/DL (ref 65–99)
HCT VFR BLD AUTO: 42.2 % (ref 34–46.6)
HGB BLD-MCNC: 13.5 G/DL (ref 12–15.9)
IMM GRANULOCYTES # BLD AUTO: 0.04 10*3/MM3 (ref 0–0.05)
IMM GRANULOCYTES NFR BLD AUTO: 0.4 % (ref 0–0.5)
LYMPHOCYTES # BLD AUTO: 2.05 10*3/MM3 (ref 0.7–3.1)
LYMPHOCYTES NFR BLD AUTO: 21.8 % (ref 19.6–45.3)
MAGNESIUM SERPL-MCNC: 2 MG/DL (ref 1.7–2.3)
MCH RBC QN AUTO: 30.5 PG (ref 26.6–33)
MCHC RBC AUTO-ENTMCNC: 32 G/DL (ref 31.5–35.7)
MCV RBC AUTO: 95.3 FL (ref 79–97)
MONOCYTES # BLD AUTO: 0.81 10*3/MM3 (ref 0.1–0.9)
MONOCYTES NFR BLD AUTO: 8.6 % (ref 5–12)
NEUTROPHILS NFR BLD AUTO: 6.44 10*3/MM3 (ref 1.7–7)
NEUTROPHILS NFR BLD AUTO: 68.5 % (ref 42.7–76)
NRBC BLD AUTO-RTO: 0 /100 WBC (ref 0–0.2)
PLATELET # BLD AUTO: 196 10*3/MM3 (ref 140–450)
PMV BLD AUTO: 9.3 FL (ref 6–12)
POTASSIUM SERPL-SCNC: 4.3 MMOL/L (ref 3.5–5.2)
RBC # BLD AUTO: 4.43 10*6/MM3 (ref 3.77–5.28)
SODIUM SERPL-SCNC: 139 MMOL/L (ref 136–145)
TROPONIN T SERPL HS-MCNC: 16 NG/L
TROPONIN T SERPL HS-MCNC: 19 NG/L
WBC NRBC COR # BLD: 9.41 10*3/MM3 (ref 3.4–10.8)

## 2023-03-22 PROCEDURE — 84484 ASSAY OF TROPONIN QUANT: CPT | Performed by: STUDENT IN AN ORGANIZED HEALTH CARE EDUCATION/TRAINING PROGRAM

## 2023-03-22 PROCEDURE — 70450 CT HEAD/BRAIN W/O DYE: CPT

## 2023-03-22 PROCEDURE — 0 LIDOCAINE 1 % SOLUTION: Performed by: STUDENT IN AN ORGANIZED HEALTH CARE EDUCATION/TRAINING PROGRAM

## 2023-03-22 PROCEDURE — 36415 COLL VENOUS BLD VENIPUNCTURE: CPT

## 2023-03-22 PROCEDURE — 96374 THER/PROPH/DIAG INJ IV PUSH: CPT

## 2023-03-22 PROCEDURE — 25010000002 TRIAMCINOLONE PER 10 MG: Performed by: STUDENT IN AN ORGANIZED HEALTH CARE EDUCATION/TRAINING PROGRAM

## 2023-03-22 PROCEDURE — 85025 COMPLETE CBC W/AUTO DIFF WBC: CPT | Performed by: STUDENT IN AN ORGANIZED HEALTH CARE EDUCATION/TRAINING PROGRAM

## 2023-03-22 PROCEDURE — 96375 TX/PRO/DX INJ NEW DRUG ADDON: CPT

## 2023-03-22 PROCEDURE — 99284 EMERGENCY DEPT VISIT MOD MDM: CPT

## 2023-03-22 PROCEDURE — 25010000002 DIPHENHYDRAMINE PER 50 MG: Performed by: STUDENT IN AN ORGANIZED HEALTH CARE EDUCATION/TRAINING PROGRAM

## 2023-03-22 PROCEDURE — 83735 ASSAY OF MAGNESIUM: CPT | Performed by: STUDENT IN AN ORGANIZED HEALTH CARE EDUCATION/TRAINING PROGRAM

## 2023-03-22 PROCEDURE — 25010000002 PROCHLORPERAZINE 10 MG/2ML SOLUTION: Performed by: STUDENT IN AN ORGANIZED HEALTH CARE EDUCATION/TRAINING PROGRAM

## 2023-03-22 PROCEDURE — 80048 BASIC METABOLIC PNL TOTAL CA: CPT | Performed by: STUDENT IN AN ORGANIZED HEALTH CARE EDUCATION/TRAINING PROGRAM

## 2023-03-22 PROCEDURE — 96372 THER/PROPH/DIAG INJ SC/IM: CPT

## 2023-03-22 RX ORDER — DIPHENHYDRAMINE HYDROCHLORIDE 50 MG/ML
12.5 INJECTION INTRAMUSCULAR; INTRAVENOUS ONCE
Status: COMPLETED | OUTPATIENT
Start: 2023-03-22 | End: 2023-03-22

## 2023-03-22 RX ORDER — METOCLOPRAMIDE HYDROCHLORIDE 5 MG/ML
10 INJECTION INTRAMUSCULAR; INTRAVENOUS ONCE
Status: DISCONTINUED | OUTPATIENT
Start: 2023-03-22 | End: 2023-03-22

## 2023-03-22 RX ORDER — LIDOCAINE HYDROCHLORIDE 10 MG/ML
5 INJECTION, SOLUTION INFILTRATION; PERINEURAL ONCE
Status: COMPLETED | OUTPATIENT
Start: 2023-03-22 | End: 2023-03-22

## 2023-03-22 RX ORDER — TRIAMCINOLONE ACETONIDE 40 MG/ML
40 INJECTION, SUSPENSION INTRA-ARTICULAR; INTRAMUSCULAR ONCE
Status: COMPLETED | OUTPATIENT
Start: 2023-03-22 | End: 2023-03-22

## 2023-03-22 RX ORDER — ACETAMINOPHEN 500 MG
1000 TABLET ORAL ONCE
Status: COMPLETED | OUTPATIENT
Start: 2023-03-22 | End: 2023-03-22

## 2023-03-22 RX ORDER — PROCHLORPERAZINE EDISYLATE 5 MG/ML
5 INJECTION INTRAMUSCULAR; INTRAVENOUS ONCE
Status: COMPLETED | OUTPATIENT
Start: 2023-03-22 | End: 2023-03-22

## 2023-03-22 RX ADMIN — LIDOCAINE HYDROCHLORIDE 5 ML: 10 INJECTION, SOLUTION INFILTRATION; PERINEURAL at 14:27

## 2023-03-22 RX ADMIN — SODIUM CHLORIDE, POTASSIUM CHLORIDE, SODIUM LACTATE AND CALCIUM CHLORIDE 500 ML: 600; 310; 30; 20 INJECTION, SOLUTION INTRAVENOUS at 10:13

## 2023-03-22 RX ADMIN — TRIAMCINOLONE ACETONIDE 40 MG: 40 INJECTION, SUSPENSION INTRA-ARTICULAR; INTRAMUSCULAR at 14:28

## 2023-03-22 RX ADMIN — DIPHENHYDRAMINE HYDROCHLORIDE 12.5 MG: 50 INJECTION, SOLUTION INTRAMUSCULAR; INTRAVENOUS at 11:57

## 2023-03-22 RX ADMIN — ACETAMINOPHEN 1000 MG: 500 TABLET, FILM COATED ORAL at 10:16

## 2023-03-22 RX ADMIN — PROCHLORPERAZINE EDISYLATE 5 MG: 5 INJECTION INTRAMUSCULAR; INTRAVENOUS at 11:57

## 2023-03-22 NOTE — ED PROVIDER NOTES
Subjective   History of Present Illness   Patient presents due to headache.  Severe.  Started 2 days ago.  Has been constant since then; sometimes wanes down to a 7 or 8 but then waxes again to a 10.  No visual disturbance.  No nausea or vomiting.  Feels like a severe ache in the top of her head.  No numbness or weakness or ting anywhere.  No head injuries.  No fevers or chills.  Her neck does hurt when I flex it forward passively.  Does not have a history of headaches or migraines.  Does not take blood thinners.    Review of Systems   Constitutional: Negative for chills and fever.   Respiratory: Negative for cough and shortness of breath.    Cardiovascular: Negative for chest pain and palpitations.   Gastrointestinal: Negative for abdominal pain, nausea and vomiting.   Genitourinary: Negative for difficulty urinating and dysuria.   Neurological: Positive for headaches. Negative for syncope and light-headedness.       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    • Wears glasses        Allergies   Allergen Reactions   • Erythromycin Shortness Of Breath   • Zithromax [Azithromycin] Itching and Other (See Comments)     Yeast infection   • Penicillins Rash       Past Surgical History:   Procedure Laterality Date   • AORTAGRAM Right 2018    Procedure: LEFT LOWER EXTREMITY ANGIOGRAM;  Surgeon: Walker Davis DO;  Location: [facility] OR 12;  Service: Vascular   • BREAST BIOPSY Bilateral     multiple on both, all benign   • BREAST BIOPSY Left 2017    Procedure: LEFT MAMMOGRAM GUIDED NEEDLE DIRECTED EXCISIONAL BREAST BIOPSY;  Surgeon: Malgorzata Scott MD;  Location: [facility] OR;  Service:    • BUNIONECTOMY Bilateral    •  SECTION      X2   • CHOLECYSTECTOMY     • CHOLECYSTECTOMY WITH  INTRAOPERATIVE CHOLANGIOGRAM N/A 2021    Procedure: LAPAROSCOPIC CHOLECYSTECTOMY WITH CHOLANGIOGRAM;  Surgeon: Malgorzata Scott MD;  Location: [facility] OR;  Service: General;  Laterality: N/A;   • COLONOSCOPY  2015    diverticulosis   • ENDOSCOPY N/A 2021    Procedure: ESOPHAGOGASTRODUODENOSCOPY WITH ANESTHESIA;  Surgeon: Sonu Cheek DO;  Location: [facility] ENDOSCOPY;  Service: Gastroenterology;  Laterality: N/A;  pre nausea  post esophagitis  Delfina Pereira DO   • EYE SURGERY      CATARACT SURGERY   • FEMORAL ENDARTERECTOMY Left 2018    Procedure: LEFT FEMORAL ENDARTERECTOMY;  Surgeon: Walker Davis DO;  Location: [facility] HYBRID OR 12;  Service: Vascular   • JOINT REPLACEMENT      RIGHT HIP       Family History   Problem Relation Age of Onset   • No Known Problems Mother    • Heart disease Father    • Cancer Sister         Uterus   • No Known Problems Brother    • No Known Problems Maternal Aunt    • No Known Problems Paternal Aunt    • No Known Problems Maternal Grandmother    • No Known Problems Paternal Grandmother    • No Known Problems Daughter    • No Known Problems Son    • Breast cancer Neg Hx    • BRCA 1/2 Neg Hx    • Colon cancer Neg Hx    • Endometrial cancer Neg Hx    • Ovarian cancer Neg Hx    • Colon polyps Neg Hx    • Esophageal cancer Neg Hx        Social History     Socioeconomic History   • Marital status:    Tobacco Use   • Smoking status: Former     Years: 2.00     Types: Cigarettes     Quit date:      Years since quittin.2   • Smokeless tobacco: Never   Vaping Use   • Vaping Use: Never used   Substance and Sexual Activity   • Alcohol use: No   • Drug use: No   • Sexual activity: Defer           Objective   Physical Exam  Vitals reviewed.   Constitutional:       General: She is not in acute distress.  HENT:      Head: Normocephalic and atraumatic.   Eyes:      Extraocular Movements: Extraocular movements intact.      Conjunctiva/sclera:  Conjunctivae normal.   Cardiovascular:      Pulses: Normal pulses.      Heart sounds: Normal heart sounds.   Pulmonary:      Effort: Pulmonary effort is normal. No respiratory distress.   Abdominal:      General: Abdomen is flat. There is no distension.   Musculoskeletal:      Cervical back: Normal range of motion and neck supple.   Skin:     General: Skin is warm and dry.   Neurological:      General: No focal deficit present.      Mental Status: She is alert. Mental status is at baseline.      Comments: Right upper extremity: 5/5 strength with handgrip and flexion/extension of shoulders, elbows.   Light touch sensation intact and equal when compared to the left upper extremity.    Left upper extremity: 5/5 strength with handgrip and flexion/extension of shoulders, elbows.   Light touch sensation intact and equal when compared to the right upper extremity.    Right lower extremity: 5/5 strength with flexion/extension of hips, knees, and dorsi/plantarflexion of ankles. Able to wiggle toes.   Light touch sensation intact and equal when compared to the left lower extremity.    Left lower extremity: 5/5 strength with flexion/extension of hips, knees, and dorsi/plantarflexion of ankles. Able to wiggle toes.   Light touch sensation intact and equal when compared to the right lower extremity.    Light sensation intact in bilateral face. CN 2-12 normal. FNF normal.    Psychiatric:         Behavior: Behavior normal.         Thought Content: Thought content normal.         Nerve Block    Date/Time: 3/23/2023 11:49 PM  Performed by: Curt Garcia MD  Authorized by: Curt Garcia MD     Consent:     Consent obtained:  Verbal    Consent given by:  Patient    Risks discussed:  Bleeding, infection, intravenous injection, nerve damage, pain and unsuccessful block    Alternatives discussed:  Alternative treatment  Universal protocol:     Patient identity confirmed:  Verbally with patient  Indications:      Indications:  Pain relief  Location:     Body area:  Head    Head nerve:  Greater occipital    Laterality:  Bilateral  Pre-procedure details:     Skin preparation:  Alcohol  Skin anesthesia:     Skin anesthesia method:  Local infiltration    Local anesthetic:  Lidocaine 1% w/o epi  Procedure details:     Block needle gauge:  30 G    Anesthetic injected:  Lidocaine 1% w/o epi    Steroid injected:  Triamcinolone    Injection procedure:  Anatomic landmarks palpated, anatomic landmarks identified, incremental injection, introduced needle and negative aspiration for blood  Post-procedure details:     Dressing:  None    Outcome:  Pain relieved    Procedure completion:  Tolerated well, no immediate complications               ED Course  ED Course as of 03/23/23 2351   Wed Mar 22, 2023   1004 EKG shows left bundle branch block, sinus rhythm, anterior JORGE but not Sgarbossa positive.  Will obtain screening troponin.  No ACS symptoms. H/o LBB previously. [AS]   1232 Tn delta <4; given ECG changes aer old and pt has no CP, no other emergent workup indicated. [AS]      ED Course User Index  [AS] Curt Garcia MD                                           [facility]   Mya Chung is a 89 y.o. female with PMH above who presents to the Emergency Department with headache.  Present for 2 days; differential diagnoses include subarachnoid hemorrhage.  Less likely meningitis given the lack of fever, and the course without significant worsening over time.  Occipital headache is suspected because she has occipital tenderness to palpation.  No injury to suggest subdural hemorrhage.  CT head was indicated and was obtained and did not show any acute bleed.  We discussed risk and benefits of a lumbar puncture and I also offered occipital nerve block and the patient opted to get an occipital nerve block.  Because her pain significantly improved with the nerve block, she did not want to pursue further work-up with lumbar puncture.  We  discussed the risk and benefits of lumbar puncture and then noted to her that I cannot rule out subarachnoid hemorrhage or meningitis and we discussed the repercussions of caring these diagnoses.  She understood this but because her headache resolved a mild suspicion for these things is lower at this point she would like to defer lumbar puncture which does not seem unreasonable.  She understands the risk of deferring it.     Final diagnosis: headache    All questions answered. Patient/family was understanding and in agreement with today's assessment and plan. The patient was monitored during their stay in the ED and dispositioned without acute event.    Electronically signed by:  Curt Garcia MD 3/23/2023 23:51 CDT      Note: Dragon medical dictation software was used in the creation of this note.        Final diagnoses:   Nonintractable headache, unspecified chronicity pattern, unspecified headache type       ED Disposition  ED Disposition     ED Disposition   Discharge    Condition   Stable    Comment   --             Delfina Pereira,   [address] [address]  [phone]          PATIENT CONNECTION - [facility] [address]  [phone]  Schedule an appointment as soon as possible for a visit            Medication List      No changes were made to your prescriptions during this visit.          Curt Garcia MD  03/23/23 7175

## 2023-03-22 NOTE — DISCHARGE INSTRUCTIONS
A referral has been made to neurology for follow-up.    Please come back if your headache worsens, you develop vision changes, nausea vomiting or any other emergencies.  Otherwise please follow-up with a neurologist outpatient to have your headache reassessed.  A referral has been made.

## 2023-04-14 ENCOUNTER — TELEPHONE (OUTPATIENT)
Dept: NEUROLOGY | Facility: CLINIC | Age: 88
End: 2023-04-14
Payer: MEDICARE

## 2023-04-14 NOTE — TELEPHONE ENCOUNTER
Provider: JULIAN  Caller: MIKE  Relationship to Patient: DAUGHTER  Phone Number: [phone]  Reason for Call:PT DAUGHTER CALLED AND IS WANTING TO KNOW IF THERE IS ANY WAY PT CAN BE SEEN  SOONER THAN 07/27/23. DAUGHTER STATES THAT PT HAS A CONSTANT HEAD ACHE AND NO OVER THE COUNTER MEDICATION IS WORKING. PT HAD NERVE BLOCK AT HOSPITAL AND THAT AT LASTED 3 WEEKS BUT HAVE SINCE RETURNED.  DAUGHTER IS UNSURE OF WHAT TO DO.    PLEASE REVIEW AND ADVISE.  THANK YOU.

## 2023-04-20 ENCOUNTER — TRANSCRIBE ORDERS (OUTPATIENT)
Dept: ADMINISTRATIVE | Facility: HOSPITAL | Age: 88
End: 2023-04-20
Payer: MEDICARE

## 2023-04-20 DIAGNOSIS — Z78.0 MENOPAUSE: Primary | ICD-10-CM

## 2023-04-20 DIAGNOSIS — Z12.31 VISIT FOR SCREENING MAMMOGRAM: ICD-10-CM

## 2023-04-23 ENCOUNTER — HOSPITAL ENCOUNTER (EMERGENCY)
Facility: HOSPITAL | Age: 88
Discharge: HOME | End: 2023-04-23
Attending: EMERGENCY MEDICINE | Admitting: EMERGENCY MEDICINE
Payer: MEDICARE

## 2023-04-23 ENCOUNTER — APPOINTMENT (OUTPATIENT)
Dept: CT IMAGING | Facility: HOSPITAL | Age: 88
End: 2023-04-23
Payer: MEDICARE

## 2023-04-23 VITALS
OXYGEN SATURATION: 98 % | DIASTOLIC BLOOD PRESSURE: 61 MMHG | HEIGHT: 64 IN | HEART RATE: 61 BPM | RESPIRATION RATE: 18 BRPM | TEMPERATURE: 98.3 F | BODY MASS INDEX: 18.78 KG/M2 | WEIGHT: 110 LBS | SYSTOLIC BLOOD PRESSURE: 98 MMHG

## 2023-04-23 DIAGNOSIS — R51.9 NONINTRACTABLE HEADACHE, UNSPECIFIED CHRONICITY PATTERN, UNSPECIFIED HEADACHE TYPE: Primary | ICD-10-CM

## 2023-04-23 DIAGNOSIS — M50.30 DEGENERATIVE DISC DISEASE, CERVICAL: ICD-10-CM

## 2023-04-23 LAB
ANION GAP SERPL CALCULATED.3IONS-SCNC: 9 MMOL/L (ref 5–15)
BASOPHILS # BLD AUTO: 0.06 10*3/MM3 (ref 0–0.2)
BASOPHILS NFR BLD AUTO: 0.9 % (ref 0–1.5)
BUN SERPL-MCNC: 25 MG/DL (ref 8–23)
BUN/CREAT SERPL: 22.7 (ref 7–25)
CALCIUM SPEC-SCNC: 9.4 MG/DL (ref 8.2–9.6)
CHLORIDE SERPL-SCNC: 106 MMOL/L (ref 98–107)
CO2 SERPL-SCNC: 26 MMOL/L (ref 22–29)
CREAT SERPL-MCNC: 1.1 MG/DL (ref 0.57–1)
CRP SERPL-MCNC: <0.3 MG/DL (ref 0–0.5)
DEPRECATED RDW RBC AUTO: 49.6 FL (ref 37–54)
EGFRCR SERPLBLD CKD-EPI 2021: 46.4 ML/MIN/1.73
EOSINOPHIL # BLD AUTO: 0.17 10*3/MM3 (ref 0–0.4)
EOSINOPHIL NFR BLD AUTO: 2.6 % (ref 0.3–6.2)
ERYTHROCYTE [DISTWIDTH] IN BLOOD BY AUTOMATED COUNT: 13.8 % (ref 12.3–15.4)
GLUCOSE SERPL-MCNC: 98 MG/DL (ref 65–99)
HCT VFR BLD AUTO: 43.5 % (ref 34–46.6)
HGB BLD-MCNC: 13.5 G/DL (ref 12–15.9)
IMM GRANULOCYTES # BLD AUTO: 0.01 10*3/MM3 (ref 0–0.05)
IMM GRANULOCYTES NFR BLD AUTO: 0.2 % (ref 0–0.5)
LYMPHOCYTES # BLD AUTO: 1.64 10*3/MM3 (ref 0.7–3.1)
LYMPHOCYTES NFR BLD AUTO: 25 % (ref 19.6–45.3)
MCH RBC QN AUTO: 30.4 PG (ref 26.6–33)
MCHC RBC AUTO-ENTMCNC: 31 G/DL (ref 31.5–35.7)
MCV RBC AUTO: 98 FL (ref 79–97)
MONOCYTES # BLD AUTO: 0.56 10*3/MM3 (ref 0.1–0.9)
MONOCYTES NFR BLD AUTO: 8.5 % (ref 5–12)
NEUTROPHILS NFR BLD AUTO: 4.11 10*3/MM3 (ref 1.7–7)
NEUTROPHILS NFR BLD AUTO: 62.8 % (ref 42.7–76)
NRBC BLD AUTO-RTO: 0 /100 WBC (ref 0–0.2)
PLATELET # BLD AUTO: 205 10*3/MM3 (ref 140–450)
PMV BLD AUTO: 9.4 FL (ref 6–12)
POTASSIUM SERPL-SCNC: 4.3 MMOL/L (ref 3.5–5.2)
RBC # BLD AUTO: 4.44 10*6/MM3 (ref 3.77–5.28)
SODIUM SERPL-SCNC: 141 MMOL/L (ref 136–145)
WBC NRBC COR # BLD: 6.55 10*3/MM3 (ref 3.4–10.8)

## 2023-04-23 PROCEDURE — 80048 BASIC METABOLIC PNL TOTAL CA: CPT | Performed by: EMERGENCY MEDICINE

## 2023-04-23 PROCEDURE — 96375 TX/PRO/DX INJ NEW DRUG ADDON: CPT

## 2023-04-23 PROCEDURE — 96374 THER/PROPH/DIAG INJ IV PUSH: CPT

## 2023-04-23 PROCEDURE — 85025 COMPLETE CBC W/AUTO DIFF WBC: CPT | Performed by: EMERGENCY MEDICINE

## 2023-04-23 PROCEDURE — 25010000002 PROCHLORPERAZINE 10 MG/2ML SOLUTION: Performed by: EMERGENCY MEDICINE

## 2023-04-23 PROCEDURE — 86140 C-REACTIVE PROTEIN: CPT | Performed by: EMERGENCY MEDICINE

## 2023-04-23 PROCEDURE — 70450 CT HEAD/BRAIN W/O DYE: CPT

## 2023-04-23 PROCEDURE — 25010000002 DEXAMETHASONE PER 1 MG: Performed by: EMERGENCY MEDICINE

## 2023-04-23 PROCEDURE — 25010000002 KETOROLAC TROMETHAMINE PER 15 MG: Performed by: EMERGENCY MEDICINE

## 2023-04-23 PROCEDURE — 99283 EMERGENCY DEPT VISIT LOW MDM: CPT

## 2023-04-23 PROCEDURE — 72125 CT NECK SPINE W/O DYE: CPT

## 2023-04-23 PROCEDURE — 25010000002 DIPHENHYDRAMINE PER 50 MG: Performed by: EMERGENCY MEDICINE

## 2023-04-23 RX ORDER — KETOROLAC TROMETHAMINE 15 MG/ML
15 INJECTION, SOLUTION INTRAMUSCULAR; INTRAVENOUS ONCE
Status: COMPLETED | OUTPATIENT
Start: 2023-04-23 | End: 2023-04-23

## 2023-04-23 RX ORDER — PROCHLORPERAZINE EDISYLATE 5 MG/ML
5 INJECTION INTRAMUSCULAR; INTRAVENOUS ONCE
Status: COMPLETED | OUTPATIENT
Start: 2023-04-23 | End: 2023-04-23

## 2023-04-23 RX ORDER — METHYLPREDNISOLONE 4 MG/1
TABLET ORAL
Qty: 21 TABLET | Refills: 0 | Status: SHIPPED | OUTPATIENT
Start: 2023-04-23 | End: 2023-05-01

## 2023-04-23 RX ORDER — DIPHENHYDRAMINE HYDROCHLORIDE 50 MG/ML
25 INJECTION INTRAMUSCULAR; INTRAVENOUS ONCE
Status: COMPLETED | OUTPATIENT
Start: 2023-04-23 | End: 2023-04-23

## 2023-04-23 RX ORDER — DEXAMETHASONE SODIUM PHOSPHATE 10 MG/ML
10 INJECTION INTRAMUSCULAR; INTRAVENOUS ONCE
Status: COMPLETED | OUTPATIENT
Start: 2023-04-23 | End: 2023-04-23

## 2023-04-23 RX ADMIN — KETOROLAC TROMETHAMINE 15 MG: 15 INJECTION, SOLUTION INTRAMUSCULAR; INTRAVENOUS at 10:36

## 2023-04-23 RX ADMIN — DEXAMETHASONE SODIUM PHOSPHATE 10 MG: 10 INJECTION INTRAMUSCULAR; INTRAVENOUS at 10:34

## 2023-04-23 RX ADMIN — PROCHLORPERAZINE EDISYLATE 5 MG: 5 INJECTION, SOLUTION INTRAMUSCULAR; INTRAVENOUS at 10:39

## 2023-04-23 RX ADMIN — DIPHENHYDRAMINE HYDROCHLORIDE 25 MG: 50 INJECTION, SOLUTION INTRAMUSCULAR; INTRAVENOUS at 10:41

## 2023-04-23 NOTE — ED PROVIDER NOTES
Subjective   History of Present Illness  Patient with headache and neck pain she was seen recently with a CT head which is negative.  Supposed to see neurology but came back to the ED there is no history of trauma or fall complaining of headache and neck pain there is no loss of consciousness there is no shortness of breath there is no fever.  No motor vehicle accidents.    Headache  Pain location:  Generalized  Quality:  Sharp  Radiates to:  Does not radiate  Severity currently:  7/10  Severity at highest:  7/10  Onset quality:  Sudden  Timing:  Constant  Chronicity:  New  Context: not activity, not coughing, not eating, not stress, not intercourse and not loud noise    Relieved by:  Nothing  Worsened by:  Nothing  Ineffective treatments:  None tried  Associated symptoms: neck pain    Associated symptoms: no abdominal pain, no back pain, no blurred vision, no congestion, no cough, no facial pain, no fatigue, no fever, no neck stiffness, no numbness, no paresthesias, no tingling and no URI    Neck Pain  Associated symptoms: headaches    Associated symptoms: no fever and no numbness        Review of Systems   Constitutional: Negative.  Negative for fatigue and fever.   HENT: Negative for congestion.    Eyes: Negative.  Negative for blurred vision.   Respiratory: Negative.  Negative for cough.    Cardiovascular: Negative.    Gastrointestinal: Negative.  Negative for abdominal pain.   Musculoskeletal: Positive for neck pain. Negative for back pain and neck stiffness.   Skin: Negative.    Neurological: Positive for headaches. Negative for numbness and paresthesias.   All other systems reviewed and are negative.      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    • Wears glasses        Allergies   Allergen Reactions    • Erythromycin Shortness Of Breath   • Zithromax [Azithromycin] Itching and Other (See Comments)     Yeast infection   • Penicillins Rash       Past Surgical History:   Procedure Laterality Date   • AORTAGRAM Right 2018    Procedure: LEFT LOWER EXTREMITY ANGIOGRAM;  Surgeon: Walker Davis DO;  Location: [facility] OR 12;  Service: Vascular   • BREAST BIOPSY Bilateral     multiple on both, all benign   • BREAST BIOPSY Left 2017    Procedure: LEFT MAMMOGRAM GUIDED NEEDLE DIRECTED EXCISIONAL BREAST BIOPSY;  Surgeon: Malgorzata Scott MD;  Location: [facility] OR;  Service:    • BUNIONECTOMY Bilateral    •  SECTION      X2   • CHOLECYSTECTOMY     • CHOLECYSTECTOMY WITH INTRAOPERATIVE CHOLANGIOGRAM N/A 2021    Procedure: LAPAROSCOPIC CHOLECYSTECTOMY WITH CHOLANGIOGRAM;  Surgeon: Malgorzata Scott MD;  Location: [facility] OR;  Service: General;  Laterality: N/A;   • COLONOSCOPY  2015    diverticulosis   • ENDOSCOPY N/A 2021    Procedure: ESOPHAGOGASTRODUODENOSCOPY WITH ANESTHESIA;  Surgeon: Sonu Cheek DO;  Location: [facility] ENDOSCOPY;  Service: Gastroenterology;  Laterality: N/A;  pre nausea  post esophagitis  Delfina Pereira DO   • EYE SURGERY      CATARACT SURGERY   • FEMORAL ENDARTERECTOMY Left 2018    Procedure: LEFT FEMORAL ENDARTERECTOMY;  Surgeon: Walker Davis DO;  Location: [facility] OR ;  Service: Vascular   • JOINT REPLACEMENT      RIGHT HIP       Family History   Problem Relation Age of Onset   • No Known Problems Mother    • Heart disease Father    • Cancer Sister         Uterus   • No Known Problems Brother    • No Known Problems Maternal Aunt    • No Known Problems Paternal Aunt    • No Known Problems Maternal Grandmother    • No Known Problems Paternal Grandmother    • No Known Problems Daughter    • No Known Problems Son    • Breast cancer Neg Hx    • BRCA 1/2 Neg Hx    • Colon cancer Neg Hx    • Endometrial cancer Neg Hx    •  Ovarian cancer Neg Hx    • Colon polyps Neg Hx    • Esophageal cancer Neg Hx        Social History     Socioeconomic History   • Marital status:    Tobacco Use   • Smoking status: Former     Years: 2.00     Types: Cigarettes     Quit date:      Years since quittin.3   • Smokeless tobacco: Never   Vaping Use   • Vaping Use: Never used   Substance and Sexual Activity   • Alcohol use: No   • Drug use: No   • Sexual activity: Defer           Objective   Physical Exam  Vitals and nursing note reviewed. Exam conducted with a chaperone present.   Constitutional:       General: She is awake. She is not in acute distress.     Appearance: Normal appearance. She is well-developed. She is not toxic-appearing or diaphoretic.   HENT:      Head: Normocephalic and atraumatic.      Mouth/Throat:      Mouth: Mucous membranes are moist.      Pharynx: Oropharynx is clear.   Eyes:      General: Lids are normal. Lids are everted, no foreign bodies appreciated.      Pupils: Pupils are equal, round, and reactive to light.   Neck:      Thyroid: No thyromegaly.      Vascular: Normal carotid pulses. No carotid bruit or JVD.      Trachea: Trachea and phonation normal. No tracheal tenderness or tracheal deviation.      Meningeal: Brudzinski's sign and Kernig's sign absent.      Comments: No pulsatile temporal area masses.  No bruits noted.  Cardiovascular:      Rate and Rhythm: Normal rate and regular rhythm.      Pulses: Normal pulses.      Heart sounds: Normal heart sounds.   Pulmonary:      Effort: Pulmonary effort is normal. No tachypnea or respiratory distress.      Breath sounds: Normal breath sounds. No stridor.   Abdominal:      General: Abdomen is flat. Bowel sounds are normal. There is no distension.      Palpations: Abdomen is soft. There is no mass.      Tenderness: There is no abdominal tenderness. There is no guarding.   Musculoskeletal:         General: Normal range of motion.      Cervical back: Full passive  range of motion without pain, normal range of motion and neck supple. No rigidity.   Skin:     General: Skin is warm and dry.      Capillary Refill: Capillary refill takes less than 2 seconds.      Coloration: Skin is not pale.      Nails: There is no clubbing.   Neurological:      General: No focal deficit present.      Mental Status: She is alert and oriented to person, place, and time. Mental status is at baseline. She is not disoriented.      GCS: GCS eye subscore is 4. GCS verbal subscore is 5. GCS motor subscore is 6.      Cranial Nerves: Cranial nerves 2-12 are intact. No cranial nerve deficit.      Sensory: Sensation is intact. No sensory deficit.      Motor: Motor function is intact. No abnormal muscle tone.      Coordination: Coordination is intact. Coordination normal.      Deep Tendon Reflexes: Reflexes are normal and symmetric. Reflexes normal. Babinski sign absent on the right side. Babinski sign absent on the left side.      Reflex Scores:       Bicep reflexes are 2+ on the right side and 2+ on the left side.       Patellar reflexes are 2+ on the right side and 2+ on the left side.  Psychiatric:         Behavior: Behavior is cooperative.         Procedures           ED Course  ED Course as of 04/23/23 1237   Sun Apr 23, 2023   1150 No fracture, no acute osseous cervical spine abnormality.  Advanced degenerative changes, including erosive changes involving the  odontoid process with findings highly suggestive of rheumatoid  arthritis. Mild spinal stenosis is present at C5-6 and there is moderate  right-sided neuroforaminal narrowing C5-6.  This was discussed with the patient [TS]   1234 Patient work-up was negative headache is much better was discharged home with a follow the primary MD return there for any worsening symptoms. [TS]      ED Course User Index  [TS] Joseph Machado MD                                           Medical Decision Making  Differential Diagnosis:  I considered vascular etiology,  migraine, cluster headache, ischemic stroke, subarachnoid hemorrhage, intracranial bleed, vasculitis, temporal arteritis, malignant hypertension, infectious etiology, toxic-metabolic etiology, carbon monoxide exposure, hypoglycemia, neuralgia, musculoskeletal etiology, muscle tension, temporomandibular joint disease, pseudo-tumor cerebri, intracranial neoplasm and other etiology as a possible cause of headache in this patient. This is a partial list of diagnoses considered.         Degenerative disc disease, cervical: chronic illness or injury     Details: Degenerative disc disease will place on steroids discharge home there is no obvious fractures.  Nonintractable headache, unspecified chronicity pattern, unspecified headache type: acute illness or injury     Details: Patient has recurrent headaches is supposed to see neurology will place on Medrol Dosepak.  Amount and/or Complexity of Data Reviewed  Labs: ordered.     Details: Lab work was reviewed.  Radiology: ordered.      Risk  Prescription drug management.    Risk Details: This patient presented with gradual onset of headache patient arrived hemodynamically stable and neurological exam was without any focal deficits. Patient was placed on a monitor and IV access established. Presentation not consistent with other acute emergent cause of headache at this time. No red flags for subarachnoid hemorrhage and headache is not the worst headache of the patient's life. No neck stiffness or overlying skin changes. Low suspicion for acute angle-closure glaucoma at this time given lack of pupillary findings. Low suspicion for temporal arteritis as there is no bulging temporal artery, pain is not localized to temporal area, and the patient does not have any visual loss or history of vasculitis. Low suspicion for CRAO/CRVO as the patient did not have any painless visual loss. Low suspicion for ACS as the patient does not have any associated chest pain or shortness of breath  and has a nonsuspicious history of present illness. No gait disturbance no diplopia no dysarthria or dysphagia on exam. There is low suspicion for meningitis encephalitis as there is no fever no nuchal rigidity and no confusion and negative inflammatory markers and normal lab work-up.  Based on the patient's history and physical there is very low clinical suspicion for significant intracranial pathology. The headache was NOT sudden onset, NOT maximal at onset, there are NO neurologic findings, the patient does NOT have a fever, the patient does NOT have any jaw claudication, the patient does NOT endorse a clotting disorder, patient DENIES any trauma or eye pain and the headache is NOT associated with dizziness or ataxia.         Final diagnoses:   Nonintractable headache, unspecified chronicity pattern, unspecified headache type   Degenerative disc disease, cervical       ED Disposition  ED Disposition     ED Disposition   Discharge    Condition   Stable    Comment   --             Delfina Pereira, DO  [address] [id number] [phone]               Medication List      New Prescriptions    methylPREDNISolone 4 MG dose pack  Commonly known as: MEDROL  Take as directed on package instructions.           Where to Get Your Medications      These medications were sent to [facility] [address] [place] [address] [address] [facility] - [phone] [facility] [phone]   [address] [place] [address]    Phone: [phone]   · methylPREDNISolone 4 MG dose pack          Joseph Machado MD  04/23/23 1024       Joseph Machado MD  04/23/23 3051

## 2023-04-23 NOTE — ED NOTES
Patient is a 89 year old female that presents to ER with complaints of acute neck pain for the past two weeks. Patient was seen two weeks ago per Radha and reports that she was administered a nerve block. Patient is to follow up with neurology in June. Patient reports continued pain and can not handle pain for the neck two weeks

## 2023-05-01 ENCOUNTER — OFFICE VISIT (OUTPATIENT)
Dept: NEUROLOGY | Facility: CLINIC | Age: 88
End: 2023-05-01
Payer: MEDICARE

## 2023-05-01 VITALS
BODY MASS INDEX: 19.39 KG/M2 | HEIGHT: 64 IN | DIASTOLIC BLOOD PRESSURE: 64 MMHG | SYSTOLIC BLOOD PRESSURE: 128 MMHG | HEART RATE: 76 BPM | OXYGEN SATURATION: 96 % | WEIGHT: 113.6 LBS

## 2023-05-01 DIAGNOSIS — M54.81 OCCIPITAL NEURALGIA OF RIGHT SIDE: Primary | ICD-10-CM

## 2023-05-01 PROCEDURE — 1159F MED LIST DOCD IN RCRD: CPT | Performed by: PSYCHIATRY & NEUROLOGY

## 2023-05-01 PROCEDURE — 99204 OFFICE O/P NEW MOD 45 MIN: CPT | Performed by: PSYCHIATRY & NEUROLOGY

## 2023-05-01 PROCEDURE — 1160F RVW MEDS BY RX/DR IN RCRD: CPT | Performed by: PSYCHIATRY & NEUROLOGY

## 2023-05-01 RX ORDER — LEVOTHYROXINE SODIUM 150 MCG
1 TABLET ORAL DAILY
COMMUNITY
Start: 2023-04-20

## 2023-05-01 RX ORDER — AMLODIPINE BESYLATE 10 MG/1
1 TABLET ORAL DAILY
COMMUNITY
Start: 2023-04-26 | End: 2023-05-01

## 2023-05-01 NOTE — PROGRESS NOTES
"  Subjective        [facility] presents to [facility]    History of Present Illness     The patient is a 89-year-old female with hypertension, hyperlipidemia, hypothyroidism, and peripheral artery disease, who was referred for headache. She is accompanied by an adult female.    The patient has a constant headache in the right occipital area that radiates down her neck. She denies sharp, shooting, or burning pain. She describes her pain as \"just there.\" She has been experiencing this since approximately the second week of 03/2023. She has presented to the emergency room twice for her headache symptoms. She received a nerve block, which improved her pain for approximately 2.5 weeks, as well as a Medrol Dosepak, which provided only temporary relief. She has tried over-the-counter pain medication as well as patches for pain relief, none of which improved her pain. She denies illness prior to the onset of her headache.         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 Thursday, Disp: , Rfl:   •  aspirin 81 MG tablet, Take 1 tablet by mouth Daily., Disp: , Rfl:   •  Calcium Carbonate-Vitamin D (CALCIUM 600/VITAMIN D PO), Take 1 tablet by mouth Daily., Disp: , Rfl:   •  carvedilol (COREG) 3.125 MG tablet, Take 1 tablet by mouth 2 (Two) Times a Day., Disp: 180 tablet, Rfl: 3  •  clopidogrel (PLAVIX) 75 MG tablet, Take 1 tablet by mouth Daily., Disp: 30 tablet, Rfl: 5  •  colestipol (COLESTID) 1 g tablet, Take 1 tablet by mouth Every Other Day., Disp: , Rfl:   •  empagliflozin " "(Jardiance) 10 MG tablet tablet, Take 1 tablet by mouth Daily., Disp: 90 tablet, Rfl: 3  •  escitalopram (LEXAPRO) 10 MG tablet, Take 1 tablet by mouth Daily., Disp: , Rfl:   •  furosemide (LASIX) 20 MG tablet, Take 2 tablets by mouth Daily As Needed (Weight gain, shortness of breath, swelling)., Disp: 30 tablet, Rfl: 1  •  Glucosamine-Chondroitin-Vit D3 1414-2423-524 MG-MG-UNIT pack, Take 1 tablet by mouth 2 (Two) Times a Day., Disp: , Rfl:   •  Multiple Vitamins-Minerals (COMPLETE MULTIVITAMIN/MINERAL PO), Take 1 tablet by mouth Daily., Disp: , Rfl:   •  sacubitril-valsartan (Entresto) 24-26 MG tablet, Take 1 tablet by mouth 2 (Two) Times a Day., Disp: 180 tablet, Rfl: 3  •  Synthroid 150 MCG tablet, Take 1 tablet by mouth Daily., Disp: , Rfl:        Objective   Vital Signs:   /64 (BP Location: Left arm, Patient Position: Sitting, Cuff Size: Adult)   Pulse 76   Ht 162.6 cm (64\")   Wt 51.5 kg (113 lb 9.6 oz)   SpO2 96%   BMI 19.50 kg/m²     Physical Exam  Constitutional:       General: She is awake.   Eyes:      Extraocular Movements: Extraocular movements intact.      Pupils: Pupils are equal, round, and reactive to light.   Neurological:      Mental Status: She is alert.      Motor: Motor strength is normal.      Deep Tendon Reflexes: Reflexes are normal and symmetric.   Psychiatric:         Speech: Speech normal.        Neurological Exam  Mental Status  Awake and alert. Oriented to person, place and time. Recent and remote memory are intact. Speech is normal. Language is fluent with no aphasia. Attention and concentration are normal. Fund of knowledge is appropriate for level of education.    Cranial Nerves  CN II: Visual fields full to confrontation.  CN III, IV, VI: Extraocular movements intact bilaterally. Pupils equal round and reactive to light bilaterally.  CN V: Facial sensation is normal.  CN VII: Full and symmetric facial movement.  CN IX, X: Palate elevates symmetrically  CN XI: Shoulder " shrug strength is normal.  CN XII: Tongue midline without atrophy or fasciculations.    Motor  Normal muscle bulk throughout. Normal muscle tone. No abnormal involuntary movements. Strength is 5/5 throughout all four extremities.    Sensory  Light touch is normal in upper and lower extremities.   Tenderness to palpation over the right occipital nerve..    Reflexes  Deep tendon reflexes are 2+ and symmetric in all four extremities.    Coordination  Right: Finger-to-nose normal.Left: Finger-to-nose normal.    Gait  Casual gait is normal including stance, stride, and arm swing.      Result Review :                     Assessment and Plan     Assessment:    - A 89-year-old female with occipital neuralgia. She has exquisite tenderness of the right occipital nerve. She did have some improvement with occipital nerve block previously, so we will get this repeated today. We discussed adding a medication like gabapentin and she would like to hold off for now. She will call me if this changes.    Plan:  1. Occipital nerve block today.  2. Follow-up with [place] in 2 months.      Follow Up   No follow-ups on file.  Patient was given instructions and counseling regarding her condition or for health maintenance advice. Please see specific information pulled into the AVS if appropriate.     Transcribed from ambient dictation for Danna Chicas MD by Ricardo Flores, Quality .    Patient verbalized consent to the visit recording.  I have personally performed the services described in this document as transcribed by the above individual, and it is both accurate and complete. Danna Chicas MD  5/1/2023

## 2023-05-02 ENCOUNTER — TELEPHONE (OUTPATIENT)
Dept: NEUROLOGY | Facility: CLINIC | Age: 88
End: 2023-05-02
Payer: MEDICARE

## 2023-05-02 DIAGNOSIS — M54.81 OCCIPITAL NEURALGIA OF RIGHT SIDE: Primary | ICD-10-CM

## 2023-05-02 RX ORDER — GABAPENTIN 100 MG/1
100 CAPSULE ORAL 2 TIMES DAILY
Qty: 60 CAPSULE | Refills: 2 | Status: SHIPPED | OUTPATIENT
Start: 2023-05-02 | End: 2023-07-31

## 2023-05-02 NOTE — TELEPHONE ENCOUNTER
Provider: DR NEGRO  Caller: MIKE  Relationship to Patient: DAUGHTER  Pharmacy: [facility]  Phone Number: [phone]  Reason for Call: PATIENT'S DAUGHTER STATES THE NERVE BLOCK HAS NOT HELPED PATIENT'S PAIN AND SHE WOULD LIKE TO KNOW NEXT STEPS AND SEE IF THE MEDICATION THAT WAS BROUGHT UP AT VISIT SHOULD BE CALLED IN. PLEASE ADVISE, THANK YOU.

## 2023-05-02 NOTE — TELEPHONE ENCOUNTER
Spoke with the patient's daughter and advised of starting gabapentin. Daughter verbalizes understanding.    No

## 2023-06-30 PROBLEM — I60.9 SUBARACHNOID HEMORRHAGE: Status: ACTIVE | Noted: 2023-06-30

## 2023-07-11 PROBLEM — Z78.9 NON-SMOKER: Status: ACTIVE | Noted: 2023-07-11

## 2023-07-11 PROBLEM — Z87.891 FORMER SMOKER: Status: ACTIVE | Noted: 2023-07-11

## 2023-07-24 ENCOUNTER — OFFICE VISIT (OUTPATIENT)
Dept: NEUROLOGY | Facility: CLINIC | Age: 88
End: 2023-07-24
Payer: MEDICARE

## 2023-07-24 ENCOUNTER — OFFICE VISIT (OUTPATIENT)
Dept: CARDIOLOGY | Facility: CLINIC | Age: 88
End: 2023-07-24
Payer: MEDICARE

## 2023-07-24 VITALS
BODY MASS INDEX: 19.29 KG/M2 | WEIGHT: 113 LBS | DIASTOLIC BLOOD PRESSURE: 58 MMHG | SYSTOLIC BLOOD PRESSURE: 128 MMHG | HEIGHT: 64 IN | HEART RATE: 74 BPM | OXYGEN SATURATION: 97 %

## 2023-07-24 VITALS
RESPIRATION RATE: 18 BRPM | WEIGHT: 114 LBS | HEIGHT: 64 IN | SYSTOLIC BLOOD PRESSURE: 124 MMHG | DIASTOLIC BLOOD PRESSURE: 80 MMHG | HEART RATE: 72 BPM | BODY MASS INDEX: 19.46 KG/M2

## 2023-07-24 DIAGNOSIS — I73.9 PAD (PERIPHERAL ARTERY DISEASE): ICD-10-CM

## 2023-07-24 DIAGNOSIS — I10 PRIMARY HYPERTENSION: ICD-10-CM

## 2023-07-24 DIAGNOSIS — M54.81 OCCIPITAL NEURALGIA OF RIGHT SIDE: Primary | ICD-10-CM

## 2023-07-24 DIAGNOSIS — E78.5 HYPERLIPIDEMIA, UNSPECIFIED HYPERLIPIDEMIA TYPE: ICD-10-CM

## 2023-07-24 DIAGNOSIS — Z87.891 FORMER SMOKER: ICD-10-CM

## 2023-07-24 DIAGNOSIS — Z86.79 HISTORY OF SUBARACHNOID HEMORRHAGE: ICD-10-CM

## 2023-07-24 DIAGNOSIS — Z87.820 HISTORY OF TRAUMATIC BRAIN INJURY: ICD-10-CM

## 2023-07-24 DIAGNOSIS — I50.42 CHRONIC COMBINED SYSTOLIC AND DIASTOLIC CONGESTIVE HEART FAILURE: Primary | ICD-10-CM

## 2023-07-24 DIAGNOSIS — N18.31 STAGE 3A CHRONIC KIDNEY DISEASE (CKD): ICD-10-CM

## 2023-07-24 PROCEDURE — 1159F MED LIST DOCD IN RCRD: CPT

## 2023-07-24 PROCEDURE — 1160F RVW MEDS BY RX/DR IN RCRD: CPT

## 2023-07-24 PROCEDURE — 99214 OFFICE O/P EST MOD 30 MIN: CPT | Performed by: EMERGENCY MEDICINE

## 2023-07-24 PROCEDURE — 99214 OFFICE O/P EST MOD 30 MIN: CPT

## 2023-07-24 RX ORDER — CARVEDILOL 3.12 MG/1
3.12 TABLET ORAL 2 TIMES DAILY
Qty: 180 TABLET | Refills: 3 | Status: SHIPPED | OUTPATIENT
Start: 2023-07-24

## 2023-07-24 RX ORDER — ASPIRIN 81 MG/1
81 TABLET ORAL DAILY
COMMUNITY

## 2023-07-24 RX ORDER — SACUBITRIL AND VALSARTAN 24; 26 MG/1; MG/1
1 TABLET, FILM COATED ORAL 2 TIMES DAILY
Qty: 180 TABLET | Refills: 3 | Status: SHIPPED | OUTPATIENT
Start: 2023-07-24

## 2023-07-24 RX ORDER — CLOPIDOGREL BISULFATE 75 MG/1
75 TABLET ORAL DAILY
COMMUNITY
End: 2023-07-24 | Stop reason: SDUPTHER

## 2023-07-24 RX ORDER — CLOPIDOGREL BISULFATE 75 MG/1
75 TABLET ORAL DAILY
Qty: 90 TABLET | Refills: 3 | Status: SHIPPED | OUTPATIENT
Start: 2023-07-24

## 2023-07-24 NOTE — PROGRESS NOTES
Subjective:     Encounter Date:07/24/2023      Patient ID: Mya Chung is a 89 y.o. female.    Chief Complaint:  History of Present Illness    The patient is a 89-year-old female who presents today for a follow-up. She is accompanied by an adult female.    She reports he was seen in the hospital 3 weeks ago. The adult female confirms she was seen in the hospital from a fall in which the patient suffered a concussion and had a mild brain bleed. She adds the patient continues to have memory issues and has been having UTIs. She notes the patient has completed her prescription for antibiotics and they are waiting for the results of her urinalysis.    The adult female maintains the patient's heart has been functioning well. She adds on the day of the fall it was 108 degrees outside and they are not sure if she passed out from the heat. She notes the patient was unconscious for 2 minutes. The patient denies any chest pain or shortness of breath and adds she has not had any dizziness or lightheadedness. She confirms she has experienced palpitations and the adult female her balance has been unsteady.    The adult female notes the patient has done well on her current medication regimen and currently requires refills for all her medications. She adds the patient was taking gabapentin for headaches and notes it was effective. She reports the patient is currently residing with her after the fall.          Review of Systems   Constitutional: Negative for diaphoresis, fever and malaise/fatigue.   HENT:  Negative for congestion.    Eyes:  Negative for vision loss in left eye and vision loss in right eye.   Cardiovascular:  Positive for palpitations. Negative for chest pain, claudication, dyspnea on exertion, irregular heartbeat, leg swelling, orthopnea and syncope.   Respiratory:  Negative for cough, shortness of breath and wheezing.    Hematologic/Lymphatic: Negative for adenopathy.   Skin:  Negative for rash.    Musculoskeletal:  Negative for joint pain and joint swelling.   Gastrointestinal:  Negative for abdominal pain, diarrhea, nausea and vomiting.   Neurological:  Positive for headaches and loss of balance. Negative for excessive daytime sleepiness, dizziness, focal weakness, light-headedness, numbness and weakness.   Psychiatric/Behavioral:  Positive for memory loss. Negative for depression. The patient does not have insomnia.          Current Outpatient Medications:     alendronate (FOSAMAX) 70 MG tablet, Take 1 tablet by mouth Every 7 (Seven) Days. Thursday, Disp: , Rfl:     aspirin 81 MG EC tablet, Take 1 tablet by mouth Daily., Disp: , Rfl:     Calcium Carbonate-Vitamin D (CALCIUM 600/VITAMIN D PO), Take 1 tablet by mouth Daily., Disp: , Rfl:     carvedilol (COREG) 3.125 MG tablet, Take 1 tablet by mouth 2 (Two) Times a Day., Disp: 180 tablet, Rfl: 3    clopidogrel (PLAVIX) 75 MG tablet, Take 1 tablet by mouth Daily., Disp: 90 tablet, Rfl: 3    colestipol (COLESTID) 1 g tablet, Take 1 tablet by mouth Every Other Day., Disp: , Rfl:     empagliflozin (Jardiance) 10 MG tablet tablet, Take 1 tablet by mouth Daily., Disp: 90 tablet, Rfl: 3    escitalopram (LEXAPRO) 10 MG tablet, Take 1 tablet by mouth Daily., Disp: , Rfl:     gabapentin (Neurontin) 100 MG capsule, Take 1 capsule by mouth 2 (Two) Times a Day., Disp: 180 capsule, Rfl: 1    Glucosamine-Chondroitin-Vit D3 9133-7098-966 MG-MG-UNIT pack, Take 1 tablet by mouth 2 (Two) Times a Day., Disp: , Rfl:     Multiple Vitamins-Minerals (COMPLETE MULTIVITAMIN/MINERAL PO), Take 1 tablet by mouth Daily., Disp: , Rfl:     sacubitril-valsartan (Entresto) 24-26 MG tablet, Take 1 tablet by mouth 2 (Two) Times a Day., Disp: 180 tablet, Rfl: 3    Synthroid 150 MCG tablet, Take 1 tablet by mouth Daily., Disp: , Rfl:        Objective:      Vitals:    07/24/23 1104   BP: 128/58   Pulse: 74   SpO2: 97%     Vitals and nursing note reviewed.   Constitutional:       Appearance:  Normal and healthy appearance. Well-developed and not in distress.   Eyes:      Extraocular Movements: Extraocular movements intact.      Pupils: Pupils are equal, round, and reactive to light.   HENT:      Head: Normocephalic and atraumatic.    Mouth/Throat:      Pharynx: Oropharynx is clear.   Neck:      Vascular: JVD normal.      Trachea: Trachea normal.   Pulmonary:      Effort: Pulmonary effort is normal.      Breath sounds: Normal breath sounds. No wheezing. No rhonchi. No rales.   Cardiovascular:      PMI at left midclavicular line. Normal rate. Regular rhythm. Normal S1. Normal S2.       Murmurs: There is no murmur. There is a grade 2/6 murmur.      No gallop.  No click. No rub.   Pulses:     Intact distal pulses.      Dorsalis pedis: 2+ bilaterally.     Posterior tibial: 2+ bilaterally.  Edema:     Peripheral edema absent.   Abdominal:      General: Bowel sounds are normal.      Palpations: Abdomen is soft.      Tenderness: There is no abdominal tenderness.   Musculoskeletal: Normal range of motion.      Cervical back: Normal range of motion and neck supple. Skin:     General: Skin is warm and dry.      Capillary Refill: Capillary refill takes less than 2 seconds.   Feet:      Right foot:      Skin integrity: Skin integrity normal.      Left foot:      Skin integrity: Skin integrity normal.   Neurological:      Mental Status: Alert and oriented to person, place and time.      Cranial Nerves: Cranial nerves are intact.      Sensory: Sensation is intact.      Motor: Motor function is intact.      Coordination: Coordination is intact.   Psychiatric:         Speech: Speech normal.         Behavior: Behavior is cooperative.       Lab Review:       Procedures      Assessment/Plan:     Problem List Items Addressed This Visit          Cardiac and Vasculature    Hypertension    Relevant Medications    carvedilol (COREG) 3.125 MG tablet    Hyperlipidemia    PAD (peripheral artery disease)    Overview     Added  automatically from request for surgery [id number]         Chronic combined systolic and diastolic congestive heart failure - Primary    Relevant Medications    sacubitril-valsartan (Entresto) 24-26 MG tablet    carvedilol (COREG) 3.125 MG tablet    clopidogrel (PLAVIX) 75 MG tablet       Endocrine and Metabolic    Body mass index (BMI) of 19.9 or less in adult       Tobacco    Former smoker       Other    Stage 3a chronic kidney disease (CKD)     Cardiac health maintenance  - The patient presents today for 6-month follow-up for congestive heart failure. The patient was recently seen in the hospital after suffering a concussion and brain bleed from a fall. She has been having memory loss and UTIs. Her lab results were not available for review.    She is currently prescribed aspirin, Plavix, Coreg, Jardiance, and Entresto. The patient's prescriptions were refilled today.    Recommendations/plans:    Transcribed from ambient dictation for Cesar Urena DO by Micah Diana.  07/24/23   13:49 CDT    Patient or patient representative verbalized consent to the visit recording.  I have personally performed the services described in this document as transcribed by the above individual, and it is both accurate and complete.  Cesar Urena DO  7/26/2023  12:43 CDT

## 2023-07-24 NOTE — PROGRESS NOTES
Neurology Consult Note  Primary Care Provider:   Delfina Pereira DO      Reason for Consult:  Occipital Neuralgia     Subjective        Mya STONE Juliet presents to [facility]    History of Present Illness  89-year-old female is seen for follow-up of occipital neuralgia.  Patient last seen in the clinic on 5/1/2023.  In review of that record reveals patient complained of a constant headache in the right occipital area that radiates down her neck.  This started around the second week of March 2023.  She did have an occipital nerve block which did help her symptoms for couple weeks.  She tried over-the-counter medications with no relief.  She received another nerve block on 5/1/2023.  This did not provide any relief.  She was started on gabapentin 100 mg, 1 pill at bedtime x3 days then patient was allowed to increase to 2 times daily.    Additional review of her record does show that patient had a fall sustained on 6/30/2023 which did result in a small acute subarachnoid hemorrhage with a right frontal lobe and left sylvian fissure.  Additionally there are 2 tiny foci of intracranial hemorrhage in the right frontal lobe.  She has seen neurosurgery.  They stopped her aspirin and Plavix.  She was discharged the following day.  Her aspirin and Plavix was restarted on 7/11/2023.  Her CT on that day was negative.  Neurosurgery had patient follow-up as needed.    Today patient presents with her daughter.  She reports her occipital neuralgia has improved.  She no longer has any pain.  She is tolerating gabapentin 100 mg twice daily well.  Her daughter does report she is having issues with memory, more short-term.  She is also having some visual hallucinations.  Patient has been living with her daughter recently.  Additionally patient has had a UTI for the last few weeks.  She has finished 2 rounds of antibiotics and is waiting for culture results.  No further concerns.    Patient  Active Problem List   Diagnosis    Left upper arm pain    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    Hypertension    Hyperlipidemia    PAD (peripheral artery disease)    Right greater trochanteric avulsion periprosthetic, closed, minimally displaced    Closed fracture of right wrist    Fall    Impaired gait and mobility    Acute right hip pain    Hypothyroidism    Closed fracture of multiple pubic rami, right, initial encounter    Cholecystitis    Nausea    Acute on chronic diastolic congestive heart failure    Other chest pain    Acute systolic heart failure    Chronic combined systolic and diastolic congestive heart failure    Closed fracture of ramus of left pubis, initial encounter    Stage 3a chronic kidney disease (CKD)    Subarachnoid hemorrhage    Body mass index (BMI) of 19.9 or less in adult    Former smoker        Past Medical History:   Diagnosis Date    Acid reflux     Arthritis     Coronary artery disease     Hyperlipidemia     Hypertension     Hypothyroidism     Leg pain     left leg    Lung nodule     Memory loss     Osteopenia     Shingles     Skin cancer     Subarachnoid hemorrhage 2023    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     Wears glasses         Social History     Socioeconomic History    Marital status:    Tobacco Use    Smoking status: Former     Years: 2.00     Types: Cigarettes     Quit date: 1955     Years since quittin.6     Passive exposure: Never    Smokeless tobacco: Never   Vaping Use    Vaping Use: Never used   Substance and Sexual Activity    Alcohol use: No    Drug use: No    Sexual activity: Defer        Allergies   Allergen Reactions    Erythromycin Shortness Of Breath    Zithromax [Azithromycin] Itching and Other (See Comments)     Yeast infection    Penicillins Rash          Current Outpatient Medications:     alendronate (FOSAMAX) 70 MG tablet, Take 1 tablet by mouth Every 7 (Seven) Days. Thursday, Disp: , Rfl:     aspirin 81 MG EC tablet,  "Take 1 tablet by mouth Daily., Disp: , Rfl:     Calcium Carbonate-Vitamin D (CALCIUM 600/VITAMIN D PO), Take 1 tablet by mouth Daily., Disp: , Rfl:     carvedilol (COREG) 3.125 MG tablet, Take 1 tablet by mouth 2 (Two) Times a Day., Disp: 180 tablet, Rfl: 3    clopidogrel (PLAVIX) 75 MG tablet, Take 1 tablet by mouth Daily., Disp: 90 tablet, Rfl: 3    colestipol (COLESTID) 1 g tablet, Take 1 tablet by mouth Every Other Day., Disp: , Rfl:     empagliflozin (Jardiance) 10 MG tablet tablet, Take 1 tablet by mouth Daily., Disp: 90 tablet, Rfl: 3    escitalopram (LEXAPRO) 10 MG tablet, Take 1 tablet by mouth Daily., Disp: , Rfl:     gabapentin (Neurontin) 100 MG capsule, Take 1 capsule by mouth 2 (Two) Times a Day., Disp: 180 capsule, Rfl: 1    Glucosamine-Chondroitin-Vit D3 2966-8169-160 MG-MG-UNIT pack, Take 1 tablet by mouth 2 (Two) Times a Day., Disp: , Rfl:     Multiple Vitamins-Minerals (COMPLETE MULTIVITAMIN/MINERAL PO), Take 1 tablet by mouth Daily., Disp: , Rfl:     sacubitril-valsartan (Entresto) 24-26 MG tablet, Take 1 tablet by mouth 2 (Two) Times a Day., Disp: 180 tablet, Rfl: 3    Synthroid 150 MCG tablet, Take 1 tablet by mouth Daily., Disp: , Rfl:        Objective   Vital Signs:   /80 (BP Location: Left arm, Patient Position: Sitting)   Pulse 72   Resp 18   Ht 162.6 cm (64\")   Wt 51.7 kg (114 lb)   BMI 19.57 kg/m²       Physical Exam  Constitutional:       General: She is not in acute distress.     Appearance: Normal appearance. She is not ill-appearing.   HENT:      Mouth/Throat:      Mouth: Mucous membranes are moist.   Eyes:      General: Lids are normal.      Extraocular Movements: Extraocular movements intact.      Pupils: Pupils are equal, round, and reactive to light.   Cardiovascular:      Rate and Rhythm: Normal rate.   Pulmonary:      Effort: Pulmonary effort is normal. No respiratory distress.   Skin:     General: Skin is warm and dry.   Neurological:      Mental Status: She is " alert.      Motor: Motor strength is normal.   Psychiatric:         Speech: Speech normal.      Neurological Exam  Mental Status  Alert. Oriented to person, place, time and situation. Recalls 2 of 3 objects immediately. Immediate recall: With hints. Speech is normal. Language is fluent with no aphasia. Attention and concentration are normal. MMSE score: 29.    Cranial Nerves  CN II: Visual acuity is normal. Visual fields full to confrontation.  CN III, IV, VI: Extraocular movements intact bilaterally. Normal lids and orbits bilaterally. Pupils equal round and reactive to light bilaterally.  CN V: Facial sensation is normal.  CN VII: Full and symmetric facial movement.  CN IX, X: Palate elevates symmetrically. Normal gag reflex.  CN XI: Shoulder shrug strength is normal.  CN XII: Tongue midline without atrophy or fasciculations.    Motor  Normal muscle bulk throughout. No fasciculations present. Normal muscle tone. No abnormal involuntary movements. Strength is 5/5 throughout all four extremities.    Gait  Casual gait: Antalgic gait.    Result Review :   The following data was reviewed by: ANGY Felder on 07/24/2023:     .    CT Head Without Contrast (06/30/2023 16:46)   IMPRESSION:  1. Small amount of acute subarachnoid hemorrhage within the right  frontal lobe as well as the left sylvian fissure. There appear to be 2  tiny foci of intracranial hemorrhage in the right frontal lobe, each  measuring less than 5 mm, seen on axial images 14 and 15. No subdural,  epidural or intraventricular hemorrhage. Mild scalp soft tissue swelling  in the right supraorbital and frontal scalp. No skull fracture.  REPORT called to the emergency department at the time of dictation.  This report was finalized on 06/30/2023 16:55 by Dr. Barrett Arreguin MD.        MRI performed on 6/30/2023 reviewed by me and interpreted as small acute subarachnoid hemorrhage right frontal lobe and left sylvian fissure.  Additional 2 areas of  intracranial hemorrhage in the right frontal lobe.    CT Cervical Spine Without Contrast (06/30/2023 16:46)  IMPRESSION:  1. No evidence of acute osseous injury or traumatic malalignment in the  cervical spine.  2. Multilevel advanced cervical spine osteoarthritis change.  3. Odontoid process erosions and calcified soft tissue pannus around the  median atlantoaxial joint seems most likely to reflect CPPD.  This report was finalized on 06/30/2023 16:56 by Dr Ryder Martinez.    CT Head Without Contrast (07/01/2023 06:18)  IMPRESSION:  Stable exam. No change in small volume subarachnoid hemorrhage in the  RIGHT anterior frontal sulci and LEFT sylvian fissure. No change in  small foci of RIGHT frontal lobe intraparenchymal hemorrhage. No change  in suspected trace intraventricular hemorrhage layering in the RIGHT  lateral ventricle. Ventricular caliber is stable.  This report was finalized on 07/01/2023 07:39 by Dr. Navid Canales MD.    CT Head Without Contrast (07/11/2023 10:35)  IMPRESSION:  1. No acute intracranial abnormality is seen.  This report was finalized on 07/11/2023 10:40 by Dr. Kory Stubbs MD.    CT of head reviewed interpreted by me as showing full resolution of prior subarachnoid intraparenchymal hemorrhages.  Mild to moderate atrophy.  Small vessel disease.    Discharge Summary by Bronson Emerson MD (07/01/2023 16:21)   H&P by Bronson Emerson MD (06/30/2023 18:00)   Progress Notes by Roosevelt Naik APRN (07/11/2023 09:45)                  Impression:  Mya Chung is a 89 y.o. female who presents for follow-up of her occipital neuralgia.  This is well controlled and has responded to gabapentin.  We will maintain this.  Additionally patient suffered a recent fall with traumatic brain injury and recent UTI.  Her follow-up CT showed resolution of the subarachnoid and intraparenchymal hemorrhages.  I do believe her confusion and hallucinations are a result of her brain injury exacerbated by  urinary tract infection.  I did explain this to the patient and her daughter.  I did offer to do an EEG for patient due to when she fell her daughter noticed her lip was quivering and patient was unresponsive.  They declined this currently.  This is acceptable.  We will reassess at next visit.  Additionally I like to reassess her memory deficits once her metabolic disturbances are corrected.    Diagnoses and all orders for this visit:    1. Occipital neuralgia of right side (Primary)    2. History of subarachnoid hemorrhage    3. History of traumatic brain injury        Plan:  Continue gabapentin 100mg BID  Fall precautions  Patient to continue staying with her daughter  Defer management of UTI to PCP  Daughter to obtain urinalysis results from PCPs office.  Follow-up with me in 3 months or sooner if needed.    The patient and I have discussed the plan of care and she is in full agreement at this time.     Follow Up   Return in about 3 months (around 10/24/2023) for memory.            ANGY Felder  07/24/23  14:04 CDT

## 2023-08-15 ENCOUNTER — TELEPHONE (OUTPATIENT)
Dept: NEUROLOGY | Facility: CLINIC | Age: 88
End: 2023-08-15
Payer: MEDICARE

## 2023-08-16 ENCOUNTER — LAB (OUTPATIENT)
Dept: LAB | Facility: HOSPITAL | Age: 88
End: 2023-08-16
Payer: MEDICARE

## 2023-08-16 ENCOUNTER — OFFICE VISIT (OUTPATIENT)
Dept: NEUROLOGY | Facility: CLINIC | Age: 88
End: 2023-08-16
Payer: MEDICARE

## 2023-08-16 VITALS
SYSTOLIC BLOOD PRESSURE: 128 MMHG | RESPIRATION RATE: 18 BRPM | HEIGHT: 64 IN | DIASTOLIC BLOOD PRESSURE: 72 MMHG | WEIGHT: 111 LBS | HEART RATE: 70 BPM | BODY MASS INDEX: 18.95 KG/M2

## 2023-08-16 DIAGNOSIS — R41.3 MEMORY IMPAIRMENT: Primary | ICD-10-CM

## 2023-08-16 DIAGNOSIS — R41.3 MEMORY IMPAIRMENT: ICD-10-CM

## 2023-08-16 DIAGNOSIS — Z86.79 HISTORY OF SUBARACHNOID HEMORRHAGE: ICD-10-CM

## 2023-08-16 PROCEDURE — 84443 ASSAY THYROID STIM HORMONE: CPT

## 2023-08-16 PROCEDURE — 82607 VITAMIN B-12: CPT

## 2023-08-16 PROCEDURE — 36415 COLL VENOUS BLD VENIPUNCTURE: CPT

## 2023-08-16 RX ORDER — DONEPEZIL HYDROCHLORIDE 5 MG/1
5 TABLET, FILM COATED ORAL NIGHTLY
Qty: 30 TABLET | Refills: 2 | Status: SHIPPED | OUTPATIENT
Start: 2023-08-16 | End: 2024-08-15

## 2023-08-16 NOTE — PROGRESS NOTES
Neurology Consult Note    [facility]  [address], [address]  [place] [address]  Phone: [phone]  Fax: [phone]    Referring Provider:   No ref. provider found  Primary Care Provider:  Delfina Pereira DO    Reason for Consult:  Worsening Memory Impairment     Subjective        Mya Chung presents to [facility]    History of Present Illness  89-year-old female who I last saw on 7/24/2023 for occipital neuralgia, history of subarachnoid hemorrhage and history of traumatic brain injury.  Patient did see our office initially on 5/1/2023 for occipital neuralgia.  She was seen by Dr. Juan Francisco manley.  She received an occipital nerve block.  This did not help.  She was ultimately started on gabapentin 100 mg twice daily on 5/2/2023.    During my visit on 7/24/2023 patient reported that gabapentin had relieved her symptoms.  It was noted that on 6/30/2023 patient did have a fall which resulted in a small subarachnoid hemorrhage and 2 tiny foci of intracranial hemorrhage on the right frontal lobe.  She was seen by neurosurgery who stopped her aspirin and Plavix.  She was discharged the following day.  Aspirin Plavix were restarted on 7/11/2023.  Her CT showed full resolution of her symptoms.  That day her daughter reported patient was having issues with memory more short-term memory.  She is also having some visual hallucinations.  It was noted patient had been recovering from 2 different UTIs over the last few weeks.  She is on 2 rounds of antibiotics and is waiting for culture results.  I felt during that visit that her symptoms were exacerbated by her traumatic brain injury and urinary tract infection.  Advised daughter I want to wait until patient recovered from her metabolic and infective disturbances.  They were agreeable to this.  Of note her MMSE this day was 29 out of 30.    On 8/8/2023 I received a message from the patient's daughter.  Advise she  patient is requesting novalog be sent to [facility] received the results from her second UTI culture which was negative.  She report her memory confusion issues are not better and could even be a little worse.  She reported today she thought that her daughter was her sister and cannot member my name.  Reports multiple episodes of confusion throughout the day.  Additionally patient had no concept of where she is, what date we could use, which she did a few hours ago and constantly misplacing items.  She also report time she does fairly well such as folding laundry, chopping vegetables, mixed tablet, takes a shower etc.  I did advise daughter that we would see the patient again in office for reevaluation.    Today patient presents with her daughter.  She does report memory issues may be getting a little worse.  Additionally she has had repeat urinalysis with culture that did not show any urinary tract infections.  Patient reports she feels she is doing fairly well.  She does report some issues with short-term memory.  Patient's daughter reports that her mother does have issues short-term memory.  She will frequently misplace objects.  Patient will confuse her daughter as her sister.  Patient also forget her daughter's name.  Patient has been living with her daughter for the last 6 weeks.  Patient's daughter does report patient will go back to her own home during the day from like 10-5.  During that timeframe she reports patient does really well as far as her memory.  She reports her memory gets worse when she comes to stay with her at her daughter's house.  Patient does now have a life alert bracelet on.  Patient does cook but has not left the stove on accidentally or burn food.  Patient does pay her bills however her son helps her.  This is not due to missing any bills.  Her son collects her mail and then helps her with paying bills.  I did do specifically to her daughter if she had any concerns for safety.  Her daughter said no.  They report patient had 1 fall.   Patient was trying to pick something up and had a fall down to her knees.  She did not hit her head and had no injury resulting from this.    Patient Active Problem List   Diagnosis    Left upper arm pain    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    Hypertension    Hyperlipidemia    PAD (peripheral artery disease)    Right greater trochanteric avulsion periprosthetic, closed, minimally displaced    Closed fracture of right wrist    Fall    Impaired gait and mobility    Acute right hip pain    Hypothyroidism    Closed fracture of multiple pubic rami, right, initial encounter    Cholecystitis    Nausea    Acute on chronic diastolic congestive heart failure    Other chest pain    Acute systolic heart failure    Chronic combined systolic and diastolic congestive heart failure    Closed fracture of ramus of left pubis, initial encounter    Stage 3a chronic kidney disease (CKD)    Subarachnoid hemorrhage    Body mass index (BMI) of 19.9 or less in adult    Former smoker        Past Medical History:   Diagnosis Date    Acid reflux     Arthritis     Coronary artery disease     Hyperlipidemia     Hypertension     Hypothyroidism     Leg pain     left leg    Lung nodule     Memory loss     Osteopenia     Shingles     Skin cancer     Subarachnoid hemorrhage 2023    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     Wears glasses         Social History     Socioeconomic History    Marital status:    Tobacco Use    Smoking status: Former     Years: 2.00     Types: Cigarettes     Quit date: 1955     Years since quittin.6     Passive exposure: Never    Smokeless tobacco: Never   Vaping Use    Vaping Use: Never used   Substance and Sexual Activity    Alcohol use: No    Drug use: No    Sexual activity: Defer        Allergies   Allergen Reactions    Erythromycin Shortness Of Breath    Zithromax [Azithromycin] Itching and Other (See Comments)     Yeast infection    Penicillins Rash          Current Outpatient  "Medications:     alendronate (FOSAMAX) 70 MG tablet, Take 1 tablet by mouth Every 7 (Seven) Days. Thursday, Disp: , Rfl:     aspirin 81 MG EC tablet, Take 1 tablet by mouth Daily., Disp: , Rfl:     Calcium Carbonate-Vitamin D (CALCIUM 600/VITAMIN D PO), Take 1 tablet by mouth Daily., Disp: , Rfl:     carvedilol (COREG) 3.125 MG tablet, Take 1 tablet by mouth 2 (Two) Times a Day., Disp: 180 tablet, Rfl: 3    clopidogrel (PLAVIX) 75 MG tablet, Take 1 tablet by mouth Daily., Disp: 90 tablet, Rfl: 3    colestipol (COLESTID) 1 g tablet, Take 1 tablet by mouth Every Other Day., Disp: , Rfl:     empagliflozin (Jardiance) 10 MG tablet tablet, Take 1 tablet by mouth Daily., Disp: 90 tablet, Rfl: 3    escitalopram (LEXAPRO) 10 MG tablet, Take 1 tablet by mouth Daily., Disp: , Rfl:     gabapentin (Neurontin) 100 MG capsule, Take 1 capsule by mouth 2 (Two) Times a Day., Disp: 180 capsule, Rfl: 1    Glucosamine-Chondroitin-Vit D3 4916-2013-637 MG-MG-UNIT pack, Take 1 tablet by mouth 2 (Two) Times a Day., Disp: , Rfl:     Multiple Vitamins-Minerals (COMPLETE MULTIVITAMIN/MINERAL PO), Take 1 tablet by mouth Daily., Disp: , Rfl:     sacubitril-valsartan (Entresto) 24-26 MG tablet, Take 1 tablet by mouth 2 (Two) Times a Day., Disp: 180 tablet, Rfl: 3    Synthroid 150 MCG tablet, Take 1 tablet by mouth Daily., Disp: , Rfl:     donepezil (Aricept) 5 MG tablet, Take 1 tablet by mouth Every Night., Disp: 30 tablet, Rfl: 2       Objective   Vital Signs:   /72 (BP Location: Left arm, Patient Position: Sitting)   Pulse 70   Resp 18   Ht 162.6 cm (64\")   Wt 50.3 kg (111 lb)   BMI 19.05 kg/mý       Physical Exam  Vitals and nursing note reviewed.   Constitutional:       General: She is not in acute distress.     Appearance: She is not ill-appearing.   HENT:      Head: Normocephalic.      Mouth/Throat:      Mouth: Mucous membranes are moist.   Eyes:      General: Lids are normal.      Extraocular Movements: Extraocular movements " intact.      Pupils: Pupils are equal, round, and reactive to light.   Pulmonary:      Effort: Pulmonary effort is normal. No respiratory distress.   Skin:     General: Skin is warm and dry.   Neurological:      Mental Status: She is alert.      Motor: Motor strength is normal.   Psychiatric:         Speech: Speech normal.      Neurological Exam  Mental Status  Alert. Oriented only to person and situation. Recalls 3 of 3 objects immediately. At 3 minutes recalls 0 of 3 objects. Recalls 3 of 3 objects with prompting. Speech is normal. Language is fluent with no aphasia. Attention and concentration are normal. MMSE score: 21.    Cranial Nerves  CN II: Visual fields full to confrontation. Wears corrective lenses.  CN III, IV, VI: Extraocular movements intact bilaterally. Normal lids and orbits bilaterally. Pupils equal round and reactive to light bilaterally.  CN V: Facial sensation is normal.  CN VII: Full and symmetric facial movement.  CN IX, X: Palate elevates symmetrically. Normal gag reflex.  CN XI: Shoulder shrug strength is normal.  CN XII: Tongue midline without atrophy or fasciculations.    Motor  Normal muscle bulk throughout. No fasciculations present. Normal muscle tone. No abnormal involuntary movements. Strength is 5/5 throughout all four extremities.    Gait  Casual gait is normal including stance, stride, and arm swing.    Result Review :   The following data was reviewed by: ANGY Felder on 08/16/2023:         ECG 12 Lead Syncope (06/30/2023 15:43)   Progress Notes by Amos Schaffer APRN (07/24/2023 14:00)   Patient Message with Amos Schaffer APRN (08/08/2023)                  Impression:  Mya Chung is a 89 y.o. female who presents for evaluation of worsening memory impairment.  Patient does have recent history of a fall resulting in a traumatic subarachnoid hemorrhage and intracerebral hemorrhage.  This is resolved per repeat CT by neurosurgery.  Her memory impairment sounds  consistent with a mild cognitive impairment.  I do not feel patient has developed into dementia.  Additionally her memory is being impacted by environmental factors including living at her daughter's house.  Patient does have improvement when she returns to her residence.  Patient does have a life alert bracelet now for safety.  There factors that could be contributing memory impairment include recent traumatic brain injury and medication influence from gabapentin.  I did discuss this with the daughter.  Patient is right at 2 months from her injury.  I did educate both the patient and her daughter that her brain is still healing.  Also discussed that cognitive impairment is a progressive degenerative disorder.  They voiced understanding.  I did explain the goals of care with them.  She also had a urinary tract infection which also impacted her memory during this time.  Both these factors have been an insult to her brain.  It will take time to heal.  However initiating Aricept is not unreasonable.  We did discuss this medication together as well as reviewed side effects.  They are opening to trying this medication.  Additionally we did discuss patient to try and live at her home.  This included also having family stay with her at night for safety.    Diagnoses and all orders for this visit:    1. Memory impairment (Primary)  -     donepezil (Aricept) 5 MG tablet; Take 1 tablet by mouth Every Night.  Dispense: 30 tablet; Refill: 2  -     MRI Brain Without Contrast; Future  -     Vitamin B12; Future  -     TSH; Future    2. History of subarachnoid hemorrhage  -     MRI Brain Without Contrast; Future        Plan:  Decrease gabapentin to 100 mg daily  Initiate donezepil 5 mg daily  MRI brain without contrast  TSH level  B12 level  Fall precautions  Recommend trialing patient living at her own home with night sitters.  Follow-up in 4 to 6 weeks    The patient and I have discussed the plan of care and she is in full  agreement at this time.     Follow Up   Return in about 4 weeks (around 9/13/2023) for memory.            Amos Schaffer, ANGY  08/16/23  13:27 CDT

## 2023-08-17 LAB
TSH SERPL DL<=0.05 MIU/L-ACNC: 0.1 UIU/ML (ref 0.27–4.2)
VIT B12 BLD-MCNC: 704 PG/ML (ref 211–946)

## 2023-08-17 NOTE — PROGRESS NOTES
Please let patient know TSH was low at 0.095. Normal >0.270. B12 was great. I will forward results to Dr. Pereira office. Thank you.

## 2023-08-29 ENCOUNTER — TRANSCRIBE ORDERS (OUTPATIENT)
Dept: ADMINISTRATIVE | Facility: HOSPITAL | Age: 88
End: 2023-08-29
Payer: MEDICARE

## 2023-08-29 ENCOUNTER — HOSPITAL ENCOUNTER (OUTPATIENT)
Dept: CARDIOLOGY | Facility: HOSPITAL | Age: 88
Discharge: HOME | End: 2023-08-29
Payer: MEDICARE

## 2023-08-29 ENCOUNTER — HOSPITAL ENCOUNTER (OUTPATIENT)
Dept: GENERAL RADIOLOGY | Facility: HOSPITAL | Age: 88
Discharge: HOME | End: 2023-08-29
Payer: MEDICARE

## 2023-08-29 DIAGNOSIS — E03.9 HYPOTHYROIDISM, UNSPECIFIED TYPE: ICD-10-CM

## 2023-08-29 DIAGNOSIS — I50.22 CHRONIC SYSTOLIC CONGESTIVE HEART FAILURE: ICD-10-CM

## 2023-08-29 DIAGNOSIS — R00.2 PALPITATIONS: ICD-10-CM

## 2023-08-29 DIAGNOSIS — I42.9 CARDIOMYOPATHY, UNSPECIFIED TYPE: ICD-10-CM

## 2023-08-29 DIAGNOSIS — R53.83 OTHER FATIGUE: ICD-10-CM

## 2023-08-29 DIAGNOSIS — R53.83 OTHER FATIGUE: Primary | ICD-10-CM

## 2023-08-29 DIAGNOSIS — I10 ESSENTIAL (PRIMARY) HYPERTENSION: ICD-10-CM

## 2023-08-29 PROCEDURE — 71046 X-RAY EXAM CHEST 2 VIEWS: CPT

## 2023-08-29 PROCEDURE — 93005 ELECTROCARDIOGRAM TRACING: CPT | Performed by: PHYSICIAN ASSISTANT

## 2023-09-01 ENCOUNTER — TRANSCRIBE ORDERS (OUTPATIENT)
Dept: ADMINISTRATIVE | Facility: HOSPITAL | Age: 88
End: 2023-09-01
Payer: MEDICARE

## 2023-09-01 DIAGNOSIS — J44.1 COPD WITH ACUTE EXACERBATION: Primary | ICD-10-CM

## 2023-09-01 LAB
QT INTERVAL: 454 MS
QTC INTERVAL: 482 MS

## 2023-09-06 ENCOUNTER — HOSPITAL ENCOUNTER (OUTPATIENT)
Dept: CT IMAGING | Facility: HOSPITAL | Age: 88
Discharge: HOME | End: 2023-09-06
Admitting: FAMILY MEDICINE
Payer: MEDICARE

## 2023-09-06 DIAGNOSIS — J44.1 COPD WITH ACUTE EXACERBATION: ICD-10-CM

## 2023-09-06 PROCEDURE — 71250 CT THORAX DX C-: CPT

## 2023-09-14 ENCOUNTER — HOSPITAL ENCOUNTER (OUTPATIENT)
Dept: MRI IMAGING | Facility: HOSPITAL | Age: 88
Discharge: HOME | End: 2023-09-14
Payer: MEDICARE

## 2023-09-14 DIAGNOSIS — R41.3 MEMORY IMPAIRMENT: ICD-10-CM

## 2023-09-14 DIAGNOSIS — Z86.79 HISTORY OF SUBARACHNOID HEMORRHAGE: ICD-10-CM

## 2023-09-14 PROCEDURE — 70551 MRI BRAIN STEM W/O DYE: CPT

## 2023-09-14 NOTE — PROGRESS NOTES
Please let patient or family know MRI unremarkable for acute findings. Does show area of prior hemorrhage but no active collections of blood. Chronic volume loss expected for age.

## 2023-09-15 ENCOUNTER — TELEPHONE (OUTPATIENT)
Dept: NEUROLOGY | Facility: CLINIC | Age: 88
End: 2023-09-15
Payer: MEDICARE

## 2023-09-15 NOTE — TELEPHONE ENCOUNTER
----- Message from ANGY Felder sent at 9/14/2023  2:03 PM CDT -----  Please let patient or family know MRI unremarkable for acute findings. Does show area of prior hemorrhage but no active collections of blood. Chronic volume loss expected for age.

## 2023-09-20 ENCOUNTER — TELEPHONE (OUTPATIENT)
Dept: NEUROLOGY | Facility: CLINIC | Age: 88
End: 2023-09-20
Payer: MEDICARE

## 2023-09-20 NOTE — TELEPHONE ENCOUNTER
Confirmed appointment with Amos Schaffer NP with patient's daughter, Yeni, for Sept. 21st at 0930am.

## 2023-09-21 ENCOUNTER — OFFICE VISIT (OUTPATIENT)
Dept: NEUROLOGY | Facility: CLINIC | Age: 88
End: 2023-09-21
Payer: MEDICARE

## 2023-09-21 VITALS
SYSTOLIC BLOOD PRESSURE: 110 MMHG | BODY MASS INDEX: 18.61 KG/M2 | RESPIRATION RATE: 18 BRPM | WEIGHT: 109 LBS | DIASTOLIC BLOOD PRESSURE: 64 MMHG | HEART RATE: 72 BPM | HEIGHT: 64 IN

## 2023-09-21 DIAGNOSIS — R41.89 COGNITIVE IMPAIRMENT: Primary | ICD-10-CM

## 2023-09-21 NOTE — PROGRESS NOTES
Neurology Consult Note    [facility]  [address], [address]  [place] [address]  Phone: [phone]  Fax: [phone]    Referring Provider:   No ref. provider found  Primary Care Provider:  Dlefina Pereira DO    Reason for Consult:  Memory  Subjective      Mya Chung presents to [facility]    History of Present Illness  Patient seen for follow-up of memory.  At last visit daughter reported patient's memory may be getting worse.  Short-term memory more affected.  Patient been living with her daughter for the last 6 weeks.  Daughter reported mother will go back to her home during the day from 10-5.  During that timeframe she reported patient is really well as far as her memory.  Patient does now have a life alert bracelet.  Patient does cook but has not left the stove on or accidentally burned food.  Patient does pay her bills however her son does help.  At last visit we also discussed decreasing her gabapentin to 100 mg daily.  Additionally asked about patient going home with sitters.    Today patient presents with her daughter.  They report good improvement since last visit.  They have decreased her gabapentin to 100 mg daily.  She reports no increase in headaches additionally patient is living back at her home.  Family checks in with her daily and talked her multiple times on the phone.  There have been no falls.  Patient continues wearing her life alert.  Family is comfortable and has no safety concerns currently.  Patient informs me she is planning on going camping with her daughter next month.  They usually go to [place].  Patient is looking forward to sitting around the campfire with family.  Additionally she has tolerated the Aricept 5 mg daily.      Patient Active Problem List   Diagnosis    Left upper arm pain    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    Hypertension    Hyperlipidemia    PAD (peripheral artery disease)    Right  HPI: Skin/Bite Injury


Time Seen by Provider: 02/15/19 01:18


Chief Complaint (Nursing): Bite


Chief Complaint (Provider): dog bite


History Per: Patient


History/Exam Limitations: no limitations


Onset/Duration Of Symptoms: Days (2)


Current Symptoms Are (Timing): Still Present


Quality Of Symptoms: Painful


Additional Complaint(s): 





24 y/o female, approximately 25 weeks gestation, presents for evaluation of dog 

bite to left hand sustained 2 days ago.  Patient states she was pet-sitting a 

husky and he took a slice of pizza off the table and when she went to grab it 

from his mouth he bit her.  Patient states dog owner reports dog has all 

vaccines.  Patient has been cleaning hand with peroxide but reports pain and 

swelling to bite sites.  Denies fever, drainage from bite sites, 

numbness/weakness left upper extremity.  Vaccines up to date





Past Medical History


Reviewed: Historical Data, Nursing Documentation, Vital Signs


Vital Signs: 





                                Last Vital Signs











Temp  98.1 F   19 23:20


 


Pulse  84   19 23:20


 


Resp  16   19 23:20


 


BP  112/72   19 23:20


 


Pulse Ox  99   19 23:20














- Medical History


PMH: Anemia, Anxiety, Bipolar Disorder, Depression, Schizophrenia, Seizures


   Denies: HTN, Chronic Kidney Disease, Sexually Transmitted Disease





- Surgical History


Surgical History:  (x1)





- Family History


Family History: States: Unknown Family Hx





- Immunization History


Hx Tetanus Toxoid Vaccination: No


Hx Influenza Vaccination: No


Hx Pneumococcal Vaccination: No





- Home Medications


Home Medications: 


                                Ambulatory Orders











 Medication  Instructions  Recorded


 


Ciprofloxacin [Cipro] 1 tab PO BID #10 tab 17


 


Ondansetron ODT [Zofran ODT] 1 odt PO BID PRN #6 odt 17


 


Phenazopyridine [Pyridium] 100 mg PO Q8 #6 tab 17


 


Ondansetron ODT [Zofran ODT] 4 mg PO Q8 PRN #12 odt 17


 


Sulfamethoxazole/Trimethoprim 1 tab PO BID #20 tab 17





[Bactrim  mg-160 mg]  


 


Dicyclomine [Bentyl] 20 mg PO BID PRN #30 tab 02/10/18


 


Amoxicillin/Clavulanate [Augmentin 1 tab PO Q12 #13 tab 02/15/19





875 MG-125 MG]  


 


Bacitracin Ointment [Bacitracin] 1 applic TOP BID #1 tube 02/15/19














- Allergies


Allergies/Adverse Reactions: 


                                    Allergies











Allergy/AdvReac Type Severity Reaction Status Date / Time


 


chocolate flavor Allergy Severe ANAPHYLAXIS Verified 02/10/18 14:54


 


Iodinated Contrast- Oral and Allergy Severe RASH Verified 02/10/18 14:54





IV Dye     





[Iodinated Contrast Media -     





IV Dye]     


 


peanut Allergy Severe ANAPHYLAXIS Verified 02/10/18 14:54


 


iodine Allergy  ITCHING Verified 02/10/18 14:54














Review of Systems


ROS Statement: Except As Marked, All Systems Reviewed And Found Negative


Musculoskeletal: Positive for: Hand Pain





Physical Exam





- Reviewed


Nursing Documentation Reviewed: Yes


Vital Signs Reviewed: Yes





- Physical Exam


Appears: Positive for: Well, Non-toxic, No Acute Distress


Pulses-Radial (L): 2+


Pulses-Radial (R): 2+


Extremity: Positive for: Normal ROM, Other (Puncture wound dorsal left hand 

overlying mid 3rd metacarpal with + surrounding edema, erythema; no drainage.  

Smaller puncture wounds noted dorsal left hand base of 2nd digit without edema, 

erythema, drainage.  Puncture wound palmar left hand lateral ascpect with 

erythema surrounding borders; no drainage, edema.  Small puncture wound distal 

left hand 5th digit without edema, erythema.  FROM. Distal NV/motor intact)


Neurologic/Psych: Positive for: Alert, Oriented (x3)





- ECG


O2 Sat by Pulse Oximetry: 99





- Progress


ED Course And Treament: 





Bite wounds irrigated with NS, bacitracin applied, bandaged


Patient educated on findings, discharged with rx Augmentin (dose given in ED), 

Bacitracin





Patient educated on wound care and RICE


Follow up with PMD within 2-3 days


Patient was given strict return precautions including fever, spreading r

edness/swelling, drainage from bite sites, or other concerning symptoms











Disposition





- Clinical Impression


Clinical Impression: 


 Dog bite of multiple sites of left hand and fingers








- Patient ED Disposition


Is Patient to be Admitted: No


Counseled Patient/Family Regarding: Diagnosis, Need For Followup, Rx Given





- Disposition


Disposition: Routine/Home


Disposition Time: 02:36


Condition: IMPROVED


Additional Instructions: 


Follow up with your primary doctor within 2-3 days


Clean wound daily, apply topical medication and take antibiotics as prescribed


Ice affected areas


Return to ED for fever, increased redness/swelling to wounds, discharge from 

wounds, or other concerning symptoms


Prescriptions: 


Amoxicillin/Clavulanate [Augmentin 875 MG-125 MG] 1 tab PO Q12 #13 tab


Bacitracin Ointment [Bacitracin] 1 applic TOP BID #1 tube


Instructions:  Animal and Human Bites


Forms:  [facility] (English) greater trochanteric avulsion periprosthetic, closed, minimally displaced    Closed fracture of right wrist    Fall    Impaired gait and mobility    Acute right hip pain    Hypothyroidism    Closed fracture of multiple pubic rami, right, initial encounter    Cholecystitis    Nausea    Acute on chronic diastolic congestive heart failure    Other chest pain    Acute systolic heart failure    Chronic combined systolic and diastolic congestive heart failure    Closed fracture of ramus of left pubis, initial encounter    Stage 3a chronic kidney disease (CKD)    Subarachnoid hemorrhage    Body mass index (BMI) of 19.9 or less in adult    Former smoker        Past Medical History:   Diagnosis Date    Acid reflux     Arthritis     Coronary artery disease     Hyperlipidemia     Hypertension     Hypothyroidism     Leg pain     left leg    Lung nodule     Memory loss     Osteopenia     Shingles     Skin cancer     Subarachnoid hemorrhage 2023    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     Wears glasses         Social History     Socioeconomic History    Marital status:    Tobacco Use    Smoking status: Former     Years: 2.00     Types: Cigarettes     Quit date: 1955     Years since quittin.7     Passive exposure: Never    Smokeless tobacco: Never   Vaping Use    Vaping Use: Never used   Substance and Sexual Activity    Alcohol use: No    Drug use: No    Sexual activity: Defer        Allergies   Allergen Reactions    Erythromycin Shortness Of Breath    Zithromax [Azithromycin] Itching and Other (See Comments)     Yeast infection    Penicillins Rash          Current Outpatient Medications:     alendronate (FOSAMAX) 70 MG tablet, Take 1 tablet by mouth Every 7 (Seven) Days. Thursday, Disp: , Rfl:     aspirin 81 MG EC tablet, Take 1 tablet by mouth Daily., Disp: , Rfl:     Calcium Carbonate-Vitamin D (CALCIUM 600/VITAMIN D PO), Take 1 tablet by mouth Daily., Disp: , Rfl:     carvedilol (COREG) 3.125 MG  "tablet, Take 1 tablet by mouth 2 (Two) Times a Day., Disp: 180 tablet, Rfl: 3    clopidogrel (PLAVIX) 75 MG tablet, Take 1 tablet by mouth Daily., Disp: 90 tablet, Rfl: 3    colestipol (COLESTID) 1 g tablet, Take 1 tablet by mouth Every Other Day., Disp: , Rfl:     donepezil (Aricept) 5 MG tablet, Take 1 tablet by mouth Every Night., Disp: 30 tablet, Rfl: 2    empagliflozin (Jardiance) 10 MG tablet tablet, Take 1 tablet by mouth Daily., Disp: 90 tablet, Rfl: 3    escitalopram (LEXAPRO) 10 MG tablet, Take 1 tablet by mouth Daily., Disp: , Rfl:     gabapentin (Neurontin) 100 MG capsule, Take 1 capsule by mouth 2 (Two) Times a Day. (Patient taking differently: Take 1 capsule by mouth Daily.), Disp: 180 capsule, Rfl: 1    Glucosamine-Chondroitin-Vit D3 8882-6414-114 MG-MG-UNIT pack, Take 1 tablet by mouth 2 (Two) Times a Day., Disp: , Rfl:     Multiple Vitamins-Minerals (COMPLETE MULTIVITAMIN/MINERAL PO), Take 1 tablet by mouth Daily., Disp: , Rfl:     sacubitril-valsartan (Entresto) 24-26 MG tablet, Take 1 tablet by mouth 2 (Two) Times a Day., Disp: 180 tablet, Rfl: 3    Synthroid 150 MCG tablet, Take 1 tablet by mouth. Take 1 tab everyday but Sat.  To take 1/2 tab po on Sat., Disp: , Rfl:        Objective   Vital Signs:   /64 (BP Location: Left arm, Patient Position: Sitting)   Pulse 72   Resp 18   Ht 162.6 cm (64\")   Wt 49.4 kg (109 lb)   BMI 18.71 kg/m²       Physical Exam  Constitutional:       General: She is not in acute distress.     Appearance: She is not ill-appearing.   Eyes:      General: Lids are normal.      Extraocular Movements: Extraocular movements intact.      Pupils: Pupils are equal, round, and reactive to light.   Pulmonary:      Effort: Pulmonary effort is normal.   Skin:     General: Skin is warm and dry.   Neurological:      Mental Status: She is alert.      Motor: Motor strength is normal.   Psychiatric:         Speech: Speech normal.      Neurological Exam  Mental Status  Alert. " Recalls 3 of 3 objects immediately. At 3 minutes recalls 1 of 3 objects. Able to copy figure. Speech is normal. Language is fluent with no aphasia. Attention and concentration are normal.    Cranial Nerves  CN II: Visual fields full to confrontation.  CN III, IV, VI: Extraocular movements intact bilaterally. Normal lids and orbits bilaterally. Pupils equal round and reactive to light bilaterally.  CN VII: Full and symmetric facial movement.    Motor  Normal muscle bulk throughout. No fasciculations present. Normal muscle tone. No abnormal involuntary movements. Strength is 5/5 throughout all four extremities.    Sensory  Sensation is intact to light touch, pinprick, vibration and proprioception in all four extremities.    Gait  Casual gait is normal including stance, stride, and arm swing.    Result Review :   The following data was reviewed by: ANGY Felder on 09/21/2023:         Office Visit with Amos Schaffer APRN (08/16/2023)   MRI Brain Without Contrast (09/14/2023 10:08)   IMPRESSION:  1. No acute intracranial abnormality, particularly, no evidence for  acute infarction. No mass.  2. A linear area of signal void in the right frontal lobe in the  gradient recall/susceptibility weighted images may represent an area of  prior hemorrhage or hemorrhagic infarction?. This was not noted in the  previous study in 2021.  3. Chronic ischemic and atrophic changes.  This report was finalized on 09/14/2023 11:02 by Dr. Timbo Pate MD          MRI performed 9/14/2023 reviewed by me today and interpreted as moderate small vessel disease and atrophy with no signs of acute infarction.  Patient does have a signal void in the right frontal lobe.  This does correlate with a prior known intracerebral hemorrhage from a fall.    CT Head Without Contrast (07/11/2023 10:35)   IMPRESSION:  1. No acute intracranial abnormality is seen.  This report was finalized on 07/11/2023 10:40 by Dr. Kory Stubbs MD.    CT Head  Without Contrast (07/01/2023 06:18)     IMPRESSION:  Stable exam. No change in small volume subarachnoid hemorrhage in the  RIGHT anterior frontal sulci and LEFT sylvian fissure. No change in  small foci of RIGHT frontal lobe intraparenchymal hemorrhage. No change  in suspected trace intraventricular hemorrhage layering in the RIGHT  lateral ventricle. Ventricular caliber is stable.  This report was finalized on 07/01/2023 07:39 by Dr. Navid Canales MD.               Impression:  Mya Chung is a 89 y.o. female who presents for follow-up of memory after starting Aricept 5 mg daily and decreasing her gabapentin to 100 mg daily.  Patient is done quite well since her last visit.  Her MMSE has increased to 26/30 from 21/30 at last visit.  Patient has started living back at her home.  She lives next-door to her daughter.  Family does check in with patient daily as well as talks to her multiple times on the phone.  There have been no safety concerns arise.  She does have a life alert bracelet.     Diagnoses and all orders for this visit:    1. Cognitive impairment (Primary)        Plan:  Continue Aricept 5 mg daily for memory impairment  Continue gabapentin 100 mg daily for occipital neuralgia  Fall precautions  Follow-up in 3 months    The patient and I have discussed the plan of care and she is in full agreement at this time.     Follow Up   Return in about 3 months (around 12/21/2023) for memory.            ANGY Felder  09/21/23  09:42 CDT

## 2023-09-22 DIAGNOSIS — R41.3 MEMORY IMPAIRMENT: ICD-10-CM

## 2023-09-22 RX ORDER — DONEPEZIL HYDROCHLORIDE 5 MG/1
5 TABLET, FILM COATED ORAL NIGHTLY
Qty: 90 TABLET | Refills: 1 | Status: SHIPPED | OUTPATIENT
Start: 2023-09-22

## 2023-10-03 ENCOUNTER — HOSPITAL ENCOUNTER (OUTPATIENT)
Dept: PULMONOLOGY | Facility: HOSPITAL | Age: 88
Discharge: HOME | End: 2023-10-03
Payer: MEDICARE

## 2023-10-03 DIAGNOSIS — J44.1 COPD WITH ACUTE EXACERBATION: ICD-10-CM

## 2023-10-03 LAB
ARTERIAL PATENCY WRIST A: POSITIVE
ATMOSPHERIC PRESS: 752 MMHG
BASE EXCESS BLDA CALC-SCNC: 2.5 MMOL/L (ref 0–2)
BDY SITE: ABNORMAL
BODY TEMPERATURE: 37 C
HCO3 BLDA-SCNC: 28.2 MMOL/L (ref 20–26)
Lab: ABNORMAL
MODALITY: ABNORMAL
PCO2 BLDA: 46.6 MM HG (ref 35–45)
PCO2 TEMP ADJ BLD: 46.6 MM HG (ref 35–45)
PH BLDA: 7.39 PH UNITS (ref 7.35–7.45)
PH, TEMP CORRECTED: 7.39 PH UNITS (ref 7.35–7.45)
PO2 BLDA: 80.6 MM HG (ref 83–108)
PO2 TEMP ADJ BLD: 80.6 MM HG (ref 83–108)
SAO2 % BLDCOA: 96 % (ref 94–99)
VENTILATOR MODE: ABNORMAL

## 2023-10-03 PROCEDURE — 36600 WITHDRAWAL OF ARTERIAL BLOOD: CPT

## 2023-10-03 PROCEDURE — 94010 BREATHING CAPACITY TEST: CPT

## 2023-10-03 PROCEDURE — 94726 PLETHYSMOGRAPHY LUNG VOLUMES: CPT

## 2023-10-03 PROCEDURE — 94729 DIFFUSING CAPACITY: CPT

## 2023-10-03 PROCEDURE — 82803 BLOOD GASES ANY COMBINATION: CPT

## 2023-11-09 ENCOUNTER — HOSPITAL ENCOUNTER (OUTPATIENT)
Dept: BONE DENSITY | Facility: HOSPITAL | Age: 88
Discharge: HOME | End: 2023-11-09
Admitting: FAMILY MEDICINE
Payer: MEDICARE

## 2023-11-09 DIAGNOSIS — Z78.0 MENOPAUSE: ICD-10-CM

## 2023-11-09 PROCEDURE — 77080 DXA BONE DENSITY AXIAL: CPT

## 2023-12-16 ENCOUNTER — APPOINTMENT (OUTPATIENT)
Dept: GENERAL RADIOLOGY | Facility: HOSPITAL | Age: 88
End: 2023-12-16
Payer: MEDICARE

## 2023-12-16 ENCOUNTER — APPOINTMENT (OUTPATIENT)
Dept: CT IMAGING | Facility: HOSPITAL | Age: 88
End: 2023-12-16
Payer: MEDICARE

## 2023-12-16 ENCOUNTER — HOSPITAL ENCOUNTER (INPATIENT)
Facility: HOSPITAL | Age: 88
LOS: 3 days | Discharge: NURSING, REHAB OR LONG-TERM CARE | End: 2023-12-21
Attending: FAMILY MEDICINE | Admitting: FAMILY MEDICINE
Payer: MEDICARE

## 2023-12-16 DIAGNOSIS — Z74.09 IMPAIRED MOBILITY: ICD-10-CM

## 2023-12-16 DIAGNOSIS — I95.1 ORTHOSTATIC HYPOTENSION: ICD-10-CM

## 2023-12-16 DIAGNOSIS — S42.91XA CLOSED FRACTURE OF RIGHT SHOULDER, INITIAL ENCOUNTER: ICD-10-CM

## 2023-12-16 DIAGNOSIS — R55 SYNCOPE AND COLLAPSE: Primary | ICD-10-CM
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
## 2023-12-16 LAB
ALBUMIN SERPL-MCNC: 3.7 G/DL (ref 3.5–5.2)
ALBUMIN/GLOB SERPL: 1.4 G/DL
ALP SERPL-CCNC: 61 U/L (ref 39–117)
ALT SERPL W P-5'-P-CCNC: 10 U/L (ref 1–33)
ANION GAP SERPL CALCULATED.3IONS-SCNC: 11 MMOL/L (ref 5–15)
AST SERPL-CCNC: 20 U/L (ref 1–32)
BACTERIA UR QL AUTO: NORMAL /HPF
BASOPHILS # BLD AUTO: 0.06 10*3/MM3 (ref 0–0.2)
BASOPHILS NFR BLD AUTO: 0.5 % (ref 0–1.5)
BILIRUB SERPL-MCNC: 0.4 MG/DL (ref 0–1.2)
BILIRUB UR QL STRIP: NEGATIVE
BUN SERPL-MCNC: 38 MG/DL (ref 8–23)
BUN/CREAT SERPL: 32.8 (ref 7–25)
CALCIUM SPEC-SCNC: 9.1 MG/DL (ref 8.2–9.6)
CHLORIDE SERPL-SCNC: 108 MMOL/L (ref 98–107)
CLARITY UR: CLEAR
CO2 SERPL-SCNC: 25 MMOL/L (ref 22–29)
COLOR UR: YELLOW
CREAT SERPL-MCNC: 1.16 MG/DL (ref 0.57–1)
DEPRECATED RDW RBC AUTO: 46.6 FL (ref 37–54)
EGFRCR SERPLBLD CKD-EPI 2021: 43.5 ML/MIN/1.73
EOSINOPHIL # BLD AUTO: 0.1 10*3/MM3 (ref 0–0.4)
EOSINOPHIL NFR BLD AUTO: 0.8 % (ref 0.3–6.2)
ERYTHROCYTE [DISTWIDTH] IN BLOOD BY AUTOMATED COUNT: 13.1 % (ref 12.3–15.4)
GLOBULIN UR ELPH-MCNC: 2.6 GM/DL
GLUCOSE SERPL-MCNC: 126 MG/DL (ref 65–99)
GLUCOSE UR STRIP-MCNC: ABNORMAL MG/DL
HCT VFR BLD AUTO: 39.5 % (ref 34–46.6)
HGB BLD-MCNC: 12 G/DL (ref 12–15.9)
HGB UR QL STRIP.AUTO: ABNORMAL
HYALINE CASTS UR QL AUTO: NORMAL /LPF
IMM GRANULOCYTES # BLD AUTO: 0.03 10*3/MM3 (ref 0–0.05)
IMM GRANULOCYTES NFR BLD AUTO: 0.2 % (ref 0–0.5)
KETONES UR QL STRIP: ABNORMAL
LEUKOCYTE ESTERASE UR QL STRIP.AUTO: NEGATIVE
LYMPHOCYTES # BLD AUTO: 1.91 10*3/MM3 (ref 0.7–3.1)
LYMPHOCYTES NFR BLD AUTO: 15.6 % (ref 19.6–45.3)
MAGNESIUM SERPL-MCNC: 2.2 MG/DL (ref 1.7–2.3)
MCH RBC QN AUTO: 29.4 PG (ref 26.6–33)
MCHC RBC AUTO-ENTMCNC: 30.4 G/DL (ref 31.5–35.7)
MCV RBC AUTO: 96.8 FL (ref 79–97)
MONOCYTES # BLD AUTO: 1.21 10*3/MM3 (ref 0.1–0.9)
MONOCYTES NFR BLD AUTO: 9.9 % (ref 5–12)
NEUTROPHILS NFR BLD AUTO: 73 % (ref 42.7–76)
NEUTROPHILS NFR BLD AUTO: 8.91 10*3/MM3 (ref 1.7–7)
NITRITE UR QL STRIP: NEGATIVE
NRBC BLD AUTO-RTO: 0 /100 WBC (ref 0–0.2)
PH UR STRIP.AUTO: <=5 [PH] (ref 5–8)
PLATELET # BLD AUTO: 189 10*3/MM3 (ref 140–450)
PMV BLD AUTO: 10.5 FL (ref 6–12)
POTASSIUM SERPL-SCNC: 5.1 MMOL/L (ref 3.5–5.2)
PROT SERPL-MCNC: 6.3 G/DL (ref 6–8.5)
PROT UR QL STRIP: NEGATIVE
RBC # BLD AUTO: 4.08 10*6/MM3 (ref 3.77–5.28)
RBC # UR STRIP: NORMAL /HPF
REF LAB TEST METHOD: NORMAL
SODIUM SERPL-SCNC: 144 MMOL/L (ref 136–145)
SP GR UR STRIP: 1.02 (ref 1–1.03)
SQUAMOUS #/AREA URNS HPF: NORMAL /HPF
TROPONIN T SERPL HS-MCNC: 15 NG/L
UROBILINOGEN UR QL STRIP: ABNORMAL
WBC # UR STRIP: NORMAL /HPF
WBC NRBC COR # BLD AUTO: 12.22 10*3/MM3 (ref 3.4–10.8)

## 2023-12-16 PROCEDURE — 70486 CT MAXILLOFACIAL W/O DYE: CPT

## 2023-12-16 PROCEDURE — 85025 COMPLETE CBC W/AUTO DIFF WBC: CPT

## 2023-12-16 PROCEDURE — 81001 URINALYSIS AUTO W/SCOPE: CPT

## 2023-12-16 PROCEDURE — 99285 EMERGENCY DEPT VISIT HI MDM: CPT

## 2023-12-16 PROCEDURE — 72125 CT NECK SPINE W/O DYE: CPT

## 2023-12-16 PROCEDURE — 93005 ELECTROCARDIOGRAM TRACING: CPT

## 2023-12-16 PROCEDURE — 73000 X-RAY EXAM OF COLLAR BONE: CPT

## 2023-12-16 PROCEDURE — 71045 X-RAY EXAM CHEST 1 VIEW: CPT

## 2023-12-16 PROCEDURE — 83735 ASSAY OF MAGNESIUM: CPT

## 2023-12-16 PROCEDURE — 73060 X-RAY EXAM OF HUMERUS: CPT

## 2023-12-16 PROCEDURE — 25810000003 LACTATED RINGERS SOLUTION

## 2023-12-16 PROCEDURE — 70450 CT HEAD/BRAIN W/O DYE: CPT

## 2023-12-16 PROCEDURE — 73030 X-RAY EXAM OF SHOULDER: CPT

## 2023-12-16 PROCEDURE — 84484 ASSAY OF TROPONIN QUANT: CPT

## 2023-12-16 PROCEDURE — 36415 COLL VENOUS BLD VENIPUNCTURE: CPT

## 2023-12-16 PROCEDURE — 80053 COMPREHEN METABOLIC PANEL: CPT

## 2023-12-16 RX ORDER — SODIUM CHLORIDE 0.9 % (FLUSH) 0.9 %
10 SYRINGE (ML) INJECTION AS NEEDED
Status: DISCONTINUED | OUTPATIENT
Start: 2023-12-16 | End: 2023-12-21 | Stop reason: HOSPADM

## 2023-12-16 RX ADMIN — SODIUM CHLORIDE, POTASSIUM CHLORIDE, SODIUM LACTATE AND CALCIUM CHLORIDE 1000 ML: 600; 310; 30; 20 INJECTION, SOLUTION INTRAVENOUS at 20:38

## 2023-12-17 PROBLEM — D64.9 ANEMIA: Status: ACTIVE | Noted: 2023-12-17

## 2023-12-17 PROBLEM — I95.1 ORTHOSTATIC HYPOTENSION: Status: ACTIVE | Noted: 2023-12-17

## 2023-12-17 PROBLEM — S42.201A CLOSED FRACTURE OF PROXIMAL END OF RIGHT HUMERUS: Status: ACTIVE | Noted: 2023-12-17

## 2023-12-17 LAB
ANION GAP SERPL CALCULATED.3IONS-SCNC: 11 MMOL/L (ref 5–15)
BASOPHILS # BLD MANUAL: 0.08 10*3/MM3 (ref 0–0.2)
BASOPHILS NFR BLD MANUAL: 1 % (ref 0–1.5)
BUN SERPL-MCNC: 35 MG/DL (ref 8–23)
BUN/CREAT SERPL: 32.1 (ref 7–25)
CALCIUM SPEC-SCNC: 8.2 MG/DL (ref 8.2–9.6)
CHLORIDE SERPL-SCNC: 105 MMOL/L (ref 98–107)
CLUMPED PLATELETS: PRESENT
CO2 SERPL-SCNC: 24 MMOL/L (ref 22–29)
CREAT SERPL-MCNC: 1.09 MG/DL (ref 0.57–1)
DEPRECATED RDW RBC AUTO: 45.3 FL (ref 37–54)
EGFRCR SERPLBLD CKD-EPI 2021: 46.9 ML/MIN/1.73
ERYTHROCYTE [DISTWIDTH] IN BLOOD BY AUTOMATED COUNT: 13.2 % (ref 12.3–15.4)
GLUCOSE SERPL-MCNC: 122 MG/DL (ref 65–99)
HCT VFR BLD AUTO: 26 % (ref 34–46.6)
HCT VFR BLD AUTO: 30.4 % (ref 34–46.6)
HEMOCCULT STL QL: NEGATIVE
HGB BLD-MCNC: 8 G/DL (ref 12–15.9)
HGB BLD-MCNC: 9.6 G/DL (ref 12–15.9)
IRON 24H UR-MRATE: 79 MCG/DL (ref 37–145)
IRON SATN MFR SERPL: 32 % (ref 20–50)
LYMPHOCYTES # BLD MANUAL: 1.73 10*3/MM3 (ref 0.7–3.1)
LYMPHOCYTES NFR BLD MANUAL: 7 % (ref 5–12)
MCH RBC QN AUTO: 29.6 PG (ref 26.6–33)
MCHC RBC AUTO-ENTMCNC: 31.6 G/DL (ref 31.5–35.7)
MCV RBC AUTO: 93.8 FL (ref 79–97)
MONOCYTES # BLD: 0.53 10*3/MM3 (ref 0.1–0.9)
NEUTROPHILS # BLD AUTO: 5.2 10*3/MM3 (ref 1.7–7)
NEUTROPHILS NFR BLD MANUAL: 65 % (ref 42.7–76)
NEUTS BAND NFR BLD MANUAL: 4 % (ref 0–5)
NRBC BLD AUTO-RTO: 0 /100 WBC (ref 0–0.2)
PLATELET # BLD AUTO: 131 10*3/MM3 (ref 140–450)
PMV BLD AUTO: 10.4 FL (ref 6–12)
POIKILOCYTOSIS BLD QL SMEAR: NORMAL
POTASSIUM SERPL-SCNC: 4.2 MMOL/L (ref 3.5–5.2)
RBC # BLD AUTO: 3.24 10*6/MM3 (ref 3.77–5.28)
SMALL PLATELETS BLD QL SMEAR: ADEQUATE
SODIUM SERPL-SCNC: 140 MMOL/L (ref 136–145)
TIBC SERPL-MCNC: 246 MCG/DL (ref 298–536)
TRANSFERRIN SERPL-MCNC: 165 MG/DL (ref 200–360)
VARIANT LYMPHS NFR BLD MANUAL: 2 % (ref 0–5)
VARIANT LYMPHS NFR BLD MANUAL: 21 % (ref 19.6–45.3)
WBC MORPH BLD: NORMAL
WBC NRBC COR # BLD AUTO: 7.54 10*3/MM3 (ref 3.4–10.8)

## 2023-12-17 PROCEDURE — 85014 HEMATOCRIT: CPT | Performed by: INTERNAL MEDICINE

## 2023-12-17 PROCEDURE — G0378 HOSPITAL OBSERVATION PER HR: HCPCS

## 2023-12-17 PROCEDURE — 97162 PT EVAL MOD COMPLEX 30 MIN: CPT | Performed by: PHYSICAL THERAPIST

## 2023-12-17 PROCEDURE — 80048 BASIC METABOLIC PNL TOTAL CA: CPT | Performed by: FAMILY MEDICINE

## 2023-12-17 PROCEDURE — 82272 OCCULT BLD FECES 1-3 TESTS: CPT | Performed by: INTERNAL MEDICINE

## 2023-12-17 PROCEDURE — 97166 OT EVAL MOD COMPLEX 45 MIN: CPT

## 2023-12-17 PROCEDURE — 84466 ASSAY OF TRANSFERRIN: CPT | Performed by: INTERNAL MEDICINE

## 2023-12-17 PROCEDURE — 85018 HEMOGLOBIN: CPT | Performed by: INTERNAL MEDICINE

## 2023-12-17 PROCEDURE — 85025 COMPLETE CBC W/AUTO DIFF WBC: CPT | Performed by: FAMILY MEDICINE

## 2023-12-17 PROCEDURE — 83540 ASSAY OF IRON: CPT | Performed by: INTERNAL MEDICINE

## 2023-12-17 PROCEDURE — 97535 SELF CARE MNGMENT TRAINING: CPT

## 2023-12-17 PROCEDURE — 25810000003 SODIUM CHLORIDE 0.9 % SOLUTION: Performed by: FAMILY MEDICINE

## 2023-12-17 RX ORDER — BISACODYL 10 MG
10 SUPPOSITORY, RECTAL RECTAL DAILY PRN
Status: DISCONTINUED | OUTPATIENT
Start: 2023-12-17 | End: 2023-12-21 | Stop reason: HOSPADM

## 2023-12-17 RX ORDER — POLYETHYLENE GLYCOL 3350 17 G/17G
17 POWDER, FOR SOLUTION ORAL DAILY PRN
Status: DISCONTINUED | OUTPATIENT
Start: 2023-12-17 | End: 2023-12-21 | Stop reason: HOSPADM

## 2023-12-17 RX ORDER — ESCITALOPRAM OXALATE 10 MG/1
10 TABLET ORAL DAILY
Status: DISCONTINUED | OUTPATIENT
Start: 2023-12-17 | End: 2023-12-21 | Stop reason: HOSPADM

## 2023-12-17 RX ORDER — LEVOTHYROXINE SODIUM 0.15 MG/1
150 TABLET ORAL
Status: DISCONTINUED | OUTPATIENT
Start: 2023-12-17 | End: 2023-12-21 | Stop reason: HOSPADM

## 2023-12-17 RX ORDER — ACETAMINOPHEN 325 MG/1
650 TABLET ORAL EVERY 4 HOURS PRN
Status: DISCONTINUED | OUTPATIENT
Start: 2023-12-17 | End: 2023-12-21 | Stop reason: HOSPADM

## 2023-12-17 RX ORDER — ASPIRIN 81 MG/1
81 TABLET ORAL DAILY
Status: DISCONTINUED | OUTPATIENT
Start: 2023-12-17 | End: 2023-12-21 | Stop reason: HOSPADM

## 2023-12-17 RX ORDER — OXYCODONE HYDROCHLORIDE 5 MG/1
5 TABLET ORAL EVERY 6 HOURS PRN
Status: DISCONTINUED | OUTPATIENT
Start: 2023-12-17 | End: 2023-12-21 | Stop reason: HOSPADM

## 2023-12-17 RX ORDER — SODIUM CHLORIDE 9 MG/ML
40 INJECTION, SOLUTION INTRAVENOUS AS NEEDED
Status: DISCONTINUED | OUTPATIENT
Start: 2023-12-17 | End: 2023-12-21 | Stop reason: HOSPADM

## 2023-12-17 RX ORDER — SODIUM CHLORIDE 0.9 % (FLUSH) 0.9 %
10 SYRINGE (ML) INJECTION AS NEEDED
Status: DISCONTINUED | OUTPATIENT
Start: 2023-12-17 | End: 2023-12-21 | Stop reason: HOSPADM

## 2023-12-17 RX ORDER — PANTOPRAZOLE SODIUM 40 MG/1
40 TABLET, DELAYED RELEASE ORAL
Status: DISCONTINUED | OUTPATIENT
Start: 2023-12-17 | End: 2023-12-21 | Stop reason: HOSPADM

## 2023-12-17 RX ORDER — AMOXICILLIN 250 MG
2 CAPSULE ORAL 2 TIMES DAILY
Status: DISCONTINUED | OUTPATIENT
Start: 2023-12-17 | End: 2023-12-21 | Stop reason: HOSPADM

## 2023-12-17 RX ORDER — LEVOTHYROXINE SODIUM 0.15 MG/1
75 TABLET ORAL WEEKLY
COMMUNITY
End: 2023-12-21 | Stop reason: HOSPADM

## 2023-12-17 RX ORDER — GABAPENTIN 100 MG/1
100 CAPSULE ORAL NIGHTLY
Status: DISCONTINUED | OUTPATIENT
Start: 2023-12-17 | End: 2023-12-21 | Stop reason: HOSPADM

## 2023-12-17 RX ORDER — SODIUM CHLORIDE 9 MG/ML
50 INJECTION, SOLUTION INTRAVENOUS CONTINUOUS
Status: DISCONTINUED | OUTPATIENT
Start: 2023-12-17 | End: 2023-12-17

## 2023-12-17 RX ORDER — CARVEDILOL 3.12 MG/1
3.12 TABLET ORAL 2 TIMES DAILY WITH MEALS
Status: DISCONTINUED | OUTPATIENT
Start: 2023-12-17 | End: 2023-12-21 | Stop reason: HOSPADM

## 2023-12-17 RX ORDER — CLOPIDOGREL BISULFATE 75 MG/1
75 TABLET ORAL DAILY
Status: DISCONTINUED | OUTPATIENT
Start: 2023-12-17 | End: 2023-12-21 | Stop reason: HOSPADM

## 2023-12-17 RX ORDER — DONEPEZIL HYDROCHLORIDE 5 MG/1
5 TABLET, FILM COATED ORAL NIGHTLY
Status: DISCONTINUED | OUTPATIENT
Start: 2023-12-17 | End: 2023-12-21 | Stop reason: HOSPADM

## 2023-12-17 RX ORDER — BISACODYL 5 MG/1
5 TABLET, DELAYED RELEASE ORAL DAILY PRN
Status: DISCONTINUED | OUTPATIENT
Start: 2023-12-17 | End: 2023-12-21 | Stop reason: HOSPADM

## 2023-12-17 RX ORDER — SODIUM CHLORIDE 0.9 % (FLUSH) 0.9 %
10 SYRINGE (ML) INJECTION EVERY 12 HOURS SCHEDULED
Status: DISCONTINUED | OUTPATIENT
Start: 2023-12-17 | End: 2023-12-21 | Stop reason: HOSPADM

## 2023-12-17 RX ORDER — GABAPENTIN 100 MG/1
100 CAPSULE ORAL NIGHTLY
Status: ON HOLD | COMMUNITY
End: 2023-12-20 | Stop reason: SDUPTHER

## 2023-12-17 RX ADMIN — ASPIRIN 81 MG: 81 TABLET, COATED ORAL at 08:58

## 2023-12-17 RX ADMIN — PANTOPRAZOLE SODIUM 40 MG: 40 TABLET, DELAYED RELEASE ORAL at 09:01

## 2023-12-17 RX ADMIN — DONEPEZIL HYDROCHLORIDE 5 MG: 5 TABLET, FILM COATED ORAL at 20:19

## 2023-12-17 RX ADMIN — Medication 10 ML: at 20:19

## 2023-12-17 RX ADMIN — SODIUM CHLORIDE 50 ML/HR: 9 INJECTION, SOLUTION INTRAVENOUS at 02:03

## 2023-12-17 RX ADMIN — DOCUSATE SODIUM 50 MG AND SENNOSIDES 8.6 MG 2 TABLET: 8.6; 5 TABLET, FILM COATED ORAL at 08:59

## 2023-12-17 RX ADMIN — LEVOTHYROXINE SODIUM 150 MCG: 150 TABLET ORAL at 05:41

## 2023-12-17 RX ADMIN — GABAPENTIN 100 MG: 100 CAPSULE ORAL at 02:02

## 2023-12-17 RX ADMIN — ACETAMINOPHEN 650 MG: 325 TABLET, FILM COATED ORAL at 02:02

## 2023-12-17 RX ADMIN — ESCITSLOPRAM 10 MG: 10 TABLET ORAL at 08:58

## 2023-12-17 RX ADMIN — ACETAMINOPHEN 650 MG: 325 TABLET, FILM COATED ORAL at 17:18

## 2023-12-17 RX ADMIN — CLOPIDOGREL BISULFATE 75 MG: 75 TABLET, FILM COATED ORAL at 08:58

## 2023-12-17 RX ADMIN — DOCUSATE SODIUM 50 MG AND SENNOSIDES 8.6 MG 2 TABLET: 8.6; 5 TABLET, FILM COATED ORAL at 20:19

## 2023-12-17 RX ADMIN — Medication 10 ML: at 09:00

## 2023-12-17 RX ADMIN — DONEPEZIL HYDROCHLORIDE 5 MG: 5 TABLET, FILM COATED ORAL at 02:02

## 2023-12-17 RX ADMIN — SACUBITRIL AND VALSARTAN 1 TABLET: 24; 26 TABLET, FILM COATED ORAL at 02:03

## 2023-12-17 RX ADMIN — SACUBITRIL AND VALSARTAN 1 TABLET: 24; 26 TABLET, FILM COATED ORAL at 08:58

## 2023-12-17 RX ADMIN — CARVEDILOL 3.12 MG: 3.12 TABLET, FILM COATED ORAL at 08:58

## 2023-12-17 RX ADMIN — GABAPENTIN 100 MG: 100 CAPSULE ORAL at 20:19

## 2023-12-17 RX ADMIN — SACUBITRIL AND VALSARTAN 1 TABLET: 24; 26 TABLET, FILM COATED ORAL at 21:57

## 2023-12-17 RX ADMIN — ACETAMINOPHEN 650 MG: 325 TABLET, FILM COATED ORAL at 21:55

## 2023-12-17 NOTE — THERAPY EVALUATION
Patient Name: Mya STONE [facility]  : 2/10/1928    MRN: [id number]                              Today's Date: 2023       Admit Date: 2023    Visit Dx:     ICD-10-CM ICD-9-CM   1. Syncope and collapse  R55 780.2   2. Orthostatic hypotension  I95.1 458.0   3. Fall, initial encounter  W19.XXXA E888.9   4. Closed fracture of right shoulder, initial encounter  S42.91XA 812.00   5. Impaired mobility [Z74.09]  Z74.09 799.89     Patient Active Problem List   Diagnosis    Left upper arm pain    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    Hypertension    Hyperlipidemia    PAD (peripheral artery disease)    Right greater trochanteric avulsion periprosthetic, closed, minimally displaced    Closed fracture of right wrist    Fall    Impaired gait and mobility    Acute right hip pain    Hypothyroidism    Closed fracture of multiple pubic rami, right, initial encounter    Cholecystitis    Nausea    Acute on chronic diastolic congestive heart failure    Other chest pain    Acute systolic heart failure    Chronic combined systolic and diastolic congestive heart failure    Closed fracture of ramus of left pubis, initial encounter    Stage 3a chronic kidney disease (CKD)    Subarachnoid hemorrhage    Body mass index (BMI) of 19.9 or less in adult    Former smoker    Orthostatic hypotension    Closed fracture of proximal end of right humerus    Anemia     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     Wears glasses      Past Surgical History:   Procedure Laterality Date    AORTAGRAM Right 2018    Procedure: LEFT LOWER EXTREMITY ANGIOGRAM;  Surgeon: Walker Davis DO;   Location:  [facility] HYBRID OR 12;  Service: Vascular    BREAST BIOPSY Bilateral     multiple on both, all benign    BREAST BIOPSY Left 2017    Procedure: LEFT MAMMOGRAM GUIDED NEEDLE DIRECTED EXCISIONAL BREAST BIOPSY;  Surgeon: Malgorzata Scott MD;  Location: [facility] OR;  Service:     BUNIONECTOMY Bilateral      SECTION      X2    CHOLECYSTECTOMY      CHOLECYSTECTOMY WITH INTRAOPERATIVE CHOLANGIOGRAM N/A 2021    Procedure: LAPAROSCOPIC CHOLECYSTECTOMY WITH CHOLANGIOGRAM;  Surgeon: Malgorzata Scott MD;  Location: [facility] OR;  Service: General;  Laterality: N/A;    COLONOSCOPY  2015    diverticulosis    ENDOSCOPY N/A 2021    Procedure: ESOPHAGOGASTRODUODENOSCOPY WITH ANESTHESIA;  Surgeon: Sonu Cheek DO;  Location: [facility] ENDOSCOPY;  Service: Gastroenterology;  Laterality: N/A;  pre nausea  post esophagitis  Delfina Pereira DO    EYE SURGERY      CATARACT SURGERY    FEMORAL ENDARTERECTOMY Left 2018    Procedure: LEFT FEMORAL ENDARTERECTOMY;  Surgeon: Walker Davis DO;  Location: [facility] HYBRID OR 12;  Service: Vascular    JOINT REPLACEMENT      RIGHT HIP    VASCULAR SURGERY        General Information       Row Name 23 1304          Physical Therapy Time and Intention    Document Type evaluation  Presented w/ R arm pain after fall & was found to have R humerus fx.Ortho recommended sling & later eval for splinting. Pt had orthostatic episode w/ syncope in ED prior to d/c & was admitted.Dx:Orthostatic hypotension, Closed fx proximal end R humerus  -SB     Mode of Treatment physical therapy  -SB       Row Name 23 3443          General Information    Patient Profile Reviewed yes  -SB     Prior Level of Function independent:;all household mobility;ADL's;cooking;cleaning;dependent:;driving;shopping  step over tub, grab bars by toilet, has cane  -SB     Existing Precautions/Restrictions fall;other (see comments);orthostatic hypotension   RUE NWB and in sling   -SB     Barriers to Rehab medically complex  -SB       Row Name 12/17/23 1306          Living Environment    People in Home alone  -SB       Row Name 12/17/23 1306          Home Main Entrance    Number of Stairs, Main Entrance three  -SB     Stair Railings, Main Entrance railing on left side (ascending)  -SB       Row Name 12/17/23 1306          Stairs Within Home, Primary    Number of Stairs, Within Home, Primary none  -SB       Row Name 12/17/23 1306          Cognition    Orientation Status (Cognition) oriented x 4  -SB       Row Name 12/17/23 1306          Safety Issues, Functional Mobility    Impairments Affecting Function (Mobility) pain;other (see comments);endurance/activity tolerance;strength;range of motion (ROM)  BP  -SB               User Key  (r) = Recorded By, (t) = Taken By, (c) = Cosigned By      Initials Name Provider Type    Whitney Galvan, PT DPT Physical Therapist                   Mobility       Row Name 12/17/23 1306          Bed Mobility    Bed Mobility supine-sit;sit-supine  -SB     Supine-Sit [place] (Bed Mobility) minimum assist (75% patient effort);verbal cues;nonverbal cues (demo/gesture)  -SB     Sit-Supine [place] (Bed Mobility) minimum assist (75% patient effort);verbal cues;nonverbal cues (demo/gesture)  -SB     Assistive Device (Bed Mobility) bed rails;head of bed elevated  -SB       Row Name 12/17/23 1306          Sit-Stand Transfer    Sit-Stand [place] (Transfers) minimum assist (75% patient effort);verbal cues  -SB     Comment, (Sit-Stand Transfer) BP dropped significantly and pt symptomatic therefore reqd return to bed  -SB       Row Name 12/17/23 1306          Gait/Stairs (Locomotion)    Patient was able to Ambulate no, other medical factors prevent ambulation  -SB     Reason Patient was unable to Ambulate Other (Comment)  orthostatic hypotension  -SB       Row Name 12/17/23 1306          Mobility    Extremity Weight-bearing Status right upper extremity  -SB      Right Upper Extremity (Weight-bearing Status) non weight-bearing (NWB)  -SB               User Key  (r) = Recorded By, (t) = Taken By, (c) = Cosigned By      Initials Name Provider Type    Whitney Galvan PT DPT Physical Therapist                   Obj/Interventions       Row Name 12/17/23 1306          Range of Motion Comprehensive    General Range of Motion bilateral lower extremity ROM WFL  -SB       Row Name 12/17/23 1306          Strength Comprehensive (MMT)    General Manual Muscle Testing (MMT) Assessment lower extremity strength deficits identified  -SB     Comment, General Manual Muscle Testing (MMT) Assessment BLE 4/5  -SB       Row Name 12/17/23 1306          Balance    Balance Assessment sitting static balance;sitting dynamic balance;standing static balance;standing dynamic balance  -SB     Static Sitting Balance standby assist  -SB     Dynamic Sitting Balance contact guard  -SB     Position, Sitting Balance sitting edge of bed;unsupported  -SB     Static Standing Balance contact guard  -SB     Position/Device Used, Standing Balance supported  -SB       Row Name 12/17/23 1306          Sensory Assessment (Somatosensory)    Sensory Assessment (Somatosensory) LE sensation intact  -SB               User Key  (r) = Recorded By, (t) = Taken By, (c) = Cosigned By      Initials Name Provider Type    Whitney Galvan PT DPT Physical Therapist                   Goals/Plan       Row Name 12/17/23 1306          Bed Mobility Goal 1 (PT)    Activity/Assistive Device (Bed Mobility Goal 1, PT) sit to supine;supine to sit;rolling to left;rolling to right  -SB     [place] Level/Cues Needed (Bed Mobility Goal 1, PT) standby assist  -SB     Time Frame (Bed Mobility Goal 1, PT) long term goal (LTG)  -SB     Progress/Outcomes (Bed Mobility Goal 1, PT) new goal  -SB       Row Name 12/17/23 1306          Transfer Goal 1 (PT)    Activity/Assistive Device (Transfer Goal 1, PT)  sit-to-stand/stand-to-sit;bed-to-chair/chair-to-bed;other (see comments)  LRAD  -SB     [place] Level/Cues Needed (Transfer Goal 1, PT) standby assist  -SB     Time Frame (Transfer Goal 1, PT) long term goal (LTG)  -SB     Strategies/Barriers (Transfers Goal 1, PT) orthostatic BP  -SB     Progress/Outcome (Transfer Goal 1, PT) new goal  -SB       Row Name 12/17/23 1306          Gait Training Goal 1 (PT)    Activity/Assistive Device (Gait Training Goal 1, PT) gait (walking locomotion);decrease fall risk;improve balance and speed;other (see comments)  LRAD  -SB     [place] Level (Gait Training Goal 1, PT) contact guard required  -SB     Distance (Gait Training Goal 1, PT) 60 feet  -SB     Time Frame (Gait Training Goal 1, PT) long term goal (LTG)  -SB     Strategies/Barriers (Gait Training Goal 1, PT) orthostatic BP  -SB     Progress/Outcome (Gait Training Goal 1, PT) new goal  -SB       Row Name 12/17/23 1306          Therapy Assessment/Plan (PT)    Planned Therapy Interventions (PT) balance training;strengthening;bed mobility training;gait training;patient/family education;transfer training  -SB               User Key  (r) = Recorded By, (t) = Taken By, (c) = Cosigned By      Initials Name Provider Type    Whitney Galvan, PT DPT Physical Therapist                   Clinical Impression       Row Name 12/17/23 7946          Pain    Pretreatment Pain Rating 0/10 - no pain  -SB     Posttreatment Pain Rating 0/10 - no pain  -SB     Pain Intervention(s) Medication (See MAR);Repositioned;Ambulation/increased activity  -SB     Additional Documentation Pain Scale: Numbers Pre/Post-Treatment (Group)  -SB       Row Name 12/17/23 1306          Plan of Care Review    Plan of Care Reviewed With patient  -SB     Progress no change  -SB     Outcome Evaluation PT eval completed. Pt alert and oriented x4. Pt reports independence at home prior to arrival, only needing help with driving and shopping. Pt edu on NWB RUE with  use of sling. Pt performs sup to sit with min A, using trunk muscles and going toward L side of bed. Pt performs sit to stand t/f with min A and stands at EOB long enough to obtain BP, but patient becomes symptomatic and reports sig dizziness. BP in seated position reads 100/50 with HR at 87, and in standing BP drops to 60/34 with HR at 79 BPM. Pt returned to supine position. Nsg notified Pt will benefit from skilled PT to improve fxl mob and independence. Recommend d/c subacute rehab vs home with 24/7 assist and HH pending progress and medical status.  -SB       Row Name 12/17/23 1306          Therapy Assessment/Plan (PT)    Patient/Family Therapy Goals Statement (PT) improve function  -SB     Rehab Potential (PT) good, to achieve stated therapy goals  -SB     Criteria for Skilled Interventions Met (PT) yes;meets criteria;skilled treatment is necessary  -SB     Therapy Frequency (PT) 2 times/day  -SB     Predicted Duration of Therapy Intervention (PT) until d/c or goals met  -SB       Row Name 12/17/23 1306          Vital Signs    Pre Systolic BP Rehab 100  -SB     Pre Treatment Diastolic BP 50  -SB     Intra Systolic BP Rehab 60   -SB     Intra Treatment Diastolic BP 34   -SB     Post Systolic BP Rehab 120  -SB     Post Treatment Diastolic BP 41  -SB     Pretreatment Heart Rate (beats/min) 87  -SB     Intratreatment Heart Rate (beats/min) 79  -SB     Posttreatment Heart Rate (beats/min) 76  -SB     O2 Delivery Pre Treatment room air  -SB     O2 Delivery Intra Treatment room air  -SB     O2 Delivery Post Treatment room air  -SB     Pre Patient Position Sitting  -SB     Intra Patient Position Standing  -SB     Post Patient Position Supine  -SB       Row Name 12/17/23 1306          Positioning and Restraints    Pre-Treatment Position in bed  -SB     Post Treatment Position bed  -SB     In Bed fowlers;call light within reach;encouraged to call for assist;with family/caregiver;notified nsg;exit alarm on  -SB                User Key  (r) = Recorded By, (t) = Taken By, (c) = Cosigned By      Initials Name Provider Type    Whitney Galvan PT DPT Physical Therapist                   Outcome Measures       Row Name 12/17/23 1306          How much help from another person do you currently need...    Turning from your back to your side while in flat bed without using bedrails? 3  -SB     Moving from lying on back to sitting on the side of a flat bed without bedrails? 3  -SB     Moving to and from a bed to a chair (including a wheelchair)? 3  -SB     Standing up from a chair using your arms (e.g., wheelchair, bedside chair)? 3  -SB     Climbing 3-5 steps with a railing? 2  -SB     To walk in hospital room? 3  -SB     AM-PAC 6 Clicks Score (PT) 17  -SB     Highest Level of Mobility Goal 5 --> Static standing  -SB       Row Name 12/17/23 1306 12/17/23 1305       Functional Assessment    Outcome Measure Options AM-PAC 6 Clicks Basic Mobility (PT)  -SB AM-PAC 6 Clicks Daily Activity (OT)  -LS              User Key  (r) = Recorded By, (t) = Taken By, (c) = Cosigned By      Initials Name Provider Type    Whitney Galvan PT DPT Physical Therapist    Nahomi Cunningham OTR/L Occupational Therapist                                 Physical Therapy Education       Title: PT OT SLP Therapies (In Progress)       Topic: Physical Therapy (In Progress)       Point: Mobility training (Done)       Learning Progress Summary             Patient Acceptance, E, VU by SB at 12/17/2023 1318    Comment: pt edu on POC, benefits of act, d/c plans, NWB status RUE   Family Acceptance, E, VU by SB at 12/17/2023 1318    Comment: pt edu on POC, benefits of act, d/c plans, NWB status RUE                         Point: Home exercise program (Not Started)       Learner Progress:  Not documented in this visit.              Point: Body mechanics (Not Started)       Learner Progress:  Not documented in this visit.              Point: Precautions (Done)       Learning  Progress Summary             Patient Acceptance, E, VU by SB at 12/17/2023 1318    Comment: pt edu on POC, benefits of act, d/c plans, NWB status RUE   Family Acceptance, E, VU by SB at 12/17/2023 1318    Comment: pt edu on POC, benefits of act, d/c plans, NWB status RUE                                         User Key       Initials Effective Dates Name Provider Type Discipline    SB 07/11/23 -  Whitney Gómez PT DPT Physical Therapist PT                  PT Recommendation and Plan  Planned Therapy Interventions (PT): balance training, strengthening, bed mobility training, gait training, patient/family education, transfer training  Plan of Care Reviewed With: patient  Progress: no change  Outcome Evaluation: PT eval completed. Pt alert and oriented x4. Pt reports independence at home prior to arrival, only needing help with driving and shopping. Pt edu on NWB RUE with use of sling. Pt performs sup to sit with min A, using trunk muscles and going toward L side of bed. Pt performs sit to stand t/f with min A and stands at EOB long enough to obtain BP, but patient becomes symptomatic and reports sig dizziness. BP in seated position reads 100/50 with HR at 87, and in standing BP drops to 60/34 with HR at 79 BPM. Pt returned to supine position. Nsg notified Pt will benefit from skilled PT to improve fxl mob and independence. Recommend d/c subacute rehab vs home with 24/7 assist and HH pending progress and medical status.     Time Calculation:         PT Charges       Row Name 12/17/23 1428             Time Calculation    Start Time 1306  -SB      Stop Time 1403  -SB      Time Calculation (min) 57 min  -SB      PT Received On 12/17/23  -SB      PT Goal Re-Cert Due Date 12/27/23  -SB                User Key  (r) = Recorded By, (t) = Taken By, (c) = Cosigned By      Initials Name Provider Type    SB Whitney Gómez PT DPT Physical Therapist                  Therapy Charges for Today       Code Description Service Date  Service Provider Modifiers Qty    [id number] HC PT EVAL MOD COMPLEXITY 4 12/17/2023 Whitney Gómez, BLANCA DPT GP 1            PT G-Codes  Outcome Measure Options: AM-PAC 6 Clicks Basic Mobility (PT)  AM-PAC 6 Clicks Score (PT): 17  AM-PAC 6 Clicks Score (OT): 14  PT Discharge Summary  Anticipated Discharge Disposition (PT): sub acute care setting, home with 24/7 care, home with home health    Whitney Gómez PT DPT  12/17/2023

## 2023-12-17 NOTE — ED NOTES
Nursing report ED to floor  Mya STONE [facility]  89 y.o.  female    HPI:   Chief Complaint   Patient presents with    Fall       Admitting doctor:   Sukumar Dominguez MD    Consulting provider(s):  Consults       No orders found for last 30 day(s).             Admitting diagnosis:   The primary encounter diagnosis was Syncope and collapse. Diagnoses of Orthostatic hypotension, Fall, initial encounter, and Closed fracture of right shoulder, initial encounter were also pertinent to this visit.    Code status:   Current Code Status       Date Active Code Status Order ID Comments User Context       Prior            Allergies:   Erythromycin, Zithromax [azithromycin], and Penicillins    Intake and Output    Intake/Output Summary (Last 24 hours) at 12/17/2023 0040  Last data filed at 12/16/2023 2305  Gross per 24 hour   Intake 1000 ml   Output 350 ml   Net 650 ml       Weight:       12/16/23  1726   Weight: 49.9 kg (110 lb)       Most recent vitals:   Vitals:    12/16/23 2146 12/16/23 2201 12/16/23 2216 12/16/23 2346   BP: 156/55 139/55 140/58 133/61   Patient Position:       Pulse: 76 80 81 79   Resp:  16     Temp:       SpO2: 95% 94% 92% 95%   Weight:       Height:         Oxygen Therapy: .    Active LDAs/IV Access:   Lines, Drains & Airways       Active LDAs       Name Placement date Placement time Site Days    Peripheral IV 06/30/23 Anterior;Left Forearm 06/30/23  --  Forearm  170    External Urinary Catheter 12/16/23 2050  --  less than 1                    Labs (abnormal labs have a star):   Labs Reviewed   COMPREHENSIVE METABOLIC PANEL - Abnormal; Notable for the following components:       Result Value    Glucose 126 (*)     BUN 38 (*)     Creatinine 1.16 (*)     Chloride 108 (*)     BUN/Creatinine Ratio 32.8 (*)     eGFR 43.5 (*)     All other components within normal limits    Narrative:     GFR Normal >60  Chronic Kidney Disease <60  Kidney Failure <15    The GFR formula is only valid for adults with stable  renal function between ages 18 and 70.   URINALYSIS W/ CULTURE IF INDICATED - Abnormal; Notable for the following components:    Glucose, UA >=1000 mg/dL (3+) (*)     Ketones, UA Trace (*)     Blood, UA Trace (*)     All other components within normal limits    Narrative:     In absence of clinical symptoms, the presence of pyuria, bacteria, and/or nitrites on the urinalysis result does not correlate with infection.   SINGLE HSTROPONIN T - Abnormal; Notable for the following components:    HS Troponin T 15 (*)     All other components within normal limits    Narrative:     High Sensitive Troponin T Reference Range:  <14.0 ng/L- Negative Female for AMI  <22.0 ng/L- Negative Male for AMI  >=14 - Abnormal Female indicating possible myocardial injury.  >=22 - Abnormal Male indicating possible myocardial injury.   Clinicians would have to utilize clinical acumen, EKG, Troponin, and serial changes to determine if it is an Acute Myocardial Infarction or myocardial injury due to an underlying chronic condition.        CBC WITH AUTO DIFFERENTIAL - Abnormal; Notable for the following components:    WBC 12.22 (*)     MCHC 30.4 (*)     Lymphocyte % 15.6 (*)     Neutrophils, Absolute 8.91 (*)     Monocytes, Absolute 1.21 (*)     All other components within normal limits   MAGNESIUM - Normal   URINALYSIS, MICROSCOPIC ONLY   CBC AND DIFFERENTIAL    Narrative:     The following orders were created for panel order CBC & Differential.  Procedure                               Abnormality         Status                     ---------                               -----------         ------                     CBC Auto Differential[[id number]]        Abnormal            Final result                 Please view results for these tests on the individual orders.       Meds given in ED:   Medications   sodium chloride 0.9 % flush 10 mL (has no administration in time range)   lactated ringers bolus 1,000 mL (0 mL Intravenous Stopped 12/16/23  2423)           NIH Stroke Scale:       Isolation/Infection(s):  No active isolations   No active infections     COVID Testing  Collected .  Resulted .    Nursing report ED to floor:  Mental status: .  Ambulatory status: .  Precautions: .    ED nurse phone extentsion- ..

## 2023-12-17 NOTE — CASE MANAGEMENT/SOCIAL WORK
Discharge Planning Assessment  [facility]     Patient Name: Mya Chung  MRN: [id number]  Today's Date: 12/17/2023    Admit Date: 12/16/2023    Plan: Home   Discharge Needs Assessment       Row Name 12/17/23 0817       Living Environment    People in Home alone    Current Living Arrangements home    Potentially Unsafe Housing Conditions none    In the past 12 months has the electric, gas, oil, or water company threatened to shut off services in your home? No    Primary Care Provided by self    Provides Primary Care For no one, unable/limited ability to care for self    Family Caregiver if Needed child(nancy), adult    Family Caregiver Names Yeni Mixon Daughter [phone]    Quality of Family Relationships supportive;involved;helpful    Able to Return to Prior Arrangements yes       Resource/Environmental Concerns    Resource/Environmental Concerns none       Food Insecurity    Within the past 12 months, you worried that your food would run out before you got the money to buy more. Never true    Within the past 12 months, the food you bought just didn't last and you didn't have money to get more. Never true       Transition Planning    Patient/Family Anticipates Transition to home with family    Patient/Family Anticipated Services at Transition none    Transportation Anticipated family or friend will provide       Discharge Needs Assessment    Readmission Within the Last 30 Days no previous admission in last 30 days    Equipment Currently Used at Home --  cane and standard walker available    Concerns to be Addressed denies needs/concerns at this time    Anticipated Changes Related to Illness none    Equipment Needed After Discharge none    Current Discharge Risk chronically ill;lives alone                   Discharge Plan       Row Name 12/17/23 0818       Plan    Plan Home    Patient/Family in Agreement with Plan yes    Plan Comments Spoke with Yeni Mixon Daughter [phone] to assess for d/c  planning. Pt lives alone at home and mostly independent.  Both of her children check on her often during the day. Pt has PCP/RX coverage.  Dtr saying pt will be staying with her at d/c until she gets better at least.  She will reach out to  if further assist felt needed. Will follow.                  Continued Care and Services - Admitted Since 12/16/2023    Coordination has not been started for this encounter.          Demographic Summary    No documentation.                  Functional Status    No documentation.                  Psychosocial    No documentation.                  Abuse/Neglect    No documentation.                  Legal    No documentation.                  Substance Abuse    No documentation.                  Patient Forms    No documentation.                     ALLIE AvendañoW

## 2023-12-17 NOTE — PLAN OF CARE
Goal Outcome Evaluation:  Plan of Care Reviewed With: patient, son, daughter        Progress: no change  Outcome Evaluation: OT eval completed. Pt in fowlers upon therapist arrival; A&Ox4; No c/o pain at rest; RUE in sling. Pt reports Mod I-I with all BADLs including fxl ambulation at PLOF. Pt educated on sling wear schedule and RUE NWB status; Pt verbalized understanding. Pt peformed supine<>sit utilizing bedrail with HOB elevated requiring Min A and verbal/visual cues for sequencing and body mechanics. Pt dependent for donning B socks while seated EOB. Pt performed sit<>stand with CGA. Once standing, Pt became symptomatic reporting light headedness/dizziness and was returned to seated position once BP had been taken. Positive orthostatic BPs as follows: sitting 100/50 HR 79bpm, standing 60/34 HR 87bpm, return to supine 120/41 HR 76bpm; Nursing notified. Skilled OT intervention indicated in order to address deficits in fxl mobility, fxl activity tolerance, balance, strength, and use of adaptive techniques/equipment during performance of BADLs. Recommend sub acute rehab vs home with assist and HH at discharge pending Pt progress.      Anticipated Discharge Disposition (OT): sub acute care setting, home with assist, home with home health

## 2023-12-17 NOTE — THERAPY EVALUATION
Patient Name: Mya STONE [facility]  : 2/10/1928    MRN: [id number]                              Today's Date: 2023       Admit Date: 2023    Visit Dx:     ICD-10-CM ICD-9-CM   1. Syncope and collapse  R55 780.2   2. Orthostatic hypotension  I95.1 458.0   3. Fall, initial encounter  W19.XXXA E888.9   4. Closed fracture of right shoulder, initial encounter  S42.91XA 812.00     Patient Active Problem List   Diagnosis    Left upper arm pain    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    Hypertension    Hyperlipidemia    PAD (peripheral artery disease)    Right greater trochanteric avulsion periprosthetic, closed, minimally displaced    Closed fracture of right wrist    Fall    Impaired gait and mobility    Acute right hip pain    Hypothyroidism    Closed fracture of multiple pubic rami, right, initial encounter    Cholecystitis    Nausea    Acute on chronic diastolic congestive heart failure    Other chest pain    Acute systolic heart failure    Chronic combined systolic and diastolic congestive heart failure    Closed fracture of ramus of left pubis, initial encounter    Stage 3a chronic kidney disease (CKD)    Subarachnoid hemorrhage    Body mass index (BMI) of 19.9 or less in adult    Former smoker    Orthostatic hypotension    Closed fracture of proximal end of right humerus    Anemia     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     Wears glasses      Past Surgical History:   Procedure Laterality Date    AORTAGRAM Right 2018    Procedure: LEFT LOWER EXTREMITY ANGIOGRAM;  Surgeon: Walker Davis DO;  Location: [facility] OR ;  Service: Vascular     BREAST BIOPSY Bilateral     multiple on both, all benign    BREAST BIOPSY Left 2017    Procedure: LEFT MAMMOGRAM GUIDED NEEDLE DIRECTED EXCISIONAL BREAST BIOPSY;  Surgeon: Malgorzata Scott MD;  Location: [facility] OR;  Service:     BUNIONECTOMY Bilateral      SECTION      X2    CHOLECYSTECTOMY      CHOLECYSTECTOMY WITH INTRAOPERATIVE CHOLANGIOGRAM N/A 2021    Procedure: LAPAROSCOPIC CHOLECYSTECTOMY WITH CHOLANGIOGRAM;  Surgeon: Malgorzata Scott MD;  Location: [facility] OR;  Service: General;  Laterality: N/A;    COLONOSCOPY  2015    diverticulosis    ENDOSCOPY N/A 2021    Procedure: ESOPHAGOGASTRODUODENOSCOPY WITH ANESTHESIA;  Surgeon: Sonu Cheek DO;  Location: [facility] ENDOSCOPY;  Service: Gastroenterology;  Laterality: N/A;  pre nausea  post esophagitis  Delfina Pereira DO    EYE SURGERY      CATARACT SURGERY    FEMORAL ENDARTERECTOMY Left 2018    Procedure: LEFT FEMORAL ENDARTERECTOMY;  Surgeon: Walker Davis DO;  Location: [facility] HYBRID OR 12;  Service: Vascular    JOINT REPLACEMENT      RIGHT HIP    VASCULAR SURGERY        General Information       Row Name 23 1305 23 0707       OT Time and Intention    Document Type evaluation  Presented w/ R arm pain after fall & was found to have R humerus fx.Ortho recommended sling & later eval for splinting. Pt had orthostatic episode w/ syncope in ED prior to d/c & was admitted.Dx:Orthostatic hypotension, Closed fx proximal end R humerus  -LS evaluation  -LS    Mode of Treatment occupational therapy  -LS occupational therapy  -LS      Row Name 23 1305 23 0707       General Information    Patient Profile Reviewed yes  -LS yes  -LS    Prior Level of Function independent:;ADL's;all household mobility;home management;cooking;cleaning;dependent:;driving;shopping  -LS --    Existing Precautions/Restrictions fall;non-weight bearing;right;orthostatic hypotension   NWB RUE; Sling immobilizer RUE  -LS --     Barriers to Rehab medically complex;physical barrier  - --      Row Name 12/17/23 1305 12/17/23 0707       Occupational Profile    Reason for Services/Referral (Occupational Profile) -- --  -    Environmental Supports and Barriers (Occupational Profile) Has both tub shower combination and walk in shower; Typically utilizes tub shower which has grab bars and a shower chair. Grab bar next to toilet on R side. Sleeps in a regular bed. Other AD/DME: [employer]  - --      Row Name 12/17/23 1305          Living Environment    People in Home alone;other (see comments)  Will be living with family temporarily  -       Row Name 12/17/23 1305          Home Main Entrance    Number of Stairs, Main Entrance three  -     Stair Railings, Main Entrance railing on left side (ascending)  -       Row Name 12/17/23 1305          Stairs Within Home, Primary    Number of Stairs, Within Home, Primary none  -       Row Name 12/17/23 1305          Cognition    Orientation Status (Cognition) oriented x 4  -       Row Name 12/17/23 1305          Safety Issues, Functional Mobility    Impairments Affecting Function (Mobility) pain;strength;endurance/activity tolerance;range of motion (ROM)  -               User Key  (r) = Recorded By, (t) = Taken By, (c) = Cosigned By      Initials Name Provider Type     Nahomi Crespo OTR/L Occupational Therapist                     Mobility/ADL's       Row Name 12/17/23 1305          Bed Mobility    Bed Mobility supine-sit;sit-supine  -     Supine-Sit [place] (Bed Mobility) minimum assist (75% patient effort);verbal cues;nonverbal cues (demo/gesture)  -     Sit-Supine [place] (Bed Mobility) minimum assist (75% patient effort);verbal cues;nonverbal cues (demo/gesture)  -     Bed Mobility, Safety Issues decreased use of arms for pushing/pulling  -     Assistive Device (Bed Mobility) bed rails;head of bed elevated  -       Row Name 12/17/23 1305          Transfers     Transfers sit-stand transfer  -[facility] Name 12/17/23 1305          Sit-Stand Transfer    Sit-Stand [place] (Transfers) minimum assist (75% patient effort);verbal cues  -[facility] Name 12/17/23 1305          Functional Mobility    Patient was able to Ambulate no, other medical factors prevent ambulation  -     Reason Patient was unable to Ambulate Other (Comment)   orthostatic hypotension  -[facility] Name 12/17/23 1305          Activities of Daily Living    BADL Assessment/Intervention upper body dressing;lower body dressing;toileting  -[facility] Name 12/17/23 1305          Mobility    Extremity Weight-bearing Status right upper extremity  -     Right Upper Extremity (Weight-bearing Status) non weight-bearing (NWB)  -       Row Name 12/17/23 1305          Upper Body Dressing Assessment/Training    Russell Level (Upper Body Dressing) upper body dressing skills;maximum assist (25% patient effort)  -LS     Position (Upper Body Dressing) edge of bed sitting  -LS       Row Name 12/17/23 1305          Lower Body Dressing Assessment/Training    [place] Level (Lower Body Dressing) lower body dressing skills;maximum assist (25% patient effort)  -LS     Position (Lower Body Dressing) edge of bed sitting;unsupported standing  -[facility] Name 12/17/23 1305          Toileting Assessment/Training    [place] Level (Toileting) toileting skills;maximum assist (25% patient effort)  -LS     Position (Toileting) supported sitting;unsupported standing  -               User Key  (r) = Recorded By, (t) = Taken By, (c) = Cosigned By      Initials Name Provider Type    Nahomi Cunningham, OTR/L Occupational Therapist                   Obj/Interventions       [facility] Name 12/17/23 1305          Sensory Assessment (Somatosensory)    Sensory Assessment (Somatosensory) UE sensation intact  -LS       Row Name 12/17/23 1305          Vision Assessment/Intervention    Visual Impairment/Limitations  WFL;corrective lenses full-time  -LS       Row Name 12/17/23 1305          Range of Motion Comprehensive    General Range of Motion upper extremity range of motion deficits identified  -LS     Comment, General Range of Motion RUE in immobilizer; LUE AROM grossly WFL  -LS       Row Name 12/17/23 1305          Strength Comprehensive (MMT)    General Manual Muscle Testing (MMT) Assessment upper extremity strength deficits identified  -LS     Comment, General Manual Muscle Testing (MMT) Assessment RUE strength testing deferred; LUE strength grossly 4+/5  -LS       Row Name 12/17/23 1305          Balance    Balance Assessment sitting static balance;sitting dynamic balance;standing static balance  -LS     Static Sitting Balance standby assist  -LS     Dynamic Sitting Balance contact guard  -LS     Position, Sitting Balance sitting edge of bed  -LS     Static Standing Balance contact guard  -LS     Position/Device Used, Standing Balance unsupported  -LS               User Key  (r) = Recorded By, (t) = Taken By, (c) = Cosigned By      Initials Name Provider Type     Nahomi Crespo, OTR/L Occupational Therapist                   Goals/Plan       Row Name 12/17/23 1305          Transfer Goal 1 (OT)    Activity/Assistive Device (Transfer Goal 1, OT) toilet;shower chair  -LS     [place] Level/Cues Needed (Transfer Goal 1, OT) standby assist  -LS     Time Frame (Transfer Goal 1, OT) long term goal (LTG);10 days  -LS     Progress/Outcome (Transfer Goal 1, OT) new goal  -       Row Name 12/17/23 1305          Dressing Goal 1 (OT)    Activity/Device (Dressing Goal 1, OT) dressing skills, all  -LS     [place]/Cues Needed (Dressing Goal 1, OT) moderate assist (50-74% patient effort)  -LS     Time Frame (Dressing Goal 1, OT) long term goal (LTG);10 days  -LS     Progress/Outcome (Dressing Goal 1, OT) new goal  -       Row Name 12/17/23 1305          Toileting Goal 1 (OT)    Activity/Device (Toileting Goal 1, OT)  toileting skills, all  -LS     [place] Level/Cues Needed (Toileting Goal 1, OT) minimum assist (75% or more patient effort)  -LS     Time Frame (Toileting Goal 1, OT) long term goal (LTG);10 days  -LS     Progress/Outcome (Toileting Goal 1, OT) new goal  -LS       Row Name 12/17/23 1309          Therapy Assessment/Plan (OT)    Planned Therapy Interventions (OT) activity tolerance training;functional balance retraining;occupation/activity based interventions;ROM/therapeutic exercise;strengthening exercise;transfer/mobility retraining;patient/caregiver education/training;adaptive equipment training;BADL retraining;orthotic fabrication/fitting/training  -LS               User Key  (r) = Recorded By, (t) = Taken By, (c) = Cosigned By      Initials Name Provider Type    Nahomi Cunningham, OTR/L Occupational Therapist                   Clinical Impression       Row Name 12/17/23 1304          Pain Assessment    Pretreatment Pain Rating 0/10 - no pain  -LS     Posttreatment Pain Rating 0/10 - no pain  -LS       Row Name 12/17/23 1303          Plan of Care Review    Plan of Care Reviewed With patient;son;daughter  -LS     Progress no change  -LS     Outcome Evaluation OT eval completed. Pt in fowlers upon therapist arrival; A&Ox4; No c/o pain at rest; RUE in sling. Pt reports Mod I-I with all BADLs including fxl ambulation at PLOF. Pt educated on sling wear schedule and RUE NWB status; Pt verbalized understanding. Pt peformed supine<>sit utilizing bedrail with HOB elevated requiring Min A and verbal/visual cues for sequencing and body mechanics. Pt dependent for donning B socks while seated EOB. Pt performed sit<>stand with CGA. Once standing, Pt became symptomatic reporting light headedness/dizziness and was returned to seated position once BP had been taken. Positive orthostatic BPs as follows: sitting 100/50 HR 79bpm, standing 60/34 HR 87bpm, return to supine 120/41 HR 76bpm; Nursing notified. Skilled OT intervention  indicated in order to address deficits in fxl mobility, fxl activity tolerance, balance, strength, and use of adaptive techniques/equipment during performance of BADLs. Recommend sub acute rehab vs home with assist and HH at discharge pending Pt progress.  -       Row Name 12/17/23 6416          Therapy Assessment/Plan (OT)    Rehab Potential (OT) good, to achieve stated therapy goals  -     Criteria for Skilled Therapeutic Interventions Met (OT) yes;skilled treatment is necessary  -     Therapy Frequency (OT) 5 times/wk  -       Row Name 12/17/23 1303          Therapy Plan Review/Discharge Plan (OT)    Anticipated Discharge Disposition (OT) sub acute care setting;home with assist;home with home health  -       Row Name 12/17/23 1043          Vital Signs    Pre Systolic BP Rehab 100  -LS     Pre Treatment Diastolic BP 50  -LS     Intra Systolic BP Rehab 60   -LS     Intra Treatment Diastolic BP 34   -LS     Post Systolic BP Rehab 120  -LS     Post Treatment Diastolic BP 41  -LS     Pretreatment Heart Rate (beats/min) 87  -LS     Intratreatment Heart Rate (beats/min) 79  -LS     Posttreatment Heart Rate (beats/min) 76  -LS     Pre Patient Position Sitting  -LS     Intra Patient Position Standing  -LS     Post Patient Position Supine  -LS       Row Name 12/17/23 9742          Positioning and Restraints    Pre-Treatment Position in bed  -LS     Post Treatment Position bed  -LS     In Bed fowlers;side rails up x2;with family/caregiver;call light within reach;encouraged to call for assist;exit alarm on  -LS               User Key  (r) = Recorded By, (t) = Taken By, (c) = Cosigned By      Initials Name Provider Type    Nahomi Cunningham OTR/L Occupational Therapist                   Outcome Measures       Row Name 12/17/23 4049          How much help from another is currently needed...    Putting on and taking off regular lower body clothing? 2  -LS     Bathing (including washing, rinsing, and drying) 2  -LS      Toileting (which includes using toilet bed pan or urinal) 2  -LS     Putting on and taking off regular upper body clothing 2  -LS     Taking care of personal grooming (such as brushing teeth) 3  -LS     Eating meals 3  -LS     AM-PAC 6 Clicks Score (OT) 14  -LS       Row Name 12/17/23 1306 12/17/23 1305       Functional Assessment    Outcome Measure Options AM-PAC 6 Clicks Basic Mobility (PT)  -SB AM-PAC 6 Clicks Daily Activity (OT)  -LS              User Key  (r) = Recorded By, (t) = Taken By, (c) = Cosigned By      Initials Name Provider Type    Whitney Galvan, PT DPT Physical Therapist    LS Nahomi Crespo, OTR/L Occupational Therapist                    Occupational Therapy Education       Title: PT OT SLP Therapies (In Progress)       Topic: Occupational Therapy (In Progress)       Point: ADL training (Done)       Description:   Instruct learner(s) on proper safety adaptation and remediation techniques during self care or transfers.   Instruct in proper use of assistive devices.                  Learning Progress Summary             Patient Acceptance, E, VU by LS at 12/17/2023 1422                         Point: Home exercise program (Not Started)       Description:   Instruct learner(s) on appropriate technique for monitoring, assisting and/or progressing therapeutic exercises/activities.                  Learner Progress:  Not documented in this visit.              Point: Precautions (Done)       Description:   Instruct learner(s) on prescribed precautions during self-care and functional transfers.                  Learning Progress Summary             Patient Acceptance, E, VU by LS at 12/17/2023 1422                         Point: Body mechanics (Done)       Description:   Instruct learner(s) on proper positioning and spine alignment during self-care, functional mobility activities and/or exercises.                  Learning Progress Summary             Patient Acceptance, E, VU by LS at 12/17/2023 1422                                          User Key       Initials Effective Dates Name Provider Type Discipline     06/20/22 -  Nahomi Crespo, OTR/L Occupational Therapist OT                  OT Recommendation and Plan  Planned Therapy Interventions (OT): activity tolerance training, functional balance retraining, occupation/activity based interventions, ROM/therapeutic exercise, strengthening exercise, transfer/mobility retraining, patient/caregiver education/training, adaptive equipment training, BADL retraining, orthotic fabrication/fitting/training  Therapy Frequency (OT): 5 times/wk  Plan of Care Review  Plan of Care Reviewed With: patient, son, daughter  Progress: no change  Outcome Evaluation: OT eval completed. Pt in fowlers upon therapist arrival; A&Ox4; No c/o pain at rest; RUE in sling. Pt reports Mod I-I with all BADLs including fxl ambulation at [facility]. Pt educated on sling wear schedule and RUE NWB status; Pt verbalized understanding. Pt peformed supine<>sit utilizing bedrail with HOB elevated requiring Min A and verbal/visual cues for sequencing and body mechanics. Pt dependent for donning B socks while seated EOB. Pt performed sit<>stand with CGA. Once standing, Pt became symptomatic reporting light headedness/dizziness and was returned to seated position once BP had been taken. Positive orthostatic BPs as follows: sitting 100/50 HR 79bpm, standing 60/34 HR 87bpm, return to supine 120/41 HR 76bpm; Nursing notified. Skilled OT intervention indicated in order to address deficits in fxl mobility, fxl activity tolerance, balance, strength, and use of adaptive techniques/equipment during performance of BADLs. Recommend sub acute rehab vs home with assist and HH at discharge pending Pt progress.     Time Calculation:         Time Calculation- OT       Row Name 12/17/23 1305             Time Calculation- OT    OT Start Time --  2628-3633 & 8221-6711 + 10 minutes chart review  -      Total Timed Code  Minutes- OT 14 minute(s)  -      OT Non-Billable Time (min) 60 min  -      OT Received On 12/17/23  -      OT Goal Re-Cert Due Date 12/27/23  -                User Key  (r) = Recorded By, (t) = Taken By, (c) = Cosigned By      Initials Name Provider Type     Nahomi Crespo, OTR/L Occupational Therapist                  Therapy Charges for Today       Code Description Service Date Service Provider Modifiers Qty    94070390456 HC OT EVAL MOD COMPLEXITY 4 12/17/2023 Nahomi Crespo, OTR/L GO 1    [id number]  OT SELF CARE/MGMT/TRAIN EA 15 MIN 12/17/2023 Nahomi Crespo, OTR/L GO 1                 Nahomi Crespo, OTR/L  12/17/2023

## 2023-12-17 NOTE — ED PROVIDER NOTES
Subjective   History of Present Illness  Patient is a 89-year-old female who presents emergency department after fall.  She states that she was in her recliner and she went to pick something up when she turned around she moved too quick and fell and hit her right cheek and right shoulder.  She denies feeling lightheaded or dizzy before she fell.  She states that she simply turned around too quick and had a mechanical fall.  She is complaining of right shoulder and right upper arm pain.  She is unable to raise her arm.  She denies any numbness or tingling sensation.  Pulses are intact.  She is not complaining of pain anywhere else.        Review of Systems   Musculoskeletal:  Positive for arthralgias.   All other systems reviewed and are negative.      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     Wears glasses        Allergies   Allergen Reactions    Erythromycin Shortness Of Breath    Zithromax [Azithromycin] Itching and Other (See Comments)     Yeast infection    Penicillins Rash       Past Surgical History:   Procedure Laterality Date    AORTAGRAM Right 2018    Procedure: LEFT LOWER EXTREMITY ANGIOGRAM;  Surgeon: Walker Davis DO;  Location: [facility] OR 12;  Service: Vascular    BREAST BIOPSY Bilateral     multiple on both, all benign    BREAST BIOPSY Left 2017    Procedure: LEFT MAMMOGRAM GUIDED NEEDLE DIRECTED EXCISIONAL BREAST BIOPSY;  Surgeon: Malgorzata Scott MD;  Location: [facility] OR;  Service:     BUNIONECTOMY Bilateral      SECTION      X2    CHOLECYSTECTOMY      CHOLECYSTECTOMY WITH INTRAOPERATIVE CHOLANGIOGRAM N/A 2021     Procedure: LAPAROSCOPIC CHOLECYSTECTOMY WITH CHOLANGIOGRAM;  Surgeon: Malgorzata Scott MD;  Location: [facility] OR;  Service: General;  Laterality: N/A;    COLONOSCOPY  2015    diverticulosis    ENDOSCOPY N/A 2021    Procedure: ESOPHAGOGASTRODUODENOSCOPY WITH ANESTHESIA;  Surgeon: Sonu Cheek DO;  Location: [facility] ENDOSCOPY;  Service: Gastroenterology;  Laterality: N/A;  pre nausea  post esophagitis  Delfina Pereira DO    EYE SURGERY      CATARACT SURGERY    FEMORAL ENDARTERECTOMY Left 2018    Procedure: LEFT FEMORAL ENDARTERECTOMY;  Surgeon: Walker Davis DO;  Location: [facility] HYBRID OR 12;  Service: Vascular    JOINT REPLACEMENT      RIGHT HIP    VASCULAR SURGERY         Family History   Problem Relation Age of Onset    No Known Problems Mother     Heart disease Father     Cancer Sister         Uterus    No Known Problems Brother     No Known Problems Maternal Aunt     No Known Problems Paternal Aunt     No Known Problems Maternal Grandmother     No Known Problems Paternal Grandmother     No Known Problems Daughter     No Known Problems Son     Breast cancer Neg Hx     BRCA 1/2 Neg Hx     Colon cancer Neg Hx     Endometrial cancer Neg Hx     Ovarian cancer Neg Hx     Colon polyps Neg Hx     Esophageal cancer Neg Hx        Social History     Socioeconomic History    Marital status:    Tobacco Use    Smoking status: Former     Years: 2     Types: Cigarettes     Quit date: 1955     Years since quittin.0     Passive exposure: Never    Smokeless tobacco: Never   Vaping Use    Vaping Use: Never used   Substance and Sexual Activity    Alcohol use: No    Drug use: No    Sexual activity: Defer           Objective   Physical Exam  Vitals and nursing note reviewed.   Constitutional:       General: She is not in acute distress.     Appearance: Normal appearance. She is normal weight. She is not ill-appearing or toxic-appearing.   HENT:      Head: Normocephalic.       Comments: Bruising present to right cheek.  Cardiovascular:      Rate and Rhythm: Normal rate and regular rhythm.      Pulses: Normal pulses.           Radial pulses are 2+ on the right side.      Heart sounds: Normal heart sounds.   Pulmonary:      Effort: Pulmonary effort is normal.      Breath sounds: Normal breath sounds.   Abdominal:      General: Abdomen is flat. Bowel sounds are normal.      Palpations: Abdomen is soft.   Musculoskeletal:         General: Normal range of motion.      Cervical back: Normal range of motion and neck supple.   Skin:     General: Skin is warm and dry.   Neurological:      General: No focal deficit present.      Mental Status: She is alert and oriented to person, place, and time. Mental status is at baseline.      GCS: GCS eye subscore is 4. GCS verbal subscore is 5. GCS motor subscore is 6.      Sensory: Sensation is intact.      Motor: Motor function is intact.      Coordination: Coordination is intact.      Gait: Gait is intact.      Comments:  strength strong bilaterally.  Dorsiflexion and plantarflexion strong bilaterally.   Psychiatric:         Mood and Affect: Mood normal.         Behavior: Behavior normal.         Thought Content: Thought content normal.         Judgment: Judgment normal.         Procedures           ED Course  ED Course as of 12/17/23 0014   Sat Dec 16, 2023   1914 Nursing staff just reported that patient had a syncopal episode in the bathroom.  This was a witnessed episode with her family members.  She did not hit her head.  She did lose consciousness for a very brief second.  We will go ahead and add some lab work and EKG at this time.  Chest x-ray also ordered.  Patient is completely alert and oriented.  She is neurologically intact.  She is answering all my questions appropriately and following all of my commands.  She is no longer complaining of any lightheadedness or dizziness. [KR]   1945 Case signed out to ANGY Hernandez who will resume  further care and final disposition.  Labs, urinalysis, EKG pending at this time.  I will message Dr. Galvez regarding patient's x-ray findings. [KR]   2043 Assumed care of pt from FABBY HEIN. Orthostatic b/p lying 146/56 to standing 90/57. She is getting ivf bolus at this time. Pending remainder of labs at present.  [CW]   2250 Pending urinalysis at present.  Have asked the staff to do a cath urine.  Blood pressure at present is 140/58, heart rate 81, respirations 16, O2 sats 92% on room air. [CW]   2319 Patient urinalysis shows trace ketones and trace of blood.  No leukocytes or bacteria.  Vitals are stable.  Call has been placed to hospitalist at this time for further. [CW]   Sun Dec 17, 2023   0008 Spoke with Dr. Dominguez- hospitalist on call- has accepted for admission.  Family in agreement with the care plan.  Vital signs are stable her blood pressure was 133/61, heart rate 79, respirations 16, O2 sat 95% on room air.  She will be admitted shortly in stable condition [CW]      ED Course User Index  [CW] Anabella Walker APRN  [KR] Barbara Lino APRN                                             Medical Decision Making  Assumed care of patient from ANGY Sullivan.  Patient has a right humeral neck fracture which extends to the right humeral head.  She had an episode of syncope while she was in the emergency department.  She did have orthostatic hypotension and her blood pressure dropped from systolic of 1 40-90.  She had a syncopal episode while she had gotten up to go to the bathroom and the syncopal episode was up for about 30 seconds.  She did not hit her head.  Her family was with her.  CTs of her head face and cervical spine are all negative for any acute abnormalities.  She was given a liter of fluids while in the emergency department.  Her pressure at this time is 133/61.  She is alert and oriented.  Her family is at her bedside.  Spoke with Dr. Dominguez hospitalist on-call who has accepted for  admission.  Ordered sling for the patient.  Ortho was contacted per ANGY Sullivan and advised to put in a sling and they would put in a splint.  Stated it was nonoperative at her age.  The patient's family is in agreement with the care plan.  They voiced understanding of instructions  XR Chest 1 View   Final Result    1.  No acute cardiopulmonary finding.    2.  Redemonstrated right humeral surgical neck and proximal shaft    fractures.         This report was signed and finalized on 12/16/2023 7:39 PM by Dr. Navid Canales MD.          XR Clavicle Right   Final Result    1.  No evidence of acute fracture involving the right clavicle.    2.  Redemonstrated right humeral fractures.         This report was signed and finalized on 12/16/2023 7:38 PM by Dr. Navid Canales MD.          XR Humerus Right   Final Result    1.  Acute comminuted and displaced fracture of the right humeral    surgical neck with suspected extension into the humeral head.    2.  Acute one shaft width displaced and angulated fracture of the    proximal to mid right humeral shaft.         This report was signed and finalized on 12/16/2023 7:37 PM by Dr. Navid Canales MD.          XR Shoulder 2+ View Right   Final Result    1.  Acute comminuted and displaced fracture of the right humeral    surgical neck with suspected extension into the right humeral head.    2.  Acute one shaft width displaced oblique fracture of the proximal    right humeral shaft.         This report was signed and finalized on 12/16/2023 7:35 PM by Dr. Navid Canales MD.          CT Head Without Contrast   Final Result    1.  No acute intracranial findings.    2.  Advanced chronic microvascular ischemic white matter change with    global cerebral volume loss.         This report was signed and finalized on 12/16/2023 6:51 PM by Dr. Navid Canales MD.          CT Facial Bones Without Contrast   Final Result    1.  No acute facial fracture.    2.   Partially imaged cervical spine is described in a separate same day    report.         This report was signed and finalized on 12/16/2023 6:55 PM by Dr. Navid Canales MD.          CT Cervical Spine Without Contrast   Final Result    1.  No acute fracture or subluxation.    2.  Old fractures of the right anterior and right posterolateral C1    arch.    3.  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         This report was signed and finalized on 12/16/2023 7:05 PM by Dr. Navid Canales MD.          Labs Reviewed  COMPREHENSIVE METABOLIC PANEL - Abnormal; Notable for the following components:     Glucose                       126 (*)                BUN                           38 (*)                 Creatinine                    1.16 (*)               Chloride                      108 (*)                BUN/Creatinine Ratio          32.8 (*)               eGFR                          43.5 (*)            All other components within normal limits         Narrative: GFR Normal >60                  Chronic Kidney Disease <60                  Kidney Failure <15                                    The GFR formula is only valid for adults with stable renal function between ages 18 and 70.  URINALYSIS W/ CULTURE IF INDICATED - Abnormal; Notable for the following components:     Glucose, UA                     (*)                  Ketones, UA                   Trace (*)               Blood, UA                     Trace (*)            All other components within normal limits         Narrative: In absence of clinical symptoms, the presence of pyuria, bacteria, and/or nitrites on the urinalysis result does not correlate with infection.  SINGLE HSTROPONIN T - Abnormal; Notable for the following components:     HS Troponin T                 15 (*)              All  other components within normal limits         Narrative: High Sensitive Troponin T Reference Range:                  <14.0 ng/L- Negative Female for AMI                  <22.0 ng/L- Negative Male for AMI                  >=14 - Abnormal Female indicating possible myocardial injury.                  >=22 - Abnormal Male indicating possible myocardial injury.                   Clinicians would have to utilize clinical acumen, EKG, Troponin, and serial changes to determine if it is an Acute Myocardial Infarction or myocardial injury due to an underlying chronic condition.                                       CBC WITH AUTO DIFFERENTIAL - Abnormal; Notable for the following components:     WBC                           12.22 (*)               MCHC                          30.4 (*)               Lymphocyte %                  15.6 (*)               Neutrophils, Absolute         8.91 (*)               Monocytes, Absolute           1.21 (*)            All other components within normal limits  MAGNESIUM - Normal  URINALYSIS, MICROSCOPIC ONLY  CBC AND DIFFERENTIAL      Problems Addressed:  Closed fracture of right shoulder, initial encounter: complicated acute illness or injury  Fall, initial encounter: complicated acute illness or injury  Orthostatic hypotension: complicated acute illness or injury  Syncope and collapse: complicated acute illness or injury    Amount and/or Complexity of Data Reviewed  Labs: ordered. Decision-making details documented in ED Course.  Radiology: ordered. Decision-making details documented in ED Course.  ECG/medicine tests: ordered. Decision-making details documented in ED Course.    Risk  Prescription drug management.  Decision regarding hospitalization.        Final diagnoses:   Syncope and collapse   Orthostatic hypotension   Fall, initial encounter   Closed fracture of right shoulder, initial encounter       ED Disposition  ED Disposition       ED Disposition   Decision to Admit     Condition   --    Comment   Level of Care: Med/Surg [1]   Diagnosis: Orthostatic hypotension [458.0.ICD-9-CM]   Admitting Physician: SUDHA CHAUDHARI [[id number]]   Attending Physician: SUDHA CHAUDHARI [[id number]]                 No follow-up provider specified.       Medication List      No changes were made to your prescriptions during this visit.            Anabella Walker, APRN  12/17/23 0014

## 2023-12-17 NOTE — CONSULTS
Orthopaedic Surgery Consult Note      12/17/2023   12:33 CST    Name:  Mya Chung  MRN:    [id number]     Acct:     [id number]   Room:  357/1   Day: 0     Admit Date: 12/16/2023  PCP: Delfina Pereira DO        Subjective:     C/C: Right humerus fracture. We are seeing this patient in consultation for an orthopaedic evaluation.  The patient is a pleasant 89-year-old female who experienced a fall onto her right side resulting in the cute onset of pain with mild deformity through the right proximal upper extremity.  X-rays in the emergency department demonstrated a mildly angulated and displaced proximal diaphyseal fracture of the right humerus.  As a result, her initial plan was to be discharged to the outpatient setting for orthopedic follow-up.  During the discharge process, the patient became lightheaded and experienced a syncopal episode.  Resultantly, she was admitted for observation.  Today at time of exam, the patient is noted to be sitting up at bedside.  She is observed sitting up from a reclined position with relative ease.  She does not describe overt discomfort currently.    Pain: 4 /10    Code Status:    Code Status and Medical Interventions:   Ordered at: 12/17/23 0134     Code Status (Patient has no pulse and is not breathing):    CPR (Attempt to Resuscitate)     Medical Interventions (Patient has pulse or is breathing):    Full Support         Past Medical History:    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     Wears glasses        Past Surgical History:    Past Surgical History:   Procedure Laterality Date    AORTAGRAM Right 08/06/2018     Procedure: LEFT LOWER EXTREMITY ANGIOGRAM;  Surgeon: Walker Davis DO;  Location: [facility] HYBRID OR 12;  Service: Vascular    BREAST BIOPSY Bilateral     multiple on both, all benign    BREAST BIOPSY Left 2017    Procedure: LEFT MAMMOGRAM GUIDED NEEDLE DIRECTED EXCISIONAL BREAST BIOPSY;  Surgeon: Malgorzata Scott MD;  Location: [facility] OR;  Service:     BUNIONECTOMY Bilateral      SECTION      X2    CHOLECYSTECTOMY      CHOLECYSTECTOMY WITH INTRAOPERATIVE CHOLANGIOGRAM N/A 2021    Procedure: LAPAROSCOPIC CHOLECYSTECTOMY WITH CHOLANGIOGRAM;  Surgeon: Malgorzata Scott MD;  Location: [facility] OR;  Service: General;  Laterality: N/A;    COLONOSCOPY  2015    diverticulosis    ENDOSCOPY N/A 2021    Procedure: ESOPHAGOGASTRODUODENOSCOPY WITH ANESTHESIA;  Surgeon: Sonu Cheek DO;  Location: [facility] ENDOSCOPY;  Service: Gastroenterology;  Laterality: N/A;  pre nausea  post esophagitis  Delfina Pereira DO    EYE SURGERY      CATARACT SURGERY    FEMORAL ENDARTERECTOMY Left 2018    Procedure: LEFT FEMORAL ENDARTERECTOMY;  Surgeon: Walker Davis DO;  Location: [facility] HYBRID OR 12;  Service: Vascular    JOINT REPLACEMENT      RIGHT HIP    VASCULAR SURGERY         Current Medications:   Prior to Admission medications    Medication Sig Start Date End Date Taking? Authorizing Provider   alendronate (FOSAMAX) 70 MG tablet Take 1 tablet by mouth Every 7 (Seven) Days. Thursday   Yes Hossein Colvin MD   aspirin 81 MG EC tablet Take 1 tablet by mouth Daily.   Yes Hossein Colvin MD   Calcium Carbonate-Vitamin D (CALCIUM 600/VITAMIN D PO) Take 1 tablet by mouth Daily.   Yes Hossein Colvin MD   carvedilol (COREG) 3.125 MG tablet Take 1 tablet by mouth 2 (Two) Times a Day. 23  Yes Cesar Urena DO   clopidogrel (PLAVIX) 75 MG tablet Take 1 tablet by mouth Daily. 23  Yes Cesar Urena DO   colestipol (COLESTID) 1 g tablet Take  1 tablet by mouth Every Other Day.   Yes Hossein Colvin MD   donepezil (Aricept) 5 MG tablet Take 1 tablet by mouth Every Night. 23  Yes Ministerio Rose APRN   empagliflozin (Jardiance) 10 MG tablet tablet Take 1 tablet by mouth Daily. 23  Yes Cesar Urena DO   escitalopram (LEXAPRO) 10 MG tablet Take 1 tablet by mouth Daily.   Yes Hossein Colvin MD   gabapentin (Neurontin) 100 MG capsule Take 1 capsule by mouth 2 (Two) Times a Day.  Patient taking differently: Take 1 capsule by mouth Every Night. 23  Yes Danna Chicas MD   Glucosamine-Chondroitin-Vit D3 9601-6629-722 MG-MG-UNIT pack Take 1 tablet by mouth 2 (Two) Times a Day.   Yes Hossein Colvin MD   Multiple Vitamins-Minerals (COMPLETE MULTIVITAMIN/MINERAL PO) Take 1 tablet by mouth Daily.   Yes Hossein Colvin MD   sacubitril-valsartan (Entresto) 24-26 MG tablet Take 1 tablet by mouth 2 (Two) Times a Day. 23  Yes Cesar Urena DO   Synthroid 150 MCG tablet Take 1 tablet by mouth. Take 1 tab everyday but Sat.  To take 1/2 tab po on Sat. 23  Yes Hossein Colvin MD       Allergies:  Erythromycin, Zithromax [azithromycin], and Penicillins    Social History:   Social History     Socioeconomic History    Marital status:    Tobacco Use    Smoking status: Former     Years: 2     Types: Cigarettes     Quit date: 1955     Years since quittin.0     Passive exposure: Never    Smokeless tobacco: Never   Vaping Use    Vaping Use: Never used   Substance and Sexual Activity    Alcohol use: No    Drug use: No    Sexual activity: Defer       Family History:   Family History   Problem Relation Age of Onset    No Known Problems Mother     Heart disease Father     Cancer Sister         Uterus    No Known Problems Brother     No Known Problems Maternal Aunt     No Known Problems Paternal Aunt     No Known Problems Maternal Grandmother     No Known Problems Paternal Grandmother      "No Known Problems Daughter     No Known Problems Son     Breast cancer Neg Hx     BRCA 1/2 Neg Hx     Colon cancer Neg Hx     Endometrial cancer Neg Hx     Ovarian cancer Neg Hx     Colon polyps Neg Hx     Esophageal cancer Neg Hx      REVIEW OF SYSTEMS:    Review of systems from admission H&P are discussed with the patient and noted to be unchanged.    Vitals:  BP (!) 106/38 (BP Location: Left arm)   Pulse 95   Temp 97.8 °F (36.6 °C) (Oral)   Resp 16   Ht 162.6 cm (64.02\")   Wt 49.5 kg (109 lb 2 oz)   SpO2 98%   BMI 18.72 kg/m²   Temp (24hrs), Av °F (36.7 °C), Min:97.8 °F (36.6 °C), Max:98.1 °F (36.7 °C)      I/O (24Hr):    Intake/Output Summary (Last 24 hours) at 2023 1233  Last data filed at 2023 0500  Gross per 24 hour   Intake 1000 ml   Output 900 ml   Net 100 ml       Labs:  Lab Results (last 72 hours)       Procedure Component Value Units Date/Time    Iron Profile [[id number]]  (Abnormal) Collected: 23    Specimen: Blood Updated: 23     Iron 79 mcg/dL      Iron Saturation (TSAT) 32 %      Transferrin 165 mg/dL      TIBC 246 mcg/dL     CBC Auto Differential [[id number]]  (Abnormal) Collected: 23    Specimen: Blood Updated: 23     WBC 7.54 10*3/mm3      RBC 3.24 10*6/mm3      Hemoglobin 9.6 g/dL      Hematocrit 30.4 %      MCV 93.8 fL      MCH 29.6 pg      MCHC 31.6 g/dL      RDW 13.2 %      RDW-SD 45.3 fl      MPV 10.4 fL      Platelets 131 10*3/mm3      nRBC 0.0 /100 WBC     Manual Differential [[id number]] Collected: 23    Specimen: Blood Updated: 23     Neutrophil % 65.0 %      Lymphocyte % 21.0 %      Monocyte % 7.0 %      Basophil % 1.0 %      Bands %  4.0 %      Atypical Lymphocyte % 2.0 %      Neutrophils Absolute 5.20 10*3/mm3      Lymphocytes Absolute 1.73 10*3/mm3      Monocytes Absolute 0.53 10*3/mm3      Basophils Absolute 0.08 10*3/mm3      Poikilocytes Slight/1+     WBC Morphology Normal     Platelet Estimate " Adequate     Clumped Platelets Present    Basic Metabolic Panel [[id number]]  (Abnormal) Collected: 12/17/23 0443    Specimen: Blood Updated: 12/17/23 0532     Glucose 122 mg/dL      BUN 35 mg/dL      Creatinine 1.09 mg/dL      Sodium 140 mmol/L      Potassium 4.2 mmol/L      Comment: Slight hemolysis detected by analyzer. Result may be falsely elevated.        Chloride 105 mmol/L      CO2 24.0 mmol/L      Calcium 8.2 mg/dL      BUN/Creatinine Ratio 32.1     Anion Gap 11.0 mmol/L      eGFR 46.9 mL/min/1.73     Narrative:      GFR Normal >60  Chronic Kidney Disease <60  Kidney Failure <15    The GFR formula is only valid for adults with stable renal function between ages 18 and 70.    Urinalysis With Culture If Indicated - Urine, Clean Catch [[id number]]  (Abnormal) Collected: 12/16/23 2302    Specimen: Urine, Clean Catch Updated: 12/16/23 2316     Color, UA Yellow     Appearance, UA Clear     pH, UA <=5.0     Specific Gravity, UA 1.019     Glucose, UA >=1000 mg/dL (3+)     Ketones, UA Trace     Bilirubin, UA Negative     Blood, UA Trace     Protein, UA Negative     Leuk Esterase, UA Negative     Nitrite, UA Negative     Urobilinogen, UA 0.2 E.U./dL    Narrative:      In absence of clinical symptoms, the presence of pyuria, bacteria, and/or nitrites on the urinalysis result does not correlate with infection.    Urinalysis, Microscopic Only - Urine, Clean Catch [[id number]] Collected: 12/16/23 2302    Specimen: Urine, Clean Catch Updated: 12/16/23 2316     RBC, UA 0-2 /HPF      WBC, UA 0-2 /HPF      Comment: Urine culture not indicated.        Bacteria, UA None Seen /HPF      Squamous Epithelial Cells, UA None Seen /HPF      Hyaline Casts, UA 0-2 /LPF      Methodology Automated Microscopy    Comprehensive Metabolic Panel [[id number]]  (Abnormal) Collected: 12/16/23 2014    Specimen: Blood Updated: 12/16/23 2049     Glucose 126 mg/dL      BUN 38 mg/dL      Creatinine 1.16 mg/dL      Sodium 144 mmol/L      Potassium 5.1  mmol/L      Comment: Slight hemolysis detected by analyzer. Result may be falsely elevated.        Chloride 108 mmol/L      CO2 25.0 mmol/L      Calcium 9.1 mg/dL      Total Protein 6.3 g/dL      Albumin 3.7 g/dL      ALT (SGPT) 10 U/L      AST (SGOT) 20 U/L      Comment: Slight hemolysis detected by analyzer. Result may be falsely elevated.        Alkaline Phosphatase 61 U/L      Total Bilirubin 0.4 mg/dL      Globulin 2.6 gm/dL      A/G Ratio 1.4 g/dL      BUN/Creatinine Ratio 32.8     Anion Gap 11.0 mmol/L      eGFR 43.5 mL/min/1.73     Narrative:      GFR Normal >60  Chronic Kidney Disease <60  Kidney Failure <15    The GFR formula is only valid for adults with stable renal function between ages 18 and 70.    Magnesium [[id number]]  (Normal) Collected: 12/16/23 2014    Specimen: Blood Updated: 12/16/23 2049     Magnesium 2.2 mg/dL     Single High Sensitivity Troponin T [[id number]]  (Abnormal) Collected: 12/16/23 2014    Specimen: Blood Updated: 12/16/23 2042     HS Troponin T 15 ng/L     Narrative:      High Sensitive Troponin T Reference Range:  <14.0 ng/L- Negative Female for AMI  <22.0 ng/L- Negative Male for AMI  >=14 - Abnormal Female indicating possible myocardial injury.  >=22 - Abnormal Male indicating possible myocardial injury.   Clinicians would have to utilize clinical acumen, EKG, Troponin, and serial changes to determine if it is an Acute Myocardial Infarction or myocardial injury due to an underlying chronic condition.         CBC & Differential [[id number]]  (Abnormal) Collected: 12/16/23 2014    Specimen: Blood Updated: 12/16/23 2025    Narrative:      The following orders were created for panel order CBC & Differential.  Procedure                               Abnormality         Status                     ---------                               -----------         ------                     CBC Auto Differential[[id number]]        Abnormal            Final result                 Please view  results for these tests on the individual orders.    CBC Auto Differential [[id number]]  (Abnormal) Collected: 12/16/23 2014    Specimen: Blood Updated: 12/16/23 2025     WBC 12.22 10*3/mm3      RBC 4.08 10*6/mm3      Hemoglobin 12.0 g/dL      Hematocrit 39.5 %      MCV 96.8 fL      MCH 29.4 pg      MCHC 30.4 g/dL      RDW 13.1 %      RDW-SD 46.6 fl      MPV 10.5 fL      Platelets 189 10*3/mm3      Neutrophil % 73.0 %      Lymphocyte % 15.6 %      Monocyte % 9.9 %      Eosinophil % 0.8 %      Basophil % 0.5 %      Immature Grans % 0.2 %      Neutrophils, Absolute 8.91 10*3/mm3      Lymphocytes, Absolute 1.91 10*3/mm3      Monocytes, Absolute 1.21 10*3/mm3      Eosinophils, Absolute 0.10 10*3/mm3      Basophils, Absolute 0.06 10*3/mm3      Immature Grans, Absolute 0.03 10*3/mm3      nRBC 0.0 /100 WBC             Radiology:  Imaging Results (Last 72 Hours)       Procedure Component Value Units Date/Time    XR Chest 1 View [[id number]] Collected: 12/16/23 1938     Updated: 12/16/23 1942    Narrative:      EXAM/TECHNIQUE: XR CHEST 1 VW-     INDICATION: syncope     COMPARISON: 8/29/2023     FINDINGS:     Cardiac silhouette is mildly enlarged but stable. No pleural effusion,  pneumothorax, or focal consolidation. Diffuse chronic interstitial  coarsening is noted. Atherosclerotic aortic arch. Demonstrated right  humeral surgical neck and proximal shaft fractures.       Impression:      1.  No acute cardiopulmonary finding.  2.  Redemonstrated right humeral surgical neck and proximal shaft  fractures.     This report was signed and finalized on 12/16/2023 7:39 PM by Dr. Navid Canales MD.       XR Clavicle Right [[id number]] Collected: 12/16/23 1937     Updated: 12/16/23 1941    Narrative:      EXAM/TECHNIQUE: XR CLAVICLE RIGHT-     INDICATION: fall     COMPARISON: None     FINDINGS:     No evidence of right clavicle fracture. AC joint appears intact.  Redemonstrated right humeral fractures. Included right lung apex  appears  unremarkable.       Impression:      1.  No evidence of acute fracture involving the right clavicle.  2.  Redemonstrated right humeral fractures.     This report was signed and finalized on 12/16/2023 7:38 PM by Dr. Navid Canales MD.       XR Humerus Right [[id number]] Collected: 12/16/23 1935     Updated: 12/16/23 1940    Narrative:      EXAM/TECHNIQUE: XR HUMERUS RIGHT-     INDICATION: fall     COMPARISON: None     FINDINGS:     Acute comminuted and displaced fracture of the right humeral surgical  neck with suspected extension into the humeral head.     Acute one shaft width displaced and angulated fracture of the proximal  to mid right humeral shaft.     The distal humerus appears intact. No evidence of elbow fracture or  dislocation. No radiopaque foreign body or soft tissue gas.       Impression:      1.  Acute comminuted and displaced fracture of the right humeral  surgical neck with suspected extension into the humeral head.  2.  Acute one shaft width displaced and angulated fracture of the  proximal to mid right humeral shaft.     This report was signed and finalized on 12/16/2023 7:37 PM by Dr. Navid Canales MD.       XR Shoulder 2+ View Right [[id number]] Collected: 12/16/23 1932     Updated: 12/16/23 1938    Narrative:      EXAM/TECHNIQUE: XR SHOULDER 2+ VW RIGHT-     INDICATION: fall     COMPARISON: None     FINDINGS:     Acute comminuted displaced fracture of the right humeral surgical neck.  Suspected fracture line extension into the right lateral humeral head.  Acute one shaft with displaced oblique fracture of the proximal right  humeral shaft.     No evidence of glenohumeral joint dislocation. AC joint appears intact.  Included portion of the right chest appears unremarkable.       Impression:      1.  Acute comminuted and displaced fracture of the right humeral  surgical neck with suspected extension into the right humeral head.  2.  Acute one shaft width displaced oblique fracture  of the proximal  right humeral shaft.     This report was signed and finalized on 12/16/2023 7:35 PM by Dr. Navid Canales MD.       CT Cervical Spine Without Contrast [[id number]] Collected: 12/16/23 1856     Updated: 12/16/23 1908    Narrative:      EXAM/TECHNIQUE: CT cervical spine without contrast     INDICATION: fall     COMPARISON: 6/30/2023     DLP: 240 mGy cm. Automated exposure control was also utilized to  decrease patient radiation dose.     FINDINGS:     Craniocervical relationships are maintained. Old right anterior and  right posterolateral C1 arch fractures are noted. Advanced degenerative  change at the atlantoaxial joint with odontoid process erosions and  calcified soft tissue pannus. Cervical lordosis is maintained. No acute  subluxations. Cervical vertebral body heights are maintained. No acute  fracture. Chronic mild compression deformity of T2 is noted. Advanced  multilevel cervical spine degenerative change with multilevel central  canal and neural foraminal stenosis. Paravertebral soft tissues are  unremarkable. Emphysema is noted in the included lung apices.       Impression:      1.  No acute fracture or subluxation.  2.  Old fractures of the right anterior and right posterolateral C1  arch.  3.  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     This report was signed and finalized on 12/16/2023 7:05 PM by Dr. Navid Canales MD.       CT Facial Bones Without Contrast [[id number]] Collected: 12/16/23 1852     Updated: 12/16/23 1859    Narrative:      EXAM/TECHNIQUE: CT facial bones without contrast     INDICATION: Fall, right facial trauma     COMPARISON: 6/30/2023     DLP: 161 mGy cm. Automated exposure control was also utilized to  decrease patient radiation dose.     FINDINGS:     Intact frontal sinus. No acute displaced nasal bone or  nasal septal  fracture. Maxillary buttresses are intact. Pterygoid plates are intact.  Zygomatic arches are maintained. No mandibular fracture. No acute  orbital fracture. No paranasal sinus air-fluid levels. Bilateral TMJ  arthritic change. Findings in the upper cervical spine are described in  a separate same day CT cervical spine report.     No evidence of soft tissue injury to either globe. No periorbital  hematoma. Parapharyngeal fat planes are maintained. No focal asymmetry  in the upper aerodigestive tract. No cervical lymphadenopathy. Bilateral  carotid artery atherosclerotic calcification.       Impression:      1.  No acute facial fracture.  2.  Partially imaged cervical spine is described in a separate same day  report.     This report was signed and finalized on 12/16/2023 6:55 PM by Dr. Navid Canales MD.       CT Head Without Contrast [[id number]] Collected: 12/16/23 1846     Updated: 12/16/23 1854    Narrative:      EXAM/TECHNIQUE: CT head without contrast     INDICATION: Fall, face and head trauma     COMPARISON: 7/11/2023     DLP: 552 mGy cm. Automated exposure control was also utilized to  decrease patient radiation dose.     FINDINGS:     No evidence of intracranial hemorrhage. Gray-white differentiation is  maintained. Advanced chronic microvascular ischemic white matter change.  Mild global cerebral volume loss with ex vacuo ventricular dilatation.  Bilateral old basal ganglia lacunar infarcts are noted. No midline shift  or mass effect. No evidence of hydrocephalus. Basilar cisterns are  patent.      Visualized portion of the orbits are unremarkable. Mastoid air cells are  clear. Visualized portion of the paranasal sinuses are clear. No acute  osseous finding.       Impression:      1.  No acute intracranial findings.  2.  Advanced chronic microvascular ischemic white matter change with  global cerebral volume loss.     This report was signed and finalized on 12/16/2023 6:51 PM by   "Navid Canales MD.               Physical Exam:     PHYSICAL EXAM:      Physical Examination:  Vitals:   Vitals:    12/17/23 0031 12/17/23 0111 12/17/23 0730 12/17/23 1100   BP: 140/56 133/50 113/52 (!) 106/38   BP Location:   Left arm Left arm   Pulse: 76 74 112 95   Resp:  16 16 16   Temp:  98.1 °F (36.7 °C) 98 °F (36.7 °C) 97.8 °F (36.6 °C)   TempSrc:  Oral Oral Oral   SpO2: 93% 94% 93% 98%   Weight:  49.5 kg (109 lb 2 oz)     Height:  162.6 cm (64.02\")       General:  Appears stated age, no distress.  Orientation:  Alert and oriented to time, place, and person.  Mood and Affect:  Cooperative and pleasant.  Gait:  Resting comfortably in bed.  Cardiovascular:  Symmetric 1-2 plus pulses in upper and lower extremities.  Lymph:  No cervical or inguinal lymphadenopathy noted.  Sensation:  Grossly intact to light touch.  DTR:  Normal, no pathologic reflexes.  Coordination/balance:  Normal    Musculoskeletal:  Right upper extremity exam:  There is no tenderness to palpation about the shoulder, elbow, wrist or hand, but over the mid humerus below the level of the shoulder, the patient does describe discomfort.  There is soft tissue swelling with mild varus deformity.  Gentle palpation does elicit mild bony crepitus.  The patient has preserved sensation distally with brisk capillary refill noted.  She tolerates digit and wrist range of motion.  She has difficulty with forearm supination/pronation and elbow range of motion secondary to discomfort.  The overlying skin is intact.  Muscle compartments are soft and compressible.    Radiology Review  My review of patient's x-rays shows the patient to have a mildly angulated and displaced proximal humeral diaphyseal fracture of the right upper extremity    Assessment:     Primary Problem  Orthostatic hypotension with right proximal humerus diaphyseal fracture    Plan:   A prolonged discussion was had with the patient and her family at bedside.  She understands that surgical " intervention is not absolutely indicated.  We will likely treat this with conversion to June bracing.  The patient and her family are very pleased.  2.   Sling immobilization to the right upper extremity currently  3.   Nonweightbearing of the right upper extremity  4.   Follow-up in the outpatient setting for conversion to a June brace

## 2023-12-17 NOTE — H&P
[facility] Medicine Services  HISTORY AND PHYSICAL    Date of Admission: 2023  Primary Care Physician: Delfina Pereira DO    Subjective   Primary Historian: Patient    Chief Complaint: Fall    History of Present Illness  89 year old female with PMH of HTN, CAD, PAD, SAH that presents to the ER with right arm pain after a fall and was found to have a cute comminuted and displaced fracture of the right humeral surgical neck with suspected extension into the humeral head. Ortho recommended sling and later evaluation for splinting. She was going to be discharged and as she got up from bed to go to the bathroom had an orthostatic episode with syncope.    She lives alone and has good support from family. Has not driven in several months. Walks with a cane or walker occasionally, but states can get by without devices.      Review of Systems   Otherwise complete ROS reviewed and negative except as mentioned in the HPI.    Past Medical History:   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     Wears glasses      Past Surgical History:  Past Surgical History:   Procedure Laterality Date    AORTAGRAM Right 2018    Procedure: LEFT LOWER EXTREMITY ANGIOGRAM;  Surgeon: Walker Davis DO;  Location: [facility] OR 12;  Service: Vascular    BREAST BIOPSY Bilateral     multiple on both, all benign    BREAST BIOPSY Left 2017    Procedure: LEFT MAMMOGRAM GUIDED NEEDLE DIRECTED EXCISIONAL BREAST BIOPSY;  Surgeon: Malgorzata Scott MD;  Location: [facility] OR;  Service:     BUNIONECTOMY Bilateral      SECTION      X2     CHOLECYSTECTOMY      CHOLECYSTECTOMY WITH INTRAOPERATIVE CHOLANGIOGRAM N/A 03/16/2021    Procedure: LAPAROSCOPIC CHOLECYSTECTOMY WITH CHOLANGIOGRAM;  Surgeon: Malgorzata Scott MD;  Location: [facility] OR;  Service: General;  Laterality: N/A;    COLONOSCOPY  06/18/2015    diverticulosis    ENDOSCOPY N/A 05/18/2021    Procedure: ESOPHAGOGASTRODUODENOSCOPY WITH ANESTHESIA;  Surgeon: Sonu Cheek DO;  Location: [facility] ENDOSCOPY;  Service: Gastroenterology;  Laterality: N/A;  pre nausea  post esophagitis  Delfina Pereira DO    EYE SURGERY      CATARACT SURGERY    FEMORAL ENDARTERECTOMY Left 08/22/2018    Procedure: LEFT FEMORAL ENDARTERECTOMY;  Surgeon: Walker Davis DO;  Location: [facility] HYBRID OR 12;  Service: Vascular    JOINT REPLACEMENT      RIGHT HIP    VASCULAR SURGERY       Social History:  reports that she quit smoking about 69 years ago. Her smoking use included cigarettes. She has never been exposed to tobacco smoke. She has never used smokeless tobacco. She reports that she does not drink alcohol and does not use drugs.    Family History: family history includes Cancer in her sister; Heart disease in her father; No Known Problems in her brother, daughter, maternal aunt, maternal grandmother, mother, paternal aunt, paternal grandmother, and son.       Allergies:  Allergies   Allergen Reactions    Erythromycin Shortness Of Breath    Zithromax [Azithromycin] Itching and Other (See Comments)     Yeast infection    Penicillins Rash       Medications:  Prior to Admission medications    Medication Sig Start Date End Date Taking? Authorizing Provider   alendronate (FOSAMAX) 70 MG tablet Take 1 tablet by mouth Every 7 (Seven) Days. Thursday   Yes ProviderHossein MD   aspirin 81 MG EC tablet Take 1 tablet by mouth Daily.   Yes ProviderHossein MD   Calcium Carbonate-Vitamin D (CALCIUM 600/VITAMIN D PO) Take 1 tablet by mouth Daily.   Yes ProviderHossein MD   carvedilol (COREG) 3.125  "MG tablet Take 1 tablet by mouth 2 (Two) Times a Day. 7/24/23  Yes Cesar Urena DO   clopidogrel (PLAVIX) 75 MG tablet Take 1 tablet by mouth Daily. 7/24/23  Yes Cesar Urena DO   colestipol (COLESTID) 1 g tablet Take 1 tablet by mouth Every Other Day.   Yes Hossein Colvin MD   donepezil (Aricept) 5 MG tablet Take 1 tablet by mouth Every Night. 9/22/23  Yes Ministerio Rose APRN   empagliflozin (Jardiance) 10 MG tablet tablet Take 1 tablet by mouth Daily. 7/24/23  Yes Cesar Urena DO   escitalopram (LEXAPRO) 10 MG tablet Take 1 tablet by mouth Daily.   Yes Hossein Colvin MD   gabapentin (Neurontin) 100 MG capsule Take 1 capsule by mouth 2 (Two) Times a Day.  Patient taking differently: Take 1 capsule by mouth Every Night. 7/14/23  Yes Danna Chicas MD   Glucosamine-Chondroitin-Vit D3 9931-1697-034 MG-MG-UNIT pack Take 1 tablet by mouth 2 (Two) Times a Day.   Yes Hossein Colvin MD   Multiple Vitamins-Minerals (COMPLETE MULTIVITAMIN/MINERAL PO) Take 1 tablet by mouth Daily.   Yes Hossein Colvin MD   sacubitril-valsartan (Entresto) 24-26 MG tablet Take 1 tablet by mouth 2 (Two) Times a Day. 7/24/23  Yes Cesar Urena DO   Synthroid 150 MCG tablet Take 1 tablet by mouth. Take 1 tab everyday but Sat.  To take 1/2 tab po on Sat. 4/20/23  Yes ProviderHossein MD     I have utilized all available immediate resources to obtain, update, or review the patient's current medications (including all prescriptions, over-the-counter products, herbals, cannabis/cannabidiol products, and vitamin/mineral/dietary (nutritional) supplements).    Objective     Vital Signs: /50   Pulse 74   Temp 98.1 °F (36.7 °C) (Oral)   Resp 16   Ht 162.6 cm (64.02\")   Wt 49.5 kg (109 lb 2 oz)   SpO2 94%   BMI 18.72 kg/m²   Physical Exam  Vitals reviewed.   Constitutional:       General: She is not in acute distress.     Appearance: She is " well-developed. She is not toxic-appearing.   HENT:      Head: Normocephalic and atraumatic.      Right Ear: External ear normal.      Left Ear: External ear normal.      Mouth/Throat:      Mouth: Mucous membranes are dry.      Pharynx: Oropharynx is clear.   Eyes:      General:         Right eye: No discharge.         Left eye: No discharge.      Extraocular Movements: Extraocular movements intact.      Conjunctiva/sclera: Conjunctivae normal.      Pupils: Pupils are equal, round, and reactive to light.   Neck:      Vascular: No JVD.   Cardiovascular:      Rate and Rhythm: Normal rate and regular rhythm.      Pulses: Normal pulses.      Heart sounds: Normal heart sounds. No murmur heard.     No friction rub. No gallop.   Pulmonary:      Effort: Pulmonary effort is normal. No respiratory distress.      Breath sounds: No stridor. No wheezing, rhonchi or rales.   Chest:      Chest wall: No tenderness.   Abdominal:      General: Bowel sounds are normal. There is no distension.      Palpations: Abdomen is soft.      Tenderness: There is no abdominal tenderness. There is no guarding or rebound.      Hernia: No hernia is present.   Musculoskeletal:         General: Tenderness and signs of injury present. No swelling or deformity. Normal range of motion.      Cervical back: Normal range of motion and neck supple. No rigidity or tenderness. No muscular tenderness.      Right lower leg: No edema.      Left lower leg: No edema.      Comments: Right arm on a sling, pain at rest   Skin:     General: Skin is warm and dry.      Findings: No erythema or rash.   Neurological:      General: No focal deficit present.      Mental Status: She is alert and oriented to person, place, and time.      Cranial Nerves: No cranial nerve deficit.      Sensory: No sensory deficit.      Motor: No weakness or abnormal muscle tone.      Deep Tendon Reflexes: Reflexes normal.   Psychiatric:         Mood and Affect: Mood normal.         Behavior:  Behavior normal.              Results Reviewed:  Lab Results (last 24 hours)       Procedure Component Value Units Date/Time    Urinalysis With Culture If Indicated - Urine, Clean Catch [[id number]]  (Abnormal) Collected: 12/16/23 2302    Specimen: Urine, Clean Catch Updated: 12/16/23 2316     Color, UA Yellow     Appearance, UA Clear     pH, UA <=5.0     Specific Gravity, UA 1.019     Glucose, UA >=1000 mg/dL (3+)     Ketones, UA Trace     Bilirubin, UA Negative     Blood, UA Trace     Protein, UA Negative     Leuk Esterase, UA Negative     Nitrite, UA Negative     Urobilinogen, UA 0.2 E.U./dL    Narrative:      In absence of clinical symptoms, the presence of pyuria, bacteria, and/or nitrites on the urinalysis result does not correlate with infection.    Urinalysis, Microscopic Only - Urine, Clean Catch [[id number]] Collected: 12/16/23 2302    Specimen: Urine, Clean Catch Updated: 12/16/23 2316     RBC, UA 0-2 /HPF      WBC, UA 0-2 /HPF      Comment: Urine culture not indicated.        Bacteria, UA None Seen /HPF      Squamous Epithelial Cells, UA None Seen /HPF      Hyaline Casts, UA 0-2 /LPF      Methodology Automated Microscopy    Comprehensive Metabolic Panel [[id number]]  (Abnormal) Collected: 12/16/23 2014    Specimen: Blood Updated: 12/16/23 2049     Glucose 126 mg/dL      BUN 38 mg/dL      Creatinine 1.16 mg/dL      Sodium 144 mmol/L      Potassium 5.1 mmol/L      Comment: Slight hemolysis detected by analyzer. Result may be falsely elevated.        Chloride 108 mmol/L      CO2 25.0 mmol/L      Calcium 9.1 mg/dL      Total Protein 6.3 g/dL      Albumin 3.7 g/dL      ALT (SGPT) 10 U/L      AST (SGOT) 20 U/L      Comment: Slight hemolysis detected by analyzer. Result may be falsely elevated.        Alkaline Phosphatase 61 U/L      Total Bilirubin 0.4 mg/dL      Globulin 2.6 gm/dL      A/G Ratio 1.4 g/dL      BUN/Creatinine Ratio 32.8     Anion Gap 11.0 mmol/L      eGFR 43.5 mL/min/1.73     Narrative:      GFR  Normal >60  Chronic Kidney Disease <60  Kidney Failure <15    The GFR formula is only valid for adults with stable renal function between ages 18 and 70.    Magnesium [[id number]]  (Normal) Collected: 12/16/23 2014    Specimen: Blood Updated: 12/16/23 2049     Magnesium 2.2 mg/dL     Single High Sensitivity Troponin T [[id number]]  (Abnormal) Collected: 12/16/23 2014    Specimen: Blood Updated: 12/16/23 2042     HS Troponin T 15 ng/L     Narrative:      High Sensitive Troponin T Reference Range:  <14.0 ng/L- Negative Female for AMI  <22.0 ng/L- Negative Male for AMI  >=14 - Abnormal Female indicating possible myocardial injury.  >=22 - Abnormal Male indicating possible myocardial injury.   Clinicians would have to utilize clinical acumen, EKG, Troponin, and serial changes to determine if it is an Acute Myocardial Infarction or myocardial injury due to an underlying chronic condition.         CBC & Differential [[id number]]  (Abnormal) Collected: 12/16/23 2014    Specimen: Blood Updated: 12/16/23 2025    Narrative:      The following orders were created for panel order CBC & Differential.  Procedure                               Abnormality         Status                     ---------                               -----------         ------                     CBC Auto Differential[[id number]]        Abnormal            Final result                 Please view results for these tests on the individual orders.    CBC Auto Differential [[id number]]  (Abnormal) Collected: 12/16/23 2014    Specimen: Blood Updated: 12/16/23 2025     WBC 12.22 10*3/mm3      RBC 4.08 10*6/mm3      Hemoglobin 12.0 g/dL      Hematocrit 39.5 %      MCV 96.8 fL      MCH 29.4 pg      MCHC 30.4 g/dL      RDW 13.1 %      RDW-SD 46.6 fl      MPV 10.5 fL      Platelets 189 10*3/mm3      Neutrophil % 73.0 %      Lymphocyte % 15.6 %      Monocyte % 9.9 %      Eosinophil % 0.8 %      Basophil % 0.5 %      Immature Grans % 0.2 %      Neutrophils, Absolute  8.91 10*3/mm3      Lymphocytes, Absolute 1.91 10*3/mm3      Monocytes, Absolute 1.21 10*3/mm3      Eosinophils, Absolute 0.10 10*3/mm3      Basophils, Absolute 0.06 10*3/mm3      Immature Grans, Absolute 0.03 10*3/mm3      nRBC 0.0 /100 WBC           Imaging Results (Last 24 Hours)       Procedure Component Value Units Date/Time    XR Chest 1 View [[id number]] Collected: 12/16/23 1938     Updated: 12/16/23 1942    Narrative:      EXAM/TECHNIQUE: XR CHEST 1 VW-     INDICATION: syncope     COMPARISON: 8/29/2023     FINDINGS:     Cardiac silhouette is mildly enlarged but stable. No pleural effusion,  pneumothorax, or focal consolidation. Diffuse chronic interstitial  coarsening is noted. Atherosclerotic aortic arch. Demonstrated right  humeral surgical neck and proximal shaft fractures.       Impression:      1.  No acute cardiopulmonary finding.  2.  Redemonstrated right humeral surgical neck and proximal shaft  fractures.     This report was signed and finalized on 12/16/2023 7:39 PM by Dr. Navid Canales MD.       XR Clavicle Right [[id number]] Collected: 12/16/23 1937     Updated: 12/16/23 1941    Narrative:      EXAM/TECHNIQUE: XR CLAVICLE RIGHT-     INDICATION: fall     COMPARISON: None     FINDINGS:     No evidence of right clavicle fracture. AC joint appears intact.  Redemonstrated right humeral fractures. Included right lung apex appears  unremarkable.       Impression:      1.  No evidence of acute fracture involving the right clavicle.  2.  Redemonstrated right humeral fractures.     This report was signed and finalized on 12/16/2023 7:38 PM by Dr. Navid Canales MD.       XR Humerus Right [[id number]] Collected: 12/16/23 1935     Updated: 12/16/23 1940    Narrative:      EXAM/TECHNIQUE: XR HUMERUS RIGHT-     INDICATION: fall     COMPARISON: None     FINDINGS:     Acute comminuted and displaced fracture of the right humeral surgical  neck with suspected extension into the humeral head.     Acute one shaft  width displaced and angulated fracture of the proximal  to mid right humeral shaft.     The distal humerus appears intact. No evidence of elbow fracture or  dislocation. No radiopaque foreign body or soft tissue gas.       Impression:      1.  Acute comminuted and displaced fracture of the right humeral  surgical neck with suspected extension into the humeral head.  2.  Acute one shaft width displaced and angulated fracture of the  proximal to mid right humeral shaft.     This report was signed and finalized on 12/16/2023 7:37 PM by Dr. Navid Canales MD.       XR Shoulder 2+ View Right [[id number]] Collected: 12/16/23 1932     Updated: 12/16/23 1938    Narrative:      EXAM/TECHNIQUE: XR SHOULDER 2+ VW RIGHT-     INDICATION: fall     COMPARISON: None     FINDINGS:     Acute comminuted displaced fracture of the right humeral surgical neck.  Suspected fracture line extension into the right lateral humeral head.  Acute one shaft with displaced oblique fracture of the proximal right  humeral shaft.     No evidence of glenohumeral joint dislocation. AC joint appears intact.  Included portion of the right chest appears unremarkable.       Impression:      1.  Acute comminuted and displaced fracture of the right humeral  surgical neck with suspected extension into the right humeral head.  2.  Acute one shaft width displaced oblique fracture of the proximal  right humeral shaft.     This report was signed and finalized on 12/16/2023 7:35 PM by Dr. Navid Canales MD.       CT Cervical Spine Without Contrast [[id number]] Collected: 12/16/23 1856     Updated: 12/16/23 1908    Narrative:      EXAM/TECHNIQUE: CT cervical spine without contrast     INDICATION: fall     COMPARISON: 6/30/2023     DLP: 240 mGy cm. Automated exposure control was also utilized to  decrease patient radiation dose.     FINDINGS:     Craniocervical relationships are maintained. Old right anterior and  right posterolateral C1 arch fractures are noted.  Advanced degenerative  change at the atlantoaxial joint with odontoid process erosions and  calcified soft tissue pannus. Cervical lordosis is maintained. No acute  subluxations. Cervical vertebral body heights are maintained. No acute  fracture. Chronic mild compression deformity of T2 is noted. Advanced  multilevel cervical spine degenerative change with multilevel central  canal and neural foraminal stenosis. Paravertebral soft tissues are  unremarkable. Emphysema is noted in the included lung apices.       Impression:      1.  No acute fracture or subluxation.  2.  Old fractures of the right anterior and right posterolateral C1  arch.  3.  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     This report was signed and finalized on 12/16/2023 7:05 PM by Dr. Navid Canales MD.       CT Facial Bones Without Contrast [[id number]] Collected: 12/16/23 1852     Updated: 12/16/23 1859    Narrative:      EXAM/TECHNIQUE: CT facial bones without contrast     INDICATION: Fall, right facial trauma     COMPARISON: 6/30/2023     DLP: 161 mGy cm. Automated exposure control was also utilized to  decrease patient radiation dose.     FINDINGS:     Intact frontal sinus. No acute displaced nasal bone or nasal septal  fracture. Maxillary buttresses are intact. Pterygoid plates are intact.  Zygomatic arches are maintained. No mandibular fracture. No acute  orbital fracture. No paranasal sinus air-fluid levels. Bilateral TMJ  arthritic change. Findings in the upper cervical spine are described in  a separate same day CT cervical spine report.     No evidence of soft tissue injury to either globe. No periorbital  hematoma. Parapharyngeal fat planes are maintained. No focal asymmetry  in the upper aerodigestive tract. No cervical lymphadenopathy. Bilateral  carotid artery atherosclerotic  calcification.       Impression:      1.  No acute facial fracture.  2.  Partially imaged cervical spine is described in a separate same day  report.     This report was signed and finalized on 12/16/2023 6:55 PM by Dr. Navid Canales MD.       CT Head Without Contrast [[id number]] Collected: 12/16/23 1846     Updated: 12/16/23 1854    Narrative:      EXAM/TECHNIQUE: CT head without contrast     INDICATION: Fall, face and head trauma     COMPARISON: 7/11/2023     DLP: 552 mGy cm. Automated exposure control was also utilized to  decrease patient radiation dose.     FINDINGS:     No evidence of intracranial hemorrhage. Gray-white differentiation is  maintained. Advanced chronic microvascular ischemic white matter change.  Mild global cerebral volume loss with ex vacuo ventricular dilatation.  Bilateral old basal ganglia lacunar infarcts are noted. No midline shift  or mass effect. No evidence of hydrocephalus. Basilar cisterns are  patent.      Visualized portion of the orbits are unremarkable. Mastoid air cells are  clear. Visualized portion of the paranasal sinuses are clear. No acute  osseous finding.       Impression:      1.  No acute intracranial findings.  2.  Advanced chronic microvascular ischemic white matter change with  global cerebral volume loss.     This report was signed and finalized on 12/16/2023 6:51 PM by Dr. Navid Canales MD.             I have personally reviewed and interpreted the radiology studies and ECG obtained at time of admission.     Assessment / Plan   Assessment:   Active Hospital Problems    Diagnosis     **Orthostatic hypotension     Closed fracture of proximal end of right humerus     Stage 3a chronic kidney disease (CKD)     Chronic combined systolic and diastolic congestive heart failure     Fall     Hypothyroidism     PAD (peripheral artery disease)      Treatment Plan  The patient will be admitted to my service here at [facility].   Observe in medical  floor  Vitals every 4 hours, orthostatics q shift  IVF NS 50 cc/hour for 1 day. She has received a fluid bolus in ER  Cardiac diet  Up with assist and fall precautions  PT/OT evaluations    Humeral fracture> Orthopedic surgery follow up in AM for recommendations    Home medications reviewed. She has taken Entresto low dose for over a year, will continue and monitor BP    DVT prophylaxis > SCD    Medical Decision Making  Number and Complexity of problems: 4 complex medical problems  Differential Diagnosis: none    Conditions and Status        Condition is unchanged.     [facility] Data  External documents reviewed: [employer] EHR  Cardiac tracing (EKG, telemetry) interpretation: NSR  Radiology interpretation: See above  Labs reviewed: see above  Any tests that were considered but not ordered: none     Decision rules/scores evaluated (example [id number], Wells, etc): N/A     Discussed with: Patient     Care Planning  Shared decision making: Patient  Code status and discussions: DNR per patient request    Disposition  Social Determinants of Health that impact treatment or disposition: none  Estimated length of stay is to be determined.     I confirmed that the patient's advanced care plan is present, code status is documented, and a surrogate decision maker is listed in the patient's medical record.     The patient's surrogate decision maker is family, see records.     The patient was seen and examined by me on 12/17/2023 at 0116 AM.    Electronically signed by Sukumar Dominguez MD, 12/17/23, 01:16 CST.

## 2023-12-17 NOTE — PLAN OF CARE
Goal Outcome Evaluation:  Plan of Care Reviewed With: patient        Progress: no change  Outcome Evaluation: PT eval completed. Pt alert and oriented x4. Pt reports independence at home prior to arrival, only needing help with driving and shopping. Pt edu on NWB RUE with use of sling. Pt performs sup to sit with min A, using trunk muscles and going toward L side of bed. Pt performs sit to stand t/f with min A and stands at EOB long enough to obtain BP, but patient becomes symptomatic and reports sig dizziness. BP in seated position reads 100/50 with HR at 87, and in standing BP drops to 60/34 with HR at 79 BPM. Pt returned to supine position. Nsg notified Pt will benefit from skilled PT to improve fxl mob and independence. Recommend d/c subacute rehab vs home with 24/7 assist and HH pending progress and medical status.      Anticipated Discharge Disposition (PT): sub acute care setting, home with 24/7 care, home with home health

## 2023-12-17 NOTE — ED NOTES
The following fall interventions were initiated:    [x] Patient and/or family given education   [x] Call light within reach and educated on how to use   [x] Bed rails up per protocol    [x] Bed locked and in the lowest position   [] Bed alarm set and on loudest setting   [x] Fall wrist band applied to pt LEFT wrist   [x] Non skid footwear applied   [x] Room free of clutter   [x] Patient items within reach   [x] Adequate lighting provided  [x] Falls sign present    [x] Patient moved closer to nursing station   [] Restraints applied

## 2023-12-17 NOTE — PLAN OF CARE
Goal Outcome Evaluation:  Plan of Care Reviewed With: patient        Progress: no change     Pt alert and oriented x3, minus time. VSS. Pt c/o pain. Position adjusted and PRN medications given with moderate relief of symptoms. Sling on right arm in place. Plavix for VTE. , room air. Tolerating prescribed diet. Skin intact. Voiding via purewick/brief. Up x 2 with walker to [facility]. Turn and reposition as needed. Last BM 12/17. Bed alarm set. Call light within reach. Safety maintained. Plan of care continued. No NV changes this shift.

## 2023-12-18 LAB
ANION GAP SERPL CALCULATED.3IONS-SCNC: 9 MMOL/L (ref 5–15)
BUN SERPL-MCNC: 39 MG/DL (ref 8–23)
BUN/CREAT SERPL: 34.2 (ref 7–25)
CALCIUM SPEC-SCNC: 7.9 MG/DL (ref 8.2–9.6)
CHLORIDE SERPL-SCNC: 109 MMOL/L (ref 98–107)
CO2 SERPL-SCNC: 23 MMOL/L (ref 22–29)
CREAT SERPL-MCNC: 1.14 MG/DL (ref 0.57–1)
DEPRECATED RDW RBC AUTO: 46.9 FL (ref 37–54)
EGFRCR SERPLBLD CKD-EPI 2021: 44.4 ML/MIN/1.73
ERYTHROCYTE [DISTWIDTH] IN BLOOD BY AUTOMATED COUNT: 13.2 % (ref 12.3–15.4)
FOLATE SERPL-MCNC: 19.3 NG/ML (ref 4.78–24.2)
GLUCOSE SERPL-MCNC: 99 MG/DL (ref 65–99)
HCT VFR BLD AUTO: 26.5 % (ref 34–46.6)
HGB BLD-MCNC: 8.1 G/DL (ref 12–15.9)
MCH RBC QN AUTO: 30 PG (ref 26.6–33)
MCHC RBC AUTO-ENTMCNC: 30.6 G/DL (ref 31.5–35.7)
MCV RBC AUTO: 98.1 FL (ref 79–97)
PLATELET # BLD AUTO: 122 10*3/MM3 (ref 140–450)
PMV BLD AUTO: 9.8 FL (ref 6–12)
POTASSIUM SERPL-SCNC: 3.8 MMOL/L (ref 3.5–5.2)
RBC # BLD AUTO: 2.7 10*6/MM3 (ref 3.77–5.28)
SODIUM SERPL-SCNC: 141 MMOL/L (ref 136–145)
VIT B12 BLD-MCNC: 378 PG/ML (ref 211–946)
WBC NRBC COR # BLD AUTO: 7.83 10*3/MM3 (ref 3.4–10.8)

## 2023-12-18 PROCEDURE — 97530 THERAPEUTIC ACTIVITIES: CPT

## 2023-12-18 PROCEDURE — 99497 ADVNCD CARE PLAN 30 MIN: CPT | Performed by: CLINICAL NURSE SPECIALIST

## 2023-12-18 PROCEDURE — 97535 SELF CARE MNGMENT TRAINING: CPT

## 2023-12-18 PROCEDURE — 97110 THERAPEUTIC EXERCISES: CPT

## 2023-12-18 PROCEDURE — 82746 ASSAY OF FOLIC ACID SERUM: CPT | Performed by: INTERNAL MEDICINE

## 2023-12-18 PROCEDURE — 82607 VITAMIN B-12: CPT | Performed by: INTERNAL MEDICINE

## 2023-12-18 PROCEDURE — 80048 BASIC METABOLIC PNL TOTAL CA: CPT | Performed by: INTERNAL MEDICINE

## 2023-12-18 PROCEDURE — 99223 1ST HOSP IP/OBS HIGH 75: CPT | Performed by: CLINICAL NURSE SPECIALIST

## 2023-12-18 PROCEDURE — 85027 COMPLETE CBC AUTOMATED: CPT | Performed by: INTERNAL MEDICINE

## 2023-12-18 PROCEDURE — 97116 GAIT TRAINING THERAPY: CPT

## 2023-12-18 RX ADMIN — CLOPIDOGREL BISULFATE 75 MG: 75 TABLET, FILM COATED ORAL at 10:03

## 2023-12-18 RX ADMIN — ESCITSLOPRAM 10 MG: 10 TABLET ORAL at 10:03

## 2023-12-18 RX ADMIN — DONEPEZIL HYDROCHLORIDE 5 MG: 5 TABLET, FILM COATED ORAL at 21:29

## 2023-12-18 RX ADMIN — ACETAMINOPHEN 650 MG: 325 TABLET, FILM COATED ORAL at 10:34

## 2023-12-18 RX ADMIN — PANTOPRAZOLE SODIUM 40 MG: 40 TABLET, DELAYED RELEASE ORAL at 05:00

## 2023-12-18 RX ADMIN — ACETAMINOPHEN 650 MG: 325 TABLET, FILM COATED ORAL at 15:37

## 2023-12-18 RX ADMIN — DOCUSATE SODIUM 50 MG AND SENNOSIDES 8.6 MG 2 TABLET: 8.6; 5 TABLET, FILM COATED ORAL at 21:29

## 2023-12-18 RX ADMIN — Medication 10 ML: at 21:32

## 2023-12-18 RX ADMIN — CARVEDILOL 3.12 MG: 3.12 TABLET, FILM COATED ORAL at 10:03

## 2023-12-18 RX ADMIN — SACUBITRIL AND VALSARTAN 1 TABLET: 24; 26 TABLET, FILM COATED ORAL at 10:02

## 2023-12-18 RX ADMIN — GABAPENTIN 100 MG: 100 CAPSULE ORAL at 21:30

## 2023-12-18 RX ADMIN — Medication 10 ML: at 10:07

## 2023-12-18 RX ADMIN — DOCUSATE SODIUM 50 MG AND SENNOSIDES 8.6 MG 2 TABLET: 8.6; 5 TABLET, FILM COATED ORAL at 10:03

## 2023-12-18 RX ADMIN — LEVOTHYROXINE SODIUM 150 MCG: 150 TABLET ORAL at 05:00

## 2023-12-18 RX ADMIN — ASPIRIN 81 MG: 81 TABLET, COATED ORAL at 10:03

## 2023-12-18 NOTE — THERAPY TREATMENT NOTE
Acute Care - Occupational Therapy Treatment Note  [facility]     Patient Name: Mya Chung  : 2/10/1928  MRN: [id number]  Today's Date: 2023             Admit Date: 2023       ICD-10-CM ICD-9-CM   1. Syncope and collapse  R55 780.2   2. Orthostatic hypotension  I95.1 458.0   3. Fall, initial encounter  W19.XXXA E888.9   4. Closed fracture of right shoulder, initial encounter  S42.91XA 812.00   5. Impaired mobility [Z74.09]  Z74.09 799.89     Patient Active Problem List   Diagnosis    Left upper arm pain    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    Hypertension    Hyperlipidemia    PAD (peripheral artery disease)    Right greater trochanteric avulsion periprosthetic, closed, minimally displaced    Closed fracture of right wrist    Fall    Impaired gait and mobility    Acute right hip pain    Hypothyroidism    Closed fracture of multiple pubic rami, right, initial encounter    Cholecystitis    Nausea    Acute on chronic diastolic congestive heart failure    Other chest pain    Acute systolic heart failure    Chronic combined systolic and diastolic congestive heart failure    Closed fracture of ramus of left pubis, initial encounter    Stage 3a chronic kidney disease (CKD)    Subarachnoid hemorrhage    Body mass index (BMI) of 19.9 or less in adult    Former smoker    Orthostatic hypotension    Closed fracture of proximal end of right humerus    Anemia     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     Wears glasses      Past Surgical History:   Procedure Laterality Date    AORTAGRAM Right 2018    Procedure: LEFT LOWER EXTREMITY ANGIOGRAM;  Surgeon:  Walker Davis DO;  Location: [facility] HYBRID OR 12;  Service: Vascular    BREAST BIOPSY Bilateral     multiple on both, all benign    BREAST BIOPSY Left 2017    Procedure: LEFT MAMMOGRAM GUIDED NEEDLE DIRECTED EXCISIONAL BREAST BIOPSY;  Surgeon: Malgorzata Scott MD;  Location: [facility] OR;  Service:     BUNIONECTOMY Bilateral      SECTION      X2    CHOLECYSTECTOMY      CHOLECYSTECTOMY WITH INTRAOPERATIVE CHOLANGIOGRAM N/A 2021    Procedure: LAPAROSCOPIC CHOLECYSTECTOMY WITH CHOLANGIOGRAM;  Surgeon: Malgorazta Scott MD;  Location: [facility] OR;  Service: General;  Laterality: N/A;    COLONOSCOPY  2015    diverticulosis    ENDOSCOPY N/A 2021    Procedure: ESOPHAGOGASTRODUODENOSCOPY WITH ANESTHESIA;  Surgeon: Sonu Cheek DO;  Location: [facility] ENDOSCOPY;  Service: Gastroenterology;  Laterality: N/A;  pre nausea  post esophagitis  Delfina Pereira DO    EYE SURGERY      CATARACT SURGERY    FEMORAL ENDARTERECTOMY Left 2018    Procedure: LEFT FEMORAL ENDARTERECTOMY;  Surgeon: Walker Davis DO;  Location: [facility] HYBRID OR 12;  Service: Vascular    JOINT REPLACEMENT      RIGHT HIP    VASCULAR SURGERY           OT ASSESSMENT FLOWSHEET (last 12 hours)       OT Evaluation and Treatment       Row Name 23 1000                   OT Time and Intention    Subjective Information no complaints  -EC        Document Type therapy note (daily note)  -EC        Mode of Treatment occupational therapy  -EC           General Information    Patient Profile Reviewed yes  -EC        Existing Precautions/Restrictions fall;orthostatic hypotension;other (see comments)   RUE NWB & sling immobilizer  -EC        Barriers to Rehab medically complex;physical barrier  -EC           Pain Assessment    Pretreatment Pain Rating 0/10 - no pain  -EC        Posttreatment Pain Rating 0/10 - no pain  -EC           Cognition    Orientation Status (Cognition) oriented x 4  -EC           Activities of  Daily Living    BADL Assessment/Intervention grooming  -EC           Grooming Assessment/Training    [place] Level (Grooming) wash face, hands;oral care regimen;set up;standby assist  -EC        Position (Grooming) edge of bed sitting  -EC           Bed Mobility    Bed Mobility supine-sit;sit-supine  -EC        Supine-Sit [place] (Bed Mobility) minimum assist (75% patient effort);verbal cues;nonverbal cues (demo/gesture)  -EC        Sit-Supine [place] (Bed Mobility) minimum assist (75% patient effort);verbal cues;nonverbal cues (demo/gesture)  -EC        Bed Mobility, Safety Issues decreased use of arms for pushing/pulling  -EC           Transfer Assessment/Treatment    Transfers sit-stand transfer;stand-sit transfer;toilet transfer  -EC           Sit-Stand Transfer    Sit-Stand [place] (Transfers) verbal cues;minimum assist (75% patient effort)  -EC           Stand-Sit Transfer    Stand-Sit [place] (Transfers) verbal cues;minimum assist (75% patient effort)  -EC           Toilet Transfer    Type (Toilet Transfer) stand pivot/stand step  -EC        [place] Level (Toilet Transfer) minimum assist (75% patient effort)  -EC        Assistive Device (Toilet Transfer) commode, bedside without drop arms  -EC           Plan of Care Review    Plan of Care Reviewed With patient  -EC        Progress improving  -EC        Outcome Evaluation OT tx completed. Pt alert & oriented x4  w no complaints, would like to get out of bed. Pt came to EOB w Gely, OT to readjust sling once sitting EOB. Pt completed grooming activities w setup & SBA. Sitting /66. Pt sit>stand w Gely, while obtaining standing BP, pt c/o dizziness & had to sit back down- BP 90/33. Returned to supine Gely, pt then stated she needed to void. Nsging in room states she is ok to transfer to bsc. OT assisted nsging w transfer to bsc, Gely & no c/o dizziness. Left pt sitting w nsging on bsc. Continue OT POC to progress act rukhsana.  Recommend d/c to subacute rehab, possible home w HH pending medical progress.  -EC           Vital Signs    Pre Systolic BP Rehab 112  -EC        Pre Treatment Diastolic BP 66  -EC        Intra Systolic BP Rehab 90   -EC        Intra Treatment Diastolic BP 33   -EC           Positioning and Restraints    Pre-Treatment Position in bed  -EC        Post Treatment Position other  -EC        Other Position with nsg  assisted nsging to transfer pt to [facility] after dizziness subsided  -EC                  User Key  (r) = Recorded By, (t) = Taken By, (c) = Cosigned By      Initials Name Effective Dates    EC Violeta Phelan, OTR/L 10/13/23 -                      Occupational Therapy Education       Title: PT OT SLP Therapies (In Progress)       Topic: Occupational Therapy (Done)       Point: ADL training (Done)       Description:   Instruct learner(s) on proper safety adaptation and remediation techniques during self care or transfers.   Instruct in proper use of assistive devices.                  Learning Progress Summary             Patient Acceptance, E, VU,NR by EC at 12/18/2023 1307    Acceptance, E, VU by MM at 12/18/2023 0437    Acceptance, E, VU by LS at 12/17/2023 1422                         Point: Home exercise program (Done)       Description:   Instruct learner(s) on appropriate technique for monitoring, assisting and/or progressing therapeutic exercises/activities.                  Learning Progress Summary             Patient Acceptance, E, VU,NR by EC at 12/18/2023 1307    Acceptance, E, VU by MM at 12/18/2023 0437                         Point: Precautions (Done)       Description:   Instruct learner(s) on prescribed precautions during self-care and functional transfers.                  Learning Progress Summary             Patient Acceptance, E, VU,NR by EC at 12/18/2023 1307    Acceptance, E, VU by MM at 12/18/2023 0437    Acceptance, E, VU by LS at 12/17/2023 1422                         Point: Body  mechanics (Done)       Description:   Instruct learner(s) on proper positioning and spine alignment during self-care, functional mobility activities and/or exercises.                  Learning Progress Summary             Patient Acceptance, E, VU,NR by EC at 12/18/2023 1307    Acceptance, E, VU by MM at 12/18/2023 0437    Acceptance, E, VU by  at 12/17/2023 1422                                         User Key       Initials Effective Dates Name Provider Type Discipline     06/20/22 -  Nahomi Crespo, OTR/L Occupational Therapist OT    MM 01/04/23 -  Cesia Ruiz, RN Registered Nurse Nurse     10/13/23 -  Violeta Phelan, OTR/L Occupational Therapist OT                      OT Recommendation and Plan     Plan of Care Review  Plan of Care Reviewed With: patient  Progress: improving  Outcome Evaluation: OT tx completed. Pt alert & oriented x4  w no complaints, would like to get out of bed. Pt came to EOB w Gely, OT to readjust sling once sitting EOB. Pt completed grooming activities w setup & SBA. Sitting /66. Pt sit>stand w Gely, while obtaining standing BP, pt c/o dizziness & had to sit back down- BP 90/33. Returned to supine Gely, pt then stated she needed to void. Nsging in room states she is ok to transfer to bsc. OT assisted nsging w transfer to bsc, Gely & no c/o dizziness. Left pt sitting w nsging on bsc. Continue OT POC to progress act rukhsana. Recommend d/c to subacute rehab, possible home w HH pending medical progress.  Plan of Care Reviewed With: patient  Outcome Evaluation: OT tx completed. Pt alert & oriented x4  w no complaints, would like to get out of bed. Pt came to EOB w Gely, OT to readjust sling once sitting EOB. Pt completed grooming activities w setup & SBA. Sitting /66. Pt sit>stand w Gely, while obtaining standing BP, pt c/o dizziness & had to sit back down- BP 90/33. Returned to supine Gely, pt then stated she needed to void. Nsging in room states she is ok to transfer to bsc.  OT assisted nsging w transfer to bsc, Gely & no c/o dizziness. Left pt sitting w nsging on bsc. Continue OT POC to progress act rukhsana. Recommend d/c to subacute rehab, possible home w HH pending medical progress.     Outcome Measures       Row Name 12/18/23 1300             How much help from another is currently needed...    Putting on and taking off regular lower body clothing? 2  -EC      Bathing (including washing, rinsing, and drying) 2  -EC      Toileting (which includes using toilet bed pan or urinal) 2  -EC      Putting on and taking off regular upper body clothing 2  -EC      Taking care of personal grooming (such as brushing teeth) 3  -EC      Eating meals 3  -EC      AM-PAC 6 Clicks Score (OT) 14  -EC         Functional Assessment    Outcome Measure Options AM-PAC 6 Clicks Daily Activity (OT)  -EC                User Key  (r) = Recorded By, (t) = Taken By, (c) = Cosigned By      Initials Name Provider Type    EC Violeta Phelan, OTR/L Occupational Therapist                    Time Calculation:    Time Calculation- OT       Row Name 12/18/23 1057             Time Calculation- OT    OT Start Time 0940  -EC      OT Stop Time 1020  -EC      OT Time Calculation (min) 40 min  -EC      Total Timed Code Minutes- OT 40 minute(s)  -EC      OT Received On 12/18/23  -EC         Timed Charges    99720 - OT Self Care/Mgmt Minutes 40  -EC         Total Minutes    Timed Charges Total Minutes 40  -EC       Total Minutes 40  -EC                User Key  (r) = Recorded By, (t) = Taken By, (c) = Cosigned By      Initials Name Provider Type    EC Violeta Phelan, OTR/L Occupational Therapist                  Therapy Charges for Today       Code Description Service Date Service Provider Modifiers Qty    [id number]  OT SELF CARE/MGMT/TRAIN EA 15 MIN 12/18/2023 Violeta Phelan OTR/L GO 3                 Violeta Phelan OTR/JOHNATHAN  12/18/2023

## 2023-12-18 NOTE — PLAN OF CARE
Goal Outcome Evaluation:  Plan of Care Reviewed With: patient, family        Progress: no change  Outcome Evaluation: Ntn assessment completed. Per ntn risk screen pt with reduced oral intake. Pt reports fair appetite. Oral intake 50% of one meal. Boost Glucose Control BID. Con to follow for plan of care.

## 2023-12-18 NOTE — CONSULTS
[facility] Palliative Care Services    Palliative Care Initial Consult   Attending Physician: Michael Weaver MD  Referring Provider: ANGY Appiah    Reason for Referral: assistance with clarification of goals of care  Family/Support: Children    Code Status and Medical Interventions:   Ordered at: 12/17/23 0134     Code Status (Patient has no pulse and is not breathing):    CPR (Attempt to Resuscitate)     Medical Interventions (Patient has pulse or is breathing):    Full Support     Goals of Care: TBD.    HPI:   89 y.o. 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  Hospitalization 6/30-7/1 due to subarachnoid hemorrhage after fall. Patient presented to [facility] on 12/16/2023 related to right arm pain after fall.  According to chart review she was found to have acute comminuted and displaced fracture of the right humeral surgical neck with suspected extension into the humeral head.  Evaluated by Ortho who recommended conservative management sling and later evaluation in the outpatient setting for June brace.  According to chart review she was to be discharged and experienced orthostatic episode with syncope when getting up to go to the bathroom.  Further workup shows creatinine 1.16, WBC 12.22.  Chest imaging negative for acute cardiopulmonary finding.  CT cervical spine showed no acute fracture, old fractures of right anterior and right posterior lateral C1 arch, advanced multilevel cervical spine degenerative change.  CT face showed no acute facial fracture.  CT head showed no acute intercranial findings, advanced chronic  "microvascular ischemic white matter change with global cerebral volume loss..  Received IV fluid bolus in ER.  Evaluated by therapy who recommended subacute rehab versus home with 24/7 assist and home health.  Continues to experience orthostatic hypotension and attending recommended trial of compression stockings.  Palliative Care Spoke With: patient and family resided independently prior to hospitalization.  Does have frequency of falls per family.  Family check on daily. Due to the Palliative Care Topics Discussed: palliative care, goals of care, care options, resuscitation status, and discharge options we will establish an advance care plan.   Advance Care Planning   Advance Care Planning Discussion: Spoke with patient and son, Donal in room.  Discussed events leading to current hospitalization and recurrence of falls.  Explored overall guarded long-term prognosis secondary to orthostatic hypotension, fall with injury, cerebral microvascular disease, multiple comorbidities, impaired mobility, and advanced age.  Explored medical priorities including CODE STATUS and medical interventions.  Patient has elected to de-escalate care measures to no CPR/limited support interventions, no intubation or permanent feeding tube.  Based upon these goals we completed a MOST document.  Discussed given multiple factors high risk for rehospitalizations and decline. Patient and family appears to have good prognostic awareness.\"I am 89 years old and have had a good life\".  Dr. Pickering present for portion of conversation recommended consideration of SNF at discharge.  Family continue to discuss discharge options.  Questions encouraged and support given.     Review of Systems   Constitutional: Positive for malaise/fatigue.   Cardiovascular:  Negative for dyspnea on exertion.   Respiratory:  Negative for shortness of breath.    Skin:  Negative for poor wound healing.   Musculoskeletal:  Positive for falls.   Gastrointestinal:  Negative for " nausea.   Genitourinary:  Negative for dysuria.   Neurological:  Negative for weakness.   Psychiatric/Behavioral:  The patient is not nervous/anxious.      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     Wears glasses      Past Surgical History:   Procedure Laterality Date    AORTAGRAM Right 2018    Procedure: LEFT LOWER EXTREMITY ANGIOGRAM;  Surgeon: Walker Davis DO;  Location: [facility] OR 12;  Service: Vascular    BREAST BIOPSY Bilateral     multiple on both, all benign    BREAST BIOPSY Left 2017    Procedure: LEFT MAMMOGRAM GUIDED NEEDLE DIRECTED EXCISIONAL BREAST BIOPSY;  Surgeon: Malgorzata Scott MD;  Location: [facility] OR;  Service:     BUNIONECTOMY Bilateral      SECTION      X2    CHOLECYSTECTOMY      CHOLECYSTECTOMY WITH INTRAOPERATIVE CHOLANGIOGRAM N/A 2021    Procedure: LAPAROSCOPIC CHOLECYSTECTOMY WITH CHOLANGIOGRAM;  Surgeon: Malgorzata Scott MD;  Location: [facility] OR;  Service: General;  Laterality: N/A;    COLONOSCOPY  2015    diverticulosis    ENDOSCOPY N/A 2021    Procedure: ESOPHAGOGASTRODUODENOSCOPY WITH ANESTHESIA;  Surgeon: Sonu Cheek DO;  Location: [facility] ENDOSCOPY;  Service: Gastroenterology;  Laterality: N/A;  pre nausea  post esophagitis  Delfina Pereira DO    EYE SURGERY      CATARACT SURGERY    FEMORAL ENDARTERECTOMY Left 2018    Procedure: LEFT FEMORAL ENDARTERECTOMY;  Surgeon: Walker Davis DO;  Location: [facility] HYBRID OR 12;  Service: Vascular    JOINT REPLACEMENT      RIGHT HIP    VASCULAR SURGERY        Social History     Socioeconomic History    Marital status:    Tobacco Use    Smoking status: Former     Years: 2     Types: Cigarettes     Quit date: 1955     Years since quittin.0     Passive exposure: Never    Smokeless tobacco: Never   Vaping Use    Vaping Use: Never used   Substance and Sexual Activity    Alcohol use: No    Drug use: No    Sexual activity: Defer         Current Facility-Administered Medications:     acetaminophen (TYLENOL) tablet 650 mg, 650 mg, Oral, Q4H PRN, Sukumar Dominguez MD, 650 mg at 23 1034    aspirin EC tablet 81 mg, 81 mg, Oral, Daily, Sukumar Dominguez MD, 81 mg at 23 1003    sennosides-docusate (PERICOLACE) 8.6-50 MG per tablet 2 tablet, 2 tablet, Oral, BID, 2 tablet at 23 1003 **AND** polyethylene glycol (MIRALAX) packet 17 g, 17 g, Oral, Daily PRN **AND** bisacodyl (DULCOLAX) EC tablet 5 mg, 5 mg, Oral, Daily PRN **AND** bisacodyl (DULCOLAX) suppository 10 mg, 10 mg, Rectal, Daily PRN, Sukumar Dominguez MD    carvedilol (COREG) tablet 3.125 mg, 3.125 mg, Oral, BID With Meals, Sukumar Dominguez MD, 3.125 mg at 23 1003    clopidogrel (PLAVIX) tablet 75 mg, 75 mg, Oral, Daily, Sukumar Dominguez MD, 75 mg at 23 1003    donepezil (ARICEPT) tablet 5 mg, 5 mg, Oral, Nightly, Sukumar Dominguez MD, 5 mg at 23 2019    escitalopram (LEXAPRO) tablet 10 mg, 10 mg, Oral, Daily, Sukumar Dominguez MD, 10 mg at 23 1003    gabapentin (NEURONTIN) capsule 100 mg, 100 mg, Oral, Nightly, Sukumar Dominguez MD, 100 mg at 23    levothyroxine (SYNTHROID, LEVOTHROID) tablet 150 mcg, 150 mcg, Oral, Q AM, Sukumar Dominguez MD, 150 mcg at 23 0500    oxyCODONE (ROXICODONE) immediate release tablet 5 mg, 5 mg, Oral, Q6H PRN, Luis Manuel Hung DO    pantoprazole (PROTONIX) EC tablet 40 mg, 40 mg, Oral, Q AM, Luis Manuel Hung, , 40 mg at 23 0500     "sacubitril-valsartan (ENTRESTO) 24-26 MG tablet 1 tablet, 1 tablet, Oral, BID, Sukumar Dominguez MD, 1 tablet at 12/18/23 1002    [COMPLETED] Insert Peripheral IV, , , Once **AND** sodium chloride 0.9 % flush 10 mL, 10 mL, Intravenous, PRN, Sukumar Dominguez MD    sodium chloride 0.9 % flush 10 mL, 10 mL, Intravenous, Q12H, Sukumar Dominguez MD, 10 mL at 12/18/23 1007    sodium chloride 0.9 % flush 10 mL, 10 mL, Intravenous, PRN, Sukumar Dominguez MD    sodium chloride 0.9 % infusion 40 mL, 40 mL, Intravenous, PRN, Sukumar Dominguez MD    Allergies   Allergen Reactions    Erythromycin Shortness Of Breath    Zithromax [Azithromycin] Itching and Other (See Comments)     Yeast infection    Penicillins Rash     I have utilized all available immediate resources to obtain, update, or review the patient's current medications (including all prescriptions, over-the-counter products, herbals, cannabis/cannabidiol products, and vitamin/mineral/dietary (nutritional) supplements) for name, route of administration, type, dose and frequency.      Intake/Output Summary (Last 24 hours) at 12/18/2023 1324  Last data filed at 12/18/2023 0932  Gross per 24 hour   Intake 820 ml   Output 400 ml   Net 420 ml       Physical Exam:    Diagnostics: Reviewed  /50 (BP Location: Left arm, Patient Position: Lying)   Pulse 82   Temp 98 °F (36.7 °C) (Oral)   Resp 18   Ht 162.6 cm (64.02\")   Wt 49.5 kg (109 lb 2 oz)   SpO2 94%   BMI 18.72 kg/m²     Vitals and nursing note reviewed.   Constitutional:       Appearance: Not in distress. Frail.      Comments: Right upper extremity sling   Eyes:      General: Lids are normal.      Pupils: Pupils are equal, round, and reactive to light.   HENT:      Head: Normocephalic.      Comments: Bruising to right jaw  Pulmonary:      Effort: Pulmonary effort is normal.   Cardiovascular:      Normal rate.   Edema:     Peripheral edema absent.   Abdominal:      " Palpations: Abdomen is soft.   Musculoskeletal:      Cervical back: Neck supple. Skin:     General: Skin is warm.   Genitourinary:     Comments: No reported dysuria  Neurological:      Mental Status: Alert, oriented to person, place, and time and oriented to person, place and time.   Psychiatric:         Mood and Affect: Mood normal.         Cognition and Memory: Cognition normal.       Patient status: Disease state: Controlled with current treatments.  Current Functional status: Palliative Performance Scale Score: Performance 40% based on the following measures: Ambulation: Mainly in bed, Activity and Evidence of Disease: Unable to do any work, extensive evidence of disease, Self-Care: Mainly assistance required,  Intake: Normal or reduced, LOC: Full, drowsy or confusion   Baseline Functional status: Palliative Performance Scale Score: Performance 60% based on the following measures: Ambulation: Reduced, Activity and Evidence of Disease: Unable to do hobby or some work, significant evidence of disease, Self-Care: Occasional assist necessary,  Intake: Normal or reduced, LOC: Full or confusion   Nutritional status: Albumin 3.7 Body mass index is 18.72 kg/m².         Hospital Problem List      Orthostatic hypotension    PAD (peripheral artery disease)    Fall    Hypothyroidism    Chronic combined systolic and diastolic congestive heart failure    Stage 3a chronic kidney disease (CKD)    Closed fracture of proximal end of right humerus    Anemia    Impression/Problem List:    Orthostatic hypotension on baseline hypertension  Chronic combined systolic and diastolic congestive heart failure  Fall  Closed fracture of proximal end of right humerus  Chronic kidney disease stage III  Anemia  Hypothyroidism  Coronary artery disease  Hyperlipidemia  Lung nodule  Cerebral microvascular disease and atrophy per CT  Osteopenia  Peripheral artery disease  History of Shingles (1940)  Skin cancer  History of subarachnoid  hemorrhage-6/30/2023  Impaired mobility  Advanced age     Recommendations/Plan:  1. plan: Goals of care include CODE STATUS no CPR/limited support interventions.    Family support: The patient receives support from her children..  Advance Directives: Advance Directive Status: Patient has advance directive, copy in chart   POA/Healthcare surrogate-Barrett Chung and Yeni Mixon, children/[facility].    2.  Palliative care encounter  - Prognosis is guarded long-term secondary to orthostatic hypotension, fall with injury, cerebral microvascular disease, multiple comorbidities, impaired mobility, and advanced age.  -Patient and family appears to have good prognostic awareness.     -Evaluated by Ortho who recommended conservative management sling and later evaluation in the outpatient setting for June brace.    -Received IV fluid bolus in ER.    -Evaluated by therapy who recommended subacute rehab versus home with 24/7 assist and home health.    -attending recommended trial of compression stockings.       12/18-CODE STATUS changes no CPR/limited support interventions, no intubation or permanent feeding tube.  -Completed a MOST document.  Copies provided to patient/family.  Scanned to EMR.  -Anticipating SNF versus home with family  -High risk for rehospitalization      Thank you for this consult and allowing us to participate in patient's plan of care. Palliative Care Team will continue to follow patient.     35 minutes spent on advance care planning-discussing with patient and/or family, answering questions, providing some guidance about a plan and documentation of care, and coordinating care face to face.    Ignacia Hill, APRN  12/18/2023  13:24 CST

## 2023-12-18 NOTE — PROGRESS NOTES
[facility] Medicine Services  INPATIENT PROGRESS NOTE    Patient Name: Mya Chung  Date of Admission: 12/16/2023  Today's Date: 12/18/23  Length of Stay: 0  Primary Care Physician: Delfina Pereira DO    Subjective   Chief Complaint: orthostatic hypotension  HPI   She was sitting up in chair with family at bedside.  The static vitals today revealed systolic blood pressure 139 sitting and dropped to 90 upon standing.  Patient was symptomatic.  Within a few minutes she felt better.  Discussed with patient and family to trial compression stockings.  Patient is agreeable to her plan of care.    Review of Systems   All pertinent negatives and positives are as above. All other systems have been reviewed and are negative unless otherwise stated.     Objective    Temp:  [97.4 °F (36.3 °C)-98.3 °F (36.8 °C)] 97.5 °F (36.4 °C)  Heart Rate:  [78-95] 79  Resp:  [16-18] 18  BP: ()/(38-71) 127/47  Physical Exam  Vitals reviewed.   Constitutional:       General: She is not in acute distress.     Appearance: She is not toxic-appearing.      Comments: Sitting up in chair.  No acute distress.  On room air.  Family at bedside.  Discussed with her nurse  renata.   HENT:      Head: Normocephalic and atraumatic.      Mouth/Throat:      Mouth: Mucous membranes are moist.      Pharynx: Oropharynx is clear.   Eyes:      Extraocular Movements: Extraocular movements intact.      Conjunctiva/sclera: Conjunctivae normal.      Pupils: Pupils are equal, round, and reactive to light.   Cardiovascular:      Rate and Rhythm: Normal rate and regular rhythm.      Pulses: Normal pulses.   Pulmonary:      Effort: Pulmonary effort is normal. No respiratory distress.      Breath sounds: Normal breath sounds. No wheezing or rhonchi.   Abdominal:      General: Bowel sounds are normal. There is no distension.      Palpations: Abdomen is soft.      Tenderness: There is no abdominal tenderness.  "  Musculoskeletal:         General: No swelling or tenderness. Normal range of motion.      Cervical back: Normal range of motion and neck supple. No muscular tenderness.      Comments: Sling to right upper extremity   Skin:     General: Skin is warm and dry.      Findings: No erythema or rash.   Neurological:      General: No focal deficit present.      Mental Status: She is alert and oriented to person, place, and time.      Cranial Nerves: No cranial nerve deficit.      Motor: No weakness.   Psychiatric:         Mood and Affect: Mood normal.         Behavior: Behavior normal.       Results Review:  I have reviewed the labs, radiology results, and diagnostic studies.    Laboratory Data:   Results from last 7 days   Lab Units 12/18/23  0557 12/17/23  1840 12/17/23 0443 12/16/23 2014   WBC 10*3/mm3 7.83  --  7.54 12.22*   HEMOGLOBIN g/dL 8.1* 8.0* 9.6* 12.0   HEMATOCRIT % 26.5* 26.0* 30.4* 39.5   PLATELETS 10*3/mm3 122*  --  131* 189        Results from last 7 days   Lab Units 12/18/23  0557 12/17/23 0443 12/16/23 2014   SODIUM mmol/L 141 140 144   POTASSIUM mmol/L 3.8 4.2 5.1   CHLORIDE mmol/L 109* 105 108*   CO2 mmol/L 23.0 24.0 25.0   BUN mg/dL 39* 35* 38*   CREATININE mg/dL 1.14* 1.09* 1.16*   CALCIUM mg/dL 7.9* 8.2 9.1   BILIRUBIN mg/dL  --   --  0.4   ALK PHOS U/L  --   --  61   ALT (SGPT) U/L  --   --  10   AST (SGOT) U/L  --   --  20   GLUCOSE mg/dL 99 122* 126*       Culture Data:   No results found for: \"ACANTHNAEG\", \"AFBCX\", \"BPERTUSSISCX\", \"BLOODCX\"  No results found for: \"BCIDPCR\", \"CXREFLEX\", \"CSFCX\", \"CULTURETIS\"  No results found for: \"CULTURES\", \"HSVCX\", \"URCX\"  No results found for: \"EYECULTURE\", \"GCCX\", \"HSVCULTURE\", \"LABHSV\"  No results found for: \"LEGIONELLA\", \"MRSACX\", \"MUMPSCX\", \"MYCOPLASCX\"  No results found for: \"NOCARDIACX\", \"STOOLCX\"  No results found for: \"THROATCX\", \"UNSTIMCULT\", \"URINECX\", \"CULTURE\", \"VZVCULTUR\"  No results found for: \"VIRALCULTU\", \"WOUNDCX\"    Radiology Data: "   Imaging Results (Last 24 Hours)       ** No results found for the last 24 hours. **          I have reviewed the patient's current medications.     Assessment/Plan   Assessment  Active Hospital Problems    Diagnosis     **Orthostatic hypotension     Closed fracture of proximal end of right humerus     Anemia     Stage 3a chronic kidney disease (CKD)     Chronic combined systolic and diastolic congestive heart failure     Fall     Hypothyroidism     PAD (peripheral artery disease)      Ms. Chung is a 89-year-old female that presented to [facility] on 12/16 with right arm pain after a fall.  In the ED, she was found to have right proximal humerus diaphyseal fracture.  She was going to be discharged from the emergency department, however, when she got up from the bed to go to the bathroom she had an orthostatic episode with syncope.  Patient lives home alone and has good support from family.  She walks with a cane or walker.    Treatment Plan  Orthostatic hypotension.  Fluid bolus in ED followed by gentle IV fluid x 1 day.  Discussed with family to trial compression stockings.  Continue orthostatic vitals every shift.    Orthopedics, Dr. Galvez consulted.  After session with orthopedics, patient and her family plan for conservative treatment with conversion to June bracing. Sling immobilization to the right upper extremity. Nonweightbearing of the right upper extremity.  She will follow-up in the outpatient setting for conversion to a June brace.     Hemoglobin 12.0 on admission.  Hemoglobin 9.6 on the following day and now 8.1.  Suspect dilutional.  She has received IV fluid.  No signs of bleeding.  Fecal occult blood negative.    She has a history of chronic combined systolic and diastolic congestive heart failure followed by cardiologist Dr. Urena as outpatient.  She is on Entresto 24-26 mg twice daily, Coreg 3.125 mg twice daily, aspirin 81 mg and Plavix 75 mg.    PT/OT.  Therapy  recommends discharge to subacute rehab versus home with 24/7 assist and home health.    Medical Decision Making  Number and Complexity of problems: 2 acute problems in the form of orthostatic hypotension and humeral fracture  Differential Diagnosis: None considered at present    Conditions and Status        Condition is unchanged.     [facility] Data  External documents reviewed: Prior epic records  Cardiac tracing (EKG, telemetry) interpretation: Reviewed  Radiology interpretation: Interpreted by radiology  Labs reviewed: As above  Any tests that were considered but not ordered: None considered at present     Decision rules/scores evaluated (example [id number], Wells, etc): None considered at present     Discussed with: Patient and Dr. Weaver     Care Planning  Shared decision making: Patient is agreeable to ongoing workup and treatment  Code status and discussions: Full code with full interventions    Disposition  Social Determinants of Health that impact treatment or disposition: None identified at present  I expect the patient to be discharged to subacute rehab versus home with 24/7 assist and home health in 1-2 days.     Electronically signed by ANGY Lu, 12/18/23, 09:11 CST.

## 2023-12-18 NOTE — THERAPY TREATMENT NOTE
Acute Care - Physical Therapy Treatment Note  [facility]     Patient Name: Mya Chung  : 2/10/1928  MRN: [id number]  Today's Date: 2023      Visit Dx:     ICD-10-CM ICD-9-CM   1. Syncope and collapse  R55 780.2   2. Orthostatic hypotension  I95.1 458.0   3. Fall, initial encounter  W19.XXXA E888.9   4. Closed fracture of right shoulder, initial encounter  S42.91XA 812.00   5. Impaired mobility [Z74.09]  Z74.09 799.89     Patient Active Problem List   Diagnosis    Left upper arm pain    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    Hypertension    Hyperlipidemia    PAD (peripheral artery disease)    Right greater trochanteric avulsion periprosthetic, closed, minimally displaced    Closed fracture of right wrist    Fall    Impaired gait and mobility    Acute right hip pain    Hypothyroidism    Closed fracture of multiple pubic rami, right, initial encounter    Cholecystitis    Nausea    Acute on chronic diastolic congestive heart failure    Other chest pain    Acute systolic heart failure    Chronic combined systolic and diastolic congestive heart failure    Closed fracture of ramus of left pubis, initial encounter    Stage 3a chronic kidney disease (CKD)    Subarachnoid hemorrhage    Body mass index (BMI) of 19.9 or less in adult    Former smoker    Orthostatic hypotension    Closed fracture of proximal end of right humerus    Anemia     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     Wears glasses      Past Surgical History:   Procedure Laterality Date    AORTAGRAM Right 2018    Procedure: LEFT LOWER EXTREMITY ANGIOGRAM;  Surgeon: Walker Davis DO;   Location:  [facility];  Service: Vascular    BREAST BIOPSY Bilateral     multiple on both, all benign    BREAST BIOPSY Left 2017    Procedure: LEFT MAMMOGRAM GUIDED NEEDLE DIRECTED EXCISIONAL BREAST BIOPSY;  Surgeon: Malgorzata Scott MD;  Location: [facility] OR;  Service:     BUNIONECTOMY Bilateral      SECTION      X2    CHOLECYSTECTOMY      CHOLECYSTECTOMY WITH INTRAOPERATIVE CHOLANGIOGRAM N/A 2021    Procedure: LAPAROSCOPIC CHOLECYSTECTOMY WITH CHOLANGIOGRAM;  Surgeon: Malgorzata Scott MD;  Location: [facility] OR;  Service: General;  Laterality: N/A;    COLONOSCOPY  2015    diverticulosis    ENDOSCOPY N/A 2021    Procedure: ESOPHAGOGASTRODUODENOSCOPY WITH ANESTHESIA;  Surgeon: Sonu Cheek DO;  Location: [facility] ENDOSCOPY;  Service: Gastroenterology;  Laterality: N/A;  pre nausea  post esophagitis  Delfina Pereira DO    EYE SURGERY      CATARACT SURGERY    FEMORAL ENDARTERECTOMY Left 2018    Procedure: LEFT FEMORAL ENDARTERECTOMY;  Surgeon: Walker Davis DO;  Location: [facility] HYBRID OR 12;  Service: Vascular    JOINT REPLACEMENT      RIGHT HIP    VASCULAR SURGERY       PT Assessment (last 12 hours)       PT Evaluation and Treatment       Row Name 23 1449 23 1145       Physical Therapy Time and Intention    Subjective Information complains of;pain  -RADHA complains of;pain  -RADHA    Document Type therapy note (daily note)  -RADHA therapy note (daily note)  -RADHA    Mode of Treatment physical therapy  -RADHA physical therapy  -RADHA      Row Name 23 1449 23 1145       General Information    Existing Precautions/Restrictions fall;oxygen therapy device and L/min;non-weight bearing  NWB RUE with sling  -RADHA fall;orthostatic hypotension;non-weight bearing   NWB RUE with sling  -RADHA      Row Name 23 1449 23 1145       Pain    Pretreatment Pain Rating 0/10 - no pain  -RADHA 0/10 - no pain  -RADHA    Posttreatment Pain Rating 4/10  -RADHA 4/10  -RADHA     Pain Location - Side/Orientation Right  -RADHA Right  -RADHA    Pain Location - shoulder  -RADHA shoulder  -RADHA    Pre/Posttreatment Pain Comment -- pain with movment, adjusting sling  -RADHA    Pain Intervention(s) Repositioned  -RADHA Repositioned  -RADHA      Row Name 12/18/23 1449 12/18/23 1145       Bed Mobility    Supine-Sit [place] (Bed Mobility) verbal cues;minimum assist (75% patient effort)  -RADHA verbal cues;minimum assist (75% patient effort)  -RADHA    Sit-Supine [place] (Bed Mobility) verbal cues;minimum assist (75% patient effort)  -RADHA --  chair  -RADHA      Row Name 12/18/23 1449 12/18/23 1145       Sit-Stand Transfer    Sit-Stand [place] (Transfers) verbal cues;minimum assist (75% patient effort)  -RADHA verbal cues;minimum assist (75% patient effort)  -RADHA      Row Name 12/18/23 1449 12/18/23 1145       Stand-Sit Transfer    Stand-Sit [place] (Transfers) verbal cues;minimum assist (75% patient effort)  -RADHA verbal cues;minimum assist (75% patient effort)  -RADHA      Row Name 12/18/23 1449 12/18/23 1145       Gait/Stairs (Locomotion)    [place] Level (Gait) verbal cues;minimum assist (75% patient effort)  -RADHA verbal cues;minimum assist (75% patient effort)  -RADHA    Assistive Device (Gait) cane, quad  -RADHA --  HHA  -RADHA    Distance in Feet (Gait) 80' with HHA and min assist, 40' with SBQC and CGA/min assist  -RADHA few steps from the bed to the chair  -RADHA      Row Name 12/18/23 1449 12/18/23 1145       Motor Skills    Comments, Therapeutic Exercise sitting AROM BLE X 20  -RADHA in abad, AROM BLE X 20  -RADHA    Additional Documentation -- Comments, Therapeutic Exercise (Row)  -RADHA      Row Name 12/18/23 1449          Vital Signs    Pre Systolic BP Rehab 118  -RADHA     Pre Treatment Diastolic BP 55  -RADHA     Intra Systolic BP Rehab 94  -RADHA     Intra Treatment Diastolic BP 54  -RADHA     Post Systolic BP Rehab 90  -RADHA     Post Treatment Diastolic BP 48  -RADHA     Pre Patient Position Supine  -RADHA     Intra Patient Position Sitting   -RADHA     Post Patient Position Standing  -RADHA       Row Name 12/18/23 1449 12/18/23 1145       Positioning and Restraints    Pre-Treatment Position in bed  -RADHA in bed  -RADHA    Post Treatment Position bed  -RADHA chair  -RADHA    In Bed fowlers;call light within reach;encouraged to call for assist;exit alarm on;with family/caregiver;side rails up x2  -RADHA --    In Chair -- sitting;call light within reach;encouraged to call for assist;exit alarm on;with family/caregiver  -RADHA              User Key  (r) = Recorded By, (t) = Taken By, (c) = Cosigned By      Initials Name Provider Type    RADHA Leo Ordonez, PTA Physical Therapist Assistant                    Physical Therapy Education       Title: PT OT SLP Therapies (In Progress)       Topic: Physical Therapy (In Progress)       Point: Mobility training (Done)       Learning Progress Summary             Patient Acceptance, E, VU by MM at 12/18/2023 0437    Acceptance, E, VU by SB at 12/17/2023 1318    Comment: pt edu on POC, benefits of act, d/c plans, NWB status RUE   Family Acceptance, E, VU by SB at 12/17/2023 1318    Comment: pt edu on POC, benefits of act, d/c plans, NWB status RUE                         Point: Home exercise program (Not Started)       Learner Progress:  Not documented in this visit.              Point: Body mechanics (Not Started)       Learner Progress:  Not documented in this visit.              Point: Precautions (Done)       Learning Progress Summary             Patient Acceptance, E, VU by MM at 12/18/2023 0437    Acceptance, E, VU by SB at 12/17/2023 1318    Comment: pt edu on POC, benefits of act, d/c plans, NWB status RUE   Family Acceptance, E, VU by SB at 12/17/2023 1318    Comment: pt edu on POC, benefits of act, d/c plans, NWB status RUE                                         User Key       Initials Effective Dates Name Provider Type Discipline    SB 07/11/23 -  Whitney Gómez, PT DPT Physical Therapist PT    MM 01/04/23 -  Cesia Ruiz  RN Registered Nurse Nurse                  PT Recommendation and Plan     Plan of Care Reviewed With: patient  Progress: improving  Outcome Evaluation: Pt was in bed, performed LE exercises AROM BLE x 20.  Transfered supine to sitting with min assist, no c/o increase dizziness.  At EOB, performed LE exercises.  Transfered sit to stand with min assist and took a few steps from the bed to the chair with HHA and min assist with no c/o increase dizziness.  Will continue to work with pt to increase mobility and progress with amb as pt is able .   Outcome Measures       Row Name 12/18/23 1300 12/18/23 1145          How much help from another person do you currently need...    Turning from your back to your side while in flat bed without using bedrails? -- 3  -RADHA     Moving from lying on back to sitting on the side of a flat bed without bedrails? -- 3  -RADHA     Moving to and from a bed to a chair (including a wheelchair)? -- 3  -RADHA     Standing up from a chair using your arms (e.g., wheelchair, bedside chair)? -- 3  -RADHA     Climbing 3-5 steps with a railing? -- 2  -RADHA     To walk in hospital room? -- 3  -RADHA     AM-PAC 6 Clicks Score (PT) -- 17  -RADHA     Highest Level of Mobility Goal -- 5 --> Static standing  -RADHA        How much help from another is currently needed...    Putting on and taking off regular lower body clothing? 2  -EC --     Bathing (including washing, rinsing, and drying) 2  -EC --     Toileting (which includes using toilet bed pan or urinal) 2  -EC --     Putting on and taking off regular upper body clothing 2  -EC --     Taking care of personal grooming (such as brushing teeth) 3  -EC --     Eating meals 3  -EC --     AM-PAC 6 Clicks Score (OT) 14  -EC --        Functional Assessment    Outcome Measure Options AM-PAC 6 Clicks Daily Activity (OT)  -EC AM-PAC 6 Clicks Basic Mobility (PT)  -RADHA               User Key  (r) = Recorded By, (t) = Taken By, (c) = Cosigned By      Initials Name Provider Type    RADHA  Leo Ordonez PTA Physical Therapist Assistant    Violeta Ross, OTR/L Occupational Therapist                     Time Calculation:    PT Charges       Row Name 12/18/23 1449 12/18/23 1145          Time Calculation    Start Time 1449  -RADHA 1145  -RADHA     Stop Time 1517  -RADHA 1210  -RADHA     Time Calculation (min) 28 min  -RADHA 25 min  -RADHA     PT Received On 12/18/23  -RADHA 12/18/23  -RADHA        Time Calculation- PT    Total Timed Code Minutes- PT 28 minute(s)  -RAHDA 25 minute(s)  -RADHA        Timed Charges    06089 - PT Therapeutic Exercise Minutes 10  -RADHA 15  -RADHA     08333 - Gait Training Minutes  18  -RADHA --     24298 - PT Therapeutic Activity Minutes -- 10  -RADHA        Total Minutes    Timed Charges Total Minutes 28  -RADHA 25  -RADHA      Total Minutes 28  -RADHA 25  -RADHA               User Key  (r) = Recorded By, (t) = Taken By, (c) = Cosigned By      Initials Name Provider Type    Leo Parkinson PTA Physical Therapist Assistant                  Therapy Charges for Today       Code Description Service Date Service Provider Modifiers Qty    49657043987 HC PT THERAPEUTIC ACT EA 15 MIN 12/18/2023 Leo Ordonez, PTA GP 1    63487168537 HC PT THER PROC EA 15 MIN 12/18/2023 Leo Ordonez, PTA GP 1    42041188328 HC GAIT TRAINING EA 15 MIN 12/18/2023 Leo Ordonez, PTA GP 1    12485564013 HC PT THER PROC EA 15 MIN 12/18/2023 Leo Ordonez, PTA GP 1            PT G-Codes  Outcome Measure Options: AM-PAC 6 Clicks Daily Activity (OT)  AM-PAC 6 Clicks Score (PT): 17  AM-PAC 6 Clicks Score (OT): 14    Leo Ordonez PTA  12/18/2023

## 2023-12-18 NOTE — PLAN OF CARE
Goal Outcome Evaluation:  Plan of Care Reviewed With: patient        Progress: improving  Outcome Evaluation: OT tx completed. Pt alert & oriented x4  w no complaints, would like to get out of bed. Pt came to EOB w Gely, OT to readjust sling once sitting EOB. Pt completed grooming activities w setup & SBA. Sitting /66. Pt sit>stand w Gely, while obtaining standing BP, pt c/o dizziness & had to sit back down- BP 90/33. Returned to supine Gely, pt then stated she needed to void. Nsging in room states she is ok to transfer to bsc. OT assisted nsging w transfer to bsc, Gely & no c/o dizziness. Left pt sitting w nsging on bsc. Continue OT POC to progress act rukhsana. Recommend d/c to subacute rehab, possible home w HH pending medical progress.

## 2023-12-18 NOTE — PLAN OF CARE
Goal Outcome Evaluation:  Plan of Care Reviewed With: patient        Progress: improving  Outcome Evaluation: Pt was in bed, performed LE exercises AROM BLE x 20.  Transfered supine to sitting with min assist, no c/o increase dizziness.  At EOB, performed LE exercises.  Transfered sit to stand with min assist and took a few steps from the bed to the chair with HHA and min assist with no c/o increase dizziness.  Will continue to work with pt to increase mobility and progress with amb as pt is able .

## 2023-12-18 NOTE — THERAPY TREATMENT NOTE
Acute Care - Physical Therapy Treatment Note  [facility]     Patient Name: Mya Chung  : 2/10/1928  MRN: [id number]  Today's Date: 2023      Visit Dx:     ICD-10-CM ICD-9-CM   1. Syncope and collapse  R55 780.2   2. Orthostatic hypotension  I95.1 458.0   3. Fall, initial encounter  W19.XXXA E888.9   4. Closed fracture of right shoulder, initial encounter  S42.91XA 812.00   5. Impaired mobility [Z74.09]  Z74.09 799.89     Patient Active Problem List   Diagnosis    Left upper arm pain    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    Hypertension    Hyperlipidemia    PAD (peripheral artery disease)    Right greater trochanteric avulsion periprosthetic, closed, minimally displaced    Closed fracture of right wrist    Fall    Impaired gait and mobility    Acute right hip pain    Hypothyroidism    Closed fracture of multiple pubic rami, right, initial encounter    Cholecystitis    Nausea    Acute on chronic diastolic congestive heart failure    Other chest pain    Acute systolic heart failure    Chronic combined systolic and diastolic congestive heart failure    Closed fracture of ramus of left pubis, initial encounter    Stage 3a chronic kidney disease (CKD)    Subarachnoid hemorrhage    Body mass index (BMI) of 19.9 or less in adult    Former smoker    Orthostatic hypotension    Closed fracture of proximal end of right humerus    Anemia     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     Wears glasses      Past Surgical History:   Procedure Laterality Date    AORTAGRAM Right 2018    Procedure: LEFT LOWER EXTREMITY ANGIOGRAM;  Surgeon: Walker Davis DO;   Location:  [facility];  Service: Vascular    BREAST BIOPSY Bilateral     multiple on both, all benign    BREAST BIOPSY Left 2017    Procedure: LEFT MAMMOGRAM GUIDED NEEDLE DIRECTED EXCISIONAL BREAST BIOPSY;  Surgeon: Malgorzata Scott MD;  Location: [facility] OR;  Service:     BUNIONECTOMY Bilateral      SECTION      X2    CHOLECYSTECTOMY      CHOLECYSTECTOMY WITH INTRAOPERATIVE CHOLANGIOGRAM N/A 2021    Procedure: LAPAROSCOPIC CHOLECYSTECTOMY WITH CHOLANGIOGRAM;  Surgeon: Malgorzata Scott MD;  Location: [facility] OR;  Service: General;  Laterality: N/A;    COLONOSCOPY  2015    diverticulosis    ENDOSCOPY N/A 2021    Procedure: ESOPHAGOGASTRODUODENOSCOPY WITH ANESTHESIA;  Surgeon: Sonu Cheek DO;  Location: [facility] ENDOSCOPY;  Service: Gastroenterology;  Laterality: N/A;  pre nausea  post esophagitis  Delfina Pereira DO    EYE SURGERY      CATARACT SURGERY    FEMORAL ENDARTERECTOMY Left 2018    Procedure: LEFT FEMORAL ENDARTERECTOMY;  Surgeon: Walker Davis DO;  Location: [facility] HYBRID OR 12;  Service: Vascular    JOINT REPLACEMENT      RIGHT HIP    VASCULAR SURGERY       PT Assessment (last 12 hours)       PT Evaluation and Treatment       Row Name 23 1145          Physical Therapy Time and Intention    Subjective Information complains of;pain  -RADHA     Document Type therapy note (daily note)  -RADHA     Mode of Treatment physical therapy  -RADHA       Row Name 23 1145          General Information    Existing Precautions/Restrictions fall;orthostatic hypotension;non-weight bearing   NWB RUE with sling  -RADHA       Row Name 23 1145          Pain    Pretreatment Pain Rating 0/10 - no pain  -RADHA     Posttreatment Pain Rating 4/10  -RADHA     Pain Location - Side/Orientation Right  -RADHA     Pain Location - shoulder  -RADHA     Pre/Posttreatment Pain Comment pain with movment, adjusting sling  -RADHA     Pain Intervention(s) Repositioned  -RADHA       Row Name  12/18/23 1145          Bed Mobility    Supine-Sit [place] (Bed Mobility) verbal cues;minimum assist (75% patient effort)  -RADHA     Sit-Supine [place] (Bed Mobility) --  chair  -RADHA       Row Name 12/18/23 1145          Sit-Stand Transfer    Sit-Stand [place] (Transfers) verbal cues;minimum assist (75% patient effort)  -RADHA       Row Name 12/18/23 1145          Stand-Sit Transfer    Stand-Sit [place] (Transfers) verbal cues;minimum assist (75% patient effort)  -RADHA       Row Name 12/18/23 1145          Gait/Stairs (Locomotion)    [place] Level (Gait) verbal cues;minimum assist (75% patient effort)  -RADHA     Assistive Device (Gait) --  HHA  -RADHA     Distance in Feet (Gait) few steps from the bed to the chair  -RADHA       Row Name 12/18/23 1145          Motor Skills    Comments, Therapeutic Exercise in abad, AROM BLE X 20  -RADHA     Additional Documentation Comments, Therapeutic Exercise (Row)  -RADHA       Row Name 12/18/23 1145          Positioning and Restraints    Pre-Treatment Position in bed  -RADHA     Post Treatment Position chair  -RADHA     In Chair sitting;call light within reach;encouraged to call for assist;exit alarm on;with family/caregiver  -RADHA               User Key  (r) = Recorded By, (t) = Taken By, (c) = Cosigned By      Initials Name Provider Type    Leo Parkinson, PTA Physical Therapist Assistant                    Physical Therapy Education       Title: PT OT SLP Therapies (In Progress)       Topic: Physical Therapy (In Progress)       Point: Mobility training (Done)       Learning Progress Summary             Patient Acceptance, E, VU by MM at 12/18/2023 0437    Acceptance, E, VU by SB at 12/17/2023 1318    Comment: pt edu on POC, benefits of act, d/c plans, NWB status RUE   Family Acceptance, E, VU by SB at 12/17/2023 1318    Comment: pt edu on POC, benefits of act, d/c plans, NWB status RUE                         Point: Home exercise program (Not Started)       Learner  Progress:  Not documented in this visit.              Point: Body mechanics (Not Started)       Learner Progress:  Not documented in this visit.              Point: Precautions (Done)       Learning Progress Summary             Patient Acceptance, E, VU by MM at 12/18/2023 0437    Acceptance, E, VU by SB at 12/17/2023 1318    Comment: pt edu on POC, benefits of act, d/c plans, NWB status RUE   Family Acceptance, E, VU by SB at 12/17/2023 1318    Comment: pt edu on POC, benefits of act, d/c plans, NWB status RUE                                         User Key       Initials Effective Dates Name Provider Type Discipline    SB 07/11/23 -  Whitney Gómez, PT DPT Physical Therapist PT    MM 01/04/23 -  Cesia Ruiz, RN Registered Nurse Nurse                  PT Recommendation and Plan     Plan of Care Reviewed With: patient  Progress: improving  Outcome Evaluation: Pt was in bed, performed LE exercises AROM BLE x 20.  Transfered supine to sitting with min assist, no c/o increase dizziness.  At EOB, performed LE exercises.  Transfered sit to stand with min assist and took a few steps from the bed to the chair with HHA and min assist with no c/o increase dizziness.  Will continue to work with pt to increase mobility and progress with amb as pt is able .   Outcome Measures       Row Name 12/18/23 1300 12/18/23 1145          How much help from another person do you currently need...    Turning from your back to your side while in flat bed without using bedrails? -- 3  -RADHA     Moving from lying on back to sitting on the side of a flat bed without bedrails? -- 3  -RADHA     Moving to and from a bed to a chair (including a wheelchair)? -- 3  -RADHA     Standing up from a chair using your arms (e.g., wheelchair, bedside chair)? -- 3  -RADHA     Climbing 3-5 steps with a railing? -- 2  -RADHA     To walk in hospital room? -- 3  -RADHA     AM-PAC 6 Clicks Score (PT) -- 17  -RADHA     Highest Level of Mobility Goal -- 5 --> Static standing  -RADHA         How much help from another is currently needed...    Putting on and taking off regular lower body clothing? 2  -EC --     Bathing (including washing, rinsing, and drying) 2  -EC --     Toileting (which includes using toilet bed pan or urinal) 2  -EC --     Putting on and taking off regular upper body clothing 2  -EC --     Taking care of personal grooming (such as brushing teeth) 3  -EC --     Eating meals 3  -EC --     AM-PAC 6 Clicks Score (OT) 14  -EC --        Functional Assessment    Outcome Measure Options AM-PAC 6 Clicks Daily Activity (OT)  -EC AM-PAC 6 Clicks Basic Mobility (PT)  -RADHA               User Key  (r) = Recorded By, (t) = Taken By, (c) = Cosigned By      Initials Name Provider Type    Leo Parkinson PTA Physical Therapist Assistant    Violeta Ross, OTR/L Occupational Therapist                     Time Calculation:    PT Charges       Row Name 12/18/23 1145             Time Calculation    Start Time 1145  -RADHA      Stop Time 1210  -RADHA      Time Calculation (min) 25 min  -RADHA      PT Received On 12/18/23  -RADHA         Time Calculation- PT    Total Timed Code Minutes- PT 25 minute(s)  -RADHA         Timed Charges    41668 - PT Therapeutic Exercise Minutes 15  -RADHA      82842 - PT Therapeutic Activity Minutes 10  -RADHA         Total Minutes    Timed Charges Total Minutes 25  -RADHA       Total Minutes 25  -RADHA                User Key  (r) = Recorded By, (t) = Taken By, (c) = Cosigned By      Initials Name Provider Type    Leo Parkinson PTA Physical Therapist Assistant                  Therapy Charges for Today       Code Description Service Date Service Provider Modifiers Qty    89757400604 HC PT THERAPEUTIC ACT EA 15 MIN 12/18/2023 Leo Ordonez PTA GP 1    31242490316 HC PT THER PROC EA 15 MIN 12/18/2023 Leo Ordonez, RICARDO GP 1            PT G-Codes  Outcome Measure Options: AM-PAC 6 Clicks Daily Activity (OT)  AM-PAC 6 Clicks Score (PT): 17  AM-PAC 6 Clicks Score (OT):  14    Leo Ordonez, PTA  12/18/2023

## 2023-12-18 NOTE — PLAN OF CARE
Goal Outcome Evaluation:   Intermittent confusion overnight  Room air  Orthostatic BP vitals: Lying 115/71, Sitting 112/58, Standing 96/38  Adequate UOP, voiding per external cath  Tylenol x1 for mild pain  Arm remains in sling for immobilization  Slept well  Bed alarm set. Call light in reach.

## 2023-12-18 NOTE — PLAN OF CARE
Goal Outcome Evaluation:    Pt Aox4. VSS on RA. Orthostatic hypotension present, BP decreased substantially with position changes witnessed by PT and RN throughout shift. C/o of lightheadedness upon standing. External catheter in place. C/o pain managed with prn meds. Sling present on right arm. Pt upx1 with walker and gait belt to bsc and chair. Ambulated with PT in hallway. Family present at bedside majority of day. Safety maintained.

## 2023-12-19 PROCEDURE — 97110 THERAPEUTIC EXERCISES: CPT

## 2023-12-19 PROCEDURE — 97116 GAIT TRAINING THERAPY: CPT

## 2023-12-19 PROCEDURE — 99232 SBSQ HOSP IP/OBS MODERATE 35: CPT | Performed by: CLINICAL NURSE SPECIALIST

## 2023-12-19 RX ADMIN — DOCUSATE SODIUM 50 MG AND SENNOSIDES 8.6 MG 2 TABLET: 8.6; 5 TABLET, FILM COATED ORAL at 22:45

## 2023-12-19 RX ADMIN — ASPIRIN 81 MG: 81 TABLET, COATED ORAL at 09:45

## 2023-12-19 RX ADMIN — PANTOPRAZOLE SODIUM 40 MG: 40 TABLET, DELAYED RELEASE ORAL at 05:38

## 2023-12-19 RX ADMIN — LEVOTHYROXINE SODIUM 150 MCG: 150 TABLET ORAL at 05:38

## 2023-12-19 RX ADMIN — Medication 10 ML: at 22:45

## 2023-12-19 RX ADMIN — Medication 10 ML: at 09:46

## 2023-12-19 RX ADMIN — CARVEDILOL 3.12 MG: 3.12 TABLET, FILM COATED ORAL at 17:59

## 2023-12-19 RX ADMIN — CLOPIDOGREL BISULFATE 75 MG: 75 TABLET, FILM COATED ORAL at 09:45

## 2023-12-19 RX ADMIN — DOCUSATE SODIUM 50 MG AND SENNOSIDES 8.6 MG 2 TABLET: 8.6; 5 TABLET, FILM COATED ORAL at 09:45

## 2023-12-19 RX ADMIN — ESCITSLOPRAM 10 MG: 10 TABLET ORAL at 09:45

## 2023-12-19 RX ADMIN — OXYCODONE HYDROCHLORIDE 5 MG: 5 TABLET ORAL at 14:24

## 2023-12-19 RX ADMIN — DONEPEZIL HYDROCHLORIDE 5 MG: 5 TABLET, FILM COATED ORAL at 22:45

## 2023-12-19 RX ADMIN — GABAPENTIN 100 MG: 100 CAPSULE ORAL at 22:45

## 2023-12-19 RX ADMIN — SACUBITRIL AND VALSARTAN 1 TABLET: 24; 26 TABLET, FILM COATED ORAL at 09:42

## 2023-12-19 NOTE — PLAN OF CARE
Goal Outcome Evaluation:  Plan of Care Reviewed With: patient        Progress: improving  Outcome Evaluation: Pt was in bed, stated to be feeling better today.  RUE in sling.  monitored BP  supine 137/55, sitting 147/55, standing 124/59.  Pt had no complaints of dizziness during tx.  Able to transfer supine to sitting with CGA.  Sit to stand with min assist.  Amb 60' with SBQC and min assist.  Pt has several slight LOB with amb that require min assist to correct.  Will continue to work with pt to increase strength and improve amb.

## 2023-12-19 NOTE — PROGRESS NOTES
[facility] Medicine Services  INPATIENT PROGRESS NOTE    Patient Name: Mya Xiefitt  Date of Admission: 12/16/2023  Today's Date: 12/19/23  Length of Stay: 1  Primary Care Physician: Delfina Pereira DO    Subjective   Chief Complaint: orthostatic hypotension  HPI   She was sitting up in bed with multiple family members at bedside.  Patient indicates that she is feeling well today.  The static vitals with PT today were improved.  She had no complaints of dizziness.  She was able to ambulate 60 feet with therapy.      Patient and family request referral sent to [facility].    Review of Systems   All pertinent negatives and positives are as above. All other systems have been reviewed and are negative unless otherwise stated.     Objective    Temp:  [97.6 °F (36.4 °C)-98.4 °F (36.9 °C)] 97.9 °F (36.6 °C)  Heart Rate:  [] 80  Resp:  [16-20] 18  BP: (113-145)/(43-56) 127/50  Physical Exam  Vitals reviewed.   Constitutional:       General: She is not in acute distress.     Appearance: She is not toxic-appearing.      Comments: Sitting up in bed.  No acute distress.  On room air.  Family at bedside.  Discussed with her nurse  renata.   HENT:      Head: Normocephalic and atraumatic.      Mouth/Throat:      Mouth: Mucous membranes are moist.      Pharynx: Oropharynx is clear.   Eyes:      Extraocular Movements: Extraocular movements intact.      Conjunctiva/sclera: Conjunctivae normal.      Pupils: Pupils are equal, round, and reactive to light.   Cardiovascular:      Rate and Rhythm: Normal rate and regular rhythm.      Pulses: Normal pulses.   Pulmonary:      Effort: Pulmonary effort is normal. No respiratory distress.      Breath sounds: Normal breath sounds. No wheezing or rhonchi.   Abdominal:      General: Bowel sounds are normal. There is no distension.      Palpations: Abdomen is soft.      Tenderness: There is no abdominal tenderness.   Musculoskeletal:  "        General: No swelling or tenderness. Normal range of motion.      Cervical back: Normal range of motion and neck supple. No muscular tenderness.      Comments: Sling to right upper extremity   Skin:     General: Skin is warm and dry.      Findings: No erythema or rash.   Neurological:      General: No focal deficit present.      Mental Status: She is alert and oriented to person, place, and time.      Cranial Nerves: No cranial nerve deficit.      Motor: No weakness.   Psychiatric:         Mood and Affect: Mood normal.         Behavior: Behavior normal.       Results Review:  I have reviewed the labs, radiology results, and diagnostic studies.    Laboratory Data:   Results from last 7 days   Lab Units 12/18/23  0557 12/17/23  1840 12/17/23 0443 12/16/23 2014   WBC 10*3/mm3 7.83  --  7.54 12.22*   HEMOGLOBIN g/dL 8.1* 8.0* 9.6* 12.0   HEMATOCRIT % 26.5* 26.0* 30.4* 39.5   PLATELETS 10*3/mm3 122*  --  131* 189        Results from last 7 days   Lab Units 12/18/23  0557 12/17/23 0443 12/16/23 2014   SODIUM mmol/L 141 140 144   POTASSIUM mmol/L 3.8 4.2 5.1   CHLORIDE mmol/L 109* 105 108*   CO2 mmol/L 23.0 24.0 25.0   BUN mg/dL 39* 35* 38*   CREATININE mg/dL 1.14* 1.09* 1.16*   CALCIUM mg/dL 7.9* 8.2 9.1   BILIRUBIN mg/dL  --   --  0.4   ALK PHOS U/L  --   --  61   ALT (SGPT) U/L  --   --  10   AST (SGOT) U/L  --   --  20   GLUCOSE mg/dL 99 122* 126*       Culture Data:   No results found for: \"ACANTHNAEG\", \"AFBCX\", \"BPERTUSSISCX\", \"BLOODCX\"  No results found for: \"BCIDPCR\", \"CXREFLEX\", \"CSFCX\", \"CULTURETIS\"  No results found for: \"CULTURES\", \"HSVCX\", \"URCX\"  No results found for: \"EYECULTURE\", \"GCCX\", \"HSVCULTURE\", \"LABHSV\"  No results found for: \"LEGIONELLA\", \"MRSACX\", \"MUMPSCX\", \"MYCOPLASCX\"  No results found for: \"NOCARDIACX\", \"STOOLCX\"  No results found for: \"THROATCX\", \"UNSTIMCULT\", \"URINECX\", \"CULTURE\", \"VZVCULTUR\"  No results found for: \"VIRALCULTU\", \"WOUNDCX\"    Radiology Data:   Imaging Results (Last " 24 Hours)       ** No results found for the last 24 hours. **          I have reviewed the patient's current medications.     Assessment/Plan   Assessment  Active Hospital Problems    Diagnosis     **Orthostatic hypotension     Closed fracture of proximal end of right humerus     Anemia     Stage 3a chronic kidney disease (CKD)     Chronic combined systolic and diastolic congestive heart failure     Fall     Hypothyroidism     PAD (peripheral artery disease)      Ms. Chung is a 89-year-old female that presented to [facility] on 12/16 with right arm pain after a fall.  In the ED, she was found to have right proximal humerus diaphyseal fracture.  She was going to be discharged from the emergency department, however, when she got up from the bed to go to the bathroom she had an orthostatic episode with syncope.  Patient lives home alone and has good support from family.  She walks with a cane or walker.    Treatment Plan  Orthostatic hypotension.  Fluid bolus in ED followed by gentle IV fluid x 1 day.  Orthostatic hypotension resolved after holding PM dose of Entresto (supine 137/55, sitting 147/55, standing 124/59 per PT note). Entresto given after orthostatic vitals.  Entresto discontinued today.    Orthopedics, Dr. Galvez consulted.  After session with orthopedics, patient and her family plan for conservative treatment with conversion to June bracing. Sling immobilization to the right upper extremity. Nonweightbearing of the right upper extremity.  She will follow-up in the outpatient setting for conversion to a June brace.     Hemoglobin 12.0 on admission.  Hemoglobin 9.6 on the following day and now 8.1.  Suspect dilutional.  She has received IV fluid.  No signs of bleeding.  Fecal occult blood negative.    She has a history of chronic combined systolic and diastolic congestive heart failure followed by cardiologist Dr. Urena as outpatient.  She is on Entresto 24-26 mg twice daily, Coreg  3.125 mg twice daily, aspirin 81 mg and Plavix 75 mg.    PT/OT.  Therapy recommends discharge to subacute rehab versus home with 24/7 assist and home health.    Palliative care consulted.    Medical Decision Making  Number and Complexity of problems: 2 acute problems in the form of orthostatic hypotension and humeral fracture  Differential Diagnosis: None considered at present    Conditions and Status        Condition is unchanged.     [facility] Data  External documents reviewed: Prior epic records  Cardiac tracing (EKG, telemetry) interpretation: Reviewed  Radiology interpretation: Interpreted by radiology  Labs reviewed: As above  Any tests that were considered but not ordered: None considered at present     Decision rules/scores evaluated (example [id number], [place], etc): None considered at present     Discussed with: Patient and Dr. Weaver     Care Planning  Shared decision making: Patient is agreeable to ongoing workup and treatment  Code status and discussions: Full code with full interventions    Disposition  Social Determinants of Health that impact treatment or disposition: None identified at present  I expect the patient to be discharged to [facility] in 1-2 days.     Electronically signed by ANGY Lu, 12/19/23, 11:35 CST.

## 2023-12-19 NOTE — CASE MANAGEMENT/SOCIAL WORK
Continued Stay Note  JAMEY Scales     Patient Name: Mya Xiefitt  MRN: [id number]  Today's Date: 12/19/2023    Admit Date: 12/16/2023    Plan: Home   Discharge Plan       Row Name 12/19/23 1148       Plan    Plan Comments PT needing rehab and PT/Family prefer [facility]. SW has made a referral. SW will await bed offer or denial.                   Discharge Codes    No documentation.                       LONNIE Cantu

## 2023-12-19 NOTE — PLAN OF CARE
Goal Outcome Evaluation:              Outcome Evaluation: Pt ao x 4, adx for ground level fall. Bruise to rt side of face, rt arm fx. Sling worn to rt arm through out shift. Denies pain, assist x 1 to the bathroom. Pt made multiple trips to the bathroom, bed alarm on, episodes of confusion noted, pt is easy to redirect, IV access intact to LRF, no new issues reported at this time.

## 2023-12-19 NOTE — PLAN OF CARE
Goal Outcome Evaluation:     Pt Aox4. VSS on RA. BP remains soft but stabilizing upon discontinuation of Entresto. Family concerned about steps being taken to address. Ortho notified, Dr. Galvez called family to discuss. Family also concerned about discharge options. Social work consult placed. Family appreciative of all staff efforts from all departments. Prn pain meds given once this shift before orthosis fitting. Over the shoulder humeral brace fitted and in place. Pt upx1 to chair/brm. Voiding well. 1 BM this shift. Family present at bedside. Safety maintained.

## 2023-12-19 NOTE — PROGRESS NOTES
[facility] Palliative Care Services    Palliative Care Daily Progress Note   Chief complaint-follow up support for patient/family    Code Status:   Code Status and Medical Interventions:   Ordered at: 12/18/23 1451     Medical Intervention Limits:    NO intubation (DNI)    Other     Level Of Support Discussed With:    Patient    Next of Kin (If No Surrogate)     Code Status (Patient has no pulse and is not breathing):    No CPR (Do Not Attempt to Resuscitate)     Medical Interventions (Patient has pulse or is breathing):    Limited Support     Comments:    patient and son, Donal     Additional Medical Interventions Limits:    no permanent feeding tube      Advanced Directives: Advance Directive Status: Patient has advance directive, copy in chart   Goals of Care: Ongoing.     S: Medical record reviewed. Events noted. Orthostatic hypotension resolved after holding PM dose of Entresto (supine 137/55, sitting 147/55, standing 124/59). PT notes reviewed. Case discussed electronically with ANGY Appiah. Unfortunately received a dose of AM Entresto today. Currently sitting up in chair without distress. Family at bedside anticipating SNF, [facility] at discharge. Discussed improvement in BP with holding Entresto overnight, and challenges with medication optimization, frequent falls, co-morbidites, and advanced age. Recommend follow-up with cardiology in outpatient setting to further direct medication optimization for CHF.     Review of Systems   Constitutional: Positive for malaise/fatigue.   Cardiovascular:  Negative for dyspnea on exertion.   Respiratory:  Negative for shortness of breath.    Skin:  Negative for poor wound healing.   Musculoskeletal:  Positive for falls.   Gastrointestinal:  Negative for nausea.   Genitourinary:  Negative for dysuria.   Neurological:  Negative for weakness.   Psychiatric/Behavioral:  The patient is not nervous/anxious.      Pain Assessment  Pain Location:  shoulder  Pain Description: aching    O:     Intake/Output Summary (Last 24 hours) at 12/19/2023 0957  Last data filed at 12/19/2023 0929  Gross per 24 hour   Intake 240 ml   Output --   Net 240 ml       Diagnostics and current medications: Reviewed.      Current Facility-Administered Medications:     acetaminophen (TYLENOL) tablet 650 mg, 650 mg, Oral, Q4H PRN, Sukumar Dominguez MD, 650 mg at 12/18/23 1537    aspirin EC tablet 81 mg, 81 mg, Oral, Daily, Sukumar Dominguez MD, 81 mg at 12/19/23 0945    sennosides-docusate (PERICOLACE) 8.6-50 MG per tablet 2 tablet, 2 tablet, Oral, BID, 2 tablet at 12/19/23 0945 **AND** polyethylene glycol (MIRALAX) packet 17 g, 17 g, Oral, Daily PRN **AND** bisacodyl (DULCOLAX) EC tablet 5 mg, 5 mg, Oral, Daily PRN **AND** bisacodyl (DULCOLAX) suppository 10 mg, 10 mg, Rectal, Daily PRN, Sukumar Dominguez MD    carvedilol (COREG) tablet 3.125 mg, 3.125 mg, Oral, BID With Meals, Sukumar Dominguez MD, 3.125 mg at 12/18/23 1003    clopidogrel (PLAVIX) tablet 75 mg, 75 mg, Oral, Daily, Sukumar Dominguez MD, 75 mg at 12/19/23 0945    donepezil (ARICEPT) tablet 5 mg, 5 mg, Oral, Nightly, Sukumar Dominguez MD, 5 mg at 12/18/23 2129    escitalopram (LEXAPRO) tablet 10 mg, 10 mg, Oral, Daily, Sukumar Dominguez MD, 10 mg at 12/19/23 0945    gabapentin (NEURONTIN) capsule 100 mg, 100 mg, Oral, Nightly, Sukumar Dominguez MD, 100 mg at 12/18/23 2130    levothyroxine (SYNTHROID, LEVOTHROID) tablet 150 mcg, 150 mcg, Oral, Q AM, Sukumar Dominguez MD, 150 mcg at 12/19/23 0538    oxyCODONE (ROXICODONE) immediate release tablet 5 mg, 5 mg, Oral, Q6H PRN, Luis Manuel Hung DO    pantoprazole (PROTONIX) EC tablet 40 mg, 40 mg, Oral, Q AM, Luis Manuel Hung DO, 40 mg at 12/19/23 0538    sacubitril-valsartan (ENTRESTO) 24-26 MG tablet 1 tablet, 1 tablet, Oral, BID, Sukumar Dominguez MD, 1 tablet at 12/19/23 0942    [COMPLETED] Insert  "Peripheral IV, , , Once **AND** sodium chloride 0.9 % flush 10 mL, 10 mL, Intravenous, PRN, Sukumar Dominguez MD    sodium chloride 0.9 % flush 10 mL, 10 mL, Intravenous, Q12H, Sukumar Dominguez MD, 10 mL at 12/19/23 0946    sodium chloride 0.9 % flush 10 mL, 10 mL, Intravenous, PRN, Sukumar Dominguez MD    sodium chloride 0.9 % infusion 40 mL, 40 mL, Intravenous, PRN, Sukumar Dominguez MD    Allergies   Allergen Reactions    Erythromycin Shortness Of Breath    Zithromax [Azithromycin] Itching and Other (See Comments)     Yeast infection    Penicillins Rash     I have utilized all available immediate resources to obtain, update, or review the patient's current medications (including all prescriptions, over-the-counter products, herbals, cannabis/cannabidiol products, and vitamin/mineral/dietary (nutritional) supplements) for name, route of administration, type, dose and frequency.    A:    /50 (BP Location: Left arm, Patient Position: Lying)   Pulse 80   Temp 97.9 °F (36.6 °C) (Oral)   Resp 18   Ht 162.6 cm (64.02\")   Wt 49.5 kg (109 lb 2 oz)   SpO2 98%   BMI 18.72 kg/m²     Vitals and nursing note reviewed.   Constitutional:       Appearance: Not in distress. Frail.      Comments: Right upper extremity sling   Eyes:      General: Lids are normal.      Pupils: Pupils are equal, round, and reactive to light.   HENT:      Head: Normocephalic.      Comments: Bruising to right jaw  Pulmonary:      Effort: Pulmonary effort is normal.   Cardiovascular:      Normal rate.   Edema:     Peripheral edema absent.   Abdominal:      Palpations: Abdomen is soft.   Musculoskeletal:      Cervical back: Neck supple.   Skin:     General: Skin is warm.   Genitourinary:     Comments: No reported dysuria  Neurological:      Mental Status: Alert, oriented to person, place, and time and oriented to person, place and time.   Psychiatric:         Mood and Affect: Mood normal.         Cognition and " Memory: Cognition normal.         Patient status: Disease state: Controlled with current treatments.  Current Functional status: Palliative Performance Scale Score: Performance 40% based on the following measures: Ambulation: Mainly in bed, Activity and Evidence of Disease: Unable to do any work, extensive evidence of disease, Self-Care: Mainly assistance required,  Intake: Normal or reduced, LOC: Full, drowsy or confusion   Baseline Functional status: Palliative Performance Scale Score: Performance 60% based on the following measures: Ambulation: Reduced, Activity and Evidence of Disease: Unable to do hobby or some work, significant evidence of disease, Self-Care: Occasional assist necessary,  Intake: Normal or reduced, LOC: Full or confusion   Nutritional status: Albumin 3.7 Body mass index is 18.72 kg/m².      Hospital Problem List      Orthostatic hypotension    PAD (peripheral artery disease)    Fall    Hypothyroidism    Chronic combined systolic and diastolic congestive heart failure    Stage 3a chronic kidney disease (CKD)    Closed fracture of proximal end of right humerus    Anemia     Impression/Problem List:     Orthostatic hypotension on baseline hypertension  Chronic combined systolic and diastolic congestive heart failure  Fall  Closed fracture of proximal end of right humerus  Chronic kidney disease stage III  Anemia  Hypothyroidism  Coronary artery disease  Hyperlipidemia  Lung nodule  Cerebral microvascular disease and atrophy per CT  Osteopenia  Peripheral artery disease  History of Shingles (1940)  Skin cancer  History of subarachnoid hemorrhage-6/30/2023  Impaired mobility  Advanced age      Recommendations/Plan:  1. plan: Goals of care include CODE STATUS no CPR/limited support interventions.     Family support: The patient receives support from her children..  Advance Directives: Advance Directive Status: Patient has advance directive, copy in chart   POA/Healthcare surrogate-Barrett Chung and  Yeni Mixon, children/[facility].     2.  Palliative care encounter  - Prognosis is guarded long-term secondary to orthostatic hypotension, fall with injury, cerebral microvascular disease, multiple comorbidities, impaired mobility, and advanced age.  -Patient and family appears to have good prognostic awareness.      -Evaluated by Ortho who recommended conservative management sling and later evaluation in the outpatient setting for June brace.    -Received IV fluid bolus in ER.    -Evaluated by therapy who recommended subacute rehab versus home with 24/7 assist and home health.    -attending recommended trial of compression stockings.        12/19-orthostatic hypotension resolved after holding PM dose of Entresto. Likely due to intolerance of medication optimization. Recommend monitor off Entresto and have patient follow up with regular cardiologist in outpatient setting. Compression stockings not likely optimal intervention given PVD and impaired mobility. Discussed with family.  -MOST document completed.  -Anticipating SNF, family prefer [facility]  -High risk for rehospitalization      Thank you for allowing us to participate in patient's plan of care. Palliative Care Team will continue to follow patient.     Ignacia Hill, APRN  12/19/2023  09:57 CST

## 2023-12-19 NOTE — THERAPY TREATMENT NOTE
Acute Care - Physical Therapy Treatment Note  [facility]     Patient Name: Mya Chung  : 2/10/1928  MRN: [id number]  Today's Date: 2023      Visit Dx:     ICD-10-CM ICD-9-CM   1. Syncope and collapse  R55 780.2   2. Orthostatic hypotension  I95.1 458.0   3. Fall, initial encounter  W19.XXXA E888.9   4. Closed fracture of right shoulder, initial encounter  S42.91XA 812.00   5. Impaired mobility [Z74.09]  Z74.09 799.89     Patient Active Problem List   Diagnosis    Left upper arm pain    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    Hypertension    Hyperlipidemia    PAD (peripheral artery disease)    Right greater trochanteric avulsion periprosthetic, closed, minimally displaced    Closed fracture of right wrist    Fall    Impaired gait and mobility    Acute right hip pain    Hypothyroidism    Closed fracture of multiple pubic rami, right, initial encounter    Cholecystitis    Nausea    Acute on chronic diastolic congestive heart failure    Other chest pain    Acute systolic heart failure    Chronic combined systolic and diastolic congestive heart failure    Closed fracture of ramus of left pubis, initial encounter    Stage 3a chronic kidney disease (CKD)    Subarachnoid hemorrhage    Body mass index (BMI) of 19.9 or less in adult    Former smoker    Orthostatic hypotension    Closed fracture of proximal end of right humerus    Anemia     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     Wears glasses      Past Surgical History:   Procedure Laterality Date    AORTAGRAM Right 2018    Procedure: LEFT LOWER EXTREMITY ANGIOGRAM;  Surgeon: Walker Davis DO;   Location:  [facility];  Service: Vascular    BREAST BIOPSY Bilateral     multiple on both, all benign    BREAST BIOPSY Left 2017    Procedure: LEFT MAMMOGRAM GUIDED NEEDLE DIRECTED EXCISIONAL BREAST BIOPSY;  Surgeon: Malgorzata Scott MD;  Location: [facility] OR;  Service:     BUNIONECTOMY Bilateral      SECTION      X2    CHOLECYSTECTOMY      CHOLECYSTECTOMY WITH INTRAOPERATIVE CHOLANGIOGRAM N/A 2021    Procedure: LAPAROSCOPIC CHOLECYSTECTOMY WITH CHOLANGIOGRAM;  Surgeon: Malgorzata Scott MD;  Location: [facility] OR;  Service: General;  Laterality: N/A;    COLONOSCOPY  2015    diverticulosis    ENDOSCOPY N/A 2021    Procedure: ESOPHAGOGASTRODUODENOSCOPY WITH ANESTHESIA;  Surgeon: Sonu Cheek DO;  Location: [facility] ENDOSCOPY;  Service: Gastroenterology;  Laterality: N/A;  pre nausea  post esophagitis  Delfina Pereira DO    EYE SURGERY      CATARACT SURGERY    FEMORAL ENDARTERECTOMY Left 2018    Procedure: LEFT FEMORAL ENDARTERECTOMY;  Surgeon: Walker Davis DO;  Location: [facility] HYBRID OR 12;  Service: Vascular    JOINT REPLACEMENT      RIGHT HIP    VASCULAR SURGERY       PT Assessment (last 12 hours)       PT Evaluation and Treatment       Row Name 23 0854          Physical Therapy Time and Intention    Subjective Information complains of;pain  -RADHA     Document Type therapy note (daily note)  -RADHA     Mode of Treatment physical therapy  -RADHA       Row Name 23 0854          General Information    Existing Precautions/Restrictions fall;non-weight bearing;orthostatic hypotension  NWB RUE with sling  -RADHA       Row Name 23 0854          Pain    Pretreatment Pain Rating 0/10 - no pain  -RADHA     Posttreatment Pain Rating 4/10  -RADHA     Pain Location - Side/Orientation Right  -RADHA     Pain Location - shoulder  -RADHA     Pain Intervention(s) Repositioned  -RADHA       Row Name 23 0854          Bed Mobility    Supine-Sit [place] (Bed Mobility)  verbal cues;contact guard  -RADHA     Sit-Supine [place] (Bed Mobility) --  chair  -RADHA       Row Name 12/19/23 0854          Sit-Stand Transfer    Sit-Stand [place] (Transfers) verbal cues;minimum assist (75% patient effort)  -RADHA       Row Name 12/19/23 0854          Stand-Sit Transfer    Stand-Sit [place] (Transfers) verbal cues;minimum assist (75% patient effort)  -RADHA       Row Name 12/19/23 0854          Gait/Stairs (Locomotion)    [place] Level (Gait) verbal cues;minimum assist (75% patient effort)  -RADHA     Assistive Device (Gait) cane, quad  -RADHA     Distance in Feet (Gait) 70  -RADHA     Comment, (Gait/Stairs) slight LOB at times that requires min assist to correct  -RADHA       Row Name 12/19/23 0854          Motor Skills    Comments, Therapeutic Exercise sitting AROM BLE X 20  -RADHA       Row Name 12/19/23 0854          Vital Signs    Pre Systolic BP Rehab 137  -RADHA     Pre Treatment Diastolic BP 55  -RADHA     Intra Systolic BP Rehab 147  -RADHA     Intra Treatment Diastolic BP 55  -RADHA     Post Systolic BP Rehab 124  -RADHA     Post Treatment Diastolic BP 59  -RADHA     Pre Patient Position Supine  -RADHA     Intra Patient Position Sitting  -RADHA     Post Patient Position Standing  -RADHA       Row Name 12/19/23 0854          Positioning and Restraints    Pre-Treatment Position in bed  -RADHA     Post Treatment Position chair  -RADHA     In Chair sitting;call light within reach;encouraged to call for assist;exit alarm on  -RADHA               User Key  (r) = Recorded By, (t) = Taken By, (c) = Cosigned By      Initials Name Provider Type    Leo Parkinson, PTA Physical Therapist Assistant                    Physical Therapy Education       Title: PT OT SLP Therapies (Done)       Topic: Physical Therapy (Done)       Point: Mobility training (Done)       Learning Progress Summary             Patient Acceptance, E,TB,D, VU,DU,NR by KH at 12/18/2023 1634    Acceptance, E, VU by MM at 12/18/2023 0437    Acceptance, E, VU by SB  at 12/17/2023 1318    Comment: pt edu on POC, benefits of act, d/c plans, NWB status RUE   Family Acceptance, E,TB,D, VU,DU,NR by KH at 12/18/2023 1634    Acceptance, E, VU by SB at 12/17/2023 1318    Comment: pt edu on POC, benefits of act, d/c plans, NWB status RUE                         Point: Home exercise program (Done)       Learning Progress Summary             Patient Acceptance, E,TB,D, VU,DU,NR by  at 12/18/2023 1634   Family Acceptance, E,TB,D, VU,DU,NR by  at 12/18/2023 1634                         Point: Body mechanics (Done)       Learning Progress Summary             Patient Acceptance, E,TB,D, VU,DU,NR by  at 12/18/2023 1634   Family Acceptance, E,TB,D, VU,DU,NR by  at 12/18/2023 1634                         Point: Precautions (Done)       Learning Progress Summary             Patient Acceptance, E,TB,D, VU,DU,NR by  at 12/18/2023 1634    Acceptance, E, VU by MM at 12/18/2023 0437    Acceptance, E, VU by SB at 12/17/2023 1318    Comment: pt edu on POC, benefits of act, d/c plans, NWB status RUE   Family Acceptance, E,TB,D, VU,DU,NR by  at 12/18/2023 1634    Acceptance, E, VU by SB at 12/17/2023 1318    Comment: pt edu on POC, benefits of act, d/c plans, NWB status RUE                                         User Key       Initials Effective Dates Name Provider Type Discipline     07/11/23 -  Whitney Gómez, PT DPT Physical Therapist PT     10/17/23 -  Merle Núñez, RN Registered Nurse Nurse    MM 01/04/23 -  Cesia Ruiz, RN Registered Nurse Nurse                  PT Recommendation and Plan     Plan of Care Reviewed With: patient  Progress: improving  Outcome Evaluation: Pt was in bed, stated to be feeling better today.  RUE in sling.  monitored BP  supine 137/55, sitting 147/55, standing 124/59.  Pt had no complaints of dizziness during tx.  Able to transfer supine to sitting with CGA.  Sit to stand with min assist.  Amb 60' with SBQC and min assist.  Pt has several slight LOB  with amb that require min assist to correct.  Will continue to work with pt to increase strength and improve amb.   Outcome Measures       Row Name 12/19/23 0854 12/18/23 1300 12/18/23 1145       How much help from another person do you currently need...    Turning from your back to your side while in flat bed without using bedrails? 3  -RADHA -- 3  -RADHA    Moving from lying on back to sitting on the side of a flat bed without bedrails? 3  -RADHA -- 3  -RADHA    Moving to and from a bed to a chair (including a wheelchair)? 3  -RADHA -- 3  -RADHA    Standing up from a chair using your arms (e.g., wheelchair, bedside chair)? 3  -RADHA -- 3  -RADHA    Climbing 3-5 steps with a railing? 3  -RADHA -- 2  -RADHA    To walk in hospital room? 3  -RADHA -- 3  -RADHA    AM-PAC 6 Clicks Score (PT) 18  -RADHA -- 17  -RADHA    Highest Level of Mobility Goal 6 --> Walk 10 steps or more  -RADHA -- 5 --> Static standing  -RADHA       How much help from another is currently needed...    Putting on and taking off regular lower body clothing? -- 2  -EC --    Bathing (including washing, rinsing, and drying) -- 2  -EC --    Toileting (which includes using toilet bed pan or urinal) -- 2  -EC --    Putting on and taking off regular upper body clothing -- 2  -EC --    Taking care of personal grooming (such as brushing teeth) -- 3  -EC --    Eating meals -- 3  -EC --    AM-PAC 6 Clicks Score (OT) -- 14  -EC --       Functional Assessment    Outcome Measure Options AM-PAC 6 Clicks Basic Mobility (PT)  -RADHA AM-PAC 6 Clicks Daily Activity (OT)  -EC AM-PAC 6 Clicks Basic Mobility (PT)  -RADHA              User Key  (r) = Recorded By, (t) = Taken By, (c) = Cosigned By      Initials Name Provider Type    Leo Parkinson, PTA Physical Therapist Assistant    EC Violeta Phelan, OTR/L Occupational Therapist                     Time Calculation:    PT Charges       Row Name 12/19/23 0854             Time Calculation    Start Time 0854  -RADHA      Stop Time 0918  -RADHA      Time Calculation (min) 24  min  -RADHA      PT Received On 12/19/23  -RADHA         Time Calculation- PT    Total Timed Code Minutes- PT 24 minute(s)  -RADHA         Timed Charges    69845 - PT Therapeutic Exercise Minutes 11  -RADHA      76811 - Gait Training Minutes  13  -RADHA         Total Minutes    Timed Charges Total Minutes 24  -RADHA       Total Minutes 24  -RADHA                User Key  (r) = Recorded By, (t) = Taken By, (c) = Cosigned By      Initials Name Provider Type    Leo Parkinson, PTA Physical Therapist Assistant                  Therapy Charges for Today       Code Description Service Date Service Provider Modifiers Qty    41618985576 HC PT THERAPEUTIC ACT EA 15 MIN 12/18/2023 Leo Ordonez, PTA GP 1    79278261423 HC PT THER PROC EA 15 MIN 12/18/2023 Leo Ordonez, PTA GP 1    48551749646 HC GAIT TRAINING EA 15 MIN 12/18/2023 Leo Ordonez, PTA GP 1    22950411245 HC PT THER PROC EA 15 MIN 12/18/2023 Leo Ordonez, PTA GP 1    06437304074 HC GAIT TRAINING EA 15 MIN 12/19/2023 Leo Ordonez, PTA GP 1    65255037317 HC PT THER PROC EA 15 MIN 12/19/2023 Leo Ordonez, PTA GP 1            PT G-Codes  Outcome Measure Options: AM-PAC 6 Clicks Basic Mobility (PT)  AM-PAC 6 Clicks Score (PT): 18  AM-PAC 6 Clicks Score (OT): 14    Leo Ordonez PTA  12/19/2023

## 2023-12-20 LAB
ANION GAP SERPL CALCULATED.3IONS-SCNC: 8 MMOL/L (ref 5–15)
BUN SERPL-MCNC: 30 MG/DL (ref 8–23)
BUN/CREAT SERPL: 34.9 (ref 7–25)
CALCIUM SPEC-SCNC: 8.7 MG/DL (ref 8.2–9.6)
CHLORIDE SERPL-SCNC: 106 MMOL/L (ref 98–107)
CO2 SERPL-SCNC: 24 MMOL/L (ref 22–29)
CREAT SERPL-MCNC: 0.86 MG/DL (ref 0.57–1)
DEPRECATED RDW RBC AUTO: 46.8 FL (ref 37–54)
EGFRCR SERPLBLD CKD-EPI 2021: 62.3 ML/MIN/1.73
ERYTHROCYTE [DISTWIDTH] IN BLOOD BY AUTOMATED COUNT: 13.2 % (ref 12.3–15.4)
GLUCOSE SERPL-MCNC: 99 MG/DL (ref 65–99)
HCT VFR BLD AUTO: 27.6 % (ref 34–46.6)
HGB BLD-MCNC: 8.5 G/DL (ref 12–15.9)
MCH RBC QN AUTO: 29.9 PG (ref 26.6–33)
MCHC RBC AUTO-ENTMCNC: 30.8 G/DL (ref 31.5–35.7)
MCV RBC AUTO: 97.2 FL (ref 79–97)
PLATELET # BLD AUTO: 158 10*3/MM3 (ref 140–450)
PMV BLD AUTO: 10.1 FL (ref 6–12)
POTASSIUM SERPL-SCNC: 3.5 MMOL/L (ref 3.5–5.2)
QT INTERVAL: 448 MS
QTC INTERVAL: 486 MS
RBC # BLD AUTO: 2.84 10*6/MM3 (ref 3.77–5.28)
SODIUM SERPL-SCNC: 138 MMOL/L (ref 136–145)
WBC NRBC COR # BLD AUTO: 8.86 10*3/MM3 (ref 3.4–10.8)

## 2023-12-20 PROCEDURE — 99232 SBSQ HOSP IP/OBS MODERATE 35: CPT | Performed by: CLINICAL NURSE SPECIALIST

## 2023-12-20 PROCEDURE — 97116 GAIT TRAINING THERAPY: CPT

## 2023-12-20 PROCEDURE — 97535 SELF CARE MNGMENT TRAINING: CPT

## 2023-12-20 PROCEDURE — 80048 BASIC METABOLIC PNL TOTAL CA: CPT | Performed by: NURSE PRACTITIONER

## 2023-12-20 PROCEDURE — 85027 COMPLETE CBC AUTOMATED: CPT | Performed by: NURSE PRACTITIONER

## 2023-12-20 RX ORDER — POLYETHYLENE GLYCOL 3350 17 G/17G
17 POWDER, FOR SOLUTION ORAL DAILY PRN
Start: 2023-12-20

## 2023-12-20 RX ORDER — ACETAMINOPHEN 325 MG/1
650 TABLET ORAL EVERY 4 HOURS PRN
Start: 2023-12-20

## 2023-12-20 RX ORDER — AMOXICILLIN 250 MG
2 CAPSULE ORAL 2 TIMES DAILY
Start: 2023-12-20

## 2023-12-20 RX ORDER — PANTOPRAZOLE SODIUM 40 MG/1
40 TABLET, DELAYED RELEASE ORAL
Start: 2023-12-21

## 2023-12-20 RX ORDER — GABAPENTIN 100 MG/1
100 CAPSULE ORAL NIGHTLY
Qty: 3 CAPSULE | Refills: 0 | Status: SHIPPED | OUTPATIENT
Start: 2023-12-20 | End: 2023-12-23

## 2023-12-20 RX ORDER — OXYCODONE HYDROCHLORIDE 5 MG/1
5 TABLET ORAL EVERY 12 HOURS PRN
Qty: 2 TABLET | Refills: 0 | Status: SHIPPED | OUTPATIENT
Start: 2023-12-20

## 2023-12-20 RX ORDER — POTASSIUM CHLORIDE 750 MG/1
40 CAPSULE, EXTENDED RELEASE ORAL ONCE
Status: COMPLETED | OUTPATIENT
Start: 2023-12-20 | End: 2023-12-20

## 2023-12-20 RX ADMIN — POTASSIUM CHLORIDE 40 MEQ: 10 CAPSULE, COATED, EXTENDED RELEASE ORAL at 12:28

## 2023-12-20 RX ADMIN — CARVEDILOL 3.12 MG: 3.12 TABLET, FILM COATED ORAL at 08:41

## 2023-12-20 RX ADMIN — PANTOPRAZOLE SODIUM 40 MG: 40 TABLET, DELAYED RELEASE ORAL at 06:14

## 2023-12-20 RX ADMIN — GABAPENTIN 100 MG: 100 CAPSULE ORAL at 22:34

## 2023-12-20 RX ADMIN — DONEPEZIL HYDROCHLORIDE 5 MG: 5 TABLET, FILM COATED ORAL at 22:34

## 2023-12-20 RX ADMIN — DOCUSATE SODIUM 50 MG AND SENNOSIDES 8.6 MG 2 TABLET: 8.6; 5 TABLET, FILM COATED ORAL at 22:35

## 2023-12-20 RX ADMIN — Medication 10 ML: at 08:42

## 2023-12-20 RX ADMIN — ESCITSLOPRAM 10 MG: 10 TABLET ORAL at 08:41

## 2023-12-20 RX ADMIN — Medication 10 ML: at 22:35

## 2023-12-20 RX ADMIN — CLOPIDOGREL BISULFATE 75 MG: 75 TABLET, FILM COATED ORAL at 08:41

## 2023-12-20 RX ADMIN — ASPIRIN 81 MG: 81 TABLET, COATED ORAL at 08:41

## 2023-12-20 RX ADMIN — LEVOTHYROXINE SODIUM 150 MCG: 150 TABLET ORAL at 06:14

## 2023-12-20 RX ADMIN — ACETAMINOPHEN 650 MG: 325 TABLET, FILM COATED ORAL at 13:58

## 2023-12-20 NOTE — PROGRESS NOTES
.This visit was performed by both a physician and an APC. I personally evaluated and examined the patient. I performed all aspects of the MDM as documented.  CHF, ongoing treatment with current medication of Entresto, Coreg, aspirin, Plavix.  Lung diminished breath sound bilateral, clear, on room air.  Cardiac regular rate and rhythm.  Assessment plan orthostatic hypotension, patient is a fall risk, advanced age.  Plan for rehab placement.  KT       Electronically signed by Michael Weaver MD, 12/19/2023, 18:53 CST.

## 2023-12-20 NOTE — PLAN OF CARE
Goal Outcome Evaluation:      Pt. A&Ox4, VSS, b/p has a tendency to drop when pt up ambulating to bathroom, non-symptomatic, orthostatic b/p's taken, pt ambulates well with cane, voids without difficulty, safety maintained, tolerated sitting up in chair, RUE in sling/splint, moderate edema noted to R arm and hand, elevated and pillow support and ice applied, tylenol given, plans to d/c to rehab tomorrow, plan of care ongoing.

## 2023-12-20 NOTE — CASE MANAGEMENT/SOCIAL WORK
Continued Stay Note  [facility]     Patient Name: Mya Xiefitt  MRN: [id number]  Today's Date: 12/20/2023    Admit Date: 12/16/2023    Plan: [facility] - Bed available tomorrow 12/21   Discharge Plan       Row Name 12/20/23 1123       Plan    Plan [facility] - Bed available tomorrow 12/21    Patient/Family in Agreement with Plan yes    Plan Comments Pt's bed not available at [facility] until tomorrow 12/21, informed Jelena TRAVIS of change.  Will follow.      Row Name 12/20/23 1010       Plan    Plan Comments PT has a bed offer with [facility] and may dc to SNF when medically ready. SNF pharmacy ([facility]) has been added in Epic.    Final Discharge Disposition Code 03 - skilled nursing facility (SNF)                   Discharge Codes    No documentation.                 Expected Discharge Date and Time       Expected Discharge Date Expected Discharge Time    Dec 20, 2023               SANTOS Avendaño

## 2023-12-20 NOTE — PROGRESS NOTES
[facility] Medicine Services  INPATIENT PROGRESS NOTE    Patient Name: Mya Chung  Date of Admission: 12/16/2023  Today's Date: 12/20/23  Length of Stay: 2  Primary Care Physician: Delfina Pereira DO    Subjective   Chief Complaint: orthostatic hypotension  HPI   She was sitting up in chair with family at bedside.  States she is feeling well today.  Continues to be orthostatic but patient is asymptomatic.  She denies any acute complaints.    She can go to [facility] tomorrow after 3 night inpatient stay.    Review of Systems   All pertinent negatives and positives are as above. All other systems have been reviewed and are negative unless otherwise stated.     Objective    Temp:  [98.1 °F (36.7 °C)-99.4 °F (37.4 °C)] 98.2 °F (36.8 °C)  Heart Rate:  [72-93] 72  Resp:  [16-20] 16  BP: ()/(44-61) 126/60  Physical Exam  Vitals reviewed.   Constitutional:       General: She is not in acute distress.     Appearance: She is not toxic-appearing.      Comments: Up in chair.  No acute distress.  On room air.  Family at bedside.  Discussed with her nurse sandra.   HENT:      Head: Normocephalic and atraumatic.      Mouth/Throat:      Mouth: Mucous membranes are moist.      Pharynx: Oropharynx is clear.   Eyes:      Extraocular Movements: Extraocular movements intact.      Conjunctiva/sclera: Conjunctivae normal.      Pupils: Pupils are equal, round, and reactive to light.   Cardiovascular:      Rate and Rhythm: Normal rate and regular rhythm.      Pulses: Normal pulses.   Pulmonary:      Effort: Pulmonary effort is normal. No respiratory distress.      Breath sounds: Normal breath sounds. No wheezing or rhonchi.   Abdominal:      General: Bowel sounds are normal. There is no distension.      Palpations: Abdomen is soft.      Tenderness: There is no abdominal tenderness.   Musculoskeletal:         General: No swelling or tenderness. Normal range of motion.       "Cervical back: Normal range of motion and neck supple. No muscular tenderness.      Comments: brace to right upper extremity   Skin:     General: Skin is warm and dry.      Findings: No erythema or rash.   Neurological:      General: No focal deficit present.      Mental Status: She is alert and oriented to person, place, and time.      Cranial Nerves: No cranial nerve deficit.      Motor: No weakness.   Psychiatric:         Mood and Affect: Mood normal.         Behavior: Behavior normal.       Results Review:  I have reviewed the labs, radiology results, and diagnostic studies.    Laboratory Data:   Results from last 7 days   Lab Units 12/20/23  0854 12/18/23  0557 12/17/23  1840 12/17/23 0443   WBC 10*3/mm3 8.86 7.83  --  7.54   HEMOGLOBIN g/dL 8.5* 8.1* 8.0* 9.6*   HEMATOCRIT % 27.6* 26.5* 26.0* 30.4*   PLATELETS 10*3/mm3 158 122*  --  131*        Results from last 7 days   Lab Units 12/20/23  0853 12/18/23  0557 12/17/23  0443 12/16/23 2014   SODIUM mmol/L 138 141 140 144   POTASSIUM mmol/L 3.5 3.8 4.2 5.1   CHLORIDE mmol/L 106 109* 105 108*   CO2 mmol/L 24.0 23.0 24.0 25.0   BUN mg/dL 30* 39* 35* 38*   CREATININE mg/dL 0.86 1.14* 1.09* 1.16*   CALCIUM mg/dL 8.7 7.9* 8.2 9.1   BILIRUBIN mg/dL  --   --   --  0.4   ALK PHOS U/L  --   --   --  61   ALT (SGPT) U/L  --   --   --  10   AST (SGOT) U/L  --   --   --  20   GLUCOSE mg/dL 99 99 122* 126*       Culture Data:   No results found for: \"ACANTHNAEG\", \"AFBCX\", \"BPERTUSSISCX\", \"BLOODCX\"  No results found for: \"BCIDPCR\", \"CXREFLEX\", \"CSFCX\", \"CULTURETIS\"  No results found for: \"CULTURES\", \"HSVCX\", \"URCX\"  No results found for: \"EYECULTURE\", \"GCCX\", \"HSVCULTURE\", \"LABHSV\"  No results found for: \"LEGIONELLA\", \"MRSACX\", \"MUMPSCX\", \"MYCOPLASCX\"  No results found for: \"NOCARDIACX\", \"STOOLCX\"  No results found for: \"THROATCX\", \"UNSTIMCULT\", \"URINECX\", \"CULTURE\", \"VZVCULTUR\"  No results found for: \"VIRALCULTU\", \"WOUNDCX\"    Radiology Data:   Imaging Results (Last 24 " Hours)       ** No results found for the last 24 hours. **          I have reviewed the patient's current medications.     Assessment/Plan   Assessment  Active Hospital Problems    Diagnosis     **Orthostatic hypotension     Closed fracture of proximal end of right humerus     Anemia     Stage 3a chronic kidney disease (CKD)     Chronic combined systolic and diastolic congestive heart failure     Fall     Hypothyroidism     PAD (peripheral artery disease)      Ms. Chung is a 89-year-old female that presented to [facility] on 12/16 with right arm pain after a fall.  In the ED, she was found to have right proximal humerus diaphyseal fracture.  She was going to be discharged from the emergency department, however, when she got up from the bed to go to the bathroom she had an orthostatic episode with syncope.  Patient lives home alone and has good support from family.  She walks with a cane or walker.    Treatment Plan  Orthostatic hypotension.  Fluid bolus in ED followed by gentle IV fluid x 1 day. Entresto discontinued.  She remains orthostatic today.  Patient is asymptomatic.      Orthopedics, Dr. Galvez consulted.  After session with orthopedics, patient and her family plan for conservative treatment with conversion to June bracing. Sling immobilization to the right upper extremity. Nonweightbearing of the right upper extremity.  She will follow-up in the outpatient setting with Dr. Galvez.      Hemoglobin 12.0 on admission.  Hemoglobin 9.6 on the following day and now 8.1.  Suspect dilutional.  She has received IV fluid.  No signs of bleeding.  Fecal occult blood negative. Hemoglobin stable at 8.5.     She has a history of chronic combined systolic and diastolic congestive heart failure followed by cardiologist Dr. Urena as outpatient.  She is on Entresto 24-26 mg twice daily, Coreg 3.125 mg twice daily, aspirin 81 mg and Plavix 75 mg. Entresto discontinued.  Patient to follow-up with her regular  cardiologist, Dr. Urena as outpatient.     PT/OT.  Therapy recommends discharge to subacute rehab versus home with 24/7 assist and home health.     Palliative care consulted.  No CPR with limited support to include no intubation, no permanent feeding tube.    Medical Decision Making  Number and Complexity of problems: 2 acute problems in the form of orthostatic hypotension and humeral fracture  Differential Diagnosis: None considered at present    Conditions and Status        Condition is unchanged.     [facility] Data  External documents reviewed: Prior [facility] records  Cardiac tracing (EKG, telemetry) interpretation: Reviewed  Radiology interpretation: Interpreted by radiology  Labs reviewed: As above  Any tests that were considered but not ordered: None considered at present     Decision rules/scores evaluated (example [id number], [place], etc): None considered at present     Discussed with: Patient and Dr. Weaver     Care Planning  Shared decision making: Patient is agreeable to ongoing workup and treatment  Code status and discussions: Full code with full interventions    Disposition  Social Determinants of Health that impact treatment or disposition: None identified at present  I expect the patient to be discharged to [facility] tomorrow.     Electronically signed by ANGY Lu, 12/20/23, 11:24 CST.

## 2023-12-20 NOTE — CASE MANAGEMENT/SOCIAL WORK
Continued Stay Note  [facility]     Patient Name: Mya Xiefitt  MRN: [id number]  Today's Date: 12/20/2023    Admit Date: 12/16/2023    Plan: Home   Discharge Plan       Row Name 12/20/23 1010       Plan    Plan Comments PT has a bed offer with [facility] and may dc to SNF when medically ready. SNF pharmacy ([facility]) has been added in Epic.    Final Discharge Disposition Code 03 - skilled nursing facility (SNF)                   Discharge Codes    No documentation.                       LONNIE Cantu

## 2023-12-20 NOTE — PROGRESS NOTES
"            [facility] Palliative Care Services    Palliative Care Daily Progress Note   Chief complaint-follow up support for patient/family    Code Status:   Code Status and Medical Interventions:   Ordered at: 12/18/23 1451     Medical Intervention Limits:    NO intubation (DNI)    Other     Level Of Support Discussed With:    Patient    Next of Kin (If No Surrogate)     Code Status (Patient has no pulse and is not breathing):    No CPR (Do Not Attempt to Resuscitate)     Medical Interventions (Patient has pulse or is breathing):    Limited Support     Comments:    patient and son, Donal     Additional Medical Interventions Limits:    no permanent feeding tube      Advanced Directives: Advance Directive Status: Patient has advance directive, copy in chart   Goals of Care: Ongoing.     S: Medical record reviewed. Events noted.  No documented hypotension over the last 24 hours after discontinuing Entresto.  Anticipating discharge to SNF, a referral to [facility] is been placed.  Currently sitting up in chair without apparent distress.  Verbalizes she feels out of sorts this morning \"just not myself\".  She cannot quantify her feelings.  She is without distress.  States she has been walking in the room with staff.  No family at bedside.     Review of Systems   Constitutional: Positive for malaise/fatigue.   Cardiovascular:  Negative for dyspnea on exertion.   Respiratory:  Negative for shortness of breath.    Skin:  Negative for poor wound healing.   Musculoskeletal:  Positive for falls.   Gastrointestinal:  Negative for nausea.   Genitourinary:  Negative for dysuria.   Neurological:  Negative for weakness.   Psychiatric/Behavioral:  The patient is not nervous/anxious.      Pain Assessment  Pain Location: shoulder  Pain Description: aching    O:     Intake/Output Summary (Last 24 hours) at 12/20/2023 0855  Last data filed at 12/19/2023 0929  Gross per 24 hour   Intake 240 ml   Output --   Net 240 ml "       Diagnostics and current medications: Reviewed.      Current Facility-Administered Medications:     acetaminophen (TYLENOL) tablet 650 mg, 650 mg, Oral, Q4H PRN, Sukumar Dominguez MD, 650 mg at 12/18/23 1537    aspirin EC tablet 81 mg, 81 mg, Oral, Daily, Sukumar Dominguez MD, 81 mg at 12/20/23 0841    sennosides-docusate (PERICOLACE) 8.6-50 MG per tablet 2 tablet, 2 tablet, Oral, BID, 2 tablet at 12/19/23 2245 **AND** polyethylene glycol (MIRALAX) packet 17 g, 17 g, Oral, Daily PRN **AND** bisacodyl (DULCOLAX) EC tablet 5 mg, 5 mg, Oral, Daily PRN **AND** bisacodyl (DULCOLAX) suppository 10 mg, 10 mg, Rectal, Daily PRN, Sukumar Dominguez MD    carvedilol (COREG) tablet 3.125 mg, 3.125 mg, Oral, BID With Meals, Sukumar Dominguez MD, 3.125 mg at 12/20/23 0841    clopidogrel (PLAVIX) tablet 75 mg, 75 mg, Oral, Daily, Sukumar Dominguez MD, 75 mg at 12/20/23 0841    donepezil (ARICEPT) tablet 5 mg, 5 mg, Oral, Nightly, Sukumar Dominguez MD, 5 mg at 12/19/23 2245    escitalopram (LEXAPRO) tablet 10 mg, 10 mg, Oral, Daily, Sukumar Dominguez MD, 10 mg at 12/20/23 0841    gabapentin (NEURONTIN) capsule 100 mg, 100 mg, Oral, Nightly, Sukumar Dominguez MD, 100 mg at 12/19/23 2245    levothyroxine (SYNTHROID, LEVOTHROID) tablet 150 mcg, 150 mcg, Oral, Q AM, Sukumar Dominguez MD, 150 mcg at 12/20/23 0614    oxyCODONE (ROXICODONE) immediate release tablet 5 mg, 5 mg, Oral, Q6H PRN, Luis Manuel Hung DO, 5 mg at 12/19/23 1424    pantoprazole (PROTONIX) EC tablet 40 mg, 40 mg, Oral, Q AM, Luis Manuel Hung F, DO, 40 mg at 12/20/23 0614    [COMPLETED] Insert Peripheral IV, , , Once **AND** sodium chloride 0.9 % flush 10 mL, 10 mL, Intravenous, PRN, Sukumar Dominguez MD    sodium chloride 0.9 % flush 10 mL, 10 mL, Intravenous, Q12H, Sukumar Dominguez MD, 10 mL at 12/20/23 0842    sodium chloride 0.9 % flush 10 mL, 10 mL, Intravenous, PRN, Alberto,  "Sukumar Zabala MD    sodium chloride 0.9 % infusion 40 mL, 40 mL, Intravenous, PRN, Sukumar Dominguez MD    Allergies   Allergen Reactions    Erythromycin Shortness Of Breath    Zithromax [Azithromycin] Itching and Other (See Comments)     Yeast infection    Penicillins Rash     I have utilized all available immediate resources to obtain, update, or review the patient's current medications (including all prescriptions, over-the-counter products, herbals, cannabis/cannabidiol products, and vitamin/mineral/dietary (nutritional) supplements) for name, route of administration, type, dose and frequency.    A:    /60 (BP Location: Left arm, Patient Position: Lying)   Pulse 72   Temp 98.2 °F (36.8 °C) (Oral)   Resp 16   Ht 162.6 cm (64.02\")   Wt 49.5 kg (109 lb 2 oz)   SpO2 94%   BMI 18.72 kg/m²     Vitals and nursing note reviewed.   Constitutional:       Appearance: Not in distress. Frail.      Comments: Right upper extremity sling/brace  Eyes:      General: Lids are normal.      Pupils: Pupils are equal, round, and reactive to light.   HENT:      Head: Normocephalic.      Comments: Bruising to right jaw  Pulmonary:      Effort: Pulmonary effort is normal.   Cardiovascular:      Normal rate.   Edema:     Peripheral edema absent.   Abdominal:      Palpations: Abdomen is soft.   Musculoskeletal:      Cervical back: Neck supple.   Skin:     General: Skin is warm.   Genitourinary:     Comments: No reported dysuria  Neurological:      Mental Status: Alert  Psychiatric:         Mood and Affect: Mood normal.         Cognition and Memory: Cognition normal.      Patient status: Disease state: Controlled with current treatments.  Current Functional status: Palliative Performance Scale Score: Performance 40% based on the following measures: Ambulation: Mainly in bed, Activity and Evidence of Disease: Unable to do any work, extensive evidence of disease, Self-Care: Mainly assistance required,  Intake: Normal or " reduced, LOC: Full, drowsy or confusion   Baseline Functional status: Palliative Performance Scale Score: Performance 60% based on the following measures: Ambulation: Reduced, Activity and Evidence of Disease: Unable to do hobby or some work, significant evidence of disease, Self-Care: Occasional assist necessary,  Intake: Normal or reduced, LOC: Full or confusion   Nutritional status: Albumin 3.7 Body mass index is 18.72 kg/m².      Hospital Problem List      Orthostatic hypotension    PAD (peripheral artery disease)    Fall    Hypothyroidism    Chronic combined systolic and diastolic congestive heart failure    Stage 3a chronic kidney disease (CKD)    Closed fracture of proximal end of right humerus    Anemia     Impression/Problem List:     Orthostatic hypotension on baseline hypertension  Chronic combined systolic and diastolic congestive heart failure  Fall  Closed fracture of proximal end of right humerus  Chronic kidney disease stage III  Anemia  Hypothyroidism  Coronary artery disease  Hyperlipidemia  Lung nodule  Cerebral microvascular disease and atrophy per CT  Osteopenia  Peripheral artery disease  History of Shingles (1940)  Skin cancer  History of subarachnoid hemorrhage-6/30/2023  Impaired mobility  Advanced age      Recommendations/Plan:  1. plan: Goals of care include CODE STATUS no CPR/limited support interventions.     Family support: The patient receives support from her children..  Advance Directives: Advance Directive Status: Patient has advance directive, copy in chart   POA/Healthcare surrogate-Barrett Chung and Yeni Mixon, children/[facility].     2.  Palliative care encounter  - Prognosis is guarded long-term secondary to orthostatic hypotension, fall with injury, cerebral microvascular disease, multiple comorbidities, impaired mobility, and advanced age.  -Patient and family appears to have good prognostic awareness.      -Evaluated by Ortho who recommended conservative management gena and  brace.    -Received IV fluid bolus in ER.    -Evaluated by therapy who recommended subacute rehab versus home with 24/7 assist and home health.    -attending recommended trial of compression stockings.        12/20-orthostatic hypotension resolved after holding PM dose of Entresto. Likely due to intolerance of medication optimization. Recommend monitor off Entresto and have patient follow up with regular cardiologist in outpatient setting. Compression stockings not likely optimal intervention given PVD and impaired mobility. Discussed with family.  -MOST document completed.  -Anticipating SNF, family prefer [facility]  -High risk for rehospitalization      Thank you for allowing us to participate in patient's plan of care. Palliative Care Team will continue to follow patient.     Ignacia Hill, ANGY  12/20/2023  08:55 CST

## 2023-12-20 NOTE — THERAPY TREATMENT NOTE
Acute Care - Physical Therapy Treatment Note  [facility]     Patient Name: Mya Chung  : 2/10/1928  MRN: [id number]  Today's Date: 2023      Visit Dx:     ICD-10-CM ICD-9-CM   1. Syncope and collapse  R55 780.2   2. Orthostatic hypotension  I95.1 458.0   3. Fall, initial encounter  W19.XXXA E888.9   4. Closed fracture of right shoulder, initial encounter  S42.91XA 812.00   5. Impaired mobility [Z74.09]  Z74.09 799.89     Patient Active Problem List   Diagnosis    Left upper arm pain    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    Hypertension    Hyperlipidemia    PAD (peripheral artery disease)    Right greater trochanteric avulsion periprosthetic, closed, minimally displaced    Closed fracture of right wrist    Fall    Impaired gait and mobility    Acute right hip pain    Hypothyroidism    Closed fracture of multiple pubic rami, right, initial encounter    Cholecystitis    Nausea    Acute on chronic diastolic congestive heart failure    Other chest pain    Acute systolic heart failure    Chronic combined systolic and diastolic congestive heart failure    Closed fracture of ramus of left pubis, initial encounter    Stage 3a chronic kidney disease (CKD)    Subarachnoid hemorrhage    Body mass index (BMI) of 19.9 or less in adult    Former smoker    Orthostatic hypotension    Closed fracture of proximal end of right humerus    Anemia     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     Wears glasses      Past Surgical History:   Procedure Laterality Date    AORTAGRAM Right 2018    Procedure: LEFT LOWER EXTREMITY ANGIOGRAM;  Surgeon: Walker Davis DO;   Location: [facility] HYBRID OR 12;  Service: Vascular    BREAST BIOPSY Bilateral     multiple on both, all benign    BREAST BIOPSY Left 2017    Procedure: LEFT MAMMOGRAM GUIDED NEEDLE DIRECTED EXCISIONAL BREAST BIOPSY;  Surgeon: Malgorzata Scott MD;  Location: [facility] OR;  Service:     BUNIONECTOMY Bilateral      SECTION      X2    CHOLECYSTECTOMY      CHOLECYSTECTOMY WITH INTRAOPERATIVE CHOLANGIOGRAM N/A 2021    Procedure: LAPAROSCOPIC CHOLECYSTECTOMY WITH CHOLANGIOGRAM;  Surgeon: Malgorzata Scott MD;  Location: [facility] OR;  Service: General;  Laterality: N/A;    COLONOSCOPY  2015    diverticulosis    ENDOSCOPY N/A 2021    Procedure: ESOPHAGOGASTRODUODENOSCOPY WITH ANESTHESIA;  Surgeon: Sonu Cheek DO;  Location: [facility] ENDOSCOPY;  Service: Gastroenterology;  Laterality: N/A;  pre nausea  post esophagitis  Delfina Pereira DO    EYE SURGERY      CATARACT SURGERY    FEMORAL ENDARTERECTOMY Left 2018    Procedure: LEFT FEMORAL ENDARTERECTOMY;  Surgeon: Walker Davis DO;  Location: [facility] HYBRID OR 12;  Service: Vascular    JOINT REPLACEMENT      RIGHT HIP    VASCULAR SURGERY       PT Assessment (last 12 hours)       PT Evaluation and Treatment       Row Name 23 1130 23 0846       Physical Therapy Time and Intention    Subjective Information complains of;weakness;fatigue;pain  - --  -    Document Type therapy note (daily note)  - --    Mode of Treatment physical therapy  - --    Session Not Performed -- other (see comments)  -    Comment, Session Not Performed -- with OT  -      Row Name 23 1130          General Information    Existing Precautions/Restrictions fall;non-weight bearing;orthostatic hypotension  NWB RUE with sling  -       Row Name 23 1130          Pain    Pretreatment Pain Rating 4/10  -     Posttreatment Pain Rating 4/10  -     Pain Location - Side/Orientation Right  -     Pain Location upper  -     Pain  Location - extremity  -     Pain Intervention(s) Repositioned  -       Row Name 12/20/23 1130          Bed Mobility    Supine-Sit [place] (Bed Mobility) --  -     Sit-Supine [place] (Bed Mobility) --  -     Comment, (Bed Mobility) chair  -       Row Name 12/20/23 1130          Sit-Stand Transfer    Sit-Stand [place] (Transfers) verbal cues;minimum assist (75% patient effort)  -       Row Name 12/20/23 1130          Stand-Sit Transfer    Stand-Sit [place] (Transfers) verbal cues;minimum assist (75% patient effort)  -       Row Name 12/20/23 1130          Gait/Stairs (Locomotion)    [place] Level (Gait) verbal cues;minimum assist (75% patient effort)  -     Assistive Device (Gait) cane, quad  -     Distance in Feet (Gait) 60  -       Row Name 12/20/23 1130          Motor Skills    Comments, Therapeutic Exercise BLE AROM  -       Row Name 12/20/23 1130          Positioning and Restraints    Pre-Treatment Position sitting in chair/recliner  -     Post Treatment Position chair  -     In Chair notified nsg;reclined;call light within reach;encouraged to call for assist;exit alarm on;with family/caregiver  -               User Key  (r) = Recorded By, (t) = Taken By, (c) = Cosigned By      Initials Name Provider Type     Ameena Pagan PTA Physical Therapist Assistant                    Physical Therapy Education       Title: PT OT SLP Therapies (Done)       Topic: Physical Therapy (Done)       Point: Mobility training (Done)       Learning Progress Summary             Patient Acceptance, E,TB,D, VU,DU,NR by  at 12/18/2023 1634    Acceptance, E, VU by MM at 12/18/2023 0437    Acceptance, E, VU by SB at 12/17/2023 1318    Comment: pt edu on POC, benefits of act, d/c plans, NWB status RUE   Family Acceptance, E,TB,D, VU,DU,NR by  at 12/18/2023 1634    Acceptance, E, VU by SB at 12/17/2023 1318    Comment: pt edu on POC, benefits of act, d/c plans, NWB status RUE                          Point: Home exercise program (Done)       Learning Progress Summary             Patient Acceptance, E,TB,D, VU,DU,NR by  at 12/18/2023 1634   Family Acceptance, E,TB,D, VU,DU,NR by  at 12/18/2023 1634                         Point: Body mechanics (Done)       Learning Progress Summary             Patient Acceptance, E,TB,D, VU,DU,NR by  at 12/18/2023 1634   Family Acceptance, E,TB,D, VU,DU,NR by  at 12/18/2023 1634                         Point: Precautions (Done)       Learning Progress Summary             Patient Acceptance, E,TB,D, VU,DU,NR by  at 12/18/2023 1634    Acceptance, E, VU by MM at 12/18/2023 0437    Acceptance, E, VU by SB at 12/17/2023 1318    Comment: pt edu on POC, benefits of act, d/c plans, NWB status RUE   Family Acceptance, E,TB,D, VU,DU,NR by  at 12/18/2023 1634    Acceptance, E, VU by SB at 12/17/2023 1318    Comment: pt edu on POC, benefits of act, d/c plans, NWB status RUE                                         User Key       Initials Effective Dates Name Provider Type Discipline     07/11/23 -  Whitney Gómez, PT DPT Physical Therapist PT     10/17/23 -  Merle Núñez, RN Registered Nurse Nurse    MM 01/04/23 -  Cesia Ruiz, RN Registered Nurse Nurse                  PT Recommendation and Plan         Outcome Measures       Row Name 12/20/23 1100 12/19/23 0854 12/18/23 1300       How much help from another person do you currently need...    Turning from your back to your side while in flat bed without using bedrails? 3  -AH 3  -RADHA --    Moving from lying on back to sitting on the side of a flat bed without bedrails? 3  -AH 3  -RADHA --    Moving to and from a bed to a chair (including a wheelchair)? 3  -AH 3  -RADHA --    Standing up from a chair using your arms (e.g., wheelchair, bedside chair)? 3  - 3  -RADHA --    Climbing 3-5 steps with a railing? 2  -AH 3  -RADHA --    To walk in hospital room? 3  - 3  -RADHA --    AM-PAC 6 Clicks Score (PT) 17  - 18   -RADHA --    Highest Level of Mobility Goal 5 --> Static standing  - 6 --> Walk 10 steps or more  -RADHA --       How much help from another is currently needed...    Putting on and taking off regular lower body clothing? -- -- 2  -EC    Bathing (including washing, rinsing, and drying) -- -- 2  -EC    Toileting (which includes using toilet bed pan or urinal) -- -- 2  -EC    Putting on and taking off regular upper body clothing -- -- 2  -EC    Taking care of personal grooming (such as brushing teeth) -- -- 3  -EC    Eating meals -- -- 3  -EC    AM-PAC 6 Clicks Score (OT) -- -- 14  -EC       Functional Assessment    Outcome Measure Options AM-PAC 6 Clicks Basic Mobility (PT)  - AM-PAC 6 Clicks Basic Mobility (PT)  -[place]-PAC 6 Clicks Daily Activity (OT)  -      Row Name 12/18/23 1145             How much help from another person do you currently need...    Turning from your back to your side while in flat bed without using bedrails? 3  -RADHA      Moving from lying on back to sitting on the side of a flat bed without bedrails? 3  -RADHA      Moving to and from a bed to a chair (including a wheelchair)? 3  -RADHA      Standing up from a chair using your arms (e.g., wheelchair, bedside chair)? 3  -RADHA      Climbing 3-5 steps with a railing? 2  -RADHA      To walk in hospital room? 3  -RADHA      AM-PAC 6 Clicks Score (PT) 17  -RADHA      Highest Level of Mobility Goal 5 --> Static standing  -         Functional Assessment    Outcome Measure Options -PAC 6 Clicks Basic Mobility (PT)  -                User Key  (r) = Recorded By, (t) = Taken By, (c) = Cosigned By      Initials Name Provider Type     Ameena Pagan, PTA Physical Therapist Assistant    Leo Parkinson, PTA Physical Therapist Assistant    Violeta Ross, OTR/L Occupational Therapist                     Time Calculation:    PT Charges       Row Name 12/20/23 1154             Time Calculation    Start Time 1130  -      Stop Time 1153  -      Time Calculation  (min) 23 min  -      PT Received On 12/20/23  -         Time Calculation- PT    Total Timed Code Minutes- PT 23 minute(s)  -         Timed Charges    52933 - Gait Training Minutes  23  -AH         Total Minutes    Timed Charges Total Minutes 23  -AH       Total Minutes 23  -AH                User Key  (r) = Recorded By, (t) = Taken By, (c) = Cosigned By      Initials Name Provider Type     Ameena Pagan PTA Physical Therapist Assistant                  Therapy Charges for Today       Code Description Service Date Service Provider Modifiers Qty    [id number]  GAIT TRAINING EA 15 MIN 12/20/2023 Ameena Pagan PTA GP 2            PT G-Codes  Outcome Measure Options: AM-PAC 6 Clicks Basic Mobility (PT)  AM-PAC 6 Clicks Score (PT): 17  AM-PAC 6 Clicks Score (OT): 14    Ameena Pagan PTA  12/20/2023

## 2023-12-20 NOTE — THERAPY TREATMENT NOTE
Patient Name: Mya STONE [facility]  : 2/10/1928    MRN: [id number]                              Today's Date: 2023       Admit Date: 2023    Visit Dx:     ICD-10-CM ICD-9-CM   1. Syncope and collapse  R55 780.2   2. Orthostatic hypotension  I95.1 458.0   3. Fall, initial encounter  W19.XXXA E888.9   4. Closed fracture of right shoulder, initial encounter  S42.91XA 812.00   5. Impaired mobility [Z74.09]  Z74.09 799.89     Patient Active Problem List   Diagnosis    Left upper arm pain    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    Hypertension    Hyperlipidemia    PAD (peripheral artery disease)    Right greater trochanteric avulsion periprosthetic, closed, minimally displaced    Closed fracture of right wrist    Fall    Impaired gait and mobility    Acute right hip pain    Hypothyroidism    Closed fracture of multiple pubic rami, right, initial encounter    Cholecystitis    Nausea    Acute on chronic diastolic congestive heart failure    Other chest pain    Acute systolic heart failure    Chronic combined systolic and diastolic congestive heart failure    Closed fracture of ramus of left pubis, initial encounter    Stage 3a chronic kidney disease (CKD)    Subarachnoid hemorrhage    Body mass index (BMI) of 19.9 or less in adult    Former smoker    Orthostatic hypotension    Closed fracture of proximal end of right humerus    Anemia     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     Wears glasses      Past Surgical History:   Procedure Laterality Date    AORTAGRAM Right 2018    Procedure: LEFT LOWER EXTREMITY ANGIOGRAM;  Surgeon: Walker Davis DO;   Location:  [facility] 12;  Service: Vascular    BREAST BIOPSY Bilateral     multiple on both, all benign    BREAST BIOPSY Left 2017    Procedure: LEFT MAMMOGRAM GUIDED NEEDLE DIRECTED EXCISIONAL BREAST BIOPSY;  Surgeon: Malgorzata Scott MD;  Location: [facility] OR;  Service:     BUNIONECTOMY Bilateral      SECTION      X2    CHOLECYSTECTOMY      CHOLECYSTECTOMY WITH INTRAOPERATIVE CHOLANGIOGRAM N/A 2021    Procedure: LAPAROSCOPIC CHOLECYSTECTOMY WITH CHOLANGIOGRAM;  Surgeon: Malgorzata Scott MD;  Location: [facility] OR;  Service: General;  Laterality: N/A;    COLONOSCOPY  2015    diverticulosis    ENDOSCOPY N/A 2021    Procedure: ESOPHAGOGASTRODUODENOSCOPY WITH ANESTHESIA;  Surgeon: Sonu Cheek DO;  Location: [facility] ENDOSCOPY;  Service: Gastroenterology;  Laterality: N/A;  pre nausea  post esophagitis  Delfina Pereira DO    EYE SURGERY      CATARACT SURGERY    FEMORAL ENDARTERECTOMY Left 2018    Procedure: LEFT FEMORAL ENDARTERECTOMY;  Surgeon: Walker Davis DO;  Location: [facility] HYBRID OR 12;  Service: Vascular    JOINT REPLACEMENT      RIGHT HIP    VASCULAR SURGERY        General Information       Row Name 23 [facility]          OT Time and Intention    Document Type therapy note (daily note)  -LS     Mode of Treatment occupational therapy  -LS       Row Name 23 [facility]          General Information    Patient Profile Reviewed yes  -LS     Existing Precautions/Restrictions fall;non-weight bearing;orthostatic hypotension  NWB RUE with trujillo brace AAT; sling as needed for comfort per Dr. Galvez  -LS       Row Name 23 0845          Cognition    Orientation Status (Cognition) oriented x 4  -LS       Row Name 23 08          Safety Issues, Functional Mobility    Impairments Affecting Function (Mobility) pain;other (see comments);endurance/activity tolerance;strength;range of motion (ROM)  orthostatic hypotension  -LS               User Key  (r)  = Recorded By, (t) = Taken By, (c) = Cosigned By      Initials Name Provider Type     Nahomi Crespo OTR/L Occupational Therapist                     Mobility/ADL's       Row Name 12/20/23 0845          Transfers    Transfers sit-stand transfer;toilet transfer  -       Row Name 12/20/23 08          Sit-Stand Transfer    Sit-Stand [place] (Transfers) minimum assist (75% patient effort);verbal cues  -       Row Name 12/20/23 08          Toilet Transfer    Type (Toilet Transfer) sit-stand;stand-sit  -LS     [place] Level (Toilet Transfer) minimum assist (75% patient effort);verbal cues  -     Assistive Device (Toilet Transfer) grab bars/safety frame  -       Row Name 12/20/23 08          Functional Mobility    Functional Mobility- Ind. Level contact guard assist  Pt ambulated from bed<>BR utilizing quad cane with CGA.  -LS     Functional Mobility- Device cane, quad  -LS     Patient was able to Ambulate yes  -       Row Name 12/20/23 08          Activities of Daily Living    BADL Assessment/Intervention toileting  -       Row Name 12/20/23 08          Mobility    Extremity Weight-bearing Status right upper extremity  -LS     Right Upper Extremity (Weight-bearing Status) non weight-bearing (NWB)  -       Row Name 12/20/23 08          Toileting Assessment/Training    [place] Level (Toileting) toileting skills;adjust/manage clothing;maximum assist (25% patient effort);perform perineal hygiene;standby assist  -     Position (Toileting) unsupported sitting;supported standing  -               User Key  (r) = Recorded By, (t) = Taken By, (c) = Cosigned By      Initials Name Provider Type     Nahomi Crespo OTR/L Occupational Therapist                   Obj/Interventions    No documentation.                  Goals/Plan    No documentation.                  Clinical Impression       Row Name 12/20/23 0845          Pain Assessment    Pretreatment Pain Rating 0/10 - no pain  -      Posttreatment Pain Rating 0/10 - no pain  -LS       Row Name 12/20/23 0845          Plan of Care Review    Plan of Care Reviewed With patient  -LS     Progress improving  -LS     Outcome Evaluation OT tx completed. Pt seated EOB upon therapist arrival; A&Ox4; Trujillo brace and sling to RUE; No c/o pain. Therapist messaged Dr. Galvez who reports that the Pt can wear the trujillo brace in isolation and just wear the sling as needed for comfort. Sit<>stand with Min A and verbal cues. Pt had positive orthostatics again on this date; sitting 143/51, standing 103/43, return to sitting 126/52. Pt asymptomatic on this date, therefore Pt still ambulated to BR utilizing quad cane with CGA. Pt performed sit<>stand toilet transfer utilizing grab bar with Min A. Pt performed dasha hygiene while seated with SBA and clothing management in standing with Max A. Pt continued to remain asymptomatic despite drop in BP. Pt continues to benefit from skilled OT intervention in order to address remaining deficits in fxl mobility, fxl activity tolerance, balance, strength, and use of adaptive techniques/equipment during performance of BADLs. Recommend sub acute rehab at discharge.  -       Row Name 12/20/23 0845          Therapy Plan Review/Discharge Plan (OT)    Anticipated Discharge Disposition (OT) sub acute care setting  -       Row Name 12/20/23 0845          Vital Signs    Pre Systolic BP Rehab 143  -LS     Pre Treatment Diastolic BP 51  -LS     Intra Systolic BP Rehab 103   -LS     Intra Treatment Diastolic BP 43  -LS     Post Systolic BP Rehab 126  -LS     Post Treatment Diastolic BP 52  -LS     Pre Patient Position Sitting  -LS     Intra Patient Position Standing  -LS     Post Patient Position Sitting  -LS       Row Name 12/20/23 0845          Positioning and Restraints    Pre-Treatment Position in bed  -LS     Post Treatment Position chair  -LS     In Chair reclined;call light within reach;encouraged to call for assist;exit  alarm on;with brace;legs elevated;with family/caregiver  -               User Key  (r) = Recorded By, (t) = Taken By, (c) = Cosigned By      Initials Name Provider Type    Nahomi Cunningham OTR/L Occupational Therapist                   Outcome Measures       Row Name 12/20/23 0845          How much help from another is currently needed...    Putting on and taking off regular lower body clothing? 2  -LS     Bathing (including washing, rinsing, and drying) 2  -LS     Toileting (which includes using toilet bed pan or urinal) 2  -LS     Putting on and taking off regular upper body clothing 2  -LS     Taking care of personal grooming (such as brushing teeth) 3  -LS     Eating meals 3  -LS     AM-PAC 6 Clicks Score (OT) 14  -       Row Name 12/20/23 0845          Functional Assessment    Outcome Measure Options AM-PAC 6 Clicks Daily Activity (OT)  -               User Key  (r) = Recorded By, (t) = Taken By, (c) = Cosigned By      Initials Name Provider Type    Nahomi Cunningham OTR/L Occupational Therapist                    Occupational Therapy Education       Title: PT OT SLP Therapies (Done)       Topic: Occupational Therapy (Done)       Point: ADL training (Done)       Description:   Instruct learner(s) on proper safety adaptation and remediation techniques during self care or transfers.   Instruct in proper use of assistive devices.                  Learning Progress Summary             Patient Acceptance, E, VU by  at 12/20/2023 1116    Acceptance, E,TB,D, VU,DU,NR by  at 12/18/2023 1634    Acceptance, E, VU,NR by  at 12/18/2023 1307    Acceptance, E, VU by MM at 12/18/2023 0437    Acceptance, E, VU by  at 12/17/2023 1422   Family Acceptance, E,TB,D, VU,DU,NR by  at 12/18/2023 1634                         Point: Home exercise program (Done)       Description:   Instruct learner(s) on appropriate technique for monitoring, assisting and/or progressing therapeutic exercises/activities.                   Learning Progress Summary             Patient Acceptance, E,TB,D, VU,DU,NR by  at 12/18/2023 1634    Acceptance, E, VU,NR by EC at 12/18/2023 1307    Acceptance, E, VU by MM at 12/18/2023 0437   Family Acceptance, E,TB,D, VU,DU,NR by  at 12/18/2023 1634                         Point: Precautions (Done)       Description:   Instruct learner(s) on prescribed precautions during self-care and functional transfers.                  Learning Progress Summary             Patient Acceptance, E, VU by  at 12/20/2023 1116    Acceptance, E,TB,D, VU,DU,NR by  at 12/18/2023 1634    Acceptance, E, VU,NR by EC at 12/18/2023 1307    Acceptance, E, VU by MM at 12/18/2023 0437    Acceptance, E, VU by  at 12/17/2023 1422   Family Acceptance, E,TB,D, VU,DU,NR by  at 12/18/2023 1634                         Point: Body mechanics (Done)       Description:   Instruct learner(s) on proper positioning and spine alignment during self-care, functional mobility activities and/or exercises.                  Learning Progress Summary             Patient Acceptance, E, VU by  at 12/20/2023 1116    Acceptance, E,TB,D, VU,DU,NR by  at 12/18/2023 1634    Acceptance, E, VU,NR by  at 12/18/2023 1307    Acceptance, E, VU by MM at 12/18/2023 0437    Acceptance, E, VU by  at 12/17/2023 1422   Family Acceptance, E,TB,D, VU,DU,NR by  at 12/18/2023 1634                                         User Key       Initials Effective Dates Name Provider Type Discipline     10/17/23 -  Merle Núñez, RN Registered Nurse Nurse    VIRI 06/20/22 -  Nahomi Crespo, OTR/L Occupational Therapist OT    MM 01/04/23 -  Cesia Ruiz, RN Registered Nurse Nurse    EC 10/13/23 -  Violeta Phelan, OTR/L Occupational Therapist OT                  OT Recommendation and Plan  Planned Therapy Interventions (OT): activity tolerance training, functional balance retraining, occupation/activity based interventions, ROM/therapeutic exercise, strengthening exercise,  transfer/mobility retraining, patient/caregiver education/training, adaptive equipment training, BADL retraining, orthotic fabrication/fitting/training  Therapy Frequency (OT): 5 times/wk  Plan of Care Review  Plan of Care Reviewed With: patient  Progress: improving  Outcome Evaluation: OT tx completed. Pt seated EOB upon therapist arrival; A&Ox4; Trujillo brace and sling to RUE; No c/o pain. Therapist messaged Dr. Galvez who reports that the Pt can wear the trujillo brace in isolation and just wear the sling as needed for comfort. Sit<>stand with Min A and verbal cues. Pt had positive orthostatics again on this date; sitting 143/51, standing 103/43, return to sitting 126/52. Pt asymptomatic on this date, therefore Pt still ambulated to BR utilizing quad cane with CGA. Pt performed sit<>stand toilet transfer utilizing grab bar with Min A. Pt performed dasha hygiene while seated with SBA and clothing management in standing with Max A. Pt continued to remain asymptomatic despite drop in BP. Pt continues to benefit from skilled OT intervention in order to address remaining deficits in fxl mobility, fxl activity tolerance, balance, strength, and use of adaptive techniques/equipment during performance of BADLs. Recommend sub acute rehab at discharge.     Time Calculation:         Time Calculation- OT       Row Name 12/20/23 0845             Time Calculation- OT    OT Start Time 0845  -LS      OT Stop Time 0924  -      OT Time Calculation (min) 39 min  -      Total Timed Code Minutes- OT 39 minute(s)  -      OT Received On 12/20/23  -                User Key  (r) = Recorded By, (t) = Taken By, (c) = Cosigned By      Initials Name Provider Type    Nahomi Cunningham OTR/L Occupational Therapist                  Therapy Charges for Today       Code Description Service Date Service Provider Modifiers Qty    [id number] HC OT SELF CARE/MGMT/TRAIN EA 15 MIN 12/20/2023 Nahomi Crespo OTR/L GO 3                 Nahomi  Cherie, OTR/L  12/20/2023

## 2023-12-20 NOTE — PLAN OF CARE
Goal Outcome Evaluation:  Plan of Care Reviewed With: patient        Progress: no change  Outcome Evaluation: A & O but forgetful, VSS, no c/o pain, sling and trujillo brace to R shoulder, up with assistance and cane to BR, voiding, bed alarm set, safety maintained

## 2023-12-20 NOTE — THERAPY TREATMENT NOTE
Acute Care - Physical Therapy Treatment Note  [facility]     Patient Name: Mya Chung  : 2/10/1928  MRN: [id number]  Today's Date: 2023      Visit Dx:     ICD-10-CM ICD-9-CM   1. Syncope and collapse  R55 780.2   2. Orthostatic hypotension  I95.1 458.0   3. Fall, initial encounter  W19.XXXA E888.9   4. Closed fracture of right shoulder, initial encounter  S42.91XA 812.00   5. Impaired mobility [Z74.09]  Z74.09 799.89     Patient Active Problem List   Diagnosis    Left upper arm pain    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    Hypertension    Hyperlipidemia    PAD (peripheral artery disease)    Right greater trochanteric avulsion periprosthetic, closed, minimally displaced    Closed fracture of right wrist    Fall    Impaired gait and mobility    Acute right hip pain    Hypothyroidism    Closed fracture of multiple pubic rami, right, initial encounter    Cholecystitis    Nausea    Acute on chronic diastolic congestive heart failure    Other chest pain    Acute systolic heart failure    Chronic combined systolic and diastolic congestive heart failure    Closed fracture of ramus of left pubis, initial encounter    Stage 3a chronic kidney disease (CKD)    Subarachnoid hemorrhage    Body mass index (BMI) of 19.9 or less in adult    Former smoker    Orthostatic hypotension    Closed fracture of proximal end of right humerus    Anemia     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     Wears glasses      Past Surgical History:   Procedure Laterality Date    AORTAGRAM Right 2018    Procedure: LEFT LOWER EXTREMITY ANGIOGRAM;  Surgeon: Walker Davis DO;   Location: [facility] HYBRID OR 12;  Service: Vascular    BREAST BIOPSY Bilateral     multiple on both, all benign    BREAST BIOPSY Left 2017    Procedure: LEFT MAMMOGRAM GUIDED NEEDLE DIRECTED EXCISIONAL BREAST BIOPSY;  Surgeon: Malgorzata Scott MD;  Location: [facility] OR;  Service:     BUNIONECTOMY Bilateral      SECTION      X2    CHOLECYSTECTOMY      CHOLECYSTECTOMY WITH INTRAOPERATIVE CHOLANGIOGRAM N/A 2021    Procedure: LAPAROSCOPIC CHOLECYSTECTOMY WITH CHOLANGIOGRAM;  Surgeon: Malgorzata Scott MD;  Location: [facility] OR;  Service: General;  Laterality: N/A;    COLONOSCOPY  2015    diverticulosis    ENDOSCOPY N/A 2021    Procedure: ESOPHAGOGASTRODUODENOSCOPY WITH ANESTHESIA;  Surgeon: Sonu Cheek DO;  Location: [facility] ENDOSCOPY;  Service: Gastroenterology;  Laterality: N/A;  pre nausea  post esophagitis  Delfina Pereira DO    EYE SURGERY      CATARACT SURGERY    FEMORAL ENDARTERECTOMY Left 2018    Procedure: LEFT FEMORAL ENDARTERECTOMY;  Surgeon: Walker Davis DO;  Location: [facility] HYBRID OR 12;  Service: Vascular    JOINT REPLACEMENT      RIGHT HIP    VASCULAR SURGERY       PT Assessment (last 12 hours)       PT Evaluation and Treatment       Row Name 23 1449 23 1130       Physical Therapy Time and Intention    Subjective Information complains of;pain  - complains of;weakness;fatigue;pain  -    Document Type therapy note (daily note)  - therapy note (daily note)  -    Mode of Treatment physical therapy  - physical therapy  -      Row Name 23 0846          Physical Therapy Time and Intention    Subjective Information --  -     Session Not Performed other (see comments)  -     Comment, Session Not Performed with OT  -       Row Name 23 1449 23 1130       General Information    Existing Precautions/Restrictions fall;non-weight bearing;orthostatic hypotension  NWB RUE with sling  - fall;non-weight  bearing;orthostatic hypotension  NWB RUE with sling  -      Row Name 12/20/23 1449 12/20/23 1130       Pain    Pretreatment Pain Rating 4/10  - 4/10  -    Posttreatment Pain Rating 4/10  - 4/10  -    Pain Location - Side/Orientation Right  - Right  -    Pain Location upper  - upper  -    Pain Location - extremity  - extremity  -    Pain Intervention(s) Medication (See MAR);Repositioned  - Repositioned  -      Row Name 12/20/23 1130          Bed Mobility    Supine-Sit [place] (Bed Mobility) --  -     Sit-Supine [place] (Bed Mobility) --  -     Comment, (Bed Mobility) chair  -       Row Name 12/20/23 1449 12/20/23 1130       Sit-Stand Transfer    Sit-Stand [place] (Transfers) verbal cues;minimum assist (75% patient effort)  - verbal cues;minimum assist (75% patient effort)  -      Row Name 12/20/23 1449 12/20/23 1130       Stand-Sit Transfer    Stand-Sit [place] (Transfers) verbal cues;minimum assist (75% patient effort)  - verbal cues;minimum assist (75% patient effort)  -      Row Name 12/20/23 1449 12/20/23 1130       Gait/Stairs (Locomotion)    [place] Level (Gait) verbal cues;minimum assist (75% patient effort)  - verbal cues;minimum assist (75% patient effort)  -    Assistive Device (Gait) cane, quad  - cane, quad  -AH    Distance in Feet (Gait) 60  60 x 2  - 60  -      Row Name 12/20/23 1449 12/20/23 1130       Motor Skills    Comments, Therapeutic Exercise BLE AROM  - BLE AROM  -      Row Name 12/20/23 1449 12/20/23 1130       Positioning and Restraints    Pre-Treatment Position in bed  - sitting in chair/recliner  -    Post Treatment Position bed  - chair  -    In Bed fowlers;call light within reach;encouraged to call for assist;with family/caregiver;RUE elevated  - --    In Chair -- notified nsg;reclined;call light within reach;encouraged to call for assist;exit alarm on;with family/caregiver  -              User Key   (r) = Recorded By, (t) = Taken By, (c) = Cosigned By      Initials Name Provider Type    Ameena Suarez, PTA Physical Therapist Assistant                    Physical Therapy Education       Title: PT OT SLP Therapies (Done)       Topic: Physical Therapy (Done)       Point: Mobility training (Done)       Learning Progress Summary             Patient Acceptance, E,TB,D, VU,DU,NR by  at 12/18/2023 1634    Acceptance, E, VU by MM at 12/18/2023 0437    Acceptance, E, VU by SB at 12/17/2023 1318    Comment: pt edu on POC, benefits of act, d/c plans, NWB status RUE   Family Acceptance, E,TB,D, VU,DU,NR by  at 12/18/2023 1634    Acceptance, E, VU by SB at 12/17/2023 1318    Comment: pt edu on POC, benefits of act, d/c plans, NWB status RUE                         Point: Home exercise program (Done)       Learning Progress Summary             Patient Acceptance, E,TB,D, VU,DU,NR by  at 12/18/2023 1634   Family Acceptance, E,TB,D, VU,DU,NR by  at 12/18/2023 1634                         Point: Body mechanics (Done)       Learning Progress Summary             Patient Acceptance, E,TB,D, VU,DU,NR by  at 12/18/2023 1634   Family Acceptance, E,TB,D, VU,DU,NR by  at 12/18/2023 1634                         Point: Precautions (Done)       Learning Progress Summary             Patient Acceptance, E,TB,D, VU,DU,NR by  at 12/18/2023 1634    Acceptance, E, VU by MM at 12/18/2023 0437    Acceptance, E, VU by SB at 12/17/2023 1318    Comment: pt edu on POC, benefits of act, d/c plans, NWB status RUE   Family Acceptance, E,TB,D, VU,DU,NR by  at 12/18/2023 1634    Acceptance, E, VU by SB at 12/17/2023 1318    Comment: pt edu on POC, benefits of act, d/c plans, NWB status RUE                                         User Key       Initials Effective Dates Name Provider Type Discipline     07/11/23 -  Whitney Gómez, PT DPT Physical Therapist PT     10/17/23 -  Merle Núñez, RN Registered Nurse Nurse     01/04/23 -   Cesia Ruiz, RN Registered Nurse Nurse                  PT Recommendation and Plan         Outcome Measures       Row Name 12/20/23 1100 12/19/23 0854 12/18/23 1300       How much help from another person do you currently need...    Turning from your back to your side while in flat bed without using bedrails? 3  - 3  -RADHA --    Moving from lying on back to sitting on the side of a flat bed without bedrails? 3  - 3  -RADHA --    Moving to and from a bed to a chair (including a wheelchair)? 3  - 3  -RADHA --    Standing up from a chair using your arms (e.g., wheelchair, bedside chair)? 3  - 3  -RADHA --    Climbing 3-5 steps with a railing? 2  - 3  -RADHA --    To walk in hospital room? 3  - 3  -RADHA --    AM-PAC 6 Clicks Score (PT) 17  - 18  -RADHA --    Highest Level of Mobility Goal 5 --> Static standing  - 6 --> Walk 10 steps or more  -RADHA --       How much help from another is currently needed...    Putting on and taking off regular lower body clothing? -- -- 2  -EC    Bathing (including washing, rinsing, and drying) -- -- 2  -EC    Toileting (which includes using toilet bed pan or urinal) -- -- 2  -EC    Putting on and taking off regular upper body clothing -- -- 2  -EC    Taking care of personal grooming (such as brushing teeth) -- -- 3  -EC    Eating meals -- -- 3  -EC    AM-PAC 6 Clicks Score (OT) -- -- 14  -EC       Functional Assessment    Outcome Measure Options AM-PAC 6 Clicks Basic Mobility (PT)  - AM-PAC 6 Clicks Basic Mobility (PT)  - AM-PAC 6 Clicks Daily Activity (OT)  -EC      Row Name 12/18/23 1145             How much help from another person do you currently need...    Turning from your back to your side while in flat bed without using bedrails? 3  -RADHA      Moving from lying on back to sitting on the side of a flat bed without bedrails? 3  -RADHA      Moving to and from a bed to a chair (including a wheelchair)? 3  -RADHA      Standing up from a chair using your arms (e.g., wheelchair, bedside  chair)? 3  -RADHA      Climbing 3-5 steps with a railing? 2  -RADHA      To walk in hospital room? 3  -RADHA      AM-PAC 6 Clicks Score (PT) 17  -RADHA      Highest Level of Mobility Goal 5 --> Static standing  -RADHA         Functional Assessment    Outcome Measure Options AM-PAC 6 Clicks Basic Mobility (PT)  -RADHA                User Key  (r) = Recorded By, (t) = Taken By, (c) = Cosigned By      Initials Name Provider Type    Ameena Suarez PTA Physical Therapist Assistant    Leo Parkinson, PTA Physical Therapist Assistant    Violeta Ross, OTR/L Occupational Therapist                     Time Calculation:    PT Charges       Row Name 12/20/23 1517 12/20/23 1154          Time Calculation    Start Time 1449  - 1130  -     Stop Time 1517  - 1153  -     Time Calculation (min) 28 min  - 23 min  -     PT Received On -- 12/20/23  -        Time Calculation- PT    Total Timed Code Minutes- PT 28 minute(s)  - 23 minute(s)  -        Timed Charges    38719 - Gait Training Minutes  28  - 23  -AH        Total Minutes    Timed Charges Total Minutes 28  - 23  -AH      Total Minutes 28  -AH 23  -AH               User Key  (r) = Recorded By, (t) = Taken By, (c) = Cosigned By      Initials Name Provider Type    Ameena Suarez PTA Physical Therapist Assistant                  Therapy Charges for Today       Code Description Service Date Service Provider Modifiers Qty    02654504355 HC GAIT TRAINING EA 15 MIN 12/20/2023 Ameena Pagan PTA GP 2    18415497431 HC GAIT TRAINING EA 15 MIN 12/20/2023 Ameena Pagan PTA GP 2            PT G-Codes  Outcome Measure Options: AM-PAC 6 Clicks Basic Mobility (PT)  AM-PAC 6 Clicks Score (PT): 17  AM-PAC 6 Clicks Score (OT): 14    Ameena Pagan PTA  12/20/2023

## 2023-12-20 NOTE — PLAN OF CARE
Goal Outcome Evaluation:  Plan of Care Reviewed With: patient        Progress: improving  Outcome Evaluation: OT tx completed. Pt seated EOB upon therapist arrival; A&Ox4; [place] brace and sling to RUE; No c/o pain. Therapist messaged Dr. Galvez who reports that the Pt can wear the [place]uj[place] brace in isolation and just wear the sling as needed for comfort. Sit<>stand with Min A and verbal cues. Pt had positive orthostatics again on this date; sitting 143/51, standing 103/43, return to sitting 126/52. Pt asymptomatic on this date, therefore Pt still ambulated to BR utilizing quad cane with CGA. Pt performed sit<>stand toilet transfer utilizing grab bar with Min A. Pt performed dasha hygiene while seated with SBA and clothing management in standing with Max A. Pt continued to remain asymptomatic despite drop in BP. Pt continues to benefit from skilled OT intervention in order to address remaining deficits in fxl mobility, fxl activity tolerance, balance, strength, and use of adaptive techniques/equipment during performance of BADLs. Recommend sub acute rehab at discharge.      Anticipated Discharge Disposition (OT): sub acute care setting

## 2023-12-20 NOTE — DISCHARGE SUMMARY
[facility] Medicine Services  DISCHARGE SUMMARY       Date of Admission: 12/16/2023  Date of Discharge:  12/21/2023  Primary Care Physician: Delfina Pereira DO    Presenting Problem/History of Present Illness:  right arm pain after a fall    Final Discharge Diagnoses:  Active Hospital Problems    Diagnosis     **Orthostatic hypotension     Closed fracture of proximal end of right humerus     Anemia     Stage 3a chronic kidney disease (CKD)     Chronic combined systolic and diastolic congestive heart failure     Fall     Hypothyroidism     PAD (peripheral artery disease)        Consults: Ignacia TRAVIS with palliative care    Procedures Performed: none.    Pertinent Test Results:   Results for orders placed during the hospital encounter of 06/26/22    Adult Transthoracic Echo Complete W/ Cont if Necessary Per Protocol    Interpretation Summary  · Left ventricular ejection fraction appears to be 31 - 35%. Left ventricular systolic function is moderately decreased.  · The following left ventricular wall segments are hypokinetic: mid anterior, apical anterior, mid anterolateral, apical lateral, mid inferolateral, apical septal, basal inferoseptal, mid inferoseptal, mid anteroseptal, basal inferior and basal inferoseptal. The following left ventricular wall segments are akinetic: apical inferior and mid inferior.  · Normal size and function of the right ventricle.  · No significant valvular pathology.  · Compared to last exam from 5/2/22, LV function appears slightly worse but mitral regurgitation is not as severe.      Imaging Results (All)       Procedure Component Value Units Date/Time    XR Chest 1 View [[id number]] Collected: 12/16/23 1938     Updated: 12/16/23 1942    Narrative:      EXAM/TECHNIQUE: XR CHEST 1 VW-     INDICATION: syncope     COMPARISON: 8/29/2023     FINDINGS:     Cardiac silhouette is mildly enlarged but stable. No pleural  effusion,  pneumothorax, or focal consolidation. Diffuse chronic interstitial  coarsening is noted. Atherosclerotic aortic arch. Demonstrated right  humeral surgical neck and proximal shaft fractures.       Impression:      1.  No acute cardiopulmonary finding.  2.  Redemonstrated right humeral surgical neck and proximal shaft  fractures.     This report was signed and finalized on 12/16/2023 7:39 PM by Dr. Navid Canales MD.       XR Clavicle Right [[id number]] Collected: 12/16/23 1937     Updated: 12/16/23 1941    Narrative:      EXAM/TECHNIQUE: XR CLAVICLE RIGHT-     INDICATION: fall     COMPARISON: None     FINDINGS:     No evidence of right clavicle fracture. AC joint appears intact.  Redemonstrated right humeral fractures. Included right lung apex appears  unremarkable.       Impression:      1.  No evidence of acute fracture involving the right clavicle.  2.  Redemonstrated right humeral fractures.     This report was signed and finalized on 12/16/2023 7:38 PM by Dr. Navid Canales MD.       XR Humerus Right [[id number]] Collected: 12/16/23 1935     Updated: 12/16/23 1940    Narrative:      EXAM/TECHNIQUE: XR HUMERUS RIGHT-     INDICATION: fall     COMPARISON: None     FINDINGS:     Acute comminuted and displaced fracture of the right humeral surgical  neck with suspected extension into the humeral head.     Acute one shaft width displaced and angulated fracture of the proximal  to mid right humeral shaft.     The distal humerus appears intact. No evidence of elbow fracture or  dislocation. No radiopaque foreign body or soft tissue gas.       Impression:      1.  Acute comminuted and displaced fracture of the right humeral  surgical neck with suspected extension into the humeral head.  2.  Acute one shaft width displaced and angulated fracture of the  proximal to mid right humeral shaft.     This report was signed and finalized on 12/16/2023 7:37 PM by Dr. Navid Canales MD.       XR Shoulder 2+ View  Right [[id number]] Collected: 12/16/23 1932     Updated: 12/16/23 1938    Narrative:      EXAM/TECHNIQUE: XR SHOULDER 2+ VW RIGHT-     INDICATION: fall     COMPARISON: None     FINDINGS:     Acute comminuted displaced fracture of the right humeral surgical neck.  Suspected fracture line extension into the right lateral humeral head.  Acute one shaft with displaced oblique fracture of the proximal right  humeral shaft.     No evidence of glenohumeral joint dislocation. AC joint appears intact.  Included portion of the right chest appears unremarkable.       Impression:      1.  Acute comminuted and displaced fracture of the right humeral  surgical neck with suspected extension into the right humeral head.  2.  Acute one shaft width displaced oblique fracture of the proximal  right humeral shaft.     This report was signed and finalized on 12/16/2023 7:35 PM by Dr. Navid Canales MD.       CT Cervical Spine Without Contrast [[id number]] Collected: 12/16/23 1856     Updated: 12/16/23 1908    Narrative:      EXAM/TECHNIQUE: CT cervical spine without contrast     INDICATION: fall     COMPARISON: 6/30/2023     DLP: 240 mGy cm. Automated exposure control was also utilized to  decrease patient radiation dose.     FINDINGS:     Craniocervical relationships are maintained. Old right anterior and  right posterolateral C1 arch fractures are noted. Advanced degenerative  change at the atlantoaxial joint with odontoid process erosions and  calcified soft tissue pannus. Cervical lordosis is maintained. No acute  subluxations. Cervical vertebral body heights are maintained. No acute  fracture. Chronic mild compression deformity of T2 is noted. Advanced  multilevel cervical spine degenerative change with multilevel central  canal and neural foraminal stenosis. Paravertebral soft tissues are  unremarkable. Emphysema is noted in the included lung apices.       Impression:      1.  No acute fracture or subluxation.  2.  Old fractures  of the right anterior and right posterolateral C1  arch.  3.  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     This report was signed and finalized on 12/16/2023 7:05 PM by Dr. Navid Canales MD.       CT Facial Bones Without Contrast [[id number]] Collected: 12/16/23 1852     Updated: 12/16/23 1859    Narrative:      EXAM/TECHNIQUE: CT facial bones without contrast     INDICATION: Fall, right facial trauma     COMPARISON: 6/30/2023     DLP: 161 mGy cm. Automated exposure control was also utilized to  decrease patient radiation dose.     FINDINGS:     Intact frontal sinus. No acute displaced nasal bone or nasal septal  fracture. Maxillary buttresses are intact. Pterygoid plates are intact.  Zygomatic arches are maintained. No mandibular fracture. No acute  orbital fracture. No paranasal sinus air-fluid levels. Bilateral TMJ  arthritic change. Findings in the upper cervical spine are described in  a separate same day CT cervical spine report.     No evidence of soft tissue injury to either globe. No periorbital  hematoma. Parapharyngeal fat planes are maintained. No focal asymmetry  in the upper aerodigestive tract. No cervical lymphadenopathy. Bilateral  carotid artery atherosclerotic calcification.       Impression:      1.  No acute facial fracture.  2.  Partially imaged cervical spine is described in a separate same day  report.     This report was signed and finalized on 12/16/2023 6:55 PM by Dr. Navid Canales MD.       CT Head Without Contrast [[id number]] Collected: 12/16/23 1846     Updated: 12/16/23 1854    Narrative:      EXAM/TECHNIQUE: CT head without contrast     INDICATION: Fall, face and head trauma     COMPARISON: 7/11/2023     DLP: 552 mGy cm. Automated exposure control was also utilized to  decrease patient radiation dose.     FINDINGS:     No  evidence of intracranial hemorrhage. Gray-white differentiation is  maintained. Advanced chronic microvascular ischemic white matter change.  Mild global cerebral volume loss with ex vacuo ventricular dilatation.  Bilateral old basal ganglia lacunar infarcts are noted. No midline shift  or mass effect. No evidence of hydrocephalus. Basilar cisterns are  patent.      Visualized portion of the orbits are unremarkable. Mastoid air cells are  clear. Visualized portion of the paranasal sinuses are clear. No acute  osseous finding.       Impression:      1.  No acute intracranial findings.  2.  Advanced chronic microvascular ischemic white matter change with  global cerebral volume loss.     This report was signed and finalized on 12/16/2023 6:51 PM by Dr. Navid Canales MD.             LAB RESULTS:      Lab 12/20/23  0854 12/18/23  0557 12/17/23  1840 12/17/23 0443 12/16/23 2014   WBC 8.86 7.83  --  7.54 12.22*   HEMOGLOBIN 8.5* 8.1* 8.0* 9.6* 12.0   HEMATOCRIT 27.6* 26.5* 26.0* 30.4* 39.5   PLATELETS 158 122*  --  131* 189   NEUTROS ABS  --   --   --  5.20 8.91*   IMMATURE GRANS (ABS)  --   --   --   --  0.03   LYMPHS ABS  --   --   --   --  1.91   MONOS ABS  --   --   --   --  1.21*   EOS ABS  --   --   --   --  0.10   MCV 97.2* 98.1*  --  93.8 96.8         Lab 12/20/23  0853 12/18/23  0557 12/17/23 0443 12/16/23 2014   SODIUM 138 141 140 144   POTASSIUM 3.5 3.8 4.2 5.1   CHLORIDE 106 109* 105 108*   CO2 24.0 23.0 24.0 25.0   ANION GAP 8.0 9.0 11.0 11.0   BUN 30* 39* 35* 38*   CREATININE 0.86 1.14* 1.09* 1.16*   EGFR 62.3 44.4* 46.9* 43.5*   GLUCOSE 99 99 122* 126*   CALCIUM 8.7 7.9* 8.2 9.1   MAGNESIUM  --   --   --  2.2         Lab 12/16/23 2014   TOTAL PROTEIN 6.3   ALBUMIN 3.7   GLOBULIN 2.6   ALT (SGPT) 10   AST (SGOT) 20   BILIRUBIN 0.4   ALK PHOS 61         Lab 12/16/23 2014   HSTROP T 15*             Lab 12/18/23  0557 12/17/23  0443   IRON  --  79   IRON SATURATION (TSAT)  --  32   TIBC  --  246*    TRANSFERRIN  --  165*   FOLATE 19.30  --    VITAMIN B 12 378  --          Brief Urine Lab Results  (Last result in the past 365 days)        Color   Clarity   Blood   Leuk Est   Nitrite   Protein   CREAT   Urine HCG        12/16/23 2302 Yellow   Clear   Trace   Negative   Negative   Negative                 Microbiology Results (last 10 days)       ** No results found for the last 240 hours. **            Hospital Course:   Ms. Chung is a 89-year-old female that presented to [facility] on 12/16 with right arm pain after a fall.  In the ED, she was found to have right proximal humerus diaphyseal fracture.  She was going to be discharged from the emergency department, however, when she got up from the bed to go to the bathroom she had an orthostatic episode with syncope.  Patient lives home alone and has good support from family.  She walks with a cane or walker.    Orthostatic hypotension.  Fluid bolus in ED followed by gentle IV fluid x 1 day. Entresto discontinued.  She remains orthostatic today.  Patient is asymptomatic.      Orthopedics, Dr. Galvez consulted.  After session with orthopedics, patient and her family plan for conservative treatment with conversion to June bracing. Sling immobilization to the right upper extremity. Nonweightbearing of the right upper extremity.  She will follow-up in the outpatient setting with Dr. Galvez.      Hemoglobin 12.0 on admission.  Hemoglobin 9.6 on the following day and now 8.1.  Suspect dilutional.  She has received IV fluid.  No signs of bleeding.  Fecal occult blood negative. Hemoglobin stable at 8.5.     She has a history of chronic combined systolic and diastolic congestive heart failure followed by cardiologist Dr. Urena as outpatient.  She is on Entresto 24-26 mg twice daily, Coreg 3.125 mg twice daily, aspirin 81 mg and Plavix 75 mg. Recommend to monitor off Entresto.  Could consider low-dose losartan as outpatient-defer to cardiologist.   "Patient to follow-up with her regular cardiologist, Dr. Urena as outpatient.     PT/OT.  Therapy recommends discharge to subacute rehab versus home with 24/7 assist and home health.     Palliative care consulted.  No CPR with limited support to include no intubation, no permanent feeding tube.    She has reached maximum benefit of hospitalization.  She is medically stable and appropriate for discharge today.    Physical Exam on Discharge:  /53 (BP Location: Left arm, Patient Position: Lying)   Pulse 81   Temp 98.6 °F (37 °C) (Oral)   Resp 18   Ht 162.6 cm (64.02\")   Wt 49.5 kg (109 lb 2 oz)   SpO2 93%   BMI 18.72 kg/m²   Physical Exam  Vitals reviewed.   Constitutional:       General: She is not in acute distress.     Appearance: She is not toxic-appearing.      Comments: Sitting up in bed.  No acute distress.  On room air.  Family at bedside.  Discussed with her nurse sandra BROWN:      Head: Normocephalic and atraumatic.      Mouth/Throat:      Mouth: Mucous membranes are moist.      Pharynx: Oropharynx is clear.   Eyes:      Extraocular Movements: Extraocular movements intact.      Conjunctiva/sclera: Conjunctivae normal.      Pupils: Pupils are equal, round, and reactive to light.   Cardiovascular:      Rate and Rhythm: Normal rate and regular rhythm.      Pulses: Normal pulses.   Pulmonary:      Effort: Pulmonary effort is normal. No respiratory distress.      Breath sounds: Normal breath sounds. No wheezing or rhonchi.   Abdominal:      General: Bowel sounds are normal. There is no distension.      Palpations: Abdomen is soft.      Tenderness: There is no abdominal tenderness.   Musculoskeletal:         General: No swelling or tenderness. Normal range of motion.      Cervical back: Normal range of motion and neck supple. No muscular tenderness.      Comments: brace to right upper extremity   Skin:     General: Skin is warm and dry.      Findings: No erythema or rash.   Neurological:      General: " No focal deficit present.      Mental Status: She is alert and oriented to person, place, and time.      Cranial Nerves: No cranial nerve deficit.      Motor: No weakness.   Psychiatric:         Mood and Affect: Mood normal.         Behavior: Behavior normal.     Condition on Discharge: medically stable.    Discharge Disposition:  Skilled Nursing Facility (DC - External)    Discharge Medications:     Discharge Medications        New Medications        Instructions Start Date   acetaminophen 325 MG tablet  Commonly known as: TYLENOL   650 mg, Oral, Every 4 Hours PRN      oxyCODONE 5 MG immediate release tablet  Commonly known as: ROXICODONE   5 mg, Oral, Every 12 Hours PRN      pantoprazole 40 MG EC tablet  Commonly known as: PROTONIX   40 mg, Oral, Every Early Morning      polyethylene glycol 17 g packet  Commonly known as: MIRALAX   17 g, Oral, Daily PRN      sennosides-docusate 8.6-50 MG per tablet  Commonly known as: PERICOLACE   2 tablets, Oral, 2 Times Daily             Changes to Medications        Instructions Start Date   Synthroid 150 MCG tablet  Generic drug: levothyroxine  What changed: Another medication with the same name was removed. Continue taking this medication, and follow the directions you see here.   1 tablet, Oral, Daily, Sunday-Friday             Continue These Medications        Instructions Start Date   alendronate 70 MG tablet  Commonly known as: FOSAMAX   70 mg, Oral, Every 7 Days, Thursday      aspirin 81 MG EC tablet   81 mg, Oral, Daily      CALCIUM 600/VITAMIN D PO   1 tablet, Oral, Daily      carvedilol 3.125 MG tablet  Commonly known as: COREG   3.125 mg, Oral, 2 Times Daily      clopidogrel 75 MG tablet  Commonly known as: PLAVIX   75 mg, Oral, Daily      colestipol 1 g tablet  Commonly known as: COLESTID   1 g, Oral, Every Other Day      COMPLETE MULTIVITAMIN/MINERAL PO   1 tablet, Oral, Daily      donepezil 5 MG tablet  Commonly known as: Aricept   5 mg, Oral, Nightly       empagliflozin 10 MG tablet tablet  Commonly known as: Jardiance   10 mg, Oral, Daily      escitalopram 10 MG tablet  Commonly known as: LEXAPRO   10 mg, Oral, Daily      gabapentin 100 MG capsule  Commonly known as: NEURONTIN   100 mg, Oral, Nightly, Last filled #180 for 90 DS on 10/18/23      Glucosamine-Chondroitin-Vit D3 6060-8082-842 MG-MG-UNIT pack   1 tablet, Oral, 2 Times Daily             Stop These Medications      Entresto 24-26 MG tablet  Generic drug: sacubitril-valsartan              This patient has current or prior documentation of an left ventricular ejection fraction (LVEF) of less than or equal to 40%.    This patient is not prescribed or taking ACE/ARB due to hypotension    The patient was prescribed already taking bisoprolol, carvedilol, or sustained release metoprolol succinate.     Discharge Diet:   Diet Instructions       Diet: Regular/House Diet, Cardiac Diets; Healthy Heart (2-3 Na+); Regular Texture (IDDSI 7); Thin (IDDSI 0)      Discharge Diet:  Regular/House Diet  Cardiac Diets       Cardiac Diet: Healthy Heart (2-3 Na+)    Texture: Regular Texture (IDDSI 7)    Fluid Consistency: Thin (IDDSI 0)            Activity at Discharge:   Activity Instructions       Activity as Tolerated      Other Activity Instructions      Activity Instructions: Nonweightbearing right upper extremity            Follow-up Appointments:   1.  Follow-up with Dr. Pereira in 1 week  2.  Follow-up with orthopedics in 1-2 weeks.  Future Appointments   Date Time Provider Department Center   1/10/2024  9:00 AM PAD US [facility] CART 3 [facility] PAD [facility]   1/10/2024 10:00 AM PAD US [facility] CART 3 [facility] PAD [facility]   1/10/2024 10:45 AM Saumya Morales APRN [facility]   3/5/2024 10:15 AM Cesar Urena DO [facility] CD [facility] PAD       Test Results Pending at Discharge: none.    Electronically signed by ANGY Lu, 12/21/23, 08:31 CST.    Time: 35 minutes.

## 2023-12-21 VITALS
RESPIRATION RATE: 18 BRPM | DIASTOLIC BLOOD PRESSURE: 53 MMHG | TEMPERATURE: 98.6 F | OXYGEN SATURATION: 93 % | WEIGHT: 109.13 LBS | HEIGHT: 64 IN | SYSTOLIC BLOOD PRESSURE: 128 MMHG | HEART RATE: 81 BPM | BODY MASS INDEX: 18.63 KG/M2

## 2023-12-21 RX ADMIN — ESCITSLOPRAM 10 MG: 10 TABLET ORAL at 08:41

## 2023-12-21 RX ADMIN — Medication 10 ML: at 08:42

## 2023-12-21 RX ADMIN — PANTOPRAZOLE SODIUM 40 MG: 40 TABLET, DELAYED RELEASE ORAL at 05:12

## 2023-12-21 RX ADMIN — LEVOTHYROXINE SODIUM 150 MCG: 150 TABLET ORAL at 05:12

## 2023-12-21 RX ADMIN — CLOPIDOGREL BISULFATE 75 MG: 75 TABLET, FILM COATED ORAL at 08:42

## 2023-12-21 RX ADMIN — ASPIRIN 81 MG: 81 TABLET, COATED ORAL at 08:42

## 2023-12-21 NOTE — THERAPY DISCHARGE NOTE
Acute Care - Occupational Therapy Discharge Summary  [facility]     Patient Name: Mya Chung  : 2/10/1928  MRN: [id number]    Today's Date: 2023                 Admit Date: 2023        OT Recommendation and Plan    Visit Dx:    ICD-10-CM ICD-9-CM   1. Syncope and collapse  R55 780.2   2. Orthostatic hypotension  I95.1 458.0   3. Fall, initial encounter  W19.XXXA E888.9   4. Closed fracture of right shoulder, initial encounter  S42.91XA 812.00   5. Impaired mobility [Z74.09]  Z74.09 799.89                OT Rehab Goals       Row Name 23 1500             Transfer Goal 1 (OT)    Activity/Assistive Device (Transfer Goal 1, OT) toilet;shower chair  -JJ      [place] Level/Cues Needed (Transfer Goal 1, OT) standby assist  -JJ      Time Frame (Transfer Goal 1, OT) long term goal (LTG);10 days  -JJ      Progress/Outcome (Transfer Goal 1, OT) goal not met;discharged from facility  -JJ         Dressing Goal 1 (OT)    Activity/Device (Dressing Goal 1, OT) dressing skills, all  -JJ      [place]/Cues Needed (Dressing Goal 1, OT) moderate assist (50-74% patient effort)  -JJ      Time Frame (Dressing Goal 1, OT) long term goal (LTG);10 days  -JJ      Progress/Outcome (Dressing Goal 1, OT) goal not met;discharged from facility  -JJ                User Key  (r) = Recorded By, (t) = Taken By, (c) = Cosigned By      Initials Name Provider Type Discipline    Delfina Aragon, OTR/L, CSRS Occupational Therapist OT                     Outcome Measures       Row Name 23 1100 23 0854          How much help from another person do you currently need...    Turning from your back to your side while in flat bed without using bedrails? 3  -AH 3  -RADHA     Moving from lying on back to sitting on the side of a flat bed without bedrails? 3  -AH 3  -RADHA     Moving to and from a bed to a chair (including a wheelchair)? 3  -AH 3  -RADHA     Standing up from a chair using your arms (e.g., wheelchair,  bedside chair)? 3  - 3  -RADHA     Climbing 3-5 steps with a railing? 2  - 3  -RADHA     To walk in hospital room? 3  - 3  -RADHA     AM-PAC 6 Clicks Score (PT) 17  - 18  -RADHA     Highest Level of Mobility Goal 5 --> Static standing  - 6 --> Walk 10 steps or more  -        Functional Assessment    Outcome Measure Options AM-PAC 6 Clicks Basic Mobility (PT)  - AM-PAC 6 Clicks Basic Mobility (PT)  -               User Key  (r) = Recorded By, (t) = Taken By, (c) = Cosigned By      Initials Name Provider Type     Ameena Pagan, PTA Physical Therapist Assistant    Leo Parkinson, PTA Physical Therapist Assistant                    Timed Therapy Charges  Total Units: 3      Suggested Charges  Total Units: 3      Procedure Name Documented Minutes Units Code    HC OT SELF CARE/MGMT/TRAIN EA 15 MIN 40 3   93748 (CPT®)                 Documented Minutes  Total Minutes: 40      Therapy Provided Minutes    11604 - OT Self Care/Mgmt Minutes 40                        OT Discharge Summary  Anticipated Discharge Disposition (OT): sub acute care setting  Reason for Discharge: Discharge from facility  Outcomes Achieved: Refer to plan of care for updates on goals achieved  Discharge Destination: SNF      LYUDMILA Garcia/L, CSRS  12/21/2023

## 2023-12-21 NOTE — THERAPY DISCHARGE NOTE
Acute Care - Physical Therapy Discharge Summary  [facility]       Patient Name: Mya Chung  : 2/10/1928  MRN: [id number]    Today's Date: 2023                 Admit Date: 2023      PT Recommendation and Plan    Visit Dx:    ICD-10-CM ICD-9-CM   1. Syncope and collapse  R55 780.2   2. Orthostatic hypotension  I95.1 458.0   3. Fall, initial encounter  W19.XXXA E888.9   4. Closed fracture of right shoulder, initial encounter  S42.91XA 812.00   5. Impaired mobility [Z74.09]  Z74.09 799.89        Outcome Measures       Row Name 23 1100 23 0854          How much help from another person do you currently need...    Turning from your back to your side while in flat bed without using bedrails? 3  - 3  -RADHA     Moving from lying on back to sitting on the side of a flat bed without bedrails? 3  - 3  -RADHA     Moving to and from a bed to a chair (including a wheelchair)? 3  - 3  -RADHA     Standing up from a chair using your arms (e.g., wheelchair, bedside chair)? 3  - 3  -RADHA     Climbing 3-5 steps with a railing? 2  - 3  -RADHA     To walk in hospital room? 3  - 3  -RADHA     AM-PAC 6 Clicks Score (PT) 17  - 18  -RADHA     Highest Level of Mobility Goal 5 --> Static standing  - 6 --> Walk 10 steps or more  -        Functional Assessment    Outcome Measure Options AM-PAC 6 Clicks Basic Mobility (PT)  - AM-PAC 6 Clicks Basic Mobility (PT)  -               User Key  (r) = Recorded By, (t) = Taken By, (c) = Cosigned By      Initials Name Provider Type     Ameena Pagan, PTA Physical Therapist Assistant    Leo Parkinson, PTA Physical Therapist Assistant                         PT Rehab Goals       Row Name 23 1400             Bed Mobility Goal 1 (PT)    Activity/Assistive Device (Bed Mobility Goal 1, PT) sit to supine;supine to sit;rolling to left;rolling to right  -AB      [place] Level/Cues Needed (Bed Mobility Goal 1, PT) standby assist  -AB      Time Frame (Bed  Mobility Goal 1, PT) long term goal (LTG)  -AB      Progress/Outcomes (Bed Mobility Goal 1, PT) goal not met  -AB         Transfer Goal 1 (PT)    Activity/Assistive Device (Transfer Goal 1, PT) sit-to-stand/stand-to-sit;bed-to-chair/chair-to-bed;other (see comments)  LRAD  -AB      [place] Level/Cues Needed (Transfer Goal 1, PT) standby assist  -AB      Time Frame (Transfer Goal 1, PT) long term goal (LTG)  -AB      Strategies/Barriers (Transfers Goal 1, PT) orthostatic BP  -AB      Progress/Outcome (Transfer Goal 1, PT) goal not met  -AB         Gait Training Goal 1 (PT)    Activity/Assistive Device (Gait Training Goal 1, PT) gait (walking locomotion);decrease fall risk;improve balance and speed;other (see comments)  LRAD  -AB      [place] Level (Gait Training Goal 1, PT) contact guard required  -AB      Distance (Gait Training Goal 1, PT) 60 feet  -AB      Time Frame (Gait Training Goal 1, PT) long term goal (LTG)  -AB      Strategies/Barriers (Gait Training Goal 1, PT) orthostatic BP  -AB      Progress/Outcome (Gait Training Goal 1, PT) goal not met  -AB                User Key  (r) = Recorded By, (t) = Taken By, (c) = Cosigned By      Initials Name Provider Type Discipline    Katya Swanson PTA Physical Therapist Assistant PT                        PT Discharge Summary  Anticipated Discharge Disposition (PT): sub acute care setting, home with 24/7 care, home with home health  Reason for Discharge: Discharge from facility  Outcomes Achieved: Refer to plan of care for updates on goals achieved  Discharge Destination: SNF      Katya Edwards PTA   12/21/2023

## 2023-12-21 NOTE — CASE MANAGEMENT/SOCIAL WORK
Continued Stay Note   [facility]     Patient Name: Mya Chung  MRN: [id number]  Today's Date: 12/21/2023    Admit Date: 12/16/2023    Plan: [facility]   Discharge Plan       Row Name 12/21/23 0958       Plan    Plan [facility]    Patient/Family in Agreement with Plan yes    Final Discharge Disposition Code 03 - skilled nursing facility (SNF)    Final Note Pt is being discharged to [facility] level care and Polina from facility aware of d/c status.  Informed Yeni Mixon Daughter [phone] and family will be taking via private vehicle.    [facility]   [phone]  Fax: [phone]  Polina's Cell [phone]                          Discharge Codes    No documentation.                 Expected Discharge Date and Time       Expected Discharge Date Expected Discharge Time    Dec 21, 2023               ALLIE AvendañoW

## 2023-12-21 NOTE — PLAN OF CARE
Goal Outcome Evaluation:   Pt. A&Ox4, VSS, b/p has a tendency to drop when pt up ambulating to bathroom, non-symptomatic, orthostatic b/p's taken, pt ambulates well with cane, voids without difficulty, safety maintained, tolerated sitting up in chair, RUE in sling/splint, moderate edema noted to R arm and hand, elevated and pillow support and ice applied,d/c to [facility] today, family taking pt.   Report called to Katya at [facility] snf at 1045, d/c education completed and family here.   1130- pt taken out via wheelchair with family

## 2024-01-02 ENCOUNTER — TELEPHONE (OUTPATIENT)
Dept: CARDIOLOGY | Facility: CLINIC | Age: 89
End: 2024-01-02

## 2024-01-02 NOTE — TELEPHONE ENCOUNTER
Caller: Yeni Mixon    Relationship to patient: Emergency Contact    Best call back number: [phone]    Type of visit: HOSPITAL FOLLOW UP     Requested date: 1.9.24, ANYTIME AFTER 11 IF POSSIBLE      If rescheduling, when is the original appointment: 1.9.24 AT 9:00AM    Additional notes:PT IS NOW IN A NURSING HOME REHAB AND IT IS HARD TO GET PT UP EARLY IN THE MORNINGS, PT'S DAUGHTER IS REQUESTING A LATER APPT TIME IF AT ALL POSSIBLE. PLEASE ADVISE AND CALL DAUGHTER BACK TO R/S

## 2024-01-09 ENCOUNTER — OFFICE VISIT (OUTPATIENT)
Dept: CARDIOLOGY | Facility: CLINIC | Age: 89
End: 2024-01-09
Payer: MEDICARE

## 2024-01-09 VITALS
DIASTOLIC BLOOD PRESSURE: 55 MMHG | SYSTOLIC BLOOD PRESSURE: 90 MMHG | OXYGEN SATURATION: 97 % | HEART RATE: 75 BPM | WEIGHT: 110 LBS | RESPIRATION RATE: 18 BRPM | HEIGHT: 64 IN | BODY MASS INDEX: 18.78 KG/M2

## 2024-01-09 DIAGNOSIS — I95.1 ORTHOSTATIC HYPOTENSION: ICD-10-CM

## 2024-01-09 DIAGNOSIS — I50.22 CHRONIC SYSTOLIC (CONGESTIVE) HEART FAILURE: Primary | ICD-10-CM
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
## 2024-01-09 PROBLEM — I50.21 ACUTE SYSTOLIC HEART FAILURE: Status: RESOLVED | Noted: 2022-06-28 | Resolved: 2024-01-09

## 2024-01-09 PROBLEM — I50.33 ACUTE ON CHRONIC DIASTOLIC CONGESTIVE HEART FAILURE: Status: RESOLVED | Noted: 2022-05-01 | Resolved: 2024-01-09

## 2024-01-09 RX ORDER — FUROSEMIDE 20 MG/1
20 TABLET ORAL AS NEEDED
Qty: 15 TABLET | Refills: 1 | Status: SHIPPED | OUTPATIENT
Start: 2024-01-09

## 2024-01-09 NOTE — PROGRESS NOTES
Subjective:     Encounter Date:01/09/2024      Patient ID: Mya Chung is a 89 y.o. female     Chief Complaint:  Congestive Heart Failure  Associated symptoms include muscle weakness. Pertinent negatives include no abdominal pain, chest pain, near-syncope, palpitations or shortness of breath.     Patient presents today for follow up after admission last month. Patient to the ER in December for a fall. She denies dizziness prior to fall. She was found to have a right arm fracture and was put in a sling. She stood up to be discharged and got lightheaded and dizziness and had LOC. Her blood pressure was soft. At that time she was admitted. She was treated with IV fluids and had her entresto stopped. She notes she has been feeling much better. Denies shortness of breath, leg swelling or chest pain. She notes her arm pain is well controlled. She is currently at the nursing home for rehab but is hopeful she can return home soon. Patient is followed for systolic congestive heart failure/cardiomyopathy of unclear etiology. Patient opted out of invasive testing and stress and cath were deferred at time of diagnosis. This was diagnosed in 2022. Overall she has been doing well. Denies volume overload. She is underweight and weighs 110 pounds. Patient has GERD, congestive heart failure, arthritis, hypothyroidism, memory loss and peripheral artery disease. Last year she experienced a few falls in June she had a fall with subarachnoid hemorrhage.     The following portions of the patient's history were reviewed and updated as appropriate: allergies, current medications, past medical history, past social history, past and problem list.    Allergies   Allergen Reactions    Erythromycin Shortness Of Breath    Zithromax [Azithromycin] Itching and Other (See Comments)     Yeast infection    Penicillins Rash       Current Outpatient Medications:     acetaminophen (TYLENOL) 325 MG tablet, Take 2 tablets by mouth Every 4 (Four)  Hours As Needed for Mild Pain., Disp: , Rfl:     alendronate (FOSAMAX) 70 MG tablet, Take 1 tablet by mouth Every 7 (Seven) Days. Thursday, Disp: , Rfl:     aspirin 81 MG EC tablet, Take 1 tablet by mouth Daily., Disp: , Rfl:     Calcium Carbonate-Vitamin D (CALCIUM 600/VITAMIN D PO), Take 1 tablet by mouth Daily., Disp: , Rfl:     carvedilol (COREG) 3.125 MG tablet, Take 1 tablet by mouth 2 (Two) Times a Day., Disp: 180 tablet, Rfl: 3    clopidogrel (PLAVIX) 75 MG tablet, Take 1 tablet by mouth Daily., Disp: 90 tablet, Rfl: 3    colestipol (COLESTID) 1 g tablet, Take 1 tablet by mouth Every Other Day., Disp: , Rfl:     donepezil (Aricept) 5 MG tablet, Take 1 tablet by mouth Every Night., Disp: 90 tablet, Rfl: 1    empagliflozin (Jardiance) 10 MG tablet tablet, Take 1 tablet by mouth Daily., Disp: 90 tablet, Rfl: 3    escitalopram (LEXAPRO) 10 MG tablet, Take 1 tablet by mouth Daily., Disp: , Rfl:     Glucosamine-Chondroitin-Vit D3 1461-7655-021 MG-MG-UNIT pack, Take 1 tablet by mouth 2 (Two) Times a Day., Disp: , Rfl:     Multiple Vitamins-Minerals (COMPLETE MULTIVITAMIN/MINERAL PO), Take 1 tablet by mouth Daily., Disp: , Rfl:     oxyCODONE (ROXICODONE) 5 MG immediate release tablet, Take 1 tablet by mouth Every 12 (Twelve) Hours As Needed for Moderate Pain., Disp: 2 tablet, Rfl: 0    pantoprazole (PROTONIX) 40 MG EC tablet, Take 1 tablet by mouth Every Morning., Disp: , Rfl:     polyethylene glycol (MIRALAX) 17 g packet, Take 17 g by mouth Daily As Needed (Use if senna-docusate is ineffective)., Disp: , Rfl:     sennosides-docusate (PERICOLACE) 8.6-50 MG per tablet, Take 2 tablets by mouth 2 (Two) Times a Day., Disp: , Rfl:     Synthroid 150 MCG tablet, Take 1 tablet by mouth Daily. Sunday-Friday, Disp: , Rfl:     furosemide (LASIX) 20 MG tablet, Take 1 tablet by mouth As Needed (for heart failure, leg swelling, weight gain)., Disp: 15 tablet, Rfl: 1    gabapentin (NEURONTIN) 100 MG capsule, Take 1 capsule by  mouth Every Night for 3 days. Last filled #180 for 90 DS on 10/18/23, Disp: 3 capsule, Rfl: 0    Social History     Socioeconomic History    Marital status:    Tobacco Use    Smoking status: Former     Years: 2     Types: Cigarettes     Quit date: 1955     Years since quittin.0     Passive exposure: Never    Smokeless tobacco: Never   Vaping Use    Vaping Use: Never used   Substance and Sexual Activity    Alcohol use: No    Drug use: No    Sexual activity: Defer       Review of Systems   Constitutional: Positive for malaise/fatigue and weight loss. Negative for chills, decreased appetite, fever and weight gain.   HENT:  Negative for nosebleeds.    Eyes:  Negative for visual disturbance.   Cardiovascular:  Negative for chest pain, dyspnea on exertion, leg swelling, near-syncope, orthopnea, palpitations, paroxysmal nocturnal dyspnea and syncope.   Respiratory:  Negative for cough, hemoptysis, shortness of breath and snoring.    Endocrine: Negative for cold intolerance and heat intolerance.   Hematologic/Lymphatic: Negative for bleeding problem. Does not bruise/bleed easily.   Skin:  Negative for rash.   Musculoskeletal:  Positive for falls and muscle weakness. Negative for back pain.   Gastrointestinal:  Negative for abdominal pain, constipation, diarrhea, heartburn, melena, nausea and vomiting.   Genitourinary:  Negative for hematuria.   Neurological:  Negative for dizziness, headaches and light-headedness.   Psychiatric/Behavioral:  Negative for altered mental status.    Allergic/Immunologic: Negative for persistent infections.              Objective:     Constitutional:       Appearance: Underweight. Frail. Chronically ill-appearing.   Eyes:      Pupils: Pupils are equal, round, and reactive to light.   HENT:      Head: Normocephalic and atraumatic.      Nose: Nose normal.    Mouth/Throat:      Dentition: Normal.   Pulmonary:      Effort: Pulmonary effort is normal.      Breath sounds: Normal breath  "sounds.   Chest:      Chest wall: Not tender to palpatation.   Cardiovascular:      PMI at left midclavicular line. Normal rate. Regular rhythm.      Murmurs: There is no murmur.   Pulses:     Intact distal pulses.   Edema:     Peripheral edema absent.   Abdominal:      General: Bowel sounds are normal.      Palpations: Abdomen is soft.   Musculoskeletal: Normal range of motion.      Cervical back: Normal range of motion. Skin:     General: Skin is warm and dry.   Neurological:      Mental Status: Alert, oriented to person, place, and time and oriented to person, place and time.   Psychiatric:         Attention and Perception: Attention normal.         Mood and Affect: Mood normal.           Procedures  BP 90/55 (BP Location: Right arm, Patient Position: Sitting, Cuff Size: Pediatric)   Pulse 75   Resp 18   Ht 162.6 cm (64\")   Wt 49.9 kg (110 lb)   SpO2 97%   BMI 18.88 kg/m²   Lab Review:   I have reviewed   Lab Results   Component Value Date    GLUCOSE 99 12/20/2023    CALCIUM 8.7 12/20/2023     12/20/2023    K 3.5 12/20/2023    CO2 24.0 12/20/2023     12/20/2023    BUN 30 (H) 12/20/2023    CREATININE 0.86 12/20/2023    EGFR 62.3 12/20/2023    BCR 34.9 (H) 12/20/2023    ANIONGAP 8.0 12/20/2023          Lab Results   Component Value Date    CHOL 207 (H) 05/01/2022    CHLPL 150 03/29/2016    TRIG 92 05/01/2022    HDL 66 (H) 05/01/2022     (H) 05/01/2022      Results for orders placed during the hospital encounter of 06/26/22    Adult Transthoracic Echo Complete W/ Cont if Necessary Per Protocol    Interpretation Summary  · Left ventricular ejection fraction appears to be 31 - 35%. Left ventricular systolic function is moderately decreased.  · The following left ventricular wall segments are hypokinetic: mid anterior, apical anterior, mid anterolateral, apical lateral, mid inferolateral, apical septal, basal inferoseptal, mid inferoseptal, mid anteroseptal, basal inferior and basal " inferoseptal. The following left ventricular wall segments are akinetic: apical inferior and mid inferior.  · Normal size and function of the right ventricle.  · No significant valvular pathology.  · Compared to last exam from 5/2/22, LV function appears slightly worse but mitral regurgitation is not as severe.     Assessment:          Diagnosis Plan   1. Chronic systolic (congestive) heart failure        2. Orthostatic hypotension        3. Fall, subsequent encounter        4. Body mass index (BMI) of 19.9 or less in adult               Plan:       Chronic Systolic Congestive Heart Failure- appears euvolemic today. Suspect patient was hypovolemic at time of fall/hypotension. Treated with IV fluids and stopping Entresto. Feeling much better. No dizziness or lightheadedness. BP soft but she reports stable at nursing home. Will not change medicines at this time given hypotension. Family is requesting PRN Lasix at home as it has been misplaced. On Jardiance and Coreg. Could consider switching Coreg to Toprol. Low salt diet. Daily weights.   Orthostatic Hypotension - improved. Continue current regimen.  Fall- with right arm fracture. Denies dizziness or lightheadedness before fall. However BP was low. 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 Low salt diet.     Keep follow up in March with Dr. Urena.

## 2024-01-18 RX ORDER — EMPAGLIFLOZIN 10 MG/1
10 TABLET, FILM COATED ORAL DAILY
OUTPATIENT
Start: 2024-01-18

## 2024-02-26 ENCOUNTER — TELEPHONE (OUTPATIENT)
Dept: VASCULAR SURGERY | Facility: CLINIC | Age: 89
End: 2024-02-26
Payer: MEDICARE

## 2024-02-26 DIAGNOSIS — I73.9 PAD (PERIPHERAL ARTERY DISEASE): Primary | ICD-10-CM

## 2024-02-26 DIAGNOSIS — I65.23 BILATERAL CAROTID ARTERY STENOSIS: ICD-10-CM

## 2024-02-28 DIAGNOSIS — R41.3 MEMORY IMPAIRMENT: ICD-10-CM

## 2024-02-28 RX ORDER — DONEPEZIL HYDROCHLORIDE 5 MG/1
5 TABLET, FILM COATED ORAL NIGHTLY
Qty: 90 TABLET | Refills: 0 | Status: SHIPPED | OUTPATIENT
Start: 2024-02-28

## 2024-03-05 ENCOUNTER — NURSE TRIAGE (OUTPATIENT)
Dept: CALL CENTER | Facility: HOSPITAL | Age: 89
End: 2024-03-05
Payer: MEDICARE

## 2024-03-05 ENCOUNTER — OFFICE VISIT (OUTPATIENT)
Dept: CARDIOLOGY | Facility: CLINIC | Age: 89
End: 2024-03-05
Payer: MEDICARE

## 2024-03-05 VITALS
BODY MASS INDEX: 18.78 KG/M2 | HEART RATE: 82 BPM | OXYGEN SATURATION: 98 % | WEIGHT: 110 LBS | HEIGHT: 64 IN | DIASTOLIC BLOOD PRESSURE: 48 MMHG | SYSTOLIC BLOOD PRESSURE: 112 MMHG

## 2024-03-05 DIAGNOSIS — I73.9 PAD (PERIPHERAL ARTERY DISEASE): ICD-10-CM

## 2024-03-05 DIAGNOSIS — I50.22 CHRONIC SYSTOLIC (CONGESTIVE) HEART FAILURE: Primary | ICD-10-CM

## 2024-03-05 DIAGNOSIS — N18.31 STAGE 3A CHRONIC KIDNEY DISEASE (CKD): ICD-10-CM

## 2024-03-05 DIAGNOSIS — I95.1 ORTHOSTATIC HYPOTENSION: ICD-10-CM

## 2024-03-05 DIAGNOSIS — I50.42 CHRONIC COMBINED SYSTOLIC AND DIASTOLIC CONGESTIVE HEART FAILURE: ICD-10-CM

## 2024-03-05 DIAGNOSIS — E78.2 MIXED HYPERLIPIDEMIA: ICD-10-CM

## 2024-03-05 DIAGNOSIS — I10 PRIMARY HYPERTENSION: ICD-10-CM

## 2024-03-05 RX ORDER — FUROSEMIDE 20 MG/1
20 TABLET ORAL AS NEEDED
Qty: 90 TABLET | Refills: 3 | Status: SHIPPED | OUTPATIENT
Start: 2024-03-05

## 2024-03-05 NOTE — TELEPHONE ENCOUNTER
"Home Health RN calling for Dr Pereira regarding Home Health admission.    Dr Pereira cell phone called and message left on voicemail to call my number  or [facility] RN at       Return call to Andrea  RN aware message has been left on Dr Pereira cell phone              Reason for Disposition   [1] Caller requests to speak ONLY to PCP AND [2] URGENT question    Additional Information   Negative: Lab calling with strep throat test results and triager can call in prescription   Negative: Lab calling with urinalysis test results and triager can call in prescription   Negative: Medication questions   Negative: Medication renewal and refill questions   Negative: Pre-operative or pre-procedural questions   Negative: ED call to PCP (i.e., primary care provider; doctor, NP, or PA)   Negative: Doctor (or NP/PA) call to PCP   Negative: Call about patient who is currently hospitalized   Negative: Lab or radiology calling with CRITICAL test results   Negative: [1] Follow-up call from patient regarding patient's clinical status AND [2] information urgent    Answer Assessment - Initial Assessment Questions  1. REASON FOR CALL or QUESTION: \"What is your reason for calling today?\" or \"How can I best  help you?\" or \"What question do you have that I can help answer?\"  Home Health RN requested call to on Call Dr Randall CRENSHAW  2. CALLER: Document the source of call. (e.g., laboratory, patient).  [facility] RN    Protocols used: PCP Call - No Triage-ADULT-    "

## 2024-03-05 NOTE — PROGRESS NOTES
Subjective:     Encounter Date:03/05/2024      Patient ID: Mya Chung is a 89 y.o. female.    Chief Complaint:  Congestive Heart Failure  Associated symptoms include shortness of breath. Pertinent negatives include no abdominal pain, chest pain, claudication or palpitations.     The patient is a 89-year-old female who presents for a follow-up. She is accompanied by an adult female.    She was in the hospital in 12/2023 due to or orthostasis with an episode of syncope. She was in a rehabilitation center, and was released Friday, 03/01/2024. She was taken off of Entresto because of syncopal episode in the bathroom of the emergency department. When her arm was initially injured, her doctor suspected it was due to a syncopal episode, having to do with her blood pressure. She did not have to have surgery due to the fracture of her arm, but her arm is still in a brace. The patient denies syncopal episodes since being discharged from the hospital. They did not repeat an ultrasound while she was in the hospital.     Her breathing is normal, but she has occasional shortness of breath with ambulation. She denies any chest pain and walked up and down the driveway twice on 03/04/2024 without issue.    She is taking aspirin 81 mg every day, Coreg 3.125 mg twice a day, Plavix, Jardiance, and Lasix as needed.      Review of Systems   Constitutional: Negative for diaphoresis, fever and malaise/fatigue.   HENT:  Negative for congestion.    Eyes:  Negative for vision loss in left eye and vision loss in right eye.   Cardiovascular:  Negative for chest pain, claudication, dyspnea on exertion, irregular heartbeat, leg swelling, orthopnea, palpitations and syncope.   Respiratory:  Positive for shortness of breath. Negative for cough and wheezing.    Hematologic/Lymphatic: Negative for adenopathy.   Skin:  Negative for rash.   Musculoskeletal:  Negative for joint pain and joint swelling.   Gastrointestinal:  Negative for  abdominal pain, diarrhea, nausea and vomiting.   Neurological:  Negative for excessive daytime sleepiness, dizziness, focal weakness, light-headedness, numbness and weakness.   Psychiatric/Behavioral:  Negative for depression. The patient does not have insomnia.            Current Outpatient Medications:     acetaminophen (TYLENOL) 325 MG tablet, Take 2 tablets by mouth Every 4 (Four) Hours As Needed for Mild Pain., Disp: , Rfl:     alendronate (FOSAMAX) 70 MG tablet, Take 1 tablet by mouth Every 7 (Seven) Days. Thursday, Disp: , Rfl:     aspirin 81 MG EC tablet, Take 1 tablet by mouth Daily., Disp: , Rfl:     Calcium Carbonate-Vitamin D (CALCIUM 600/VITAMIN D PO), Take 1 tablet by mouth Daily., Disp: , Rfl:     carvedilol (COREG) 3.125 MG tablet, Take 1 tablet by mouth 2 (Two) Times a Day., Disp: 180 tablet, Rfl: 3    clopidogrel (PLAVIX) 75 MG tablet, Take 1 tablet by mouth Daily., Disp: 90 tablet, Rfl: 3    colestipol (COLESTID) 1 g tablet, Take 1 tablet by mouth Every Other Day., Disp: , Rfl:     donepezil (Aricept) 5 MG tablet, Take 1 tablet by mouth Every Night., Disp: 90 tablet, Rfl: 0    empagliflozin (Jardiance) 10 MG tablet tablet, Take 1 tablet by mouth Daily., Disp: 90 tablet, Rfl: 3    escitalopram (LEXAPRO) 10 MG tablet, Take 1 tablet by mouth Daily., Disp: , Rfl:     furosemide (LASIX) 20 MG tablet, Take 1 tablet by mouth As Needed (for heart failure, leg swelling, weight gain)., Disp: 90 tablet, Rfl: 3    Glucosamine-Chondroitin-Vit D3 6067-3688-192 MG-MG-UNIT pack, Take 1 tablet by mouth 2 (Two) Times a Day., Disp: , Rfl:     Multiple Vitamins-Minerals (COMPLETE MULTIVITAMIN/MINERAL PO), Take 1 tablet by mouth Daily., Disp: , Rfl:     oxyCODONE (ROXICODONE) 5 MG immediate release tablet, Take 1 tablet by mouth Every 12 (Twelve) Hours As Needed for Moderate Pain., Disp: 2 tablet, Rfl: 0    Synthroid 150 MCG tablet, Take 1 tablet by mouth Daily. Sunday-Friday, Disp: , Rfl:     gabapentin (NEURONTIN)  100 MG capsule, Take 1 capsule by mouth Every Night for 3 days. Last filled #180 for 90 DS on 10/18/23, Disp: 3 capsule, Rfl: 0       Objective:      Vitals:    03/05/24 1015   BP: 112/48   Pulse: 82   SpO2: 98%     Vitals and nursing note reviewed.   Constitutional:       Appearance: Normal and healthy appearance. Well-developed and not in distress.   Eyes:      Extraocular Movements: Extraocular movements intact.      Pupils: Pupils are equal, round, and reactive to light.   HENT:      Head: Normocephalic and atraumatic.    Mouth/Throat:      Pharynx: Oropharynx is clear.   Neck:      Vascular: JVD normal.      Trachea: Trachea normal.   Pulmonary:      Effort: Pulmonary effort is normal.      Breath sounds: Normal breath sounds. No wheezing. No rhonchi. No rales.      Comments: Clear to auscultation  Cardiovascular:      PMI at left midclavicular line. Normal rate. Regular rhythm. Normal S1. Normal S2.       Murmurs: There is a grade 2/6 systolic murmur.      No gallop.  No click. No rub.   Pulses:     Dorsalis pedis: 2+ bilaterally.     Posterior tibial: 2+ bilaterally.  Abdominal:      General: Bowel sounds are normal.      Palpations: Abdomen is soft.      Tenderness: There is no abdominal tenderness.   Musculoskeletal: Normal range of motion.      Cervical back: Normal range of motion and neck supple. Skin:     General: Skin is warm and dry.      Capillary Refill: Capillary refill takes less than 2 seconds.   Feet:      Right foot:      Skin integrity: Skin integrity normal.      Left foot:      Skin integrity: Skin integrity normal.   Neurological:      Mental Status: Alert and oriented to person, place and time.      Sensory: Sensation is intact.      Motor: Motor function is intact.      Coordination: Coordination is intact.   Psychiatric:         Speech: Speech normal.         Behavior: Behavior is cooperative.       Lab Review:       Procedures    Imaging  Echocardiogram from 2023 was reviewed with the  patient. There was 30 percent - 35 percent mitral valve regurgitation.      Assessment/Plan:     Problem List Items Addressed This Visit       Hypertension    Relevant Medications    furosemide (LASIX) 20 MG tablet    Hyperlipidemia    PAD (peripheral artery disease)    Overview     Added automatically from request for surgery [id number]         Chronic systolic (congestive) heart failure - Primary    Relevant Orders    Adult Transthoracic Echo Complete W/ Cont if Necessary Per Protocol    Stage 3a chronic kidney disease (CKD)    Relevant Medications    furosemide (LASIX) 20 MG tablet    Orthostatic hypotension     1. Congestive heart failure.  Her blood pressure and heart rate are perfect. Another echocardiogram of the heart was ordered today. I will send in a prescription for Lasix. She will continue Jardiance, Plavix, aspirin, and carvedilol.    Follow-up  The patient will follow up in 6 months.    Recommendations/plans:    Transcribed from ambient dictation for Cesar Urena DO by Daniela Dodge.  03/05/24   14:56 CST    Patient or patient representative verbalized consent to the visit recording.  I have personally performed the services described in this document as transcribed by the above individual, and it is both accurate and complete.  Cesar Urena DO  3/12/2024  12:36 CDT

## 2024-03-12 PROBLEM — I50.22 CHRONIC SYSTOLIC (CONGESTIVE) HEART FAILURE: Status: ACTIVE | Noted: 2022-07-24

## 2024-03-25 ENCOUNTER — HOSPITAL ENCOUNTER (OUTPATIENT)
Dept: CARDIOLOGY | Facility: HOSPITAL | Age: 89
Discharge: HOME | End: 2024-03-25
Admitting: EMERGENCY MEDICINE
Payer: MEDICARE

## 2024-03-25 VITALS
WEIGHT: 110 LBS | DIASTOLIC BLOOD PRESSURE: 48 MMHG | BODY MASS INDEX: 18.78 KG/M2 | HEIGHT: 64 IN | SYSTOLIC BLOOD PRESSURE: 112 MMHG

## 2024-03-25 DIAGNOSIS — I50.22 CHRONIC SYSTOLIC (CONGESTIVE) HEART FAILURE: ICD-10-CM

## 2024-03-25 PROCEDURE — 93306 TTE W/DOPPLER COMPLETE: CPT

## 2024-03-25 PROCEDURE — 93356 MYOCRD STRAIN IMG SPCKL TRCK: CPT | Performed by: EMERGENCY MEDICINE

## 2024-03-25 PROCEDURE — 93356 MYOCRD STRAIN IMG SPCKL TRCK: CPT

## 2024-03-25 PROCEDURE — 93306 TTE W/DOPPLER COMPLETE: CPT | Performed by: EMERGENCY MEDICINE

## 2024-03-26 ENCOUNTER — HOSPITAL ENCOUNTER (OUTPATIENT)
Dept: GENERAL RADIOLOGY | Facility: HOSPITAL | Age: 89
Discharge: HOME | End: 2024-03-26
Admitting: PHYSICIAN ASSISTANT
Payer: MEDICARE

## 2024-03-26 ENCOUNTER — TRANSCRIBE ORDERS (OUTPATIENT)
Dept: ADMINISTRATIVE | Facility: HOSPITAL | Age: 89
End: 2024-03-26
Payer: MEDICARE

## 2024-03-26 DIAGNOSIS — R41.82 ALTERED MENTAL STATUS, UNSPECIFIED ALTERED MENTAL STATUS TYPE: Primary | ICD-10-CM

## 2024-03-26 DIAGNOSIS — R41.82 ALTERED MENTAL STATUS, UNSPECIFIED ALTERED MENTAL STATUS TYPE: ICD-10-CM

## 2024-03-26 LAB
BH CV ECHO LEFT VENTRICLE GLOBAL LONGITUDINAL STRAIN: -8.3 %
BH CV ECHO MEAS - AO MAX PG: 6.2 MMHG
BH CV ECHO MEAS - AO MEAN PG: 3 MMHG
BH CV ECHO MEAS - AO ROOT DIAM: 2.7 CM
BH CV ECHO MEAS - AO V2 MAX: 124 CM/SEC
BH CV ECHO MEAS - AO V2 VTI: 23.7 CM
BH CV ECHO MEAS - AVA(I,D): 1.67 CM2
BH CV ECHO MEAS - EDV(CUBED): 134.2 ML
BH CV ECHO MEAS - EDV(MOD-SP4): 96.8 ML
BH CV ECHO MEAS - EF(MOD-SP4): 30.5 %
BH CV ECHO MEAS - ESV(CUBED): 65.5 ML
BH CV ECHO MEAS - ESV(MOD-SP4): 67.3 ML
BH CV ECHO MEAS - FS: 21.3 %
BH CV ECHO MEAS - IVS/LVPW: 1.14 CM
BH CV ECHO MEAS - IVSD: 1.23 CM
BH CV ECHO MEAS - LA DIMENSION: 4 CM
BH CV ECHO MEAS - LAT PEAK E' VEL: 9 CM/SEC
BH CV ECHO MEAS - LV DIASTOLIC VOL/BSA (35-75): 63.8 CM2
BH CV ECHO MEAS - LV MASS(C)D: 230.2 GRAMS
BH CV ECHO MEAS - LV MAX PG: 2.25 MMHG
BH CV ECHO MEAS - LV MEAN PG: 1 MMHG
BH CV ECHO MEAS - LV SYSTOLIC VOL/BSA (12-30): 44.4 CM2
BH CV ECHO MEAS - LV V1 MAX: 74.9 CM/SEC
BH CV ECHO MEAS - LV V1 VTI: 14 CM
BH CV ECHO MEAS - LVIDD: 5.1 CM
BH CV ECHO MEAS - LVIDS: 4 CM
BH CV ECHO MEAS - LVOT AREA: 2.8 CM2
BH CV ECHO MEAS - LVOT DIAM: 1.9 CM
BH CV ECHO MEAS - LVPWD: 1.08 CM
BH CV ECHO MEAS - MED PEAK E' VEL: 4.5 CM/SEC
BH CV ECHO MEAS - MR MAX PG: 81 MMHG
BH CV ECHO MEAS - MR MAX VEL: 450 CM/SEC
BH CV ECHO MEAS - MR MEAN PG: 54 MMHG
BH CV ECHO MEAS - MR MEAN VEL: 347 CM/SEC
BH CV ECHO MEAS - MR VTI: 152 CM
BH CV ECHO MEAS - MV A MAX VEL: 93.8 CM/SEC
BH CV ECHO MEAS - MV DEC TIME: 0.16 SEC
BH CV ECHO MEAS - MV E MAX VEL: 122 CM/SEC
BH CV ECHO MEAS - MV E/A: 1.3
BH CV ECHO MEAS - PA V2 MAX: 104.5 CM/SEC
BH CV ECHO MEAS - PI END-D VEL: 102 CM/SEC
BH CV ECHO MEAS - RAP SYSTOLE: 8 MMHG
BH CV ECHO MEAS - RVSP: 49 MMHG
BH CV ECHO MEAS - SI(MOD-SP4): 19.4 ML/M2
BH CV ECHO MEAS - SV(LVOT): 39.7 ML
BH CV ECHO MEAS - SV(MOD-SP4): 29.5 ML
BH CV ECHO MEAS - TAPSE (>1.6): 1.61 CM
BH CV ECHO MEAS - TR MAX PG: 41 MMHG
BH CV ECHO MEAS - TR MAX VEL: 320 CM/SEC
BH CV ECHO MEASUREMENTS AVERAGE E/E' RATIO: 18.07
BH CV XLRA - TDI S': 9.8 CM/SEC

## 2024-03-26 PROCEDURE — 71046 X-RAY EXAM CHEST 2 VIEWS: CPT

## 2024-03-28 ENCOUNTER — HOSPITAL ENCOUNTER (EMERGENCY)
Facility: HOSPITAL | Age: 89
Discharge: HOME | End: 2024-03-28
Attending: EMERGENCY MEDICINE
Payer: MEDICARE

## 2024-03-28 ENCOUNTER — APPOINTMENT (OUTPATIENT)
Dept: GENERAL RADIOLOGY | Facility: HOSPITAL | Age: 89
End: 2024-03-28
Payer: MEDICARE

## 2024-03-28 DIAGNOSIS — J18.9 PNEUMONIA OF LEFT LOWER LOBE DUE TO INFECTIOUS ORGANISM: Primary | ICD-10-CM

## 2024-03-28 DIAGNOSIS — R79.89 ELEVATED LIVER FUNCTION TESTS: ICD-10-CM

## 2024-03-28 DIAGNOSIS — D64.9 ANEMIA, UNSPECIFIED TYPE: ICD-10-CM

## 2024-03-28 LAB
ALBUMIN SERPL-MCNC: 4 G/DL (ref 3.5–5.2)
ALBUMIN/GLOB SERPL: 1.3 G/DL
ALP SERPL-CCNC: 128 U/L (ref 39–117)
ALT SERPL W P-5'-P-CCNC: 130 U/L (ref 1–33)
ANION GAP SERPL CALCULATED.3IONS-SCNC: 15 MMOL/L (ref 5–15)
AST SERPL-CCNC: 144 U/L (ref 1–32)
B PARAPERT DNA SPEC QL NAA+PROBE: NOT DETECTED
B PERT DNA SPEC QL NAA+PROBE: NOT DETECTED
BACTERIA UR QL AUTO: NORMAL /HPF
BASOPHILS # BLD AUTO: 0.04 10*3/MM3 (ref 0–0.2)
BASOPHILS NFR BLD AUTO: 0.5 % (ref 0–1.5)
BILIRUB SERPL-MCNC: 1.1 MG/DL (ref 0–1.2)
BILIRUB UR QL STRIP: NEGATIVE
BUN SERPL-MCNC: 27 MG/DL (ref 8–23)
BUN/CREAT SERPL: 20.9 (ref 7–25)
C PNEUM DNA NPH QL NAA+NON-PROBE: NOT DETECTED
CALCIUM SPEC-SCNC: 9.3 MG/DL (ref 8.2–9.6)
CHLORIDE SERPL-SCNC: 101 MMOL/L (ref 98–107)
CLARITY UR: CLEAR
CO2 SERPL-SCNC: 22 MMOL/L (ref 22–29)
COLOR UR: YELLOW
CREAT SERPL-MCNC: 1.29 MG/DL (ref 0.57–1)
DEPRECATED RDW RBC AUTO: 57.4 FL (ref 37–54)
DEVELOPER EXPIRATION DATE: NORMAL
DEVELOPER LOT NUMBER: 257
EGFRCR SERPLBLD CKD-EPI 2021: 38.1 ML/MIN/1.73
EOSINOPHIL # BLD AUTO: 0.06 10*3/MM3 (ref 0–0.4)
EOSINOPHIL NFR BLD AUTO: 0.8 % (ref 0.3–6.2)
ERYTHROCYTE [DISTWIDTH] IN BLOOD BY AUTOMATED COUNT: 18.5 % (ref 12.3–15.4)
EXPIRATION DATE: NORMAL
FECAL OCCULT BLOOD SCREEN, POC: NEGATIVE
FLUAV SUBTYP SPEC NAA+PROBE: NOT DETECTED
FLUBV RNA ISLT QL NAA+PROBE: NOT DETECTED
GLOBULIN UR ELPH-MCNC: 3.1 GM/DL
GLUCOSE SERPL-MCNC: 96 MG/DL (ref 65–99)
GLUCOSE UR STRIP-MCNC: ABNORMAL MG/DL
HADV DNA SPEC NAA+PROBE: NOT DETECTED
HCOV 229E RNA SPEC QL NAA+PROBE: NOT DETECTED
HCOV HKU1 RNA SPEC QL NAA+PROBE: NOT DETECTED
HCOV NL63 RNA SPEC QL NAA+PROBE: NOT DETECTED
HCOV OC43 RNA SPEC QL NAA+PROBE: NOT DETECTED
HCT VFR BLD AUTO: 26.7 % (ref 34–46.6)
HGB BLD-MCNC: 7.8 G/DL (ref 12–15.9)
HGB UR QL STRIP.AUTO: NEGATIVE
HMPV RNA NPH QL NAA+NON-PROBE: NOT DETECTED
HPIV1 RNA ISLT QL NAA+PROBE: NOT DETECTED
HPIV2 RNA SPEC QL NAA+PROBE: NOT DETECTED
HPIV3 RNA NPH QL NAA+PROBE: NOT DETECTED
HPIV4 P GENE NPH QL NAA+PROBE: NOT DETECTED
HYALINE CASTS UR QL AUTO: NORMAL /LPF
INR PPP: 1.25 (ref 0.91–1.09)
KETONES UR QL STRIP: ABNORMAL
LEUKOCYTE ESTERASE UR QL STRIP.AUTO: NEGATIVE
LYMPHOCYTES # BLD AUTO: 2.13 10*3/MM3 (ref 0.7–3.1)
LYMPHOCYTES NFR BLD AUTO: 27.7 % (ref 19.6–45.3)
Lab: 257
M PNEUMO IGG SER IA-ACNC: NOT DETECTED
MAGNESIUM SERPL-MCNC: 2.2 MG/DL (ref 1.7–2.3)
MCH RBC QN AUTO: 25.1 PG (ref 26.6–33)
MCHC RBC AUTO-ENTMCNC: 29.2 G/DL (ref 31.5–35.7)
MCV RBC AUTO: 85.9 FL (ref 79–97)
MONOCYTES # BLD AUTO: 0.75 10*3/MM3 (ref 0.1–0.9)
MONOCYTES NFR BLD AUTO: 9.8 % (ref 5–12)
NEGATIVE CONTROL: NEGATIVE
NEUTROPHILS NFR BLD AUTO: 4.65 10*3/MM3 (ref 1.7–7)
NEUTROPHILS NFR BLD AUTO: 60.5 % (ref 42.7–76)
NITRITE UR QL STRIP: NEGATIVE
PH UR STRIP.AUTO: 5.5 [PH] (ref 5–8)
PLATELET # BLD AUTO: 316 10*3/MM3 (ref 140–450)
PMV BLD AUTO: 9.8 FL (ref 6–12)
POSITIVE CONTROL: POSITIVE
POTASSIUM SERPL-SCNC: 4.6 MMOL/L (ref 3.5–5.2)
PROT SERPL-MCNC: 7.1 G/DL (ref 6–8.5)
PROT UR QL STRIP: ABNORMAL
PROTHROMBIN TIME: 16.2 SECONDS (ref 11.8–14.8)
RBC # BLD AUTO: 3.11 10*6/MM3 (ref 3.77–5.28)
RBC # UR STRIP: NORMAL /HPF
REF LAB TEST METHOD: NORMAL
RHINOVIRUS RNA SPEC NAA+PROBE: NOT DETECTED
RSV RNA NPH QL NAA+NON-PROBE: NOT DETECTED
SARS-COV-2 RNA NPH QL NAA+NON-PROBE: NOT DETECTED
SODIUM SERPL-SCNC: 138 MMOL/L (ref 136–145)
SP GR UR STRIP: 1.02 (ref 1–1.03)
SQUAMOUS #/AREA URNS HPF: NORMAL /HPF
UROBILINOGEN UR QL STRIP: ABNORMAL
WBC # UR STRIP: NORMAL /HPF
WBC NRBC COR # BLD AUTO: 7.68 10*3/MM3 (ref 3.4–10.8)

## 2024-03-28 PROCEDURE — 25010000002 CEFTRIAXONE PER 250 MG: Performed by: EMERGENCY MEDICINE

## 2024-03-28 PROCEDURE — P9612 CATHETERIZE FOR URINE SPEC: HCPCS

## 2024-03-28 PROCEDURE — 82270 OCCULT BLOOD FECES: CPT | Performed by: EMERGENCY MEDICINE

## 2024-03-28 PROCEDURE — 0202U NFCT DS 22 TRGT SARS-COV-2: CPT | Performed by: EMERGENCY MEDICINE

## 2024-03-28 PROCEDURE — 71045 X-RAY EXAM CHEST 1 VIEW: CPT

## 2024-03-28 PROCEDURE — 85610 PROTHROMBIN TIME: CPT | Performed by: EMERGENCY MEDICINE

## 2024-03-28 PROCEDURE — 80053 COMPREHEN METABOLIC PANEL: CPT | Performed by: EMERGENCY MEDICINE

## 2024-03-28 PROCEDURE — 81001 URINALYSIS AUTO W/SCOPE: CPT | Performed by: EMERGENCY MEDICINE

## 2024-03-28 PROCEDURE — 99283 EMERGENCY DEPT VISIT LOW MDM: CPT

## 2024-03-28 PROCEDURE — 83735 ASSAY OF MAGNESIUM: CPT | Performed by: EMERGENCY MEDICINE

## 2024-03-28 PROCEDURE — 96365 THER/PROPH/DIAG IV INF INIT: CPT

## 2024-03-28 PROCEDURE — 85025 COMPLETE CBC W/AUTO DIFF WBC: CPT | Performed by: EMERGENCY MEDICINE

## 2024-03-28 RX ORDER — DOXYCYCLINE 100 MG/1
100 CAPSULE ORAL 2 TIMES DAILY
Qty: 14 CAPSULE | Refills: 0 | Status: SHIPPED | OUTPATIENT
Start: 2024-03-28 | End: 2024-03-28

## 2024-03-28 RX ORDER — DOXYCYCLINE 100 MG/1
100 CAPSULE ORAL 2 TIMES DAILY
Qty: 14 CAPSULE | Refills: 0 | Status: SHIPPED | OUTPATIENT
Start: 2024-03-28 | End: 2024-04-05 | Stop reason: HOSPADM

## 2024-03-28 RX ORDER — ONDANSETRON 4 MG/1
4 TABLET, ORALLY DISINTEGRATING ORAL EVERY 8 HOURS PRN
Qty: 15 TABLET | Refills: 0 | Status: SHIPPED | OUTPATIENT
Start: 2024-03-28 | End: 2024-03-28

## 2024-03-28 RX ORDER — ONDANSETRON 4 MG/1
4 TABLET, ORALLY DISINTEGRATING ORAL EVERY 8 HOURS PRN
Qty: 15 TABLET | Refills: 0 | Status: SHIPPED | OUTPATIENT
Start: 2024-03-28

## 2024-03-28 RX ORDER — SODIUM CHLORIDE 0.9 % (FLUSH) 0.9 %
10 SYRINGE (ML) INJECTION AS NEEDED
Status: DISCONTINUED | OUTPATIENT
Start: 2024-03-28 | End: 2024-03-29 | Stop reason: HOSPADM

## 2024-03-28 RX ADMIN — SODIUM CHLORIDE 1000 MG: 900 INJECTION INTRAVENOUS at 23:18

## 2024-03-29 VITALS
TEMPERATURE: 97.4 F | DIASTOLIC BLOOD PRESSURE: 63 MMHG | WEIGHT: 107 LBS | BODY MASS INDEX: 18.27 KG/M2 | OXYGEN SATURATION: 94 % | RESPIRATION RATE: 20 BRPM | HEART RATE: 82 BPM | SYSTOLIC BLOOD PRESSURE: 124 MMHG | HEIGHT: 64 IN

## 2024-03-29 NOTE — ED PROVIDER NOTES
"Subjective   History of Present Illness  89-year-old female presents to the ED with complaint of generalized weakness fatigue, decreased appetite, failure to thrive.  She has a history of hypertension hyperlipidemia, coronary disease.  She presents with family, they state for the past 4 months has had a progressive decline in her overall health and wellbeing.  This all began after a episode of syncope in which she fell and broke her proximal humerus.  She spent some time in inpatient rehab.  Has not been thriving since leaving rehab per family.  She currently weighs 107 pounds, down from 115 pounds.  They states she has poor appetite, very minimal oral intake and family states they have to really encourage her to eat or drink anything.  Went to see her primary doctor couple days ago, they check some routine labs and contacted today stating the patient was \"very anemic\".  Due to the patient's generalized weakness and abnormal labs, they brought her to the ED for further evaluation.  She was not instructed to come to the ED by her primary care doctor.  No reports of fever or chills.  She has had some cough and congestion for the past couple days.    History provided by:  Patient      Review of Systems   All other systems reviewed and are negative.      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     Wears glasses        Allergies   Allergen Reactions    Erythromycin Shortness Of Breath    Zithromax [Azithromycin] Itching and Other (See Comments)     Yeast infection    Penicillins Rash       Past Surgical History:   Procedure Laterality Date    AORTAGRAM Right 08/06/2018    " Procedure: LEFT LOWER EXTREMITY ANGIOGRAM;  Surgeon: Walker Davis DO;  Location: [facility] HYBRID OR 12;  Service: Vascular    BREAST BIOPSY Bilateral     multiple on both, all benign    BREAST BIOPSY Left 2017    Procedure: LEFT MAMMOGRAM GUIDED NEEDLE DIRECTED EXCISIONAL BREAST BIOPSY;  Surgeon: Malgorzata Scott MD;  Location: [facility] OR;  Service:     BUNIONECTOMY Bilateral      SECTION      X2    CHOLECYSTECTOMY      CHOLECYSTECTOMY WITH INTRAOPERATIVE CHOLANGIOGRAM N/A 2021    Procedure: LAPAROSCOPIC CHOLECYSTECTOMY WITH CHOLANGIOGRAM;  Surgeon: Malgorzata Scott MD;  Location: [facility] OR;  Service: General;  Laterality: N/A;    COLONOSCOPY  2015    diverticulosis    ENDOSCOPY N/A 2021    Procedure: ESOPHAGOGASTRODUODENOSCOPY WITH ANESTHESIA;  Surgeon: Sonu Cheek DO;  Location: [facility] ENDOSCOPY;  Service: Gastroenterology;  Laterality: N/A;  pre nausea  post esophagitis  Delfina Pereira DO    EYE SURGERY      CATARACT SURGERY    FEMORAL ENDARTERECTOMY Left 2018    Procedure: LEFT FEMORAL ENDARTERECTOMY;  Surgeon: Walker Davis DO;  Location: [facility] HYBRID OR 12;  Service: Vascular    JOINT REPLACEMENT      RIGHT HIP    VASCULAR SURGERY         Family History   Problem Relation Age of Onset    No Known Problems Mother     Heart disease Father     Cancer Sister         Uterus    No Known Problems Brother     No Known Problems Maternal Aunt     No Known Problems Paternal Aunt     No Known Problems Maternal Grandmother     No Known Problems Paternal Grandmother     No Known Problems Daughter     No Known Problems Son     Breast cancer Neg Hx     BRCA 1/2 Neg Hx     Colon cancer Neg Hx     Endometrial cancer Neg Hx     Ovarian cancer Neg Hx     Colon polyps Neg Hx     Esophageal cancer Neg Hx        Social History     Socioeconomic History    Marital status:    Tobacco Use    Smoking status: Former     Current packs/day: 0.00     Types: Cigarettes      Start date: 1953     Quit date: 1955     Years since quittin.2     Passive exposure: Never    Smokeless tobacco: Never   Vaping Use    Vaping status: Never Used   Substance and Sexual Activity    Alcohol use: No    Drug use: No    Sexual activity: Defer           Objective   Physical Exam  Vitals and nursing note reviewed.   Constitutional:       Appearance: Normal appearance.      Comments: Frail-appearing elderly female, does not appear to be in distress   HENT:      Head: Normocephalic and atraumatic.      Nose: Nose normal. No congestion or rhinorrhea.      Mouth/Throat:      Pharynx: No oropharyngeal exudate or posterior oropharyngeal erythema.   Eyes:      Extraocular Movements: Extraocular movements intact.      Conjunctiva/sclera: Conjunctivae normal.      Pupils: Pupils are equal, round, and reactive to light.   Cardiovascular:      Rate and Rhythm: Normal rate and regular rhythm.      Heart sounds: Normal heart sounds. No murmur heard.  Pulmonary:      Effort: Pulmonary effort is normal.      Breath sounds: Rhonchi present. No wheezing or rales.   Abdominal:      General: Abdomen is flat. Bowel sounds are normal.      Palpations: Abdomen is soft.   Musculoskeletal:      Right lower leg: No edema.      Left lower leg: No edema.   Skin:     General: Skin is warm and dry.      Capillary Refill: Capillary refill takes less than 2 seconds.   Neurological:      Mental Status: She is alert.         Procedures         Lab Results (last 24 hours)       Procedure Component Value Units Date/Time    Respiratory Panel PCR w/COVID-19(SARS-CoV-2) CATHY/SHAWN/ONESIMO/PAD/COR/ROSSY In-House, NP Swab in UTM/VTM, 2 HR TAT - Swab, Nasopharynx [[id number]]  (Normal) Collected: 24    Specimen: Swab from Nasopharynx Updated: 24     ADENOVIRUS, PCR Not Detected     Coronavirus 229E Not Detected     Coronavirus HKU1 Not Detected     Coronavirus NL63 Not Detected     Coronavirus OC43 Not Detected      COVID19 Not Detected     Human Metapneumovirus Not Detected     Human Rhinovirus/Enterovirus Not Detected     Influenza A PCR Not Detected     Influenza B PCR Not Detected     Parainfluenza Virus 1 Not Detected     Parainfluenza Virus 2 Not Detected     Parainfluenza Virus 3 Not Detected     Parainfluenza Virus 4 Not Detected     RSV, PCR Not Detected     Bordetella pertussis pcr Not Detected     Bordetella parapertussis PCR Not Detected     Chlamydophila pneumoniae PCR Not Detected     Mycoplasma pneumo by PCR Not Detected    Narrative:      In the setting of a positive respiratory panel with a viral infection PLUS a negative procalcitonin without other underlying concern for bacterial infection, consider observing off antibiotics or discontinuation of antibiotics and continue supportive care. If the respiratory panel is positive for atypical bacterial infection (Bordetella pertussis, Chlamydophila pneumoniae, or Mycoplasma pneumoniae), consider antibiotic de-escalation to target atypical bacterial infection.    CBC & Differential [[id number]]  (Abnormal) Collected: 03/28/24 1945    Specimen: Blood Updated: 03/28/24 2027    Narrative:      The following orders were created for panel order CBC & Differential.  Procedure                               Abnormality         Status                     ---------                               -----------         ------                     CBC Auto Differential[[id number]]        Abnormal            Final result                 Please view results for these tests on the individual orders.    Comprehensive Metabolic Panel [[id number]]  (Abnormal) Collected: 03/28/24 1945    Specimen: Blood Updated: 03/28/24 2017     Glucose 96 mg/dL      BUN 27 mg/dL      Creatinine 1.29 mg/dL      Sodium 138 mmol/L      Potassium 4.6 mmol/L      Chloride 101 mmol/L      CO2 22.0 mmol/L      Calcium 9.3 mg/dL      Total Protein 7.1 g/dL      Albumin 4.0 g/dL      ALT (SGPT) 130 U/L      AST  (SGOT) 144 U/L      Alkaline Phosphatase 128 U/L      Total Bilirubin 1.1 mg/dL      Globulin 3.1 gm/dL      A/G Ratio 1.3 g/dL      BUN/Creatinine Ratio 20.9     Anion Gap 15.0 mmol/L      eGFR 38.1 mL/min/1.73     Narrative:      GFR Normal >60  Chronic Kidney Disease <60  Kidney Failure <15    The GFR formula is only valid for adults with stable renal function between ages 18 and 70.    Magnesium [[id number]]  (Normal) Collected: 03/28/24 1945    Specimen: Blood Updated: 03/28/24 2017     Magnesium 2.2 mg/dL     Protime-INR [[id number]]  (Abnormal) Collected: 03/28/24 1945    Specimen: Blood Updated: 03/28/24 2008     Protime 16.2 Seconds      INR 1.25    CBC Auto Differential [[id number]]  (Abnormal) Collected: 03/28/24 1945    Specimen: Blood Updated: 03/28/24 2027     WBC 7.68 10*3/mm3      RBC 3.11 10*6/mm3      Hemoglobin 7.8 g/dL      Hematocrit 26.7 %      MCV 85.9 fL      MCH 25.1 pg      MCHC 29.2 g/dL      RDW 18.5 %      RDW-SD 57.4 fl      MPV 9.8 fL      Platelets 316 10*3/mm3      Neutrophil % 60.5 %      Lymphocyte % 27.7 %      Monocyte % 9.8 %      Eosinophil % 0.8 %      Basophil % 0.5 %      Neutrophils, Absolute 4.65 10*3/mm3      Lymphocytes, Absolute 2.13 10*3/mm3      Monocytes, Absolute 0.75 10*3/mm3      Eosinophils, Absolute 0.06 10*3/mm3      Basophils, Absolute 0.04 10*3/mm3     Urinalysis With Culture If Indicated - Straight Cath [[id number]]  (Abnormal) Collected: 03/28/24 2105    Specimen: Urine from Straight Cath Updated: 03/28/24 2131     Color, UA Yellow     Appearance, UA Clear     pH, UA 5.5     Specific Gravity, UA 1.021     Glucose,  mg/dL (2+)     Ketones, UA Trace     Bilirubin, UA Negative     Blood, UA Negative     Protein, UA 30 mg/dL (1+)     Leuk Esterase, UA Negative     Nitrite, UA Negative     Urobilinogen, UA 0.2 E.U./dL    Narrative:      In absence of clinical symptoms, the presence of pyuria, bacteria, and/or nitrites on the urinalysis result does not  correlate with infection.    Urinalysis, Microscopic Only - Straight Cath [[id number]] Collected: 03/28/24 2105    Specimen: Urine from Straight Cath Updated: 03/28/24 2131     RBC, UA 0-2 /HPF      WBC, UA 0-2 /HPF      Comment: Urine culture not indicated.        Bacteria, UA None Seen /HPF      Squamous Epithelial Cells, UA 0-2 /HPF      Hyaline Casts, UA 0-2 /LPF      Methodology Automated Microscopy    POC Occult Blood Stool [[id number]]  (Normal) Resulted: 03/28/24 2240    Specimen: Stool Updated: 03/28/24 2240     Fecal Occult Blood Negative     Lot Number [id number]     Expiration Date 7/25/2027     DEVELOPER LOT NUMBER 257     DEVELOPER EXPIRATION DATE 7/25/2027     Positive Control Positive     Negative Control Negative         XR Chest 1 View    Result Date: 3/28/2024  EXAMINATION: XR CHEST 1 VW-  3/28/2024 6:40 PM  HISTORY: cough, weakness  1 view chest x-ray.  COMPARISON: 3/26/2024.  Cardiomegaly. Moderate aortic arch calcification.  Chronic interstitial lung disease with increasing consolidation of the retrocardiac LEFT lower lobe compatible with developing atelectasis or pneumonia.  Trace amounts of pleural fluid.  No pneumothorax or heart failure.      1. Chronic lung changes with increasing opacification of the LEFT lower lobe compatible with developing pneumonia or atelectasis.    This report was signed and finalized on 3/28/2024 8:05 PM by Dr. Kory Stubbs MD.      ED Course  ED Course as of 03/28/24 2306   Thu Mar 28, 2024   0150 89-year-old female with hypertension, hyperlipidemia, coronary disease presents to the ED with several month history of progressive generalized weakness, overall decline, unintentional weight loss.  Weakness been worse over the past week.  Vital signs are normal arrival to the ED.  Labs revealed mild worsening of her anemia.  Hemoglobin 7.8, but has been as low as 8 as far back as 3 months ago.  5 months ago hemoglobin was 12 but is hovered between 8-9 over the past 4 to 5  months.  Stool negative for occult blood.  Do not suspect GI bleeding.  She is hemodynamically stable.  Does not have a urinary tract infection.  Respiratory panel negative.  Does have mild elevated LFTs, possibly from her poor appetite and feeding, could be from viral illness.  Will need to have this monitored by her PCP.    Chest x-ray did reveal a developing left lower lobe pneumonia.  She has had cough for the past couple days.  Sats are normal.  Discussed the options of admission for observation and IV antibiotics versus discharge home with outpatient primary care follow-up.  Children are okay with discharge home at this point, understand the risk of hospital-acquired infection and since the patient does have an oxygen requirement willing to try treating her pneumonia at home. [AW]      ED Course User Index  [AW] Curt Lock MD                                             Medical Decision Making  Amount and/or Complexity of Data Reviewed  Labs: ordered.  Radiology: ordered.    Risk  Prescription drug management.        Final diagnoses:   Pneumonia of left lower lobe due to infectious organism   Anemia, unspecified type   Elevated liver function tests       ED Disposition  ED Disposition       ED Disposition   Discharge    Condition   Stable    Comment   --               Delfina Pereira,   [address] [address]  [phone]    Schedule an appointment as soon as possible for a visit in 2 days           Medication List        New Prescriptions      doxycycline 100 MG capsule  Commonly known as: MONODOX  Take 1 capsule by mouth 2 (Two) Times a Day.     ondansetron ODT 4 MG disintegrating tablet  Commonly known as: ZOFRAN-ODT  Place 1 tablet on the tongue Every 8 (Eight) Hours As Needed for Vomiting.               Where to Get Your Medications        These medications were sent to [facility] - [address] [phone]  -  [phone]   [address] [facility] [address]      Phone: [phone]   doxycycline 100 MG capsule  ondansetron ODT 4 MG disintegrating tablet            Curt Lock MD  03/28/24 4690

## 2024-04-01 ENCOUNTER — APPOINTMENT (OUTPATIENT)
Dept: CT IMAGING | Facility: HOSPITAL | Age: 89
End: 2024-04-01
Payer: MEDICARE

## 2024-04-01 ENCOUNTER — HOSPITAL ENCOUNTER (INPATIENT)
Facility: HOSPITAL | Age: 89
LOS: 4 days | Discharge: HOSPICE | End: 2024-04-05
Attending: STUDENT IN AN ORGANIZED HEALTH CARE EDUCATION/TRAINING PROGRAM | Admitting: HOSPITALIST
Payer: MEDICARE

## 2024-04-01 ENCOUNTER — APPOINTMENT (OUTPATIENT)
Dept: GENERAL RADIOLOGY | Facility: HOSPITAL | Age: 89
End: 2024-04-01
Payer: MEDICARE

## 2024-04-01 DIAGNOSIS — D64.9 ANEMIA, UNSPECIFIED TYPE: Primary | ICD-10-CM

## 2024-04-01 DIAGNOSIS — R13.11 ORAL PHASE DYSPHAGIA: ICD-10-CM

## 2024-04-01 PROBLEM — R62.7 FAILURE TO THRIVE IN ADULT: Status: ACTIVE | Noted: 2024-04-01

## 2024-04-01 PROBLEM — N19 RENAL FAILURE: Status: ACTIVE | Noted: 2024-04-01

## 2024-04-01 PROBLEM — N17.9 ACUTE RENAL FAILURE SUPERIMPOSED ON STAGE 3A CHRONIC KIDNEY DISEASE: Status: ACTIVE | Noted: 2024-04-01

## 2024-04-01 PROBLEM — K72.00 SHOCK LIVER: Status: ACTIVE | Noted: 2024-04-01

## 2024-04-01 PROBLEM — N18.31 ACUTE RENAL FAILURE SUPERIMPOSED ON STAGE 3A CHRONIC KIDNEY DISEASE: Status: ACTIVE | Noted: 2024-04-01

## 2024-04-01 PROBLEM — R74.01 TRANSAMINITIS: Status: ACTIVE | Noted: 2024-04-01

## 2024-04-01 PROBLEM — E87.5 HYPERKALEMIA: Status: ACTIVE | Noted: 2024-04-01

## 2024-04-01 LAB
ABO GROUP BLD: NORMAL
ACANTHOCYTES BLD QL SMEAR: ABNORMAL
ALBUMIN SERPL-MCNC: 3.9 G/DL (ref 3.5–5.2)
ALBUMIN/GLOB SERPL: 1.5 G/DL
ALP SERPL-CCNC: 198 U/L (ref 39–117)
ALT SERPL W P-5'-P-CCNC: 2242 U/L (ref 1–33)
AMORPH URATE CRY URNS QL MICRO: ABNORMAL /HPF
ANION GAP SERPL CALCULATED.3IONS-SCNC: 19 MMOL/L (ref 5–15)
ANION GAP SERPL CALCULATED.3IONS-SCNC: 20 MMOL/L (ref 5–15)
ANISOCYTOSIS BLD QL: ABNORMAL
AST SERPL-CCNC: 4382 U/L (ref 1–32)
BACTERIA UR QL AUTO: ABNORMAL /HPF
BILIRUB SERPL-MCNC: 1.6 MG/DL (ref 0–1.2)
BILIRUB UR QL STRIP: ABNORMAL
BLD GP AB SCN SERPL QL: NEGATIVE
BUN SERPL-MCNC: 54 MG/DL (ref 8–23)
BUN SERPL-MCNC: 56 MG/DL (ref 8–23)
BUN/CREAT SERPL: 18.7 (ref 7–25)
BUN/CREAT SERPL: 19.1 (ref 7–25)
CALCIUM SPEC-SCNC: 9 MG/DL (ref 8.2–9.6)
CALCIUM SPEC-SCNC: 9.2 MG/DL (ref 8.2–9.6)
CHLORIDE SERPL-SCNC: 95 MMOL/L (ref 98–107)
CHLORIDE SERPL-SCNC: 95 MMOL/L (ref 98–107)
CLARITY UR: ABNORMAL
CO2 SERPL-SCNC: 17 MMOL/L (ref 22–29)
CO2 SERPL-SCNC: 18 MMOL/L (ref 22–29)
COLOR UR: ABNORMAL
CREAT SERPL-MCNC: 2.83 MG/DL (ref 0.57–1)
CREAT SERPL-MCNC: 3 MG/DL (ref 0.57–1)
DEPRECATED RDW RBC AUTO: 60.3 FL (ref 37–54)
EGFRCR SERPLBLD CKD-EPI 2021: 13.8 ML/MIN/1.73
EGFRCR SERPLBLD CKD-EPI 2021: 14.8 ML/MIN/1.73
ERYTHROCYTE [DISTWIDTH] IN BLOOD BY AUTOMATED COUNT: 19.2 % (ref 12.3–15.4)
GLOBULIN UR ELPH-MCNC: 2.6 GM/DL
GLUCOSE SERPL-MCNC: 66 MG/DL (ref 65–99)
GLUCOSE SERPL-MCNC: 79 MG/DL (ref 65–99)
GLUCOSE UR STRIP-MCNC: NEGATIVE MG/DL
HCT VFR BLD AUTO: 22.3 % (ref 34–46.6)
HGB BLD-MCNC: 6.2 G/DL (ref 12–15.9)
HGB UR QL STRIP.AUTO: NEGATIVE
HYALINE CASTS UR QL AUTO: ABNORMAL /LPF
HYPOCHROMIA BLD QL: ABNORMAL
INR PPP: 2.91 (ref 0.91–1.09)
KETONES UR QL STRIP: ABNORMAL
LEUKOCYTE ESTERASE UR QL STRIP.AUTO: ABNORMAL
LIPASE SERPL-CCNC: 57 U/L (ref 13–60)
LYMPHOCYTES # BLD MANUAL: 0.94 10*3/MM3 (ref 0.7–3.1)
LYMPHOCYTES NFR BLD MANUAL: 4 % (ref 5–12)
MAGNESIUM SERPL-MCNC: 2.6 MG/DL (ref 1.7–2.3)
MCH RBC QN AUTO: 24 PG (ref 26.6–33)
MCHC RBC AUTO-ENTMCNC: 27.8 G/DL (ref 31.5–35.7)
MCV RBC AUTO: 86.4 FL (ref 79–97)
MICROCYTES BLD QL: ABNORMAL
MONOCYTES # BLD: 0.47 10*3/MM3 (ref 0.1–0.9)
NEUTROPHILS # BLD AUTO: 10.25 10*3/MM3 (ref 1.7–7)
NEUTROPHILS NFR BLD MANUAL: 85.9 % (ref 42.7–76)
NEUTS BAND NFR BLD MANUAL: 2 % (ref 0–5)
NITRITE UR QL STRIP: NEGATIVE
NRBC SPEC MANUAL: 16.2 /100 WBC (ref 0–0.2)
OVALOCYTES BLD QL SMEAR: ABNORMAL
PH UR STRIP.AUTO: <=5 [PH] (ref 5–8)
PLATELET # BLD AUTO: 117 10*3/MM3 (ref 140–450)
PMV BLD AUTO: 11.4 FL (ref 6–12)
POIKILOCYTOSIS BLD QL SMEAR: ABNORMAL
POLYCHROMASIA BLD QL SMEAR: ABNORMAL
POTASSIUM SERPL-SCNC: 6.7 MMOL/L (ref 3.5–5.2)
POTASSIUM SERPL-SCNC: 6.7 MMOL/L (ref 3.5–5.2)
PROT SERPL-MCNC: 6.5 G/DL (ref 6–8.5)
PROT UR QL STRIP: ABNORMAL
PROTHROMBIN TIME: 31.5 SECONDS (ref 11.8–14.8)
RBC # BLD AUTO: 2.58 10*6/MM3 (ref 3.77–5.28)
RBC # UR STRIP: ABNORMAL /HPF
REF LAB TEST METHOD: ABNORMAL
RH BLD: POSITIVE
SMALL PLATELETS BLD QL SMEAR: ABNORMAL
SODIUM SERPL-SCNC: 132 MMOL/L (ref 136–145)
SODIUM SERPL-SCNC: 132 MMOL/L (ref 136–145)
SP GR UR STRIP: 1.02 (ref 1–1.03)
SPHEROCYTES BLD QL SMEAR: ABNORMAL
SQUAMOUS #/AREA URNS HPF: ABNORMAL /HPF
T&S EXPIRATION DATE: NORMAL
TRANS CELLS #/AREA URNS HPF: ABNORMAL /HPF
UROBILINOGEN UR QL STRIP: ABNORMAL
VARIANT LYMPHS NFR BLD MANUAL: 8.1 % (ref 19.6–45.3)
WBC # UR STRIP: ABNORMAL /HPF
WBC MORPH BLD: NORMAL
WBC NRBC COR # BLD AUTO: 11.66 10*3/MM3 (ref 3.4–10.8)

## 2024-04-01 PROCEDURE — 25010000002 FUROSEMIDE PER 20 MG: Performed by: STUDENT IN AN ORGANIZED HEALTH CARE EDUCATION/TRAINING PROGRAM

## 2024-04-01 PROCEDURE — 86900 BLOOD TYPING SEROLOGIC ABO: CPT | Performed by: STUDENT IN AN ORGANIZED HEALTH CARE EDUCATION/TRAINING PROGRAM

## 2024-04-01 PROCEDURE — 81001 URINALYSIS AUTO W/SCOPE: CPT | Performed by: STUDENT IN AN ORGANIZED HEALTH CARE EDUCATION/TRAINING PROGRAM

## 2024-04-01 PROCEDURE — 86850 RBC ANTIBODY SCREEN: CPT | Performed by: STUDENT IN AN ORGANIZED HEALTH CARE EDUCATION/TRAINING PROGRAM

## 2024-04-01 PROCEDURE — 36415 COLL VENOUS BLD VENIPUNCTURE: CPT

## 2024-04-01 PROCEDURE — 25010000002 CALCIUM GLUCONATE-NACL 1-0.675 GM/50ML-% SOLUTION: Performed by: STUDENT IN AN ORGANIZED HEALTH CARE EDUCATION/TRAINING PROGRAM

## 2024-04-01 PROCEDURE — 83690 ASSAY OF LIPASE: CPT | Performed by: STUDENT IN AN ORGANIZED HEALTH CARE EDUCATION/TRAINING PROGRAM

## 2024-04-01 PROCEDURE — 86923 COMPATIBILITY TEST ELECTRIC: CPT

## 2024-04-01 PROCEDURE — 94799 UNLISTED PULMONARY SVC/PX: CPT

## 2024-04-01 PROCEDURE — P9016 RBC LEUKOCYTES REDUCED: HCPCS

## 2024-04-01 PROCEDURE — 85007 BL SMEAR W/DIFF WBC COUNT: CPT | Performed by: STUDENT IN AN ORGANIZED HEALTH CARE EDUCATION/TRAINING PROGRAM

## 2024-04-01 PROCEDURE — 93005 ELECTROCARDIOGRAM TRACING: CPT | Performed by: STUDENT IN AN ORGANIZED HEALTH CARE EDUCATION/TRAINING PROGRAM

## 2024-04-01 PROCEDURE — 83735 ASSAY OF MAGNESIUM: CPT | Performed by: STUDENT IN AN ORGANIZED HEALTH CARE EDUCATION/TRAINING PROGRAM

## 2024-04-01 PROCEDURE — 99285 EMERGENCY DEPT VISIT HI MDM: CPT

## 2024-04-01 PROCEDURE — P9612 CATHETERIZE FOR URINE SPEC: HCPCS

## 2024-04-01 PROCEDURE — 25810000003 SODIUM CHLORIDE 0.9 % SOLUTION: Performed by: NURSE PRACTITIONER

## 2024-04-01 PROCEDURE — 63710000001 INSULIN REGULAR HUMAN PER 5 UNITS: Performed by: STUDENT IN AN ORGANIZED HEALTH CARE EDUCATION/TRAINING PROGRAM

## 2024-04-01 PROCEDURE — 86901 BLOOD TYPING SEROLOGIC RH(D): CPT | Performed by: STUDENT IN AN ORGANIZED HEALTH CARE EDUCATION/TRAINING PROGRAM

## 2024-04-01 PROCEDURE — 85025 COMPLETE CBC W/AUTO DIFF WBC: CPT | Performed by: STUDENT IN AN ORGANIZED HEALTH CARE EDUCATION/TRAINING PROGRAM

## 2024-04-01 PROCEDURE — 36430 TRANSFUSION BLD/BLD COMPNT: CPT

## 2024-04-01 PROCEDURE — 80053 COMPREHEN METABOLIC PANEL: CPT | Performed by: STUDENT IN AN ORGANIZED HEALTH CARE EDUCATION/TRAINING PROGRAM

## 2024-04-01 PROCEDURE — 93010 ELECTROCARDIOGRAM REPORT: CPT | Performed by: INTERNAL MEDICINE

## 2024-04-01 PROCEDURE — 74176 CT ABD & PELVIS W/O CONTRAST: CPT

## 2024-04-01 PROCEDURE — 86900 BLOOD TYPING SEROLOGIC ABO: CPT

## 2024-04-01 PROCEDURE — 85610 PROTHROMBIN TIME: CPT | Performed by: STUDENT IN AN ORGANIZED HEALTH CARE EDUCATION/TRAINING PROGRAM

## 2024-04-01 PROCEDURE — 71045 X-RAY EXAM CHEST 1 VIEW: CPT

## 2024-04-01 PROCEDURE — 25810000003 SODIUM CHLORIDE 0.9 % SOLUTION: Performed by: INTERNAL MEDICINE

## 2024-04-01 PROCEDURE — 94761 N-INVAS EAR/PLS OXIMETRY MLT: CPT

## 2024-04-01 PROCEDURE — 94640 AIRWAY INHALATION TREATMENT: CPT

## 2024-04-01 PROCEDURE — 70450 CT HEAD/BRAIN W/O DYE: CPT

## 2024-04-01 RX ORDER — ONDANSETRON 2 MG/ML
4 INJECTION INTRAMUSCULAR; INTRAVENOUS EVERY 6 HOURS PRN
Status: DISCONTINUED | OUTPATIENT
Start: 2024-04-01 | End: 2024-04-05 | Stop reason: HOSPADM

## 2024-04-01 RX ORDER — LEVOTHYROXINE SODIUM 0.12 MG/1
125 TABLET ORAL
Status: DISCONTINUED | OUTPATIENT
Start: 2024-04-02 | End: 2024-04-05 | Stop reason: HOSPADM

## 2024-04-01 RX ORDER — NALOXONE HCL 0.4 MG/ML
0.4 VIAL (ML) INJECTION
Status: DISCONTINUED | OUTPATIENT
Start: 2024-04-01 | End: 2024-04-05 | Stop reason: HOSPADM

## 2024-04-01 RX ORDER — SODIUM CHLORIDE 9 MG/ML
50 INJECTION, SOLUTION INTRAVENOUS CONTINUOUS
Status: DISCONTINUED | OUTPATIENT
Start: 2024-04-01 | End: 2024-04-05 | Stop reason: HOSPADM

## 2024-04-01 RX ORDER — AMOXICILLIN 250 MG
2 CAPSULE ORAL 2 TIMES DAILY PRN
Status: DISCONTINUED | OUTPATIENT
Start: 2024-04-01 | End: 2024-04-05 | Stop reason: HOSPADM

## 2024-04-01 RX ORDER — ONDANSETRON 4 MG/1
4 TABLET, ORALLY DISINTEGRATING ORAL EVERY 6 HOURS PRN
Status: DISCONTINUED | OUTPATIENT
Start: 2024-04-01 | End: 2024-04-05 | Stop reason: HOSPADM

## 2024-04-01 RX ORDER — HYDROMORPHONE HYDROCHLORIDE 1 MG/ML
0.5 INJECTION, SOLUTION INTRAMUSCULAR; INTRAVENOUS; SUBCUTANEOUS EVERY 4 HOURS PRN
Status: DISCONTINUED | OUTPATIENT
Start: 2024-04-01 | End: 2024-04-05

## 2024-04-01 RX ORDER — SODIUM CHLORIDE 0.9 % (FLUSH) 0.9 %
10 SYRINGE (ML) INJECTION EVERY 12 HOURS SCHEDULED
Status: DISCONTINUED | OUTPATIENT
Start: 2024-04-01 | End: 2024-04-05 | Stop reason: HOSPADM

## 2024-04-01 RX ORDER — SODIUM CHLORIDE 9 MG/ML
125 INJECTION, SOLUTION INTRAVENOUS CONTINUOUS
Status: DISCONTINUED | OUTPATIENT
Start: 2024-04-01 | End: 2024-04-01

## 2024-04-01 RX ORDER — BISACODYL 10 MG
10 SUPPOSITORY, RECTAL RECTAL DAILY PRN
Status: DISCONTINUED | OUTPATIENT
Start: 2024-04-01 | End: 2024-04-01

## 2024-04-01 RX ORDER — POLYETHYLENE GLYCOL 3350 17 G/17G
17 POWDER, FOR SOLUTION ORAL DAILY PRN
Status: DISCONTINUED | OUTPATIENT
Start: 2024-04-01 | End: 2024-04-01

## 2024-04-01 RX ORDER — BISACODYL 5 MG/1
5 TABLET, DELAYED RELEASE ORAL DAILY PRN
Status: DISCONTINUED | OUTPATIENT
Start: 2024-04-01 | End: 2024-04-01

## 2024-04-01 RX ORDER — SODIUM CHLORIDE 0.9 % (FLUSH) 0.9 %
10 SYRINGE (ML) INJECTION AS NEEDED
Status: DISCONTINUED | OUTPATIENT
Start: 2024-04-01 | End: 2024-04-05 | Stop reason: HOSPADM

## 2024-04-01 RX ORDER — ESCITALOPRAM OXALATE 10 MG/1
10 TABLET ORAL DAILY
Status: DISCONTINUED | OUTPATIENT
Start: 2024-04-01 | End: 2024-04-05 | Stop reason: HOSPADM

## 2024-04-01 RX ORDER — ACETAMINOPHEN 325 MG/1
650 TABLET ORAL EVERY 4 HOURS PRN
Status: DISCONTINUED | OUTPATIENT
Start: 2024-04-01 | End: 2024-04-05 | Stop reason: HOSPADM

## 2024-04-01 RX ORDER — BISACODYL 5 MG/1
5 TABLET, DELAYED RELEASE ORAL DAILY PRN
Status: DISCONTINUED | OUTPATIENT
Start: 2024-04-01 | End: 2024-04-05 | Stop reason: HOSPADM

## 2024-04-01 RX ORDER — ACETAMINOPHEN 160 MG/5ML
650 SOLUTION ORAL EVERY 4 HOURS PRN
Status: DISCONTINUED | OUTPATIENT
Start: 2024-04-01 | End: 2024-04-05 | Stop reason: HOSPADM

## 2024-04-01 RX ORDER — CALCIUM GLUCONATE 20 MG/ML
1000 INJECTION, SOLUTION INTRAVENOUS ONCE
Status: COMPLETED | OUTPATIENT
Start: 2024-04-01 | End: 2024-04-01

## 2024-04-01 RX ORDER — SODIUM CHLORIDE 9 MG/ML
40 INJECTION, SOLUTION INTRAVENOUS AS NEEDED
Status: DISCONTINUED | OUTPATIENT
Start: 2024-04-01 | End: 2024-04-05 | Stop reason: HOSPADM

## 2024-04-01 RX ORDER — FUROSEMIDE 10 MG/ML
20 INJECTION INTRAMUSCULAR; INTRAVENOUS ONCE
Status: COMPLETED | OUTPATIENT
Start: 2024-04-01 | End: 2024-04-01

## 2024-04-01 RX ORDER — POLYETHYLENE GLYCOL 3350 17 G/17G
17 POWDER, FOR SOLUTION ORAL DAILY PRN
Status: DISCONTINUED | OUTPATIENT
Start: 2024-04-01 | End: 2024-04-05 | Stop reason: HOSPADM

## 2024-04-01 RX ORDER — ACETAMINOPHEN 650 MG/1
650 SUPPOSITORY RECTAL EVERY 4 HOURS PRN
Status: DISCONTINUED | OUTPATIENT
Start: 2024-04-01 | End: 2024-04-05 | Stop reason: HOSPADM

## 2024-04-01 RX ORDER — ALBUTEROL SULFATE 2.5 MG/3ML
10 SOLUTION RESPIRATORY (INHALATION) ONCE
Status: COMPLETED | OUTPATIENT
Start: 2024-04-01 | End: 2024-04-01

## 2024-04-01 RX ORDER — DONEPEZIL HYDROCHLORIDE 5 MG/1
5 TABLET, FILM COATED ORAL NIGHTLY
Status: DISCONTINUED | OUTPATIENT
Start: 2024-04-01 | End: 2024-04-05 | Stop reason: HOSPADM

## 2024-04-01 RX ORDER — AMOXICILLIN 250 MG
2 CAPSULE ORAL 2 TIMES DAILY PRN
Status: DISCONTINUED | OUTPATIENT
Start: 2024-04-01 | End: 2024-04-01

## 2024-04-01 RX ORDER — BISACODYL 10 MG
10 SUPPOSITORY, RECTAL RECTAL DAILY
Status: DISCONTINUED | OUTPATIENT
Start: 2024-04-01 | End: 2024-04-05 | Stop reason: HOSPADM

## 2024-04-01 RX ORDER — DEXTROSE MONOHYDRATE 25 G/50ML
25 INJECTION, SOLUTION INTRAVENOUS ONCE
Status: COMPLETED | OUTPATIENT
Start: 2024-04-01 | End: 2024-04-01

## 2024-04-01 RX ADMIN — ALBUTEROL SULFATE 10 MG: 2.5 SOLUTION RESPIRATORY (INHALATION) at 14:38

## 2024-04-01 RX ADMIN — DEXTROSE MONOHYDRATE 25 G: 25 INJECTION, SOLUTION INTRAVENOUS at 16:13

## 2024-04-01 RX ADMIN — SODIUM ZIRCONIUM CYCLOSILICATE 10 G: 10 POWDER, FOR SUSPENSION ORAL at 16:07

## 2024-04-01 RX ADMIN — FUROSEMIDE 20 MG: 10 INJECTION, SOLUTION INTRAMUSCULAR; INTRAVENOUS at 16:16

## 2024-04-01 RX ADMIN — SODIUM CHLORIDE 125 ML/HR: 9 INJECTION, SOLUTION INTRAVENOUS at 18:24

## 2024-04-01 RX ADMIN — Medication 10 ML: at 21:06

## 2024-04-01 RX ADMIN — SODIUM CHLORIDE 100 ML/HR: 9 INJECTION, SOLUTION INTRAVENOUS at 18:24

## 2024-04-01 RX ADMIN — INSULIN HUMAN 5 UNITS: 100 INJECTION, SOLUTION PARENTERAL at 16:08

## 2024-04-01 RX ADMIN — CALCIUM GLUCONATE 1000 MG: 20 INJECTION, SOLUTION INTRAVENOUS at 16:23

## 2024-04-01 NOTE — H&P
[facility] Medicine Services  HISTORY AND PHYSICAL    Date of Admission: 4/1/2024  Primary Care Physician: Delfina Pereira DO    Subjective   Primary Historian: Daughter Yeni    Chief Complaint: Failure to thrive    History of Present Illness  Mya Montes is a 89-year-old female with a past medical history of peripheral artery disease, coronary artery disease, on platelet therapy, hypothyroidism, hyperlipidemia, hypertension, please see below for complete list.  Unfortunately patient had a fall 12/16/2023 with resultant admission for fracture of the proximal end of right humerus.  She was discharged on 12/21/2023 to rehab and subsequent discharged to her daughter's home on 3/1/2024.  Unfortunately she has continued to decline with worsening weakness, decreased appetite, nausea with dry heaves.  She presented to [facility] ER on 3/28/2024 diagnosed with left lower lobe pneumonia, discharged on antibiotic.  She is back today with inability to walk due to weakness.  She is alert and oriented and pleasant.  Her daughter does provide all history.  Patient does awaken easily and answers all questions appropriately.  She denies pain.  She is admitted for further evaluation treatment.    Review of Systems   Otherwise complete ROS reviewed and negative except as mentioned in the HPI.    Past Medical History:   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     Wears glasses      Past Surgical History:  Past Surgical History:   Procedure Laterality Date    AORTAGRAM Right 08/06/2018    Procedure: LEFT LOWER EXTREMITY ANGIOGRAM;  Surgeon: Ryan  Walker WETZEL DO;  Location: [facility] HYBRID OR 12;  Service: Vascular    BREAST BIOPSY Bilateral     multiple on both, all benign    BREAST BIOPSY Left 2017    Procedure: LEFT MAMMOGRAM GUIDED NEEDLE DIRECTED EXCISIONAL BREAST BIOPSY;  Surgeon: Malgorzata Scott MD;  Location: [facility] OR;  Service:     BUNIONECTOMY Bilateral      SECTION      X2    CHOLECYSTECTOMY      CHOLECYSTECTOMY WITH INTRAOPERATIVE CHOLANGIOGRAM N/A 2021    Procedure: LAPAROSCOPIC CHOLECYSTECTOMY WITH CHOLANGIOGRAM;  Surgeon: Malgorzata Scott MD;  Location: [facility] OR;  Service: General;  Laterality: N/A;    COLONOSCOPY  2015    diverticulosis    ENDOSCOPY N/A 2021    Procedure: ESOPHAGOGASTRODUODENOSCOPY WITH ANESTHESIA;  Surgeon: Sonu Cheek DO;  Location: [facility] ENDOSCOPY;  Service: Gastroenterology;  Laterality: N/A;  pre nausea  post esophagitis  Delfina Pereira DO    EYE SURGERY      CATARACT SURGERY    FEMORAL ENDARTERECTOMY Left 2018    Procedure: LEFT FEMORAL ENDARTERECTOMY;  Surgeon: Walker Davis DO;  Location: [facility] HYBRID OR 12;  Service: Vascular    JOINT REPLACEMENT      RIGHT HIP    VASCULAR SURGERY       Social History:  reports that she quit smoking about 69 years ago. Her smoking use included cigarettes. She started smoking about 71 years ago. She has never been exposed to tobacco smoke. She has never used smokeless tobacco. She reports that she does not drink alcohol and does not use drugs.    Family History: family history includes Cancer in her sister; Heart disease in her father; No Known Problems in her brother, daughter, maternal aunt, maternal grandmother, mother, paternal aunt, paternal grandmother, and son.       Allergies:  Allergies   Allergen Reactions    Erythromycin Shortness Of Breath    Zithromax [Azithromycin] Itching and Other (See Comments)     Yeast infection    Penicillins Rash       Medications:  Prior to Admission medications    Medication Sig Start  Date End Date Taking? Authorizing Provider   acetaminophen (TYLENOL) 325 MG tablet Take 2 tablets by mouth Every 4 (Four) Hours As Needed for Mild Pain. 12/20/23  Yes Jelena Bello APRN   alendronate (FOSAMAX) 70 MG tablet Take 1 tablet by mouth Every 7 (Seven) Days. Thursday   Yes Hossein Colvin MD   aspirin 81 MG EC tablet Take 1 tablet by mouth Daily.   Yes Hossein Colvin MD   Calcium Carbonate-Vitamin D (CALCIUM 600/VITAMIN D PO) Take 1 tablet by mouth Daily.   Yes Hossein Colvin MD   carvedilol (COREG) 3.125 MG tablet Take 1 tablet by mouth 2 (Two) Times a Day. 7/24/23  Yes Cesar Urena DO   clopidogrel (PLAVIX) 75 MG tablet Take 1 tablet by mouth Daily. 7/24/23  Yes Cesar Urena DO   colestipol (COLESTID) 1 g tablet Take 1 tablet by mouth Every Other Day.   Yes Hossein Colvin MD   donepezil (Aricept) 5 MG tablet Take 1 tablet by mouth Every Night. 2/28/24  Yes Amos Schaffer APRN   doxycycline (MONODOX) 100 MG capsule Take 1 capsule by mouth 2 (Two) Times a Day. 3/28/24  Yes Curt Lock MD   empagliflozin (Jardiance) 10 MG tablet tablet Take 1 tablet by mouth Daily. 7/24/23  Yes Cesar Urena DO   escitalopram (LEXAPRO) 10 MG tablet Take 1 tablet by mouth Daily.   Yes Hossein Colvin MD   furosemide (LASIX) 20 MG tablet Take 1 tablet by mouth As Needed (for heart failure, leg swelling, weight gain). 3/5/24  Yes Cesar Urena DO   Multiple Vitamins-Minerals (COMPLETE MULTIVITAMIN/MINERAL PO) Take 1 tablet by mouth Daily.   Yes Hossein Colvin MD   ondansetron ODT (ZOFRAN-ODT) 4 MG disintegrating tablet Place 1 tablet on the tongue Every 8 (Eight) Hours As Needed for Vomiting. 3/28/24  Yes Curt Lock MD   oxyCODONE (ROXICODONE) 5 MG immediate release tablet Take 1 tablet by mouth Every 12 (Twelve) Hours As Needed for Moderate Pain. 12/20/23  Yes Jelena Bello APRN   Synthroid 150 MCG tablet Take  "125 mcg by mouth Daily. Sunday-Friday 4/20/23  Yes Provider, MD Hossein   gabapentin (NEURONTIN) 100 MG capsule Take 1 capsule by mouth Every Night for 3 days. Last filled #180 for 90 DS on 10/18/23 12/20/23 4/1/24  Jelena Bello APRN   Glucosamine-Chondroitin-Vit D3 [id number] MG-MG-UNIT pack Take 1 tablet by mouth 2 (Two) Times a Day.    Provider, MD Hossein     I have utilized all available immediate resources to obtain, update, or review the patient's current medications (including all prescriptions, over-the-counter products, herbals, cannabis/cannabidiol products, and vitamin/mineral/dietary (nutritional) supplements).    Objective     Vital Signs: BP (!) 118/39   Pulse 55   Temp 97.6 °F (36.4 °C) (Oral)   Resp 18   Ht 162.6 cm (64\")   Wt 49 kg (108 lb)   SpO2 96%   BMI 18.54 kg/m²   Physical Exam  Vitals reviewed.   Constitutional:       Appearance: She is ill-appearing.      Comments: Frail, cachectic   HENT:      Head: Normocephalic and atraumatic.      Mouth/Throat:      Mouth: Mucous membranes are dry.      Pharynx: Oropharynx is clear.   Eyes:      Extraocular Movements: Extraocular movements intact.      Conjunctiva/sclera: Conjunctivae normal.   Neck:      Comments: Does not follow ROM due to history of neck problems, she does move head spontaneously  Cardiovascular:      Rate and Rhythm: Normal rate and regular rhythm.   Pulmonary:      Effort: Pulmonary effort is normal.      Comments: Coarseness left lower lobe  Abdominal:      General: There is no distension.      Palpations: Abdomen is soft.   Musculoskeletal:      Right lower leg: No edema.      Left lower leg: No edema.      Comments: Generalized weakness and debility   Skin:     General: Skin is warm and dry.   Neurological:      General: No focal deficit present.      Mental Status: She is alert and oriented to person, place, and time.   Psychiatric:         Mood and Affect: Mood normal.         Behavior: Behavior normal.      "   Results Reviewed:  Lab Results (last 24 hours)       Procedure Component Value Units Date/Time    Basic Metabolic Panel [[id number]]  (Abnormal) Collected: 04/01/24 1502    Specimen: Blood Updated: 04/01/24 1543     Glucose 66 mg/dL      BUN 56 mg/dL      Creatinine 3.00 mg/dL      Sodium 132 mmol/L      Potassium 6.7 mmol/L      Chloride 95 mmol/L      CO2 17.0 mmol/L      Calcium 9.0 mg/dL      BUN/Creatinine Ratio 18.7     Anion Gap 20.0 mmol/L      eGFR 13.8 mL/min/1.73     Urinalysis With Culture If Indicated - Straight Cath [[id number]]  (Abnormal) Collected: 04/01/24 1237    Specimen: Urine from Straight Cath Updated: 04/01/24 1307     Color, UA Dark Yellow     Appearance, UA Cloudy     pH, UA <=5.0     Specific Gravity, UA 1.018     Glucose, UA Negative     Ketones, UA Trace     Bilirubin, UA Small (1+)     Blood, UA Negative     Protein, UA >=300 mg/dL (3+)     Leuk Esterase, UA Trace     Nitrite, UA Negative     Urobilinogen, UA 0.2 E.U./dL    Urinalysis, Microscopic Only - Straight Cath [[id number]]  (Abnormal) Collected: 04/01/24 1237    Specimen: Urine from Straight Cath Updated: 04/01/24 1307     RBC, UA None Seen /HPF      WBC, UA 0-2 /HPF      Comment: Urine culture not indicated.        Bacteria, UA Trace /HPF      Squamous Epithelial Cells, UA 0-2 /HPF      Transitional Epithelial Cells, UA 0-2 /HPF      Hyaline Casts, UA None Seen /LPF      Amorphous Crystals, UA Large/3+ /HPF      Methodology Manual Light Microscopy    CBC Auto Differential [[id number]]  (Abnormal) Collected: 04/01/24 1210    Specimen: Blood Updated: 04/01/24 1302     WBC 11.66 10*3/mm3      RBC 2.58 10*6/mm3      Hemoglobin 6.2 g/dL      Hematocrit 22.3 %      MCV 86.4 fL      MCH 24.0 pg      MCHC 27.8 g/dL      RDW 19.2 %      RDW-SD 60.3 fl      MPV 11.4 fL      Platelets 117 10*3/mm3     Manual Differential [[id number]]  (Abnormal) Collected: 04/01/24 1210    Specimen: Blood Updated: 04/01/24 1302     Neutrophil % 85.9 %       Lymphocyte % 8.1 %      Monocyte % 4.0 %      Bands %  2.0 %      Neutrophils Absolute 10.25 10*3/mm3      Lymphocytes Absolute 0.94 10*3/mm3      Monocytes Absolute 0.47 10*3/mm3      nRBC 16.2 /100 WBC      Acanthocytes Slight/1+     Anisocytosis Slight/1+     Hypochromia Mod/2+     Microcytes Slight/1+     Ovalocytes Slight/1+     Poikilocytes Slight/1+     Polychromasia Mod/2+     Spherocytes Slight/1+     WBC Morphology Normal     Platelet Estimate Decreased    Comprehensive Metabolic Panel [[id number]]  (Abnormal) Collected: 04/01/24 1210    Specimen: Blood Updated: 04/01/24 1258     Glucose 79 mg/dL      BUN 54 mg/dL      Creatinine 2.83 mg/dL      Sodium 132 mmol/L      Potassium 6.7 mmol/L      Comment: Slight hemolysis detected by analyzer. Result may be falsely elevated.        Chloride 95 mmol/L      CO2 18.0 mmol/L      Calcium 9.2 mg/dL      Total Protein 6.5 g/dL      Albumin 3.9 g/dL      ALT (SGPT) 2,242 U/L      AST (SGOT) 4,382 U/L      Alkaline Phosphatase 198 U/L      Total Bilirubin 1.6 mg/dL      Globulin 2.6 gm/dL      A/G Ratio 1.5 g/dL      BUN/Creatinine Ratio 19.1     Anion Gap 19.0 mmol/L      eGFR 14.8 mL/min/1.73      Comment: <15 Indicative of kidney failure       Magnesium [[id number]]  (Abnormal) Collected: 04/01/24 1210    Specimen: Blood Updated: 04/01/24 1248     Magnesium 2.6 mg/dL     Lipase [[id number]]  (Normal) Collected: 04/01/24 1210    Specimen: Blood Updated: 04/01/24 1248     Lipase 57 U/L     Protime-INR [[id number]]  (Abnormal) Collected: 04/01/24 1210    Specimen: Blood Updated: 04/01/24 1234     Protime 31.5 Seconds      INR 2.91          Imaging Results (Last 24 Hours)       Procedure Component Value Units Date/Time    CT Abdomen Pelvis Without Contrast [[id number]] Collected: 04/01/24 1332     Updated: 04/01/24 1352    Narrative:      EXAMINATION: CT ABDOMEN PELVIS WO CONTRAST- 4/1/2024 1:32 PM     HISTORY: Acute kidney injury, decreased gfr and hyperkalemia.     TOTAL  DOSE: 227 mGy.cm (Automatic exposure control technique was  implemented in an effort to keep the radiation dose as low as possible  without compromising image quality)     REPORT: Spiral CT of the abdomen and pelvis was performed without  contrast from the lung bases through the pubic symphysis. Reconstructed  coronal and sagittal images are also reviewed.     Comparison: CT of the pelvis 10/20/2022. Ultrasound of the kidneys  12/12/2022.     Review of lung windows demonstrates mild respiratory motion artifact,  there is a small right pleural effusion. The heart is enlarged. There is  a questionable small right-sided pericardial effusion. Heavy calcified  plaque is present within the coronary arteries.     Evaluation of the solid abdominal organs is limited without intravenous  contrast. Decreased attenuation of the liver parenchyma probably  represents steatosis. Cholecystectomy clips are present. The spleen  appears within normal limits. There is incomplete distention of the  stomach which contains some fluid. There is a large slightly complex  cyst arising from the lateral left kidney which measures 8.7 cm. This  contains a thin septation and calcification within its inferior medial  wall. No nephrolithiasis or hydronephrosis is identified. The ureters  are decompressed. There is a small amount of free fluid in the pelvis,  the source is not clear. No free air is identified. There is mild  sigmoid diverticulosis, no evidence of acute diverticulitis. The bladder  is decompressed. Heavy calcified plaque is noted within the abdominal  aorta and its branches as before. There is normal aortic caliber. Small  surgical clips are noted adjacent to the left femoral artery.  Nonvisualization of the appendix. Review of bone windows demonstrates  partially healed fractures of the pubic ring bilaterally. There is  previous right total hip arthroplasty. Multilevel degenerative changes  are present in the lumbar spine includes  grade 1 spondylolisthesis at  L4-5 with disc space collapse.       Impression:      1. Limited noncontrast study shows a small amount of free fluid in the  pelvis, the etiology is unknown. No free air or abscess is identified.  No evidence of hydronephrosis or ureteral dilation. Large stable  slightly complex cyst arising from the lateral left kidney measuring 8.7  x 8.9 cm.  2. Hepatic steatosis, evidence of previous cholecystectomy.  3. Mild sigmoid diverticulosis without definite diverticulitis.  4. Partially healed fractures of the bilateral pubic ring and evidence  of previous right hip arthroplasty. Advanced degenerative changes within  the lumbar spine.     This report was signed and finalized on 4/1/2024 1:48 PM by Dr. Barrett Arreguin MD.       CT Head Without Contrast [[id number]] Collected: 04/01/24 1325     Updated: 04/01/24 1331    Narrative:      EXAMINATION: CT HEAD WO CONTRAST- 4/1/2024 1:25 PM     HISTORY: Altered mental status.     DOSE: 921 mGycm (Automatic exposure control technique was implemented in  an effort to keep the radiation dose as low as possible without  compromising image quality)     REPORT: Spiral CT of the head was performed without contrast,  reconstructed coronal and sagittal images are also reviewed.     COMPARISON: CT head without contrast 12/16/2023.     No intracranial hemorrhage, mass or mass effect is identified. There is  mild age compatible cerebral atrophy. Decreased attenuation in the  periventricular and subcortical white matter tracts is compatible with  chronic small vessel white matter ischemic disease, this is stable. The  ventricles and basal cisterns are within normal limits. Review of bone  windows is unremarkable. There is normal aeration of the visualized  paranasal sinuses and mastoid air cells.       Impression:      1. No acute intracranial abnormality, no interval change. Mild diffuse  cerebral atrophy and mild to moderate chronic small vessel white  matter  ischemic changes are identified.        This report was signed and finalized on 4/1/2024 1:28 PM by Dr. Barrett Arreguin MD.       XR Chest 1 View [[id number]] Collected: 04/01/24 1231     Updated: 04/01/24 1237    Narrative:      EXAMINATION: XR CHEST 1 VW- 4/1/2024 12:31 PM     HISTORY: pneumonia evaluation.     REPORT: A frontal view of the chest was obtained.     COMPARISON: Chest x-ray 3/28/2024, 12/16/2023.     There is blunting of the costophrenic angles compatible with small  bilateral pleural effusions, which appear decreased in size. There is  improved aeration of the left lung base with decreased infiltrate. No  pneumothorax is identified. There is underlying COPD. Mild cardiomegaly  is present without evidence of overt CHF. Again noted are partially  healed fractures of the surgical neck of the right humerus as well as  the proximal shaft, with displacement.       Impression:      1. Improved aeration of the left lung base and decrease in size of small  bilateral pleural effusions. No lung consolidation is identified.  Advanced COPD.  2. Cardiomegaly without evidence of CHF.  3. Partially healed closed fractures of the proximal humerus as  described.           This report was signed and finalized on 4/1/2024 12:34 PM by Dr. Barrett Arreguin MD.             Assessment / Plan   Assessment:   Active Hospital Problems    Diagnosis     **Acute renal failure superimposed on stage 3a chronic kidney disease     Hyperkalemia     Failure to thrive in adult     Shock liver     Symptomatic anemia     Chronic systolic (congestive) heart failure        Treatment Plan  1.  The patient will be admitted to Dr. Horne's service here at [facility].   2.  Patient has been treated for hyperkalemia x 2, will recheck BMP every 6 hours and treat accordingly  3.  Transfuse 1 unit packed red blood cells as previously ordered  4.  Due to history of heart failure workup hydrate with normal saline 100 ml/hour,  monitor volume status closely  5.  Due to DNR/DNI status, will not monitor cardiac status  6.  Supplemental oxygen as needed  7.  Labs in a.m.  8.  Urology consulted and saw in the emergency department  9.  DVT prophylaxis with SCDs  10.  Daily weights, oral care every 2 hours, turn every 2 hours  11.  Consult palliative care nurse  12.  Consult speech therapy       Medical Decision Making  Number and Complexity of problems: 6  Differential Diagnosis: None    Conditions and Status        Condition is unchanged.     [facility] Data  External documents reviewed: No  Cardiac tracing (EKG, telemetry) interpretation: Reviewed  Radiology interpretation: Reviewed  Labs reviewed: Yes  Any tests that were considered but not ordered: No     Decision rules/scores evaluated (example [id number], Wells, etc): No     Discussed with: Patient, natalie Jaime and lisbet Frias and Dr. Horne     Care Planning  Shared decision making: Patient, natalie Jaime and lisbet Frias and Dr. Horne    Code status and discussions: 7:15 PM reconvened with natalie Jaime and now son Donal.  Reviewed all lab studies, they wish to proceed with blood transfusion and repeat labs as appropriate.  She will continue with DNR/DNI status and they will consider comfort measures if condition declines.      Disposition  Social Determinants of Health that impact treatment or disposition: None  Estimated length of stay is 2+ days.     I confirmed that the patient's advanced care plan is present, code status is documented, and a surrogate decision maker is listed in the patient's medical record.     The patient's surrogate decision maker is natalie Jaime and her son Rodriguez.     The patient was seen and examined by me on 4/1/2024 at 4:01 PM.    Electronically signed by ANGY Estevez, 04/01/24, 19:40 CDT.

## 2024-04-01 NOTE — ED PROVIDER NOTES
Subjective   History of Present Illness  Patient presents due to fatigue and generalized weakness.  She states that she feels poorly.  She cannot specify how.  She denies basic review of systems questions like chest pain shortness of breath headache passing out.  She is oriented x 3 on my evaluation.  Her family states that she has had gradual worsening over the past month, with increased intermittent confusion, fatigue, sleeping a lot, and being less active.  She has also been throwing up for the past couple weeks, worse since Thursday.  Her emesis is described as dry heaving at this point and was whatever she was trying to eat or drink on Thursday.  No falls or head injuries since December.  No acute changes in mental status; she will be confused about putting on her close or location off-and-on.  She feels weak all over, no focal weakness.  Denies abdominal pain.  History of heart failure.  Was seen on Thursday, diagnosed with possible pneumonia, put on doxycycline.  She saw her doctor today who recommended she come back to the ER for admission    Review of Systems   Constitutional:  Negative for chills and fever.   Respiratory:  Negative for cough and shortness of breath.    Cardiovascular:  Negative for chest pain and leg swelling.   Gastrointestinal:  Positive for vomiting. Negative for abdominal pain. Diarrhea: loose stool no diarrhea.  Genitourinary:  Negative for difficulty urinating and dysuria.   Neurological:  Positive for light-headedness. Negative for syncope.       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     Wears glasses        Allergies   Allergen Reactions     Erythromycin Shortness Of Breath    Zithromax [Azithromycin] Itching and Other (See Comments)     Yeast infection    Penicillins Rash       Past Surgical History:   Procedure Laterality Date    AORTAGRAM Right 2018    Procedure: LEFT LOWER EXTREMITY ANGIOGRAM;  Surgeon: Walker Davis DO;  Location: [facility] HYBRID OR 12;  Service: Vascular    BREAST BIOPSY Bilateral     multiple on both, all benign    BREAST BIOPSY Left 2017    Procedure: LEFT MAMMOGRAM GUIDED NEEDLE DIRECTED EXCISIONAL BREAST BIOPSY;  Surgeon: Malgorzata Scott MD;  Location: [facility] OR;  Service:     BUNIONECTOMY Bilateral      SECTION      X2    CHOLECYSTECTOMY      CHOLECYSTECTOMY WITH INTRAOPERATIVE CHOLANGIOGRAM N/A 2021    Procedure: LAPAROSCOPIC CHOLECYSTECTOMY WITH CHOLANGIOGRAM;  Surgeon: Malgorzata Scott MD;  Location: [facility] OR;  Service: General;  Laterality: N/A;    COLONOSCOPY  2015    diverticulosis    ENDOSCOPY N/A 2021    Procedure: ESOPHAGOGASTRODUODENOSCOPY WITH ANESTHESIA;  Surgeon: Sonu Cheek DO;  Location: [facility] ENDOSCOPY;  Service: Gastroenterology;  Laterality: N/A;  pre nausea  post esophagitis  Delfina Pereira DO    EYE SURGERY      CATARACT SURGERY    FEMORAL ENDARTERECTOMY Left 2018    Procedure: LEFT FEMORAL ENDARTERECTOMY;  Surgeon: Walker Davis DO;  Location: [facility] HYBRID OR 12;  Service: Vascular    JOINT REPLACEMENT      RIGHT HIP    VASCULAR SURGERY         Family History   Problem Relation Age of Onset    No Known Problems Mother     Heart disease Father     Cancer Sister         Uterus    No Known Problems Brother     No Known Problems Maternal Aunt     No Known Problems Paternal Aunt     No Known Problems Maternal Grandmother     No Known Problems Paternal Grandmother     No Known Problems Daughter     No Known Problems Son     Breast cancer Neg Hx     BRCA 1/2 Neg Hx     Colon cancer Neg Hx     Endometrial cancer Neg Hx     Ovarian  cancer Neg Hx     Colon polyps Neg Hx     Esophageal cancer Neg Hx        Social History     Socioeconomic History    Marital status:    Tobacco Use    Smoking status: Former     Current packs/day: 0.00     Types: Cigarettes     Start date: 1953     Quit date: 1955     Years since quittin.3     Passive exposure: Never    Smokeless tobacco: Never   Vaping Use    Vaping status: Never Used   Substance and Sexual Activity    Alcohol use: No    Drug use: No    Sexual activity: Defer           Objective   Physical Exam  Vitals reviewed.   Constitutional:       General: She is not in acute distress.  HENT:      Head: Normocephalic and atraumatic.   Eyes:      Extraocular Movements: Extraocular movements intact.      Conjunctiva/sclera: Conjunctivae normal.   Cardiovascular:      Pulses: Normal pulses.      Heart sounds: Normal heart sounds.   Pulmonary:      Effort: Pulmonary effort is normal. No respiratory distress.      Breath sounds: Normal breath sounds. No wheezing.   Abdominal:      General: Abdomen is flat. There is no distension.      Tenderness: There is no abdominal tenderness. There is no guarding.   Musculoskeletal:      Cervical back: Normal range of motion and neck supple.   Skin:     General: Skin is warm and dry.   Neurological:      General: No focal deficit present.      Mental Status: She is alert. Mental status is at baseline.      Comments: No strength or sensory deficit noted on exam.  No cranial nerve deficit.   Psychiatric:         Behavior: Behavior normal.         Thought Content: Thought content normal.         Procedures           ED Course  ED Course as of 24 1241   Mon 2024   1307 ECG sinus bradycardia LBBB not new.  Evidence of renal failure and hyperkalemia; given the emesis will obtain CT of the pelvis without to evaluate further.  Risk-benefit discussion of transfusion discussed and family consents. [AS]   1307 LFTs are also deranged unclear etiology  patient's abdominal tenderness to suggest acute cholecystitis may be multiorgan failure [AS]   1528 Dr. Navarrete states he is unable to do dialysis access so she will have to be transferred if they want to consider dialysis for treatment. [AS]   1545 I discussed with the family and the patient again.  She is DNR/DNI, says that she knows her time has come, she is 89, does not want any serious interventions, and does not want dialysis.  She understands that if she does not get dialysis she will die from her hyperkalemia and renal failure and she accepts this, understands, and still does not want dialysis. [AS]      ED Course User Index  [AS] Curt Garcia MD                                             Medical Decision Making  Problems Addressed:  Anemia, unspecified type: complicated acute illness or injury    Amount and/or Complexity of Data Reviewed  Labs: ordered.  Radiology: ordered.  ECG/medicine tests: ordered.    Risk  OTC drugs.  Prescription drug management.  Decision regarding hospitalization.      Mya Chung is a 89 y.o. female with PMH above who presents to the Emergency Department with generalized weakness and fatigue.  No sign of dark stools or hematochezia but has dropped her hemoglobin point half in the past few days.  Will request [facility] for further workup.  Chaperoned [facility] light brown stool but guaiac positive. See ED course.       ED Course:   -admitted to hospitalist with nephro consult      Final diagnosis: renal failure hyperkalemia    All questions answered. Patient/family was understanding and in agreement with today's assessment and plan. The patient was monitored during their stay in the ED and dispositioned without acute event.    Electronically signed by:  Curt Garcia MD 4/3/2024 12:41 CDT      Note: Dragon medical dictation software was used in the creation of this note.      Final diagnoses:   Anemia, unspecified type       ED Disposition  ED Disposition       ED  Disposition   Decision to Admit    Condition   --    Comment   Level of Care: Telemetry [5]   Diagnosis: Renal failure [320445]   Admitting Physician: CORINNE SOSA [[id number]]   Attending Physician: CORINNE SOSA [[id number]]   Certification: I Certify That Inpatient Hospital Services Are Medically Necessary For Greater Than 2 Midnights                 No follow-up provider specified.       Medication List        ASK your doctor about these medications      levothyroxine 125 MCG tablet  Commonly known as: SYNTHROID, LEVOTHROID  Ask about: Which instructions should I use?                 Curt Garcia MD  04/03/24 [id number]

## 2024-04-01 NOTE — Clinical Note
Level of Care: Telemetry [5]   Diagnosis: Renal failure [809698]   Admitting Physician: LASHAY LAGUNAS [[id number]]   Attending Physician: LASHAY LAGUNAS [[id number]]   Certification: I Certify That Inpatient Hospital Services Are Medically Necessary For Greater Than 2 Midnights

## 2024-04-01 NOTE — ED NOTES
Patient stated that she was not sure what was going on.  Family stated that the patient has not eaten for four days, that anytime she tries she gets sick to her stomach.  Family also stated that she was here on Thursday of last week for similar complaints but it has gotten worse.

## 2024-04-01 NOTE — CONSULTS
Nephrology ([facility]) Consult Note      Patient:  Mya Chung  YOB: 1928  Date of Service: 4/1/2024  MRN: [id number]   Acct: [id number]   Primary Care Physician: Delfina Pereira DO  Advance Directive:   There are no questions and answers to display.     Admit Date: 4/1/2024       Hospital Day: 0  Referring Provider: No ref. provider found      Patient personally seen and examined.  Complete chart including Consults, Notes, Operative Reports, Labs, Cardiology, and Radiology studies reviewed as able.        Subjective:  Mya Chung is a 89 y.o. female for whom we were consulted for evaluation and treatment of acute kidney injury with hyperkalemia.  Patient family recalled no prior nephrologic evaluation.  She has had progressive nausea and vomiting with lack of appetite for a month.  Baseline labs available for comparison include a creatinine of 0.86 on 12/20/2023 and 1.29 on 3/28/2024.  Urinalysis at that time demonstrated some proteinuria and glucosuria.  When presenting to the emergency room continue to worsen to a BUN of 54 and a creatinine of 2.3 with potassium of 6.7 on 4/1.  She was supplemented hospital service and we were asked to consult for medical management of the kidney failure and hyperkalemia.  Patient and family discussed with emergency room physician Dr. Baez they elected not to proceed with consideration of renal placement therapy.  She notes that it received or was receiving maximal medical management including Lokelma.  She appeared obviously volume depleted on examination with very dry skin and mucous membranes.    Allergies:  Erythromycin, Zithromax [azithromycin], and Penicillins    Home Meds:  (Not in a hospital admission)      Medicines:  Current Facility-Administered Medications   Medication Dose Route Frequency Provider Last Rate Last Admin    sodium chloride 0.9 % bolus 250 mL  250 mL Intravenous Once Curt Garcia  MD Salvador         Current Outpatient Medications   Medication Sig Dispense Refill    acetaminophen (TYLENOL) 325 MG tablet Take 2 tablets by mouth Every 4 (Four) Hours As Needed for Mild Pain.      alendronate (FOSAMAX) 70 MG tablet Take 1 tablet by mouth Every 7 (Seven) Days. Thursday      aspirin 81 MG EC tablet Take 1 tablet by mouth Daily.      Calcium Carbonate-Vitamin D (CALCIUM 600/VITAMIN D PO) Take 1 tablet by mouth Daily.      carvedilol (COREG) 3.125 MG tablet Take 1 tablet by mouth 2 (Two) Times a Day. 180 tablet 3    clopidogrel (PLAVIX) 75 MG tablet Take 1 tablet by mouth Daily. 90 tablet 3    colestipol (COLESTID) 1 g tablet Take 1 tablet by mouth Every Other Day.      donepezil (Aricept) 5 MG tablet Take 1 tablet by mouth Every Night. 90 tablet 0    doxycycline (MONODOX) 100 MG capsule Take 1 capsule by mouth 2 (Two) Times a Day. 14 capsule 0    empagliflozin (Jardiance) 10 MG tablet tablet Take 1 tablet by mouth Daily. 90 tablet 3    escitalopram (LEXAPRO) 10 MG tablet Take 1 tablet by mouth Daily.      furosemide (LASIX) 20 MG tablet Take 1 tablet by mouth As Needed (for heart failure, leg swelling, weight gain). 90 tablet 3    Multiple Vitamins-Minerals (COMPLETE MULTIVITAMIN/MINERAL PO) Take 1 tablet by mouth Daily.      ondansetron ODT (ZOFRAN-ODT) 4 MG disintegrating tablet Place 1 tablet on the tongue Every 8 (Eight) Hours As Needed for Vomiting. 15 tablet 0    oxyCODONE (ROXICODONE) 5 MG immediate release tablet Take 1 tablet by mouth Every 12 (Twelve) Hours As Needed for Moderate Pain. 2 tablet 0    Synthroid 150 MCG tablet Take 125 mcg by mouth Daily. Sunday-Friday      gabapentin (NEURONTIN) 100 MG capsule Take 1 capsule by mouth Every Night for 3 days. Last filled #180 for 90 DS on 10/18/23 3 capsule 0    Glucosamine-Chondroitin-Vit D3 4236-6855-426 MG-MG-UNIT pack Take 1 tablet by mouth 2 (Two) Times a Day.         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     Wears glasses        Past Surgical History:  Past Surgical History:   Procedure Laterality Date    AORTAGRAM Right 2018    Procedure: LEFT LOWER EXTREMITY ANGIOGRAM;  Surgeon: Walker Davis DO;  Location: [facility] HYBRID OR 12;  Service: Vascular    BREAST BIOPSY Bilateral     multiple on both, all benign    BREAST BIOPSY Left 2017    Procedure: LEFT MAMMOGRAM GUIDED NEEDLE DIRECTED EXCISIONAL BREAST BIOPSY;  Surgeon: Malgorzata Scott MD;  Location: [facility] OR;  Service:     BUNIONECTOMY Bilateral      SECTION      X2    CHOLECYSTECTOMY      CHOLECYSTECTOMY WITH INTRAOPERATIVE CHOLANGIOGRAM N/A 2021    Procedure: LAPAROSCOPIC CHOLECYSTECTOMY WITH CHOLANGIOGRAM;  Surgeon: Malgorzata Scott MD;  Location: [facility] OR;  Service: General;  Laterality: N/A;    COLONOSCOPY  2015    diverticulosis    ENDOSCOPY N/A 2021    Procedure: ESOPHAGOGASTRODUODENOSCOPY WITH ANESTHESIA;  Surgeon: Sonu Cheek DO;  Location: [facility] ENDOSCOPY;  Service: Gastroenterology;  Laterality: N/A;  pre nausea  post esophagitis  Delfina Pereira DO    EYE SURGERY      CATARACT SURGERY    FEMORAL ENDARTERECTOMY Left 2018    Procedure: LEFT FEMORAL ENDARTERECTOMY;  Surgeon: Walker Davis DO;  Location: [facility] HYBRID OR 12;  Service: Vascular    JOINT REPLACEMENT      RIGHT HIP    VASCULAR SURGERY         Family History  Family History   Problem Relation Age of Onset    No Known Problems Mother     Heart disease Father     Cancer Sister         Uterus    No Known Problems Brother     No Known Problems Maternal Aunt     No Known Problems Paternal  "Aunt     No Known Problems Maternal Grandmother     No Known Problems Paternal Grandmother     No Known Problems Daughter     No Known Problems Son     Breast cancer Neg Hx     BRCA 1/2 Neg Hx     Colon cancer Neg Hx     Endometrial cancer Neg Hx     Ovarian cancer Neg Hx     Colon polyps Neg Hx     Esophageal cancer Neg Hx        Social History  Social History     Socioeconomic History    Marital status:    Tobacco Use    Smoking status: Former     Current packs/day: 0.00     Types: Cigarettes     Start date: 1953     Quit date: 1955     Years since quittin.2     Passive exposure: Never    Smokeless tobacco: Never   Vaping Use    Vaping status: Never Used   Substance and Sexual Activity    Alcohol use: No    Drug use: No    Sexual activity: Defer         Review of Systems:  History obtained from chart review and the patient  General ROS: No fever or chills  Respiratory ROS: No cough, shortness of breath, wheezing  Cardiovascular ROS: No chest pain or palpitations  Gastrointestinal ROS: No abdominal pain or melena  Genito-Urinary ROS: No dysuria or hematuria  Psych ROS: No anxiety and depression  14 point ROS reviewed with the patient and negative except as noted above and in the HPI unless unable to obtain.      Objective:  Patient Vitals for the past 24 hrs:   BP Temp Pulse Resp SpO2 Height Weight   24 1601 110/40 -- (!) 49 18 96 % -- --   24 1438 -- -- 50 18 100 % -- --   24 1247 111/55 -- 52 18 97 % -- --   24 1124 (!) 113/33 97.7 °F (36.5 °C) 50 17 97 % 162.6 cm (64\") 49 kg (108 lb)     No intake or output data in the 24 hours ending 24 1657  General: awake/alert , dry mm  Chest:  clear to auscultation bilaterally without respiratory distress  CVS: regular rate and rhythm  Abdominal: soft, nontender, positive bowel sounds  Extremities: no cyanosis or edema  Skin: warm and dry without rash      Labs:  Results from last 7 days   Lab Units 24  1210 " 03/28/24  1945   WBC 10*3/mm3 11.66* 7.68   HEMOGLOBIN g/dL 6.2* 7.8*   HEMATOCRIT % 22.3* 26.7*   PLATELETS 10*3/mm3 117* 316         Results from last 7 days   Lab Units 04/01/24  1502 04/01/24  1210 03/28/24  1945   SODIUM mmol/L 132* 132* 138   POTASSIUM mmol/L 6.7* 6.7* 4.6   CHLORIDE mmol/L 95* 95* 101   CO2 mmol/L 17.0* 18.0* 22.0   BUN mg/dL 56* 54* 27*   CREATININE mg/dL 3.00* 2.83* 1.29*   CALCIUM mg/dL 9.0 9.2 9.3   EGFR mL/min/1.73 13.8* 14.8* 38.1*   BILIRUBIN mg/dL  --  1.6* 1.1   ALK PHOS U/L  --  198* 128*   ALT (SGPT) U/L  --  2,242* 130*   AST (SGOT) U/L  --  4,382* 144*   GLUCOSE mg/dL 66 79 96       Radiology:   Imaging Results (Last 72 Hours)       Procedure Component Value Units Date/Time    CT Abdomen Pelvis Without Contrast [[id number]] Collected: 04/01/24 1332     Updated: 04/01/24 1352    Narrative:      EXAMINATION: CT ABDOMEN PELVIS WO CONTRAST- 4/1/2024 1:32 PM     HISTORY: Acute kidney injury, decreased gfr and hyperkalemia.     TOTAL DOSE: 227 mGy.cm (Automatic exposure control technique was  implemented in an effort to keep the radiation dose as low as possible  without compromising image quality)     REPORT: Spiral CT of the abdomen and pelvis was performed without  contrast from the lung bases through the pubic symphysis. Reconstructed  coronal and sagittal images are also reviewed.     Comparison: CT of the pelvis 10/20/2022. Ultrasound of the kidneys  12/12/2022.     Review of lung windows demonstrates mild respiratory motion artifact,  there is a small right pleural effusion. The heart is enlarged. There is  a questionable small right-sided pericardial effusion. Heavy calcified  plaque is present within the coronary arteries.     Evaluation of the solid abdominal organs is limited without intravenous  contrast. Decreased attenuation of the liver parenchyma probably  represents steatosis. Cholecystectomy clips are present. The spleen  appears within normal limits. There is  incomplete distention of the  stomach which contains some fluid. There is a large slightly complex  cyst arising from the lateral left kidney which measures 8.7 cm. This  contains a thin septation and calcification within its inferior medial  wall. No nephrolithiasis or hydronephrosis is identified. The ureters  are decompressed. There is a small amount of free fluid in the pelvis,  the source is not clear. No free air is identified. There is mild  sigmoid diverticulosis, no evidence of acute diverticulitis. The bladder  is decompressed. Heavy calcified plaque is noted within the abdominal  aorta and its branches as before. There is normal aortic caliber. Small  surgical clips are noted adjacent to the left femoral artery.  Nonvisualization of the appendix. Review of bone windows demonstrates  partially healed fractures of the pubic ring bilaterally. There is  previous right total hip arthroplasty. Multilevel degenerative changes  are present in the lumbar spine includes grade 1 spondylolisthesis at  L4-5 with disc space collapse.       Impression:      1. Limited noncontrast study shows a small amount of free fluid in the  pelvis, the etiology is unknown. No free air or abscess is identified.  No evidence of hydronephrosis or ureteral dilation. Large stable  slightly complex cyst arising from the lateral left kidney measuring 8.7  x 8.9 cm.  2. Hepatic steatosis, evidence of previous cholecystectomy.  3. Mild sigmoid diverticulosis without definite diverticulitis.  4. Partially healed fractures of the bilateral pubic ring and evidence  of previous right hip arthroplasty. Advanced degenerative changes within  the lumbar spine.     This report was signed and finalized on 4/1/2024 1:48 PM by Dr. Barrett Arreguin MD.       CT Head Without Contrast [[id number]] Collected: 04/01/24 1325     Updated: 04/01/24 1331    Narrative:      EXAMINATION: CT HEAD WO CONTRAST- 4/1/2024 1:25 PM     HISTORY: Altered mental status.      DOSE: 921 mGycm (Automatic exposure control technique was implemented in  an effort to keep the radiation dose as low as possible without  compromising image quality)     REPORT: Spiral CT of the head was performed without contrast,  reconstructed coronal and sagittal images are also reviewed.     COMPARISON: CT head without contrast 12/16/2023.     No intracranial hemorrhage, mass or mass effect is identified. There is  mild age compatible cerebral atrophy. Decreased attenuation in the  periventricular and subcortical white matter tracts is compatible with  chronic small vessel white matter ischemic disease, this is stable. The  ventricles and basal cisterns are within normal limits. Review of bone  windows is unremarkable. There is normal aeration of the visualized  paranasal sinuses and mastoid air cells.       Impression:      1. No acute intracranial abnormality, no interval change. Mild diffuse  cerebral atrophy and mild to moderate chronic small vessel white matter  ischemic changes are identified.        This report was signed and finalized on 4/1/2024 1:28 PM by Dr. Barrett Arreguin MD.       XR Chest 1 View [[id number]] Collected: 04/01/24 1231     Updated: 04/01/24 1237    Narrative:      EXAMINATION: XR CHEST 1 VW- 4/1/2024 12:31 PM     HISTORY: pneumonia evaluation.     REPORT: A frontal view of the chest was obtained.     COMPARISON: Chest x-ray 3/28/2024, 12/16/2023.     There is blunting of the costophrenic angles compatible with small  bilateral pleural effusions, which appear decreased in size. There is  improved aeration of the left lung base with decreased infiltrate. No  pneumothorax is identified. There is underlying COPD. Mild cardiomegaly  is present without evidence of overt CHF. Again noted are partially  healed fractures of the surgical neck of the right humerus as well as  the proximal shaft, with displacement.       Impression:      1. Improved aeration of the left lung base and  "decrease in size of small  bilateral pleural effusions. No lung consolidation is identified.  Advanced COPD.  2. Cardiomegaly without evidence of CHF.  3. Partially healed closed fractures of the proximal humerus as  described.           This report was signed and finalized on 4/1/2024 12:34 PM by Dr. Barrett Arreguin MD.               Culture:  No results found for: \"BLOODCX\", \"URINECX\", \"WOUNDCX\", \"MRSACX\", \"RESPCX\", \"STOOLCX\"      Assessment   Acute kidney injury  Hyperkalemia  Acute liver injury  Hyponatremia  Metabolic acidosis  Elevated INR  Anemia  Large left complex renal cyst    Plan:  Discussed with patient, nursing, family, Dr. Garcia  Workup reviewed today with further testing ordered ongoing  Monitor labs  Cyst evaluation ultimately per urology but given the advanced age and relatively stable appearance may not be of high yield further testing  Continue maximal medical management of potassium with Lokelma as appropriate from the agent, not considering dialysis per patient and family wishes  IV fluid trial  Hold diuretics and Jardiance  Reassess in the morning  Consider transfusion for hemoglobin less than 7 which will likely decrease with volume replacement      Thank you for the consult, we appreciate the opportunity to provide care to your patients.  Feel free to contact me if I can be of any further assistance.      Roosevelt Navarrete MD  4/1/2024  16:57 CDT    "

## 2024-04-01 NOTE — ED NOTES
[facility] hospitalist ANGY speaking with family and they have decided to hold off on the unit of blood at this time.

## 2024-04-02 PROBLEM — E43 SEVERE MALNUTRITION: Status: ACTIVE | Noted: 2024-04-02

## 2024-04-02 LAB
25(OH)D3 SERPL-MCNC: 48.1 NG/ML (ref 30–100)
ALBUMIN SERPL-MCNC: 3.5 G/DL (ref 3.5–5.2)
ALBUMIN/GLOB SERPL: 1.5 G/DL
ALP SERPL-CCNC: 185 U/L (ref 39–117)
ALT SERPL W P-5'-P-CCNC: 2632 U/L (ref 1–33)
ANION GAP SERPL CALCULATED.3IONS-SCNC: 13 MMOL/L (ref 5–15)
ANION GAP SERPL CALCULATED.3IONS-SCNC: 13 MMOL/L (ref 5–15)
ANION GAP SERPL CALCULATED.3IONS-SCNC: 16 MMOL/L (ref 5–15)
ANION GAP SERPL CALCULATED.3IONS-SCNC: 19 MMOL/L (ref 5–15)
AST SERPL-CCNC: 4928 U/L (ref 1–32)
BILIRUB SERPL-MCNC: 1.8 MG/DL (ref 0–1.2)
BUN SERPL-MCNC: 65 MG/DL (ref 8–23)
BUN SERPL-MCNC: 66 MG/DL (ref 8–23)
BUN SERPL-MCNC: 70 MG/DL (ref 8–23)
BUN SERPL-MCNC: 71 MG/DL (ref 8–23)
BUN/CREAT SERPL: 19.2 (ref 7–25)
BUN/CREAT SERPL: 19.5 (ref 7–25)
BUN/CREAT SERPL: 21.3 (ref 7–25)
BUN/CREAT SERPL: 21.5 (ref 7–25)
CALCIUM SPEC-SCNC: 7.9 MG/DL (ref 8.2–9.6)
CALCIUM SPEC-SCNC: 8.6 MG/DL (ref 8.2–9.6)
CALCIUM SPEC-SCNC: 9 MG/DL (ref 8.2–9.6)
CALCIUM SPEC-SCNC: 9.1 MG/DL (ref 8.2–9.6)
CHLORIDE SERPL-SCNC: 96 MMOL/L (ref 98–107)
CHLORIDE SERPL-SCNC: 97 MMOL/L (ref 98–107)
CHLORIDE SERPL-SCNC: 97 MMOL/L (ref 98–107)
CHLORIDE SERPL-SCNC: 98 MMOL/L (ref 98–107)
CO2 SERPL-SCNC: 18 MMOL/L (ref 22–29)
CO2 SERPL-SCNC: 21 MMOL/L (ref 22–29)
CO2 SERPL-SCNC: 21 MMOL/L (ref 22–29)
CO2 SERPL-SCNC: 24 MMOL/L (ref 22–29)
CREAT SERPL-MCNC: 3.29 MG/DL (ref 0.57–1)
CREAT SERPL-MCNC: 3.3 MG/DL (ref 0.57–1)
CREAT SERPL-MCNC: 3.38 MG/DL (ref 0.57–1)
CREAT SERPL-MCNC: 3.39 MG/DL (ref 0.57–1)
CREAT UR-MCNC: 108.4 MG/DL
DEPRECATED RDW RBC AUTO: 57.6 FL (ref 37–54)
EGFRCR SERPLBLD CKD-EPI 2021: 11.9 ML/MIN/1.73
EGFRCR SERPLBLD CKD-EPI 2021: 12 ML/MIN/1.73
EGFRCR SERPLBLD CKD-EPI 2021: 12.3 ML/MIN/1.73
EGFRCR SERPLBLD CKD-EPI 2021: 12.4 ML/MIN/1.73
ERYTHROCYTE [DISTWIDTH] IN BLOOD BY AUTOMATED COUNT: 18.6 % (ref 12.3–15.4)
GLOBULIN UR ELPH-MCNC: 2.3 GM/DL
GLUCOSE BLDC GLUCOMTR-MCNC: 104 MG/DL (ref 70–130)
GLUCOSE BLDC GLUCOMTR-MCNC: 129 MG/DL (ref 70–130)
GLUCOSE BLDC GLUCOMTR-MCNC: 152 MG/DL (ref 70–130)
GLUCOSE BLDC GLUCOMTR-MCNC: 178 MG/DL (ref 70–130)
GLUCOSE BLDC GLUCOMTR-MCNC: 204 MG/DL (ref 70–130)
GLUCOSE BLDC GLUCOMTR-MCNC: 288 MG/DL (ref 70–130)
GLUCOSE SERPL-MCNC: 102 MG/DL (ref 65–99)
GLUCOSE SERPL-MCNC: 124 MG/DL (ref 65–99)
GLUCOSE SERPL-MCNC: 124 MG/DL (ref 65–99)
GLUCOSE SERPL-MCNC: 96 MG/DL (ref 65–99)
HCT VFR BLD AUTO: 24.4 % (ref 34–46.6)
HGB BLD-MCNC: 7.2 G/DL (ref 12–15.9)
INR PPP: 3.7 (ref 0.91–1.09)
MCH RBC QN AUTO: 25 PG (ref 26.6–33)
MCHC RBC AUTO-ENTMCNC: 29.5 G/DL (ref 31.5–35.7)
MCV RBC AUTO: 84.7 FL (ref 79–97)
PLATELET # BLD AUTO: 61 10*3/MM3 (ref 140–450)
PMV BLD AUTO: 12.8 FL (ref 6–12)
POTASSIUM SERPL-SCNC: 4.6 MMOL/L (ref 3.5–5.2)
POTASSIUM SERPL-SCNC: 4.7 MMOL/L (ref 3.5–5.2)
POTASSIUM SERPL-SCNC: 5.2 MMOL/L (ref 3.5–5.2)
POTASSIUM SERPL-SCNC: 6.1 MMOL/L (ref 3.5–5.2)
PROT ?TM UR-MCNC: 270.2 MG/DL
PROT SERPL-MCNC: 5.8 G/DL (ref 6–8.5)
PROTHROMBIN TIME: 38.1 SECONDS (ref 11.8–14.8)
PTH-INTACT SERPL-MCNC: 92.1 PG/ML (ref 15–65)
RBC # BLD AUTO: 2.88 10*6/MM3 (ref 3.77–5.28)
SODIUM SERPL-SCNC: 131 MMOL/L (ref 136–145)
SODIUM SERPL-SCNC: 133 MMOL/L (ref 136–145)
SODIUM SERPL-SCNC: 134 MMOL/L (ref 136–145)
SODIUM SERPL-SCNC: 135 MMOL/L (ref 136–145)
SODIUM UR-SCNC: 24 MMOL/L
UUN 24H UR-MCNC: 183 MG/DL
WBC NRBC COR # BLD AUTO: 12.46 10*3/MM3 (ref 3.4–10.8)

## 2024-04-02 PROCEDURE — 63710000001 INSULIN REGULAR HUMAN PER 5 UNITS: Performed by: STUDENT IN AN ORGANIZED HEALTH CARE EDUCATION/TRAINING PROGRAM

## 2024-04-02 PROCEDURE — 85610 PROTHROMBIN TIME: CPT | Performed by: NURSE PRACTITIONER

## 2024-04-02 PROCEDURE — 84156 ASSAY OF PROTEIN URINE: CPT | Performed by: INTERNAL MEDICINE

## 2024-04-02 PROCEDURE — 25810000003 SODIUM CHLORIDE 0.9 % SOLUTION: Performed by: NURSE PRACTITIONER

## 2024-04-02 PROCEDURE — 80053 COMPREHEN METABOLIC PANEL: CPT | Performed by: NURSE PRACTITIONER

## 2024-04-02 PROCEDURE — 82570 ASSAY OF URINE CREATININE: CPT | Performed by: INTERNAL MEDICINE

## 2024-04-02 PROCEDURE — 83970 ASSAY OF PARATHORMONE: CPT | Performed by: INTERNAL MEDICINE

## 2024-04-02 PROCEDURE — 36415 COLL VENOUS BLD VENIPUNCTURE: CPT | Performed by: INTERNAL MEDICINE

## 2024-04-02 PROCEDURE — 84540 ASSAY OF URINE/UREA-N: CPT | Performed by: INTERNAL MEDICINE

## 2024-04-02 PROCEDURE — 84165 PROTEIN E-PHORESIS SERUM: CPT | Performed by: INTERNAL MEDICINE

## 2024-04-02 PROCEDURE — 82306 VITAMIN D 25 HYDROXY: CPT | Performed by: INTERNAL MEDICINE

## 2024-04-02 PROCEDURE — 25010000002 CALCIUM GLUCONATE-NACL 1-0.675 GM/50ML-% SOLUTION: Performed by: STUDENT IN AN ORGANIZED HEALTH CARE EDUCATION/TRAINING PROGRAM

## 2024-04-02 PROCEDURE — 99231 SBSQ HOSP IP/OBS SF/LOW 25: CPT | Performed by: CLINICAL NURSE SPECIALIST

## 2024-04-02 PROCEDURE — 82948 REAGENT STRIP/BLOOD GLUCOSE: CPT

## 2024-04-02 PROCEDURE — 85027 COMPLETE CBC AUTOMATED: CPT | Performed by: NURSE PRACTITIONER

## 2024-04-02 PROCEDURE — 84300 ASSAY OF URINE SODIUM: CPT | Performed by: INTERNAL MEDICINE

## 2024-04-02 PROCEDURE — 92610 EVALUATE SWALLOWING FUNCTION: CPT

## 2024-04-02 RX ORDER — LEVOTHYROXINE SODIUM 0.12 MG/1
125 TABLET ORAL
COMMUNITY

## 2024-04-02 RX ORDER — DEXTROSE MONOHYDRATE 25 G/50ML
25 INJECTION, SOLUTION INTRAVENOUS ONCE
Status: COMPLETED | OUTPATIENT
Start: 2024-04-02 | End: 2024-04-02

## 2024-04-02 RX ORDER — CALCIUM GLUCONATE 20 MG/ML
1000 INJECTION, SOLUTION INTRAVENOUS ONCE
Status: COMPLETED | OUTPATIENT
Start: 2024-04-02 | End: 2024-04-02

## 2024-04-02 RX ORDER — FAMOTIDINE 20 MG/1
20 TABLET, FILM COATED ORAL DAILY
Status: DISCONTINUED | OUTPATIENT
Start: 2024-04-02 | End: 2024-04-05 | Stop reason: HOSPADM

## 2024-04-02 RX ADMIN — DEXTROSE MONOHYDRATE 25 G: 25 INJECTION, SOLUTION INTRAVENOUS at 01:17

## 2024-04-02 RX ADMIN — INSULIN HUMAN 5 UNITS: 100 INJECTION, SOLUTION PARENTERAL at 01:17

## 2024-04-02 RX ADMIN — FAMOTIDINE 20 MG: 20 TABLET, FILM COATED ORAL at 15:38

## 2024-04-02 RX ADMIN — Medication 10 ML: at 10:02

## 2024-04-02 RX ADMIN — SODIUM BICARBONATE 50 MEQ: 84 INJECTION INTRAVENOUS at 01:17

## 2024-04-02 RX ADMIN — SODIUM CHLORIDE 100 ML/HR: 9 INJECTION, SOLUTION INTRAVENOUS at 12:28

## 2024-04-02 RX ADMIN — BISACODYL 10 MG: 10 SUPPOSITORY RECTAL at 10:02

## 2024-04-02 RX ADMIN — DONEPEZIL HYDROCHLORIDE 5 MG: 5 TABLET, FILM COATED ORAL at 22:09

## 2024-04-02 RX ADMIN — CALCIUM GLUCONATE 1000 MG: 20 INJECTION, SOLUTION INTRAVENOUS at 01:17

## 2024-04-02 RX ADMIN — ESCITALOPRAM OXALATE 10 MG: 10 TABLET ORAL at 10:02

## 2024-04-02 NOTE — CASE MANAGEMENT/SOCIAL WORK
Discharge Planning Assessment  [facility]     Patient Name: Mya Chung  MRN: [id number]  Today's Date: 4/2/2024    Admit Date: 4/1/2024        Discharge Needs Assessment       Row Name 04/02/24 1421       Living Environment    People in Home child(nancy), adult    Current Living Arrangements home    Potentially Unsafe Housing Conditions none    Primary Care Provided by self    Provides Primary Care For no one    Family Caregiver if Needed child(nancy), adult    Quality of Family Relationships helpful;involved;supportive       Resource/Environmental Concerns    Resource/Environmental Concerns none       Transition Planning    Patient/Family Anticipates Transition to home with family    Patient/Family Anticipated Services at Transition none    Transportation Anticipated family or friend will provide       Discharge Needs Assessment    Readmission Within the Last 30 Days no previous admission in last 30 days    Equipment Currently Used at Home cane, straight;walker, standard;shower chair    Concerns to be Addressed care coordination/care conferences;discharge planning    Anticipated Changes Related to Illness inability to care for self    Outpatient/Agency/Support Group Needs home hospice;homecare agency;skilled nursing facility    Discharge Coordination/Progress Spoke with pt's family in the room. She typically lives alone but currently is living with family. She was recently d/c'ed from a snf. Family is currently making decisions with how they want to proceed. Hospice has been discussed but nothing for sure just yet. Will check back in a day or two.                   Discharge Plan    No documentation.                 Continued Care and Services - Admitted Since 4/1/2024    No active coordination exists for this encounter.          Demographic Summary    No documentation.                  Functional Status    No documentation.                  Psychosocial    No documentation.                  Abuse/Neglect    No  documentation.                  Legal    No documentation.                  Substance Abuse    No documentation.                  Patient Forms    No documentation.                     JOHNATHON Danielle

## 2024-04-02 NOTE — PLAN OF CARE
Goal Outcome Evaluation:  Plan of Care Reviewed With: family, patient           Outcome Evaluation: NTN assessment. Screen reduced oral intake, unsure of weight loss, poor appetite, BMI 18.54. Pt presents with inability to walk due to weakness. Palliative care following.  Reports of progressive nausea/vomiting w/ lack of appetite for the last 4 to 5 days. SLP following. Dx minimal oral dysphagia w/o esophageal component. Visited pt at bedside family  present. Spoke to pt's family. Family stated appetite has been poor with pt only being able to tolerate cooked carrots and ONS (equate, ensure) at home. Family stated  pt consume minimal food today. Observed weight report, no significant weight changes noted.  Pt advanced age. Indicators of malnutrition present. Pt has severe calorie-protein malnutrition in the context of chronic disease. Offered ONS, accepted. Ordering  Mighty Shake TID and Magic Cup BID to help pt meet estimated needs. Will continue to follow per protocol.

## 2024-04-02 NOTE — PROGRESS NOTES
Nephrology ([facility]) Progress Note      Patient:  Mya Chung  YOB: 1928  Date of Service: 4/2/2024  MRN: [id number]   Acct: [id number]   Primary Care Physician: Delfina Pereira DO  Advance Directive:   Code Status and Medical Interventions:   Ordered at: 04/02/24 1052     Medical Intervention Limits:    NO intubation (DNI)    NO cardioversion    NO dialysis    NO vasopressors    NO artificial nutrition     Level Of Support Discussed With:    Health Care Surrogate     Code Status (Patient has no pulse and is not breathing):    No CPR (Do Not Attempt to Resuscitate)     Medical Interventions (Patient has pulse or is breathing):    Limited Support     Admit Date: 4/1/2024       Hospital Day: 1  Referring Provider: No ref. provider found      Patient personally seen and examined.  Complete chart including Consults, Notes, Operative Reports, Labs, Cardiology, and Radiology studies reviewed as able.        Subjective:  Mya Chung is a 89 y.o. female for whom we were consulted for evaluation and treatment of acute kidney injury with hyperkalemia. No prior known renal issues. Presented to ER with progressive nausea and vomiting and lack of appetite for a month. Prior labs include creatinine 0.86  in December 2023, and creatinine 1.29 on 3/28/24.  In ER her creatinine was 2.3 and potassium 6.7. patient and family elected to not pursue dialysis and only wanted medial management. Received treatment including Lokelma. Also given unit PRBC for hemoglobin 6.2.  On initial nephrology exam noted to be clinically volume depleted    Today is feeling better. Awake and alert with multiple family at bedside. Potassium improved but renal function has gotten worse. Urine output nonoliguric    Allergies:  Erythromycin, Zithromax [azithromycin], and Penicillins    Home Meds:  Medications Prior to Admission   Medication Sig Dispense Refill Last Dose    acetaminophen (TYLENOL)  325 MG tablet Take 2 tablets by mouth Every 4 (Four) Hours As Needed for Mild Pain.       alendronate (FOSAMAX) 70 MG tablet Take 1 tablet by mouth Every 7 (Seven) Days. Thursday       aspirin 81 MG EC tablet Take 1 tablet by mouth Daily.       Calcium Carbonate-Vitamin D (CALCIUM 600/VITAMIN D PO) Take 1 tablet by mouth Daily.       carvedilol (COREG) 3.125 MG tablet Take 1 tablet by mouth 2 (Two) Times a Day. 180 tablet 3     clopidogrel (PLAVIX) 75 MG tablet Take 1 tablet by mouth Daily. 90 tablet 3     colestipol (COLESTID) 1 g tablet Take 1 tablet by mouth Every Other Day.       donepezil (Aricept) 5 MG tablet Take 1 tablet by mouth Every Night. 90 tablet 0     doxycycline (MONODOX) 100 MG capsule Take 1 capsule by mouth 2 (Two) Times a Day. 14 capsule 0     empagliflozin (Jardiance) 10 MG tablet tablet Take 1 tablet by mouth Daily. 90 tablet 3     escitalopram (LEXAPRO) 10 MG tablet Take 1 tablet by mouth Daily.       furosemide (LASIX) 20 MG tablet Take 1 tablet by mouth As Needed (for heart failure, leg swelling, weight gain). 90 tablet 3     Multiple Vitamins-Minerals (COMPLETE MULTIVITAMIN/MINERAL PO) Take 1 tablet by mouth Daily.       ondansetron ODT (ZOFRAN-ODT) 4 MG disintegrating tablet Place 1 tablet on the tongue Every 8 (Eight) Hours As Needed for Vomiting. 15 tablet 0     oxyCODONE (ROXICODONE) 5 MG immediate release tablet Take 1 tablet by mouth Every 12 (Twelve) Hours As Needed for Moderate Pain. 2 tablet 0     Synthroid 150 MCG tablet Take 125 mcg by mouth Daily. Sunday-Friday       gabapentin (NEURONTIN) 100 MG capsule Take 1 capsule by mouth Every Night for 3 days. Last filled #180 for 90 DS on 10/18/23 3 capsule 0     Glucosamine-Chondroitin-Vit D3 0980-0608-559 MG-MG-UNIT pack Take 1 tablet by mouth 2 (Two) Times a Day.          Medicines:  Current Facility-Administered Medications   Medication Dose Route Frequency Provider Last Rate Last Admin    acetaminophen (TYLENOL) tablet 650 mg  650  mg Oral Q4H PRN Candida Jorge APRN        Or    acetaminophen (TYLENOL) 160 MG/5ML oral solution 650 mg  650 mg Oral Q4H PRN Candida Jorge APRN        Or    acetaminophen (TYLENOL) suppository 650 mg  650 mg Rectal Q4H PRN Candida Jorge APRN        sennosides-docusate (PERICOLACE) 8.6-50 MG per tablet 2 tablet  2 tablet Oral BID PRN Candida Jorge APRN        And    polyethylene glycol (MIRALAX) packet 17 g  17 g Oral Daily PRN Candida Jorge APRN        And    bisacodyl (DULCOLAX) EC tablet 5 mg  5 mg Oral Daily PRN Candida Jorge APRN        And    bisacodyl (DULCOLAX) suppository 10 mg  10 mg Rectal Daily Candida Jorge APRN   10 mg at 04/02/24 1002    donepezil (ARICEPT) tablet 5 mg  5 mg Oral Nightly Candida Jorge APRN        escitalopram (LEXAPRO) tablet 10 mg  10 mg Oral Daily Candida Jorge APRN   10 mg at 04/02/24 1002    HYDROmorphone (DILAUDID) injection 0.5 mg  0.5 mg Intravenous Q4H PRN Candida Jorge APRN        And    naloxone (NARCAN) injection 0.4 mg  0.4 mg Intravenous Q5 Min PRN Candida Jorge APRN        levothyroxine (SYNTHROID, LEVOTHROID) tablet 125 mcg  125 mcg Oral Once per day on Sunday Monday Tuesday Wednesday Thursday Friday Candida Jorge APRN        ondansetron ODT (ZOFRAN-ODT) disintegrating tablet 4 mg  4 mg Oral Q6H PRN Candida Jorge APRN        Or    ondansetron (ZOFRAN) injection 4 mg  4 mg Intravenous Q6H PRN Candida Jorge APRN        sodium chloride 0.9 % bolus 250 mL  250 mL Intravenous Once Curt Garcia MD        sodium chloride 0.9 % flush 10 mL  10 mL Intravenous Q12H Candida Jorge APRN   10 mL at 04/02/24 1002    sodium chloride 0.9 % flush 10 mL  10 mL Intravenous PRN Jorge, Candida D, APRN        sodium chloride 0.9 % infusion 40 mL  40 mL Intravenous PRN Candida Jorge, APRN        sodium chloride 0.9 % infusion  100 mL/hr Intravenous Continuous Candida Jorge, APRN 100 mL/hr at  24 1824 100 mL/hr at 24 1824    sodium zirconium cyclosilicate (LOKELMA) packet 10 g  10 g Oral Once [email or web address]           Past Medical History:  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     Wears glasses        Past Surgical History:  Past Surgical History:   Procedure Laterality Date    AORTAGRAM Right 2018    Procedure: LEFT LOWER EXTREMITY ANGIOGRAM;  Surgeon: Walker Davis DO;  Location: [facility] OR 12;  Service: Vascular    BREAST BIOPSY Bilateral     multiple on both, all benign    BREAST BIOPSY Left 2017    Procedure: LEFT MAMMOGRAM GUIDED NEEDLE DIRECTED EXCISIONAL BREAST BIOPSY;  Surgeon: Malgorzata Scott MD;  Location: [facility] OR;  Service:     BUNIONECTOMY Bilateral      SECTION      X2    CHOLECYSTECTOMY      CHOLECYSTECTOMY WITH INTRAOPERATIVE CHOLANGIOGRAM N/A 2021    Procedure: LAPAROSCOPIC CHOLECYSTECTOMY WITH CHOLANGIOGRAM;  Surgeon: Malgorzata Sctot MD;  Location: [facility] OR;  Service: General;  Laterality: N/A;    COLONOSCOPY  2015    diverticulosis    ENDOSCOPY N/A 2021    Procedure: ESOPHAGOGASTRODUODENOSCOPY WITH ANESTHESIA;  Surgeon: Sonu Cheek DO;  Location: [facility] ENDOSCOPY;  Service: Gastroenterology;  Laterality: N/A;  pre nausea  post esophagitis  Delfina Pereira DO    EYE SURGERY      CATARACT SURGERY    FEMORAL ENDARTERECTOMY Left 2018    Procedure: LEFT FEMORAL ENDARTERECTOMY;  Surgeon: Walker Davis DO;  Location: [facility] HYBRID OR 12;  Service: Vascular    JOINT REPLACEMENT      RIGHT HIP    VASCULAR SURGERY         Family History  Family History    Problem Relation Age of Onset    No Known Problems Mother     Heart disease Father     Cancer Sister         Uterus    No Known Problems Brother     No Known Problems Maternal Aunt     No Known Problems Paternal Aunt     No Known Problems Maternal Grandmother     No Known Problems Paternal Grandmother     No Known Problems Daughter     No Known Problems Son     Breast cancer Neg Hx     BRCA 1/2 Neg Hx     Colon cancer Neg Hx     Endometrial cancer Neg Hx     Ovarian cancer Neg Hx     Colon polyps Neg Hx     Esophageal cancer Neg Hx        Social History  Social History     Socioeconomic History    Marital status:    Tobacco Use    Smoking status: Former     Current packs/day: 0.00     Types: Cigarettes     Start date: 1953     Quit date: 1955     Years since quittin.2     Passive exposure: Never    Smokeless tobacco: Never   Vaping Use    Vaping status: Never Used   Substance and Sexual Activity    Alcohol use: No    Drug use: No    Sexual activity: Defer       Review of Systems:  History obtained from chart review and the patient  General ROS: No fever or chills  Respiratory ROS: No cough, shortness of breath, wheezing  Cardiovascular ROS: No chest pain or palpitations  Gastrointestinal ROS: No abdominal pain or melena  Genito-Urinary ROS: No dysuria or hematuria  Psych ROS: No anxiety and depression  14 point ROS reviewed with the patient and negative except as noted above and in the HPI unless unable to obtain.    Objective:  Patient Vitals for the past 24 hrs:   BP Temp Temp src Pulse Resp SpO2   24 0730 113/46 97.1 °F (36.2 °C) Oral 63 16 93 %   24 0354 111/45 98.1 °F (36.7 °C) Oral 58 16 91 %   24 0045 117/45 97.8 °F (36.6 °C) Oral 55 16 94 %   24 2112 103/40 97.7 °F (36.5 °C) Oral 53 16 95 %   24 (!) 113/31 98 °F (36.7 °C) Oral 53 18 98 %   24 (!) 104/34 97.6 °F (36.4 °C) Oral 54 18 96 %   24 183 (!) 118/39 97.6 °F (36.4 °C) Oral 55 --  --   04/01/24 1601 110/40 -- -- (!) 49 18 96 %   04/01/24 1438 -- -- -- 50 18 100 %   04/01/24 1247 111/55 -- -- 52 18 97 %       Intake/Output Summary (Last 24 hours) at 4/2/2024 1144  Last data filed at 4/2/2024 0045  Gross per 24 hour   Intake 300 ml   Output --   Net 300 ml     General: awake/alert   Chest:  clear to auscultation bilaterally without respiratory distress  CVS: regular rate and rhythm  Abdominal: soft, nontender, positive bowel sounds  Extremities: no cyanosis or edema  Skin: warm and dry without rash      Labs:  Results from last 7 days   Lab Units 04/02/24  0656 04/01/24  1210 03/28/24  1945   WBC 10*3/mm3 12.46* 11.66* 7.68   HEMOGLOBIN g/dL 7.2* 6.2* 7.8*   HEMATOCRIT % 24.4* 22.3* 26.7*   PLATELETS 10*3/mm3 61* 117* 316         Results from last 7 days   Lab Units 04/02/24  0407 04/02/24  0015 04/01/24  1502 04/01/24  1210 03/28/24  1945   SODIUM mmol/L 135* 133* 132* 132* 138   POTASSIUM mmol/L 5.2 6.1* 6.7* 6.7* 4.6   CHLORIDE mmol/L 98 96* 95* 95* 101   CO2 mmol/L 21.0* 18.0* 17.0* 18.0* 22.0   BUN mg/dL 66* 65* 56* 54* 27*   CREATININE mg/dL 3.39* 3.38* 3.00* 2.83* 1.29*   CALCIUM mg/dL 9.1 9.0 9.0 9.2 9.3   EGFR mL/min/1.73 11.9* 12.0* 13.8* 14.8* 38.1*   BILIRUBIN mg/dL 1.8*  --   --  1.6* 1.1   ALK PHOS U/L 185*  --   --  198* 128*   ALT (SGPT) U/L 2,632*  --   --  2,242* 130*   AST (SGOT) U/L 4,928*  --   --  4,382* 144*   GLUCOSE mg/dL 124* 96 66 79 96       Radiology:   Imaging Results (Last 72 Hours)       Procedure Component Value Units Date/Time    CT Abdomen Pelvis Without Contrast [[id number]] Collected: 04/01/24 1332     Updated: 04/01/24 1352    Narrative:      EXAMINATION: CT ABDOMEN PELVIS WO CONTRAST- 4/1/2024 1:32 PM     HISTORY: Acute kidney injury, decreased gfr and hyperkalemia.     TOTAL DOSE: 227 mGy.cm (Automatic exposure control technique was  implemented in an effort to keep the radiation dose as low as possible  without compromising image quality)     REPORT:  Spiral CT of the abdomen and pelvis was performed without  contrast from the lung bases through the pubic symphysis. Reconstructed  coronal and sagittal images are also reviewed.     Comparison: CT of the pelvis 10/20/2022. Ultrasound of the kidneys  12/12/2022.     Review of lung windows demonstrates mild respiratory motion artifact,  there is a small right pleural effusion. The heart is enlarged. There is  a questionable small right-sided pericardial effusion. Heavy calcified  plaque is present within the coronary arteries.     Evaluation of the solid abdominal organs is limited without intravenous  contrast. Decreased attenuation of the liver parenchyma probably  represents steatosis. Cholecystectomy clips are present. The spleen  appears within normal limits. There is incomplete distention of the  stomach which contains some fluid. There is a large slightly complex  cyst arising from the lateral left kidney which measures 8.7 cm. This  contains a thin septation and calcification within its inferior medial  wall. No nephrolithiasis or hydronephrosis is identified. The ureters  are decompressed. There is a small amount of free fluid in the pelvis,  the source is not clear. No free air is identified. There is mild  sigmoid diverticulosis, no evidence of acute diverticulitis. The bladder  is decompressed. Heavy calcified plaque is noted within the abdominal  aorta and its branches as before. There is normal aortic caliber. Small  surgical clips are noted adjacent to the left femoral artery.  Nonvisualization of the appendix. Review of bone windows demonstrates  partially healed fractures of the pubic ring bilaterally. There is  previous right total hip arthroplasty. Multilevel degenerative changes  are present in the lumbar spine includes grade 1 spondylolisthesis at  L4-5 with disc space collapse.       Impression:      1. Limited noncontrast study shows a small amount of free fluid in the  pelvis, the etiology is  unknown. No free air or abscess is identified.  No evidence of hydronephrosis or ureteral dilation. Large stable  slightly complex cyst arising from the lateral left kidney measuring 8.7  x 8.9 cm.  2. Hepatic steatosis, evidence of previous cholecystectomy.  3. Mild sigmoid diverticulosis without definite diverticulitis.  4. Partially healed fractures of the bilateral pubic ring and evidence  of previous right hip arthroplasty. Advanced degenerative changes within  the lumbar spine.     This report was signed and finalized on 4/1/2024 1:48 PM by Dr. Barrett Arreguin MD.       CT Head Without Contrast [[id number]] Collected: 04/01/24 1325     Updated: 04/01/24 1331    Narrative:      EXAMINATION: CT HEAD WO CONTRAST- 4/1/2024 1:25 PM     HISTORY: Altered mental status.     DOSE: 921 mGycm (Automatic exposure control technique was implemented in  an effort to keep the radiation dose as low as possible without  compromising image quality)     REPORT: Spiral CT of the head was performed without contrast,  reconstructed coronal and sagittal images are also reviewed.     COMPARISON: CT head without contrast 12/16/2023.     No intracranial hemorrhage, mass or mass effect is identified. There is  mild age compatible cerebral atrophy. Decreased attenuation in the  periventricular and subcortical white matter tracts is compatible with  chronic small vessel white matter ischemic disease, this is stable. The  ventricles and basal cisterns are within normal limits. Review of bone  windows is unremarkable. There is normal aeration of the visualized  paranasal sinuses and mastoid air cells.       Impression:      1. No acute intracranial abnormality, no interval change. Mild diffuse  cerebral atrophy and mild to moderate chronic small vessel white matter  ischemic changes are identified.        This report was signed and finalized on 4/1/2024 1:28 PM by Dr. Barrett Arreguin MD.       XR Chest 1 View [[id number]] Collected: 04/01/24  "1231     Updated: 04/01/24 1237    Narrative:      EXAMINATION: XR CHEST 1 VW- 4/1/2024 12:31 PM     HISTORY: pneumonia evaluation.     REPORT: A frontal view of the chest was obtained.     COMPARISON: Chest x-ray 3/28/2024, 12/16/2023.     There is blunting of the costophrenic angles compatible with small  bilateral pleural effusions, which appear decreased in size. There is  improved aeration of the left lung base with decreased infiltrate. No  pneumothorax is identified. There is underlying COPD. Mild cardiomegaly  is present without evidence of overt CHF. Again noted are partially  healed fractures of the surgical neck of the right humerus as well as  the proximal shaft, with displacement.       Impression:      1. Improved aeration of the left lung base and decrease in size of small  bilateral pleural effusions. No lung consolidation is identified.  Advanced COPD.  2. Cardiomegaly without evidence of CHF.  3. Partially healed closed fractures of the proximal humerus as  described.           This report was signed and finalized on 4/1/2024 12:34 PM by Dr. Barrett Arreguin MD.               Culture:  No results found for: \"BLOODCX\", \"URINECX\", \"WOUNDCX\", \"MRSACX\", \"RESPCX\", \"STOOLCX\"      Assessment    Acute kidney injury, ATN--worse today  Hyperkalemia--improved  Metabolic acidosis  Hyponatremia  Acute liver injury  Elevated INR  Anemia  Large left renal cyst    Plan:  Continue IV fluids  Discussed with patient and family at bedside. They all affirm that they do not want any aggressive interventions such as dialysis.  Palliative care now also following.      Oscar Grijalva, APRN  4/2/2024  11:44 CDT    "

## 2024-04-02 NOTE — CONSULTS
Palliative Care Services    Palliative Care Initial Consult   Attending Physician: Tk Horne MD  Referring Provider: Candida Jorge    Reason for Referral: assistance with clarification of goals of care  Family/Support: Children    Code Status and Medical Interventions:   Ordered at: 04/01/24 1719     Medical Intervention Limits:    NO intubation (DNI)    NO cardioversion     Level Of Support Discussed With:    Health Care Surrogate     Code Status (Patient has no pulse and is not breathing):    No CPR (Do Not Attempt to Resuscitate)     Medical Interventions (Patient has pulse or is breathing):    Limited Support     Goals of Care: TBD.    HPI:   89 y.o. female has a past medical history of chronic combined systolic and diastolic congestive heart failure, acid reflux, Arthritis, Coronary artery disease, Hyperlipidemia, Hypertension, Hypothyroidism, Lung nodule, Memory loss, Osteopenia, peripheral artery disease, Shingles (1940), Skin cancer, Subarachnoid hemorrhage-6/30/2023, and 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.  Last seen by palliative services during December 2023 hospitalization, goals of care established and a MOST document was completed.  Hospitalization 12/16-12/21 due to orthostatic hypotension, fall, and closed fracture of proximal end of right humerus.  Last seen by cardiologist Dr. Urena on 3/5/2024 for follow-up and noted not to have repeat syncopal episodes since being discharged from hospital in December 2023 and after discontinuing Entresto.  ED visit 3/28/2024 due to left lower lobe pneumonia, anemia, and elevated liver function tests and discharged on antibiotic.  Patient presented to  on 4/1/2024 related to failure to thrive.  According to chart review progressive decline with worsening weakness with inability to walk, decreased appetite, nausea with dry heaves.  ED workup shows creatinine 3.0, sodium 132, potassium  6.7, CO2 17, urinalysis shows trace ketones/1+ bilirubin/3+ protein/trace leuk esterase/trace bacteria, hemoglobin 6.2, leukocytosis, thrombocytopenia, hypermagnesemia, and significant transaminitis.  CT abdomen pelvis shows small amount of free fluid in the pelvis the etiology is unknown.  Large stable slightly complex cyst arising from the lateral left kidney measuring 8.7 x 8.9 cm.  Hepatic steatosis.  Mild sigmoid diverticulosis without diverticulitis.  Partially healed fractures of the bilateral pubic ring and evidence of previous right hip arthroplasty.  Advanced degenerative changes within the lumbar spine.  CT head shows no acute intercranial abnormality.  Mild diffuse cerebral atrophy and mild to moderate chronic small vessel white matter ischemic changes.  Chest imaging shows small bilateral pleural effusions.  Advanced COPD.  Cardiomegaly without evidence of CHF.  Partially healed closed fractures of the proximal humerus.  Treated for hyperkalemia, received 1 unit PRBCs.  Nephrology consulted and noted patient and family elected not to pursue renal replacement therapy.  Renal function continues to decline creatinine 3.39 from 3.0.  Liver function continues to decline AST 4928 from 4382, ALT 2632 from 2242.  Laying in bed without apparent distress.  Assisted to set up breakfast tray. Palliative Care Spoke With: patient and family majority of history gleaned from family secondary to dementia.  Patient has resided with daughter and her spouse since early March after discharge from rehab.  Family note poor oral intake over the last 4 to 5 days prior to hospitalization, nausea/vomiting, generalized weakness and decline.  Due to the Palliative Care Topics Discussed: palliative care, goals of care, care options, resuscitation status, [facility], and discharge options we will establish an advance care plan.   Advance Care Planning   Advance Care Planning Discussion: Spoke with patient, son, daughter, and son-in-law  in room with regard to events leading to current hospitalization.  Explored poor long-term prognosis secondary to acute kidney failure, shock liver, failure to thrive, symptomatic anemia, hyperkalemia, advanced COPD, multiple comorbidities, and advanced age.  Family seem to have good prognostic awareness with regard to complexity of health history and decline.  Further assess medical priorities and family have elected to de-escalate care measures to no CPR/limited support interventions, no intubation, no cardioversion, no dialysis, no vasopressors, and no artificial nutrition.  Would benefit from completion of a MOST document prior to discharge.  Discussed options of continued conservative/supportive care interventions versus transitions care with focus of comfort.  Further discussed discharge options given high risk for rehospitalizations to include best supportive care directed by hospice.  Expectations discussed at length and questions answered to family satisfaction.  Family considering options.     Review of Systems   Constitutional: Positive for decreased appetite and malaise/fatigue.   Cardiovascular:  Negative for leg swelling.   Respiratory:  Negative for shortness of breath.    Hematologic/Lymphatic: Negative for bleeding problem. Bruises/bleeds easily.   Musculoskeletal:  Positive for muscle weakness.   Gastrointestinal:  Positive for anorexia. Negative for nausea.   Genitourinary:  Negative for dysuria.   Neurological:  Positive for weakness.   Psychiatric/Behavioral:  Positive for memory loss. The patient is not nervous/anxious.      1- Pain Assessment  Nonverbal Indicators of Pain: nonverbal indicators absent    Past Medical History:   Diagnosis Date    Acid reflux     Arthritis     Coronary artery disease     Hyperlipidemia     Hypertension     Hypothyroidism     Leg pain     left leg    Lung nodule     Memory loss     Osteopenia     PAD (peripheral artery disease) 07/24/2018    Shingles 1940    Skin  cancer     Subarachnoid hemorrhage 2023    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     Wears glasses      Past Surgical History:   Procedure Laterality Date    AORTAGRAM Right 2018    Procedure: LEFT LOWER EXTREMITY ANGIOGRAM;  Surgeon: Walker Davis DO;  Location: [facility] HYBRID OR 12;  Service: Vascular    BREAST BIOPSY Bilateral     multiple on both, all benign    BREAST BIOPSY Left 2017    Procedure: LEFT MAMMOGRAM GUIDED NEEDLE DIRECTED EXCISIONAL BREAST BIOPSY;  Surgeon: Malgorzata Scott MD;  Location: [facility] OR;  Service:     BUNIONECTOMY Bilateral      SECTION      X2    CHOLECYSTECTOMY      CHOLECYSTECTOMY WITH INTRAOPERATIVE CHOLANGIOGRAM N/A 2021    Procedure: LAPAROSCOPIC CHOLECYSTECTOMY WITH CHOLANGIOGRAM;  Surgeon: Malgorzata Scott MD;  Location: [facility] OR;  Service: General;  Laterality: N/A;    COLONOSCOPY  2015    diverticulosis    ENDOSCOPY N/A 2021    Procedure: ESOPHAGOGASTRODUODENOSCOPY WITH ANESTHESIA;  Surgeon: Sonu Cheek DO;  Location: [facility] ENDOSCOPY;  Service: Gastroenterology;  Laterality: N/A;  pre nausea  post esophagitis  Delfina Pereira DO    EYE SURGERY      CATARACT SURGERY    FEMORAL ENDARTERECTOMY Left 2018    Procedure: LEFT FEMORAL ENDARTERECTOMY;  Surgeon: Walker Davis DO;  Location: [facility] HYBRID OR 12;  Service: Vascular    JOINT REPLACEMENT      RIGHT HIP    VASCULAR SURGERY       Social History     Socioeconomic History    Marital status:    Tobacco Use    Smoking status: Former     Current packs/day: 0.00     Types: Cigarettes     Start date: 1953     Quit date: 1955     Years since quittin.2     Passive exposure: Never    Smokeless tobacco: Never   Vaping Use    Vaping status: Never Used   Substance and Sexual Activity    Alcohol use: No    Drug use: No    Sexual activity: Defer         Current Facility-Administered Medications:     acetaminophen (TYLENOL) tablet 650 mg,  650 mg, Oral, Q4H PRN **OR** acetaminophen (TYLENOL) 160 MG/5ML oral solution 650 mg, 650 mg, Oral, Q4H PRN **OR** acetaminophen (TYLENOL) suppository 650 mg, 650 mg, Rectal, Q4H PRN, Candida Jorge, ANGY    sennosides-docusate (PERICOLACE) 8.6-50 MG per tablet 2 tablet, 2 tablet, Oral, BID PRN **AND** polyethylene glycol (MIRALAX) packet 17 g, 17 g, Oral, Daily PRN **AND** bisacodyl (DULCOLAX) EC tablet 5 mg, 5 mg, Oral, Daily PRN **AND** bisacodyl (DULCOLAX) suppository 10 mg, 10 mg, Rectal, Daily, Candida Jorge, APRN    donepezil (ARICEPT) tablet 5 mg, 5 mg, Oral, Nightly, Candida Jorge APRN    escitalopram (LEXAPRO) tablet 10 mg, 10 mg, Oral, Daily, Canidda Jorge, ANGY    HYDROmorphone (DILAUDID) injection 0.5 mg, 0.5 mg, Intravenous, Q4H PRN **AND** naloxone (NARCAN) injection 0.4 mg, 0.4 mg, Intravenous, Q5 Min PRN, Candida Jorge, ANGY    levothyroxine (SYNTHROID, LEVOTHROID) tablet 125 mcg, 125 mcg, Oral, Once per day on Sunday Monday Tuesday Wednesday Thursday Friday, Candida Jorge, ANGY    ondansetron ODT (ZOFRAN-ODT) disintegrating tablet 4 mg, 4 mg, Oral, Q6H PRN **OR** ondansetron (ZOFRAN) injection 4 mg, 4 mg, Intravenous, Q6H PRN, Candida Jorge, ANGY    sodium chloride 0.9 % bolus 250 mL, 250 mL, Intravenous, Once, Curt Garcia MD    sodium chloride 0.9 % flush 10 mL, 10 mL, Intravenous, Q12H, Candida Jorge, ANGY, 10 mL at 04/01/24 2106    sodium chloride 0.9 % flush 10 mL, 10 mL, Intravenous, PRN, Candida Jorge, APRN    sodium chloride 0.9 % infusion 40 mL, 40 mL, Intravenous, PRN, Candida Jorge, ANGY    sodium chloride 0.9 % infusion, 100 mL/hr, Intravenous, Continuous, Candida Jorge, ANGY, Last Rate: 100 mL/hr at 04/01/24 1824, 100 mL/hr at 04/01/24 1824    sodium zirconium cyclosilicate (LOKELMA) packet 10 g, 10 g, Oral, Once, Mummaneni, Sundeep, DO    Allergies   Allergen Reactions    Erythromycin Shortness Of Breath    Zithromax  "[Azithromycin] Itching and Other (See Comments)     Yeast infection    Penicillins Rash     I have utilized all available immediate resources to obtain, update, or review the patient's current medications (including all prescriptions, over-the-counter products, herbals, cannabis/cannabidiol products, and vitamin/mineral/dietary (nutritional) supplements) for name, route of administration, type, dose and frequency.      Intake/Output Summary (Last 24 hours) at 4/2/2024 0742  Last data filed at 4/2/2024 0045  Gross per 24 hour   Intake 300 ml   Output --   Net 300 ml       Physical Exam:    Diagnostics: Reviewed  /46 (BP Location: Left arm, Patient Position: Sitting)   Pulse 63   Temp 97.1 °F (36.2 °C) (Oral)   Resp 16   Ht 162.6 cm (64\")   Wt 49 kg (108 lb)   SpO2 93%   BMI 18.54 kg/m²     Vitals and nursing note reviewed.   Constitutional:       Appearance: Not in distress. Cachectic and frail.   Eyes:      General: Lids are normal.      Pupils: Pupils are equal, round, and reactive to light.   HENT:      Head: Normocephalic.   Pulmonary:      Effort: Pulmonary effort is normal.   Cardiovascular:      Normal rate.   Edema:     Peripheral edema absent.   Abdominal:      Palpations: Abdomen is soft.   Musculoskeletal: Normal range of motion.      Cervical back: Neck supple. Skin:     General: Skin is warm.   Genitourinary:     Comments: No reported dysuria  Neurological:      Mental Status: Alert.   Psychiatric:         Mood and Affect: Mood normal.         Cognition and Memory: Memory is impaired.     Patient status: Disease state: Controlled with current treatments.  Current Functional status: Palliative Performance Scale Score: Performance 40% based on the following measures: Ambulation: Mainly in bed, Activity and Evidence of Disease: Unable to do any work, extensive evidence of disease, Self-Care: Mainly assistance required,  Intake: Normal or reduced, LOC: Full, drowsy or confusion   Nutritional " status: Albumin 3.5 Body mass index is 18.54 kg/m².         Hospital Problem List      Acute renal failure superimposed on stage 3a chronic kidney disease    Chronic systolic (congestive) heart failure    Symptomatic anemia    Hyperkalemia    Failure to thrive in adult    Shock liver    Impression/Problem List:    Acute kidney failure superimposed on stage III chronic kidney disease  Symptomatic anemia  Hyperkalemia  Failure to thrive  Shock liver     Hepatic steatosis  Leukocytosis  Thrombocytopenia  Advanced COPD  Hypertension  Chronic combined systolic and diastolic congestive heart failure  Fall  Partially healed closed fractures of the proximal humerus, right  Hypothyroidism  Coronary artery disease  Hyperlipidemia  Lung nodule  Cerebral microvascular disease and atrophy per CT  Osteopenia  Peripheral artery disease  History of Shingles (1940)  Skin cancer  History of subarachnoid hemorrhage-6/30/2023  Impaired mobility  Advanced age     Large stable slightly complex cyst,lateral left kidney measuring 8.7 x 8.9 cm, per CT    Partially healed fractures of the bilateral pubic ring  Advanced degenerative changes within the lumbar spine.     Recommendations/Plan:  1. plan: Goals of care include CODE STATUS no CPR/limited support interventions.    Family support: The patient receives support from her children..  Advance Directives: Advance Directive Status: Patient has advance directive, copy in chart   [facility]care [facility]    2.  Palliative care encounter  - Prognosis is poor long-term secondary to acute kidney failure, shock liver, failure to thrive, symptomatic anemia, hyperkalemia, advanced COPD, multiple comorbidities, and advanced age.  -Family appears to have good prognostic awareness.     -Last seen by palliative services during December 2023 hospitalization, goals of care established and a MOST document was completed.     -Treated for hyperkalemia, received 1 unit PRBCs.     -Nephrology consulted and noted patient and family elected not to pursue renal replacement therapy.     4/2-CODE STATUS changes, no CPR/limited support interventions  -Would benefit from completion of a MOST document  -Discussed options of care to include continued conservative/supportive measures versus transitions of care with focus of comfort and best supportive care directed by hospice at discharge.  Family contemplating options.  -High risk for rehospitalization's.      Thank you for this consult and allowing us to participate in patient's plan of care. Palliative Care Team will continue to follow patient.     25 minutes spent on advance care planning-discussing with patient and/or family, answering questions, providing some guidance about a plan and documentation of care, and coordinating care face to face.    Ignacia Hill, APRN  4/2/2024  07:42 CDT

## 2024-04-02 NOTE — PLAN OF CARE
Goal Outcome Evaluation:  Plan of Care Reviewed With: patient, caregiver     Progress: improving     Anticipated Discharge Disposition (SLP): unknown    SLP Swallowing Diagnosis: mild, oral dysphagia (04/02/24 0823)       SPEECH-LANGUAGE PATHOLOGY EVALUATION - SWALLOW  Subjective: The patient was seen on this date for a Clinical Swallow evaluation.  Patient was alert and cooperative.  Significant history: Patient is admitted with STEWART, hyperkalemia, acute liver injury, hyponatremia, metabolic acidosis, anemia, and renal cyst. Patient has recently discharged from skilled nursing to home with family. Patient has not eaten in 4 days per report. Patient is alert and oriented x3.   Objective: Textures given included thin liquid, puree consistency, and regular consistency.  Assessment: Difficulties were noted with regular consistency.  Observations: Patient has natural teeth which are appropriate. Generalized weakness in noted in oral structures.   Extended mastication noted with regular solids. No overt s/s noted with any consistency provided.   SLP Findings:  Patient presents with minimal oral dysphagia, without esophageal component.   Recommendations: Diet Textures: thin liquid, regular consistency food.  Medications should be taken whole with thin liquids. May have water and ice between meals after oral care, under staff or family supervision and with the recommended strategies for safe swallowing.   Recommended Strategies: Upright for PO, small bites and sips, may use straw, and alternate liquids and solids. Oral care before breakfast, after all meals and PRN.  Other Recommended Evaluations: Re-evaluation at bedside    Dysphagia therapy is recommended. Rationale: safe diet tolerance.     Monica Sequeira, SLP 4/2/2024 09:02 CDT

## 2024-04-02 NOTE — PROGRESS NOTES
Malnutrition Severity Assessment    Patient Name:  Mya [facility]  YOB: 1928  MRN: [id number]  Admit Date:  4/1/2024    Patient meets criteria for : Severe Malnutrition    Malnutrition Severity Assessment  Malnutrition Type: Chronic Disease - Related Malnutrition  Malnutrition Type (Last 8 Hours)       Malnutrition Severity Assessment       Row Name 04/02/24 1344       Malnutrition Severity Assessment    Malnutrition Type Chronic Disease - Related Malnutrition      Row Name 04/02/24 1344       Insufficient Energy Intake     Insufficient Energy Intake Findings Severe    Insufficient Energy Intake  < or equal to 50% of est. energy requirement for > or equal to 5d)      Row Name 04/02/24 1344       Muscle Loss    Loss of Muscle Mass Findings Severe    Alevism Region Severe - deep hollowing/scooping, lack of muscle to touch, facial bones well defined  observed      Row Name 04/02/24 1344       Fat Loss    Subcutaneous Fat Loss Findings Severe    Orbital Region  Severe - pronounced hollowness/depression, dark circles, loose saggy skin  observed      Row Name 04/02/24 1344       Criteria Met (Must meet criteria for severity in at least 2 of these categories: M Wasting, Fat Loss, Fluid, Secondary Signs, Wt. Status, Intake)    Patient meets criteria for  Severe Malnutrition                    Electronically signed by:  Castro Light RD  04/02/24 13:47 CDT

## 2024-04-02 NOTE — CONSULTS
Attempted USGPIV x 2 without success, patient requested to wait until tomorrow for further attempts. RN updated.

## 2024-04-02 NOTE — PROGRESS NOTES
[facility] Medicine Services  INPATIENT PROGRESS NOTE    Patient Name: Mya Xiefitt  Date of Admission: 4/1/2024  Today's Date: 04/02/24  Length of Stay: 1  Primary Care Physician: Delfina Pereira DO    Subjective   Chief Complaint: weakness  HPI   She was sitting up in bed with multiple family numbers at bedside.  She tells me that she feels okay today.  She denies shortness of breath, chest pain or pressure.  On room air.  No nausea, vomiting, abdominal pain or diarrhea.  She has been able to eat some today.  No complaints of pain.  Per RN, she has gotten up to bedside commode approximately 2 times and has had about  of dark urine output.    Review of Systems   All pertinent negatives and positives are as above. All other systems have been reviewed and are negative unless otherwise stated.     Objective    Temp:  [97.1 °F (36.2 °C)-98.1 °F (36.7 °C)] 97.4 °F (36.3 °C)  Heart Rate:  [49-63] 55  Resp:  [16-18] 16  BP: (101-118)/(31-51) 101/51  Physical Exam  Vitals reviewed.   Constitutional:       General: She is not in acute distress.     Appearance: She is ill-appearing (chronically). She is not toxic-appearing.      Comments: Sitting up in bed with multiple family members at bedside.   HENT:      Head: Normocephalic and atraumatic.      Mouth/Throat:      Mouth: Mucous membranes are moist.      Pharynx: Oropharynx is clear.   Eyes:      Extraocular Movements: Extraocular movements intact.      Conjunctiva/sclera: Conjunctivae normal.      Pupils: Pupils are equal, round, and reactive to light.   Cardiovascular:      Rate and Rhythm: Normal rate and regular rhythm.      Pulses: Normal pulses.      Heart sounds: Murmur heard.   Pulmonary:      Effort: Pulmonary effort is normal. No respiratory distress.      Breath sounds: Normal breath sounds.   Abdominal:      General: Bowel sounds are normal. There is no distension.      Palpations: Abdomen is soft.       Tenderness: There is no abdominal tenderness.   Musculoskeletal:         General: No swelling or tenderness. Normal range of motion.      Cervical back: Normal range of motion and neck supple. No muscular tenderness.   Skin:     General: Skin is warm and dry.      Findings: No erythema or rash.   Neurological:      General: No focal deficit present.      Mental Status: She is alert and oriented to person, place, and time.      Cranial Nerves: No cranial nerve deficit.      Motor: No weakness.   Psychiatric:         Mood and Affect: Mood normal.         Behavior: Behavior normal.         Results Review:  I have reviewed the labs, radiology results, and diagnostic studies.    Laboratory Data:   Results from last 7 days   Lab Units 04/02/24  0656 04/01/24  1210 03/28/24  1945   WBC 10*3/mm3 12.46* 11.66* 7.68   HEMOGLOBIN g/dL 7.2* 6.2* 7.8*   HEMATOCRIT % 24.4* 22.3* 26.7*   PLATELETS 10*3/mm3 61* 117* 316        Results from last 7 days   Lab Units 04/02/24  0407 04/02/24  0015 04/01/24  1502 04/01/24  1210 03/28/24  1945   SODIUM mmol/L 135* 133* 132* 132* 138   POTASSIUM mmol/L 5.2 6.1* 6.7* 6.7* 4.6   CHLORIDE mmol/L 98 96* 95* 95* 101   CO2 mmol/L 21.0* 18.0* 17.0* 18.0* 22.0   BUN mg/dL 66* 65* 56* 54* 27*   CREATININE mg/dL 3.39* 3.38* 3.00* 2.83* 1.29*   CALCIUM mg/dL 9.1 9.0 9.0 9.2 9.3   BILIRUBIN mg/dL 1.8*  --   --  1.6* 1.1   ALK PHOS U/L 185*  --   --  198* 128*   ALT (SGPT) U/L 2,632*  --   --  2,242* 130*   AST (SGOT) U/L 4,928*  --   --  4,382* 144*   GLUCOSE mg/dL 124* 96 66 79 96       Culture Data:   Microbiology Results (last 10 days)       Procedure Component Value - Date/Time    Respiratory Panel PCR w/COVID-19(SARS-CoV-2) CATHY/SHAWN/ONESIMO/PAD/COR/ROSSY In-House, NP Swab in UTM/VTM, 2 HR TAT - Swab, Nasopharynx [[id number]]  (Normal) Collected: 03/28/24 1939    Lab Status: Final result Specimen: Swab from Nasopharynx Updated: 03/28/24 2038     ADENOVIRUS, PCR Not Detected     Coronavirus 229E Not  Detected     Coronavirus HKU1 Not Detected     Coronavirus NL63 Not Detected     Coronavirus OC43 Not Detected     COVID19 Not Detected     Human Metapneumovirus Not Detected     Human Rhinovirus/Enterovirus Not Detected     Influenza A PCR Not Detected     Influenza B PCR Not Detected     Parainfluenza Virus 1 Not Detected     Parainfluenza Virus 2 Not Detected     Parainfluenza Virus 3 Not Detected     Parainfluenza Virus 4 Not Detected     RSV, PCR Not Detected     Bordetella pertussis pcr Not Detected     Bordetella parapertussis PCR Not Detected     Chlamydophila pneumoniae PCR Not Detected     Mycoplasma pneumo by PCR Not Detected    Narrative:      In the setting of a positive respiratory panel with a viral infection PLUS a negative procalcitonin without other underlying concern for bacterial infection, consider observing off antibiotics or discontinuation of antibiotics and continue supportive care. If the respiratory panel is positive for atypical bacterial infection (Bordetella pertussis, Chlamydophila pneumoniae, or Mycoplasma pneumoniae), consider antibiotic de-escalation to target atypical bacterial infection.          Radiology Data:   Imaging Results (Last 24 Hours)       Procedure Component Value Units Date/Time    CT Abdomen Pelvis Without Contrast [[id number]] Collected: 04/01/24 1332     Updated: 04/01/24 1352    Narrative:      EXAMINATION: CT ABDOMEN PELVIS WO CONTRAST- 4/1/2024 1:32 PM     HISTORY: Acute kidney injury, decreased gfr and hyperkalemia.     TOTAL DOSE: 227 mGy.cm (Automatic exposure control technique was  implemented in an effort to keep the radiation dose as low as possible  without compromising image quality)     REPORT: Spiral CT of the abdomen and pelvis was performed without  contrast from the lung bases through the pubic symphysis. Reconstructed  coronal and sagittal images are also reviewed.     Comparison: CT of the pelvis 10/20/2022. Ultrasound of the  kidneys  12/12/2022.     Review of lung windows demonstrates mild respiratory motion artifact,  there is a small right pleural effusion. The heart is enlarged. There is  a questionable small right-sided pericardial effusion. Heavy calcified  plaque is present within the coronary arteries.     Evaluation of the solid abdominal organs is limited without intravenous  contrast. Decreased attenuation of the liver parenchyma probably  represents steatosis. Cholecystectomy clips are present. The spleen  appears within normal limits. There is incomplete distention of the  stomach which contains some fluid. There is a large slightly complex  cyst arising from the lateral left kidney which measures 8.7 cm. This  contains a thin septation and calcification within its inferior medial  wall. No nephrolithiasis or hydronephrosis is identified. The ureters  are decompressed. There is a small amount of free fluid in the pelvis,  the source is not clear. No free air is identified. There is mild  sigmoid diverticulosis, no evidence of acute diverticulitis. The bladder  is decompressed. Heavy calcified plaque is noted within the abdominal  aorta and its branches as before. There is normal aortic caliber. Small  surgical clips are noted adjacent to the left femoral artery.  Nonvisualization of the appendix. Review of bone windows demonstrates  partially healed fractures of the pubic ring bilaterally. There is  previous right total hip arthroplasty. Multilevel degenerative changes  are present in the lumbar spine includes grade 1 spondylolisthesis at  L4-5 with disc space collapse.       Impression:      1. Limited noncontrast study shows a small amount of free fluid in the  pelvis, the etiology is unknown. No free air or abscess is identified.  No evidence of hydronephrosis or ureteral dilation. Large stable  slightly complex cyst arising from the lateral left kidney measuring 8.7  x 8.9 cm.  2. Hepatic steatosis, evidence of previous  cholecystectomy.  3. Mild sigmoid diverticulosis without definite diverticulitis.  4. Partially healed fractures of the bilateral pubic ring and evidence  of previous right hip arthroplasty. Advanced degenerative changes within  the lumbar spine.     This report was signed and finalized on 4/1/2024 1:48 PM by Dr. Barrett Arreguin MD.       CT Head Without Contrast [[id number]] Collected: 04/01/24 1325     Updated: 04/01/24 1331    Narrative:      EXAMINATION: CT HEAD WO CONTRAST- 4/1/2024 1:25 PM     HISTORY: Altered mental status.     DOSE: 921 mGycm (Automatic exposure control technique was implemented in  an effort to keep the radiation dose as low as possible without  compromising image quality)     REPORT: Spiral CT of the head was performed without contrast,  reconstructed coronal and sagittal images are also reviewed.     COMPARISON: CT head without contrast 12/16/2023.     No intracranial hemorrhage, mass or mass effect is identified. There is  mild age compatible cerebral atrophy. Decreased attenuation in the  periventricular and subcortical white matter tracts is compatible with  chronic small vessel white matter ischemic disease, this is stable. The  ventricles and basal cisterns are within normal limits. Review of bone  windows is unremarkable. There is normal aeration of the visualized  paranasal sinuses and mastoid air cells.       Impression:      1. No acute intracranial abnormality, no interval change. Mild diffuse  cerebral atrophy and mild to moderate chronic small vessel white matter  ischemic changes are identified.        This report was signed and finalized on 4/1/2024 1:28 PM by Dr. Barrett Arreguin MD.               I have reviewed the patient's current medications.     Assessment/Plan   Assessment  Active Hospital Problems    Diagnosis     **Acute renal failure superimposed on stage 3a chronic kidney disease     Hyperkalemia     Failure to thrive in adult     Shock liver     Symptomatic  anemia     Chronic systolic (congestive) heart failure      Ms. Chung is a 89-year-old female that presented to [facility] on 4/1 with failure to thrive. Unfortunately patient had a fall 12/16/2023 with resultant admission for fracture of the proximal end of right humerus. She was discharged on 12/21/2023 to rehab and subsequent discharged to her daughter's home on 3/1/2024. Unfortunately she has continued to decline with worsening weakness, decreased appetite, nausea with dry heaves. She presented to [facility] ER on 3/28/2024 diagnosed with left lower lobe pneumonia, discharged on antibiotic. She returned with inability to walk due to weakness.  In the ED, CT head showed no acute findings.  CT abdomen pelvis showed no free air or abscess, acute findings.    Treatment Plan  Acute kidney injury with hyperkalemia.  Creatinine on admission 2.83 with potassium 6.7. Baseline labs available for comparison include a creatinine of 0.86 on 12/20/2023 and 1.29 on 3/28/2024.  Nephrology consulted and following.  Creatinine 3.29.  Potassium 4.7.  IV fluid trial per nephrology.  Family does not want any aggressive interventions such as dialysis.    Shock liver with AST 4382, ALT 2242, total bilirubin 1.6, alkaline phosphatase 198.  Platelet count 61,000. CMP in am.     Hemglobin 6.2 on admission.  She was transfused 1 unit PRBC.  Hemoglobin 7.2 today.  No signs of bleeding.    Elevated INR at 3.70.  No signs of bleeding.    SCDs for DVT prophylaxis    Palliative care consult.  No CPR with limited support to include no intubation, no cardioversion, no dialysis, no vasopressors, no artificial nutrition.  Family contemplating transitioning to hospice at discharge. Consult hospice.    Medical Decision Making  Number and Complexity of problems: 5 acute problems  Differential Diagnosis: none considered at present    Conditions and Status        Condition is worsening.     [facility] Data  External documents reviewed: Prior epic  records  Cardiac tracing (EKG, telemetry) interpretation: reviewed  Radiology interpretation: Interpreted by radiology  Labs reviewed: As above  Any tests that were considered but not ordered: None considered at present     Decision rules/scores evaluated (example [id number], [place], etc): None considered at present     Discussed with: Patient and Dr. Horne     Care Planning  Shared decision making: Patient is agreeable to ongoing workup and treatment  Code status and discussions: No CPR with limited support to include no intubation, no cardioversion, no dialysis, no vasopressors, no artificial nutrition.     Disposition  Social Determinants of Health that impact treatment or disposition: none  I expect the patient to be discharged to SNF with/without hospice in 1-2 days.     Electronically signed by ANGY Lu, 04/02/24, 13:18 CDT.

## 2024-04-02 NOTE — PLAN OF CARE
Goal Outcome Evaluation:   Patient received from ER. I unit PRBC given. Potassium high. Correction given per order. Patient bed rest. NPO for speech eval. Safety maintained.

## 2024-04-03 LAB
ALBUMIN SERPL ELPH-MCNC: 3.1 G/DL (ref 2.9–4.4)
ALBUMIN SERPL-MCNC: 2.8 G/DL (ref 3.5–5.2)
ALBUMIN/GLOB SERPL: 1.2 {RATIO} (ref 0.7–1.7)
ALBUMIN/GLOB SERPL: 1.4 G/DL
ALP SERPL-CCNC: 176 U/L (ref 39–117)
ALPHA1 GLOB SERPL ELPH-MCNC: 0.3 G/DL (ref 0–0.4)
ALPHA2 GLOB SERPL ELPH-MCNC: 0.5 G/DL (ref 0.4–1)
ALT SERPL W P-5'-P-CCNC: 1827 U/L (ref 1–33)
ANION GAP SERPL CALCULATED.3IONS-SCNC: 13 MMOL/L (ref 5–15)
AST SERPL-CCNC: 2042 U/L (ref 1–32)
B-GLOBULIN SERPL ELPH-MCNC: 0.7 G/DL (ref 0.7–1.3)
BH BB BLOOD EXPIRATION DATE: NORMAL
BH BB BLOOD TYPE BARCODE: 5100
BH BB DISPENSE STATUS: NORMAL
BH BB PRODUCT CODE: NORMAL
BH BB UNIT NUMBER: NORMAL
BILIRUB SERPL-MCNC: 1.6 MG/DL (ref 0–1.2)
BUN SERPL-MCNC: 71 MG/DL (ref 8–23)
BUN SERPL-MCNC: 74 MG/DL (ref 8–23)
BUN SERPL-MCNC: 74 MG/DL (ref 8–23)
BUN/CREAT SERPL: 24.2 (ref 7–25)
BUN/CREAT SERPL: 27.2 (ref 7–25)
BUN/CREAT SERPL: 27.2 (ref 7–25)
CALCIUM SPEC-SCNC: 7.9 MG/DL (ref 8.2–9.6)
CALCIUM SPEC-SCNC: 8.4 MG/DL (ref 8.2–9.6)
CALCIUM SPEC-SCNC: 8.4 MG/DL (ref 8.2–9.6)
CHLORIDE SERPL-SCNC: 99 MMOL/L (ref 98–107)
CO2 SERPL-SCNC: 22 MMOL/L (ref 22–29)
CREAT SERPL-MCNC: 2.72 MG/DL (ref 0.57–1)
CREAT SERPL-MCNC: 2.72 MG/DL (ref 0.57–1)
CREAT SERPL-MCNC: 2.93 MG/DL (ref 0.57–1)
CROSSMATCH INTERPRETATION: NORMAL
DEPRECATED RDW RBC AUTO: 63.4 FL (ref 37–54)
EGFRCR SERPLBLD CKD-EPI 2021: 14.2 ML/MIN/1.73
EGFRCR SERPLBLD CKD-EPI 2021: 15.5 ML/MIN/1.73
EGFRCR SERPLBLD CKD-EPI 2021: 15.5 ML/MIN/1.73
ERYTHROCYTE [DISTWIDTH] IN BLOOD BY AUTOMATED COUNT: 19.3 % (ref 12.3–15.4)
GAMMA GLOB SERPL ELPH-MCNC: 1 G/DL (ref 0.4–1.8)
GLOBULIN SER CALC-MCNC: 2.5 G/DL (ref 2.2–3.9)
GLOBULIN UR ELPH-MCNC: 2 GM/DL
GLUCOSE SERPL-MCNC: 82 MG/DL (ref 65–99)
GLUCOSE SERPL-MCNC: 82 MG/DL (ref 65–99)
GLUCOSE SERPL-MCNC: 86 MG/DL (ref 65–99)
HCT VFR BLD AUTO: 30.3 % (ref 34–46.6)
HGB BLD-MCNC: 8.3 G/DL (ref 12–15.9)
INR PPP: 2.57 (ref 0.91–1.09)
LABORATORY COMMENT REPORT: ABNORMAL
M PROTEIN SERPL ELPH-MCNC: ABNORMAL G/DL
MCH RBC QN AUTO: 24.6 PG (ref 26.6–33)
MCHC RBC AUTO-ENTMCNC: 27.4 G/DL (ref 31.5–35.7)
MCV RBC AUTO: 89.6 FL (ref 79–97)
PLATELET # BLD AUTO: 103 10*3/MM3 (ref 140–450)
PMV BLD AUTO: 11.8 FL (ref 6–12)
POTASSIUM SERPL-SCNC: 4.2 MMOL/L (ref 3.5–5.2)
POTASSIUM SERPL-SCNC: 4.2 MMOL/L (ref 3.5–5.2)
POTASSIUM SERPL-SCNC: 4.4 MMOL/L (ref 3.5–5.2)
PROT PATTERN SERPL ELPH-IMP: ABNORMAL
PROT SERPL-MCNC: 4.8 G/DL (ref 6–8.5)
PROT SERPL-MCNC: 5.6 G/DL (ref 6–8.5)
PROTHROMBIN TIME: 28.6 SECONDS (ref 11.8–14.8)
QT INTERVAL: 564 MS
QTC INTERVAL: 514 MS
RBC # BLD AUTO: 3.38 10*6/MM3 (ref 3.77–5.28)
SODIUM SERPL-SCNC: 134 MMOL/L (ref 136–145)
UNIT  ABO: NORMAL
UNIT  RH: NORMAL
WBC NRBC COR # BLD AUTO: 7.52 10*3/MM3 (ref 3.4–10.8)

## 2024-04-03 PROCEDURE — 92526 ORAL FUNCTION THERAPY: CPT

## 2024-04-03 PROCEDURE — 80053 COMPREHEN METABOLIC PANEL: CPT | Performed by: NURSE PRACTITIONER

## 2024-04-03 PROCEDURE — 85027 COMPLETE CBC AUTOMATED: CPT | Performed by: NURSE PRACTITIONER

## 2024-04-03 PROCEDURE — 99497 ADVNCD CARE PLAN 30 MIN: CPT | Performed by: CLINICAL NURSE SPECIALIST

## 2024-04-03 PROCEDURE — 85610 PROTHROMBIN TIME: CPT | Performed by: NURSE PRACTITIONER

## 2024-04-03 PROCEDURE — 99232 SBSQ HOSP IP/OBS MODERATE 35: CPT | Performed by: CLINICAL NURSE SPECIALIST

## 2024-04-03 RX ADMIN — BISACODYL 10 MG: 10 SUPPOSITORY RECTAL at 09:18

## 2024-04-03 RX ADMIN — ESCITALOPRAM OXALATE 10 MG: 10 TABLET ORAL at 09:18

## 2024-04-03 RX ADMIN — LEVOTHYROXINE SODIUM 125 MCG: 125 TABLET ORAL at 04:12

## 2024-04-03 RX ADMIN — FAMOTIDINE 20 MG: 20 TABLET, FILM COATED ORAL at 09:18

## 2024-04-03 RX ADMIN — DONEPEZIL HYDROCHLORIDE 5 MG: 5 TABLET, FILM COATED ORAL at 21:31

## 2024-04-03 NOTE — PLAN OF CARE
Goal Outcome Evaluation:  Plan of Care Reviewed With: patient        Progress: improving  Outcome Evaluation: see note      Anticipated Discharge Disposition (SLP): unknown             Treatment Assessment (SLP): improved (04/03/24 1244)  Treatment Assessment Comments (SLP): see note (04/03/24 1244)  Plan for Continued Treatment (SLP): treatment no longer indicated as all goals met (04/03/24 1244)

## 2024-04-03 NOTE — THERAPY DISCHARGE NOTE
Acute Care - Speech Language Pathology Treatment Note/Discharge   [place]     Patient Name: Mya Chung  : 2/10/1928  MRN: [id number]  Today's Date: 4/3/2024               Admit Date: 2024  Pt was seen to ensure safety of current diet. Upon arrival, pt was upright in bed consuming lunch. Pt family was thrilled that she was eating as they reported poor PO intake as of recent. Pt completed trials of regular solids and thin liquids while ST at bedside with no overt s/s of aspiration. Vocal quality remained clear. Pt and family denied further questions or concerns.     It is recommended that pt continue current diet of regular solids and thin liquids. Meds whole with thin liquids. General aspiration precautions. ST to sign off as skilled services are no longer warranted. Please consult if acute changes occur.     Devora Lino, MS CCC-SLP 4/3/2024 14:54 CDT       Visit Dx:    ICD-10-CM ICD-9-CM   1. Anemia, unspecified type  D64.9 285.9   2. Oral phase dysphagia  R13.11 787.21     Patient Active Problem List   Diagnosis    Left upper arm pain    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    Hypertension    Hyperlipidemia    PAD (peripheral artery disease)    Right greater trochanteric avulsion periprosthetic, closed, minimally displaced    Closed fracture of right wrist    Fall    Impaired gait and mobility    Acute right hip pain    Hypothyroidism    Closed fracture of multiple pubic rami, right, initial encounter    Cholecystitis    Nausea    Other chest pain    Chronic systolic (congestive) heart failure    Closed fracture of ramus of left pubis, initial encounter    Stage 3a chronic kidney disease (CKD)    Subarachnoid hemorrhage    Body mass index (BMI) of 19.9 or less in adult    Former smoker    Orthostatic hypotension    Closed fracture of proximal end of right humerus    Symptomatic anemia    Acute renal failure superimposed on stage 3a chronic kidney disease    Hyperkalemia    Failure to thrive in  adult    Shock liver    Severe malnutrition     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     Wears glasses      Past Surgical History:   Procedure Laterality Date    AORTAGRAM Right 2018    Procedure: LEFT LOWER EXTREMITY ANGIOGRAM;  Surgeon: Walker Davis DO;  Location: [facility] HYBRID OR 12;  Service: Vascular    BREAST BIOPSY Bilateral     multiple on both, all benign    BREAST BIOPSY Left 2017    Procedure: LEFT MAMMOGRAM GUIDED NEEDLE DIRECTED EXCISIONAL BREAST BIOPSY;  Surgeon: Malgorzata Scott MD;  Location: [facility] OR;  Service:     BUNIONECTOMY Bilateral      SECTION      X2    CHOLECYSTECTOMY      CHOLECYSTECTOMY WITH INTRAOPERATIVE CHOLANGIOGRAM N/A 2021    Procedure: LAPAROSCOPIC CHOLECYSTECTOMY WITH CHOLANGIOGRAM;  Surgeon: Malgorzata Scott MD;  Location: [facility] OR;  Service: General;  Laterality: N/A;    COLONOSCOPY  2015    diverticulosis    ENDOSCOPY N/A 2021    Procedure: ESOPHAGOGASTRODUODENOSCOPY WITH ANESTHESIA;  Surgeon: Sonu Cheek DO;  Location: [facility] ENDOSCOPY;  Service: Gastroenterology;  Laterality: N/A;  pre nausea  post esophagitis  Delfina Pereira DO    EYE SURGERY      CATARACT SURGERY    FEMORAL ENDARTERECTOMY Left 2018    Procedure: LEFT FEMORAL ENDARTERECTOMY;  Surgeon: Walker Davis DO;  Location: [facility] HYBRID OR 12;  Service: Vascular    JOINT REPLACEMENT      RIGHT HIP    VASCULAR SURGERY         SLP Recommendation and Plan              Anticipated Discharge Disposition (SLP): unknown (24 1244)  Therapy Frequency (Swallow): PRN (24 1244)     Predicted  Duration Therapy Intervention (Days): 1 week (04/03/24 1244)  Daily Summary of Progress (SLP): progress toward functional goals is good (04/03/24 1244)                    Treatment Assessment (SLP): improved (04/03/24 1244)  Treatment Assessment Comments (SLP): see note (04/03/24 1244)  Plan for Continued Treatment (SLP): treatment no longer indicated as all goals met (04/03/24 1244)    Plan of Care Reviewed With: patient (04/03/24 1441)  Progress: improving (04/03/24 1441)  Outcome Evaluation: see note (04/03/24 1441)           EDUCATION  The patient has been educated in the following areas:   Dysphagia (Swallowing Impairment).           SLP GOALS       Row Name 04/03/24 1244 04/02/24 0823          (LTG) Patient will demonstrate functional swallow for    Diet Texture (Demonstrate functional swallow) regular textures  -JR regular textures  -MD     Liquid viscosity (Demonstrate functional swallow) thin liquids  -JR thin liquids  -MD     [place] (Demonstrate functional swallow) independently (over 90% accuracy)  -JR independently (over 90% accuracy)  -MD     Time Frame (Demonstrate functional swallow) 1 week  -JR 1 week  -MD     Barriers (Demonstrate functional swallow) n/a  -JR n/a  -MD     Progress/Outcomes (Demonstrate functional swallow) goal met  -JR new goal  -MD        (STG) Patient will tolerate trials of    Consistencies Trialed (Tolerate trials) regular textures;thin liquids  -JR regular textures;thin liquids  -MD     Desired Outcome (Tolerate trials) without signs/symptoms of aspiration  -JR without signs/symptoms of aspiration  -MD     [place] (Tolerate trials) independently (over 90% accuracy)  -JR independently (over 90% accuracy)  -MD     Time Frame (Tolerate trials) 1 week  -JR 1 week  -MD     Progress/Outcomes (Tolerate trials) goal met  -JR new goal  -MD               User Key  (r) = Recorded By, (t) = Taken By, (c) = Cosigned By      Initials Name Provider Type    Devora Cheema MS CCC-SLP  Speech and Language Pathologist    Monica Clay, SLP Speech and Language Pathologist                        Time Calculation:    Time Calculation- SLP       Row Name 04/03/24 1452             Time Calculation- SLP    SLP Start Time 1244  -JR      SLP Stop Time 1338  -JR      SLP Time Calculation (min) 54 min  -JR      SLP Received On 04/03/24  -JR         Untimed Charges    [id number]-GK Treatment Swallow Minutes 54  -JR         Total Minutes    Untimed Charges Total Minutes 54  -JR       Total Minutes 54  -JR                User Key  (r) = Recorded By, (t) = Taken By, (c) = Cosigned By      Initials Name Provider Type    Devora Cheema MS CCC-SLP Speech and Language Pathologist                    Therapy Charges for Today       Code Description Service Date Service Provider Modifiers Qty    [id number]  ST TREATMENT SWALLOW 4 4/3/2024 Devora Lino MS CCC-SLP GN 1                     SLP Discharge Summary  Anticipated Discharge Disposition (SLP): unknown    Devora Lino MS CCC-SLP  4/3/2024

## 2024-04-03 NOTE — PROGRESS NOTES
[facility] Medicine Services  INPATIENT PROGRESS NOTE    Patient Name: Mya STONE Juliet  Date of Admission: 4/1/2024  Today's Date: 04/03/24  Length of Stay: 2  Primary Care Physician: Delfina Pereira DO    Subjective   Chief Complaint: weakness  HPI   She was sitting up in bed with multiple family numbers at bedside.  She tells me that she feels much better today.  She denies shortness of breath, chest pain or pressure.  On room air.  No nausea, vomiting, abdominal pain or diarrhea.  She ate breakfast.  No complaints of pain.     Patient/family have elected to take home with hospice.    Review of Systems   All pertinent negatives and positives are as above. All other systems have been reviewed and are negative unless otherwise stated.     Objective    Temp:  [97.4 °F (36.3 °C)-98.2 °F (36.8 °C)] 98 °F (36.7 °C)  Heart Rate:  [54-68] 66  Resp:  [16] 16  BP: (101-126)/(47-68) 125/57  Physical Exam  Vitals reviewed.   Constitutional:       General: She is not in acute distress.     Appearance: She is ill-appearing (chronically). She is not toxic-appearing.      Comments: Sitting up in bed with multiple family members at bedside.   HENT:      Head: Normocephalic and atraumatic.      Mouth/Throat:      Mouth: Mucous membranes are moist.      Pharynx: Oropharynx is clear.   Eyes:      Extraocular Movements: Extraocular movements intact.      Conjunctiva/sclera: Conjunctivae normal.      Pupils: Pupils are equal, round, and reactive to light.   Cardiovascular:      Rate and Rhythm: Normal rate and regular rhythm.      Pulses: Normal pulses.   Pulmonary:      Effort: Pulmonary effort is normal. No respiratory distress.      Breath sounds: Normal breath sounds.   Abdominal:      General: Bowel sounds are normal. There is no distension.      Palpations: Abdomen is soft.      Tenderness: There is no abdominal tenderness.   Musculoskeletal:         General: No swelling or  tenderness. Normal range of motion.      Cervical back: Normal range of motion and neck supple. No muscular tenderness.   Skin:     General: Skin is warm and dry.      Findings: No erythema or rash.   Neurological:      General: No focal deficit present.      Mental Status: She is alert and oriented to person, place, and time.      Cranial Nerves: No cranial nerve deficit.      Motor: No weakness.   Psychiatric:         Mood and Affect: Mood normal.         Behavior: Behavior normal.         Results Review:  I have reviewed the labs, radiology results, and diagnostic studies.    Laboratory Data:   Results from last 7 days   Lab Units 04/03/24  0910 04/02/24  0656 04/01/24  1210   WBC 10*3/mm3 7.52 12.46* 11.66*   HEMOGLOBIN g/dL 8.3* 7.2* 6.2*   HEMATOCRIT % 30.3* 24.4* 22.3*   PLATELETS 10*3/mm3 103* 61* 117*        Results from last 7 days   Lab Units 04/03/24  0612 04/03/24  0043 04/02/24  1726 04/02/24  1216 04/02/24  0407 04/01/24  1502 04/01/24  1210   SODIUM mmol/L 134*  134* 134* 131*   < > 135*   < > 132*   POTASSIUM mmol/L 4.2  4.2 4.4 4.6   < > 5.2   < > 6.7*   CHLORIDE mmol/L 99  99 99 97*   < > 98   < > 95*   CO2 mmol/L 22.0  22.0 22.0 21.0*   < > 21.0*   < > 18.0*   BUN mg/dL 74*  74* 71* 71*   < > 66*   < > 54*   CREATININE mg/dL 2.72*  2.72* 2.93* 3.30*   < > 3.39*   < > 2.83*   CALCIUM mg/dL 8.4  8.4 7.9* 7.9*   < > 9.1   < > 9.2   BILIRUBIN mg/dL 1.6*  --   --   --  1.8*  --  1.6*   ALK PHOS U/L 176*  --   --   --  185*  --  198*   ALT (SGPT) U/L 1,827*  --   --   --  2,632*  --  2,242*   AST (SGOT) U/L 2,042*  --   --   --  4,928*  --  4,382*   GLUCOSE mg/dL 82  82 86 102*   < > 124*   < > 79    < > = values in this interval not displayed.       Culture Data:   Microbiology Results (last 10 days)       Procedure Component Value - Date/Time    Respiratory Panel PCR w/COVID-19(SARS-CoV-2) CATHY/SHAWN/ONESIMO/PAD/COR/ROSSY In-House, NP Swab in UTM/VTM, 2 HR TAT - Swab, Nasopharynx [[id number]]  (Normal)  Collected: 03/28/24 1939    Lab Status: Final result Specimen: Swab from Nasopharynx Updated: 03/28/24 2038     ADENOVIRUS, PCR Not Detected     Coronavirus 229E Not Detected     Coronavirus HKU1 Not Detected     Coronavirus NL63 Not Detected     Coronavirus OC43 Not Detected     COVID19 Not Detected     Human Metapneumovirus Not Detected     Human Rhinovirus/Enterovirus Not Detected     Influenza A PCR Not Detected     Influenza B PCR Not Detected     Parainfluenza Virus 1 Not Detected     Parainfluenza Virus 2 Not Detected     Parainfluenza Virus 3 Not Detected     Parainfluenza Virus 4 Not Detected     RSV, PCR Not Detected     Bordetella pertussis pcr Not Detected     Bordetella parapertussis PCR Not Detected     Chlamydophila pneumoniae PCR Not Detected     Mycoplasma pneumo by PCR Not Detected    Narrative:      In the setting of a positive respiratory panel with a viral infection PLUS a negative procalcitonin without other underlying concern for bacterial infection, consider observing off antibiotics or discontinuation of antibiotics and continue supportive care. If the respiratory panel is positive for atypical bacterial infection (Bordetella pertussis, Chlamydophila pneumoniae, or Mycoplasma pneumoniae), consider antibiotic de-escalation to target atypical bacterial infection.          Radiology Data:   Imaging Results (Last 24 Hours)       ** No results found for the last 24 hours. **            I have reviewed the patient's current medications.     Assessment/Plan   Assessment  Active Hospital Problems    Diagnosis     **Acute renal failure superimposed on stage 3a chronic kidney disease     Severe malnutrition     Hyperkalemia     Failure to thrive in adult     Shock liver     Symptomatic anemia     Chronic systolic (congestive) heart failure      Ms. Chung is a 89-year-old female that presented to [facility] on 4/1 with failure to thrive. Unfortunately patient had a fall 12/16/2023 with resultant  admission for fracture of the proximal end of right humerus. She was discharged on 12/21/2023 to rehab and subsequent discharged to her daughter's home on 3/1/2024. Unfortunately she has continued to decline with worsening weakness, decreased appetite, nausea with dry heaves. She presented to [facility] ER on 3/28/2024 diagnosed with left lower lobe pneumonia, discharged on antibiotic. She returned with inability to walk due to weakness.  In the ED, CT head showed no acute findings.  CT abdomen pelvis showed no free air or abscess, acute findings.    Treatment Plan  Acute kidney injury with hyperkalemia.  Creatinine on admission 2.83 with potassium 6.7. Baseline labs available for comparison include a creatinine of 0.86 on 12/20/2023 and 1.29 on 3/28/2024.  Nephrology consulted and following.  Creatinine 2.72.  Potassium 4.2.  IV fluid trial per nephrology.  Patient lost IV access.  Encourage p.o. intake as able.  Family does not want any aggressive interventions such as dialysis.     Shock liver with AST 4382, ALT 2242, total bilirubin 1.6, alkaline phosphatase 198.  Liver enzymes slightly improved.  Platelet count 61,000 with repeat 103.     Hemglobin 6.2 on admission.  She was transfused 1 unit PRBC on admission.  Hemoglobin 8.3 today.  No signs of bleeding.    Elevated INR at 3.70.  INR 2.57 today.  No signs of bleeding.    SCDs for DVT prophylaxis    Palliative care consult.  No CPR with limited support to include no intubation, no cardioversion, no dialysis, no vasopressors, no artificial nutrition.  Hospice consulted. Patient/family have elected to take home with hospice.    Medical Decision Making  Number and Complexity of problems: 5 acute problems  Differential Diagnosis: none considered at present    Conditions and Status        Condition is worsening.     [facility] Data  External documents reviewed: Prior epic records  Cardiac tracing (EKG, telemetry) interpretation: reviewed  Radiology interpretation:  Interpreted by radiology  Labs reviewed: As above  Any tests that were considered but not ordered: None considered at present     Decision rules/scores evaluated (example [id number], Wells, etc): None considered at present     Discussed with: Patient and Dr. Horne     Care Planning  Shared decision making: Patient is agreeable to ongoing workup and treatment  Code status and discussions: No CPR with limited support to include no intubation, no cardioversion, no dialysis, no vasopressors, no artificial nutrition.     Disposition  Social Determinants of Health that impact treatment or disposition: none  I expect the patient to be discharged to home with hospice once arranged in 1-2 days.     Electronically signed by ANGY Lu, 04/03/24, 10:43 CDT.

## 2024-04-03 NOTE — PROGRESS NOTES
"            [facility] Palliative Care Services    Palliative Care Daily Progress Note   Chief complaint-follow up goals of care/advanced care planning, support for patient/family, and hospice referral/discussion    Code Status:   Code Status and Medical Interventions:   Ordered at: 04/02/24 1052     Medical Intervention Limits:    NO intubation (DNI)    NO cardioversion    NO dialysis    NO vasopressors    NO artificial nutrition     Level Of Support Discussed With:    Health Care Surrogate     Code Status (Patient has no pulse and is not breathing):    No CPR (Do Not Attempt to Resuscitate)     Medical Interventions (Patient has pulse or is breathing):    Limited Support      Advanced Directives: Advance Directive Status: Patient has advance directive, copy in chart   Goals of Care: Ongoing.     S: Medical record reviewed. Events noted.  Kidney function continues to improve creatinine 2.72 from 3.39.  Leukocytosis has resolved.  Hemoglobin stable 8.3 from 7.2.  Improvement and thrombocytopenia platelets 103 from 61.  Shock liver improving.  Laying in bed without apparent distress.  Without complaint.  Family at bedside.  Due to the Palliative Care Topics Discussed: palliative care, goals of care, care options, resuscitation status, [facility], and discharge options we will establish an advance care plan.   Advance Care Planning   Advance Care Planning Discussion: Spoke with patient's daughter, son, and son-in-law outside of room.  Updated on labs as above. Transition labs to daily per patient/family request \"she has been stuck so many times and they are having difficutly with \"port\" (IV) placement. Again explored discharge options and family elected to transition home with hospice at discharge.  Declined nursing facility placement.  Daughter and son-in-law to provide caregiver support at discharge.  Hospice expectations discussed at length. A MOST document is current and on file, copies provided to " family.     Review of Systems   Constitutional: Positive for decreased appetite and malaise/fatigue.   Cardiovascular:  Negative for leg swelling.   Respiratory:  Negative for shortness of breath.    Hematologic/Lymphatic: Negative for bleeding problem. Bruises/bleeds easily.   Musculoskeletal:  Positive for muscle weakness.   Gastrointestinal:  Positive for anorexia. Negative for nausea.   Genitourinary:  Negative for dysuria.   Neurological:  Positive for weakness.   Psychiatric/Behavioral:  Positive for memory loss. The patient is not nervous/anxious.      Pain Assessment  Preferred Pain Scale: number (Numeric Rating Pain Scale)  Nonverbal Indicators of Pain: nonverbal indicators absent    O:     Intake/Output Summary (Last 24 hours) at 4/3/2024 1008  Last data filed at 4/3/2024 0900  Gross per 24 hour   Intake 240 ml   Output 450 ml   Net -210 ml       Diagnostics and current medications: Reviewed.      Current Facility-Administered Medications:     acetaminophen (TYLENOL) tablet 650 mg, 650 mg, Oral, Q4H PRN **OR** acetaminophen (TYLENOL) 160 MG/5ML oral solution 650 mg, 650 mg, Oral, Q4H PRN **OR** acetaminophen (TYLENOL) suppository 650 mg, 650 mg, Rectal, Q4H PRN, Candida Jorge, APRN    sennosides-docusate (PERICOLACE) 8.6-50 MG per tablet 2 tablet, 2 tablet, Oral, BID PRN **AND** polyethylene glycol (MIRALAX) packet 17 g, 17 g, Oral, Daily PRN **AND** bisacodyl (DULCOLAX) EC tablet 5 mg, 5 mg, Oral, Daily PRN **AND** bisacodyl (DULCOLAX) suppository 10 mg, 10 mg, Rectal, Daily, Candida Jorge, APRN, 10 mg at 04/03/24 0918    donepezil (ARICEPT) tablet 5 mg, 5 mg, Oral, Nightly, Candida Jorge, APRN, 5 mg at 04/02/24 2209    escitalopram (LEXAPRO) tablet 10 mg, 10 mg, Oral, Daily, Candida Jorge, APRN, 10 mg at 04/03/24 0918    famotidine (PEPCID) tablet 20 mg, 20 mg, Oral, Daily, Jelena Bello, APRN, 20 mg at 04/03/24 0918    HYDROmorphone (DILAUDID) injection 0.5 mg, 0.5 mg, Intravenous, Q4H PRN  "**AND** naloxone (NARCAN) injection 0.4 mg, 0.4 mg, Intravenous, Q5 Min PRN, Candida Jorge APRN    levothyroxine (SYNTHROID, LEVOTHROID) tablet 125 mcg, 125 mcg, Oral, Once per day on Sunday Monday Tuesday Wednesday Thursday Friday, Candida Jorge APRN, 125 mcg at 04/03/24 0412    ondansetron ODT (ZOFRAN-ODT) disintegrating tablet 4 mg, 4 mg, Oral, Q6H PRN **OR** ondansetron (ZOFRAN) injection 4 mg, 4 mg, Intravenous, Q6H PRN, Candida Jorge APRN    sodium chloride 0.9 % bolus 250 mL, 250 mL, Intravenous, Once, Curt Garcia MD    sodium chloride 0.9 % flush 10 mL, 10 mL, Intravenous, Q12H, Candida Jorge APRN, 10 mL at 04/02/24 1002    sodium chloride 0.9 % flush 10 mL, 10 mL, Intravenous, PRN, Candida Jorge APRN    sodium chloride 0.9 % infusion 40 mL, 40 mL, Intravenous, PRN, Candida Jorge APRN    sodium chloride 0.9 % infusion, 100 mL/hr, Intravenous, Continuous, Candida Jorge APRN, Last Rate: 100 mL/hr at 04/02/24 1228, 100 mL/hr at 04/02/24 1228    sodium zirconium cyclosilicate (LOKELMA) packet 10 g, 10 g, Oral, Once, Mummaneni, Sundeep, DO    Allergies   Allergen Reactions    Erythromycin Shortness Of Breath    Zithromax [Azithromycin] Itching and Other (See Comments)     Yeast infection    Penicillins Rash     I have utilized all available immediate resources to obtain, update, or review the patient's current medications (including all prescriptions, over-the-counter products, herbals, cannabis/cannabidiol products, and vitamin/mineral/dietary (nutritional) supplements) for name, route of administration, type, dose and frequency.    A:    /57 (BP Location: Left arm, Patient Position: Lying)   Pulse 66   Temp 98 °F (36.7 °C) (Oral)   Resp 16   Ht 162.6 cm (64\")   Wt 49 kg (108 lb)   SpO2 92%   BMI 18.54 kg/m²     Vitals and nursing note reviewed.   Constitutional:       Appearance: Not in distress. Cachectic and frail.  Nasal cannula.  Eyes:      " General: Lids are normal.      Pupils: Pupils are equal, round, and reactive to light.   HENT:      Head: Normocephalic.   Pulmonary:      Effort: Pulmonary effort is normal.   Cardiovascular:      Normal rate.   Edema:     Peripheral edema absent.   Abdominal:      Palpations: Abdomen is soft.   Musculoskeletal: Normal range of motion.      Cervical back: Neck supple.   Skin:     General: Skin is warm.   Genitourinary:     Comments: No reported dysuria  Neurological:      Mental Status: Alert.   Psychiatric:         Mood and Affect: Mood normal.         Cognition and Memory: Memory is impaired.      Patient status: Disease state: Controlled with current treatments.  Current Functional status: Palliative Performance Scale Score: Performance 40% based on the following measures: Ambulation: Mainly in bed, Activity and Evidence of Disease: Unable to do any work, extensive evidence of disease, Self-Care: Mainly assistance required,  Intake: Normal or reduced, LOC: Full, drowsy or confusion   Nutritional status: Albumin 3.5 Body mass index is 18.54 kg/m².      Hospital Problem List      Acute renal failure superimposed on stage 3a chronic kidney disease    Chronic systolic (congestive) heart failure    Symptomatic anemia    Hyperkalemia    Failure to thrive in adult    Shock liver     Impression/Problem List:     Acute kidney failure superimposed on stage III chronic kidney disease  Symptomatic anemia  Hyperkalemia  Failure to thrive  Shock liver     Hepatic steatosis  Leukocytosis  Thrombocytopenia  Advanced COPD  Hypertension  Chronic combined systolic and diastolic congestive heart failure  Fall  Partially healed closed fractures of the proximal humerus, right  Hypothyroidism  Coronary artery disease  Hyperlipidemia  Lung nodule  Cerebral microvascular disease and atrophy per CT  Osteopenia  Peripheral artery disease  History of Shingles (1940)  Skin cancer  History of subarachnoid hemorrhage-6/30/2023  Impaired  mobility  Advanced age     Large stable slightly complex cyst,lateral left kidney measuring 8.7 x 8.9 cm, per CT    Partially healed fractures of the bilateral pubic ring  Advanced degenerative changes within the lumbar spine.      Recommendations/Plan:  1. plan: Goals of care include CODE STATUS no CPR/limited support interventions.     Family support: The patient receives support from her children..  Advance Directives: Advance Directive Status: Patient has advance directive, copy in chart   POA/Healthcare surrogate-children Donal and [facility].     2.  Palliative care encounter  - Prognosis is poor long-term secondary to acute kidney failure, shock liver, failure to thrive, symptomatic anemia, hyperkalemia, advanced COPD, multiple comorbidities, and advanced age.  -Family appears to have good prognostic awareness.      -Last seen by palliative services during December 2023 hospitalization, goals of care established and a MOST document was completed.      -Treated for hyperkalemia, received 1 unit PRBCs.    -Nephrology consulted and noted patient and family elected not to pursue renal replacement therapy.      4/2-CODE STATUS changes, no CPR/limited support interventions  -Would benefit from completion of a MOST document  -Discussed options of care to include continued conservative/supportive measures versus transitions of care with focus of comfort and best supportive care directed by hospice at discharge.  Family contemplating options.  -High risk for rehospitalization's.    4/3-continue supportive measures  -Transition labs to daily per patient/family request  -Anticipating home with hospice.  Discussed with SW.  A hospice consult was placed by attending physician yesterday.  -A MOST document is current and on file      Thank you for allowing us to participate in patient's plan of care. Palliative Care Team will continue to follow patient.     25 minutes spent on advance care planning-discussing with patient  and/or family, answering questions, providing some guidance about a plan and documentation of care, and coordinating care face to face.    Ignacia Hill, ANGY  4/3/2024  10:08 CDT

## 2024-04-03 NOTE — PLAN OF CARE
Goal Outcome Evaluation:  Plan of Care Reviewed With: patient        Progress: no change  Outcome Evaluation: Pt denies pain; small BM this shift; no IV access; pt ate a bit more for breakfast and lunch today than she had been eating per family; palliative following; d/c planning ongoing for home with hospice; safety maintained.

## 2024-04-03 NOTE — CASE MANAGEMENT/SOCIAL WORK
Continued Stay Note  JAMEY Scales     Patient Name: Mya Chung  MRN: [id number]  Today's Date: 4/3/2024    Admit Date: 4/1/2024    Plan: Home Hospice   Discharge Plan       Row Name 04/03/24 1120       Plan    Plan Home Hospice    Patient/Family in Agreement with Plan yes    Plan Comments Rec'd order for home hospice. Spoke with pt's family in the room. They do plan to take pt home. [facility] is the only agency in their [facility] and they are ok with this. Referral called to [facility] [phone].                   Discharge Codes    No documentation.                 Expected Discharge Date and Time       Expected Discharge Date Expected Discharge Time    Apr 4, 2024               JOHNATHON Danielle

## 2024-04-03 NOTE — PROGRESS NOTES
Nephrology ([facility]) Progress Note      Patient:  Mya Chung  YOB: 1928  Date of Service: 4/3/2024  MRN: [id number]   Acct: [id number]   Primary Care Physician: Delfina Pereira DO  Advance Directive:   Code Status and Medical Interventions:   Ordered at: 04/02/24 1052     Medical Intervention Limits:    NO intubation (DNI)    NO cardioversion    NO dialysis    NO vasopressors    NO artificial nutrition     Level Of Support Discussed With:    Health Care Surrogate     Code Status (Patient has no pulse and is not breathing):    No CPR (Do Not Attempt to Resuscitate)     Medical Interventions (Patient has pulse or is breathing):    Limited Support     Admit Date: 4/1/2024       Hospital Day: 2  Referring Provider: No ref. provider found      Patient personally seen and examined.  Complete chart including Consults, Notes, Operative Reports, Labs, Cardiology, and Radiology studies reviewed as able.        Subjective:  Mya Chung is a 89 y.o. female for whom we were consulted for evaluation and treatment of acute kidney injury with hyperkalemia. No prior known renal issues. Presented to ER with progressive nausea and vomiting and lack of appetite for a month. Prior labs include creatinine 0.86  in December 2023, and creatinine 1.29 on 3/28/24.  In ER her creatinine was 2.3 and potassium 6.7. patient and family elected to not pursue dialysis and only wanted medial management. Received treatment including Lokelma. Also given unit PRBC for hemoglobin 6.2.  On initial nephrology exam noted to be clinically volume depleted. Renal function has shown some improvement with IV fluids.    Today is awake, no complaints. Some confusion noted earlier.  Urine output nonoliguric    Allergies:  Erythromycin, Zithromax [azithromycin], and Penicillins    Home Meds:  Medications Prior to Admission   Medication Sig Dispense Refill Last Dose    alendronate (FOSAMAX) 70 MG tablet  Take 1 tablet by mouth Every 7 (Seven) Days. Thursday       aspirin 81 MG EC tablet Take 1 tablet by mouth Daily.       Calcium Carbonate-Vitamin D (CALCIUM 600/VITAMIN D PO) Take 1 tablet by mouth Daily.       carvedilol (COREG) 3.125 MG tablet Take 1 tablet by mouth 2 (Two) Times a Day. 180 tablet 3     clopidogrel (PLAVIX) 75 MG tablet Take 1 tablet by mouth Daily. 90 tablet 3     colestipol (COLESTID) 1 g tablet Take 1 tablet by mouth Every Other Day.       donepezil (Aricept) 5 MG tablet Take 1 tablet by mouth Every Night. 90 tablet 0     doxycycline (MONODOX) 100 MG capsule Take 1 capsule by mouth 2 (Two) Times a Day. (Patient taking differently: Take 1 capsule by mouth 2 (Two) Times a Day. Started on 03/29/2024 x 7 days) 14 capsule 0     empagliflozin (Jardiance) 10 MG tablet tablet Take 1 tablet by mouth Daily. 90 tablet 3     escitalopram (LEXAPRO) 10 MG tablet Take 1 tablet by mouth Daily.       Glucosamine-Chondroitin-Vit D3 6071-4646-331 MG-MG-UNIT pack Take 1 tablet by mouth 2 (Two) Times a Day.       levothyroxine (SYNTHROID, LEVOTHROID) 125 MCG tablet Take 1 tablet by mouth Every Morning.       Multiple Vitamins-Minerals (COMPLETE MULTIVITAMIN/MINERAL PO) Take 1 tablet by mouth Daily.       acetaminophen (TYLENOL) 325 MG tablet Take 2 tablets by mouth Every 4 (Four) Hours As Needed for Mild Pain.       furosemide (LASIX) 20 MG tablet Take 1 tablet by mouth As Needed (for heart failure, leg swelling, weight gain). (Patient taking differently: Take 1 tablet by mouth Daily As Needed (for heart failure, leg swelling, weight gain).) 90 tablet 3     ondansetron ODT (ZOFRAN-ODT) 4 MG disintegrating tablet Place 1 tablet on the tongue Every 8 (Eight) Hours As Needed for Vomiting. 15 tablet 0        Medicines:  Current Facility-Administered Medications   Medication Dose Route Frequency Provider Last Rate Last Admin    acetaminophen (TYLENOL) tablet 650 mg  650 mg Oral Q4H PRN Candida Jorge APRN        Or     acetaminophen (TYLENOL) 160 MG/5ML oral solution 650 mg  650 mg Oral Q4H PRN Candida Jorge APRN        Or    acetaminophen (TYLENOL) suppository 650 mg  650 mg Rectal Q4H PRN Candida Jorge APRN        sennosides-docusate (PERICOLACE) 8.6-50 MG per tablet 2 tablet  2 tablet Oral BID PRN Candida Jorge APRN        And    polyethylene glycol (MIRALAX) packet 17 g  17 g Oral Daily PRN Candida Jorge APRN        And    bisacodyl (DULCOLAX) EC tablet 5 mg  5 mg Oral Daily PRN Candida Jorge APRN        And    bisacodyl (DULCOLAX) suppository 10 mg  10 mg Rectal Daily Candida Jorge APRN   10 mg at 04/03/24 0918    donepezil (ARICEPT) tablet 5 mg  5 mg Oral Nightly Candida Jorge APRN   5 mg at 04/02/24 2209    escitalopram (LEXAPRO) tablet 10 mg  10 mg Oral Daily Candida Jorge APRN   10 mg at 04/03/24 0918    famotidine (PEPCID) tablet 20 mg  20 mg Oral Daily Jelena Bello APRN   20 mg at 04/03/24 0918    HYDROmorphone (DILAUDID) injection 0.5 mg  0.5 mg Intravenous Q4H PRN Candida Jorge APRN        And    naloxone (NARCAN) injection 0.4 mg  0.4 mg Intravenous Q5 Min PRN Candida Jorge APRN        levothyroxine (SYNTHROID, LEVOTHROID) tablet 125 mcg  125 mcg Oral Once per day on Sunday Monday Tuesday Wednesday Thursday Friday Candida Jorge APRN   125 mcg at 04/03/24 0412    ondansetron ODT (ZOFRAN-ODT) disintegrating tablet 4 mg  4 mg Oral Q6H PRN Candida Jorge APRN        Or    ondansetron (ZOFRAN) injection 4 mg  4 mg Intravenous Q6H PRN Candida Jorge, APRANA        sodium chloride 0.9 % bolus 250 mL  250 mL Intravenous Once Curt Garcia MD        sodium chloride 0.9 % flush 10 mL  10 mL Intravenous Q12H Candida Jorge APRN   10 mL at 04/02/24 1002    sodium chloride 0.9 % flush 10 mL  10 mL Intravenous PRN Candida Jorge APRN        sodium chloride 0.9 % infusion 40 mL  40 mL Intravenous PRN Candida Jorge, ANGY        sodium chloride 0.9  % infusion  100 mL/hr Intravenous Continuous Candida Jorge APRN 100 mL/hr at 24 1228 100 mL/hr at 24 1228    sodium zirconium cyclosilicate (LOKELMA) packet 10 g  10 g Oral Once [place] ,            Past Medical History:  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     Wears glasses        Past Surgical History:  Past Surgical History:   Procedure Laterality Date    AORTAGRAM Right 2018    Procedure: LEFT LOWER EXTREMITY ANGIOGRAM;  Surgeon: Walker Davis DO;  Location: [facility] OR 12;  Service: Vascular    BREAST BIOPSY Bilateral     multiple on both, all benign    BREAST BIOPSY Left 2017    Procedure: LEFT MAMMOGRAM GUIDED NEEDLE DIRECTED EXCISIONAL BREAST BIOPSY;  Surgeon: Malgorzata Scott MD;  Location: [facility] OR;  Service:     BUNIONECTOMY Bilateral      SECTION      X2    CHOLECYSTECTOMY      CHOLECYSTECTOMY WITH INTRAOPERATIVE CHOLANGIOGRAM N/A 2021    Procedure: LAPAROSCOPIC CHOLECYSTECTOMY WITH CHOLANGIOGRAM;  Surgeon: Malgorzata Scott MD;  Location: [facility] OR;  Service: General;  Laterality: N/A;    COLONOSCOPY  2015    diverticulosis    ENDOSCOPY N/A 2021    Procedure: ESOPHAGOGASTRODUODENOSCOPY WITH ANESTHESIA;  Surgeon: Sonu Cheek DO;  Location: [facility] ENDOSCOPY;  Service: Gastroenterology;  Laterality: N/A;  pre nausea  post esophagitis  Delfina Pereira DO    EYE SURGERY      CATARACT SURGERY    FEMORAL ENDARTERECTOMY Left 2018    Procedure: LEFT FEMORAL ENDARTERECTOMY;  Surgeon: Walker Davis DO;  Location: [facility] HYBRID OR 12;  Service: Vascular    JOINT  REPLACEMENT      RIGHT HIP    VASCULAR SURGERY         Family History  Family History   Problem Relation Age of Onset    No Known Problems Mother     Heart disease Father     Cancer Sister         Uterus    No Known Problems Brother     No Known Problems Maternal Aunt     No Known Problems Paternal Aunt     No Known Problems Maternal Grandmother     No Known Problems Paternal Grandmother     No Known Problems Daughter     No Known Problems Son     Breast cancer Neg Hx     BRCA 1/2 Neg Hx     Colon cancer Neg Hx     Endometrial cancer Neg Hx     Ovarian cancer Neg Hx     Colon polyps Neg Hx     Esophageal cancer Neg Hx        Social History  Social History     Socioeconomic History    Marital status:    Tobacco Use    Smoking status: Former     Current packs/day: 0.00     Types: Cigarettes     Start date: 1953     Quit date: 1955     Years since quittin.3     Passive exposure: Never    Smokeless tobacco: Never   Vaping Use    Vaping status: Never Used   Substance and Sexual Activity    Alcohol use: No    Drug use: No    Sexual activity: Defer       Review of Systems:  History obtained from chart review and the patient  General ROS: No fever or chills  Respiratory ROS: No cough, shortness of breath, wheezing  Cardiovascular ROS: No chest pain or palpitations  Gastrointestinal ROS: No abdominal pain or melena  Genito-Urinary ROS: No dysuria or hematuria  Psych ROS: No anxiety and depression  14 point ROS reviewed with the patient and negative except as noted above and in the HPI unless unable to obtain.    Objective:  Patient Vitals for the past 24 hrs:   BP Temp Temp src Pulse Resp SpO2   24 0803 125/57 98 °F (36.7 °C) Oral 66 16 92 %   24 0405 126/68 98.2 °F (36.8 °C) Oral 68 16 96 %   24 0016 103/47 97.8 °F (36.6 °C) Axillary 62 16 96 %   24 2021 109/53 98.2 °F (36.8 °C) Oral 63 16 95 %   24 1459 111/56 97.5 °F (36.4 °C) Oral 54 16 94 %   24 1211 101/51 97.4 °F  (36.3 °C) Oral 55 16 93 %       Intake/Output Summary (Last 24 hours) at 4/3/2024 1046  Last data filed at 4/3/2024 0900  Gross per 24 hour   Intake 240 ml   Output 450 ml   Net -210 ml     General: awake/alert   Chest:  clear to auscultation bilaterally without respiratory distress  CVS: regular rate and rhythm  Abdominal: soft, nontender, positive bowel sounds  Extremities: no cyanosis or edema  Skin: warm and dry without rash      Labs:  Results from last 7 days   Lab Units 04/03/24  0910 04/02/24  0656 04/01/24  1210   WBC 10*3/mm3 7.52 12.46* 11.66*   HEMOGLOBIN g/dL 8.3* 7.2* 6.2*   HEMATOCRIT % 30.3* 24.4* 22.3*   PLATELETS 10*3/mm3 103* 61* 117*         Results from last 7 days   Lab Units 04/03/24  0612 04/03/24  0043 04/02/24  1726 04/02/24  1216 04/02/24  0407 04/01/24  1502 04/01/24  1210   SODIUM mmol/L 134*  134* 134* 131*   < > 135*   < > 132*   POTASSIUM mmol/L 4.2  4.2 4.4 4.6   < > 5.2   < > 6.7*   CHLORIDE mmol/L 99  99 99 97*   < > 98   < > 95*   CO2 mmol/L 22.0  22.0 22.0 21.0*   < > 21.0*   < > 18.0*   BUN mg/dL 74*  74* 71* 71*   < > 66*   < > 54*   CREATININE mg/dL 2.72*  2.72* 2.93* 3.30*   < > 3.39*   < > 2.83*   CALCIUM mg/dL 8.4  8.4 7.9* 7.9*   < > 9.1   < > 9.2   EGFR mL/min/1.73 15.5*  15.5* 14.2* 12.3*   < > 11.9*   < > 14.8*   BILIRUBIN mg/dL 1.6*  --   --   --  1.8*  --  1.6*   ALK PHOS U/L 176*  --   --   --  185*  --  198*   ALT (SGPT) U/L 1,827*  --   --   --  2,632*  --  2,242*   AST (SGOT) U/L 2,042*  --   --   --  4,928*  --  4,382*   GLUCOSE mg/dL 82  82 86 102*   < > 124*   < > 79    < > = values in this interval not displayed.       Radiology:   Imaging Results (Last 72 Hours)       Procedure Component Value Units Date/Time    CT Abdomen Pelvis Without Contrast [[id number]] Collected: 04/01/24 1332     Updated: 04/01/24 1352    Narrative:      EXAMINATION: CT ABDOMEN PELVIS WO CONTRAST- 4/1/2024 1:32 PM     HISTORY: Acute kidney injury, decreased gfr and  hyperkalemia.     TOTAL DOSE: 227 mGy.cm (Automatic exposure control technique was  implemented in an effort to keep the radiation dose as low as possible  without compromising image quality)     REPORT: Spiral CT of the abdomen and pelvis was performed without  contrast from the lung bases through the pubic symphysis. Reconstructed  coronal and sagittal images are also reviewed.     Comparison: CT of the pelvis 10/20/2022. Ultrasound of the kidneys  12/12/2022.     Review of lung windows demonstrates mild respiratory motion artifact,  there is a small right pleural effusion. The heart is enlarged. There is  a questionable small right-sided pericardial effusion. Heavy calcified  plaque is present within the coronary arteries.     Evaluation of the solid abdominal organs is limited without intravenous  contrast. Decreased attenuation of the liver parenchyma probably  represents steatosis. Cholecystectomy clips are present. The spleen  appears within normal limits. There is incomplete distention of the  stomach which contains some fluid. There is a large slightly complex  cyst arising from the lateral left kidney which measures 8.7 cm. This  contains a thin septation and calcification within its inferior medial  wall. No nephrolithiasis or hydronephrosis is identified. The ureters  are decompressed. There is a small amount of free fluid in the pelvis,  the source is not clear. No free air is identified. There is mild  sigmoid diverticulosis, no evidence of acute diverticulitis. The bladder  is decompressed. Heavy calcified plaque is noted within the abdominal  aorta and its branches as before. There is normal aortic caliber. Small  surgical clips are noted adjacent to the left femoral artery.  Nonvisualization of the appendix. Review of bone windows demonstrates  partially healed fractures of the pubic ring bilaterally. There is  previous right total hip arthroplasty. Multilevel degenerative changes  are present in  the lumbar spine includes grade 1 spondylolisthesis at  L4-5 with disc space collapse.       Impression:      1. Limited noncontrast study shows a small amount of free fluid in the  pelvis, the etiology is unknown. No free air or abscess is identified.  No evidence of hydronephrosis or ureteral dilation. Large stable  slightly complex cyst arising from the lateral left kidney measuring 8.7  x 8.9 cm.  2. Hepatic steatosis, evidence of previous cholecystectomy.  3. Mild sigmoid diverticulosis without definite diverticulitis.  4. Partially healed fractures of the bilateral pubic ring and evidence  of previous right hip arthroplasty. Advanced degenerative changes within  the lumbar spine.     This report was signed and finalized on 4/1/2024 1:48 PM by Dr. Barrett Arreguin MD.       CT Head Without Contrast [[id number]] Collected: 04/01/24 1325     Updated: 04/01/24 1331    Narrative:      EXAMINATION: CT HEAD WO CONTRAST- 4/1/2024 1:25 PM     HISTORY: Altered mental status.     DOSE: 921 mGycm (Automatic exposure control technique was implemented in  an effort to keep the radiation dose as low as possible without  compromising image quality)     REPORT: Spiral CT of the head was performed without contrast,  reconstructed coronal and sagittal images are also reviewed.     COMPARISON: CT head without contrast 12/16/2023.     No intracranial hemorrhage, mass or mass effect is identified. There is  mild age compatible cerebral atrophy. Decreased attenuation in the  periventricular and subcortical white matter tracts is compatible with  chronic small vessel white matter ischemic disease, this is stable. The  ventricles and basal cisterns are within normal limits. Review of bone  windows is unremarkable. There is normal aeration of the visualized  paranasal sinuses and mastoid air cells.       Impression:      1. No acute intracranial abnormality, no interval change. Mild diffuse  cerebral atrophy and mild to moderate chronic  "small vessel white matter  ischemic changes are identified.        This report was signed and finalized on 4/1/2024 1:28 PM by Dr. Barrett Arreguin MD.       XR Chest 1 View [[id number]] Collected: 04/01/24 1231     Updated: 04/01/24 1237    Narrative:      EXAMINATION: XR CHEST 1 VW- 4/1/2024 12:31 PM     HISTORY: pneumonia evaluation.     REPORT: A frontal view of the chest was obtained.     COMPARISON: Chest x-ray 3/28/2024, 12/16/2023.     There is blunting of the costophrenic angles compatible with small  bilateral pleural effusions, which appear decreased in size. There is  improved aeration of the left lung base with decreased infiltrate. No  pneumothorax is identified. There is underlying COPD. Mild cardiomegaly  is present without evidence of overt CHF. Again noted are partially  healed fractures of the surgical neck of the right humerus as well as  the proximal shaft, with displacement.       Impression:      1. Improved aeration of the left lung base and decrease in size of small  bilateral pleural effusions. No lung consolidation is identified.  Advanced COPD.  2. Cardiomegaly without evidence of CHF.  3. Partially healed closed fractures of the proximal humerus as  described.           This report was signed and finalized on 4/1/2024 12:34 PM by Dr. Barrett Arreguin MD.               Culture:  No results found for: \"BLOODCX\", \"URINECX\", \"WOUNDCX\", \"MRSACX\", \"RESPCX\", \"STOOLCX\"      Assessment    Acute kidney injury, ATN--worse today  Hyperkalemia--improved  Metabolic acidosis  Hyponatremia  Acute liver injury  Elevated INR  Anemia  Large left renal cyst    Plan:  Continue IV fluids as able. Encourage PO intake as able.  Discussed in conjunction with palliative care and multiple family members. They continue to oppose any aggressive interventions and also wish to limit lab draws/IV attempts. While renal function has shown some improvement with IV hydration and PRBC administration, it is very likely that " patient's baseline level of activity and intake at home will lead to repeated need for hospitalization for the same issues. Feel it is appropriate to move towards a comfort care approach. Palliative care providers and family will be discussing options to include going home with Hospice        Oscar Grijalva, APRN  4/3/2024  10:46 CDT

## 2024-04-03 NOTE — NURSING NOTE
Spoke with Dr[facility] who stated that the patient's appt was set for Tuesday, April 9 and would need an updated X-ray of her arm. Also, Dr. Galvez is out of town until next week. If patient is still here on Monday, we will re-address with Ortho. Family at bedside and informed.

## 2024-04-04 LAB
ANION GAP SERPL CALCULATED.3IONS-SCNC: 10 MMOL/L (ref 5–15)
BUN SERPL-MCNC: 53 MG/DL (ref 8–23)
BUN/CREAT SERPL: 31.9 (ref 7–25)
CALCIUM SPEC-SCNC: 8.7 MG/DL (ref 8.2–9.6)
CHLORIDE SERPL-SCNC: 101 MMOL/L (ref 98–107)
CO2 SERPL-SCNC: 25 MMOL/L (ref 22–29)
CREAT SERPL-MCNC: 1.66 MG/DL (ref 0.57–1)
EGFRCR SERPLBLD CKD-EPI 2021: 28.1 ML/MIN/1.73
GLUCOSE SERPL-MCNC: 86 MG/DL (ref 65–99)
INR PPP: 1.9 (ref 0.91–1.09)
POTASSIUM SERPL-SCNC: 3.7 MMOL/L (ref 3.5–5.2)
PROTHROMBIN TIME: 22.6 SECONDS (ref 11.8–14.8)
SODIUM SERPL-SCNC: 136 MMOL/L (ref 136–145)

## 2024-04-04 PROCEDURE — 25810000003 SODIUM CHLORIDE 0.9 % SOLUTION: Performed by: NURSE PRACTITIONER

## 2024-04-04 PROCEDURE — 80048 BASIC METABOLIC PNL TOTAL CA: CPT | Performed by: CLINICAL NURSE SPECIALIST

## 2024-04-04 PROCEDURE — 99232 SBSQ HOSP IP/OBS MODERATE 35: CPT | Performed by: CLINICAL NURSE SPECIALIST

## 2024-04-04 PROCEDURE — 85610 PROTHROMBIN TIME: CPT | Performed by: NURSE PRACTITIONER

## 2024-04-04 RX ADMIN — ESCITALOPRAM OXALATE 10 MG: 10 TABLET ORAL at 09:23

## 2024-04-04 RX ADMIN — LEVOTHYROXINE SODIUM 125 MCG: 125 TABLET ORAL at 06:50

## 2024-04-04 RX ADMIN — FAMOTIDINE 20 MG: 20 TABLET, FILM COATED ORAL at 09:23

## 2024-04-04 RX ADMIN — SODIUM CHLORIDE 100 ML/HR: 9 INJECTION, SOLUTION INTRAVENOUS at 12:58

## 2024-04-04 NOTE — PROGRESS NOTES
[facility] Palliative [facility] Services    Palliative Care Daily Progress Note   Chief complaint-follow up     Code Status:   Code Status and Medical Interventions:   Ordered at: 04/02/24 1052     Medical Intervention Limits:    NO intubation (DNI)    NO cardioversion    NO dialysis    NO vasopressors    NO artificial nutrition     Level Of Support Discussed With:    Health Care Surrogate     Code Status (Patient has no pulse and is not breathing):    No CPR (Do Not Attempt to Resuscitate)     Medical Interventions (Patient has pulse or is breathing):    Limited Support      Advanced Directives: Advance Directive Status: Patient has advance directive, copy in chart   Goals of Care: Ongoing.     S: Medical record reviewed. Events noted.  Kidney function continues to improve creatinine 1.66 from 2.72 from 3.39.  In bed without apparent distress with eyes closed.  Her son and daughter and son-in-law currently at bedside.  States patient had a fair breakfast this morning, improved compared to last several days.  Plan Home with hospice and family awaiting to talk with hospice liaison this morning.      Review of Systems   Constitutional: Positive for decreased appetite and malaise/fatigue.   Cardiovascular:  Negative for leg swelling.   Respiratory:  Negative for shortness of breath.    Hematologic/Lymphatic: Negative for bleeding problem. Bruises/bleeds easily.   Musculoskeletal:  Positive for muscle weakness.   Gastrointestinal:  Positive for anorexia. Negative for nausea.   Genitourinary:  Negative for dysuria.   Neurological:  Positive for weakness.   Psychiatric/Behavioral:  Positive for memory loss. The patient is not nervous/anxious.      Pain Assessment  Preferred Pain Scale: number (Numeric Rating Pain Scale)  Nonverbal Indicators of Pain: nonverbal indicators absent    O:     Intake/Output Summary (Last 24 hours) at 4/4/2024 0900  Last data filed at 4/3/2024 1430  Gross per 24 hour   Intake  120 ml   Output 300 ml   Net -180 ml       Diagnostics and current medications: Reviewed.      Current Facility-Administered Medications:     acetaminophen (TYLENOL) tablet 650 mg, 650 mg, Oral, Q4H PRN **OR** acetaminophen (TYLENOL) 160 MG/5ML oral solution 650 mg, 650 mg, Oral, Q4H PRN **OR** acetaminophen (TYLENOL) suppository 650 mg, 650 mg, Rectal, Q4H PRN, Candida Jorge, ANGY    sennosides-docusate (PERICOLACE) 8.6-50 MG per tablet 2 tablet, 2 tablet, Oral, BID PRN **AND** polyethylene glycol (MIRALAX) packet 17 g, 17 g, Oral, Daily PRN **AND** bisacodyl (DULCOLAX) EC tablet 5 mg, 5 mg, Oral, Daily PRN **AND** bisacodyl (DULCOLAX) suppository 10 mg, 10 mg, Rectal, Daily, Candida Jorge, ANGY, 10 mg at 04/03/24 0918    donepezil (ARICEPT) tablet 5 mg, 5 mg, Oral, Nightly, Candida Jorge, APRN, 5 mg at 04/03/24 2131    escitalopram (LEXAPRO) tablet 10 mg, 10 mg, Oral, Daily, Candida Jorge APRN, 10 mg at 04/04/24 0923    famotidine (PEPCID) tablet 20 mg, 20 mg, Oral, Daily, Jelena Bello, APRN, 20 mg at 04/04/24 0923    HYDROmorphone (DILAUDID) injection 0.5 mg, 0.5 mg, Intravenous, Q4H PRN **AND** naloxone (NARCAN) injection 0.4 mg, 0.4 mg, Intravenous, Q5 Min PRN, Candida Jorge, APRN    levothyroxine (SYNTHROID, LEVOTHROID) tablet 125 mcg, 125 mcg, Oral, Once per day on Sunday Monday Tuesday Wednesday Thursday Friday, Candida Jorge APRN, 125 mcg at 04/04/24 0650    ondansetron ODT (ZOFRAN-ODT) disintegrating tablet 4 mg, 4 mg, Oral, Q6H PRN **OR** ondansetron (ZOFRAN) injection 4 mg, 4 mg, Intravenous, Q6H PRN, Candida Jorge, APRN    sodium chloride 0.9 % bolus 250 mL, 250 mL, Intravenous, Once, Curt Garcia MD    sodium chloride 0.9 % flush 10 mL, 10 mL, Intravenous, Q12H, Candida Jorge, APRN, 10 mL at 04/02/24 1002    sodium chloride 0.9 % flush 10 mL, 10 mL, Intravenous, PRN, Candida Jorge, APRN    sodium chloride 0.9 % infusion 40 mL, 40 mL, Intravenous, PRN,  "Candida Jorge APRN    sodium chloride 0.9 % infusion, 100 mL/hr, Intravenous, Continuous, Candida Jorge APRN, Last Rate: 100 mL/hr at 04/02/24 1228, 100 mL/hr at 04/02/24 1228    sodium zirconium cyclosilicate (LOKELMA) packet 10 g, 10 g, Oral, Once, Mummaneni, Sundeep, DO    Allergies   Allergen Reactions    Erythromycin Shortness Of Breath    Zithromax [Azithromycin] Itching and Other (See Comments)     Yeast infection    Penicillins Rash     I have utilized all available immediate resources to obtain, update, or review the patient's current medications (including all prescriptions, over-the-counter products, herbals, cannabis/cannabidiol products, and vitamin/mineral/dietary (nutritional) supplements) for name, route of administration, type, dose and frequency.    A:    /73 (BP Location: Left arm, Patient Position: Lying)   Pulse 73   Temp 97.9 °F (36.6 °C) (Oral)   Resp 18   Ht 162.6 cm (64\")   Wt 50.4 kg (111 lb 3.2 oz)   SpO2 94%   BMI 19.09 kg/m²     Vitals and nursing note reviewed.   Constitutional:       Appearance: Not in distress. Cachectic and frail.  Nasal cannula.  Eyes:      General: Lids are normal.   HENT:      Head: Normocephalic.   Pulmonary:      Effort: Pulmonary effort is normal.   Cardiovascular:      Normal rate.   Musculoskeletal: Normal range of motion.      Cervical back: Neck supple.   Skin:     General: Skin is warm.   Genitourinary:     Comments: No reported dysuria  Psychiatric:         Mood and Affect: Mood normal.         Cognition and Memory: Memory is impaired.      Patient status: Disease state: Controlled with current treatments.  Current Functional status: Palliative Performance Scale Score: Performance 40% based on the following measures: Ambulation: Mainly in bed, Activity and Evidence of Disease: Unable to do any work, extensive evidence of disease, Self-Care: Mainly assistance required,  Intake: Normal or reduced, LOC: Full, drowsy or confusion "   Nutritional status: Albumin 3.5 Body mass index is 18.54 kg/m².      Hospital Problem List      Acute renal failure superimposed on stage 3a chronic kidney disease    Chronic systolic (congestive) heart failure    Symptomatic anemia    Hyperkalemia    Failure to thrive in adult    Shock liver     Impression/Problem List:     Acute kidney failure superimposed on stage III chronic kidney disease  Symptomatic anemia  Hyperkalemia  Failure to thrive  Shock liver     Hepatic steatosis  Leukocytosis  Thrombocytopenia  Advanced COPD  Hypertension  Chronic combined systolic and diastolic congestive heart failure  Fall  Partially healed closed fractures of the proximal humerus, right  Hypothyroidism  Coronary artery disease  Hyperlipidemia  Lung nodule  Cerebral microvascular disease and atrophy per CT  Osteopenia  Peripheral artery disease  History of Shingles (1940)  Skin cancer  History of subarachnoid hemorrhage-6/30/2023  Impaired mobility  Advanced age     Large stable slightly complex cyst,lateral left kidney measuring 8.7 x 8.9 cm, per CT    Partially healed fractures of the bilateral pubic ring  Advanced degenerative changes within the lumbar spine.      Recommendations/Plan:  1. plan: Goals of care include CODE STATUS no CPR/limited support interventions.     Family support: The patient receives support from her children..  Advance Directives: Advance Directive Status: Patient has advance directive, copy in chart   [facility]     2.  Palliative care encounter  - Prognosis is poor long-term secondary to acute kidney failure, shock liver, failure to thrive, symptomatic anemia, hyperkalemia, advanced COPD, multiple comorbidities, and advanced age.  -Family appears to have good prognostic awareness.      -Last seen by palliative services during December 2023 hospitalization, goals of care established and a MOST document was completed.      -Treated for hyperkalemia, received  1 unit PRBCs.    -Nephrology consulted and noted patient and family elected not to pursue renal replacement therapy.      4/2-CODE STATUS changes, no CPR/limited support interventions  -Would benefit from completion of a MOST document  -Discussed options of care to include continued conservative/supportive measures versus transitions of care with focus of comfort and best supportive care directed by hospice at discharge.  Family contemplating options.  -High risk for rehospitalization's.    4/3-continue supportive measures  -Transition labs to daily per patient/family request  -Anticipating home with hospice.  Discussed with SW.  A hospice consult was placed by attending physician yesterday.    4/4-continue supportive measures  -Plan Home with hospice, family to meet with liaison this morning, electronic communication to Rd[facility] Cheek  -A MOST document is current and on file      Thank you for allowing us to participate in patient's plan of care. Palliative Care Team will continue to follow patient.     Ignacia Hill, APRN  4/4/2024  09:54 CDT

## 2024-04-04 NOTE — CASE MANAGEMENT/SOCIAL WORK
Continued Stay Note  JAMEY Scales     Patient Name: Mya Chung  MRN: [id number]  Today's Date: 4/4/2024    Admit Date: 4/1/2024    Plan: Home Hospice   Discharge Plan       Row Name 04/04/24 1000       Plan    Plan Home Hospice    Patient/Family in Agreement with Plan yes    Plan Comments Radha with [facility] has met with family and working on getting dme delivered. At this time, plan is to go home on Friday.                   Discharge Codes    No documentation.                 Expected Discharge Date and Time       Expected Discharge Date Expected Discharge Time    Apr 4, 2024               JOHNATHON Danielle

## 2024-04-04 NOTE — PROGRESS NOTES
[facility] Medicine Services  INPATIENT PROGRESS NOTE    Patient Name: Mya Chung  Date of Admission: 4/1/2024  Today's Date: 04/04/24  Length of Stay: 3  Primary Care Physician: Delfina Pereira DO    Subjective   Chief Complaint: Weakness  HPI   Ms. Chung is a 89-year-old female who presented to [facility] on 4/1 with failure to thrive. Unfortunately, patient had a fall 12/16/2023 with resultant admission for fracture of the proximal end of right humerus. She was discharged on 12/21/2023 to rehab and subsequent discharged to her daughter's home on 3/1/2024. She has continued to decline with worsening weakness, decreased appetite, nausea with dry heaves. She presented to [facility] ER on 3/28/2024 was diagnosed with left lower lobe pneumonia, discharged on antibiotic. She returned with inability to walk due to weakness. In the ED, CT head showed no acute findings. CT abdomen pelvis showed no free air or abscess, acute findings.  Potassium 6.7, creatinine 3, sodium 132.  WBC 11.66, hemoglobin 6.2, platelets 117,000.  Lokelma, regular insulin, D5W, calcium gluconate, Lasix given in ER.    Today  Sitting up in bed.  No oxygen in use.  Daughter, son, son-in-law present in room.  Patient tells me her name and identifies family members correctly.  She is cooperative and moves extremities.  She tells me she is not hungry.  Family reports she ate a few bites for breakfast and has drank a little bit of water.  Creatinine down to 1.66 today and nephrology following family has elected to go home with hospice and plans for discharge in the morning.      Review of Systems   Constitutional:  Positive for appetite change and fatigue.   HENT:  Negative for congestion and trouble swallowing.    Eyes:  Negative for photophobia and visual disturbance.   Respiratory:  Negative for cough, shortness of breath and wheezing.    Cardiovascular:  Negative for chest pain, palpitations and  leg swelling.   Gastrointestinal:  Negative for constipation, diarrhea, nausea and vomiting.   Endocrine: Negative for cold intolerance, heat intolerance and polyuria.   Genitourinary:  Negative for dysuria, frequency and urgency.   Musculoskeletal:  Positive for gait problem.   Skin:  Negative for color change, pallor, rash and wound.   Allergic/Immunologic: Negative for immunocompromised state.   Neurological:  Positive for weakness. Negative for light-headedness.   Hematological:  Negative for adenopathy. Does not bruise/bleed easily.   Psychiatric/Behavioral:  Negative for agitation and behavioral problems.       All pertinent negatives and positives are as above. All other systems have been reviewed and are negative unless otherwise stated.     Objective    Temp:  [97.7 °F (36.5 °C)-98.8 °F (37.1 °C)] 98.8 °F (37.1 °C)  Heart Rate:  [66-88] 88  Resp:  [18] 18  BP: (101-116)/(49-73) 101/54  Physical Exam  Vitals and nursing note reviewed.   Constitutional:       Comments: Sitting up in bed.  No oxygen use.  Multiple family members in room.   HENT:      Head: Normocephalic and atraumatic.      Nose: No congestion.      Mouth/Throat:      Pharynx: Oropharynx is clear. No oropharyngeal exudate or posterior oropharyngeal erythema.   Eyes:      Extraocular Movements: Extraocular movements intact.      Pupils: Pupils are equal, round, and reactive to light.   Cardiovascular:      Rate and Rhythm: Normal rate and regular rhythm.      Comments: Normal sinus rhythm 60-82 on telemetry  Pulmonary:      Breath sounds: No wheezing, rhonchi or rales.      Comments: No oxygen use.  Abdominal:      Palpations: Abdomen is soft.      Tenderness: There is no abdominal tenderness.   Genitourinary:     Comments: Voiding.  Musculoskeletal:         General: No swelling or tenderness.      Cervical back: Normal range of motion and neck supple.   Skin:     General: Skin is warm and dry.   Neurological:      General: No focal deficit  present.      Mental Status: She is oriented to person, place, and time.   Psychiatric:         Mood and Affect: Mood normal.         Behavior: Behavior normal.       Results Review:  I have reviewed the labs, radiology results, and diagnostic studies.    Laboratory Data:   Results from last 7 days   Lab Units 04/03/24  0910 04/02/24  0656 04/01/24  1210   WBC 10*3/mm3 7.52 12.46* 11.66*   HEMOGLOBIN g/dL 8.3* 7.2* 6.2*   HEMATOCRIT % 30.3* 24.4* 22.3*   PLATELETS 10*3/mm3 103* 61* 117*        Results from last 7 days   Lab Units 04/04/24  0323 04/03/24  0612 04/03/24  0043 04/02/24  1216 04/02/24  0407 04/01/24  1502 04/01/24  1210   SODIUM mmol/L 136 134*  134* 134*   < > 135*   < > 132*   POTASSIUM mmol/L 3.7 4.2  4.2 4.4   < > 5.2   < > 6.7*   CHLORIDE mmol/L 101 99  99 99   < > 98   < > 95*   CO2 mmol/L 25.0 22.0  22.0 22.0   < > 21.0*   < > 18.0*   BUN mg/dL 53* 74*  74* 71*   < > 66*   < > 54*   CREATININE mg/dL 1.66* 2.72*  2.72* 2.93*   < > 3.39*   < > 2.83*   CALCIUM mg/dL 8.7 8.4  8.4 7.9*   < > 9.1   < > 9.2   BILIRUBIN mg/dL  --  1.6*  --   --  1.8*  --  1.6*   ALK PHOS U/L  --  176*  --   --  185*  --  198*   ALT (SGPT) U/L  --  1,827*  --   --  2,632*  --  2,242*   AST (SGOT) U/L  --  2,042*  --   --  4,928*  --  4,382*   GLUCOSE mg/dL 86 82  82 86   < > 124*   < > 79    < > = values in this interval not displayed.     Microbiology Results (last 10 days)       Procedure Component Value - Date/Time    Respiratory Panel PCR w/COVID-19(SARS-CoV-2) CATHY/SHAWN/ONESIMO/PAD/COR/ROSSY In-House, NP Swab in UTM/VTM, 2 HR TAT - Swab, Nasopharynx [[id number]]  (Normal) Collected: 03/28/24 1939    Lab Status: Final result Specimen: Swab from Nasopharynx Updated: 03/28/24 2038     ADENOVIRUS, PCR Not Detected     Coronavirus 229E Not Detected     Coronavirus HKU1 Not Detected     Coronavirus NL63 Not Detected     Coronavirus OC43 Not Detected     COVID19 Not Detected     Human Metapneumovirus Not Detected     Human  Rhinovirus/Enterovirus Not Detected     Influenza A PCR Not Detected     Influenza B PCR Not Detected     Parainfluenza Virus 1 Not Detected     Parainfluenza Virus 2 Not Detected     Parainfluenza Virus 3 Not Detected     Parainfluenza Virus 4 Not Detected     RSV, PCR Not Detected     Bordetella pertussis pcr Not Detected     Bordetella parapertussis PCR Not Detected     Chlamydophila pneumoniae PCR Not Detected     Mycoplasma pneumo by PCR Not Detected    Narrative:      In the setting of a positive respiratory panel with a viral infection PLUS a negative procalcitonin without other underlying concern for bacterial infection, consider observing off antibiotics or discontinuation of antibiotics and continue supportive care. If the respiratory panel is positive for atypical bacterial infection (Bordetella pertussis, Chlamydophila pneumoniae, or Mycoplasma pneumoniae), consider antibiotic de-escalation to target atypical bacterial infection.           Imaging Results (All)       Procedure Component Value Units Date/Time    CT Abdomen Pelvis Without Contrast [[id number]] Collected: 04/01/24 1332     Updated: 04/01/24 1352    Narrative:      EXAMINATION: CT ABDOMEN PELVIS WO CONTRAST- 4/1/2024 1:32 PM     HISTORY: Acute kidney injury, decreased gfr and hyperkalemia.     TOTAL DOSE: 227 mGy.cm (Automatic exposure control technique was  implemented in an effort to keep the radiation dose as low as possible  without compromising image quality)     REPORT: Spiral CT of the abdomen and pelvis was performed without  contrast from the lung bases through the pubic symphysis. Reconstructed  coronal and sagittal images are also reviewed.     Comparison: CT of the pelvis 10/20/2022. Ultrasound of the kidneys  12/12/2022.     Review of lung windows demonstrates mild respiratory motion artifact,  there is a small right pleural effusion. The heart is enlarged. There is  a questionable small right-sided pericardial effusion. Heavy  calcified  plaque is present within the coronary arteries.     Evaluation of the solid abdominal organs is limited without intravenous  contrast. Decreased attenuation of the liver parenchyma probably  represents steatosis. Cholecystectomy clips are present. The spleen  appears within normal limits. There is incomplete distention of the  stomach which contains some fluid. There is a large slightly complex  cyst arising from the lateral left kidney which measures 8.7 cm. This  contains a thin septation and calcification within its inferior medial  wall. No nephrolithiasis or hydronephrosis is identified. The ureters  are decompressed. There is a small amount of free fluid in the pelvis,  the source is not clear. No free air is identified. There is mild  sigmoid diverticulosis, no evidence of acute diverticulitis. The bladder  is decompressed. Heavy calcified plaque is noted within the abdominal  aorta and its branches as before. There is normal aortic caliber. Small  surgical clips are noted adjacent to the left femoral artery.  Nonvisualization of the appendix. Review of bone windows demonstrates  partially healed fractures of the pubic ring bilaterally. There is  previous right total hip arthroplasty. Multilevel degenerative changes  are present in the lumbar spine includes grade 1 spondylolisthesis at  L4-5 with disc space collapse.       Impression:      1. Limited noncontrast study shows a small amount of free fluid in the  pelvis, the etiology is unknown. No free air or abscess is identified.  No evidence of hydronephrosis or ureteral dilation. Large stable  slightly complex cyst arising from the lateral left kidney measuring 8.7  x 8.9 cm.  2. Hepatic steatosis, evidence of previous cholecystectomy.  3. Mild sigmoid diverticulosis without definite diverticulitis.  4. Partially healed fractures of the bilateral pubic ring and evidence  of previous right hip arthroplasty. Advanced degenerative changes  within  the lumbar spine.     This report was signed and finalized on 4/1/2024 1:48 PM by Dr. Barrett Arreguin MD.       CT Head Without Contrast [[id number]] Collected: 04/01/24 1325     Updated: 04/01/24 1331    Narrative:      EXAMINATION: CT HEAD WO CONTRAST- 4/1/2024 1:25 PM     HISTORY: Altered mental status.     DOSE: 921 mGycm (Automatic exposure control technique was implemented in  an effort to keep the radiation dose as low as possible without  compromising image quality)     REPORT: Spiral CT of the head was performed without contrast,  reconstructed coronal and sagittal images are also reviewed.     COMPARISON: CT head without contrast 12/16/2023.     No intracranial hemorrhage, mass or mass effect is identified. There is  mild age compatible cerebral atrophy. Decreased attenuation in the  periventricular and subcortical white matter tracts is compatible with  chronic small vessel white matter ischemic disease, this is stable. The  ventricles and basal cisterns are within normal limits. Review of bone  windows is unremarkable. There is normal aeration of the visualized  paranasal sinuses and mastoid air cells.       Impression:      1. No acute intracranial abnormality, no interval change. Mild diffuse  cerebral atrophy and mild to moderate chronic small vessel white matter  ischemic changes are identified.        This report was signed and finalized on 4/1/2024 1:28 PM by Dr. Barrett Arerguin MD.       XR Chest 1 View [[id number]] Collected: 04/01/24 1231     Updated: 04/01/24 1237    Narrative:      EXAMINATION: XR CHEST 1 VW- 4/1/2024 12:31 PM     HISTORY: pneumonia evaluation.     REPORT: A frontal view of the chest was obtained.     COMPARISON: Chest x-ray 3/28/2024, 12/16/2023.     There is blunting of the costophrenic angles compatible with small  bilateral pleural effusions, which appear decreased in size. There is  improved aeration of the left lung base with decreased infiltrate.  No  pneumothorax is identified. There is underlying COPD. Mild cardiomegaly  is present without evidence of overt CHF. Again noted are partially  healed fractures of the surgical neck of the right humerus as well as  the proximal shaft, with displacement.       Impression:      1. Improved aeration of the left lung base and decrease in size of small  bilateral pleural effusions. No lung consolidation is identified.  Advanced COPD.  2. Cardiomegaly without evidence of CHF.  3. Partially healed closed fractures of the proximal humerus as  described.           This report was signed and finalized on 4/1/2024 12:34 PM by Dr. Barrett Arreguin MD.              Scheduled medications:  bisacodyl, 10 mg, Rectal, Daily  donepezil, 5 mg, Oral, Nightly  escitalopram, 10 mg, Oral, Daily  famotidine, 20 mg, Oral, Daily  levothyroxine, 125 mcg, Oral, Once per day on Sunday Monday Tuesday Wednesday Thursday Friday  sodium chloride, 250 mL, Intravenous, Once  sodium chloride, 10 mL, Intravenous, Q12H  sodium zirconium cyclosilicate, 10 g, Oral, Once         Assessment/Plan   Assessment  Active Hospital Problems    Diagnosis     **Acute kidney injury superimposed on stage 3a chronic kidney disease     Severe malnutrition     Hyperkalemia     Failure to thrive in adult     Shock liver     Symptomatic anemia     Chronic systolic (congestive) heart failure        Treatment Plan    1.  Acute kidney injury superimposed on CKD 3a.  Creatinine 2.83 with potassium 6.7 on admission.  Baseline creatinine 0.86 on 12/20/2023 and 1.29 on 3/28/2024.  Nephrology consulted. Creatinine up to 3.3 on 4/2/2024.  Creatinine down to 1.66 today.  Continue IV fluids.  Encourage oral intake.  Patient and family have declined any aggressive interventions such as dialysis.    2.  Hyperkalemia.  Potassium 6.7 on admission.  Lokelma given.  Potassium 3.7 today.  Repeat CMP in AM.    3.  Shock liver.  AST 4382, ALT 2242, total bilirubin 1.6, alkaline phosphatase  198.  Liver enzymes improved AST 2042, ALT 1827.  Total bilirubin 1.6.  Repeat CMP in AM.    4.  Symptomatic anemia.  Hemoglobin 6.2 on admit.  Transfused 1 unit packed cells.  Hemoglobin 8.3 on 4/3.     5.  Elevated INR at 3.7 on admission.  No signs of bleeding.  INR 1.9 today.    6.  SCDs for deep vein thrombosis prophylaxis.    7.  Palliative care consulted.  No CPR with limited support to include no intubation, no cardioversion, no dialysis, no vasopressors, no artificial nutrition.  Hospice consulted.  Family have elected to take patient home with hospice 4/5/2024.  Family met with [facility] today.    Medical Decision Making  Number and Complexity of problems: 5  Acute kidney injury superimposed on CKD 3A: Acute, high complexity poses threat to life and bodily function: Improving  Hyperkalemia: Acute, high complexity poses threat to life and bodily function, improving  Shock liver: Acute, high complexity poses threat to life and bodily function, improving  Symptomatic anemia requiring transfusion: Acute, high complexity poses threat to life and bodily function, improving  Elevated INR    Differential Diagnosis: None      Conditions and Status        Condition is unchanged.     MDM Data  External documents reviewed: Prior [facility] hospitalizations  Cardiac tracing (EKG, telemetry) interpretation: Normal sinus rhythm 60 on telemetry.  Brief episode of atrial fibrillation.  Radiology interpretation: Reviewed radiology interpretation CT scan of the head, CT scan of the abdomen pelvis chest x-ray  Labs reviewed:   CMP 4/4/24.  CMP in AM.  CBC 4/3/2024  INR 1.9 on 4/424  Respiratory PCR panel negative    Any tests that were considered but not ordered: None     Decision rules/scores evaluated (example [id number], [place], etc): None     Discussed with: Dr. Horne, Oscar Grijalva, APRN nephrology, Radha Cheek, J[facility] hospice, patient, daughter Yeni, son Donal.     Care Planning  Shared decision making:   Oscar Horne APRN nephrology, Radha Cheek RN hospice, patient, daughter Yeni, son Donal.  Patient and family members have elected home with hospice.  Code status and discussions: No intubation, no CPR, no cardioversion, no dialysis, no vasopressors, no artificial nutrition    Disposition  Social Determinants of Health that impact treatment or disposition: None  I expect the patient to be discharged to home with [facility] in 1 day.    Electronically signed by ANGY Montemayor, 04/04/24, 12:59 CDT.     The above documentation resulted from a face-to-face encounter by me Flor TRAVIS, [facility].

## 2024-04-04 NOTE — PLAN OF CARE
Goal Outcome Evaluation:  Plan of Care Reviewed With: patient        Progress: no change  Outcome Evaluation: Bedcheck; no complaints of pain; up with assist; void; resting between care; safety maintained

## 2024-04-04 NOTE — PROGRESS NOTES
Nephrology ([facility]) Progress Note      Patient:  Mya Chung  YOB: 1928  Date of Service: 4/4/2024  MRN: [id number]   Acct: [id number]   Primary Care Physician: Delfina Pereira DO  Advance Directive:   Code Status and Medical Interventions:   Ordered at: 04/02/24 1052     Medical Intervention Limits:    NO intubation (DNI)    NO cardioversion    NO dialysis    NO vasopressors    NO artificial nutrition     Level Of Support Discussed With:    Health Care Surrogate     Code Status (Patient has no pulse and is not breathing):    No CPR (Do Not Attempt to Resuscitate)     Medical Interventions (Patient has pulse or is breathing):    Limited Support     Admit Date: 4/1/2024       Hospital Day: 3  Referring Provider: No ref. provider found      Patient personally seen and examined.  Complete chart including Consults, Notes, Operative Reports, Labs, Cardiology, and Radiology studies reviewed as able.        Subjective:  Mya Chung is a 89 y.o. female for whom we were consulted for evaluation and treatment of acute kidney injury with hyperkalemia. No prior known renal issues. Presented to ER with progressive nausea and vomiting and lack of appetite for a month. Prior labs include creatinine 0.86  in December 2023, and creatinine 1.29 on 3/28/24.  In ER her creatinine was 2.3 and potassium 6.7. patient and family elected to not pursue dialysis and only wanted medial management. Received treatment including Lokelma. Also given unit PRBC for hemoglobin 6.2.  On initial nephrology exam noted to be clinically volume depleted. Renal function has shown some improvement with IV fluids. After discussion, family have elected to take patient home with hospice.    Today is awake, no new complaints. No overnight issues. Working on getting DME delivered and arrangements in place for discharge. Urine output nonoliguric    Allergies:  Erythromycin, Zithromax [azithromycin],  and Penicillins    Home Meds:  Medications Prior to Admission   Medication Sig Dispense Refill Last Dose    alendronate (FOSAMAX) 70 MG tablet Take 1 tablet by mouth Every 7 (Seven) Days. Thursday       aspirin 81 MG EC tablet Take 1 tablet by mouth Daily.       Calcium Carbonate-Vitamin D (CALCIUM 600/VITAMIN D PO) Take 1 tablet by mouth Daily.       carvedilol (COREG) 3.125 MG tablet Take 1 tablet by mouth 2 (Two) Times a Day. 180 tablet 3     clopidogrel (PLAVIX) 75 MG tablet Take 1 tablet by mouth Daily. 90 tablet 3     colestipol (COLESTID) 1 g tablet Take 1 tablet by mouth Every Other Day.       donepezil (Aricept) 5 MG tablet Take 1 tablet by mouth Every Night. 90 tablet 0     doxycycline (MONODOX) 100 MG capsule Take 1 capsule by mouth 2 (Two) Times a Day. (Patient taking differently: Take 1 capsule by mouth 2 (Two) Times a Day. Started on 03/29/2024 x 7 days) 14 capsule 0     empagliflozin (Jardiance) 10 MG tablet tablet Take 1 tablet by mouth Daily. 90 tablet 3     escitalopram (LEXAPRO) 10 MG tablet Take 1 tablet by mouth Daily.       Glucosamine-Chondroitin-Vit D3 3445-1862-664 MG-MG-UNIT pack Take 1 tablet by mouth 2 (Two) Times a Day.       levothyroxine (SYNTHROID, LEVOTHROID) 125 MCG tablet Take 1 tablet by mouth Every Morning.       Multiple Vitamins-Minerals (COMPLETE MULTIVITAMIN/MINERAL PO) Take 1 tablet by mouth Daily.       acetaminophen (TYLENOL) 325 MG tablet Take 2 tablets by mouth Every 4 (Four) Hours As Needed for Mild Pain.       furosemide (LASIX) 20 MG tablet Take 1 tablet by mouth As Needed (for heart failure, leg swelling, weight gain). (Patient taking differently: Take 1 tablet by mouth Daily As Needed (for heart failure, leg swelling, weight gain).) 90 tablet 3     ondansetron ODT (ZOFRAN-ODT) 4 MG disintegrating tablet Place 1 tablet on the tongue Every 8 (Eight) Hours As Needed for Vomiting. 15 tablet 0        Medicines:  Current Facility-Administered Medications   Medication Dose  Route Frequency Provider Last Rate Last Admin    acetaminophen (TYLENOL) tablet 650 mg  650 mg Oral Q4H PRN Candida Jorge APRN        Or    acetaminophen (TYLENOL) 160 MG/5ML oral solution 650 mg  650 mg Oral Q4H PRN Candida Jorge APRN        Or    acetaminophen (TYLENOL) suppository 650 mg  650 mg Rectal Q4H PRN Candida Jorge APRN        sennosides-docusate (PERICOLACE) 8.6-50 MG per tablet 2 tablet  2 tablet Oral BID PRN Candida Jorge APRN        And    polyethylene glycol (MIRALAX) packet 17 g  17 g Oral Daily PRN Candida Jorge APRN        And    bisacodyl (DULCOLAX) EC tablet 5 mg  5 mg Oral Daily PRN Candida Jorge APRN        And    bisacodyl (DULCOLAX) suppository 10 mg  10 mg Rectal Daily Candida Jorge APRN   10 mg at 04/03/24 0918    donepezil (ARICEPT) tablet 5 mg  5 mg Oral Nightly Candida Jorge APRN   5 mg at 04/03/24 2131    escitalopram (LEXAPRO) tablet 10 mg  10 mg Oral Daily Candida Jorge APRN   10 mg at 04/04/24 0923    famotidine (PEPCID) tablet 20 mg  20 mg Oral Daily Jelena Bello APRN   20 mg at 04/04/24 0923    HYDROmorphone (DILAUDID) injection 0.5 mg  0.5 mg Intravenous Q4H PRN Candida Jorge APRN        And    naloxone (NARCAN) injection 0.4 mg  0.4 mg Intravenous Q5 Min PRN Candida Jorge APRN        levothyroxine (SYNTHROID, LEVOTHROID) tablet 125 mcg  125 mcg Oral Once per day on Sunday Monday Tuesday Wednesday Thursday Friday Candida Jorge APRN   125 mcg at 04/04/24 0650    ondansetron ODT (ZOFRAN-ODT) disintegrating tablet 4 mg  4 mg Oral Q6H PRN Candida Jorge APRN        Or    ondansetron (ZOFRAN) injection 4 mg  4 mg Intravenous Q6H PRN Candida Jorge APRN        sodium chloride 0.9 % bolus 250 mL  250 mL Intravenous Once Curt Garcia MD        sodium chloride 0.9 % flush 10 mL  10 mL Intravenous Q12H Candida Jorge APRN   10 mL at 04/02/24 1002    sodium chloride 0.9 % flush 10 mL  10 mL Intravenous PRN  Candida Jorge APRN        sodium chloride 0.9 % infusion 40 mL  40 mL Intravenous PRN Candida Jorge APRANA        sodium chloride 0.9 % infusion  100 mL/hr Intravenous Continuous Candida Jorge APRN 100 mL/hr at 24 1228 100 mL/hr at 24 1228    sodium zirconium cyclosilicate (LOKELMA) packet 10 g  10 g Oral Once , DO           Past Medical History:  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     Wears glasses        Past Surgical History:  Past Surgical History:   Procedure Laterality Date    AORTAGRAM Right 2018    Procedure: LEFT LOWER EXTREMITY ANGIOGRAM;  Surgeon: Walker Davis DO;  Location: [place] [address];  Service: Vascular    BREAST BIOPSY Bilateral     multiple on both, all benign    BREAST BIOPSY Left 2017    Procedure: LEFT MAMMOGRAM GUIDED NEEDLE DIRECTED EXCISIONAL BREAST BIOPSY;  Surgeon: Malgorzata Scott MD;  Location: [facility] OR;  Service:     BUNIONECTOMY Bilateral      SECTION      X2    CHOLECYSTECTOMY      CHOLECYSTECTOMY WITH INTRAOPERATIVE CHOLANGIOGRAM N/A 2021    Procedure: LAPAROSCOPIC CHOLECYSTECTOMY WITH CHOLANGIOGRAM;  Surgeon: Malgorzata Scott MD;  Location: [facility] OR;  Service: General;  Laterality: N/A;    COLONOSCOPY  2015    diverticulosis    ENDOSCOPY N/A 2021    Procedure: ESOPHAGOGASTRODUODENOSCOPY WITH ANESTHESIA;  Surgeon: Sonu Cheek DO;  Location: [facility] ENDOSCOPY;  Service: Gastroenterology;  Laterality: N/A;  pre nausea  post esophagitis  Delfina Pereira DO    EYE SURGERY      CATARACT SURGERY    FEMORAL ENDARTERECTOMY Left  2018    Procedure: LEFT FEMORAL ENDARTERECTOMY;  Surgeon: Walker Davis DO;  Location:  PAD HYBRID OR 12;  Service: Vascular    JOINT REPLACEMENT      RIGHT HIP    VASCULAR SURGERY         Family History  Family History   Problem Relation Age of Onset    No Known Problems Mother     Heart disease Father     Cancer Sister         Uterus    No Known Problems Brother     No Known Problems Maternal Aunt     No Known Problems Paternal Aunt     No Known Problems Maternal Grandmother     No Known Problems Paternal Grandmother     No Known Problems Daughter     No Known Problems Son     Breast cancer Neg Hx     BRCA 1/2 Neg Hx     Colon cancer Neg Hx     Endometrial cancer Neg Hx     Ovarian cancer Neg Hx     Colon polyps Neg Hx     Esophageal cancer Neg Hx        Social History  Social History     Socioeconomic History    Marital status:    Tobacco Use    Smoking status: Former     Current packs/day: 0.00     Types: Cigarettes     Start date: 1953     Quit date: 1955     Years since quittin.3     Passive exposure: Never    Smokeless tobacco: Never   Vaping Use    Vaping status: Never Used   Substance and Sexual Activity    Alcohol use: No    Drug use: No    Sexual activity: Defer       Review of Systems:  History obtained from chart review and the patient  General ROS: No fever or chills  Respiratory ROS: No cough, shortness of breath, wheezing  Cardiovascular ROS: No chest pain or palpitations  Gastrointestinal ROS: No abdominal pain or melena  Genito-Urinary ROS: No dysuria or hematuria  Psych ROS: No anxiety and depression  14 point ROS reviewed with the patient and negative except as noted above and in the HPI unless unable to obtain.    Objective:  Patient Vitals for the past 24 hrs:   BP Temp Temp src Pulse Resp SpO2 Weight   24 0633 -- -- -- -- -- -- 50.4 kg (111 lb 3.2 oz)   24 101/73 97.9 °F (36.6 °C) Oral 73 18 94 % --   24 116/49 98.3 °F (36.8 °C)  Oral 75 18 94 % --   04/03/24 1511 105/56 97.7 °F (36.5 °C) Axillary 66 18 99 % --   04/03/24 1046 100/45 97.7 °F (36.5 °C) Oral 58 16 99 % --       Intake/Output Summary (Last 24 hours) at 4/4/2024 1041  Last data filed at 4/4/2024 0900  Gross per 24 hour   Intake 360 ml   Output 300 ml   Net 60 ml     General: awake/alert   Chest:  clear to auscultation bilaterally without respiratory distress  CVS: regular rate and rhythm  Abdominal: soft, nontender, positive bowel sounds  Extremities: no cyanosis or edema  Skin: warm and dry without rash      Labs:  Results from last 7 days   Lab Units 04/03/24  0910 04/02/24  0656 04/01/24  1210   WBC 10*3/mm3 7.52 12.46* 11.66*   HEMOGLOBIN g/dL 8.3* 7.2* 6.2*   HEMATOCRIT % 30.3* 24.4* 22.3*   PLATELETS 10*3/mm3 103* 61* 117*         Results from last 7 days   Lab Units 04/04/24  0323 04/03/24  0612 04/03/24  0043 04/02/24  1216 04/02/24  0407 04/01/24  1502 04/01/24  1210   SODIUM mmol/L 136 134*  134* 134*   < > 135*   < > 132*   POTASSIUM mmol/L 3.7 4.2  4.2 4.4   < > 5.2   < > 6.7*   CHLORIDE mmol/L 101 99  99 99   < > 98   < > 95*   CO2 mmol/L 25.0 22.0  22.0 22.0   < > 21.0*   < > 18.0*   BUN mg/dL 53* 74*  74* 71*   < > 66*   < > 54*   CREATININE mg/dL 1.66* 2.72*  2.72* 2.93*   < > 3.39*   < > 2.83*   CALCIUM mg/dL 8.7 8.4  8.4 7.9*   < > 9.1   < > 9.2   EGFR mL/min/1.73 28.1* 15.5*  15.5* 14.2*   < > 11.9*   < > 14.8*   BILIRUBIN mg/dL  --  1.6*  --   --  1.8*  --  1.6*   ALK PHOS U/L  --  176*  --   --  185*  --  198*   ALT (SGPT) U/L  --  1,827*  --   --  2,632*  --  2,242*   AST (SGOT) U/L  --  2,042*  --   --  4,928*  --  4,382*   GLUCOSE mg/dL 86 82  82 86   < > 124*   < > 79    < > = values in this interval not displayed.       Radiology:   Imaging Results (Last 72 Hours)       Procedure Component Value Units Date/Time    CT Abdomen Pelvis Without Contrast [[id number]] Collected: 04/01/24 1332     Updated: 04/01/24 1352    Narrative:      EXAMINATION:  CT ABDOMEN PELVIS WO CONTRAST- 4/1/2024 1:32 PM     HISTORY: Acute kidney injury, decreased gfr and hyperkalemia.     TOTAL DOSE: 227 mGy.cm (Automatic exposure control technique was  implemented in an effort to keep the radiation dose as low as possible  without compromising image quality)     REPORT: Spiral CT of the abdomen and pelvis was performed without  contrast from the lung bases through the pubic symphysis. Reconstructed  coronal and sagittal images are also reviewed.     Comparison: CT of the pelvis 10/20/2022. Ultrasound of the kidneys  12/12/2022.     Review of lung windows demonstrates mild respiratory motion artifact,  there is a small right pleural effusion. The heart is enlarged. There is  a questionable small right-sided pericardial effusion. Heavy calcified  plaque is present within the coronary arteries.     Evaluation of the solid abdominal organs is limited without intravenous  contrast. Decreased attenuation of the liver parenchyma probably  represents steatosis. Cholecystectomy clips are present. The spleen  appears within normal limits. There is incomplete distention of the  stomach which contains some fluid. There is a large slightly complex  cyst arising from the lateral left kidney which measures 8.7 cm. This  contains a thin septation and calcification within its inferior medial  wall. No nephrolithiasis or hydronephrosis is identified. The ureters  are decompressed. There is a small amount of free fluid in the pelvis,  the source is not clear. No free air is identified. There is mild  sigmoid diverticulosis, no evidence of acute diverticulitis. The bladder  is decompressed. Heavy calcified plaque is noted within the abdominal  aorta and its branches as before. There is normal aortic caliber. Small  surgical clips are noted adjacent to the left femoral artery.  Nonvisualization of the appendix. Review of bone windows demonstrates  partially healed fractures of the pubic ring bilaterally.  There is  previous right total hip arthroplasty. Multilevel degenerative changes  are present in the lumbar spine includes grade 1 spondylolisthesis at  L4-5 with disc space collapse.       Impression:      1. Limited noncontrast study shows a small amount of free fluid in the  pelvis, the etiology is unknown. No free air or abscess is identified.  No evidence of hydronephrosis or ureteral dilation. Large stable  slightly complex cyst arising from the lateral left kidney measuring 8.7  x 8.9 cm.  2. Hepatic steatosis, evidence of previous cholecystectomy.  3. Mild sigmoid diverticulosis without definite diverticulitis.  4. Partially healed fractures of the bilateral pubic ring and evidence  of previous right hip arthroplasty. Advanced degenerative changes within  the lumbar spine.     This report was signed and finalized on 4/1/2024 1:48 PM by Dr. Barrett Arreguin MD.       CT Head Without Contrast [[id number]] Collected: 04/01/24 1325     Updated: 04/01/24 1331    Narrative:      EXAMINATION: CT HEAD WO CONTRAST- 4/1/2024 1:25 PM     HISTORY: Altered mental status.     DOSE: 921 mGycm (Automatic exposure control technique was implemented in  an effort to keep the radiation dose as low as possible without  compromising image quality)     REPORT: Spiral CT of the head was performed without contrast,  reconstructed coronal and sagittal images are also reviewed.     COMPARISON: CT head without contrast 12/16/2023.     No intracranial hemorrhage, mass or mass effect is identified. There is  mild age compatible cerebral atrophy. Decreased attenuation in the  periventricular and subcortical white matter tracts is compatible with  chronic small vessel white matter ischemic disease, this is stable. The  ventricles and basal cisterns are within normal limits. Review of bone  windows is unremarkable. There is normal aeration of the visualized  paranasal sinuses and mastoid air cells.       Impression:      1. No acute  "intracranial abnormality, no interval change. Mild diffuse  cerebral atrophy and mild to moderate chronic small vessel white matter  ischemic changes are identified.        This report was signed and finalized on 4/1/2024 1:28 PM by Dr. Barrett Arreguin MD.       XR Chest 1 View [[id number]] Collected: 04/01/24 1231     Updated: 04/01/24 1237    Narrative:      EXAMINATION: XR CHEST 1 VW- 4/1/2024 12:31 PM     HISTORY: pneumonia evaluation.     REPORT: A frontal view of the chest was obtained.     COMPARISON: Chest x-ray 3/28/2024, 12/16/2023.     There is blunting of the costophrenic angles compatible with small  bilateral pleural effusions, which appear decreased in size. There is  improved aeration of the left lung base with decreased infiltrate. No  pneumothorax is identified. There is underlying COPD. Mild cardiomegaly  is present without evidence of overt CHF. Again noted are partially  healed fractures of the surgical neck of the right humerus as well as  the proximal shaft, with displacement.       Impression:      1. Improved aeration of the left lung base and decrease in size of small  bilateral pleural effusions. No lung consolidation is identified.  Advanced COPD.  2. Cardiomegaly without evidence of CHF.  3. Partially healed closed fractures of the proximal humerus as  described.           This report was signed and finalized on 4/1/2024 12:34 PM by Dr. Barrett Arreguin MD.               Culture:  No results found for: \"BLOODCX\", \"URINECX\", \"WOUNDCX\", \"MRSACX\", \"RESPCX\", \"STOOLCX\"      Assessment    Acute kidney injury, ATN--improving  Hyperkalemia--improved  Metabolic acidosis  Hyponatremia  Acute liver injury  Elevated INR  Anemia  Large left renal cyst    Plan:   Encourage PO intake as able.  Planning to discharge home with Hospice once arrangement have been made, likely tomorrow.  OK for discharge from renal standpoint. No follow up required but family welcome to contact our office if any " questions/concerns.        Oscar Grijalva, APRN  4/4/2024  10:41 CDT

## 2024-04-04 NOTE — DISCHARGE SUMMARY
[facility]  DISCHARGE SUMMARY     Date of Admission: 4/1/2024  Date of Discharge:  4/5/2024  Primary Care Physician: Delfina Pereira DO    Presenting Problem/History of Present Illness:  Weakness, failure to thrive    Final Discharge Diagnoses:  Active Hospital Problems    Diagnosis     **Acute kidney injury superimposed on stage 3a chronic kidney disease     Chronic HFrEF (heart failure with reduced ejection fraction)     Severe malnutrition     Hyperkalemia     Failure to thrive in adult     Shock liver     Symptomatic anemia     Chronic systolic (congestive) heart failure        Consults:   Dr. Roosevelt Navarrete, nephrology  Ignacia Hill, ANGY palliative care  Radha Cheek, RN hospice  Dr. Emmanuel Galvez, orthopedics    Procedures Performed: None    Pertinent Test Results:   Results for orders placed during the hospital encounter of 03/25/24    Adult Transthoracic Echo Complete W/ Cont if Necessary Per Protocol    Interpretation Summary    Left ventricular systolic function is severely decreased. Left ventricular ejection fraction appears to be 26 - 30%.    Abnormal global longitudinal LV strain (GLS) = -8.3%.    The left ventricular cavity is mildly dilated. Left ventricular wall thickness is consistent with mild concentric hypertrophy.    Left ventricular diastolic function is consistent with (grade II w/high LAP) pseudonormalization.    Mildly reduced right ventricular systolic function noted.    The left atrial cavity is severely dilated.    The aortic valve exhibits sclerosis. There is mild thickening of the aortic valve.    Mild mitral annular calcification is present. Moderate to severe mitral valve regurgitation is present.    Moderate pulmonary hypertension is present.    Imaging Results (Last 24 Hours)       Procedure Component Value Units Date/Time    XR Humerus Right [[id number]] Collected: 04/05/24 1123     Updated: 04/05/24 1129     Narrative:      EXAMINATION:  XR HUMERUS RIGHT-  4/5/2024 8:45 AM     HISTORY: 12 week old fracture; D64.9-Anemia, unspecified;  R13.11-Dysphagia, oral phase.     COMPARISON: 12/16/2023.     TECHNIQUE: 2 views were obtained.     FINDINGS: There appears to be some interval callus formation around the  fractures of the proximal humerus. The upper portion of the humerus  overlaps the scapula and the ribs which makes evaluation somewhat  difficult. There are overlapping bony structures in this area.          Impression:      1. Somewhat limited imaging as the proximal humerus overlies a portion  of the right ribs and the right scapula making evaluation of the  fracture difficult.  2. There does appear to be periosteal reaction and some interval healing  of the oblique spiral fracture compared to the prior study. The more  proximal humeral fracture is very difficult to evaluate due to  overlapping bony structures. However, I suspect that there is also  periosteal reaction and new bone formation in this area as well.     This report was signed and finalized on 4/5/2024 11:26 AM by Dr. Wu Mathews MD.              Imaging Results (All)       Procedure Component Value Units Date/Time    CT Abdomen Pelvis Without Contrast [[id number]] Collected: 04/01/24 1332     Updated: 04/01/24 1352    Narrative:      EXAMINATION: CT ABDOMEN PELVIS WO CONTRAST- 4/1/2024 1:32 PM     HISTORY: Acute kidney injury, decreased gfr and hyperkalemia.     TOTAL DOSE: 227 mGy.cm (Automatic exposure control technique was  implemented in an effort to keep the radiation dose as low as possible  without compromising image quality)     REPORT: Spiral CT of the abdomen and pelvis was performed without  contrast from the lung bases through the pubic symphysis. Reconstructed  coronal and sagittal images are also reviewed.     Comparison: CT of the pelvis 10/20/2022. Ultrasound of the kidneys  12/12/2022.     Review of lung windows demonstrates mild  respiratory motion artifact,  there is a small right pleural effusion. The heart is enlarged. There is  a questionable small right-sided pericardial effusion. Heavy calcified  plaque is present within the coronary arteries.     Evaluation of the solid abdominal organs is limited without intravenous  contrast. Decreased attenuation of the liver parenchyma probably  represents steatosis. Cholecystectomy clips are present. The spleen  appears within normal limits. There is incomplete distention of the  stomach which contains some fluid. There is a large slightly complex  cyst arising from the lateral left kidney which measures 8.7 cm. This  contains a thin septation and calcification within its inferior medial  wall. No nephrolithiasis or hydronephrosis is identified. The ureters  are decompressed. There is a small amount of free fluid in the pelvis,  the source is not clear. No free air is identified. There is mild  sigmoid diverticulosis, no evidence of acute diverticulitis. The bladder  is decompressed. Heavy calcified plaque is noted within the abdominal  aorta and its branches as before. There is normal aortic caliber. Small  surgical clips are noted adjacent to the left femoral artery.  Nonvisualization of the appendix. Review of bone windows demonstrates  partially healed fractures of the pubic ring bilaterally. There is  previous right total hip arthroplasty. Multilevel degenerative changes  are present in the lumbar spine includes grade 1 spondylolisthesis at  L4-5 with disc space collapse.       Impression:      1. Limited noncontrast study shows a small amount of free fluid in the  pelvis, the etiology is unknown. No free air or abscess is identified.  No evidence of hydronephrosis or ureteral dilation. Large stable  slightly complex cyst arising from the lateral left kidney measuring 8.7  x 8.9 cm.  2. Hepatic steatosis, evidence of previous cholecystectomy.  3. Mild sigmoid diverticulosis without definite  diverticulitis.  4. Partially healed fractures of the bilateral pubic ring and evidence  of previous right hip arthroplasty. Advanced degenerative changes within  the lumbar spine.     This report was signed and finalized on 4/1/2024 1:48 PM by Dr. Barrett Arreguin MD.       CT Head Without Contrast [[id number]] Collected: 04/01/24 1325     Updated: 04/01/24 1331    Narrative:      EXAMINATION: CT HEAD WO CONTRAST- 4/1/2024 1:25 PM     HISTORY: Altered mental status.     DOSE: 921 mGycm (Automatic exposure control technique was implemented in  an effort to keep the radiation dose as low as possible without  compromising image quality)     REPORT: Spiral CT of the head was performed without contrast,  reconstructed coronal and sagittal images are also reviewed.     COMPARISON: CT head without contrast 12/16/2023.     No intracranial hemorrhage, mass or mass effect is identified. There is  mild age compatible cerebral atrophy. Decreased attenuation in the  periventricular and subcortical white matter tracts is compatible with  chronic small vessel white matter ischemic disease, this is stable. The  ventricles and basal cisterns are within normal limits. Review of bone  windows is unremarkable. There is normal aeration of the visualized  paranasal sinuses and mastoid air cells.       Impression:      1. No acute intracranial abnormality, no interval change. Mild diffuse  cerebral atrophy and mild to moderate chronic small vessel white matter  ischemic changes are identified.        This report was signed and finalized on 4/1/2024 1:28 PM by Dr. Barrett Arreguin MD.       XR Chest 1 View [[id number]] Collected: 04/01/24 1231     Updated: 04/01/24 1237    Narrative:      EXAMINATION: XR CHEST 1 VW- 4/1/2024 12:31 PM     HISTORY: pneumonia evaluation.     REPORT: A frontal view of the chest was obtained.     COMPARISON: Chest x-ray 3/28/2024, 12/16/2023.     There is blunting of the costophrenic angles compatible with  small  bilateral pleural effusions, which appear decreased in size. There is  improved aeration of the left lung base with decreased infiltrate. No  pneumothorax is identified. There is underlying COPD. Mild cardiomegaly  is present without evidence of overt CHF. Again noted are partially  healed fractures of the surgical neck of the right humerus as well as  the proximal shaft, with displacement.       Impression:      1. Improved aeration of the left lung base and decrease in size of small  bilateral pleural effusions. No lung consolidation is identified.  Advanced COPD.  2. Cardiomegaly without evidence of CHF.  3. Partially healed closed fractures of the proximal humerus as  described.           This report was signed and finalized on 4/1/2024 12:34 PM by Dr. Barrett Arreguin MD.             LAB RESULTS:      Lab 04/05/24  0330 04/04/24  0323 04/03/24  0910 04/02/24  0656 04/02/24  0419 04/01/24  1210   WBC  --   --  7.52 12.46*  --  11.66*   HEMOGLOBIN  --   --  8.3* 7.2*  --  6.2*   HEMATOCRIT  --   --  30.3* 24.4*  --  22.3*   PLATELETS  --   --  103* 61*  --  117*   NEUTROS ABS  --   --   --   --   --  10.25*   MCV  --   --  89.6 84.7  --  86.4   PROTIME 19.6* 22.6* 28.6*  --  38.1* 31.5*         Lab 04/05/24  0330 04/04/24  0323 04/03/24  0612 04/03/24  0043 04/02/24  1726 04/01/24  1502 04/01/24  1210   SODIUM 138 136 134*  134* 134* 131*   < > 132*   POTASSIUM 4.0 3.7 4.2  4.2 4.4 4.6   < > 6.7*   CHLORIDE 103 101 99  99 99 97*   < > 95*   CO2 25.0 25.0 22.0  22.0 22.0 21.0*   < > 18.0*   ANION GAP 10.0 10.0 13.0  13.0 13.0 13.0   < > 19.0*   BUN 39* 53* 74*  74* 71* 71*   < > 54*   CREATININE 1.33* 1.66* 2.72*  2.72* 2.93* 3.30*   < > 2.83*   EGFR 36.7* 28.1* 15.5*  15.5* 14.2* 12.3*   < > 14.8*   GLUCOSE 90 86 82  82 86 102*   < > 79   CALCIUM 8.7 8.7 8.4  8.4 7.9* 7.9*   < > 9.2   MAGNESIUM  --   --   --   --   --   --  2.6*    < > = values in this interval not displayed.         Lab  04/05/24  0330 04/03/24  0612 04/02/24  0407 04/02/24  0015 04/01/24  1210   TOTAL PROTEIN 5.8* 4.8* 5.8*  --  6.5   ALBUMIN 3.4* 2.8* 3.5 3.1 3.9   GLOBULIN 2.4 2.0 2.3  --  2.6   ALT (SGPT) 1,259* 1,827* 2,632*  --  2,242*   AST (SGOT) 689* 2,042* 4,928*  --  4,382*   BILIRUBIN 2.4* 1.6* 1.8*  --  1.6*   ALK PHOS 167* 176* 185*  --  198*   LIPASE  --   --   --   --  57         Lab 04/05/24  0330 04/04/24  0323 04/03/24  0910 04/02/24  0419 04/01/24  1210   PROTIME 19.6* 22.6* 28.6* 38.1* 31.5*   INR 1.59* 1.90* 2.57* 3.70* 2.91*             Lab 04/01/24  1309   ABO TYPING O   RH TYPING Positive   ANTIBODY SCREEN Negative         Brief Urine Lab Results  (Last result in the past 365 days)        Color   Clarity   Blood   Leuk Est   Nitrite   Protein   CREAT   Urine HCG        04/02/24 0041             108.4               Microbiology Results (last 10 days)       Procedure Component Value - Date/Time    Respiratory Panel PCR w/COVID-19(SARS-CoV-2) CATHY/SHAWN/ONESIMO/PAD/COR/ROSSY In-House, NP Swab in UTM/VTM, 2 HR TAT - Swab, Nasopharynx [[id number]]  (Normal) Collected: 03/28/24 1939    Lab Status: Final result Specimen: Swab from Nasopharynx Updated: 03/28/24 2038     ADENOVIRUS, PCR Not Detected     Coronavirus 229E Not Detected     Coronavirus HKU1 Not Detected     Coronavirus NL63 Not Detected     Coronavirus OC43 Not Detected     COVID19 Not Detected     Human Metapneumovirus Not Detected     Human Rhinovirus/Enterovirus Not Detected     Influenza A PCR Not Detected     Influenza B PCR Not Detected     Parainfluenza Virus 1 Not Detected     Parainfluenza Virus 2 Not Detected     Parainfluenza Virus 3 Not Detected     Parainfluenza Virus 4 Not Detected     RSV, PCR Not Detected     Bordetella pertussis pcr Not Detected     Bordetella parapertussis PCR Not Detected     Chlamydophila pneumoniae PCR Not Detected     Mycoplasma pneumo by PCR Not Detected    Narrative:      In the setting of a positive respiratory panel with  a viral infection PLUS a negative procalcitonin without other underlying concern for bacterial infection, consider observing off antibiotics or discontinuation of antibiotics and continue supportive care. If the respiratory panel is positive for atypical bacterial infection (Bordetella pertussis, Chlamydophila pneumoniae, or Mycoplasma pneumoniae), consider antibiotic de-escalation to target atypical bacterial infection.            Hospital Course: Ms. Chung is a 89-year-old female who presented to [facility] on 4/1/23 with failure to thrive. Unfortunately, patient had a fall 12/16/2023 with resultant admission for fracture of the proximal end of right humerus. She was discharged on 12/21/2023 to rehab and subsequent discharged to her daughter's home on 3/1/2024. She has continued to decline with worsening weakness, decreased appetite, nausea with dry heaves. She presented to [facility] ER on 3/28/2024 was diagnosed with left lower lobe pneumonia, discharged on antibiotic. She returned with inability to walk due to weakness. In the ED, CT head showed no acute findings. CT abdomen pelvis showed no free air or abscess, acute findings.  Potassium 6.7, creatinine 3, sodium 132.  WBC 11.66, hemoglobin 6.2, platelets 117,000.  Lokelma, regular insulin, D5W, calcium gluconate, Lasix given in ER.     She was admitted to the medical floor with acute kidney injury superimposed on chronic kidney disease stage IIIa.  Creatinine 2.38 with potassium 6.7 on admission.  Baseline creatinine 0.86 on 12/20/2023 and 1.29 on 3/28/2024.  Nephrology consulted and creatinine trended up to 3.3 on 4/2/2024.  Patient was hydrated with IV fluids and creatinine trended down to 1.66 on 4/4.  IV fluids continued.  Oral intake encouraged.  Patient and family declined aggressive interventions such as dialysis.  Creatinine 1.33 on date of discharge.    Potassium 6.7 on admission and Lokelma given.  Potassium 4 on 4/5/2024    Shock liver  "with AST 4383, ALT 2242 total bilirubin 1.6, alkaline phosphatase 198.  Liver enzymes improved with AST 2042, ALT 1827, total bilirubin 1.6.  On date of discharge, , ALT 1259, total bilirubin 2.4.    Symptomatic anemia with hemoglobin 6.2 on admission.  Patient was transfused 1 unit packed red blood cells.  Hemoglobin improved to 7.2 and up to 8.3 on 4/3/2024.    INR elevated at 3.7 on admission.  No signs of  bright red bleeding per rectum or dark tarry stools.  INR 1.59 on 4/5/2024.    SCDs ordered for deep vein thrombosis prophylaxis.    X-ray right shoulder ordered by Dr. Galvez as he previously followed her for fracture of the proximal end of the right humerus.  Impression:     1. Somewhat limited imaging as the proximal humerus overlies a portion  of the right ribs and the right scapula making evaluation of the  fracture difficult.  2. There does appear to be periosteal reaction and some interval healing  of the oblique spiral fracture compared to the prior study. The more  proximal humeral fracture is very difficult to evaluate due to  overlapping bony structures. However, I suspect that there is also  periosteal reaction and new bone formation in this area as well.     Palliative care consulted and patient and family were seen by ANGY Augustin.  CODE STATUS No CPR with limited support to include no intubation, no cardioversion, no dialysis, no vasopressors, no artificial nutrition.  Hospice consulted and family elected to take patient home with hospice 4/5/2024.  Family met with [facility] again on 4/4.  MOST form completed.    On 4/4/2024, she will be discharged home with [facility] hospice.     Physical Exam on Discharge:  /65 (BP Location: Left arm, Patient Position: Lying)   Pulse 80   Temp 97.6 °F (36.4 °C) (Oral)   Resp 16   Ht 162.6 cm (64\")   Wt 51.7 kg (113 lb 14.4 oz)   SpO2 93%   BMI 19.55 kg/m²   Physical Exam  Vitals and nursing note reviewed. "   Constitutional:       Comments: Sitting up in bed.  No oxygen use.  Visitors in room.   HENT:      Head: Normocephalic and atraumatic.      Nose: No congestion.      Mouth/Throat:      Pharynx: No oropharyngeal exudate or posterior oropharyngeal erythema.   Eyes:      Extraocular Movements: Extraocular movements intact.      Pupils: Pupils are equal, round, and reactive to light.   Cardiovascular:      Rate and Rhythm: Normal rate and regular rhythm.      Heart sounds: No murmur heard.     Comments: Normal sinus rhythm 60-82 on telemetry.  Pulmonary:      Breath sounds: No wheezing, rhonchi or rales.      Comments: No oxygen use.  Abdominal:      Palpations: Abdomen is soft.      Tenderness: There is no abdominal tenderness.   Genitourinary:     Comments: Voiding.  Musculoskeletal:         General: No swelling or tenderness.      Cervical back: Normal range of motion and neck supple.      Comments: Right upper extremity with cast sling humerus.   Skin:     Findings: Bruising (Stable bruising on arms) present.   Neurological:      General: No focal deficit present.      Comments: Oriented to person and place.  Moves extremities.  Identifies family members.  Follows commands.   Psychiatric:         Mood and Affect: Mood normal.         Behavior: Behavior normal.       Condition on Discharge: Home with [facility]    Discharge Disposition:  Hospice/Home    Discharge Medications:     Discharge Medications        Changes to Medications        Instructions Start Date   furosemide 20 MG tablet  Commonly known as: LASIX  What changed: when to take this   20 mg, Oral, As Needed             Continue These Medications        Instructions Start Date   acetaminophen 325 MG tablet  Commonly known as: TYLENOL   650 mg, Oral, Every 4 Hours PRN      alendronate 70 MG tablet  Commonly known as: FOSAMAX   70 mg, Oral, Every 7 Days, Thursday      aspirin 81 MG EC tablet   81 mg, Oral, Daily      CALCIUM 600/VITAMIN D PO   1 tablet,  Oral, Daily      carvedilol 3.125 MG tablet  Commonly known as: COREG   3.125 mg, Oral, 2 Times Daily      clopidogrel 75 MG tablet  Commonly known as: PLAVIX   75 mg, Oral, Daily      colestipol 1 g tablet  Commonly known as: COLESTID   1 g, Oral, Every Other Day      COMPLETE MULTIVITAMIN/MINERAL PO   1 tablet, Oral, Daily      donepezil 5 MG tablet  Commonly known as: Aricept   5 mg, Oral, Nightly      escitalopram 10 MG tablet  Commonly known as: LEXAPRO   10 mg, Oral, Daily      Glucosamine-Chondroitin-Vit D3 3015-8988-100 MG-MG-UNIT pack   1 tablet, Oral, 2 Times Daily      levothyroxine 125 MCG tablet  Commonly known as: SYNTHROID, LEVOTHROID   125 mcg, Oral, Every Early Morning      ondansetron ODT 4 MG disintegrating tablet  Commonly known as: ZOFRAN-ODT   4 mg, Translingual, Every 8 Hours PRN             Stop These Medications      doxycycline 100 MG capsule  Commonly known as: MONODOX     empagliflozin 10 MG tablet tablet  Commonly known as: Jardiance              This patient has current or prior documentation of an left ventricular ejection fraction (LVEF) of less than or equal to 40%.    This patient is not prescribed or taking ACE/ARB due to renal disease and hospice care at discharge.    The patient was prescribed already taking bisoprolol, carvedilol, or sustained release metoprolol succinate.     Discharge Diet:   Diet Instructions       Diet: Regular/House Diet; Regular (IDDSI 7); Thin (IDDSI 0)      Discharge Diet: Regular/House Diet    Texture: Regular (IDDSI 7)    Fluid Consistency: Thin (IDDSI 0)            Activity at Discharge:   Activity Instructions       Activity as Tolerated              Discharge instructions  1.  Home with [facility]  2.  Follow-up with Dr. Delfina Pereira as needed  3.  Discontinue Jardiance      Follow-up Appointments:   Future Appointments   Date Time Provider Department Center   4/18/2024 11:00 AM PAD US NIVAS CART 2  PAD US PAD   4/18/2024  1:00 PM PAD US  [facility] CART 2 [facility] PAD US PAD   4/18/2024  1:45 PM Saumya Morales APRN [facility] VS [facility]AD PAD   9/5/2024 10:00 AM Cesar Urena DO [facility] CD PAD PAD       Test Results Pending at Discharge: None    Electronically signed by ANGY Montemayor, 04/05/24, 11:39 CDT.    Time: 35 minutes.  Discussed with Dr. Horne, ANGY Brown nephrology, patient, daughter Yeni, son Donal.    The above documentation resulted from a face-to-face encounter by me Flor TRAVIS, [facility].

## 2024-04-05 ENCOUNTER — APPOINTMENT (OUTPATIENT)
Dept: GENERAL RADIOLOGY | Facility: HOSPITAL | Age: 89
End: 2024-04-05
Payer: MEDICARE

## 2024-04-05 VITALS
DIASTOLIC BLOOD PRESSURE: 63 MMHG | SYSTOLIC BLOOD PRESSURE: 116 MMHG | RESPIRATION RATE: 16 BRPM | WEIGHT: 113.9 LBS | HEIGHT: 64 IN | TEMPERATURE: 97.3 F | BODY MASS INDEX: 19.44 KG/M2 | HEART RATE: 72 BPM | OXYGEN SATURATION: 87 %

## 2024-04-05 PROBLEM — I50.22 CHRONIC HFREF (HEART FAILURE WITH REDUCED EJECTION FRACTION): Status: ACTIVE | Noted: 2024-04-05

## 2024-04-05 LAB
ALBUMIN SERPL-MCNC: 3.4 G/DL (ref 3.5–5.2)
ALBUMIN/GLOB SERPL: 1.4 G/DL
ALP SERPL-CCNC: 167 U/L (ref 39–117)
ALT SERPL W P-5'-P-CCNC: 1259 U/L (ref 1–33)
ANION GAP SERPL CALCULATED.3IONS-SCNC: 10 MMOL/L (ref 5–15)
AST SERPL-CCNC: 689 U/L (ref 1–32)
BILIRUB SERPL-MCNC: 2.4 MG/DL (ref 0–1.2)
BUN SERPL-MCNC: 39 MG/DL (ref 8–23)
BUN/CREAT SERPL: 29.3 (ref 7–25)
CALCIUM SPEC-SCNC: 8.7 MG/DL (ref 8.2–9.6)
CHLORIDE SERPL-SCNC: 103 MMOL/L (ref 98–107)
CO2 SERPL-SCNC: 25 MMOL/L (ref 22–29)
CREAT SERPL-MCNC: 1.33 MG/DL (ref 0.57–1)
EGFRCR SERPLBLD CKD-EPI 2021: 36.7 ML/MIN/1.73
GLOBULIN UR ELPH-MCNC: 2.4 GM/DL
GLUCOSE SERPL-MCNC: 90 MG/DL (ref 65–99)
INR PPP: 1.59 (ref 0.91–1.09)
POTASSIUM SERPL-SCNC: 4 MMOL/L (ref 3.5–5.2)
PROT SERPL-MCNC: 5.8 G/DL (ref 6–8.5)
PROTHROMBIN TIME: 19.6 SECONDS (ref 11.8–14.8)
SODIUM SERPL-SCNC: 138 MMOL/L (ref 136–145)

## 2024-04-05 PROCEDURE — 80053 COMPREHEN METABOLIC PANEL: CPT | Performed by: NURSE PRACTITIONER

## 2024-04-05 PROCEDURE — 73060 X-RAY EXAM OF HUMERUS: CPT

## 2024-04-05 PROCEDURE — 99232 SBSQ HOSP IP/OBS MODERATE 35: CPT | Performed by: CLINICAL NURSE SPECIALIST

## 2024-04-05 PROCEDURE — 85610 PROTHROMBIN TIME: CPT | Performed by: NURSE PRACTITIONER

## 2024-04-05 RX ADMIN — Medication 10 ML: at 10:00

## 2024-04-05 NOTE — PROGRESS NOTES
"            [facility] Palliative Care Services    Palliative Care Daily Progress Note   Chief complaint-follow up     Code Status:   Code Status and Medical Interventions:   Ordered at: 04/02/24 1052     Medical Intervention Limits:    NO intubation (DNI)    NO cardioversion    NO dialysis    NO vasopressors    NO artificial nutrition     Level Of Support Discussed With:    Health Care Surrogate     Code Status (Patient has no pulse and is not breathing):    No CPR (Do Not Attempt to Resuscitate)     Medical Interventions (Patient has pulse or is breathing):    Limited Support      Advanced Directives: Advance Directive Status: Patient has advance directive, copy in chart   Goals of Care: Ongoing.     S: Medical record reviewed. Events noted.  Kidney function continues to improve creatinine 1.33 from 1.66 from 2.72 from 3.39.  In bed without apparent distress with eyes closed, resting comfortably.  Her son and daughter and son-in-law currently at bedside.  Family report poor oral intake and increased sleeping \"nurses could not get her to take her medications this morning\". Plan Home with hospice.      Review of Systems   Unable to assess due to acuity of condition     Pain Assessment  Preferred Pain Scale: number (Numeric Rating Pain Scale)  Nonverbal Indicators of Pain: nonverbal indicators absent    O:     Intake/Output Summary (Last 24 hours) at 4/5/2024 1050  Last data filed at 4/4/2024 1541  Gross per 24 hour   Intake 120 ml   Output --   Net 120 ml       Diagnostics and current medications: Reviewed.      Current Facility-Administered Medications:     acetaminophen (TYLENOL) tablet 650 mg, 650 mg, Oral, Q4H PRN **OR** acetaminophen (TYLENOL) 160 MG/5ML oral solution 650 mg, 650 mg, Oral, Q4H PRN **OR** acetaminophen (TYLENOL) suppository 650 mg, 650 mg, Rectal, Q4H PRN, Candida Jorge APRN    sennosides-docusate (PERICOLACE) 8.6-50 MG per tablet 2 tablet, 2 tablet, Oral, BID PRN **AND** " polyethylene glycol (MIRALAX) packet 17 g, 17 g, Oral, Daily PRN **AND** bisacodyl (DULCOLAX) EC tablet 5 mg, 5 mg, Oral, Daily PRN **AND** bisacodyl (DULCOLAX) suppository 10 mg, 10 mg, Rectal, Daily, Candida Jorge, APRN, 10 mg at 04/03/24 0918    donepezil (ARICEPT) tablet 5 mg, 5 mg, Oral, Nightly, Candida Jorge, APRN, 5 mg at 04/03/24 2131    escitalopram (LEXAPRO) tablet 10 mg, 10 mg, Oral, Daily, Candida Jorge, APRN, 10 mg at 04/04/24 0923    famotidine (PEPCID) tablet 20 mg, 20 mg, Oral, Daily, BelloJelena, APRN, 20 mg at 04/04/24 0923    levothyroxine (SYNTHROID, LEVOTHROID) tablet 125 mcg, 125 mcg, Oral, Once per day on Sunday Monday Tuesday Wednesday Thursday Friday, Candida Jorge, APRN, 125 mcg at 04/04/24 0650    [DISCONTINUED] HYDROmorphone (DILAUDID) injection 0.5 mg, 0.5 mg, Intravenous, Q4H PRN **AND** naloxone (NARCAN) injection 0.4 mg, 0.4 mg, Intravenous, Q5 Min PRN, Candida Jorge, APRN    ondansetron ODT (ZOFRAN-ODT) disintegrating tablet 4 mg, 4 mg, Oral, Q6H PRN **OR** ondansetron (ZOFRAN) injection 4 mg, 4 mg, Intravenous, Q6H PRN, Candida Jorge, APRN    sodium chloride 0.9 % bolus 250 mL, 250 mL, Intravenous, Once, Curt Garcia MD    sodium chloride 0.9 % flush 10 mL, 10 mL, Intravenous, Q12H, Candida Jorge, APRN, 10 mL at 04/05/24 1000    sodium chloride 0.9 % flush 10 mL, 10 mL, Intravenous, PRN, Candida Jorge, APRN    sodium chloride 0.9 % infusion 40 mL, 40 mL, Intravenous, PRN, Candida Jorge, APRN    sodium chloride 0.9 % infusion, 50 mL/hr, Intravenous, Continuous, Flor Montalvo, ANGY, Last Rate: 50 mL/hr at 04/04/24 1401, 50 mL/hr at 04/04/24 1401    sodium zirconium cyclosilicate (LOKELMA) packet 10 g, 10 g, Oral, Once, Mummaneni, Sundeep, DO    Allergies   Allergen Reactions    Erythromycin Shortness Of Breath    Zithromax [Azithromycin] Itching and Other (See Comments)     Yeast infection    Penicillins Rash     I have utilized  "all available immediate resources to obtain, update, or review the patient's current medications (including all prescriptions, over-the-counter products, herbals, cannabis/cannabidiol products, and vitamin/mineral/dietary (nutritional) supplements) for name, route of administration, type, dose and frequency.    A:    /65 (BP Location: Left arm, Patient Position: Lying)   Pulse 80   Temp 97.6 °F (36.4 °C) (Oral)   Resp 16   Ht 162.6 cm (64\")   Wt 51.7 kg (113 lb 14.4 oz)   SpO2 93%   BMI 19.55 kg/m²     Vitals and nursing note reviewed.   Constitutional:       Appearance: Not in distress. Cachectic and frail.  Nasal cannula.  Eyes:      General: Lids are normal.   HENT:      Head: Normocephalic.   Pulmonary:      Effort: Pulmonary effort is normal.   Cardiovascular:      Normal rate.   Skin:     General: Skin is warm.   Genitourinary:     Comments: No reported dysuria  Psychiatric:            Cognition and Memory: Memory is impaired.      Patient status: Disease state: Controlled with current treatments.  Current Functional status: Palliative Performance Scale Score: Performance 40% based on the following measures: Ambulation: Mainly in bed, Activity and Evidence of Disease: Unable to do any work, extensive evidence of disease, Self-Care: Mainly assistance required,  Intake: Normal or reduced, LOC: Full, drowsy or confusion   Nutritional status: Albumin 3.5 Body mass index is 18.54 kg/m².      Hospital Problem List      Acute renal failure superimposed on stage 3a chronic kidney disease    Chronic systolic (congestive) heart failure    Symptomatic anemia    Hyperkalemia    Failure to thrive in adult    Shock liver     Impression/Problem List:     Acute kidney failure superimposed on stage III chronic kidney disease  Symptomatic anemia  Hyperkalemia  Failure to thrive  Shock liver     Hepatic steatosis  Leukocytosis  Thrombocytopenia  Advanced COPD  Hypertension  Chronic combined systolic and diastolic " congestive heart failure  Fall  Partially healed closed fractures of the proximal humerus, right  Hypothyroidism  Coronary artery disease  Hyperlipidemia  Lung nodule  Cerebral microvascular disease and atrophy per CT  Osteopenia  Peripheral artery disease  History of Shingles (1940)  Skin cancer  History of subarachnoid hemorrhage-6/30/2023  Impaired mobility  Advanced age     Large stable slightly complex cyst,lateral left kidney measuring 8.7 x 8.9 cm, per CT    Partially healed fractures of the bilateral pubic ring  Advanced degenerative changes within the lumbar spine.      Recommendations/Plan:  1. plan: Goals of care include CODE STATUS no CPR/limited support interventions.     Family support: The patient receives support from her children..  Advance Directives: Advance Directive Status: Patient has advance directive, copy in chart   [facility]Healthcare [facility]     2.  Palliative care encounter  - Prognosis is poor long-term secondary to acute kidney failure, shock liver, failure to thrive, symptomatic anemia, hyperkalemia, advanced COPD, multiple comorbidities, and advanced age.  -Family appears to have good prognostic awareness.      -Last seen by palliative services during December 2023 hospitalization, goals of care established and a MOST document was completed.      -Treated for hyperkalemia, received 1 unit PRBCs.    -Nephrology consulted and noted patient and family elected not to pursue renal replacement therapy.      4/2-CODE STATUS changes, no CPR/limited support interventions  -Would benefit from completion of a MOST document  -Discussed options of care to include continued conservative/supportive measures versus transitions of care with focus of comfort and best supportive care directed by hospice at discharge.  Family contemplating options.  -High risk for rehospitalization's.    4/3-continue supportive measures  -Transition labs to daily per patient/family  request  -Anticipating home with hospice.  Discussed with SW.  A hospice consult was placed by attending physician yesterday.    4/5-continue supportive measures  -Plan Home with hospice  -A MOST document is current and on file      Thank you for allowing us to participate in patient's plan of care. Palliative Care Team will continue to follow patient.     Ignacia Hill, APRN  4/5/2024  10:50 CDT

## 2024-04-05 NOTE — PLAN OF CARE
Goal Outcome Evaluation:      Patient resting well. No CO paint IVF. Plan to DC with hospice today. Safety maintained.

## 2024-04-05 NOTE — CASE MANAGEMENT/SOCIAL WORK
Continued Stay Note  JAMEY Scales     Patient Name: Mya Chung  MRN: [id number]  Today's Date: 4/5/2024    Admit Date: 4/1/2024    Plan: Home Hospice   Discharge Plan       Row Name 04/05/24 1241       Plan    Plan Home Hospice    Patient/Family in Agreement with Plan yes    Final Discharge Disposition Code 50 - home with hospice    Final Note Pt is being d/c'ed home with [facility] today. They are aware. Pt will go by [facility] -4493 or "[facility] -5656.                   Discharge Codes    No documentation.                 Expected Discharge Date and Time       Expected Discharge Date Expected Discharge Time    Apr 5, 2024               JOHNATHON Danielle

## 2024-07-12 ENCOUNTER — HOSPITAL ENCOUNTER (INPATIENT)
Age: 89
LOS: 4 days | Discharge: HOSPICE | DRG: 536 | End: 2024-07-16
Attending: EMERGENCY MEDICINE
Payer: MEDICARE

## 2024-07-12 ENCOUNTER — APPOINTMENT (OUTPATIENT)
Dept: GENERAL RADIOLOGY | Age: 89
DRG: 536 | End: 2024-07-12
Payer: MEDICARE

## 2024-07-12 ENCOUNTER — APPOINTMENT (OUTPATIENT)
Dept: CT IMAGING | Age: 89
DRG: 536 | End: 2024-07-12
Payer: MEDICARE
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
## 2024-07-12 DIAGNOSIS — D64.9 ANEMIA, UNSPECIFIED TYPE: ICD-10-CM

## 2024-07-12 DIAGNOSIS — S72.8X1A OTHER FRACTURE OF RIGHT FEMUR, INITIAL ENCOUNTER FOR CLOSED FRACTURE (HCC): Primary | ICD-10-CM

## 2024-07-12 PROBLEM — E78.5 HYPERLIPIDEMIA: Status: ACTIVE | Noted: 2024-07-12

## 2024-07-12 PROBLEM — S72.001A CLOSED RIGHT HIP FRACTURE, INITIAL ENCOUNTER (HCC): Status: ACTIVE | Noted: 2024-07-12

## 2024-07-12 PROBLEM — E07.9 THYROID DISEASE: Status: ACTIVE | Noted: 2024-07-12

## 2024-07-12 PROBLEM — I10 HYPERTENSION: Status: ACTIVE | Noted: 2024-07-12

## 2024-07-12 PROBLEM — F32.A DEPRESSION: Status: ACTIVE | Noted: 2024-07-12

## 2024-07-12 PROBLEM — I50.9 CHF (CONGESTIVE HEART FAILURE) (HCC): Status: ACTIVE | Noted: 2024-07-12

## 2024-07-12 LAB
ABO/RH: NORMAL
ALBUMIN SERPL-MCNC: 3.2 G/DL (ref 3.5–5.2)
ALP SERPL-CCNC: 93 U/L (ref 35–104)
ALT SERPL-CCNC: 10 U/L (ref 5–33)
ANION GAP SERPL CALCULATED.3IONS-SCNC: 9 MMOL/L (ref 7–19)
ANISOCYTOSIS BLD QL SMEAR: ABNORMAL
ANTIBODY SCREEN: NORMAL
APTT PPP: 29 SEC (ref 26–36.2)
AST SERPL-CCNC: 23 U/L (ref 5–32)
BASOPHILS # BLD: 0 K/UL (ref 0–0.2)
BASOPHILS NFR BLD: 0.4 % (ref 0–1)
BILIRUB SERPL-MCNC: 0.9 MG/DL (ref 0.2–1.2)
BLOOD BANK DISPENSE STATUS: NORMAL
BLOOD BANK PRODUCT CODE: NORMAL
BPU ID: NORMAL
BUN SERPL-MCNC: 24 MG/DL (ref 8–23)
CALCIUM SERPL-MCNC: 8.8 MG/DL (ref 8.2–9.6)
CHLORIDE SERPL-SCNC: 101 MMOL/L (ref 98–111)
CO2 SERPL-SCNC: 30 MMOL/L (ref 22–29)
CREAT SERPL-MCNC: 1.4 MG/DL (ref 0.5–0.9)
DACRYOCYTES BLD QL SMEAR: ABNORMAL
DESCRIPTION BLOOD BANK: NORMAL
EOSINOPHIL # BLD: 0.1 K/UL (ref 0–0.6)
EOSINOPHIL NFR BLD: 1.5 % (ref 0–5)
ERYTHROCYTE [DISTWIDTH] IN BLOOD BY AUTOMATED COUNT: 21.8 % (ref 11.5–14.5)
GLUCOSE SERPL-MCNC: 142 MG/DL (ref 74–109)
HCT VFR BLD AUTO: 26.8 % (ref 37–47)
HGB BLD-MCNC: 7.1 G/DL (ref 12–16)
HYPOCHROMIA BLD QL SMEAR: ABNORMAL
IMM GRANULOCYTES # BLD: 0 K/UL
INR PPP: 1.42 (ref 0.88–1.18)
LYMPHOCYTES # BLD: 1.2 K/UL (ref 1.1–4.5)
LYMPHOCYTES NFR BLD: 15.3 % (ref 20–40)
MCH RBC QN AUTO: 19.7 PG (ref 27–31)
MCHC RBC AUTO-ENTMCNC: 26.5 G/DL (ref 33–37)
MCV RBC AUTO: 74.2 FL (ref 81–99)
MICROCYTES BLD QL SMEAR: ABNORMAL
MONOCYTES # BLD: 0.5 K/UL (ref 0–0.9)
MONOCYTES NFR BLD: 7.2 % (ref 0–10)
NEUTROPHILS # BLD: 5.6 K/UL (ref 1.5–7.5)
NEUTS SEG NFR BLD: 75.1 % (ref 50–65)
OVALOCYTES BLD QL SMEAR: ABNORMAL
PLATELET # BLD AUTO: 210 K/UL (ref 130–400)
PLATELET SLIDE REVIEW: ADEQUATE
PMV BLD AUTO: 9.4 FL (ref 9.4–12.3)
POTASSIUM SERPL-SCNC: 3.9 MMOL/L (ref 3.5–5)
PROT SERPL-MCNC: 6.1 G/DL (ref 6.6–8.7)
PROTHROMBIN TIME: 17 SEC (ref 12–14.6)
RBC # BLD AUTO: 3.61 M/UL (ref 4.2–5.4)
SODIUM SERPL-SCNC: 140 MMOL/L (ref 136–145)
TARGETS BLD QL SMEAR: ABNORMAL
WBC # BLD AUTO: 7.5 K/UL (ref 4.8–10.8)

## 2024-07-12 PROCEDURE — 73552 X-RAY EXAM OF FEMUR 2/>: CPT

## 2024-07-12 PROCEDURE — 2700000000 HC OXYGEN THERAPY PER DAY

## 2024-07-12 PROCEDURE — 86850 RBC ANTIBODY SCREEN: CPT

## 2024-07-12 PROCEDURE — P9016 RBC LEUKOCYTES REDUCED: HCPCS

## 2024-07-12 PROCEDURE — 86901 BLOOD TYPING SEROLOGIC RH(D): CPT

## 2024-07-12 PROCEDURE — 85730 THROMBOPLASTIN TIME PARTIAL: CPT

## 2024-07-12 PROCEDURE — 72170 X-RAY EXAM OF PELVIS: CPT

## 2024-07-12 PROCEDURE — 30233N1 TRANSFUSION OF NONAUTOLOGOUS RED BLOOD CELLS INTO PERIPHERAL VEIN, PERCUTANEOUS APPROACH: ICD-10-PCS | Performed by: INTERNAL MEDICINE

## 2024-07-12 PROCEDURE — 85610 PROTHROMBIN TIME: CPT

## 2024-07-12 PROCEDURE — 70450 CT HEAD/BRAIN W/O DYE: CPT

## 2024-07-12 PROCEDURE — 86900 BLOOD TYPING SEROLOGIC ABO: CPT

## 2024-07-12 PROCEDURE — 99285 EMERGENCY DEPT VISIT HI MDM: CPT

## 2024-07-12 PROCEDURE — 36415 COLL VENOUS BLD VENIPUNCTURE: CPT

## 2024-07-12 PROCEDURE — 86923 COMPATIBILITY TEST ELECTRIC: CPT

## 2024-07-12 PROCEDURE — 2580000003 HC RX 258

## 2024-07-12 PROCEDURE — 6370000000 HC RX 637 (ALT 250 FOR IP)

## 2024-07-12 PROCEDURE — 85025 COMPLETE CBC W/AUTO DIFF WBC: CPT

## 2024-07-12 PROCEDURE — 1200000000 HC SEMI PRIVATE

## 2024-07-12 PROCEDURE — 80053 COMPREHEN METABOLIC PANEL: CPT

## 2024-07-12 PROCEDURE — 36430 TRANSFUSION BLD/BLD COMPNT: CPT

## 2024-07-12 PROCEDURE — 71045 X-RAY EXAM CHEST 1 VIEW: CPT

## 2024-07-12 PROCEDURE — 94760 N-INVAS EAR/PLS OXIMETRY 1: CPT

## 2024-07-12 RX ORDER — SODIUM CHLORIDE, SODIUM LACTATE, POTASSIUM CHLORIDE, CALCIUM CHLORIDE 600; 310; 30; 20 MG/100ML; MG/100ML; MG/100ML; MG/100ML
INJECTION, SOLUTION INTRAVENOUS CONTINUOUS
Status: DISCONTINUED | OUTPATIENT
Start: 2024-07-12 | End: 2024-07-12

## 2024-07-12 RX ORDER — GINGER ROOT/GINGER ROOT EXT 262.5 MG
1 CAPSULE ORAL DAILY
Status: ON HOLD | COMMUNITY
End: 2024-07-16 | Stop reason: HOSPADM

## 2024-07-12 RX ORDER — LEVOTHYROXINE SODIUM 0.12 MG/1
125 TABLET ORAL DAILY
Status: ON HOLD | COMMUNITY
End: 2024-07-16 | Stop reason: HOSPADM

## 2024-07-12 RX ORDER — DONEPEZIL HYDROCHLORIDE 5 MG/1
5 TABLET, FILM COATED ORAL NIGHTLY
Status: DISCONTINUED | OUTPATIENT
Start: 2024-07-12 | End: 2024-07-17 | Stop reason: HOSPADM

## 2024-07-12 RX ORDER — ESCITALOPRAM OXALATE 10 MG/1
10 TABLET ORAL DAILY
Status: DISCONTINUED | OUTPATIENT
Start: 2024-07-13 | End: 2024-07-17 | Stop reason: HOSPADM

## 2024-07-12 RX ORDER — SODIUM CHLORIDE 9 MG/ML
INJECTION, SOLUTION INTRAVENOUS PRN
Status: DISCONTINUED | OUTPATIENT
Start: 2024-07-12 | End: 2024-07-17 | Stop reason: HOSPADM

## 2024-07-12 RX ORDER — SODIUM CHLORIDE 9 MG/ML
INJECTION, SOLUTION INTRAVENOUS CONTINUOUS
Status: DISCONTINUED | OUTPATIENT
Start: 2024-07-12 | End: 2024-07-14

## 2024-07-12 RX ORDER — GABAPENTIN 100 MG/1
100 CAPSULE ORAL NIGHTLY
Status: ON HOLD | COMMUNITY
End: 2024-07-16 | Stop reason: HOSPADM

## 2024-07-12 RX ORDER — FUROSEMIDE 20 MG/1
20 TABLET ORAL 2 TIMES DAILY
Status: ON HOLD | COMMUNITY
End: 2024-07-16 | Stop reason: HOSPADM

## 2024-07-12 RX ORDER — POTASSIUM CHLORIDE 7.45 MG/ML
10 INJECTION INTRAVENOUS PRN
Status: DISCONTINUED | OUTPATIENT
Start: 2024-07-12 | End: 2024-07-17 | Stop reason: HOSPADM

## 2024-07-12 RX ORDER — POTASSIUM CHLORIDE 20 MEQ/1
40 TABLET, EXTENDED RELEASE ORAL PRN
Status: DISCONTINUED | OUTPATIENT
Start: 2024-07-12 | End: 2024-07-17 | Stop reason: HOSPADM

## 2024-07-12 RX ORDER — ESCITALOPRAM OXALATE 10 MG/1
10 TABLET ORAL DAILY
Status: ON HOLD | COMMUNITY
End: 2024-07-16 | Stop reason: HOSPADM

## 2024-07-12 RX ORDER — FUROSEMIDE 20 MG/1
20 TABLET ORAL 2 TIMES DAILY
Status: DISCONTINUED | OUTPATIENT
Start: 2024-07-12 | End: 2024-07-17 | Stop reason: HOSPADM

## 2024-07-12 RX ORDER — MAGNESIUM SULFATE IN WATER 40 MG/ML
2000 INJECTION, SOLUTION INTRAVENOUS PRN
Status: DISCONTINUED | OUTPATIENT
Start: 2024-07-12 | End: 2024-07-17 | Stop reason: HOSPADM

## 2024-07-12 RX ORDER — DONEPEZIL HYDROCHLORIDE 5 MG/1
5 TABLET, FILM COATED ORAL NIGHTLY
Status: ON HOLD | COMMUNITY
End: 2024-07-16 | Stop reason: HOSPADM

## 2024-07-12 RX ORDER — ACETAMINOPHEN 650 MG/1
650 SUPPOSITORY RECTAL EVERY 6 HOURS PRN
Status: DISCONTINUED | OUTPATIENT
Start: 2024-07-12 | End: 2024-07-13

## 2024-07-12 RX ORDER — ONDANSETRON 4 MG/1
4 TABLET, ORALLY DISINTEGRATING ORAL EVERY 8 HOURS PRN
Status: DISCONTINUED | OUTPATIENT
Start: 2024-07-12 | End: 2024-07-17 | Stop reason: HOSPADM

## 2024-07-12 RX ORDER — CARVEDILOL 3.12 MG/1
3.12 TABLET ORAL 2 TIMES DAILY WITH MEALS
Status: ON HOLD | COMMUNITY
End: 2024-07-16 | Stop reason: HOSPADM

## 2024-07-12 RX ORDER — MONTELUKAST SODIUM 4 MG/1
1 TABLET, CHEWABLE ORAL EVERY OTHER DAY
Status: ON HOLD | COMMUNITY
End: 2024-07-16 | Stop reason: HOSPADM

## 2024-07-12 RX ORDER — POLYETHYLENE GLYCOL 3350 17 G/17G
17 POWDER, FOR SOLUTION ORAL DAILY PRN
Status: DISCONTINUED | OUTPATIENT
Start: 2024-07-12 | End: 2024-07-13

## 2024-07-12 RX ORDER — ONDANSETRON 2 MG/ML
4 INJECTION INTRAMUSCULAR; INTRAVENOUS EVERY 6 HOURS PRN
Status: DISCONTINUED | OUTPATIENT
Start: 2024-07-12 | End: 2024-07-17 | Stop reason: HOSPADM

## 2024-07-12 RX ORDER — SODIUM CHLORIDE 0.9 % (FLUSH) 0.9 %
5-40 SYRINGE (ML) INJECTION EVERY 12 HOURS SCHEDULED
Status: DISCONTINUED | OUTPATIENT
Start: 2024-07-12 | End: 2024-07-17 | Stop reason: HOSPADM

## 2024-07-12 RX ORDER — ASPIRIN 81 MG/1
81 TABLET ORAL DAILY
Status: DISCONTINUED | OUTPATIENT
Start: 2024-07-13 | End: 2024-07-17 | Stop reason: HOSPADM

## 2024-07-12 RX ORDER — ACETAMINOPHEN 325 MG/1
650 TABLET ORAL EVERY 6 HOURS PRN
Status: DISCONTINUED | OUTPATIENT
Start: 2024-07-12 | End: 2024-07-13

## 2024-07-12 RX ORDER — SODIUM CHLORIDE 0.9 % (FLUSH) 0.9 %
5-40 SYRINGE (ML) INJECTION PRN
Status: DISCONTINUED | OUTPATIENT
Start: 2024-07-12 | End: 2024-07-17 | Stop reason: HOSPADM

## 2024-07-12 RX ADMIN — SODIUM CHLORIDE: 9 INJECTION, SOLUTION INTRAVENOUS at 23:14

## 2024-07-12 RX ADMIN — DONEPEZIL HYDROCHLORIDE 5 MG: 5 TABLET, FILM COATED ORAL at 23:17

## 2024-07-12 RX ADMIN — SODIUM CHLORIDE, PRESERVATIVE FREE 10 ML: 5 INJECTION INTRAVENOUS at 23:17

## 2024-07-12 RX ADMIN — FUROSEMIDE 20 MG: 20 TABLET ORAL at 23:17

## 2024-07-12 ASSESSMENT — PAIN SCALES - GENERAL: PAINLEVEL_OUTOF10: 6

## 2024-07-12 NOTE — CONSENT
Informed Consent for Blood Component Transfusion Note    I have discussed with the patient the rationale for blood component transfusion; its benefits in treating or preventing fatigue, organ damage, or death; and its risk which includes mild transfusion reactions, rare risk of blood borne infection, or more serious but rare reactions. I have discussed the alternatives to transfusion, including the risk and consequences of not receiving transfusion. The patient had an opportunity to ask questions and had agreed to proceed with transfusion of blood components.    Electronically signed by GLORIA RAMIREZ MD on 7/12/24 at 6:33 PM CDT

## 2024-07-12 NOTE — ED NOTES
Patient SpO2 dropped to 85%. Patient placed on 2L NC. SpO2 continued to stay at 85%. Patient placed on 3.5L NC and SpO2 went to 92%. MD notified.

## 2024-07-12 NOTE — ED PROVIDER NOTES
[facility] EMERGENCY DEPT  eMERGENCY dEPARTMENT eNCOUnter      Pt Name: Tasha Bowers  MRN: [id number]  Birthdate 2/10/1928  Date of evaluation: 2024  Provider: GLORIA RAMIREZ MD    CHIEF COMPLAINT       Chief Complaint   Patient presents with    Fall     Fall w pain in tight hip and head injury; unsure of loc - hospice pt         HISTORY OF PRESENT ILLNESS   (Location/Symptom, Timing/Onset,Context/Setting, Quality, Duration, Modifying Factors, Severity)  Note limiting factors.   Tasha Bowers is a 89 y.o. female who presents to the emergency department after a fall.  Patient fell approximately 3 hours ago and has been unable to ambulate since the fall.  Complains of right hip pain.  She is currently on hospice.  She had 10 mg of morphine prior to arrival.  Ambulatory at baseline per family.    HPI    NursingNotes were reviewed.    REVIEW OF SYSTEMS    (2-9 systems for level 4, 10 or more for level 5)     Review of Systems   Unable to perform ROS: Dementia          PAST MEDICALHISTORY     Past Medical History:   Diagnosis Date    Arthritis     Hyperlipidemia     Hypertension     Thyroid disease          SURGICAL HISTORY       Past Surgical History:   Procedure Laterality Date    BREAST SURGERY  biopsy     SECTION  2 times    HC INJECT OTHER PERPHRL NERV Right 4/15/2016    INTRA ARTICULAR HIP INJECTION  performed by Steve Lopes MD at [facility] ASC OR         CURRENT MEDICATIONS     Previous Medications    ALENDRONATE (FOSAMAX) 70 MG TABLET    Take 70 mg by mouth every 7 days    ALISKIREN-HYDROCHLOROTHIAZIDE 300-12.5 MG TABS    Take by mouth    AMLODIPINE (NORVASC) 10 MG TABLET    Take 10 mg by mouth daily    ASPIRIN 81 MG TABLET    Take 81 mg by mouth daily    CLOPIDOGREL (PLAVIX) 75 MG TABLET    Take 75 mg by mouth daily    CYCLOSPORINE (RESTASIS OP)    Apply to eye    LEVOTHYROXINE SODIUM 112 MCG CAPS    Take by mouth Daily    METHYLPREDNISOLONE (MEDROL DOSEPACK) 4 MG TABLET    Take 4 mg by mouth See Admin  well on 2 L and she has been sleeping resting comfortably after her fall at home sustained right periprosthetic femur fracture.  Given 10 mg of morphine prior to arrival again seems comfortable here.  Spoke with Dr. Morris in regards to case as well.  Once fracture was confirmed family agreeable for revoking hospice so proceeded with laboratory evaluation and additional imaging.  Incidentally anemic hemoglobin 7.1 denies any history of GI bleed has not any dark stools.  Given her planned upcoming surgery for tomorrow felt that transfusion needed.  Chest x-ray read noted does have some known congestive heart failure.  Humerus fracture is old she fell a year ago approximately when sustained this injury and has normal ROM today.  D/w Dr. Tracy npo MN and hold plavix OR tomorrow.  D/w hospitalist for admission.      CONSULTS:  IP CONSULT TO ORTHOPEDIC SURGERY    :  Unless otherwise noted below, none     Procedures    FINAL IMPRESSION      1. Other fracture of right femur, initial encounter for closed fracture (HCC)    2. Fall, initial encounter    3. Anemia, unspecified type          DISPOSITION/PLAN   DISPOSITION Decision To Admit 07/12/2024 08:16:12 PM      PATIENT REFERRED TO:  No follow-up provider specified.    DISCHARGE MEDICATIONS:  New Prescriptions    No medications on file          (Please note that portions of this note were completed with a voice recognition program.  Efforts were made to edit thedictations but occasionally words are mis-transcribed.)    MARCELLO RAMIREZ MD (electronically signed)Emergency Physician        Marcello Ramirez MD  07/12/24 2020       Marcello Ramirez MD  07/12/24 2024

## 2024-07-12 NOTE — PROGRESS NOTES
SN spoke with  and pt's dtrSue. Revoking HS services for repair of hip fx. Chaplain rubio to \[facility] [place] [facility]\"" for completion of forms.

## 2024-07-13 LAB
ANION GAP SERPL CALCULATED.3IONS-SCNC: 11 MMOL/L (ref 7–19)
ANISOCYTOSIS BLD QL SMEAR: ABNORMAL
BASOPHILS # BLD: 0 K/UL (ref 0–0.2)
BASOPHILS NFR BLD: 0.4 % (ref 0–1)
BUN SERPL-MCNC: 23 MG/DL (ref 8–23)
CALCIUM SERPL-MCNC: 8.2 MG/DL (ref 8.2–9.6)
CHLORIDE SERPL-SCNC: 101 MMOL/L (ref 98–111)
CO2 SERPL-SCNC: 28 MMOL/L (ref 22–29)
CREAT SERPL-MCNC: 1.3 MG/DL (ref 0.5–0.9)
EOSINOPHIL # BLD: 0 K/UL (ref 0–0.6)
EOSINOPHIL NFR BLD: 0.1 % (ref 0–5)
ERYTHROCYTE [DISTWIDTH] IN BLOOD BY AUTOMATED COUNT: 21.3 % (ref 11.5–14.5)
GLUCOSE SERPL-MCNC: 125 MG/DL (ref 74–109)
HCT VFR BLD AUTO: 28.2 % (ref 37–47)
HGB BLD-MCNC: 8 G/DL (ref 12–16)
HYPOCHROMIA BLD QL SMEAR: ABNORMAL
IMM GRANULOCYTES # BLD: 0 K/UL
IRON SATN MFR SERPL: 8 % (ref 14–50)
IRON SERPL-MCNC: 26 UG/DL (ref 37–145)
LYMPHOCYTES # BLD: 1.6 K/UL (ref 1.1–4.5)
LYMPHOCYTES NFR BLD: 19.3 % (ref 20–40)
MCH RBC QN AUTO: 21.6 PG (ref 27–31)
MCHC RBC AUTO-ENTMCNC: 28.4 G/DL (ref 33–37)
MCV RBC AUTO: 76 FL (ref 81–99)
MICROCYTES BLD QL SMEAR: ABNORMAL
MONOCYTES # BLD: 0.8 K/UL (ref 0–0.9)
MONOCYTES NFR BLD: 9.6 % (ref 0–10)
NEUTROPHILS # BLD: 5.8 K/UL (ref 1.5–7.5)
NEUTS SEG NFR BLD: 70.4 % (ref 50–65)
OVALOCYTES BLD QL SMEAR: ABNORMAL
PLATELET # BLD AUTO: 175 K/UL (ref 130–400)
PLATELET SLIDE REVIEW: ADEQUATE
PMV BLD AUTO: 9.6 FL (ref 9.4–12.3)
POLYCHROMASIA BLD QL SMEAR: ABNORMAL
POTASSIUM SERPL-SCNC: 3.9 MMOL/L (ref 3.5–5)
RBC # BLD AUTO: 3.71 M/UL (ref 4.2–5.4)
SODIUM SERPL-SCNC: 140 MMOL/L (ref 136–145)
STOMATOCYTES BLD QL SMEAR: ABNORMAL
TIBC SERPL-MCNC: 336 UG/DL (ref 250–400)
TSH SERPL DL<=0.005 MIU/L-ACNC: 3.77 UIU/ML (ref 0.27–4.2)
WBC # BLD AUTO: 8.2 K/UL (ref 4.8–10.8)

## 2024-07-13 PROCEDURE — 94760 N-INVAS EAR/PLS OXIMETRY 1: CPT

## 2024-07-13 PROCEDURE — 80048 BASIC METABOLIC PNL TOTAL CA: CPT

## 2024-07-13 PROCEDURE — 36415 COLL VENOUS BLD VENIPUNCTURE: CPT

## 2024-07-13 PROCEDURE — 97161 PT EVAL LOW COMPLEX 20 MIN: CPT

## 2024-07-13 PROCEDURE — 83550 IRON BINDING TEST: CPT

## 2024-07-13 PROCEDURE — 97530 THERAPEUTIC ACTIVITIES: CPT

## 2024-07-13 PROCEDURE — 85025 COMPLETE CBC W/AUTO DIFF WBC: CPT

## 2024-07-13 PROCEDURE — 6360000002 HC RX W HCPCS

## 2024-07-13 PROCEDURE — 1200000000 HC SEMI PRIVATE

## 2024-07-13 PROCEDURE — 2580000003 HC RX 258

## 2024-07-13 PROCEDURE — 84443 ASSAY THYROID STIM HORMONE: CPT

## 2024-07-13 PROCEDURE — 93005 ELECTROCARDIOGRAM TRACING: CPT

## 2024-07-13 PROCEDURE — 6370000000 HC RX 637 (ALT 250 FOR IP)

## 2024-07-13 PROCEDURE — 2700000000 HC OXYGEN THERAPY PER DAY

## 2024-07-13 PROCEDURE — 83540 ASSAY OF IRON: CPT

## 2024-07-13 RX ORDER — CLOPIDOGREL BISULFATE 75 MG/1
75 TABLET ORAL DAILY
Status: DISCONTINUED | OUTPATIENT
Start: 2024-07-13 | End: 2024-07-17 | Stop reason: HOSPADM

## 2024-07-13 RX ORDER — MORPHINE SULFATE 2 MG/ML
2 INJECTION, SOLUTION INTRAMUSCULAR; INTRAVENOUS EVERY 6 HOURS PRN
Status: DISCONTINUED | OUTPATIENT
Start: 2024-07-13 | End: 2024-07-17 | Stop reason: HOSPADM

## 2024-07-13 RX ORDER — GABAPENTIN 100 MG/1
100 CAPSULE ORAL NIGHTLY
Status: DISCONTINUED | OUTPATIENT
Start: 2024-07-13 | End: 2024-07-17 | Stop reason: HOSPADM

## 2024-07-13 RX ORDER — CARVEDILOL 3.12 MG/1
3.12 TABLET ORAL 2 TIMES DAILY WITH MEALS
Status: DISCONTINUED | OUTPATIENT
Start: 2024-07-13 | End: 2024-07-17 | Stop reason: HOSPADM

## 2024-07-13 RX ORDER — SODIUM FERRIC GLUCONATE COMPLEX IN SUCROSE 12.5 MG/ML
125 INJECTION INTRAVENOUS DAILY
Status: DISCONTINUED | OUTPATIENT
Start: 2024-07-13 | End: 2024-07-13

## 2024-07-13 RX ORDER — ACETAMINOPHEN 500 MG
1000 TABLET ORAL EVERY 8 HOURS SCHEDULED
Status: DISCONTINUED | OUTPATIENT
Start: 2024-07-13 | End: 2024-07-17 | Stop reason: HOSPADM

## 2024-07-13 RX ORDER — POLYETHYLENE GLYCOL 3350 17 G/17G
17 POWDER, FOR SOLUTION ORAL DAILY
Status: DISCONTINUED | OUTPATIENT
Start: 2024-07-13 | End: 2024-07-17 | Stop reason: HOSPADM

## 2024-07-13 RX ORDER — TRAMADOL HYDROCHLORIDE 50 MG/1
50 TABLET ORAL EVERY 6 HOURS PRN
Status: DISCONTINUED | OUTPATIENT
Start: 2024-07-13 | End: 2024-07-17 | Stop reason: HOSPADM

## 2024-07-13 RX ADMIN — POLYETHYLENE GLYCOL 3350 17 G: 17 POWDER, FOR SOLUTION ORAL at 13:04

## 2024-07-13 RX ADMIN — Medication 1 TABLET: at 13:04

## 2024-07-13 RX ADMIN — SODIUM CHLORIDE 125 MG: 9 INJECTION, SOLUTION INTRAVENOUS at 13:02

## 2024-07-13 RX ADMIN — DONEPEZIL HYDROCHLORIDE 5 MG: 5 TABLET, FILM COATED ORAL at 21:45

## 2024-07-13 RX ADMIN — ACETAMINOPHEN 1000 MG: 500 TABLET ORAL at 21:45

## 2024-07-13 RX ADMIN — TRAMADOL HYDROCHLORIDE 50 MG: 50 TABLET ORAL at 10:13

## 2024-07-13 RX ADMIN — ACETAMINOPHEN 1000 MG: 500 TABLET ORAL at 13:55

## 2024-07-13 RX ADMIN — TRAMADOL HYDROCHLORIDE 50 MG: 50 TABLET ORAL at 17:44

## 2024-07-13 RX ADMIN — LEVOTHYROXINE SODIUM 125 MCG: 25 TABLET ORAL at 08:17

## 2024-07-13 RX ADMIN — ESCITALOPRAM OXALATE 10 MG: 10 TABLET ORAL at 08:17

## 2024-07-13 RX ADMIN — GABAPENTIN 100 MG: 100 CAPSULE ORAL at 21:45

## 2024-07-13 RX ADMIN — CARVEDILOL 3.12 MG: 3.12 TABLET, FILM COATED ORAL at 17:44

## 2024-07-13 ASSESSMENT — PAIN DESCRIPTION - ORIENTATION
ORIENTATION: RIGHT
ORIENTATION: RIGHT

## 2024-07-13 ASSESSMENT — PAIN SCALES - GENERAL
PAINLEVEL_OUTOF10: 3
PAINLEVEL_OUTOF10: 9
PAINLEVEL_OUTOF10: 4

## 2024-07-13 ASSESSMENT — PAIN DESCRIPTION - LOCATION
LOCATION: HIP
LOCATION: LEG

## 2024-07-13 ASSESSMENT — PAIN DESCRIPTION - DESCRIPTORS
DESCRIPTORS: ACHING
DESCRIPTORS: ACHING

## 2024-07-13 NOTE — H&P
[facility] Hospitalists      Hospitalist - History & Physical      PCP: Gay Pena DO    Date of Admission: 2024    Date of Service: 2024    Chief Complaint:  Fall    History Of Present Illness:   The patient is a 89 y.o. female with athritis, CHF, cognitive impairment, depression, hyperlipidemia, HTN, and thyroid disease comes to the ED today following a fall at home. Patient and family at bedside state that she fell approximately 3 hours PTA and has been unable to ambulate since. Patient denies any dizziness or syncope. She has some bruising to her right forehead and eye area. Patient complains of R hip pain. Patient is currently on hospice, but after discussion with family has decided to revoke hospice services for repair of hip fracture. CXR in ER noted comminuted right humerus fracture age indeterminate, family reports patient broke her arm last year. She has no complaints of pain to that arm. Denies fever, chills, nausea, vomiting, abdominal pain, shortness of breath, and chest pain.     In ED, X-ray of right femur shows moderately displaced periprosthetic fracture of the proximal right femur, CT head with no acute intracranial process with some right periorbital soft tissue swelling, xray of chest notes age indeterminate comminuted right humerus fracture; creatinine 1.4, BUN 24, Hgb 7.1.  Will admit inpatient to hospitalist service with orthopedic surgery consult.     Past Medical History:        Diagnosis Date    Arthritis     CHF (congestive heart failure) (HCC)     Cognitive impairment     Depression     Hyperlipidemia     Hypertension     Thyroid disease        Past Surgical History:        Procedure Laterality Date    BREAST SURGERY  biopsy     SECTION  2 times    HC INJECT OTHER PERPHRL NERV Right 4/15/2016    INTRA ARTICULAR HIP INJECTION  performed by Steve Lopes MD at [facility] OR       Home Medications:  Prior to Admission medications    Medication Sig Start Date  vascular calcifications.   ______________________________________ Electronically signed by: BO ROE M.D. Date:     07/12/2024 Time:    17:38       Assessment/Plan:  Principal Problem:    Closed right hip fracture, initial encounter    - Consult to orthopedic surgery   - NPO after midnight   - Hold plavix   - IVF NS @ 75 cc/hr   - CBC, BMP w/ reflex to mag in AM   - Insert Kraus per indwelling catheter insertion/discontinue protocol   - heels off bed at all times   - neurovascular checks Q4H   - strict bedrest   - telemetry   - vital signs Q4H    Active Problems:    Anemia   - 1 Unit PRBC transfused in ER   - Post-transfusion H/H per protocol   - CBC in AM    CHF   - Continue home lasix    Hyperlipidemia   - Continue home ASA    Thyroid disease   - Continue home synthroid    Cognitive impairment   - Continue aricept    Depression   - Continue lexapro      Resolved Problems:    * No resolved hospital problems. *       DVT prophylaxis: SCD's  GI prophylaxis: Not indicated    Signed:  DIANA Zaragoza - CNP, 7/12/2024 7:14 PM

## 2024-07-13 NOTE — PROGRESS NOTES
Physical Therapy  Facility/Department: [facility] SURG SERVICES  Physical Therapy Initial Assessment    Name: Tasha Bowers  : 2/10/1928  MRN: [id number]  Date of Service: 2024    Discharge Recommendations:  Subacute/Skilled Nursing Facility, Long Term Care with PT          Patient Diagnosis(es): The primary encounter diagnosis was Other fracture of right femur, initial encounter for closed fracture (HCC). Diagnoses of Fall, initial encounter and Anemia, unspecified type were also pertinent to this visit.  Past Medical History:  has a past medical history of Arthritis, CHF (congestive heart failure) (HCC), Cognitive impairment, Depression, Hyperlipidemia, Hypertension, and Thyroid disease.  Past Surgical History:  has a past surgical history that includes Breast surgery (biopsy);  section (2 times); and hc inject other perphrl nerv (Right, 4/15/2016).    Assessment   Body Structures, Functions, Activity Limitations Requiring Skilled Therapeutic Intervention: Decreased functional mobility   Assessment: Patient will benefit from continuing skilled physical therapy to improve mobility  Treatment Diagnosis: Decline in mobility  Therapy Prognosis: Good  Decision Making: Low Complexity  Requires PT Follow-Up: Yes  Activity Tolerance  Activity Tolerance: Patient limited by pain     Plan   Physical Therapy Plan  Days Per Week: 7 Days  Therapy Duration: 2 Weeks  Current Treatment Recommendations: Strengthening, Functional mobility training, Balance training, Therapeutic activities, Safety education & training, Patient/Caregiver education & training, Gait training  Safety Devices  Type of Devices: Bed alarm in place, Call light within reach, Gait belt, Left in bed, Nurse notified     Restrictions        Subjective   Pain: No c/o pain at rest - nursing premedicated  General  Chart Reviewed: Yes  Patient assessed for rehabilitation services?: Yes  Diagnosis: Right periprosthetic hip fracture  Follows Commands: Within

## 2024-07-13 NOTE — PROGRESS NOTES
Physical Therapy    Name: Tasha Bowers  MRN: [id number]  Date of service: 7/13/2024 07/13/24 1559   General   Diagnosis Right periprosthetic hip fracture   General Comment   Comments Pt in bed   Subjective   Subjective agrees to therapy   Subjective   Subjective it hurts   Pain did not rate, seems moderate to severe with mobility   Bed mobility   Supine to Sit Moderate assistance   Sit to Supine Moderate assistance   Transfers   Sit to Stand Moderate Assistance   Stand to Sit Moderate Assistance   Comment therapist placed foot under pts foot, placed too much weight through RLE when standing, returned to sitting EOB   Ambulation   WB Status TTWB RLE   Ambulation   Comments not appropriate at this time due to TTWB   Short Term Goals   Time Frame for Short Term Goals 2 weeks   Short Term Goal 1 Transfer supine to edge of bed with minimal assist   Short Term Goal 2 Transfer sit to stand with minimal assist   Short Term Goal 3 Transfer bed to chair with minimal assist   Activity Tolerance   Activity Tolerance Patient limited by pain   Assessment   Assessment Pt had pillow under arm that was soaked upon entry. Found that pts IV was removed from arm and bag fluid was leaking into bed. Changed gown during session. Nurse stated she will start new IV when therapy was finished.  Requiring mod assist with all mobility. Pt is TTWB and is having difficulty understanding that and is not maintaining it. Therapist placed foot under pts foot during stand. Too much weightbear, returned pt to bed. Left pt in bed with all needs in reach. Nurse present to start new IV.   Discharge Recommendations Subacute/Skilled Nursing Facility;Long Term Care with PT   Physical Therapy Plan   Days Per Week 7 Days   Therapy Duration 2 Weeks   Current Treatment Recommendations Strengthening;Functional mobility training;Balance training;Therapeutic activities;Safety education & training;Patient/Caregiver education & training;Gait training   Additional

## 2024-07-13 NOTE — ED NOTES
ED TO INPATIENT SBAR HANDOFF    Patient Name: Tasha Bowers   : 2/10/1928  89 y.o.   Family/Caregiver Present: Yes (Son, POA)  Code Status Order: No Order    C-SSRS: Risk of Suicide: No Risk  Sitter No  Restraints: No      Situation  Chief Complaint:   Chief Complaint   Patient presents with    Fall     Fall w pain in tight hip and head injury; unsure of loc - hospice pt     Patient Diagnosis: Closed right hip fracture, initial encounter ([facility]) [S72.001A]     Brief Description of Patient's Condition: Tasha Bowers is a 89 year-old female who presents to the emergency department after a fall. Patient fell approximately 3 hours ago and has been unable to ambulate since the fall. Patient complains of right hip pain. She is currently on hospice. She had 10 mg of morphine prior to arrival. XRAY showed an oblique fracture of the proximal right femur which appears acute in nature. Comminuted right humerus fracture. Chest XR showed pulmonary edema and right greater than left bilateral pleural effusions. Pt's Hgb 7.1, RBC 3.61. Patient ordered 1 unit of RBC. Patient does not typically wear O2, but was placed on 2L due to 85% on RA.   Mental Status: A&O X4  Arrived from: Home    Imaging:   XR CHEST PORTABLE   Final Result   Impression:   1.  Cardiomegaly with pulmonary edema and right greater than left bilateral pleural effusions.   2.  Comminuted right humerus fracture           ______________________________________    Electronically signed by: BO ROE M.D.   Date:     2024   Time:    18:59       CT HEAD WO CONTRAST   Final Result   Impression:   1.  No acute intracranial process.   2.  Atrophy and chronic small vessel ischemic disease.   3.  Right periorbital soft tissue swelling        All CT scans are performed using dose optimization techniques as appropriate to the performed exam and include    at least one of the following: Automated exposure control, adjustment of the mA and/or kV according to  BUN 24 (*)     Creatinine 1.4 (*)     Est, Glom Filt Rate 34 (*)     Total Protein 6.1 (*)     Albumin 3.2 (*)     All other components within normal limits   PROTIME-INR - Abnormal; Notable for the following components:    Protime 17.0 (*)     INR 1.42 (*)     All other components within normal limits     Background  Allergies:   Allergies   Allergen Reactions    Erythromycin Shortness Of Breath     Current Medications:     History:   Past Medical History:   Diagnosis Date    Arthritis     Hyperlipidemia     Hypertension     Thyroid disease        Assessment  Vitals: Level of Consciousness: Alert (0)   Vitals:    07/12/24 1830 07/12/24 1840 07/12/24 1845 07/12/24 1900   BP: (!) 124/57      Pulse: 82 86 82 79   Resp: 18 20 20 19   Temp:       SpO2: 90% (!) 85% 92% 97%     Predictive Model Details          41 (Caution)  Factor Value    Calculated 7/12/2024 19:33 35% Age 89 years old    Deterioration Index Model 27% Supplemental oxygen Nasal cannula     16% [place] coma scale 14     10% Respiratory rate 19     7% Hematocrit abnormal (26.8 %)     2% BUN abnormal (24 mg/dL)     2% Systolic 124     0% Temperature 97.1 °F (36.2 °C)     0% Pulse oximetry 97 %     0% Sodium 140 mmol/L     0% Pulse 79     0% WBC count 7.5 K/uL     0% Potassium 3.9 mmol/L       NPO? No  O2 Flow Rate: O2 Device: Nasal cannula O2 Flow Rate (L/min): 2 L/min  Cardiac Rhythm:   NIH Score: NIH     Active LDA's:   Peripheral IV 07/12/24 Right Antecubital (Active)   Site Assessment Clean, dry & intact;Clean;Dry;Intact 07/12/24 1857   Line Status Blood return noted;Brisk blood return;Flushed;Normal saline locked;Specimen collected 07/12/24 1857   Phlebitis Assessment No symptoms 07/12/24 1857   Infiltration Assessment 0 07/12/24 1857   Dressing Status New dressing applied 07/12/24 1857   Dressing Type Transparent 07/12/24 1857       Peripheral IV 07/12/24 Left;Proximal Antecubital (Active)     Pertinent or High Risk Medications/Drips: No   If Yes,  please provide details: N/A  Blood Product Administration: Yes  If Yes, please provide details: Will update RN  Sepsis Risk Score      Admitted with Sepsis? No    Recommendation  Incomplete orders:   Patient Belongings: Bagged and with pt   Additional Comments: Will need melani tree and IV pole and pump   If any further questions, please call Sending RN at [phone]    Electronically signed by: Electronically signed by Mary Beth Saez RN on 7/12/2024 at 7:33 PM

## 2024-07-13 NOTE — PROGRESS NOTES
[facility]    Progress Note    Patient:  Tasha Bowers  YOB: 1928  Date of Service: 7/13/2024  MRN: [id number]   Acct: [id number]   Primary Care Physician: Gay Pena DO  Advance Directive: DNR  Admit Date: 7/12/2024       Hospital Day: 1    Portions of this note have been copied forward, however, updated to reflect the most current clinical status of this patient.     CHIEF COMPLAINT: Fall    SUBJECTIVE: Denies CP, SOB. 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  Patient also had some anemia in ER with hemoglobin 7.1.  She was transfused 1 unit PRBCs.  Other lab work stable.  Patient admitted to hospitalist with consult to orthopedic surgery.  Dr. Tracy evaluated the patient and recommend 1 to 2 days of physical therapy and proceeding with surgery only if ongoing or worsening pain due to high risk surgery with her multiple comorbidities.    Objective   VITALS:  BP (!) 116/53   Pulse 94   Temp 98.2 °F (36.8 °C) (Temporal)   Resp 16   Ht 1.6 m (5' 3\")   Wt 49.4 kg (109 lb)   SpO2 93%   BMI 19.31 kg/m²     24HR INTAKE/OUTPUT:    Intake/Output Summary (Last 24 hours) at 7/13/2024 1144  Last data filed at 7/13/2024 1021  Gross per 24 hour   Intake 1903 ml   Output 600 ml   Net 1303 ml       Physical Exam  Constitutional:       Appearance: She is underweight. She is ill-appearing (Chronic).   Cardiovascular:      Rate and Rhythm: Normal rate and regular rhythm.   Pulmonary:      Effort: Pulmonary effort is normal. No respiratory distress.

## 2024-07-13 NOTE — PLAN OF CARE
Problem: Discharge Planning  Goal: Discharge to home or other facility with appropriate resources  Outcome: Progressing     Problem: Safety - Adult  Goal: Free from fall injury  Outcome: Progressing     Problem: ABCDS Injury Assessment  Goal: Absence of physical injury  Outcome: Progressing     Problem: Skin/Tissue Integrity  Goal: Absence of new skin breakdown  Description: 1.  Monitor for areas of redness and/or skin breakdown  2.  Assess vascular access sites hourly  3.  Every 4-6 hours minimum:  Change oxygen saturation probe site  4.  Every 4-6 hours:  If on nasal continuous positive airway pressure, respiratory therapy assess nares and determine need for appliance change or resting period.  Outcome: Progressing     Problem: Pain  Goal: Verbalizes/displays adequate comfort level or baseline comfort level  Outcome: Progressing     Problem: Confusion  Goal: Confusion, delirium, dementia, or psychosis is improved or at baseline  Description: INTERVENTIONS:  1. Assess for possible contributors to thought disturbance, including medications, impaired vision or hearing, underlying metabolic abnormalities, dehydration, psychiatric diagnoses, and notify attending LIP  2. [place] high risk fall precautions, as indicated  3. Provide frequent short contacts to provide reality reorientation, refocusing and direction  4. Decrease environmental stimuli, including noise as appropriate  5. Monitor and intervene to maintain adequate nutrition, hydration, elimination, sleep and activity  6. If unable to ensure safety without constant attention obtain sitter and review sitter guidelines with assigned personnel  7. Initiate Psychosocial CNS and Spiritual Care consult, as indicated  Outcome: Progressing     Problem: Chronic Conditions and Co-morbidities  Goal: Patient's chronic conditions and co-morbidity symptoms are monitored and maintained or improved  Outcome: Progressing

## 2024-07-13 NOTE — CONSULTS
Orthopaedic Inpatient Consultation    NAME:  Tasha Bowers   : 2/10/1928  MRN: [id number]    2024  5:02 PM        CHIEF COMPLAINT:  right hip pain      HISTORY OF PRESENT ILLNESS:   The patient is a 89 y.o. female with dementia, severe anemia, recent history of kidney and liver failure and hospice care at home who presents with the above complaint after a fall yesterday at home.  Patient had a right hip replaced in  by Dr. Lopes.  Did well after surgery.  This was an anterior approach.  She has had no problems with the hip.  Yesterday she stumbled in the dining room.  She was found unconscious.  She had bruising around the right side of her face.  Inability to bear weight.  Severe right hip pain.  Admitted to the hospital and x-rays show a periprosthetic right proximal femur fracture with probable loosening of the femoral component.  Past Medical History:        Diagnosis Date    Arthritis     CHF (congestive heart failure) (HCC)     Cognitive impairment     Depression     Hyperlipidemia     Hypertension     Thyroid disease        Past Surgical History:        Procedure Laterality Date    BREAST SURGERY  biopsy     SECTION  2 times    HC INJECT OTHER PERPHRL NERV Right 4/15/2016    INTRA ARTICULAR HIP INJECTION  performed by Steve Lopes MD at [facility] OR       Current Medications:   Prior to Admission medications    Medication Sig Start Date End Date Taking? Authorizing Provider   carvedilol (COREG) 3.125 MG tablet Take 1 tablet by mouth 2 times daily (with meals)   Yes ProviderPatsy MD   escitalopram (LEXAPRO) 10 MG tablet Take 1 tablet by mouth daily   Yes Patsy Weeks MD   levothyroxine (SYNTHROID) 125 MCG tablet Take 1 tablet by mouth Daily   Yes Patsy Weeks MD   furosemide (LASIX) 20 MG tablet Take 1 tablet by mouth 2 times daily   Yes Patsy Weeks MD   colestipol (COLESTID) 1 g tablet Take 1 tablet by mouth every other day   Yes Provider  single frontal view of the pelvis.  There are postoperative changes of a right total arthroplasty.  There is a oblique fracture through the proximal right femoral metaphysis.  This appears acute nature.  Recommend comparison prior studies.  There is a fracture of the inferior pubic ramus on the right and an old fracture on the left.  There are degenerative changes of the spine.  There are no lytic or blastic lesions.   The soft tissues are normal.      1.    Postoperative changes of a right total arthroplasty. 2.  Oblique fracture the proximal right femur which appears acute nature.  Recommend comparison to prior studies. 3.  Old fracture of the inferior pubic ramus bilaterally.   ______________________________________ Electronically signed by: AJAY PARKER M.D. Date:     07/12/2024 Time:    17:38     XR FEMUR RIGHT (MIN 2 VIEWS)    Result Date: 7/12/2024  EXAM:  TWO VIEWS OF THE RIGHT FEMUR.  History:  Right leg trauma.      Impression: Moderately displaced periprosthetic fracture of the proximal right femur.  No dislocation.  Atherosclerotic vascular calcifications.   ______________________________________ Electronically signed by: BO ROE M.D. Date:     07/12/2024 Time:    17:38       Assessment:   Right periprosthetic proximal femur fracture with likely a loose femoral component.  Options would be to see how she does with therapy over the next day or 2, or surgery.  Surgery would consist of open reduction internal fixation or more likely femoral revision of the right hip.  At her age and medical condition this would be a superhigh risk procedure.  She is already severely anemic.  Would probably end up needing 4 units of blood at minimum.  I explained this procedure in detail.  It would be a lateral incision.  If we are able to fix the fracture she would need to be nonweightbearing for 8 weeks minimum.  If we do a femoral revision she may still need to be toe-touch weightbearing for several weeks.  She

## 2024-07-13 NOTE — PROGRESS NOTES
PHYSICAL THERAPY  Daily Treatment Note    DATE:  2024  NAME:  Tasha Bowers  :  2/10/1928  (89 y.o.,female)  MRN:  [id number]      Subjective  General  Chart Reviewed: Yes  Patient assessed for rehabilitation services?: Yes  Diagnosis: Right periprosthetic hip fracture  Follows Commands: Within Functional Limits  Subjective  Subjective: Agrees to work with therapy          PAIN    [x] No Pain    [] Patient rates pain at ___ / 10 - Nursing was notified    HOME LIVING:       RESTRICTIONS/PRECAUTIONS:         OVERALL  ORIENTATION STATUS:  Overall Orientation Status: Within Functional Limits    STRENGTH  Strength RLE  Comment: Not tested  Strength LLE  Comment: Grossly 3+/5    ROM   PROM RLE (degrees)  RLE General PROM: Not tested  PROM LLE (degrees)  LLE PROM: WFL     BALANCE  Balance  Posture: Good  Sitting - Static: Good  Sitting - Dynamic: Good    BED MOBILITY  Bed Mobility  Scooting: Moderate assistance    TRANSFERS  Transfers  Sit to Stand: Moderate Assistance  Stand to Sit: Moderate Assistance  Bed to Chair: Unable to assess (Working within pain tolerance)    AMBULATION  Device: Rolling Walker  Assistance: Unable to assess       STAIRS       TREATMENT FOCUS THIS VISIT    [] Gait training  [x] Transfer training  [x] Bed mobility  [] Lower extremity exercise  [x] Safety and education    COMMENTS:  Returned to bed at patient request  Call light within reach  Bed alarm activated  Patient instructed to request assistance before getting up  Nursing updated        Electronically signed by Christiano Carreno PT on 2024 at 10:57 AM

## 2024-07-14 PROCEDURE — 97530 THERAPEUTIC ACTIVITIES: CPT

## 2024-07-14 PROCEDURE — 2580000003 HC RX 258

## 2024-07-14 PROCEDURE — 94760 N-INVAS EAR/PLS OXIMETRY 1: CPT

## 2024-07-14 PROCEDURE — 6370000000 HC RX 637 (ALT 250 FOR IP)

## 2024-07-14 PROCEDURE — 2700000000 HC OXYGEN THERAPY PER DAY

## 2024-07-14 PROCEDURE — 1200000000 HC SEMI PRIVATE

## 2024-07-14 PROCEDURE — 6360000002 HC RX W HCPCS

## 2024-07-14 RX ADMIN — ACETAMINOPHEN 1000 MG: 500 TABLET ORAL at 13:53

## 2024-07-14 RX ADMIN — DONEPEZIL HYDROCHLORIDE 5 MG: 5 TABLET, FILM COATED ORAL at 21:11

## 2024-07-14 RX ADMIN — SODIUM CHLORIDE 125 MG: 9 INJECTION, SOLUTION INTRAVENOUS at 13:54

## 2024-07-14 RX ADMIN — Medication 1 TABLET: at 08:42

## 2024-07-14 RX ADMIN — ESCITALOPRAM OXALATE 10 MG: 10 TABLET ORAL at 08:42

## 2024-07-14 RX ADMIN — TRAMADOL HYDROCHLORIDE 50 MG: 50 TABLET ORAL at 05:37

## 2024-07-14 RX ADMIN — CARVEDILOL 3.12 MG: 3.12 TABLET, FILM COATED ORAL at 08:42

## 2024-07-14 RX ADMIN — SODIUM CHLORIDE, PRESERVATIVE FREE 10 ML: 5 INJECTION INTRAVENOUS at 21:11

## 2024-07-14 RX ADMIN — ACETAMINOPHEN 1000 MG: 500 TABLET ORAL at 05:36

## 2024-07-14 RX ADMIN — POLYETHYLENE GLYCOL 3350 17 G: 17 POWDER, FOR SOLUTION ORAL at 08:42

## 2024-07-14 RX ADMIN — ACETAMINOPHEN 1000 MG: 500 TABLET ORAL at 21:10

## 2024-07-14 RX ADMIN — LEVOTHYROXINE SODIUM 125 MCG: 25 TABLET ORAL at 05:36

## 2024-07-14 RX ADMIN — GABAPENTIN 100 MG: 100 CAPSULE ORAL at 21:10

## 2024-07-14 ASSESSMENT — PAIN DESCRIPTION - LOCATION: LOCATION: HIP;LEG;KNEE

## 2024-07-14 ASSESSMENT — PAIN SCALES - WONG BAKER
WONGBAKER_NUMERICALRESPONSE: NO HURT
WONGBAKER_NUMERICALRESPONSE: NO HURT

## 2024-07-14 ASSESSMENT — PAIN DESCRIPTION - ORIENTATION: ORIENTATION: RIGHT

## 2024-07-14 ASSESSMENT — PAIN DESCRIPTION - DESCRIPTORS: DESCRIPTORS: ACHING;DISCOMFORT

## 2024-07-14 ASSESSMENT — PAIN SCALES - GENERAL: PAINLEVEL_OUTOF10: 0

## 2024-07-14 NOTE — PROGRESS NOTES
Physical Therapy    Name: Tasha Bowers  MRN: [id number]  Date of service: 7/14/2024 07/14/24 1230   General   Diagnosis Right periprosthetic hip fracture   General Comment   Comments Pt in bed, slightly confused   Subjective   Subjective agrees to therapy, repeating herself and wearing O2 as a headband   Subjective   Pain in pain with movements   Bed mobility   Supine to Sit Moderate assistance   Bed Mobility Comments needed therapist hands to pull up trunk and assist with getting hips to EOB   Transfers   Stand Pivot Transfers Maximum Assistance   Comment transfer pt to recliner by stand pivot.   Ambulation   Comments not appropriate at this time due to TTWB   Short Term Goals   Time Frame for Short Term Goals 2 weeks   Short Term Goal 1 Transfer supine to edge of bed with minimal assist   Short Term Goal 2 Transfer sit to stand with minimal assist   Short Term Goal 3 Transfer bed to chair with minimal assist   Activity Tolerance   Activity Tolerance Patient limited by pain;Patient limited by endurance   Assessment   Assessment Pt is confused today, wearing her O2 like a headband. Corrected several times during session. Mod pacheco with bed mobility, max assist to stand pivot transfer to the recliner. Max assist to scoot pt back in recliner using pad. Left pt comfortable in chair with all needs in reach. Did not want lunch table in front of her. Son present with call light. Notified nursing and changed linens on bed.   Discharge Recommendations Subacute/Skilled Nursing Facility;Long Term Care with PT   Physical Therapy Plan   Days Per Week 7 Days   Therapy Duration 2 Weeks   Current Treatment Recommendations Strengthening;Functional mobility training;Balance training;Therapeutic activities;Safety education & training;Patient/Caregiver education & training;Gait training   Additional Comments Dr. chun BARRERA for transfers only   PT Plan of Care   Sunday X   Safety Devices   Type of Devices Left in chair;Call light  within reach;Nurse notified;Heels elevated for pressure relief  (son present with call light)   PT Whiteboard Notes   Therapy Whiteboard Periprosthetic R hip fracture, TTWB, Argelia, RE: 7/27   Recommendation   Requires PT Follow-Up Yes           Electronically signed by Gavino Bryant PTA on 7/14/2024 at 1:09 PM

## 2024-07-14 NOTE — PROGRESS NOTES
[facility]    Progress Note    Patient:  Tasha Bowers  YOB: 1928  Date of Service: 7/14/2024  MRN: [id number]   Acct: [id number]   Primary Care Physician: Gay Pena DO  Advance Directive: DNR  Admit Date: 7/12/2024       Hospital Day: 2    Portions of this note have been copied forward, however, updated to reflect the most current clinical status of this patient.     CHIEF COMPLAINT: Fall    SUBJECTIVE: Denies CP, SOB. 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  Patient also had some anemia in ER with hemoglobin 7.1.  She was transfused 1 unit PRBCs.  Other lab work stable.  Patient admitted to hospitalist with consult to orthopedic surgery.  Dr. Tracy evaluated the patient and recommend 1 to 2 days of physical therapy and proceeding with surgery only if ongoing or worsening pain due to high risk surgery with her multiple comorbidities. Should pain worsen or not improve, plan is for tentative right hip revision on Tuesday.    Objective   VITALS:  BP (!) 93/48   Pulse 76   Temp 96.8 °F (36 °C) (Temporal)   Resp 16   Ht 1.6 m (5' 3\")   Wt 49.4 kg (109 lb)   SpO2 90%   BMI 19.31 kg/m²     24HR INTAKE/OUTPUT:    Intake/Output Summary (Last 24 hours) at 7/14/2024 1135  Last data filed at 7/14/2024 0530  Gross per 24 hour   Intake 1640 ml   Output 1000 ml   Net 640 ml         Physical Exam  Constitutional:       Appearance: She is underweight. She is ill-appearing (Chronic).   Cardiovascular:      Rate and Rhythm: Normal rate and regular rhythm.   Right periorbital soft tissue swelling  All CT scans are performed using dose optimization techniques as appropriate to the performed exam and include at least one of the following: Automated exposure control, adjustment of the mA and/or kV according to size, and the use of iterative reconstruction technique.  ______________________________________ Electronically signed by: BO ROE M.D. Date:     07/12/2024 Time:    18:56     XR PELVIS (1-2 VIEWS)    Result Date: 7/12/2024  EXAM:  PELVIS, SINGLE VIEW.  HISTORY:  Pelvic pain.  Fall.  Right hip pain.  COMPARISON:  None.  FINDINGS:  A single frontal view of the pelvis.  There are postoperative changes of a right total arthroplasty.  There is a oblique fracture through the proximal right femoral metaphysis.  This appears acute nature.  Recommend comparison prior studies.  There is a fracture of the inferior pubic ramus on the right and an old fracture on the left.  There are degenerative changes of the spine.  There are no lytic or blastic lesions.   The soft tissues are normal.      1.    Postoperative changes of a right total arthroplasty. 2.  Oblique fracture the proximal right femur which appears acute nature.  Recommend comparison to prior studies. 3.  Old fracture of the inferior pubic ramus bilaterally.   ______________________________________ Electronically signed by: AJAY PARKER M.D. Date:     07/12/2024 Time:    17:38     XR FEMUR RIGHT (MIN 2 VIEWS)    Result Date: 7/12/2024  EXAM:  TWO VIEWS OF THE RIGHT FEMUR.  History:  Right leg trauma.      Impression: Moderately displaced periprosthetic fracture of the proximal right femur.  No dislocation.  Atherosclerotic vascular calcifications.   ______________________________________ Electronically signed by: BO ROE M.D. Date:     07/12/2024 Time:    17:38       Culture Results:    No results for input(s): \"CXSURG\" in the last 720 hours.    Blood Culture Recent: No results for input(s): \"BC\"

## 2024-07-14 NOTE — PROGRESS NOTES
Subjective:     Hospital day 2: She did pretty well with therapy yesterday.  Was able to get to the edge of the bed.  She stood.    Objective:     Patient Vitals for the past 24 hrs:   BP Temp Temp src Pulse Resp SpO2   07/14/24 0815 (!) 93/48 96.8 °F (36 °C) Temporal 76 16 90 %   07/14/24 0708 -- -- -- -- -- 90 %   07/14/24 0607 -- -- -- -- 16 --   07/14/24 0404 (!) 116/56 97.2 °F (36.2 °C) Temporal 90 16 91 %   07/13/24 2130 -- -- -- 74 -- --   07/13/24 1927 (!) 98/52 97.3 °F (36.3 °C) Temporal 74 16 90 %   07/13/24 1555 118/62 98.2 °F (36.8 °C) Temporal (!) 114 16 95 %       General: Alert pleasant   Wound: N/A   Neurovascular: Exam normal   DVT Exam: negative         Data Review:  Recent Labs     07/12/24 1810 07/13/24  0121   HGB 7.1* 8.0*     Recent Labs     07/12/24  1810 07/13/24  0121    140   K 3.9 3.9   CREATININE 1.4* 1.3*   GLUCOSE 142* 125*   INR 1.42*  --      No results for input(s): \"POCGLU\" in the last 72 hours.        Assessment:     Right periprosthetic proximal femur fracture with probable loose femoral component.  High risk for surgery due to her age and severe anemia.  Plan:   Continue therapy  Pain control  Possible revision right hip on Tuesday if not tolerating therapy.

## 2024-07-14 NOTE — PROGRESS NOTES
Physical Therapy    Name: Tasha Bowers  MRN: [id number]  Date of service: 7/14/2024    Attempted to get pt up for lunch. Pt is sound asleep and family member is in the recliner. Will continue to follow.      Electronically signed by Gavino Bryant PTA on 7/14/2024 at 12:02 PM

## 2024-07-15 PROBLEM — N17.9 AKI (ACUTE KIDNEY INJURY) (HCC): Status: ACTIVE | Noted: 2024-07-15

## 2024-07-15 LAB
ACANTHOCYTES BLD QL SMEAR: ABNORMAL
ANION GAP SERPL CALCULATED.3IONS-SCNC: 18 MMOL/L (ref 7–19)
ANISOCYTOSIS BLD QL SMEAR: ABNORMAL
BASOPHILS # BLD: 0.1 K/UL (ref 0–0.2)
BASOPHILS NFR BLD: 0.7 % (ref 0–1)
BUN SERPL-MCNC: 34 MG/DL (ref 8–23)
CALCIUM SERPL-MCNC: 8.8 MG/DL (ref 8.2–9.6)
CHLORIDE SERPL-SCNC: 99 MMOL/L (ref 98–111)
CO2 SERPL-SCNC: 18 MMOL/L (ref 22–29)
CREAT SERPL-MCNC: 2.4 MG/DL (ref 0.5–0.9)
DACRYOCYTES BLD QL SMEAR: ABNORMAL
EOSINOPHIL # BLD: 0.3 K/UL (ref 0–0.6)
EOSINOPHIL NFR BLD: 3.1 % (ref 0–5)
ERYTHROCYTE [DISTWIDTH] IN BLOOD BY AUTOMATED COUNT: 23.8 % (ref 11.5–14.5)
GLUCOSE SERPL-MCNC: 99 MG/DL (ref 74–109)
HCT VFR BLD AUTO: 29.5 % (ref 37–47)
HGB BLD-MCNC: 7.5 G/DL (ref 12–16)
HYPOCHROMIA BLD QL SMEAR: ABNORMAL
IMM GRANULOCYTES # BLD: 0 K/UL
LYMPHOCYTES # BLD: 2.3 K/UL (ref 1.1–4.5)
LYMPHOCYTES NFR BLD: 26.3 % (ref 20–40)
MCH RBC QN AUTO: 21.6 PG (ref 27–31)
MCHC RBC AUTO-ENTMCNC: 25.4 G/DL (ref 33–37)
MCV RBC AUTO: 85 FL (ref 81–99)
MONOCYTES # BLD: 1.1 K/UL (ref 0–0.9)
MONOCYTES NFR BLD: 11.9 % (ref 0–10)
NEUTROPHILS # BLD: 5.1 K/UL (ref 1.5–7.5)
NEUTS SEG NFR BLD: 57.5 % (ref 50–65)
PLATELET # BLD AUTO: 148 K/UL (ref 130–400)
PMV BLD AUTO: 10.4 FL (ref 9.4–12.3)
POLYCHROMASIA BLD QL SMEAR: ABNORMAL
POTASSIUM SERPL-SCNC: 4.9 MMOL/L (ref 3.5–5)
RBC # BLD AUTO: 3.47 M/UL (ref 4.2–5.4)
SODIUM SERPL-SCNC: 135 MMOL/L (ref 136–145)
TARGETS BLD QL SMEAR: ABNORMAL
WBC # BLD AUTO: 8.8 K/UL (ref 4.8–10.8)

## 2024-07-15 PROCEDURE — 2580000003 HC RX 258

## 2024-07-15 PROCEDURE — 6370000000 HC RX 637 (ALT 250 FOR IP)

## 2024-07-15 PROCEDURE — 97530 THERAPEUTIC ACTIVITIES: CPT

## 2024-07-15 PROCEDURE — 85025 COMPLETE CBC W/AUTO DIFF WBC: CPT

## 2024-07-15 PROCEDURE — 80048 BASIC METABOLIC PNL TOTAL CA: CPT

## 2024-07-15 PROCEDURE — 2700000000 HC OXYGEN THERAPY PER DAY

## 2024-07-15 PROCEDURE — 1200000000 HC SEMI PRIVATE

## 2024-07-15 PROCEDURE — 6360000002 HC RX W HCPCS

## 2024-07-15 PROCEDURE — 97165 OT EVAL LOW COMPLEX 30 MIN: CPT

## 2024-07-15 PROCEDURE — 36415 COLL VENOUS BLD VENIPUNCTURE: CPT

## 2024-07-15 PROCEDURE — 94760 N-INVAS EAR/PLS OXIMETRY 1: CPT

## 2024-07-15 RX ORDER — FERROUS SULFATE 325(65) MG
325 TABLET ORAL 2 TIMES DAILY WITH MEALS
Status: DISCONTINUED | OUTPATIENT
Start: 2024-07-16 | End: 2024-07-17 | Stop reason: HOSPADM

## 2024-07-15 RX ORDER — 0.9 % SODIUM CHLORIDE 0.9 %
500 INTRAVENOUS SOLUTION INTRAVENOUS ONCE
Status: COMPLETED | OUTPATIENT
Start: 2024-07-15 | End: 2024-07-15

## 2024-07-15 RX ORDER — BISACODYL 10 MG
10 SUPPOSITORY, RECTAL RECTAL DAILY PRN
Status: DISCONTINUED | OUTPATIENT
Start: 2024-07-15 | End: 2024-07-17 | Stop reason: HOSPADM

## 2024-07-15 RX ORDER — DOCUSATE SODIUM 100 MG/1
100 CAPSULE, LIQUID FILLED ORAL DAILY
Status: DISCONTINUED | OUTPATIENT
Start: 2024-07-15 | End: 2024-07-17 | Stop reason: HOSPADM

## 2024-07-15 RX ORDER — POLYETHYLENE GLYCOL 3350 17 G/17G
17 POWDER, FOR SOLUTION ORAL DAILY
Status: DISCONTINUED | OUTPATIENT
Start: 2024-07-15 | End: 2024-07-15

## 2024-07-15 RX ADMIN — ESCITALOPRAM OXALATE 10 MG: 10 TABLET ORAL at 09:01

## 2024-07-15 RX ADMIN — TRAMADOL HYDROCHLORIDE 50 MG: 50 TABLET ORAL at 21:42

## 2024-07-15 RX ADMIN — ACETAMINOPHEN 1000 MG: 500 TABLET ORAL at 05:01

## 2024-07-15 RX ADMIN — ACETAMINOPHEN 1000 MG: 500 TABLET ORAL at 21:41

## 2024-07-15 RX ADMIN — POLYETHYLENE GLYCOL 3350 17 G: 17 POWDER, FOR SOLUTION ORAL at 09:01

## 2024-07-15 RX ADMIN — GABAPENTIN 100 MG: 100 CAPSULE ORAL at 21:42

## 2024-07-15 RX ADMIN — Medication 1 TABLET: at 09:01

## 2024-07-15 RX ADMIN — SODIUM CHLORIDE, PRESERVATIVE FREE 10 ML: 5 INJECTION INTRAVENOUS at 09:01

## 2024-07-15 RX ADMIN — SODIUM CHLORIDE 125 MG: 9 INJECTION, SOLUTION INTRAVENOUS at 11:50

## 2024-07-15 RX ADMIN — LEVOTHYROXINE SODIUM 125 MCG: 25 TABLET ORAL at 05:01

## 2024-07-15 RX ADMIN — DOCUSATE SODIUM 100 MG: 100 CAPSULE, LIQUID FILLED ORAL at 13:36

## 2024-07-15 RX ADMIN — SODIUM CHLORIDE 500 ML: 9 INJECTION, SOLUTION INTRAVENOUS at 15:23

## 2024-07-15 RX ADMIN — DONEPEZIL HYDROCHLORIDE 5 MG: 5 TABLET, FILM COATED ORAL at 21:42

## 2024-07-15 RX ADMIN — TRAMADOL HYDROCHLORIDE 50 MG: 50 TABLET ORAL at 05:01

## 2024-07-15 RX ADMIN — SODIUM CHLORIDE: 9 INJECTION, SOLUTION INTRAVENOUS at 11:47

## 2024-07-15 ASSESSMENT — PAIN SCALES - GENERAL: PAINLEVEL_OUTOF10: 3

## 2024-07-15 ASSESSMENT — PAIN SCALES - WONG BAKER
WONGBAKER_NUMERICALRESPONSE: HURTS A LITTLE BIT
WONGBAKER_NUMERICALRESPONSE: HURTS A LITTLE BIT

## 2024-07-15 ASSESSMENT — PAIN DESCRIPTION - DESCRIPTORS: DESCRIPTORS: ACHING;DISCOMFORT

## 2024-07-15 ASSESSMENT — PAIN DESCRIPTION - ORIENTATION: ORIENTATION: RIGHT

## 2024-07-15 ASSESSMENT — PAIN DESCRIPTION - LOCATION: LOCATION: LEG

## 2024-07-15 ASSESSMENT — ENCOUNTER SYMPTOMS
GASTROINTESTINAL NEGATIVE: 1
RESPIRATORY NEGATIVE: 1

## 2024-07-15 NOTE — PROGRESS NOTES
[facility]      Patient:  Tasha Bowers  YOB: 1928  Date of Service: 7/15/2024  MRN: [id number]   Acct: [id number]   Primary Care Physician: Gay Pena DO  Advance Directive: DNR  Admit Date: 7/12/2024       Hospital Day: 3  Portions of this note have been copied forward, however, changed to reflect the most current clinical status of this patient.    CHIEF COMPLAINT fall    SUBJECTIVE:   no complaints of pain, denies chest pain or shortness of breath     Cumulative hospital stay:  89 year old female with combined systolic and diastolic heart failure, PVD, CAD, HTN, hyperlipidemia, hypothyroidism, with complaints of fall.  Patient had a mechanical fall at home, she was unable to bear weight to her right hip and found to have a moderately displaced periprosthetic fracture of right proximal right femur along with right humeral fracture. Found to be anemic Hgb 7.1 no signs of bleeding.  Received 1 unit PRBC.  Patient was admitted to hospital service with consultation to orthopedic surgery.  Patient able to stand with therapy.  Pain controlled.  High risk for surgery nonsurgical candidate plan for home with hospice versus SNF rehab.  Patient to consider and discussed with family today.  Review of Systems:   Review of Systems   Constitutional:  Positive for activity change and fatigue.   HENT: Negative.     Respiratory: Negative.     Cardiovascular: Negative.    Gastrointestinal: Negative.    Genitourinary: Negative.    Musculoskeletal:  Positive for arthralgias and gait problem.   Neurological:  Positive for weakness.       14 point review of systems is negative except as specifically addressed above.      Objective:   VITALS:  BP (!) 111/53   Pulse 66   Temp 97.3 °F (36.3 °C) (Temporal)   Resp 18   Ht 1.6 m (5' 3\")   Wt 49.4 kg (109 lb)   SpO2 97%   BMI 19.31 kg/m²   24HR INTAKE/OUTPUT:    Intake/Output Summary (Last 24 hours) at 7/15/2024 1413  Last data filed at 7/15/2024  noted   -Ferrous gluconate X3 dose    Ferrous sulfate twice daily    Monitor CBC     Hyperlipidemia       Thyroid disease   TSH with reflex 3.7   Synthroid     Depression   Lexapro     CHF (congestive heart failure)    -ECHO 3/24 EF 26-30%, grade 2 diastolic dysfunction    No signs of overload    Holding Lasix, Coreg given hypotension   PVD    Resume DAPT given no plan for O.R and no signs of bleeding    Antibiotic: n/a     DVT Prophylaxis: SCD      Sekou Grayson, APRN - CNP, 7/15/2024 2:13 PM

## 2024-07-15 NOTE — PLAN OF CARE
Problem: Confusion  Goal: Confusion, delirium, dementia, or psychosis is improved or at baseline  Description: INTERVENTIONS:  1. Assess for possible contributors to thought disturbance, including medications, impaired vision or hearing, underlying metabolic abnormalities, dehydration, psychiatric diagnoses, and notify attending LIP  2. [place] high risk fall precautions, as indicated  3. Provide frequent short contacts to provide reality reorientation, refocusing and direction  4. Decrease environmental stimuli, including noise as appropriate  5. Monitor and intervene to maintain adequate nutrition, hydration, elimination, sleep and activity  6. If unable to ensure safety without constant attention obtain sitter and review sitter guidelines with assigned personnel  7. Initiate Psychosocial CNS and Spiritual Care consult, as indicated  Outcome: Not Progressing  Flowsheets  Taken 7/15/2024 0840 by Sandra Sparks LPN  Effect of thought disturbance (confusion, delirium, dementia, or psychosis) are managed with adequate functional status: Assess for contributors to thought disturbance, including medications, impaired vision or hearing, underlying metabolic abnormalities, dehydration, psychiatric diagnoses, notify LIP  Taken 7/15/2024 0535 by Alesha Stern, RN  Effect of thought disturbance (confusion, delirium, dementia, or psychosis) are managed with adequate functional status: Assess for contributors to thought disturbance, including medications, impaired vision or hearing, underlying metabolic abnormalities, dehydration, psychiatric diagnoses, notify LIP     Problem: Chronic Conditions and Co-morbidities  Goal: Patient's chronic conditions and co-morbidity symptoms are monitored and maintained or improved  Outcome: Not Progressing  Flowsheets (Taken 7/15/2024 0840)  Care Plan - Patient's Chronic Conditions and Co-Morbidity Symptoms are Monitored and Maintained or Improved: Monitor and assess patient's chronic

## 2024-07-15 NOTE — PROGRESS NOTES
Occupational Therapy  Facility/Department: [facility] SURG SERVICES  Occupational Therapy Initial Assessment    Name: Tasha Bowers  : 2/10/1928  MRN: [id number]  Date of Service: 7/15/2024    Discharge Recommendations:             Patient Diagnosis(es): The primary encounter diagnosis was Other fracture of right femur, initial encounter for closed fracture (HCC). Diagnoses of Fall, initial encounter and Anemia, unspecified type were also pertinent to this visit.  Past Medical History:  has a past medical history of Arthritis, CHF (congestive heart failure) (HCC), Cognitive impairment, Depression, Hyperlipidemia, Hypertension, and Thyroid disease.  Past Surgical History:  has a past surgical history that includes Breast surgery (biopsy);  section (2 times); and hc inject other perphrl nerv (Right, 4/15/2016).    Treatment Diagnosis: Periprosthetic R hip fx (non-surgical)      Assessment   Performance deficits / Impairments: Decreased functional mobility ;Decreased endurance;Decreased ADL status;Decreased safe awareness;Decreased cognition;Decreased balance  Assessment: Pt. unable to maintain TTWB on RLE.  Will progress as tolerated  Treatment Diagnosis: Periprosthetic R hip fx (non-surgical)  Prognosis: Good  Decision Making: Low Complexity  REQUIRES OT FOLLOW-UP: Yes  Activity Tolerance  Activity Tolerance: Patient Tolerated treatment well        Plan   Occupational Therapy Plan  Times Per Week: 3-5x/week  Times Per Day: Once a day     Restrictions  Restrictions/Precautions  Restrictions/Precautions: Weight Bearing  Lower Extremity Weight Bearing Restrictions  Right Lower Extremity Weight Bearing: Toe Touch Weight Bearing  Partial Weight Bearing Percentage Or Pounds: Per MD., pt. can WBAT just for transfers.    Subjective   General  Chart Reviewed: Yes  Patient assessed for rehabilitation services?: Yes  Family / Caregiver Present: Yes (Daughter)  General Comment  Comments: Pt. agreeable to getting up OOB      Social/Functional History  Social/Functional History  Lives With: Daughter  Type of Home: House  ADL Assistance: Needs assistance  Ambulation Assistance: Independent  Transfer Assistance: Independent       Objective   Temp: 97.7 °F (36.5 °C)  Pulse: 72  Heart Rate Source: Monitor  Respirations: 20  SpO2: 94 %  O2 Device: None (Room air)  BP: (!) 91/49  MAP (Calculated): 63  BP Location: Left upper arm  Patient Position: Supine                      AROM: Within functional limits  Strength: Within functional limits  ADL  Feeding: Independent  Grooming: Supervision  UE Bathing: Supervision  LE Bathing: Maximum assistance  UE Dressing: Supervision  LE Dressing: Dependent/Total  Toileting: Dependent/Total  Additional Comments: Difficulty with standing ADLs  Skin Care: Chlorhexidine solution        Bed mobility  Supine to Sit: Minimal assistance  Transfers  Stand Pivot Transfers: Moderate assistance;2 Person assistance (Handheld Assist.  Attempted pivot tfer but pt. wound up stepping two steps to turn and sit in recliner.)  Sit to stand: Minimal assistance;2 Person assistance  Vision  Vision: Impaired  Vision Exceptions: Wears glasses at all times  Hearing  Hearing: Within functional limits  Cognition  Overall Cognitive Status: WFL  Orientation  Overall Orientation Status: Within Functional Limits                                           G-Code     OutComes Score                                                  AM-PAC - ADL       Tinneti Score       Goals  Short Term Goals  Time Frame for Short Term Goals: 1 week  Short Term Goal 1: [place] of 1 bed to [facility] [place]fers  Short Term Goal 2: Tolerate standing 30 seconds to assist with self care  Short Term Goal 3: Mod A to manage clothing in standing for toileting  Long Term Goals  Long Term Goal 1: Return to PLOF       Therapy Time   Individual Concurrent Group Co-treatment   Time In           Time Out           Minutes                   Hill Dozier, OT

## 2024-07-15 NOTE — PROGRESS NOTES
Physical Therapy    Name: Tasha Bowers  MRN: [id number]  Date of service: 7/15/2024         07/15/24 1015   Restrictions/Precautions   Restrictions/Precautions Weight Bearing   Lower Extremity Weight Bearing Restrictions   Right Lower Extremity Weight Bearing Toe Touch Weight Bearing   Partial Weight Bearing Percentage Or Pounds Per MD, pt can WB for transfers only   General   Diagnosis Right periprosthetic hip fracture   General Comment   Comments Pt in bed, slightly confused   Subjective   Subjective agrees to therapy, wearing O2 between eyes this session, family present   Subjective   Subjective no complaints   Pain pain with movement   Bed mobility   Supine to Sit Minimal assistance   Transfers   Sit to Stand Moderate Assistance   Stand to Sit Moderate Assistance   Comment 2 small steps to recliner, patient seems to be WBing 50-75 percent during transfer   Ambulation   WB Status TTWB RLE   Short Term Goals   Time Frame for Short Term Goals 2 weeks   Short Term Goal 1 Transfer supine to edge of bed with minimal assist   Short Term Goal 2 Transfer sit to stand with minimal assist   Short Term Goal 3 Transfer bed to chair with minimal assist   Activity Tolerance   Activity Tolerance Patient limited by pain;Treatment limited secondary to decreased cognition   Assessment   Assessment Pt is less confused today. Gave pt extra time to get EOB. Required min assist with scooting hips. Mod assist to stand and take 2 steps to recliner. Patient seems to be WBing 50-75% during transfer. Left pt comfortable in recliner with all needs in reach. Family present   Discharge Recommendations Subacute/Skilled Nursing Facility;Long Term Care with PT   Physical Therapy Plan   Days Per Week 7 Days   Therapy Duration 2 Weeks   Current Treatment Recommendations Strengthening;Functional mobility training;Balance training;Therapeutic activities;Safety education & training;Patient/Caregiver education & training;Gait training   Additional

## 2024-07-15 NOTE — ACP (ADVANCE CARE PLANNING)
Advance Care Planning     Advance Care Planning Clinical Specialist  Conversation Note      Date of ACP Conversation: 7/15/2024    Conversation Conducted with:  Deb Daughter, MIKO, Belia,son.  No ACP documents on chart.  Deb states she will bring them in to have placed on Pt's chart.    ACP Clinical Specialist: Carmen Erwin RN      Healthcare Decision Maker:   Deb Singh    Current Designated Healthcare Decision Maker:     Primary Decision Maker (Active): DEB SINGH - Child - [phone]    Secondary Decision Maker: BELIA KIMBLE - Child - [phone]    Care Preferences    Hospitalization:  \"If your health worsens and it becomes clear that your chance of recovery is unlikely, what would your preference be regarding hospitalization?\"    Choice:  [x] The patient wants hospitalization.  [] The patient prefers comfort-focused treatment without hospitalization.    Ventilation:  \"If you were in your present state of health and suddenly became very ill and were unable to breathe on your own, what would your preference be about the use of a ventilator (breathing machine) if it were available to you?\"      If the patient would desire the use of ventilator (breathing machine), answer \"yes\".  No    \"If your health worsens and it becomes clear that your chance of recovery is unlikely, what would your preference be about the use of a ventilator (breathing machine) if it were available to you?\"     Would the patient desire the use of ventilator (breathing machine)?:  No      Resuscitation  \"CPR works best to restart the heart when there is a sudden event, like a heart attack, in someone who is otherwise healthy. Unfortunately, CPR does not typically restart the heart for people who have serious health conditions or who are very sick.\"    \"In the event your heart stopped as a result of an underlying serious health condition, would you want attempts to be made to restart your heart (answer \"yes\" for attempt to  resuscitate) or would you prefer a natural death (answer \"no\" for do not attempt to resuscitate)?\"  No

## 2024-07-15 NOTE — CONSULTS
Palliative Care:          Patient is new to Palliative and consulted for support, goals of care, and to review ACP.  Patient admitted to ED due to fall with Right hip pain and head injury and not able to ambulate.  Patient was sitting up in bed with PT at bedside to transfer Pt to chair.  Pt's daughter, Sue, her  and Pt's son, Benny at bedside.  Pt family stepped out to quinn to allow PT to visit and work with Pt.  Per family Pt's confusion had been getting worse with some irritation at times.  Patient had been ambulatory until recent fall.  Pt had been in [facility] in April for Rehab due to Fx of Left Arm and had been living with Sue and her  with home hospice support.  Pt's family state they are not sure they are going to be able to care for Pt upon discharge especially if she is no longer ambulatory.  Questions were answered regarding returning home or to SNF with hospice.   Pt is currently receiving PT and there is currently no pending discharge date.  Family are very supportive; however, Benny is in his 70s and Sue's  may have to hospitalized for Chemotherapy Tx for 5 weeks.  Family informed Palliative will continue to follow for support and goals of care.    Past Medical History:        Past Medical History:   Diagnosis Date    Arthritis     CHF (congestive heart failure) (HCC)     Cognitive impairment     Depression     Hyperlipidemia     Hypertension     Thyroid disease        Advance Directives:      Family state they have documents and will bring them in to have placed on Pt's chart.  Pt is a DNR.        Pain/Other Symptoms:     Pt has some pain with PRN medication in place.    How are symptoms affecting QOL:    Pt has been able to transfer to chair with PT.          Psychological/Spiritual:   Pt is alert with confusion, is of [employer] adilia.                  Plan:     Family are unsure of plan after discharge stating it depends on Pt's level of activity upon discharge.

## 2024-07-15 NOTE — PROGRESS NOTES
Subjective:     Hospital day 3: She did pretty well with therapy again yesterday.  Was able to get to the edge of the bed.  She stood.    Objective:     Patient Vitals for the past 24 hrs:   BP Temp Temp src Pulse Resp SpO2   07/15/24 0531 -- -- -- -- 14 --   07/15/24 0332 97/87 97.5 °F (36.4 °C) Temporal 78 -- --   07/14/24 2130 -- -- -- 74 -- --   07/14/24 1929 (!) 104/49 97 °F (36.1 °C) Temporal 74 14 94 %   07/14/24 1643 (!) 94/52 97.2 °F (36.2 °C) Temporal 70 16 96 %   07/14/24 1231 91/61 97 °F (36.1 °C) Temporal 85 16 91 %   07/14/24 0815 (!) 93/48 96.8 °F (36 °C) Temporal 76 16 90 %   07/14/24 0708 -- -- -- -- -- 90 %         General: Alert pleasant   Wound: N/A   Neurovascular: Exam normal   DVT Exam: negative         Data Review:  Recent Labs     07/13/24  0121 07/15/24  0145   HGB 8.0* 7.5*       Recent Labs     07/12/24  1810 07/13/24  0121 07/15/24  0145      < > 135*   K 3.9   < > 4.9   CREATININE 1.4*   < > 2.4*   GLUCOSE 142*   < > 99   INR 1.42*  --   --     < > = values in this interval not displayed.       No results for input(s): \"POCGLU\" in the last 72 hours.        Assessment:     Right periprosthetic proximal femur fracture with probable loose femoral component.  High risk for surgery due to her age and severe anemia, and chronic kidney disease  Plan:   Continue therapy  Pain control  Discharge plan for home with hospice or SNF transfer

## 2024-07-15 NOTE — PROGRESS NOTES
Physician Progress Note      PATIENT:               JOANN KIMBLE  CSN #:                  [id number]  :                       2/10/1928  ADMIT DATE:       2024 5:02 PM  DISCH DATE:  RESPONDING  PROVIDER #:        Mike Kraft MPH MD MPH          QUERY TEXT:    Pt admitted with periprosthetic fracture right femur. Pt noted to have rise in   creatinine. If possible, please document in the progress notes and discharge   summary if you are evaluating and/or treating any of the following:    The medical record reflects the following:  Risk Factors: HTN, anemia, PVD, HX CKD.  Clinical Indicators: Creatinine beginning @ admission, 1.4 1.3 2.4  Treatment: 0.9% NS @ 75, 0.9%  ml bolus.    Defined by Kidney Disease Improving Global Outcomes (KDIGO) clinical practice   guideline for acute kidney injury:  -Increase in SCr by greater than or equal to 0.3 mg/dl within 48 hours; or  -Increase or decrease in SCr to greater than or equal to 1.5 times baseline,   which is known or presumed to have occurred within the prior 7 days; or  -Urine volume < 0.5ml/kg/h for 6 hours  Options provided:  -- Acute kidney failure  -- Acute kidney injury  -- Other - I will add my own diagnosis  -- Disagree - Not applicable / Not valid  -- Disagree - Clinically unable to determine / Unknown  -- Refer to Clinical Documentation Reviewer    PROVIDER RESPONSE TEXT:    This patient has an Acute kidney injury.    Query created by: Yumiko Mcneal on 7/15/2024 4:40 PM      Electronically signed by:  Mike Kraft MPH MD MPH 7/15/2024   4:46 PM

## 2024-07-15 NOTE — CARE COORDINATION
[facility]  Notification of Admission Decision      [] Patient has been accepted for admit to [facility] on :       Please write discharge orders and summary prior to discharge.    [] Patient acceptance to Rehab pending the following :    [] Eval in progress       [x] Patient determined to be ineligible for services at [facility] because : too low level with therapy, feel she will need a longer stay than we can provide.      We recommend you consider: SNF        Thank you for your referral, we appreciate you. If you have any questions, please feel   free to contact me at [phone].  Electronically Signed by Gracie Ulrich, Admissions Coordinator 7/15/2024 1:43 PM

## 2024-07-16 ENCOUNTER — APPOINTMENT (OUTPATIENT)
Dept: CT IMAGING | Age: 89
DRG: 536 | End: 2024-07-16
Payer: MEDICARE

## 2024-07-16 ENCOUNTER — APPOINTMENT (OUTPATIENT)
Dept: ULTRASOUND IMAGING | Age: 89
DRG: 536 | End: 2024-07-16
Payer: MEDICARE

## 2024-07-16 VITALS
HEIGHT: 63 IN | HEART RATE: 69 BPM | SYSTOLIC BLOOD PRESSURE: 119 MMHG | TEMPERATURE: 96.8 F | DIASTOLIC BLOOD PRESSURE: 54 MMHG | OXYGEN SATURATION: 92 % | RESPIRATION RATE: 14 BRPM | BODY MASS INDEX: 19.31 KG/M2 | WEIGHT: 109 LBS

## 2024-07-16 PROBLEM — N18.9 ACUTE KIDNEY INJURY SUPERIMPOSED ON CKD (HCC): Status: ACTIVE | Noted: 2024-07-15

## 2024-07-16 LAB
ACANTHOCYTES BLD QL SMEAR: ABNORMAL
ALLENS TEST: ABNORMAL
ANION GAP SERPL CALCULATED.3IONS-SCNC: 13 MMOL/L (ref 7–19)
ANISOCYTOSIS BLD QL SMEAR: ABNORMAL
BASE EXCESS ARTERIAL: -0.8 MMOL/L (ref -2–2)
BASE EXCESS ARTERIAL: -1.3 MMOL/L (ref -2–2)
BASOPHILS # BLD: 0 K/UL (ref 0–0.2)
BASOPHILS NFR BLD: 0.4 % (ref 0–1)
BI-LEVEL POS AIRWAY PRESSURE(EXPIRATORY): 8
BI-LEVEL POS AIRWAY PRESSURE(INSPIRATORY): 12
BNP BLD-MCNC: ABNORMAL PG/ML (ref 0–449)
BUN SERPL-MCNC: 41 MG/DL (ref 8–23)
CALCIUM SERPL-MCNC: 9 MG/DL (ref 8.2–9.6)
CARBOXYHEMOGLOBIN ARTERIAL: 3.5 % (ref 0–5)
CARBOXYHEMOGLOBIN ARTERIAL: 3.7 % (ref 0–5)
CHLORIDE SERPL-SCNC: 99 MMOL/L (ref 98–111)
CO2 SERPL-SCNC: 24 MMOL/L (ref 22–29)
CREAT SERPL-MCNC: 2.8 MG/DL (ref 0.5–0.9)
CREAT UR-MCNC: 183.3 MG/DL (ref 28–217)
DACRYOCYTES BLD QL SMEAR: ABNORMAL
EKG P AXIS: 40 DEGREES
EKG P AXIS: 85 DEGREES
EKG P-R INTERVAL: 156 MS
EKG P-R INTERVAL: 184 MS
EKG Q-T INTERVAL: 402 MS
EKG Q-T INTERVAL: 430 MS
EKG QRS DURATION: 152 MS
EKG QRS DURATION: 152 MS
EKG QTC CALCULATION (BAZETT): 458 MS
EKG QTC CALCULATION (BAZETT): 466 MS
EKG T AXIS: 123 DEGREES
EKG T AXIS: 125 DEGREES
EOSINOPHIL # BLD: 0.2 K/UL (ref 0–0.6)
EOSINOPHIL NFR BLD: 1.7 % (ref 0–5)
ERYTHROCYTE [DISTWIDTH] IN BLOOD BY AUTOMATED COUNT: 23.5 % (ref 11.5–14.5)
GLUCOSE SERPL-MCNC: 86 MG/DL (ref 74–109)
HCO3 ARTERIAL: 25.7 MMOL/L (ref 22–26)
HCO3 ARTERIAL: 26 MMOL/L (ref 22–26)
HCT VFR BLD AUTO: 26.6 % (ref 37–47)
HEMOGLOBIN, ART, EXTENDED: 7.5 G/DL (ref 12–16)
HEMOGLOBIN, ART, EXTENDED: 7.9 G/DL (ref 12–16)
HGB BLD-MCNC: 7.4 G/DL (ref 12–16)
HYPOCHROMIA BLD QL SMEAR: ABNORMAL
IMM GRANULOCYTES # BLD: 0.1 K/UL
LYMPHOCYTES # BLD: 1.5 K/UL (ref 1.1–4.5)
LYMPHOCYTES NFR BLD: 15.8 % (ref 20–40)
MCH RBC QN AUTO: 22.1 PG (ref 27–31)
MCHC RBC AUTO-ENTMCNC: 27.8 G/DL (ref 33–37)
MCV RBC AUTO: 79.4 FL (ref 81–99)
METHEMOGLOBIN ARTERIAL: 0.9 %
METHEMOGLOBIN ARTERIAL: 1.2 %
MICROCYTES BLD QL SMEAR: ABNORMAL
MODE: ABNORMAL
MONOCYTES # BLD: 1.1 K/UL (ref 0–0.9)
MONOCYTES NFR BLD: 11.7 % (ref 0–10)
NEUTROPHILS # BLD: 6.4 K/UL (ref 1.5–7.5)
NEUTS SEG NFR BLD: 69.7 % (ref 50–65)
O2 CONTENT ARTERIAL: 9.5 ML/DL
O2 CONTENT ARTERIAL: 9.6 ML/DL
O2 DELIVERY DEVICE: ABNORMAL
O2 SAT, ARTERIAL: 84.7 %
O2 SAT, ARTERIAL: 90.3 %
O2 THERAPY: ABNORMAL
O2 THERAPY: ABNORMAL
OXYGEN FLOW: 2
OXYGEN FLOW: 2
PCO2 ARTERIAL: 54 MMHG (ref 35–45)
PCO2 ARTERIAL: 56 MMHG (ref 35–45)
PH ARTERIAL: 7.27 (ref 7.35–7.45)
PH ARTERIAL: 7.29 (ref 7.35–7.45)
PLATELET # BLD AUTO: 194 K/UL (ref 130–400)
PMV BLD AUTO: 10.2 FL (ref 9.4–12.3)
PO2 ARTERIAL: 52 MMHG (ref 80–100)
PO2 ARTERIAL: 63 MMHG (ref 80–100)
POLYCHROMASIA BLD QL SMEAR: ABNORMAL
POTASSIUM BLD-SCNC: 5.1 MMOL/L
POTASSIUM BLD-SCNC: 5.2 MMOL/L
POTASSIUM SERPL-SCNC: 5.2 MMOL/L (ref 3.5–5)
RBC # BLD AUTO: 3.35 M/UL (ref 4.2–5.4)
SAMPLE SOURCE: ABNORMAL
SAMPLE SOURCE: ABNORMAL
SODIUM SERPL-SCNC: 136 MMOL/L (ref 136–145)
SODIUM UR-SCNC: <20 MMOL/L
SPONT RATE(BPM): 16
TARGETS BLD QL SMEAR: ABNORMAL
WBC # BLD AUTO: 9.2 K/UL (ref 4.8–10.8)

## 2024-07-16 PROCEDURE — 51798 US URINE CAPACITY MEASURE: CPT

## 2024-07-16 PROCEDURE — 84300 ASSAY OF URINE SODIUM: CPT

## 2024-07-16 PROCEDURE — 82570 ASSAY OF URINE CREATININE: CPT

## 2024-07-16 PROCEDURE — 76770 US EXAM ABDO BACK WALL COMP: CPT

## 2024-07-16 PROCEDURE — 51702 INSERT TEMP BLADDER CATH: CPT

## 2024-07-16 PROCEDURE — 6370000000 HC RX 637 (ALT 250 FOR IP)

## 2024-07-16 PROCEDURE — 1200000000 HC SEMI PRIVATE

## 2024-07-16 PROCEDURE — 97530 THERAPEUTIC ACTIVITIES: CPT

## 2024-07-16 PROCEDURE — 2580000003 HC RX 258

## 2024-07-16 PROCEDURE — 6360000002 HC RX W HCPCS: Performed by: INTERNAL MEDICINE

## 2024-07-16 PROCEDURE — 94660 CPAP INITIATION&MGMT: CPT

## 2024-07-16 PROCEDURE — 36600 WITHDRAWAL OF ARTERIAL BLOOD: CPT

## 2024-07-16 PROCEDURE — 2700000000 HC OXYGEN THERAPY PER DAY

## 2024-07-16 PROCEDURE — 85025 COMPLETE CBC W/AUTO DIFF WBC: CPT

## 2024-07-16 PROCEDURE — 71250 CT THORAX DX C-: CPT

## 2024-07-16 PROCEDURE — 83880 ASSAY OF NATRIURETIC PEPTIDE: CPT

## 2024-07-16 PROCEDURE — 80048 BASIC METABOLIC PNL TOTAL CA: CPT

## 2024-07-16 PROCEDURE — 82803 BLOOD GASES ANY COMBINATION: CPT

## 2024-07-16 PROCEDURE — 36415 COLL VENOUS BLD VENIPUNCTURE: CPT

## 2024-07-16 PROCEDURE — 93010 ELECTROCARDIOGRAM REPORT: CPT | Performed by: INTERNAL MEDICINE

## 2024-07-16 RX ORDER — SODIUM CHLORIDE 9 MG/ML
INJECTION, SOLUTION INTRAVENOUS CONTINUOUS
Status: DISCONTINUED | OUTPATIENT
Start: 2024-07-16 | End: 2024-07-16

## 2024-07-16 RX ORDER — BUMETANIDE 0.25 MG/ML
1 INJECTION INTRAMUSCULAR; INTRAVENOUS ONCE
Status: COMPLETED | OUTPATIENT
Start: 2024-07-16 | End: 2024-07-16

## 2024-07-16 RX ORDER — MORPHINE SULFATE 2 MG/ML
0.5 INJECTION, SOLUTION INTRAMUSCULAR; INTRAVENOUS
Status: DISCONTINUED | OUTPATIENT
Start: 2024-07-16 | End: 2024-07-17 | Stop reason: HOSPADM

## 2024-07-16 RX ORDER — 0.9 % SODIUM CHLORIDE 0.9 %
1000 INTRAVENOUS SOLUTION INTRAVENOUS ONCE
Status: COMPLETED | OUTPATIENT
Start: 2024-07-16 | End: 2024-07-16

## 2024-07-16 RX ADMIN — BUMETANIDE 1 MG: 0.25 INJECTION INTRAMUSCULAR; INTRAVENOUS at 17:47

## 2024-07-16 RX ADMIN — ASPIRIN 81 MG: 81 TABLET, COATED ORAL at 09:33

## 2024-07-16 RX ADMIN — DOCUSATE SODIUM 100 MG: 100 CAPSULE, LIQUID FILLED ORAL at 09:33

## 2024-07-16 RX ADMIN — SODIUM CHLORIDE, PRESERVATIVE FREE 10 ML: 5 INJECTION INTRAVENOUS at 09:37

## 2024-07-16 RX ADMIN — ESCITALOPRAM OXALATE 10 MG: 10 TABLET ORAL at 09:33

## 2024-07-16 RX ADMIN — Medication 1 TABLET: at 09:33

## 2024-07-16 RX ADMIN — POLYETHYLENE GLYCOL 3350 17 G: 17 POWDER, FOR SOLUTION ORAL at 09:33

## 2024-07-16 RX ADMIN — FERROUS SULFATE TAB 325 MG (65 MG ELEMENTAL FE) 325 MG: 325 (65 FE) TAB at 09:33

## 2024-07-16 RX ADMIN — SODIUM CHLORIDE 1000 ML: 9 INJECTION, SOLUTION INTRAVENOUS at 09:39

## 2024-07-16 RX ADMIN — MORPHINE SULFATE 0.5 MG: 2 INJECTION, SOLUTION INTRAMUSCULAR; INTRAVENOUS at 20:18

## 2024-07-16 RX ADMIN — SODIUM CHLORIDE: 9 INJECTION, SOLUTION INTRAVENOUS at 13:13

## 2024-07-16 RX ADMIN — CLOPIDOGREL BISULFATE 75 MG: 75 TABLET ORAL at 09:33

## 2024-07-16 RX ADMIN — LEVOTHYROXINE SODIUM 125 MCG: 25 TABLET ORAL at 05:16

## 2024-07-16 RX ADMIN — ACETAMINOPHEN 1000 MG: 500 TABLET ORAL at 05:16

## 2024-07-16 ASSESSMENT — PAIN DESCRIPTION - LOCATION: LOCATION: LEG

## 2024-07-16 ASSESSMENT — PAIN - FUNCTIONAL ASSESSMENT: PAIN_FUNCTIONAL_ASSESSMENT: PREVENTS OR INTERFERES SOME ACTIVE ACTIVITIES AND ADLS

## 2024-07-16 ASSESSMENT — PAIN DESCRIPTION - ORIENTATION: ORIENTATION: RIGHT

## 2024-07-16 ASSESSMENT — PAIN DESCRIPTION - FREQUENCY: FREQUENCY: INTERMITTENT

## 2024-07-16 ASSESSMENT — PAIN SCALES - WONG BAKER: WONGBAKER_NUMERICALRESPONSE: HURTS LITTLE MORE

## 2024-07-16 ASSESSMENT — PAIN DESCRIPTION - PAIN TYPE: TYPE: ACUTE PAIN

## 2024-07-16 NOTE — PROGRESS NOTES
[facility]      Patient:  Tasha Bowers  YOB: 1928  Date of Service: 7/16/2024  MRN: [id number]   Acct: [id number]   Primary Care Physician: Gay Pena DO  Advance Directive: DNR  Admit Date: 7/12/2024       Hospital Day: 4  Portions of this note have been copied forward, however, changed to reflect the most current clinical status of this patient.    CHIEF COMPLAINT fall     SUBJECTIVE:   no complaints of pain, denies chest pain or shortness of breath      Cumulative hospital stay:  89 year old female with combined systolic and diastolic heart failure, PVD, CAD, HTN, CKD 3a, hyperlipidemia, hypothyroidism, with complaints of fall.  Patient had a mechanical fall at home, she was unable to bear weight to her right hip and found to have a moderately displaced periprosthetic fracture of right proximal right femur along with right humeral fracture. Found to be anemic Hgb 7.1 no signs of bleeding.  Received 1 unit PRBC.  Patient was admitted to hospital service with consultation to orthopedic surgery.  Patient able to stand with therapy.  Pain controlled.  High risk for surgery nonsurgical candidate plan for home with hospice versus SNF rehab. Discussed with family plan for SNF referral. Worsening DERRELL on CKD. IVF initiated and renal u/s ordered.   Review of Systems:   Review of Systems   Reason unable to perform ROS: asleep.   All other systems reviewed and are negative.      14 point review of systems is negative except as specifically addressed above.      Objective:   VITALS:  BP (!) 120/57   Pulse 73   Temp 97.5 °F (36.4 °C) (Temporal)   Resp 12   Ht 1.6 m (5' 3\")   Wt 49.4 kg (109 lb)   SpO2 90%   BMI 19.31 kg/m²   24HR INTAKE/OUTPUT:    Intake/Output Summary (Last 24 hours) at 7/16/2024 1105  Last data filed at 7/16/2024 0519  Gross per 24 hour   Intake 637 ml   Output 250 ml   Net 387 ml       Physical Exam  Vitals and nursing note reviewed.   Constitutional:        oriented to person, place and time not situation. ABG ordered acute respiratory hypercapnic and hypoxemic failure. BIPAP ordered and patient to be transferred to unit. Held sedative medications, neuro checks. Attempted to call daughterSue no answer.     Antibiotic: n/a      DVT Prophylaxis: HEATHER Grayson, APRN - CNP, 7/16/2024 11:05 AM

## 2024-07-16 NOTE — PROGRESS NOTES
Physical Therapy  Name: Tasha Bowers  MRN:  [id number]  Date of service:  7/16/2024 07/16/24 1350   Restrictions/Precautions   Restrictions/Precautions Weight Bearing   Lower Extremity Weight Bearing Restrictions   Right Lower Extremity Weight Bearing Toe Touch Weight Bearing   Subjective   Subjective Pt agreed to therapy.   Pain Assessment   Pain Assessment Garcia-Baker FACES   Garcia-Baker Pain Rating 4   Pain Location Leg   Pain Orientation Right   Pain Descriptors Squeezing;Patient unable to describe   Functional Pain Assessment Prevents or interferes some active activities and ADLs   Pain Type Acute pain   Pain Frequency Intermittent   Non-Pharmaceutical Pain Intervention(s) Ambulation/Increased Activity;Repositioned;Rest   Response to Pain Intervention Patient satisfied   Bed Mobility   Supine to Sit Maximal assistance  (x2)   Sit to Supine Maximal assistance  (x2)   Comment sat EOB several minutes working on sitting balance, very drowsy overall   Other Activities   Comment gentle stretching to neck and worked on sitting in midline; positioning of LE's with pillows and towel rolls to promote good alignment   Short Term Goals   Time Frame for Short Term Goals 2 weeks   Short Term Goal 1 Transfer supine to edge of bed with minimal assist   Short Term Goal 2 Transfer sit to stand with minimal assist   Short Term Goal 3 Transfer bed to chair with minimal assist   Conditions Requiring Skilled Therapeutic Intervention   Body Structures, Functions, Activity Limitations Requiring Skilled Therapeutic Intervention Decreased functional mobility    Assessment Pt able to work on sitting EOB but more drowsy today and unable to maintain balance for more than a short time. Mostly max assist for balance with occasional CGA for short periods. Assisted back to bed, unable to stand due to alertness.   Activity Tolerance   Activity Tolerance Treatment limited secondary to decreased cognition   PT Plan of Care   Tuesday X   Safety

## 2024-07-16 NOTE — PROGRESS NOTES
Subjective:     Hospital day 4: She did pretty well with therapy again yesterday.  Was able to get to the edge of the bed.  She stood.    Objective:     Patient Vitals for the past 24 hrs:   BP Temp Temp src Pulse Resp SpO2   07/16/24 0519 (!) 104/51 97.7 °F (36.5 °C) Temporal 78 20 90 %   07/15/24 1912 (!) 121/55 97.5 °F (36.4 °C) Temporal 70 18 92 %   07/15/24 1643 (!) 102/48 97 °F (36.1 °C) Temporal 69 18 92 %   07/15/24 1130 (!) 111/53 97.3 °F (36.3 °C) Temporal 66 18 97 %   07/15/24 0840 (!) 91/49 -- -- -- -- --   07/15/24 0834 (!) 89/46 97.7 °F (36.5 °C) Temporal 72 20 94 %         General: Alert pleasant   Wound: N/A   Neurovascular: Exam normal   DVT Exam: negative    Right hip externally rotated.  Minimal pain with gentle range of motion.     Data Review:  Recent Labs     07/15/24  0145 07/16/24  0139   HGB 7.5* 7.4*       Recent Labs     07/16/24  0140      K 5.2*   CREATININE 2.8*   GLUCOSE 86       No results for input(s): \"POCGLU\" in the last 72 hours.        Assessment:     Right periprosthetic proximal femur fracture with probable loose femoral component.  High risk for surgery due to her age and severe anemia, and chronic kidney disease  Plan:   Continue therapy  Pain control  Discharge plan for home with hospice or SNF transfer  Touchdown weightbearing

## 2024-07-16 NOTE — PROGRESS NOTES
Occupational Therapy  Facility/Department: [facility] SURG SERVICES  Daily Treatment Note  NAME: Tasha Bowers  : 2/10/1928  MRN: [id number]    Date of Service: 2024    Discharge Recommendations:  Patient would benefit from continued therapy after discharge       Assessment   Performance deficits / Impairments: Decreased functional mobility ;Decreased endurance;Decreased ADL status;Decreased safe awareness;Decreased cognition;Decreased balance  Assessment: Pt willing to participate, but very lethargic. Pt unable to maintain sitting balance and stay alert enough to attempt standing. Pt was limited by cognition and pain.   Pt continues to benefit from skilled OT services.  Treatment Diagnosis: Periprosthetic R hip fx (non-surgical)  Prognosis: Good  Activity Tolerance  Activity Tolerance: Treatment limited secondary to decreased cognition;Patient limited by pain         Patient Diagnosis(es): The primary encounter diagnosis was Other fracture of right femur, initial encounter for closed fracture (HCC). Diagnoses of Fall, initial encounter and Anemia, unspecified type were also pertinent to this visit.      has a past medical history of Arthritis, CHF (congestive heart failure) (HCC), Cognitive impairment, Depression, Hyperlipidemia, Hypertension, and Thyroid disease.   has a past surgical history that includes Breast surgery (biopsy);  section (2 times); and hc inject other perphrl nerv (Right, 4/15/2016).    Restrictions  Restrictions/Precautions  Restrictions/Precautions: Weight Bearing  Lower Extremity Weight Bearing Restrictions  Right Lower Extremity Weight Bearing: Toe Touch Weight Bearing  Partial Weight Bearing Percentage Or Pounds: Per MD., pt. can WBAT just for transfers.  Subjective   General  Chart Reviewed: Yes  Patient assessed for rehabilitation services?: Yes  Response to previous treatment: Patient with no complaints from previous session  Family / Caregiver Present: Yes (Daughter, son-in-law

## 2024-07-16 NOTE — PROGRESS NOTES
1600 PATIENT GOING DOWN FOR TESTS BIPAP AND PULSE OX SETUP IN ROOM TILL PATIENTS RETURN FROM [facility].

## 2024-07-16 NOTE — PROGRESS NOTES
Patient family preset at bedside. Discussed goals of care, and contacted Dr. Anderson to speak to patient family at bedside, states he will come. Patient previously received hospice care at home and they may be interested in resuming hospice measures but are unsure at this time. Called hospice unit and they are notifying the intake nurse.

## 2024-07-17 PROBLEM — I27.20 PULMONARY HTN (HCC): Status: ACTIVE | Noted: 2024-07-17

## 2024-07-17 PROBLEM — R62.51 FAILURE TO THRIVE (CHILD): Status: ACTIVE | Noted: 2024-07-17

## 2024-07-17 PROBLEM — I51.89 GRADE II DIASTOLIC DYSFUNCTION: Status: ACTIVE | Noted: 2024-07-17

## 2024-07-17 PROBLEM — J96.22 ACUTE ON CHRONIC RESPIRATORY FAILURE WITH HYPOXIA AND HYPERCAPNIA (HCC): Status: ACTIVE | Noted: 2024-07-17

## 2024-07-17 PROBLEM — S42.309A HUMERUS FRACTURE: Status: ACTIVE | Noted: 2024-07-17

## 2024-07-17 PROBLEM — I60.9 SUBARACHNOID HEMORRHAGE (HCC): Status: ACTIVE | Noted: 2023-06-30

## 2024-07-17 PROBLEM — M97.8XXA PERIPROSTHETIC FRACTURE AROUND INTERNAL PROSTHETIC HIP JOINT: Status: ACTIVE | Noted: 2024-07-17

## 2024-07-17 PROBLEM — F03.90 DEMENTIA (HCC): Status: ACTIVE | Noted: 2024-07-17

## 2024-07-17 PROBLEM — I50.22 CHRONIC SYSTOLIC HEART FAILURE (HCC): Status: ACTIVE | Noted: 2024-07-17

## 2024-07-17 PROBLEM — I34.0 NONRHEUMATIC MITRAL VALVE REGURGITATION: Status: ACTIVE | Noted: 2024-07-17

## 2024-07-17 PROBLEM — I42.9 CARDIOMYOPATHY (HCC): Status: ACTIVE | Noted: 2024-07-17

## 2024-07-17 PROBLEM — E43 SEVERE MALNUTRITION (HCC): Status: ACTIVE | Noted: 2024-04-02

## 2024-07-17 PROBLEM — J96.21 ACUTE ON CHRONIC RESPIRATORY FAILURE WITH HYPOXIA AND HYPERCAPNIA (HCC): Status: ACTIVE | Noted: 2024-07-17

## 2024-07-17 PROBLEM — Z96.649 PERIPROSTHETIC FRACTURE AROUND INTERNAL PROSTHETIC HIP JOINT: Status: ACTIVE | Noted: 2024-07-17

## 2024-07-17 NOTE — CONSULTS
Sent Dr. Vasquez information obtained during earlier GIP evaluation, he then called 5th floor and spoke to KRERI Faust.  Dr. Vasquez okayed [facility] GIP admission.  This nurse spoke with pt daughter Sue Houser to discuss transfer to [facility].  Hospice services will send admission paperwork to [facility] daughter Sue via e-mail to sign electronically. Informed her to call  if any questions re: paperwork.   Hospital staff will work on discharge from hospital so we can admit under hospice services.  KERRI Faust aware of process.

## 2024-07-17 NOTE — PROGRESS NOTES
Report received from Casie, regarding updates of care through her shift. Patient decline with respiratory status, decrease in urine output, bolus, Bumex. Family at bedside. Spoke with family about any concerns. Kraus catheter placed. ABGs drawn. Hospice nurse at bedside at 2018. Dr Vasquez called this nurse at 2123. Hospice nurse returned, 2159. Spoke with Ivonne at 2155. Leigh returned at 259. Dr. Payan notified of discharge and acceptance to hospice at 2208. Respiratory notified of transfer with bipap at 2255. Report given to Mary at 2318.

## 2024-07-17 NOTE — CONSULTS
Visit requested to evaluate for GIP admission to hospice.  Pt was previously a hospice pt prior to fall and fracture of proximal femur and family is requesting that pt be readmitted to hospice.  Dr. Tracy, orthopedics, states in today's note, 7/16/24,  pt is high risk for surgery due to age, severe anemia, and chronic kidney disease.  Pt has been receiving PT and OT. She had been able to sit at edge of bed but today she has been max assist due to alertness.  Pt has had recent decline requiring Bipap, respirations have been 8 and 10 respectively with last sets of vital signs.  Pt has been made NPO due to responsiveness.  Pt had several medications for varying levels of pain: Tylenol, Tramodol, and Morphine.  Tylenol is scheduled q8h, while tramadol and morphine are PRN.  Tramadol has been used approx 1-2x/day, and morphine has been used once until today where dose has been given 2x.  No anxiety, nausea/vomiting being treated for symptom management.  Currently pt pain regimen and bipap can be managed on in the outpt setting.  At this time pt does not qualify for inpt hospice services.  Hospice will re-evaluate tomorrow for any change in condition or upon discharge to home or SNF.

## 2024-07-17 NOTE — DISCHARGE SUMMARY
Discharge Summary    NAME: Tasha Bowers  :  2/10/1928  MRN:  [id number]    Admit date:  2024  Discharge date:  2024    Advance Directive: DNR    Consults: orthopedic surgery    Primary Care Physician:  Gay Pena,     Discharge Diagnoses:  Principal Problem:    Closed right hip fracture, initial encounter ([facility])  Active Problems:  Right humerus fracture  Somnolence  Respiratory depression  Chronic systolic and diastolic heart failure    Anemia    Hypertension    Thyroid disease    Depression    Acute kidney injury superimposed on CKD ([facility])  Resolved Problems:    * No resolved hospital problems. *      Significant Diagnostic Studies:   XR CHEST PORTABLE    Result Date: 2024  EXAM:  SINGLE VIEW OF THE CHEST.  History:  Chest trauma.  FINDINGS:  Heart is enlarged.  Interstitial pulmonary edema and right greater than left bilateral pleural effusions.  No pneumothorax.  Age indeterminate comminuted right humerus fracture.      Impression: 1.  Cardiomegaly with pulmonary edema and right greater than left bilateral pleural effusions. 2.  Comminuted right humerus fracture   ______________________________________ Electronically signed by: BO ROE M.D. Date:     2024 Time:    18:59     CT HEAD WO CONTRAST    Result Date: 2024  EXAM:  CT OF THE HEAD WITHOUT CONTRAST  History:  Head trauma.  Technique:  Multiplanar CT images of the head were obtained without the administration of IV contrast  FINDINGS:  The visualized paranasal sinuses and mastoid air cells are clear in general.  No acute calvarial abnormalities.  Right periorbital soft tissue swelling.  Intracranially there is mild to moderate atrophy.  No dominant mass or midline shift.  No hydrocephalous.  No acute intracranial hemorrhage or abnormal extraaxial fluid collections.  Periventricular and subcortical white matter hypodensities.      Impression: 1.  No acute intracranial process. 2.  Atrophy and chronic small

## 2024-07-17 NOTE — PROGRESS NOTES
Dr. Anderson at bedside. Discussed goals of care with patient family. Patient family wishes for the patient to remain comfortable, and states the bipap can be left on overnight. PRN orders placed by Dr. Anderson. Spoke to hospice on the phone with Dr. Anderson, they will evaluate the patient this evening.

## 2024-08-12 ENCOUNTER — TELEPHONE (OUTPATIENT)
Dept: CARDIOLOGY | Facility: CLINIC | Age: 89
End: 2024-08-12

## 2024-08-12 NOTE — TELEPHONE ENCOUNTER
"    “Please be informed that patient has passed. Patient has been marked  in the system. The date of death is: 2024\".    Caller: Yeni Mixon    Relationship: Emergency Contact    Best call back number: [phone]    Did the patient have surgery within 30 days of their passing (Y/N): *    "

## (undated) DEVICE — ENDOPATH XCEL DILATING TIP TROCARS WITH STABILITY SLEEVES: Brand: ENDOPATH XCEL

## (undated) DEVICE — GLV SURG SENSICARE W/ALOE PF LF 7.5 STRL

## (undated) DEVICE — YANKAUER,BULB TIP WITH VENT: Brand: ARGYLE

## (undated) DEVICE — NDL HYPO PRECISIONGLIDE REG 25G 1 1/2

## (undated) DEVICE — DRSNG SURESITE WNDW 4X4.5

## (undated) DEVICE — ELECTRD BLD EDGE/INSUL1P 2.4X5.1MM STRL

## (undated) DEVICE — PK TURNOVER RM ADV

## (undated) DEVICE — ADHS LIQ MASTISOL 2/3ML

## (undated) DEVICE — CONMED SCOPE SAVER BITE BLOCK, 20X27 MM: Brand: SCOPE SAVER

## (undated) DEVICE — PAD MINOR UNIVERSAL: Brand: MEDLINE INDUSTRIES, INC.

## (undated) DEVICE — PROXIMATE RH ROTATING HEAD SKIN STAPLERS (35 WIDE) CONTAINS 35 STAINLESS STEEL STAPLES: Brand: PROXIMATE

## (undated) DEVICE — SUT SILK 2/0 SH 30IN K833H

## (undated) DEVICE — ENDOPATH XCEL WITH OPTIVIEW TECHNOLOGY UNIVERSAL TROCAR STABILITY SLEEVES: Brand: ENDOPATH XCEL OPTIVIEW

## (undated) DEVICE — SUT PROLN 5/0 C1 DA 24IN 8725H

## (undated) DEVICE — 3M™ STERI-STRIP™ REINFORCED ADHESIVE SKIN CLOSURES, R1547, 1/2 IN X 4 IN (12 MM X 100 MM), 6 STRIPS/ENVELOPE: Brand: 3M™ STERI-STRIP™

## (undated) DEVICE — GLV SURG BIOGEL LTX PF 6 1/2

## (undated) DEVICE — MYNX ACE VASCULAR CLOSURE DEVICE (6F/7F): Brand: MYNX ACE VASCULAR CLOSURE DEVICE (6F/7F)

## (undated) DEVICE — SPNG GZ STRL 2S 4X4 12PLY

## (undated) DEVICE — Device: Brand: DEFENDO AIR/WATER/SUCTION AND BIOPSY VALVE

## (undated) DEVICE — SYR LL TP 10ML STRL

## (undated) DEVICE — CATH FLSH OMNI SFT 5F 90CM

## (undated) DEVICE — TOTAL TRAY, 16FR 10ML SIL FOLEY, URN: Brand: MEDLINE

## (undated) DEVICE — ENDOPATH XCEL WITH OPTIVIEW TECHNOLOGY DILATING TIP TROCARS WITH STABILITY SLEEVES: Brand: ENDOPATH XCEL OPTIVIEW

## (undated) DEVICE — ANTIBACTERIAL UNDYED BRAIDED (POLYGLACTIN 910), SYNTHETIC ABSORBABLE SUTURE: Brand: COATED VICRYL

## (undated) DEVICE — SNAP KOVER: Brand: UNBRANDED

## (undated) DEVICE — MONOPOLAR METZENBAUM SCISSOR, MINI BLADE TIP, DISPOSABLE: Brand: MONOPOLAR METZENBAUM SCISSOR, MINI BLADE TIP, DISPOSABLE

## (undated) DEVICE — ST MIC/INTRO ACC SHRP/NDL TUNG/TP NITNL 5F 45CM 7CM

## (undated) DEVICE — TBG SMPL FLTR LINE NASL 02/C02 A/ BX/100

## (undated) DEVICE — CLTH CLENS READYCLEANSE PERI CARE PK/5

## (undated) DEVICE — THE CHANNEL CLEANING BRUSH IS A NYLON FLEXI BRUSH ATTACHED TO A FLEXIBLE PLASTIC SHEATH DESIGNED TO SAFELY REMOVE DEBRIS FROM FLEXIBLE ENDOSCOPES.

## (undated) DEVICE — MYNXGRIP 5F: Brand: MYNXGRIP

## (undated) DEVICE — ELECTRD L HK EZ CLN 33CM

## (undated) DEVICE — VAGINAL PREP TRAY: Brand: MEDLINE INDUSTRIES, INC.

## (undated) DEVICE — PINNACLE INTRODUCER SHEATH: Brand: PINNACLE

## (undated) DEVICE — ENDOPATH PNEUMONEEDLE INSUFFLATION NEEDLES WITH LUER LOCK CONNECTORS 120MM: Brand: ENDOPATH

## (undated) DEVICE — TOWEL,OR,DSP,ST,BLUE,STD,4/PK,20PK/CS: Brand: MEDLINE

## (undated) DEVICE — CUFF,BP,DISP,1 TUBE,ADULT,HP: Brand: MEDLINE

## (undated) DEVICE — SUT VIC 0 SUTUPAK TIES 18IN J906G

## (undated) DEVICE — PAD ENDOVASCULAR: Brand: MEDLINE INDUSTRIES, INC.

## (undated) DEVICE — SYR CONTRL LUERLOK 10CC

## (undated) DEVICE — RADIFOCUS GLIDEWIRE: Brand: GLIDEWIRE

## (undated) DEVICE — FRCP BX RADJAW4 NDL 2.8 240 STD OG

## (undated) DEVICE — MODEL BT2000 P/N 700287-012KIT CONTENTS: MANIFOLD WITH SALINE AND CONTRAST PORTS, SALINE TUBING WITH SPIKE AND HAND SYRINGE, TRANSDUCER: Brand: BT2000 AUTOMATED MANIFOLD KIT

## (undated) DEVICE — ENDOPOUCH RETRIEVER SPECIMEN RETRIEVAL BAGS: Brand: ENDOPOUCH RETRIEVER

## (undated) DEVICE — BG BANDED WRUBBER BAND AND TP 36X54IN

## (undated) DEVICE — A2000 MULTI-USE SYRINGE KIT, P/N 701277-003KIT CONTENTS: 100ML CONTRAST RESERVOIR AND TUBING WITH CONTRAST SPIKE AND CLAMP: Brand: A2000 MULTI-USE SYRINGE KIT

## (undated) DEVICE — SENSR O2 OXIMAX FNGR A/ 18IN NONSTR

## (undated) DEVICE — APPL CHLORAPREP W/TINT 26ML ORNG

## (undated) DEVICE — PAD LAP CHOLE: Brand: MEDLINE INDUSTRIES, INC.

## (undated) DEVICE — 2, DISPOSABLE SUCTION/IRRIGATOR WITHOUT DISPOSABLE TIP: Brand: STRYKEFLOW

## (undated) DEVICE — SUT MNCRYL 4/0 PS2 27IN UD MCP426H

## (undated) DEVICE — TRY PREP SCRB VAG PVP

## (undated) DEVICE — PAD MAJOR VASCULAR: Brand: MEDLINE INDUSTRIES, INC.

## (undated) DEVICE — DISPOSABLE SPECIMEN RADIOGRAPHY SYSTEM WITH LOCALIZING GRID, 4.65" RADIOLUCENT GRID: Brand: ACCUGRID

## (undated) DEVICE — MODEL AT P65, P/N 701554-001KIT CONTENTS: HAND CONTROLLER, 3-WAY HIGH-PRESSURE STOPCOCK WITH ROTATING END AND PREMIUM HIGH-PRESSURE TUBING: Brand: ANGIOTOUCH® KIT

## (undated) DEVICE — CANN VESL ACRN TP 4MM

## (undated) DEVICE — RADIFOCUS GLIDEWIRE ADVANTAGE GUIDEWIRE: Brand: GLIDEWIRE ADVANTAGE